# Patient Record
Sex: MALE | Race: BLACK OR AFRICAN AMERICAN | Employment: OTHER | URBAN - METROPOLITAN AREA
[De-identification: names, ages, dates, MRNs, and addresses within clinical notes are randomized per-mention and may not be internally consistent; named-entity substitution may affect disease eponyms.]

---

## 2017-01-17 ENCOUNTER — HOSPITAL ENCOUNTER (OUTPATIENT)
Dept: LAB | Age: 67
Discharge: HOME | End: 2017-01-17
Payer: MEDICARE

## 2017-01-17 ENCOUNTER — LAB ONLY (OUTPATIENT)
Dept: INTERNAL MEDICINE CLINIC | Age: 67
End: 2017-01-17

## 2017-01-17 DIAGNOSIS — I10 ESSENTIAL HYPERTENSION: ICD-10-CM

## 2017-01-17 DIAGNOSIS — E11.9 DIABETES MELLITUS WITHOUT COMPLICATION (HCC): ICD-10-CM

## 2017-01-17 DIAGNOSIS — N18.3 CKD (CHRONIC KIDNEY DISEASE), STAGE 3 (MODERATE): ICD-10-CM

## 2017-01-17 DIAGNOSIS — F32.9 REACTIVE DEPRESSION: ICD-10-CM

## 2017-01-17 DIAGNOSIS — M48.02 CERVICAL SPINAL STENOSIS: ICD-10-CM

## 2017-01-17 PROCEDURE — 82043 UR ALBUMIN QUANTITATIVE: CPT

## 2017-01-17 PROCEDURE — 80061 LIPID PANEL: CPT

## 2017-01-17 PROCEDURE — 80053 COMPREHEN METABOLIC PANEL: CPT

## 2017-01-17 PROCEDURE — 36415 COLL VENOUS BLD VENIPUNCTURE: CPT

## 2017-01-17 PROCEDURE — 83036 HEMOGLOBIN GLYCOSYLATED A1C: CPT

## 2017-01-18 LAB
ALBUMIN SERPL-MCNC: 4.5 G/DL (ref 3.6–4.8)
ALBUMIN/CREAT UR: 91.7 MG/G CREAT (ref 0–30)
ALBUMIN/GLOB SERPL: 1.4 {RATIO} (ref 1.1–2.5)
ALP SERPL-CCNC: 57 IU/L (ref 39–117)
ALT SERPL-CCNC: 15 IU/L (ref 0–44)
AST SERPL-CCNC: 20 IU/L (ref 0–40)
BILIRUB SERPL-MCNC: 0.7 MG/DL (ref 0–1.2)
BUN SERPL-MCNC: 26 MG/DL (ref 8–27)
BUN/CREAT SERPL: 20 (ref 10–22)
CALCIUM SERPL-MCNC: 9.6 MG/DL (ref 8.6–10.2)
CHLORIDE SERPL-SCNC: 101 MMOL/L (ref 96–106)
CHOLEST SERPL-MCNC: 213 MG/DL (ref 100–199)
CO2 SERPL-SCNC: 24 MMOL/L (ref 18–29)
CREAT SERPL-MCNC: 1.33 MG/DL (ref 0.76–1.27)
CREAT UR-MCNC: 225.7 MG/DL
EST. AVERAGE GLUCOSE BLD GHB EST-MCNC: 143 MG/DL
GLOBULIN SER CALC-MCNC: 3.3 G/DL (ref 1.5–4.5)
GLUCOSE SERPL-MCNC: 129 MG/DL (ref 65–99)
HBA1C MFR BLD: 6.6 % (ref 4.8–5.6)
HDLC SERPL-MCNC: 68 MG/DL
LDLC SERPL CALC-MCNC: 128 MG/DL (ref 0–99)
MICROALBUMIN UR-MCNC: 207 UG/ML
POTASSIUM SERPL-SCNC: 4.4 MMOL/L (ref 3.5–5.2)
PROT SERPL-MCNC: 7.8 G/DL (ref 6–8.5)
SODIUM SERPL-SCNC: 141 MMOL/L (ref 134–144)
TRIGL SERPL-MCNC: 83 MG/DL (ref 0–149)
VLDLC SERPL CALC-MCNC: 17 MG/DL (ref 5–40)

## 2017-01-19 RX ORDER — LORAZEPAM 0.5 MG/1
TABLET ORAL
Qty: 20 TAB | Refills: 0 | Status: SHIPPED | OUTPATIENT
Start: 2017-01-19 | End: 2017-01-21 | Stop reason: SDUPTHER

## 2017-01-22 RX ORDER — LORAZEPAM 0.5 MG/1
TABLET ORAL
Qty: 20 TAB | Refills: 0 | Status: SHIPPED | OUTPATIENT
Start: 2017-01-22 | End: 2017-02-24 | Stop reason: SDUPTHER

## 2017-01-22 RX ORDER — METOPROLOL SUCCINATE 50 MG/1
TABLET, EXTENDED RELEASE ORAL
Qty: 30 TAB | Refills: 0 | Status: SHIPPED | OUTPATIENT
Start: 2017-01-22 | End: 2017-01-24

## 2017-01-22 RX ORDER — HYDROCHLOROTHIAZIDE 12.5 MG/1
TABLET ORAL
Qty: 30 TAB | Refills: 0 | Status: SHIPPED | OUTPATIENT
Start: 2017-01-22 | End: 2017-01-24

## 2017-01-23 ENCOUNTER — DOCUMENTATION ONLY (OUTPATIENT)
Dept: INTERNAL MEDICINE CLINIC | Age: 67
End: 2017-01-23

## 2017-01-24 ENCOUNTER — OFFICE VISIT (OUTPATIENT)
Dept: INTERNAL MEDICINE CLINIC | Age: 67
End: 2017-01-24

## 2017-01-24 VITALS
WEIGHT: 190 LBS | HEART RATE: 75 BPM | SYSTOLIC BLOOD PRESSURE: 111 MMHG | DIASTOLIC BLOOD PRESSURE: 68 MMHG | OXYGEN SATURATION: 99 % | BODY MASS INDEX: 30.53 KG/M2 | HEIGHT: 66 IN | TEMPERATURE: 97.3 F

## 2017-01-24 DIAGNOSIS — Z13.39 SCREENING FOR ALCOHOLISM: ICD-10-CM

## 2017-01-24 DIAGNOSIS — Z00.00 ROUTINE GENERAL MEDICAL EXAMINATION AT A HEALTH CARE FACILITY: ICD-10-CM

## 2017-01-24 DIAGNOSIS — G47.00 INSOMNIA, UNSPECIFIED TYPE: ICD-10-CM

## 2017-01-24 DIAGNOSIS — I10 ESSENTIAL HYPERTENSION: ICD-10-CM

## 2017-01-24 DIAGNOSIS — E78.00 PURE HYPERCHOLESTEROLEMIA: ICD-10-CM

## 2017-01-24 DIAGNOSIS — E11.9 DIABETES MELLITUS WITHOUT COMPLICATION (HCC): Primary | ICD-10-CM

## 2017-01-24 DIAGNOSIS — R59.9 LYMPH NODE ENLARGEMENT: ICD-10-CM

## 2017-01-24 DIAGNOSIS — F32.9 REACTIVE DEPRESSION: ICD-10-CM

## 2017-01-24 DIAGNOSIS — N18.3 CKD (CHRONIC KIDNEY DISEASE), STAGE 3 (MODERATE): ICD-10-CM

## 2017-01-24 DIAGNOSIS — Z13.31 SCREENING FOR DEPRESSION: ICD-10-CM

## 2017-01-24 DIAGNOSIS — M48.02 CERVICAL SPINAL STENOSIS: ICD-10-CM

## 2017-01-24 RX ORDER — ROSUVASTATIN CALCIUM 10 MG/1
10 TABLET, COATED ORAL
Qty: 30 TAB | Refills: 6 | Status: SHIPPED | OUTPATIENT
Start: 2017-01-24 | End: 2017-09-02 | Stop reason: SDUPTHER

## 2017-01-24 NOTE — PATIENT INSTRUCTIONS
Medicare Part B Preventive Services  Limitations  Recommendation/Date completed if known  Scheduled/ Next Due    Bone Mass Measurement  (age 72 & older, biennial)  Requires diagnosis related to osteoporosis or estrogen deficiency. Biennial benefit unless patient has history of long-term glucocorticoid tx or baseline is needed because initial test was by other method  n/a      Recommended every 2 years  n/a   Cardiovascular Screening Blood Tests (every 5 years)  Total cholesterol, HDL, Triglycerides  Order as a panel if possible  Completed January 2017      Recommended annually  Due January 2018   Colorectal Cancer Screening  -Fecal occult blood test (annual)  -Flexible sigmoidoscopy (5y)  -Screening colonoscopy (10y)  -Barium Enema     Completed:      9/15/15      Recommended every 10 years  DUE:      September 2020 per Dr. Darrian Sun    Counseling to Prevent Tobacco Use (up to 8 sessions per year)  - Counseling greater than 3 and up to 10 minutes  - Counseling greater than 10 minutes  Patients must be asymptomatic of tobacco-related conditions to receive as preventive service     Keep up the good work! Diabetes Screening Tests (at least every 3 years, Medicare covers annually or at 6-month intervals for prediabetic patients)      Fasting blood sugar (FBS) or glucose tolerance test (GTT)     Patient must be diagnosed with one of the following:  -Hypertension, Dyslipidemia, obesity, previous impaired FBS or GTT  Or any two of the following: overweight, FH of diabetes, age ? 72, history of gestational diabetes, birth of baby weighing more than 9 pounds  Completed:      January 2017 A1c 6.6%      Recommended annually  DUE:      Every 3-6 months    Diabetes Self-Management Training (DSMT) (no USPSTF recommendation)  Requires referral by treating physician for patient with diabetes or renal disease. 10 hours of initial DSMT session of no less than 30 minutes each in a continuous 12-month period.  2 hours of follow-up DSMT in subsequent years. n/a n/a   Glaucoma Screening (no USPSTF recommendation)  Diabetes mellitus, family history, , age 48 or over,  American, age 72 or over  Completed April 2016 Dr. Beatriz Yi  Recommended annually  Due April 2017   Human Immunodeficiency Virus (HIV) Screening (annually for increased risk patients)  HIV-1 and HIV-2 by EIA, RUKHSANA, rapid antibody test, or oral mucosa transudate  Patient must be at increased risk for HIV infection per USPSTF guidelines or pregnant. Tests covered annually for patients at increased risk. Pregnant patients may receive up to 3 test during pregnancy. n/a N/a   Medical Nutrition Therapy (MNT) (for diabetes or renal disease not recommended schedule)  Requires referral by treating physician for patient with diabetes or renal disease. Can be provided in same year as diabetes self-management training (DSMT), and CMS recommends medical nutrition therapy take place after DSMT. Up to 3 hours for initial year and 2 hours in subsequent years. n/a n/a   Prostate Cancer Screening (annually up to age 76)  - Digital rectal exam (NORA)  - Prostate specific antigen (PSA)  Annually (age 48 or over), NORA not paid separately when covered E/M service is provided on same date  Completed July 2016      Recommended annually  Due July 2017   Seasonal Influenza Vaccination (annually)     Completed:      Declined  Recommended annually      DUE every Fall    Pneumococcal Vaccination (once after 65)     Completed:   November 2014   You are eligible for Prevnar 13 vaccine. You can obtain this at your local pharmacy   Shingles vaccine         Hepatitis B Vaccinations (if medium/high risk)  Medium/high risk factors: End-stage renal disease,  Hemophiliacs who received Factor VIII or IX concentrates, Clients of institutions for the mentally retarded, Persons who live in the same house as a HepB virus carrier, Homosexual men, Illicit injectable drug abusers.   n/a n/a Ultrasound Screening for Abdominal Aortic Aneurysm (AAA) (once)  Patient must be referred through IPPE and not have had a screening for abdominal aortic aneurysm before under Medicare. Limited to patients who meet one of the following criteria:  - Men who are 73-68 years old and have smoked more than 100 cigarettes in their lifetime.  -Anyone with a FH of AAA  -Anyone recommended for screening by USPSTF  CT from 11/29/14 states:      \"The aorta tapers without aneurysm. \"       Please bring in a copy of your Advance Medical Directive when complete, to be added to your medical record. Thank you!     Change pravachol to crestor    Start januvia 100mg daily

## 2017-01-24 NOTE — PROGRESS NOTES
HISTORY OF PRESENT ILLNESS  Adela Garcia is a 74 y.o. male. HPI   Last here 11/15/16.  Pt is here to f/u on chronic conditions  Pt presents with his daughter who provides some of his hx     BP today is 111/68  Pt has been monitoring BP at home but cannot recall readings  Continues toprol 50mg and HCTZ 12.5mg daily  Advised writing down home readings and bringing these in for review    BS at home running around 120s in the AM fasting  Not currently taking anything for this, had been diet controlled   Discussed his a1c is now in diabetic ranges and will require treatment   I recommend starting januvia daily at this time, very well tolerated, would be once daily, does not cause low blood sugars and is weight neutral, would start renally dosed 50mg daily  Advised working on his diet and working on more diabetic friendly diet     Last visit, I increased his celexa to 20mg daily for depression/anxiety  He states this is improving his mood overall   Pt is happy with this higher dose of celexa, working well for him  Pt has ativan to use prn for anxiety, takes this about 2-3 times per month    Pt follows with Dr. Pretty Wilburn (cardio)   Last saw him in 11/16, will follow annually    Pt reports of knot to his R neck   He noticed this after his neck surgery  Discussed lymph node, likely reactive from surgery  Will order US to evaluate this further     Reviewed last labs 1/17  Cr braden, BS elevated    Continues pravachol 40mg daily for cholesterol- not at goal in January   He misses frequent doses of this per his daughter but pt states he takes every night  Discussed pravachol is a weaker statin and would recommend changing to crestor  His daughter states pt has taken lipitor in the past   Will have him start crestor daily at this time     Wt is stable since last visit   Pt has been eating more the last several months   He had frequent company over the holidays  He has been drinking sodas and eating fast foods  Pt has been eating more fried chicken recently as well   Discussed diet and weight loss   Advised working to cut back on sodas and fast foods, reduce carbohydrates and increase proteins    Pt follows with  [facility] (surg), had surgery to his neck in 11/16  No longer taking any percocet for pain or flexeril muscle relaxer  Pt completed PT for this with improvement   He has f/u pending next week with this physician    Continues trazodone qhs for sleep, this works well for him    No longer taking prilosec for reflux, denies any sxs    Pt follows with Dr. Louisa Mendosa (nephr)  He missed his appt in 12/16 and will reschedule this     Pt's daughter states that pt is very wary of taking medications  When he gets prescriptions from the pharmacy he gets increased anxiety from reading bottles of medications and then is hesitant to take these regularly  Discussed this with patient     Recall sees dr Og Roly h/o dvt, the patient is no longer on coumadin or xarelto- just on asa  Recall was on coumadin for h/o PE and DVT post op.      PREVENTIVE:    Colonoscopy: 9/15/15, Dr. Jose Maldonado, repeat 5 years  EGD 9/15/15, Dr. Jose Maldonado  PSA: 7/15 3.0    AAA screen: CT 11/12, negative  Tdap: unknown   Pneumovax: 12/04/2012  [id number]: never had  Zostavax: never had   Flu shot: declines  Foot exam: 4/16  Microalbumin: 1/17 minimal  A1c: 11/12 12.2, 12/12 11.3 , 10/14 7.1, 7/15 6.3, 9/15 6.4, 12/15 5.9, 4/16 6.2, 10/16 5.9, 1/17 6.6  Eye exam: Dr. Ric Mckoy, 3/16/16, mild diabetic retinopathy  HepC Screen: 10/15 negative  Lipids: 1/17,         Patient Active Problem List    Diagnosis Date Noted    Reactive depression 11/15/2016    Cervical spinal stenosis 11/03/2016    CKD (chronic kidney disease) 10/31/2016    ACP (advance care planning) 12/11/2015    Diabetes mellitus without complication ([facility] [address] 95/20/8008    Hyperlipidemia 07/10/2015    Spinal stenosis, lumbar region, without neurogenic claudication 10/16/2014    Lumbar disc disease with radiculopathy 10/16/2014    DVT of leg (deep venous thrombosis) ([facility] [address] 12/18/2012    HTN (hypertension) 12/12/2012    Microalbuminuria 12/12/2012    Perforated diverticulum of large intestine 12/04/2012    Alcohol abuse 12/04/2012    Back pain 12/04/2012    Insomnia 12/04/2012    Perforated viscus 11/21/2012     Current Outpatient Prescriptions   Medication Sig Dispense Refill    LORazepam (ATIVAN) 0.5 mg tablet TAKE ONE TABLET BY MOUTH NIGHTLY AS NEEDED FOR ANXIETY 20 Tab 0    metoprolol succinate (TOPROL-XL) 50 mg XL tablet TAKE ONE TABLET BY MOUTH ONCE DAILY 30 Tab 0    hydroCHLOROthiazide (HYDRODIURIL) 12.5 mg tablet TAKE ONE TABLET BY MOUTH ONCE DAILY 30 Tab 0    metoprolol succinate (TOPROL-XL) 50 mg XL tablet TAKE ONE TABLET BY MOUTH ONCE DAILY 30 Tab 0    hydroCHLOROthiazide (HYDRODIURIL) 12.5 mg tablet TAKE ONE TABLET BY MOUTH ONCE DAILY 30 Tab 0    glucose blood VI test strips (FREESTYLE INSULINX) strip Check blood sugar once daily. 100 Strip 12    citalopram (CELEXA) 10 mg tablet TAKE ONE TABLET BY MOUTH ONCE DAILY 30 Tab 0    traZODone (DESYREL) 50 mg tablet TAKE ONE TABLET BY MOUTH NIGHTLY. 30 Tab 6    glucose blood VI test strips (FREESTYLE TEST) strip Check blood sugar once daily. 100 Strip 2    lancets (FREESTYLE LANCETS) 28 gauge misc Check blood sugar once daily 100 Lancet 2    lancets (ONE TOUCH DELICA) 33 gauge misc Check blood sugar twice daily. 1 Package 3    glucose blood VI test strips (FREESTYLE LITE STRIPS) strip Check blood sugar once daily. 100 Strip 2    cyclobenzaprine (FLEXERIL) 10 mg tablet Take 1 Tab by mouth two (2) times daily as needed for Muscle Spasm(s). 60 Tab 0    oxyCODONE-acetaminophen (PERCOCET) 5-325 mg per tablet Take 1 Tab by mouth every four (4) hours as needed. Max Daily Amount: 6 Tabs. 30 Tab 0    citalopram (CELEXA) 20 mg tablet Take 1 Tab by mouth daily.  30 Tab 3    Omeprazole delayed release (PRILOSEC D/R) 20 mg tablet Take 1 Tab by mouth daily. (Patient not taking: Reported on 11/4/2016) 30 Tab 1    glucose blood VI test strips (ONETOUCH VERIO) strip Check blood sugar twice daily. 100 Strip 3    Blood-Glucose Meter (ONETOUCH VERIO IQ METER) misc Check blood sugar twice daily. 1 Each 0    pravastatin (PRAVACHOL) 40 mg tablet Take 1 Tab by mouth nightly. 30 Tab 6    cyanocobalamin 1,000 mcg tablet Take 1,000 mcg by mouth daily.  nitroglycerin (NITROSTAT) 0.4 mg SL tablet 1 Tab by SubLINGual route every five (5) minutes as needed for Chest Pain (call 911 if not relieved by 3 tabs). 25 Tab 1    FERROUS FUMARATE (IRON PO) Take  by mouth.  CINNAMON BARK (CINNAMON PO) Take  by mouth daily.  magnesium 250 mg tab Take  by mouth daily.  Blood-Glucose Meter monitoring kit Please use as directed twice daily. 1 Kit 0    glucose blood VI test strips (ASCENSIA AUTODISC VI, ONE TOUCH ULTRA TEST VI) strip Use to check sugars twice daily.  1 Package 11     Past Surgical History   Procedure Laterality Date    Hx gi  11/2012     bowel resection    Pr abdomen surgery proc unlisted       bowel resection    Hx orthopaedic       amputated left 4th finger    Hx back surgery  2014      Lab Results  Component Value Date/Time   WBC 5.4 10/26/2016 09:49 AM   Hemoglobin (POC) 13.3 11/02/2016 07:15 AM   HGB 13.0 10/26/2016 09:49 AM   Hematocrit (POC) 39 11/02/2016 07:15 AM   HCT 39.4 10/26/2016 09:49 AM   PLATELET 187 11/69/3866 09:49 AM   MCV 80.2 10/26/2016 09:49 AM       Lab Results  Component Value Date/Time   Cholesterol, total 213 01/17/2017 09:29 AM   HDL Cholesterol 68 01/17/2017 09:29 AM   LDL, calculated 128 01/17/2017 09:29 AM   Triglyceride 83 01/17/2017 09:29 AM       Lab Results  Component Value Date/Time   GFR est AA 63 01/17/2017 09:29 AM   GFR est non-AA 55 01/17/2017 09:29 AM   Creatinine (POC) 1.4 11/02/2016 07:15 AM   Creatinine 1.33 01/17/2017 09:29 AM   BUN 26 01/17/2017 09:29 AM   BUN (POC) 28 11/02/2016 07:15 AM   Sodium (POC) 140 11/02/2016 07:15 AM   Sodium 141 01/17/2017 09:29 AM   Potassium 4.4 01/17/2017 09:29 AM   Potassium (POC) 4.1 11/02/2016 07:15 AM   Chloride (POC) 105 11/02/2016 07:15 AM   Chloride 101 01/17/2017 09:29 AM   CO2 24 01/17/2017 09:29 AM         Review of Systems   Respiratory: Negative for shortness of breath. Cardiovascular: Negative for chest pain. Physical Exam   Constitutional: He is oriented to person, place, and time. He appears well-developed and well-nourished. No distress. HENT:   Head: Normocephalic and atraumatic. Eyes: Conjunctivae and EOM are normal. Right eye exhibits no discharge. Left eye exhibits no discharge. Neck: Normal range of motion. Neck supple. Cardiovascular: Normal rate, regular rhythm, normal heart sounds and intact distal pulses. Exam reveals no gallop and no friction rub. No murmur heard. Pulmonary/Chest: Effort normal and breath sounds normal. No respiratory distress. He has no wheezes. He has no rales. He exhibits no tenderness. Musculoskeletal: He exhibits no edema, tenderness or deformity. Lymphadenopathy:     He has no cervical adenopathy. Neurological: He is alert and oriented to person, place, and time. He has normal reflexes. Coordination abnormal.   Skin: Skin is warm and dry. No rash noted. He is not diaphoretic. No erythema. No pallor. [place] size lymph node R neck   Psychiatric: He has a normal mood and affect. His behavior is normal.       ASSESSMENT and PLAN    ICD-10-CM ICD-9-CM    1. Diabetes mellitus without complication ([facility] [address]    B5N up in diabetic range again and home readings up as well, wt not significantly changed but diet is quite poor, need to treat at this time,     will give januvia 50mg daily, renally dosed, continue to monitor BS at home   E11.9 250.00    2. Essential hypertension    BP well controlled on HCTZ 12.5mg and toprol 50mg. Continue no change to dose   I10 401.9    3.  Insomnia, unspecified type    Uses trazodone every night, this works well    G47.00 780.52    4. CKD (chronic kidney disease), stage 3 (moderate)    Cr runs from 1.2-1.4 baseline, will repeat BMP today to ensure stable, pt has appt with Dr. Belia Ferguson pending for further evaluation in 2/17, he has seen her in the past but not recently. N18.3 585.3    5. Cervical spinal stenosis    S/p surgical fusion with Dr. Asher Pendleton, overall pain improved, not using narcotics or other pain medications any further. M48.02 723.0    6. Reactive depression    Increased his celexa to 20mg last visit, this worked quite well, happy with dose. Still has some bad days, uses ativan about 2 times per week for his anxiety, will continue to prescribe. F32.9 300.4    7. Pure hypercholesterolemia    Not at goal on pravachol, reports compliance though daughter thinks this is questionable, will change pravachol to crestor 10mg daily, he thinks he had myalgias on lipitor in the past but he is not sure. E78.00 272.0    8. Lymph node enlargement    Check US of lymph node, unclear if reactive from recent surgery, may need biopsy per ENT next, await results. R59.9 785.6 US THYROID/PARATHYROID/SOFT TISS   9. Routine general medical examination at a health care facility Z00.00 V70.0 [address]   10. Screening for alcoholism Z13.89 V79.1    11. Screening for depression Z13.89 V79.0 DEPRESSION SCREEN ANNUAL          Written by Krystina Fernandez, as dictated by Cuca Vergara MD.    Current diagnosis and concerns discussed with pt at length. Understands risks and benefits or current treatment plan and medications and accepts the treatment and medication with any possible risks.   Pt asks appropriate questions which were answered.   Pt instructed to call with any concerns or problems.

## 2017-01-24 NOTE — PROGRESS NOTES
NNAWV    This is a Subsequent Medicare Annual Wellness Visit providing Personalized Prevention Plan Services (PPPS) (Performed 12 months after initial AWV and PPPS )    I have reviewed the patient's medical history in detail and updated the computerized patient record. History     Past Medical History   Diagnosis Date    Arthritis     Chronic kidney disease      Stage 3    Chronic pain      Abdoman, back, fingers per daughter   [facility])      DIET CONTROLLED    Hypertension     PE (pulmonary embolism)     Pneumonia     Psychiatric disorder      Depression      Past Surgical History   Procedure Laterality Date    Hx gi  11/2012     bowel resection    Pr abdomen surgery proc unlisted       bowel resection    Hx orthopaedic       amputated left 4th finger    Hx back surgery  2014     Current Outpatient Prescriptions   Medication Sig Dispense Refill    rosuvastatin (CRESTOR) 10 mg tablet Take 1 Tab by mouth nightly. 30 Tab 6    SITagliptin (JANUVIA) 50 mg tablet Take 1 Tab by mouth daily. 30 Tab 6    LORazepam (ATIVAN) 0.5 mg tablet TAKE ONE TABLET BY MOUTH NIGHTLY AS NEEDED FOR ANXIETY 20 Tab 0    metoprolol succinate (TOPROL-XL) 50 mg XL tablet TAKE ONE TABLET BY MOUTH ONCE DAILY 30 Tab 0    hydroCHLOROthiazide (HYDRODIURIL) 12.5 mg tablet TAKE ONE TABLET BY MOUTH ONCE DAILY 30 Tab 0    traZODone (DESYREL) 50 mg tablet TAKE ONE TABLET BY MOUTH NIGHTLY. 30 Tab 6    citalopram (CELEXA) 20 mg tablet Take 1 Tab by mouth daily. 30 Tab 3    glucose blood VI test strips (ONETOUCH VERIO) strip Check blood sugar twice daily. 100 Strip 3    cyanocobalamin 1,000 mcg tablet Take 1,000 mcg by mouth daily.  FERROUS FUMARATE (IRON PO) Take  by mouth.  magnesium 250 mg tab Take  by mouth daily.  nitroglycerin (NITROSTAT) 0.4 mg SL tablet 1 Tab by SubLINGual route every five (5) minutes as needed for Chest Pain (call 911 if not relieved by 3 tabs).  25 Tab 1     Allergies   Allergen Reactions    Arb-Angiotensin Receptor Antagonist Other (comments)     High k     Family History   Problem Relation Age of Onset    Cancer Mother     Anesth Problems Neg Hx      Social History   Substance Use Topics    Smoking status: Never Smoker    Smokeless tobacco: Current User    Alcohol use 4.2 oz/week     7 Cans of beer per week      Comment: 7-10 per week     Patient Active Problem List   Diagnosis Code    Perforated viscus R19.8    Perforated 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    Alcohol abuse F10.10    Back pain M54.9    Insomnia G47.00    HTN (hypertension) I10    Microalbuminuria R80.9    DVT of leg (deep venous thrombosis) [facility] I82.409    Spinal stenosis, lumbar region, without neurogenic claudication M48.06    Lumbar disc disease with radiculopathy M51.16    Hyperlipidemia E78.5    ACP (advance care planning) Z71.89    Diabetes mellitus without complication ([facility] S35.3    CKD (chronic kidney disease) N18.9    Cervical spinal stenosis M48.02    Reactive depression F32.9       Depression Risk Factor Screening:     PHQ 2 / 9, over the last two weeks 1/24/2017   Little interest or pleasure in doing things Several days   Feeling down, depressed or hopeless Several days   Total Score PHQ 2 2     Alcohol Risk Factor Screening: On any occasion during the past 3 months, have you had more than 4 drinks containing alcohol? Yes    Do you average more than 14 drinks per week? Yes      Functional Ability and Level of Safety:     Hearing Loss   mild-to-moderate    Activities of Daily Living   Partial assistance. Requires assistance with: driving, shopping, cleaning house    Fall Risk     Fall Risk Assessment, last 12 mths 1/24/2017   Able to walk? Yes   Fall in past 12 months?  No     Abuse Screen   Patient is not abused    Review of Systems   Not required    Physical Examination     Evaluation of Cognitive Function:  Mood/affect:  neutral  Appearance: age appropriate and casually dressed  Family member/caregiver input: daughter present. No additional information. No exam performed today, MAWV. Patient Care Team:  Jagjit Rubio MD as PCP - General (Internal Medicine)  Danielle Stewart RN as Nurse Navigator  Duyen Faulkner MD as Physician (Cardiology)  Kennedy Zapata MD (Nephrology)  Nery Marlow MD (Neurosurgery)    Advice/Referrals/Counseling   Education and counseling provided:  Are appropriate based on today's review and evaluation  End-of-Life planning (with patient's consent)  Prostate cancer screening tests (PSA, covered annually)  Colorectal cancer screening tests  Screening for glaucoma  Diabetes screening test    Assessment/Plan   1. Information on Advanced Medical Directive discussed with patient. Daughter states he does have a blank AMD form and has received information on it. States he does intend to complete the form. Asked that patient bring a copy of the completed form to the office. 2.  Exercise level - states he exercises regularly. Has been getting physical therapy, and uses those exercises at home    3. Pt lives alone, but his daughter lives nearby  who is his main support network. Medication reconciliation completed by MA and reviewed by provider. Family history and Care Team reviewed and updated. Patient provided with a copy of After Visit Summary and Preventative Screening table. Patient verbalized understanding of all information discussed. Wilbert Means

## 2017-01-24 NOTE — MR AVS SNAPSHOT
Visit Information Date & Time Provider Department Dept. Phone Encounter #  
 1/24/2017  1:45 PM Shena Dawson, [address] [place] [address],[address] [phone] [id number] Follow-up Instructions Return in about 3 months (around 4/24/2017). Upcoming Health Maintenance Date Due DTaP/Tdap/Td series (1 - Tdap) 7/5/1971 EYE EXAM RETINAL OR DILATED Q1 3/16/2017 FOOT EXAM Q1 4/12/2017 HEMOGLOBIN A1C Q6M 7/17/2017 Pneumococcal 65+ High/Highest Risk (2 of 2 - PPSV23) 12/4/2017 MICROALBUMIN Q1 1/17/2018 LIPID PANEL Q1 1/17/2018 MEDICARE YEARLY EXAM 1/25/2018 GLAUCOMA SCREENING Q2Y 3/16/2018 COLONOSCOPY 9/15/2020 Allergies as of 1/24/2017  Review Complete On: 1/24/2017 By: Shena Dawson MD  
  
 Severity Noted Reaction Type Reactions Arb-angiotensin Receptor Antagonist  07/12/2016    Other (comments) High k  
  
Current Immunizations  Reviewed on 10/17/2014 Name Date Influenza Vaccine 12/4/2012 Pneumococcal Polysaccharide (PPSV-23) 12/4/2012 Pneumococcal Vaccine (Unspecified Type) 11/27/2012 12:16 PM  
  
 Not reviewed this visit You Were Diagnosed With   
  
 Codes Comments Diabetes mellitus without complication ([facility]    -  Primary ICD-10-CM: E11.9 ICD-9-CM: 250.00 Essential hypertension     ICD-10-CM: I10 
ICD-9-CM: 401.9 Insomnia, unspecified type     ICD-10-CM: G47.00 ICD-9-CM: 780.52 CKD (chronic kidney disease), stage 3 (moderate)     ICD-10-CM: N18.3 ICD-9-CM: 179. 3 Cervical spinal stenosis     ICD-10-CM: M48.02 
ICD-9-CM: 723.0 Reactive depression     ICD-10-CM: F32.9 ICD-9-CM: 300.4 Pure hypercholesterolemia     ICD-10-CM: E78.00 ICD-9-CM: 272.0 Lymph node enlargement     ICD-10-CM: R59.9 ICD-9-CM: 785.6 Routine general medical examination at a health care facility     ICD-10-CM: Z00.00 ICD-9-CM: V70.0 Screening for alcoholism     ICD-10-CM: Z13.89 ICD-9-CM: V79.1 Screening for depression     ICD-10-CM: Z13.89 ICD-9-CM: V79.0 Vitals BP Pulse Temp Height(growth percentile) Weight(growth percentile) SpO2  
 111/68 (BP 1 Location: Left arm, BP Patient Position: Sitting) 75 97.3 °F (36.3 °C) (Oral) 5' 5.5\" (1.664 m) 190 lb (86.2 kg) 99% BMI Smoking Status 31.14 kg/m2 Never Smoker BMI and BSA Data Body Mass Index Body Surface Area  
 31.14 kg/m 2 2 m 2 Preferred Pharmacy Pharmacy Name Phone [facility] [address] , [address] [place] [address] [phone] Your Updated Medication List  
  
   
This list is accurate as of: 1/24/17  2:24 PM.  Always use your most recent med list.  
  
  
  
  
 citalopram 20 mg tablet Commonly known as:  [place] Party Take 1 Tab by mouth daily. cyanocobalamin 1,000 mcg tablet Take 1,000 mcg by mouth daily. glucose blood VI test strips strip Commonly known as:  Twyla Cisneros Check blood sugar twice daily. hydroCHLOROthiazide 12.5 mg tablet Commonly known as:  HYDRODIURIL  
TAKE ONE TABLET BY MOUTH ONCE DAILY  
  
 IRON PO Take  by mouth. LORazepam 0.5 mg tablet Commonly known as:  ATIVAN  
TAKE ONE TABLET BY MOUTH NIGHTLY AS NEEDED FOR ANXIETY  
  
 magnesium 250 mg Tab Take  by mouth daily. metoprolol succinate 50 mg XL tablet Commonly known as:  TOPROL-XL  
TAKE ONE TABLET BY MOUTH ONCE DAILY  
  
 nitroglycerin 0.4 mg SL tablet Commonly known as:  NITROSTAT  
1 Tab by SubLINGual route every five (5) minutes as needed for Chest Pain (call 911 if not relieved by 3 tabs). rosuvastatin 10 mg tablet Commonly known as:  CRESTOR Take 1 Tab by mouth nightly. SITagliptin 50 mg tablet Commonly known as:  Yolis Bump Take 1 Tab by mouth daily. traZODone 50 mg tablet Commonly known as:  DESYREL  
TAKE ONE TABLET BY MOUTH NIGHTLY. Prescriptions Sent to Pharmacy Refills rosuvastatin (CRESTOR) 10 mg tablet 6 Sig: Take 1 Tab by mouth nightly. Class: Normal  
 Pharmacy: [facility] [address], [address] Ph #: [phone] Route: Oral  
 SITagliptin (JANUVIA) 50 mg tablet 6 Sig: Take 1 Tab by mouth daily. Class: Normal  
 Pharmacy: [facility] [address], [address] Ph #: [phone] Route: Oral  
  
We Performed the Following Jocelyn  [[id number] [facility]] Follow-up Instructions Return in about 3 months (around 4/24/2017). To-Do List   
 01/24/2017 Imaging:  US THYROID/PARATHYROID/SOFT TISS Patient Instructions Medicare Part B Preventive Services  Limitations  Recommendation/Date completed if known  Scheduled/ Next Due Bone Mass Measurement 
(age 72 & older, biennial)  Requires diagnosis related to osteoporosis or estrogen deficiency. Biennial benefit unless patient has history of long-term glucocorticoid tx or baseline is needed because initial test was by other method  n/a 
   
Recommended every 2 years  n/a Cardiovascular Screening Blood Tests (every 5 years) Total cholesterol, HDL, Triglycerides  Order as a panel if possible  Completed January 2017 
   
Recommended annually  Due January 2018 Colorectal Cancer Screening 
-Fecal occult blood test (annual) -Flexible sigmoidoscopy (5y) 
-Screening colonoscopy (10y) -Barium Enema     Completed: 
   
9/15/15 
   
Recommended every 10 years  DUE: 
   
September 2020 per Dr. Radha Naik   
Counseling to Prevent Tobacco Use (up to 8 sessions per year) - Counseling greater than 3 and up to 10 minutes - Counseling greater than 10 minutes  Patients must be asymptomatic of tobacco-related conditions to receive as preventive service     Keep up the good work!   
Diabetes Screening Tests (at least every 3 years, Medicare covers annually or at 6-month intervals for prediabetic patients) 
   
 Fasting blood sugar (FBS) or glucose tolerance test (GTT) 
   Patient must be diagnosed with one of the following: 
-Hypertension, Dyslipidemia, obesity, previous impaired FBS or GTT 
Or any two of the following: overweight, FH of diabetes, age ? 72, history of gestational diabetes, birth of baby weighing more than 9 pounds  Completed: 
   
January 2017 A1c 6.6% 
   
Recommended annually  DUE: 
   
Every 3-6 months Diabetes Self-Management Training (DSMT) (no USPSTF recommendation)  Requires referral by treating physician for patient with diabetes or renal disease. 10 hours of initial DSMT session of no less than 30 minutes each in a continuous 12-month period. 2 hours of follow-up DSMT in subsequent years. n/a n/a Glaucoma Screening (no USPSTF recommendation)  Diabetes mellitus, family history, , age 48 or over,  American, age 72 or over  Completed April 2016 Dr. Lyssa Loera 
Recommended annually  Due April 2017 Human Immunodeficiency Virus (HIV) Screening (annually for increased risk patients) HIV-1 and HIV-2 by EIA, RUKHSANA, rapid antibody test, or oral mucosa transudate  Patient must be at increased risk for HIV infection per USPSTF guidelines or pregnant. Tests covered annually for patients at increased risk. Pregnant patients may receive up to 3 test during pregnancy. n/a N/a Medical Nutrition Therapy (MNT) (for diabetes or renal disease not recommended schedule)  Requires referral by treating physician for patient with diabetes or renal disease. Can be provided in same year as diabetes self-management training (DSMT), and CMS recommends medical nutrition therapy take place after DSMT. Up to 3 hours for initial year and 2 hours in subsequent years. n/a n/a Prostate Cancer Screening (annually up to age 76) - Digital rectal exam (NORA) - Prostate specific antigen (PSA)  Annually (age 48 or over), NORA not paid separately when covered E/M service is provided on same date  Completed July 2016 
   
Recommended annually  Due July 2017 Seasonal Influenza Vaccination (annually)     Completed: 
   
Declined  Recommended annually 
   
DUE every Fall Pneumococcal Vaccination (once after 65)     Completed:  
November 2014   You are eligible for Prevnar 13 vaccine. You can obtain this at your local pharmacy Shingles vaccine        
Hepatitis B Vaccinations (if medium/high risk)  Medium/high risk factors: End-stage renal disease, Hemophiliacs who received Factor VIII or IX concentrates, Clients of institutions for the mentally retarded, Persons who live in the same house as a HepB virus carrier, Homosexual men, Illicit injectable drug abusers. n/a n/a Ultrasound Screening for Abdominal Aortic Aneurysm (AAA) (once)  Patient must be referred through IPPE and not have had a screening for abdominal aortic aneurysm before under Medicare. Limited to patients who meet one of the following criteria: 
- Men who are 73-68 years old and have smoked more than 100 cigarettes in their lifetime. 
-Anyone with a FH of AAA 
-Anyone recommended for screening by USPSTF  CT from 11/29/14 states: 
   
\"The aorta tapers without aneurysm. \" Please bring in a copy of your Advance Medical Directive when complete, to be added to your medical record. Thank you! Change pravachol to crestor Start januvia 100mg daily Introducing [facility]! Brett Nolan introduces [employer] patient portal. Now you can access parts of your medical record, email your doctor's office, and request medication refills online. 1. In your internet browser, go to [email or web address] 2. Click on the First Time User? Click Here link in the Sign In box. You will see the New Member Sign Up page. 3. Enter your [employer] Access Code exactly as it appears below.  You will not need to use this code after youve completed the sign-up process. If you do not sign up before the expiration date, you must request a new code. · [employer] Access Code: [email or web address] Expires: 1/29/2017  8:48 AM 
 
4. Enter the last four digits of your Social Security Number (xxxx) and Date of Birth (mm/dd/yyyy) as indicated and click Submit. You will be taken to the next sign-up page. 5. Create a [employer] ID. This will be your [employer] login ID and cannot be changed, so think of one that is secure and easy to remember. 6. Create a [employer] password. You can change your password at any time. 7. Enter your Password Reset Question and Answer. This can be used at a later time if you forget your password. 8. Enter your e-mail address. You will receive e-mail notification when new information is available in [address] 9. Click Sign Up. You can now view and download portions of your medical record. 10. Click the Download Summary menu link to download a portable copy of your medical information. If you have questions, please visit the Frequently Asked Questions section of the [employer] website. Remember, [employer] is NOT to be used for urgent needs. For medical emergencies, dial 911. Now available from your iPhone and Android! Please provide this summary of care documentation to your next provider. Your primary care clinician is listed as Dee Burleson. If you have any questions after today's visit, please call [phone].

## 2017-01-31 ENCOUNTER — HOSPITAL ENCOUNTER (OUTPATIENT)
Dept: ULTRASOUND IMAGING | Age: 67
Discharge: HOME | End: 2017-01-31
Attending: INTERNAL MEDICINE
Payer: MEDICARE

## 2017-01-31 DIAGNOSIS — R59.9 LYMPH NODE ENLARGEMENT: ICD-10-CM

## 2017-01-31 PROCEDURE — 76536 US EXAM OF HEAD AND NECK: CPT

## 2017-01-31 NOTE — PROGRESS NOTES
Mild enlargement of lymph node on US please send to ENT for further eval--dr Ramin Deleon    Fax copy US to their office as well

## 2017-02-01 DIAGNOSIS — R59.9 ENLARGED LYMPH NODE: Primary | ICD-10-CM

## 2017-02-01 NOTE — PROGRESS NOTES
Spoke to [employer] (HIPAA). Two pt identifiers confirmed. Mary informed Mild enlargement of lymph node on US please send to ENT for further eval--dr abebe. Mary given contact info for Dr. Francia Jean. Farrukh Hassan informed that US results will be faxed to ENT. Farrukh Hassan verbalized understanding of information discussed w/ no further questions at this time. Results faxed to ENT and referral placed.

## 2017-02-13 ENCOUNTER — HOSPITAL ENCOUNTER (OUTPATIENT)
Dept: CT IMAGING | Age: 67
Discharge: HOME | End: 2017-02-13
Attending: OTOLARYNGOLOGY
Payer: MEDICARE

## 2017-02-13 DIAGNOSIS — R22.1 NECK MASS: ICD-10-CM

## 2017-02-13 PROCEDURE — 74011636320 HC RX REV CODE- 636/320: Performed by: OTOLARYNGOLOGY

## 2017-02-13 PROCEDURE — 70491 CT SOFT TISSUE NECK W/DYE: CPT

## 2017-02-13 PROCEDURE — 74011250636 HC RX REV CODE- 250/636: Performed by: OTOLARYNGOLOGY

## 2017-02-13 RX ORDER — SODIUM CHLORIDE 0.9 % (FLUSH) 0.9 %
10 SYRINGE (ML) INJECTION
Status: COMPLETED | OUTPATIENT
Start: 2017-02-13 | End: 2017-02-13

## 2017-02-13 RX ORDER — SODIUM CHLORIDE 9 MG/ML
50 INJECTION, SOLUTION INTRAVENOUS
Status: COMPLETED | OUTPATIENT
Start: 2017-02-13 | End: 2017-02-13

## 2017-02-13 RX ADMIN — IOPAMIDOL 100 ML: 612 INJECTION, SOLUTION INTRAVENOUS at 08:22

## 2017-02-13 RX ADMIN — Medication 10 ML: at 08:22

## 2017-02-13 RX ADMIN — SODIUM CHLORIDE 50 ML/HR: 900 INJECTION, SOLUTION INTRAVENOUS at 08:22

## 2017-02-19 RX ORDER — METOPROLOL SUCCINATE 50 MG/1
TABLET, EXTENDED RELEASE ORAL
Qty: 30 TAB | Refills: 0 | Status: SHIPPED | OUTPATIENT
Start: 2017-02-19 | End: 2017-03-24 | Stop reason: SDUPTHER

## 2017-02-19 RX ORDER — HYDROCHLOROTHIAZIDE 12.5 MG/1
TABLET ORAL
Qty: 30 TAB | Refills: 0 | Status: SHIPPED | OUTPATIENT
Start: 2017-02-19 | End: 2017-03-24 | Stop reason: SDUPTHER

## 2017-02-21 NOTE — TELEPHONE ENCOUNTER
Pt needs test strips for the Freestyle machine, not the one touch. Please call [facility] [place] for further details as I don't show it on here?

## 2017-02-21 NOTE — TELEPHONE ENCOUNTER
I called and spoke with Richard Lombardi. She states patient needs Freestlye Insulinx test strips. She states patient checks 2-3 times daily. Rx pended.

## 2017-02-25 RX ORDER — LORAZEPAM 0.5 MG/1
TABLET ORAL
Qty: 20 TAB | Refills: 0 | Status: SHIPPED | OUTPATIENT
Start: 2017-02-25 | End: 2017-03-24 | Stop reason: SDUPTHER

## 2017-03-24 RX ORDER — HYDROCHLOROTHIAZIDE 12.5 MG/1
TABLET ORAL
Qty: 30 TAB | Refills: 0 | Status: SHIPPED | OUTPATIENT
Start: 2017-03-24 | End: 2017-04-25 | Stop reason: SDUPTHER

## 2017-03-24 RX ORDER — METOPROLOL SUCCINATE 50 MG/1
TABLET, EXTENDED RELEASE ORAL
Qty: 30 TAB | Refills: 0 | Status: SHIPPED | OUTPATIENT
Start: 2017-03-24 | End: 2017-04-25 | Stop reason: SDUPTHER

## 2017-03-24 RX ORDER — LORAZEPAM 0.5 MG/1
TABLET ORAL
Qty: 20 TAB | Refills: 0 | Status: SHIPPED | OUTPATIENT
Start: 2017-03-24 | End: 2017-05-04 | Stop reason: SDUPTHER

## 2017-04-05 ENCOUNTER — PATIENT OUTREACH (OUTPATIENT)
Dept: INTERNAL MEDICINE CLINIC | Age: 67
End: 2017-04-05

## 2017-04-05 NOTE — PROGRESS NOTES
NN - progress note:     Goals completed. Navigation type closed. Episode resolved.    Care Team updated

## 2017-04-26 RX ORDER — HYDROCHLOROTHIAZIDE 12.5 MG/1
TABLET ORAL
Qty: 30 TAB | Refills: 0 | Status: SHIPPED | OUTPATIENT
Start: 2017-04-26 | End: 2017-05-09 | Stop reason: SDUPTHER

## 2017-04-26 RX ORDER — METOPROLOL SUCCINATE 50 MG/1
TABLET, EXTENDED RELEASE ORAL
Qty: 30 TAB | Refills: 0 | Status: SHIPPED | OUTPATIENT
Start: 2017-04-26 | End: 2017-05-09 | Stop reason: SDUPTHER

## 2017-05-05 RX ORDER — LORAZEPAM 0.5 MG/1
TABLET ORAL
Qty: 20 TAB | Refills: 0 | Status: SHIPPED | OUTPATIENT
Start: 2017-05-05 | End: 2017-07-04 | Stop reason: SDUPTHER

## 2017-05-05 NOTE — TELEPHONE ENCOUNTER
The following prescription was called into pt's local pharmacy:    LORazepam (ATIVAN) 0.5 mg tablet [[id number]]   Order Details   Dose, Route, Frequency: As Directed    Dispense Quantity:  20 Tab Refills:  0 Fills Remaining:  --           Sig: TAKE ONE TABLET BY MOUTH NIGHTLY AS NEEDED FOR ANXIETY          Written Date:  05/05/17 Expiration Date:  --     Start Date:  05/05/17 End Date:  --            Ordering Provider:  -- ALEXX #:  -- NPI:  --    Authorizing Provider:  Mauro Mueller MD ALEXX #:  [id number] NPI:  [id number]       Original Order:  LORazepam (ATIVAN) 0.5 mg tablet [[id number]]      Pharmacy:  [address] #:  --     Pharmacy Comments:  --          Quantity Remaining:  -- Quantity Filled:  --

## 2017-05-09 ENCOUNTER — OFFICE VISIT (OUTPATIENT)
Dept: INTERNAL MEDICINE CLINIC | Age: 67
End: 2017-05-09

## 2017-05-09 ENCOUNTER — HOSPITAL ENCOUNTER (OUTPATIENT)
Dept: LAB | Age: 67
Discharge: HOME | End: 2017-05-09
Payer: MEDICARE

## 2017-05-09 VITALS
TEMPERATURE: 97.7 F | DIASTOLIC BLOOD PRESSURE: 83 MMHG | HEIGHT: 65 IN | BODY MASS INDEX: 31.59 KG/M2 | OXYGEN SATURATION: 99 % | WEIGHT: 189.6 LBS | SYSTOLIC BLOOD PRESSURE: 132 MMHG | HEART RATE: 75 BPM | RESPIRATION RATE: 16 BRPM

## 2017-05-09 DIAGNOSIS — I10 ESSENTIAL HYPERTENSION: ICD-10-CM

## 2017-05-09 DIAGNOSIS — F32.9 REACTIVE DEPRESSION: ICD-10-CM

## 2017-05-09 DIAGNOSIS — F41.9 ANXIETY: ICD-10-CM

## 2017-05-09 DIAGNOSIS — N18.3 CKD (CHRONIC KIDNEY DISEASE), STAGE 3 (MODERATE): ICD-10-CM

## 2017-05-09 DIAGNOSIS — E11.9 DIABETES MELLITUS WITHOUT COMPLICATION (HCC): Primary | ICD-10-CM

## 2017-05-09 DIAGNOSIS — E78.00 PURE HYPERCHOLESTEROLEMIA: ICD-10-CM

## 2017-05-09 DIAGNOSIS — G47.00 INSOMNIA, UNSPECIFIED TYPE: ICD-10-CM

## 2017-05-09 DIAGNOSIS — R97.20 PSA ELEVATION: ICD-10-CM

## 2017-05-09 DIAGNOSIS — G62.9 NEUROPATHY: ICD-10-CM

## 2017-05-09 PROCEDURE — 80061 LIPID PANEL: CPT

## 2017-05-09 PROCEDURE — 84443 ASSAY THYROID STIM HORMONE: CPT

## 2017-05-09 PROCEDURE — 36415 COLL VENOUS BLD VENIPUNCTURE: CPT

## 2017-05-09 PROCEDURE — 84153 ASSAY OF PSA TOTAL: CPT

## 2017-05-09 PROCEDURE — 80053 COMPREHEN METABOLIC PANEL: CPT

## 2017-05-09 PROCEDURE — 83036 HEMOGLOBIN GLYCOSYLATED A1C: CPT

## 2017-05-09 RX ORDER — GABAPENTIN 300 MG/1
300 CAPSULE ORAL
Qty: 30 CAP | Refills: 6 | Status: SHIPPED | OUTPATIENT
Start: 2017-05-09 | End: 2017-11-22 | Stop reason: SDUPTHER

## 2017-05-09 NOTE — PROGRESS NOTES
HISTORY OF PRESENT ILLNESS  Jami Brush is a 74 y.o. male. HPI   Last here 1/24/17.  Pt is here to f/u on chronic conditions  He presents with his daughter who provides some of his hx    BP today is 132/82  Pt has been monitoring BP at home but cannot recall readings  Continues toprol 50mg and HCTZ 12.5mg daily  Advised writing down home readings and bringing these in for review     BS at home running around 115, 116, 120s, 130s  Last visit, I started him on januvia 50mg daily   However, he notes he is taking this only when he remembers   Pt takes this on average about 5-6 days per week   Advised working on compliance with this   His daughter wonders about diabetic shoes- refer to podiatry     Continues celexa 20mg daily for depression/anxiety, wroks well, happy with dose   He has ativan to use prn for anxiety, uses about a few days per week   Discussed to avoid taking ativan every day, discussed addictive potential  Pt does continue to feel sad and down occasionally but overall doing well   Pt only feels sad when he lets himself sit around and think too much      Pt follows with Dr. Sabine Larson (cardio)   Last saw him in 11/16, will follow annually    Reviewed last labs 1/17  Repeat labs today     Wt is stable since last visit   Discussed diet and weight loss     Pt follows with Dr. Emerita Haley (nephr)--but has not seen her recently   He missed his appt in 12/16     Last visit, I changed his cholesterol medication from pravachol to crestor   Currently taking crestor 10mg daily and reports compliance, tolerating well   He may have missed a few doses since last visit     Pt had an enlarged lymph node to his neck last visit  Reviewed US thyroid: enlarged lymph node by L parotid gland   Reviewed CT neck: no persistent prominence of lymph node   Reviewed notes per Dr. Nely Alberts (ENT) 3/07/17: not a concern, no f/u needed   Will not see ENT further for now    Continues trazodone qhs for sleep   His daughter states he complains to her about sleep   Pt notes he can fall asleep but wakes up frequently   Advised focusing on sleep hygiene to improve this further     Pt drinks beer but not on daily basis   He states he primarily drinks when he has company over  He also drinks wine occasionally with company   Discussed he cannot drink alcohol and take ativan   Denies drinking in conjunction with this medication    Since last visit, pt saw Dr. Chilango Gibbons (neurosurg) to f/u from neck surgery   He had neck surgery completed per this physician in 11/16  He continues to have pain in his fingers and has not noticed much improvement   Pt has completed PT for this previously and is not taking anything for his pain   Per his daughter, Dr. Chilango Gibbons does not suspect much improvement in some of his chronic sx  Will start gabapentin qhs to improve sx and may help him sleep as well   Of note, I have started gabapentin previously but he was too concerned about kidney function- assured him that I monitor kidney function quite closely, is okay to take this     No longer taking prilosec for reflux, denies any sxs    Denies any recent falls at home  He does struggle with balance occasionally   Pt denies this but per daughter's report he has come near falling     Pt c/o burning and tingling to toes occasionally   He has this more after walking more around his house  He states this feels like a razor cutting him   Starting gabapentin which should help this     Recall sees Dr. Keith Friends for h/o DVT, no longer on coumadin or xarelto, on ASA only.  Was on coumadin for h/o PE and DVT post operatively         PREVENTIVE:    Colonoscopy: 9/15/15, Dr. Dee Dee Alonzo, repeat 5 years  EGD: 9/15/15, Dr. Dee Dee Alonzo  PSA: 7/15 3.0  , 7/16  AAA screen: CT 11/12, negative  Tdap: unknown   Pneumovax: 12/04/2012  [id number]: never had  Zostavax: never had   Flu shot: declines  Foot exam: 5/09/17   Microalbumin: 1/17 minimal  A1c: 11/12 12.2, 12/12 11.3 , 10/14 7.1, 7/15 6.3, 9/15 6.4, 12/15 5.9, 4/16 6.2, 10/16 5.9, 1/17 6.6  Eye exam: Dr. Ishmael Narayan (retired) 3/16/16, mild diabetic retinopathy, scheduled 5/31/17 with Dr. Hernandez Back: 10/15 negative  Lipids: 1/17         Patient Active Problem List    Diagnosis Date Noted    Reactive depression 11/15/2016    Cervical spinal stenosis 11/03/2016    CKD (chronic kidney disease) 10/31/2016    ACP (advance care planning) 12/11/2015    Diabetes mellitus without complication ([facility] 61/69/0231    Hyperlipidemia 07/10/2015    Spinal stenosis, lumbar region, without neurogenic claudication 10/16/2014    Lumbar disc disease with radiculopathy 10/16/2014    DVT of leg (deep venous thrombosis) ([facility] 12/18/2012    HTN (hypertension) 12/12/2012    Microalbuminuria 12/12/2012    Perforated diverticulum of large intestine 12/04/2012    Alcohol abuse 12/04/2012    Back pain 12/04/2012    Insomnia 12/04/2012    Perforated viscus 11/21/2012     Current Outpatient Prescriptions   Medication Sig Dispense Refill    LORazepam (ATIVAN) 0.5 mg tablet TAKE ONE TABLET BY MOUTH NIGHTLY AS NEEDED FOR ANXIETY 20 Tab 0    glucose blood VI test strips (FREESTYLE INSULINX TEST STRIPS) strip Check blood sugars 2-3 times daily. ICD-10: E11.9 100 Strip 0    rosuvastatin (CRESTOR) 10 mg tablet Take 1 Tab by mouth nightly. 30 Tab 6    SITagliptin (JANUVIA) 50 mg tablet Take 1 Tab by mouth daily. 30 Tab 6    metoprolol succinate (TOPROL-XL) 50 mg XL tablet TAKE ONE TABLET BY MOUTH ONCE DAILY 30 Tab 0    hydroCHLOROthiazide (HYDRODIURIL) 12.5 mg tablet TAKE ONE TABLET BY MOUTH ONCE DAILY 30 Tab 0    traZODone (DESYREL) 50 mg tablet TAKE ONE TABLET BY MOUTH NIGHTLY. 30 Tab 6    citalopram (CELEXA) 20 mg tablet Take 1 Tab by mouth daily. 30 Tab 3    glucose blood VI test strips (ONETOUCH VERIO) strip Check blood sugar twice daily. 100 Strip 3    cyanocobalamin 1,000 mcg tablet Take 1,000 mcg by mouth daily.       FERROUS FUMARATE (IRON PO) Take  by mouth.  magnesium 250 mg tab Take  by mouth daily.  nitroglycerin (NITROSTAT) 0.4 mg SL tablet 1 Tab by SubLINGual route every five (5) minutes as needed for Chest Pain (call 911 if not relieved by 3 tabs). 25 Tab 1     Past Surgical History:   Procedure Laterality Date    ABDOMEN SURGERY PROC UNLISTED      bowel resection    HX BACK SURGERY  2014    HX GI  11/2012    bowel resection    HX ORTHOPAEDIC      amputated left 4th finger      Lab Results  Component Value Date/Time   WBC 5.4 10/26/2016 09:49 AM   Hemoglobin (POC) 13.3 11/02/2016 07:15 AM   HGB 13.0 10/26/2016 09:49 AM   Hematocrit (POC) 39 11/02/2016 07:15 AM   HCT 39.4 10/26/2016 09:49 AM   PLATELET 530 01/95/7180 09:49 AM   MCV 80.2 10/26/2016 09:49 AM       Lab Results  Component Value Date/Time   Cholesterol, total 213 01/17/2017 09:29 AM   HDL Cholesterol 68 01/17/2017 09:29 AM   LDL, calculated 128 01/17/2017 09:29 AM   Triglyceride 83 01/17/2017 09:29 AM       Lab Results  Component Value Date/Time   GFR est AA 63 01/17/2017 09:29 AM   GFR est non-AA 55 01/17/2017 09:29 AM   Creatinine (POC) 1.4 11/02/2016 07:15 AM   Creatinine 1.33 01/17/2017 09:29 AM   BUN 26 01/17/2017 09:29 AM   BUN (POC) 28 11/02/2016 07:15 AM   Sodium (POC) 140 11/02/2016 07:15 AM   Sodium 141 01/17/2017 09:29 AM   Potassium 4.4 01/17/2017 09:29 AM   Potassium (POC) 4.1 11/02/2016 07:15 AM   Chloride (POC) 105 11/02/2016 07:15 AM   Chloride 101 01/17/2017 09:29 AM   CO2 24 01/17/2017 09:29 AM         Review of Systems   Respiratory: Negative for shortness of breath. Cardiovascular: Negative for chest pain. Physical Exam   Constitutional: He is oriented to person, place, and time. He appears well-developed and well-nourished. No distress. HENT:   Head: Normocephalic and atraumatic. Mouth/Throat: Oropharynx is clear and moist. No oropharyngeal exudate. Eyes: Conjunctivae and EOM are normal. Right eye exhibits no discharge.  Left eye exhibits no discharge. Neck: Normal range of motion. Neck supple. Cardiovascular: Normal rate, regular rhythm, normal heart sounds and intact distal pulses. Exam reveals no gallop and no friction rub. No murmur heard. Pulmonary/Chest: Effort normal and breath sounds normal. No respiratory distress. He has no wheezes. He has no rales. He exhibits no tenderness. Musculoskeletal: He exhibits edema. He exhibits no tenderness or deformity. Lymphadenopathy:     He has no cervical adenopathy. Neurological: He is alert and oriented to person, place, and time. He has normal reflexes. Coordination abnormal.   Monofilament nl BLE, good peripheral pulses, no ulcers       Skin: Skin is warm and dry. No rash noted. He is not diaphoretic. No erythema. No pallor. Calluses and hammertoes BLE   Psychiatric: He has a normal mood and affect. His behavior is normal.       ASSESSMENT and PLAN    ICD-10-CM ICD-9-CM    1. Diabetes mellitus without complication ([address]    Controlled on januvia 50mg daily, renally dosed, however, he is only sporadically taking this. Advised compliance. Eye exam scheduled T83.4 008.44 METABOLIC PANEL, COMPREHENSIVE      HEMOGLOBIN A1C WITH EAG      LIPID PANEL      TSH 3RD GENERATION      PROSTATE SPECIFIC AG (PSA)      HM DIABETES FOOT EXAM      AMB SUPPLY ORDER      REFERRAL TO PODIATRY   2. CKD (chronic kidney disease), stage 3 (moderate)    Cr runs around 1.2-1.4 baseline, repeat BMP today to ensure stable. A99.6 776.4 METABOLIC PANEL, COMPREHENSIVE      HEMOGLOBIN A1C WITH EAG      LIPID PANEL      TSH 3RD GENERATION      PROSTATE SPECIFIC AG (PSA)   3. Pure hypercholesterolemia    Now on crestor 10mg, reports compliance, repeat lipids and adjust dosing as needed. Q39.46 692.2 METABOLIC PANEL, COMPREHENSIVE      HEMOGLOBIN A1C WITH EAG      LIPID PANEL      TSH 3RD GENERATION      PROSTATE SPECIFIC AG (PSA)   4. Essential hypertension    Controlled on HCTZ 12.5mg and toprol 50mg daily.  Continue Y43 962.3 METABOLIC PANEL, COMPREHENSIVE      HEMOGLOBIN A1C WITH EAG      LIPID PANEL      TSH 3RD GENERATION      PROSTATE SPECIFIC AG (PSA)   5. Reactive depression    Doing well on celexa 20mg daily, continue. He continues to use ativan several nights per week for acute anxiety. Denies drinking alcohol at the same time. Q63.4 323.4 METABOLIC PANEL, COMPREHENSIVE      HEMOGLOBIN A1C WITH EAG      LIPID PANEL      TSH 3RD GENERATION      PROSTATE SPECIFIC AG (PSA)   6. Anxiety    See above I20.0 716.46 METABOLIC PANEL, COMPREHENSIVE      HEMOGLOBIN A1C WITH EAG      LIPID PANEL      TSH 3RD GENERATION      PROSTATE SPECIFIC AG (PSA)   7. PSA elevation    Last couple PSAs just under 3, repeat today to ensure stable. C96.18 612.03 METABOLIC PANEL, COMPREHENSIVE      HEMOGLOBIN A1C WITH EAG      LIPID PANEL      TSH 3RD GENERATION      PROSTATE SPECIFIC AG (PSA)   8. Insomnia, unspecified type    Chronic issue, uses trazodone which helps a little bit, discussed sleep hygiene. Will be giving gabapentin for neuropathy which may help with sleep as well    G47.00 780.52    9. Neuropathy    Gabapentin 300mg qhs to help with finger tingling and foot pain G62.9 355.9 AMB SUPPLY ORDER      REFERRAL TO PODIATRY          Written by Lupillo Dan, as dictated by Sherrie Sam MD.    Current diagnosis and concerns discussed with pt at length. Understands risks and benefits or current treatment plan and medications and accepts the treatment and medication with any possible risks.   Pt asks appropriate questions which were answered.   Pt instructed to call with any concerns or problems. This note will not be viewable in [address]

## 2017-05-09 NOTE — MR AVS SNAPSHOT
Visit Information Date & Time Provider Department Dept. Phone Encounter #  
 5/9/2017  1:30 PM Ever Spatz, [address] [place] [address],[address] [phone] [id number] Follow-up Instructions Return in about 3 months (around 8/9/2017). Upcoming Health Maintenance Date Due DTaP/Tdap/Td series (1 - Tdap) 7/5/1971 EYE EXAM RETINAL OR DILATED Q1 3/16/2017 FOOT EXAM Q1 4/12/2017 HEMOGLOBIN A1C Q6M 7/17/2017 INFLUENZA AGE 9 TO ADULT 8/1/2017 Pneumococcal 65+ Low/Medium Risk (2 of 2 - PPSV23) 12/4/2017 MICROALBUMIN Q1 1/17/2018 LIPID PANEL Q1 1/17/2018 MEDICARE YEARLY EXAM 1/25/2018 GLAUCOMA SCREENING Q2Y 3/16/2018 COLONOSCOPY 9/15/2020 Allergies as of 5/9/2017  Review Complete On: 1/24/2017 By: Ever Spatz, MD  
  
 Severity Noted Reaction Type Reactions Arb-angiotensin Receptor Antagonist  07/12/2016    Other (comments) High k  
  
Current Immunizations  Reviewed on 10/17/2014 Name Date Influenza Vaccine 12/4/2012 Pneumococcal Polysaccharide (PPSV-23) 12/4/2012 Pneumococcal Vaccine (Unspecified Type) 11/27/2012 12:16 PM  
  
 Not reviewed this visit You Were Diagnosed With   
  
 Codes Comments Diabetes mellitus without complication ([facility]    -  Primary ICD-10-CM: E11.9 ICD-9-CM: 250.00 CKD (chronic kidney disease), stage 3 (moderate)     ICD-10-CM: N18.3 ICD-9-CM: 740. 3 Pure hypercholesterolemia     ICD-10-CM: E78.00 ICD-9-CM: 272.0 Essential hypertension     ICD-10-CM: I10 
ICD-9-CM: 401.9 Reactive depression     ICD-10-CM: F32.9 ICD-9-CM: 300.4 Anxiety     ICD-10-CM: F41.9 ICD-9-CM: 300.00   
 PSA elevation     ICD-10-CM: R97.20 ICD-9-CM: 790.93 Insomnia, unspecified type     ICD-10-CM: G47.00 ICD-9-CM: 780.52 Neuropathy     ICD-10-CM: G62.9 ICD-9-CM: 359. 9 Vitals BP Pulse Temp Resp Height(growth percentile) Weight(growth percentile) 132/83 (BP 1 Location: Left arm, BP Patient Position: Sitting) 75 97.7 °F (36.5 °C) (Oral) 16 5' 5\" (1.651 m) 189 lb 9.6 oz (86 kg) SpO2 BMI Smoking Status 99% 31.55 kg/m2 Never Smoker Vitals History BMI and BSA Data Body Mass Index Body Surface Area 31.55 kg/m 2 1.99 m 2 Preferred Pharmacy Pharmacy Name Phone [facility] [address] [phone] Your Updated Medication List  
  
   
This list is accurate as of: 5/9/17  1:49 PM.  Always use your most recent med list.  
  
  
  
  
 citalopram 20 mg tablet Commonly known as:  Abel Flatness Take 1 Tab by mouth daily. cyanocobalamin 1,000 mcg tablet Take 1,000 mcg by mouth daily. gabapentin 300 mg capsule Commonly known as:  NEURONTIN Take 1 Cap by mouth nightly as needed. glucose blood VI test strips strip Commonly known as:  FREESTYLE INSULINX TEST STRIPS Check blood sugars 2-3 times daily. ICD-10: E11.9  
  
 hydroCHLOROthiazide 12.5 mg tablet Commonly known as:  HYDRODIURIL  
TAKE ONE TABLET BY MOUTH ONCE DAILY  
  
 IRON PO Take  by mouth. LORazepam 0.5 mg tablet Commonly known as:  ATIVAN  
TAKE ONE TABLET BY MOUTH NIGHTLY AS NEEDED FOR ANXIETY  
  
 magnesium 250 mg Tab Take  by mouth daily. metoprolol succinate 50 mg XL tablet Commonly known as:  TOPROL-XL  
TAKE ONE TABLET BY MOUTH ONCE DAILY  
  
 rosuvastatin 10 mg tablet Commonly known as:  CRESTOR Take 1 Tab by mouth nightly. SITagliptin 50 mg tablet Commonly known as:  Jennifer Cowboy Take 1 Tab by mouth daily. traZODone 50 mg tablet Commonly known as:  DESYREL  
TAKE ONE TABLET BY MOUTH NIGHTLY. Prescriptions Sent to Pharmacy Refills  
 gabapentin (NEURONTIN) 300 mg capsule 6 Sig: Take 1 Cap by mouth nightly as needed.   
 Class: Normal  
 Pharmacy: [facility] [address], [address] [place] [address]  [place]  #: [phone] Route: Oral  
  
We Performed the Following AMB SUPPLY ORDER [[id number] Custom] Comments:  
 Diabetic shoes with inserts HEMOGLOBIN A1C WITH EAG [23279 CPT(R)]  DIABETES FOOT EXAM [HM7 Custom] LIPID PANEL [15842 CPT(R)] METABOLIC PANEL, COMPREHENSIVE [56415 CPT(R)] PROSTATE SPECIFIC AG (PSA) Q2047507 CPT(R)] REFERRAL TO PODIATRY [REF90 Custom] Comments:  
 Please evaluate patient for dm TSH 3RD GENERATION [50797 CPT(R)] Follow-up Instructions Return in about 3 months (around 8/9/2017). Referral Information Referral ID Referred By Referred To  
  
 [id number] Carrillo Robin [address] Visits Status Start Date End Date 1 New Request 5/9/17 5/9/18 If your referral has a status of pending review or denied, additional information will be sent to support the outcome of this decision. Patient Instructions Gabapentin in the evening for nerve pain Lab today Introducing [facility] SERVICES! Soniya Varela introduces [email or web address] patient portal. Now you can access parts of your medical record, email your doctor's office, and request medication refills online. 1. In your internet browser, go to [email or web address] 2. Click on the First Time User? Click Here link in the Sign In box. You will see the New Member Sign Up page. 3. Enter your [email or web address] Access Code exactly as it appears below. You will not need to use this code after youve completed the sign-up process. If you do not sign up before the expiration date, you must request a new code. · [email or web address] Access Code: [email or web address] Expires: 8/7/2017  1:47 PM 
 
4. Enter the last four digits of your Social Security Number (xxxx) and Date of Birth (mm/dd/yyyy) as indicated and click Submit. You will be taken to the next sign-up page. 5. Create a OWM ID. This will be your OWM login ID and cannot be changed, so think of one that is secure and easy to remember. 6. Create a OWM password. You can change your password at any time. 7. Enter your Password Reset Question and Answer. This can be used at a later time if you forget your password. 8. Enter your e-mail address. You will receive e-mail notification when new information is available in [address] 9. Click Sign Up. You can now view and download portions of your medical record. 10. Click the Download Summary menu link to download a portable copy of your medical information. If you have questions, please visit the Frequently Asked Questions section of the OWM website. Remember, OWM is NOT to be used for urgent needs. For medical emergencies, dial 911. Now available from your iPhone and Android! Please provide this summary of care documentation to your next provider. Your primary care clinician is listed as Aamir Quigley. If you have any questions after today's visit, please call [phone].

## 2017-05-10 LAB
ALBUMIN SERPL-MCNC: 4.4 G/DL (ref 3.6–4.8)
ALBUMIN/GLOB SERPL: 1.3 {RATIO} (ref 1.2–2.2)
ALP SERPL-CCNC: 43 IU/L (ref 39–117)
ALT SERPL-CCNC: 34 IU/L (ref 0–44)
AST SERPL-CCNC: 38 IU/L (ref 0–40)
BILIRUB SERPL-MCNC: 0.8 MG/DL (ref 0–1.2)
BUN SERPL-MCNC: 30 MG/DL (ref 8–27)
BUN/CREAT SERPL: 19 (ref 10–24)
CALCIUM SERPL-MCNC: 9.9 MG/DL (ref 8.6–10.2)
CHLORIDE SERPL-SCNC: 100 MMOL/L (ref 96–106)
CHOLEST SERPL-MCNC: 182 MG/DL (ref 100–199)
CO2 SERPL-SCNC: 25 MMOL/L (ref 18–29)
CREAT SERPL-MCNC: 1.56 MG/DL (ref 0.76–1.27)
EST. AVERAGE GLUCOSE BLD GHB EST-MCNC: 134 MG/DL
GLOBULIN SER CALC-MCNC: 3.4 G/DL (ref 1.5–4.5)
GLUCOSE SERPL-MCNC: 102 MG/DL (ref 65–99)
HBA1C MFR BLD: 6.3 % (ref 4.8–5.6)
HDLC SERPL-MCNC: 77 MG/DL
LDLC SERPL CALC-MCNC: 94 MG/DL (ref 0–99)
POTASSIUM SERPL-SCNC: 4.7 MMOL/L (ref 3.5–5.2)
PROT SERPL-MCNC: 7.8 G/DL (ref 6–8.5)
PSA SERPL-MCNC: 3.8 NG/ML (ref 0–4)
SODIUM SERPL-SCNC: 144 MMOL/L (ref 134–144)
TRIGL SERPL-MCNC: 57 MG/DL (ref 0–149)
TSH SERPL DL<=0.005 MIU/L-ACNC: 2.1 UIU/ML (ref 0.45–4.5)
VLDLC SERPL CALC-MCNC: 11 MG/DL (ref 5–40)

## 2017-05-10 NOTE — PROGRESS NOTES
Spoke to [employer] (HIPAA). Gui Mckeon informed pt's psa climbed from last check, needs to see urology--send to [facility] for further evaluation. Pt referred to Dr. Jaret Mcdonald. Mary informed kidney's overall stable. Mary informed dm/cholesterol controlled. Gui Mckeon verbalized understanding of information discussed w/ no further questions at this time.

## 2017-05-10 NOTE — PROGRESS NOTES
psa climbed from last check, needs to see urology--send to [facility] for [facility]    kidnesy overall stable    Dm/cholesterol controlled

## 2017-05-11 ENCOUNTER — TELEPHONE (OUTPATIENT)
Dept: INTERNAL MEDICINE CLINIC | Age: 67
End: 2017-05-11

## 2017-05-11 NOTE — TELEPHONE ENCOUNTER
Mary-daughter on HIPPA states that she is needing a call back in regards to some questions that she has for you. Please call Nicolette Bauman.

## 2017-05-11 NOTE — TELEPHONE ENCOUNTER
Spoke to [employer] ([facility] Gloria Wallace had a question regarding pt's PSA level. All questions answered to satisfaction. Gloria Wallace verbalized understanding of information discussed w/ no further questions at this time.

## 2017-05-29 RX ORDER — HYDROCHLOROTHIAZIDE 12.5 MG/1
TABLET ORAL
Qty: 30 TAB | Refills: 0 | Status: SHIPPED | OUTPATIENT
Start: 2017-05-29 | End: 2017-07-04 | Stop reason: SDUPTHER

## 2017-05-29 RX ORDER — METOPROLOL SUCCINATE 50 MG/1
TABLET, EXTENDED RELEASE ORAL
Qty: 30 TAB | Refills: 0 | Status: SHIPPED | OUTPATIENT
Start: 2017-05-29 | End: 2017-07-04 | Stop reason: SDUPTHER

## 2017-07-04 RX ORDER — LORAZEPAM 0.5 MG/1
TABLET ORAL
Qty: 20 TAB | Refills: 0 | Status: SHIPPED | OUTPATIENT
Start: 2017-07-04 | End: 2017-11-01 | Stop reason: SDUPTHER

## 2017-07-04 RX ORDER — METOPROLOL SUCCINATE 50 MG/1
TABLET, EXTENDED RELEASE ORAL
Qty: 30 TAB | Refills: 0 | Status: SHIPPED | OUTPATIENT
Start: 2017-07-04 | End: 2017-08-08

## 2017-07-04 RX ORDER — HYDROCHLOROTHIAZIDE 12.5 MG/1
TABLET ORAL
Qty: 30 TAB | Refills: 0 | Status: SHIPPED | OUTPATIENT
Start: 2017-07-04 | End: 2017-08-01 | Stop reason: SDUPTHER

## 2017-07-04 RX ORDER — TRAZODONE HYDROCHLORIDE 50 MG/1
TABLET ORAL
Qty: 30 TAB | Refills: 0 | Status: SHIPPED | OUTPATIENT
Start: 2017-07-04 | End: 2017-08-01 | Stop reason: SDUPTHER

## 2017-07-05 NOTE — TELEPHONE ENCOUNTER
Called following rx to pharmacy:       LORazepam (ATIVAN) 0.5 mg tablet [[id number]]   Order Details   Dose, Route, Frequency: As Directed    Dispense Quantity:  20 Tab Refills:  0 Fills Remaining:  --           Sig: TAKE ONE TABLET BY MOUTH NIGHTLY AS NEEDED FOR ANXIETY

## 2017-08-01 RX ORDER — HYDROCHLOROTHIAZIDE 12.5 MG/1
TABLET ORAL
Qty: 30 TAB | Refills: 0 | Status: SHIPPED | OUTPATIENT
Start: 2017-08-01 | End: 2017-08-08

## 2017-08-01 RX ORDER — TRAZODONE HYDROCHLORIDE 50 MG/1
TABLET ORAL
Qty: 30 TAB | Refills: 0 | Status: SHIPPED | OUTPATIENT
Start: 2017-08-01 | End: 2017-10-06 | Stop reason: SDUPTHER

## 2017-08-08 ENCOUNTER — HOSPITAL ENCOUNTER (OUTPATIENT)
Dept: LAB | Age: 67
Discharge: HOME | End: 2017-08-08
Payer: MEDICARE

## 2017-08-08 ENCOUNTER — OFFICE VISIT (OUTPATIENT)
Dept: INTERNAL MEDICINE CLINIC | Age: 67
End: 2017-08-08

## 2017-08-08 VITALS
RESPIRATION RATE: 16 BRPM | HEIGHT: 65 IN | OXYGEN SATURATION: 99 % | SYSTOLIC BLOOD PRESSURE: 133 MMHG | BODY MASS INDEX: 31.99 KG/M2 | DIASTOLIC BLOOD PRESSURE: 78 MMHG | TEMPERATURE: 98 F | HEART RATE: 74 BPM | WEIGHT: 192 LBS

## 2017-08-08 DIAGNOSIS — G62.9 NEUROPATHY: ICD-10-CM

## 2017-08-08 DIAGNOSIS — F32.9 REACTIVE DEPRESSION: ICD-10-CM

## 2017-08-08 DIAGNOSIS — N18.3 CKD (CHRONIC KIDNEY DISEASE), STAGE 3 (MODERATE): ICD-10-CM

## 2017-08-08 DIAGNOSIS — F41.9 ANXIETY: ICD-10-CM

## 2017-08-08 DIAGNOSIS — I10 ESSENTIAL HYPERTENSION: Primary | ICD-10-CM

## 2017-08-08 DIAGNOSIS — I82.5Y3 CHRONIC DEEP VEIN THROMBOSIS (DVT) OF PROXIMAL VEIN OF BOTH LOWER EXTREMITIES (HCC): ICD-10-CM

## 2017-08-08 DIAGNOSIS — E11.9 DIABETES MELLITUS WITHOUT COMPLICATION (HCC): ICD-10-CM

## 2017-08-08 DIAGNOSIS — E78.00 PURE HYPERCHOLESTEROLEMIA: ICD-10-CM

## 2017-08-08 PROCEDURE — 83036 HEMOGLOBIN GLYCOSYLATED A1C: CPT

## 2017-08-08 PROCEDURE — 85027 COMPLETE CBC AUTOMATED: CPT

## 2017-08-08 PROCEDURE — 80048 BASIC METABOLIC PNL TOTAL CA: CPT

## 2017-08-08 PROCEDURE — 82550 ASSAY OF CK (CPK): CPT

## 2017-08-08 PROCEDURE — 83735 ASSAY OF MAGNESIUM: CPT

## 2017-08-08 PROCEDURE — 36415 COLL VENOUS BLD VENIPUNCTURE: CPT

## 2017-08-08 RX ORDER — METOPROLOL SUCCINATE 50 MG/1
50 TABLET, EXTENDED RELEASE ORAL DAILY
Qty: 30 TAB | Refills: 3 | Status: SHIPPED | OUTPATIENT
Start: 2017-08-08 | End: 2017-12-12 | Stop reason: SDUPTHER

## 2017-08-08 NOTE — PROGRESS NOTES
HISTORY OF PRESENT ILLNESS  Scar Hernandez is a 69 y.o. male. HPI   Last here 5/09/17.  Pt is here to f/u on chronic conditions  Pt presents with his daughter who provides some of his hx  Ambulates with cane    BP today is   BP at home running pretty good per home health nurse  Continues toprol 50mg and HCTZ 12.5mg daily    Pt follows with Dr. Bryanna Torres (cardio)   Last saw him in 11/16, will follow annually  His home health nurse has noted of some extra heart beats      BS at home running around 84, 88, 120, 127, 130, some lower around 80s-90s as well   Continues januvia 50mg but is not taking this daily   He misses frequent doses of this   He takes this about 2-3 days per week but sometimes will not take this for several weeks  Pt is taking this quite sporadically   Pt wonders if other medications interfere with this-addressed this  Will have him work on compliance with the [place] and [place] daily   Discussed goal BS readings with him      Continues celexa 20mg daily for depression/anxiety, works well, happy with dose   He has ativan to use prn for anxiety, quite sporadically   He uses this once per week or less   Use of this depending on how much he is sitting and thinking about things that upset him and seeing things around his house that remind him of different events    Pt c/o increased cramping in his legs  Will check basic labs to evaluate this  Advised tonic water and stretching   Beer can affect this as well-addressed     Continues crestor 10mg daily for cholesterol       Reviewed last labs 5/17  Cr stable, PSA 3.8  Repeat labs today     Since last visit, I referred pt to urology for elevated PSA  Reviewed notes from Dr. Aby Garcia (uro) 5/15/17  /77, PSA went up, will repeat his, will monitor q6 months   Per daughter, they are considering a bx to evaluate further   Discussed elevated PSA can be indicative of prostate cancer    Pt follows with Dr. Bailee Webb (nephr) but has not seen her recently   He missed his appt in 12/16     Continues trazodone prn for sleep, not on nightly basis  This works well overall      Wt is stable since last visit   Discussed diet and weight loss      Pt is not drinking beer daily   He drinks occasionally with his brother, may have 2-3 with him   There are some nights he does not have any but drinks up to 5-6   Beer tends him to help him with calming down   Discussed recommendations of no more than 2 drinks per night   Discussed cannot drink in conjunction with taking the ativan  Pt assures me that he never takes ativan after drinking   Discussed he also cannot take trazodone after drinking beer   He will not use either of these medications with drinking   Addressed this at length with him and his daughter today     Since last visit, pt had a fall about 1-2 months ago   Pt was in his kitchen cooking and stepped back and stumbled  Pt just lost his balance and had a misstep when trying to avoid grease  He hit his head and took a bit to get back up   Denies drinking at that time   His daughter had offered to take him to ED but he declined   Pt did not have significant injury at that time  Discussed avoiding future falls      Continues gabapentin daily for his tingling/neuropathy pain   His daughter thinks this helps as he has not been c/o this as much     Pt c/o increased rhinorrhea  Discussed common with age  [de-identified] starting zyrtec daily     He would like to come q6 months instead of q3 months  Discussed that I need to follow him a bit more closely     Recall sees Dr. Nirmal Mccallum for h/o DVT, no longer on coumadin or xarelto, on ASA only.  Was on coumadin for h/o PE and DVT post operatively      PREVENTIVE:    Colonoscopy: 9/15/15, Dr. Landon Olvera, repeat 5 years  EGD: 9/15/15, Dr. Landon Olvera  PSA: 7/15 3.0, 7/16, 5/17 3.8  AAA screen: CT 11/12, negative  Tdap: unknown   Pneumovax: 12/04/2012  Ernhlvr74: declines  Zostavax: never had   Flu shot: declines  Foot exam: 5/09/17   Microalbumin: 1/17 minimal  A1c: 11/12 12.2, 12/12 11.3 , 10/14 7.1, 7/15 6.3, 9/15 6.4, 12/15 5.9, 4/16 6.2, 10/16 5.9, 1/17 6.6, 5/17 6.3  Eye exam: Dr. Velasquez Guillermo, 5/31/17  Hep C Screen: 10/15 negative  Lipids: 1/17        Patient Active Problem List    Diagnosis Date Noted    Reactive depression 11/15/2016    Cervical spinal stenosis 11/03/2016    CKD (chronic kidney disease) 10/31/2016    ACP (advance care planning) 12/11/2015    Diabetes mellitus without complication ([facility] 54/35/4094    Hyperlipidemia 07/10/2015    Spinal stenosis, lumbar region, without neurogenic claudication 10/16/2014    Lumbar disc disease with radiculopathy 10/16/2014    [facility] venous thrombosis) [facility] 12/18/2012    HTN (hypertension) 12/12/2012    Microalbuminuria 12/12/2012    Perforated diverticulum of large intestine 12/04/2012    Alcohol abuse 12/04/2012    Back pain 12/04/2012    Insomnia 12/04/2012    Perforated viscus 11/21/2012     Current Outpatient Prescriptions   Medication Sig Dispense Refill    traZODone (DESYREL) 50 mg tablet TAKE ONE TABLET BY MOUTH ONCE NIGHTLY 30 Tab 0    hydroCHLOROthiazide (HYDRODIURIL) 12.5 mg tablet TAKE ONE TABLET BY MOUTH ONCE DAILY 30 Tab 0    LORazepam (ATIVAN) 0.5 mg tablet TAKE ONE TABLET BY MOUTH NIGHTLY AS NEEDED FOR ANXIETY 20 Tab 0    metoprolol succinate (TOPROL-XL) 50 mg XL tablet TAKE ONE TABLET BY MOUTH ONCE DAILY 30 Tab 0    gabapentin (NEURONTIN) 300 mg capsule Take 1 Cap by mouth nightly as needed. 30 Cap 6    glucose blood VI test strips (FREESTYLE INSULINX TEST STRIPS) strip Check blood sugars 2-3 times daily. ICD-10: E11.9 100 Strip 0    rosuvastatin (CRESTOR) 10 mg tablet Take 1 Tab by mouth nightly. 30 Tab 6    SITagliptin (JANUVIA) 50 mg tablet Take 1 Tab by mouth daily.  30 Tab 6    metoprolol succinate (TOPROL-XL) 50 mg XL tablet TAKE ONE TABLET BY MOUTH ONCE DAILY 30 Tab 0    hydroCHLOROthiazide (HYDRODIURIL) 12.5 mg tablet TAKE ONE TABLET BY MOUTH ONCE DAILY 30 Tab 0    citalopram (CELEXA) 20 mg tablet Take 1 Tab by mouth daily. 30 Tab 3    cyanocobalamin 1,000 mcg tablet Take 1,000 mcg by mouth daily.  FERROUS FUMARATE (IRON PO) Take  by mouth.  magnesium 250 mg tab Take  by mouth daily. Past Surgical History:   Procedure Laterality Date    ABDOMEN SURGERY PROC UNLISTED      bowel resection    HX BACK SURGERY  2014    HX GI  11/2012    bowel resection    HX ORTHOPAEDIC      amputated left 4th finger      Lab Results  Component Value Date/Time   WBC 5.4 10/26/2016 09:49 AM   HGB 13.0 10/26/2016 09:49 AM   Hemoglobin (POC) 13.3 11/02/2016 07:15 AM   HCT 39.4 10/26/2016 09:49 AM   Hematocrit (POC) 39 11/02/2016 07:15 AM   PLATELET 070 14/01/7019 09:49 AM   MCV 80.2 10/26/2016 09:49 AM     Lab Results  Component Value Date/Time   Cholesterol, total 182 05/09/2017 01:58 PM   Cholesterol (POC) 227 02/06/2015 03:30 PM   HDL Cholesterol 77 05/09/2017 01:58 PM   LDL, calculated 94 05/09/2017 01:58 PM   LDL Cholesterol (POC) 164 02/06/2015 03:30 PM   Triglyceride 57 05/09/2017 01:58 PM   Triglycerides (POC) 68 02/06/2015 03:30 PM     Lab Results  Component Value Date/Time   GFR est non-AA 45 05/09/2017 01:58 PM   GFRNA, POC 50 11/02/2016 07:15 AM   GFR est AA 52 05/09/2017 01:58 PM   GFRAA, POC >60 11/02/2016 07:15 AM   Creatinine 1.56 05/09/2017 01:58 PM   Creatinine (POC) 1.4 11/02/2016 07:15 AM   BUN 30 05/09/2017 01:58 PM   BUN (POC) 28 11/02/2016 07:15 AM   Sodium 144 05/09/2017 01:58 PM   Sodium (POC) 140 11/02/2016 07:15 AM   Potassium 4.7 05/09/2017 01:58 PM   Potassium (POC) 4.1 11/02/2016 07:15 AM   Chloride 100 05/09/2017 01:58 PM   Chloride (POC) 105 11/02/2016 07:15 AM   CO2 25 05/09/2017 01:58 PM   Magnesium 1.9 10/18/2014 03:05 AM   Phosphorus 3.0 10/17/2014 04:40 AM          Review of Systems   Respiratory: Negative for shortness of breath. Cardiovascular: Negative for chest pain. Musculoskeletal: Positive for falls. Physical Exam   Constitutional: He is oriented to person, place, and time. He appears well-developed and well-nourished. No distress. HENT:   Head: Normocephalic and atraumatic. Mouth/Throat: Oropharynx is clear and moist. No oropharyngeal exudate. Eyes: Conjunctivae and EOM are normal. Right eye exhibits no discharge. Left eye exhibits no discharge. Neck: Normal range of motion. Neck supple. Cardiovascular: Normal rate, regular rhythm, normal heart sounds and intact distal pulses. Exam reveals no gallop and no friction rub. No murmur heard. Pulmonary/Chest: Effort normal and breath sounds normal. No respiratory distress. He has no wheezes. He has no rales. He exhibits no tenderness. Musculoskeletal: Normal range of motion. He exhibits no edema, tenderness or deformity. Lymphadenopathy:     He has no cervical adenopathy. Neurological: He is alert and oriented to person, place, and time. He has normal reflexes. Coordination abnormal.   Skin: Skin is warm and dry. No rash noted. He is not diaphoretic. No erythema. No pallor. Psychiatric: He has a normal mood and affect. His behavior is normal.       ASSESSMENT and PLAN    ICD-10-CM ICD-9-CM    1. Essential hypertension    Well controlled on toprol 50mg and HCTZ daily. J97 659.0 METABOLIC PANEL, BASIC      CBC W/O DIFF      HEMOGLOBIN A1C WITH EAG      MAGNESIUM      CK   2. CKD (chronic kidney disease), stage 3 (moderate)    Cr running around 1.4-1.5, repeat BMP today to ensure continued stability. Was seen by Dr. Justine Albert but has not made f/u with her yet. Z94.4 078.5 METABOLIC PANEL, BASIC      CBC W/O DIFF      HEMOGLOBIN A1C WITH EAG      MAGNESIUM      CK   3. Diabetes mellitus without complication ([facility]    Controlled, will repeat a1c today, most home readings are quite good. Does have [place] and [place], which he is taking sporadically. Again, encouraged him to work on compliance.  Eye exam UTD U67.5 897.20 METABOLIC PANEL, BASIC      CBC W/O DIFF      HEMOGLOBIN A1C WITH EAG      MAGNESIUM      CK   4. Pure hypercholesterolemia    Controlled on crestor in May L30.83 637.0 METABOLIC PANEL, BASIC      CBC W/O DIFF      HEMOGLOBIN A1C WITH EAG      MAGNESIUM      CK   5. Chronic deep vein thrombosis (DVT) of proximal vein of both lower extremities (HCC)    No longer on anticoagulation other than ASA, no current signs or sx of recurrent DVT, in the past, was on coumadin. T20.1S0 433.03 METABOLIC PANEL, BASIC      CBC W/O DIFF      HEMOGLOBIN A1C WITH EAG      MAGNESIUM      CK   6. Reactive depression    Controlled with celexa for the most part. N96.0 855.4 METABOLIC PANEL, BASIC      CBC W/O DIFF      HEMOGLOBIN A1C WITH EAG      MAGNESIUM      CK   7. Neuropathy (HCC)    Gabapentin is improving his sx significantly. Continue current dose. N86.7 243.3 METABOLIC PANEL, BASIC      CBC W/O DIFF      HEMOGLOBIN A1C WITH EAG      MAGNESIUM      CK   8. Anxiety    Uses ativan sporadically, will continue to give this to him. Does not use on a daily basis. Discussed not to drink alcohol when taking the ativan. B73.6 268.89 METABOLIC PANEL, BASIC      CBC W/O DIFF      HEMOGLOBIN A1C WITH EAG      MAGNESIUM      CK              Written by Mirtha Alan, as dictated by Marcelle Johnson MD.    Current diagnosis and concerns discussed with pt at length. Understands risks and benefits or current treatment plan and medications and accepts the treatment and medication with any possible risks.   Pt asks appropriate questions which were answered.   Pt instructed to call with any concerns or problems. This note will not be viewable in [address]

## 2017-08-08 NOTE — MR AVS SNAPSHOT
Visit Information Date & Time Provider Department Dept. Phone Encounter #  
 8/8/2017  9:00 AM Elissa Morales, [address] [place] [address],[address] [phone] [id number] Follow-up Instructions Return in about 3 months (around 11/8/2017). Upcoming Health Maintenance Date Due DTaP/Tdap/Td series (1 - Tdap) 7/5/1969 HEMOGLOBIN A1C Q6M 11/9/2017 Pneumococcal 65+ Low/Medium Risk (2 of 2 - PPSV23) 12/4/2017 MICROALBUMIN Q1 1/17/2018 MEDICARE YEARLY EXAM 1/25/2018 FOOT EXAM Q1 5/9/2018 LIPID PANEL Q1 5/9/2018 EYE EXAM RETINAL OR DILATED Q1 5/31/2018 GLAUCOMA SCREENING Q2Y 5/31/2019 COLONOSCOPY 9/15/2020 Allergies as of 8/8/2017  Review Complete On: 8/8/2017 By: Elissa Morales MD  
  
 Severity Noted Reaction Type Reactions Arb-angiotensin Receptor Antagonist  07/12/2016    Other (comments) High k  
  
Current Immunizations  Reviewed on 8/8/2017 Name Date Influenza Vaccine 12/4/2012 Pneumococcal Polysaccharide (PPSV-23) 12/4/2012 ZZZ-RETIRED (DO NOT USE) Pneumococcal Vaccine (Unspecified Type) 11/27/2012 12:16 PM  
  
 Reviewed by Elissa Morales MD on 8/8/2017 at  9:01 AM  
 Reviewed by Elissa Morales MD on 8/8/2017 at  9:01 AM  
You Were Diagnosed With   
  
 Codes Comments Essential hypertension    -  Primary ICD-10-CM: I10 
ICD-9-CM: 401.9 CKD (chronic kidney disease), stage 3 (moderate)     ICD-10-CM: N18.3 ICD-9-CM: 060. 3 Diabetes mellitus without complication ([facility]     BYX-10-OR: E11.9 ICD-9-CM: 250.00 Pure hypercholesterolemia     ICD-10-CM: E78.00 ICD-9-CM: 272.0 Chronic deep vein thrombosis (DVT) of proximal vein of both lower extremities (HCC)     ICD-10-CM: P36.0H3 ICD-9-CM: 453.51 Reactive depression     ICD-10-CM: F32.9 ICD-9-CM: 300.4 Neuropathy ([facility]     ICD-10-CM: G62.9 ICD-9-CM: 355.9 Anxiety     ICD-10-CM: F41.9 ICD-9-CM: 300.00 Vitals Smoking Status Never Smoker Preferred Pharmacy Pharmacy Name Phone [facility] [address] [phone] Your Updated Medication List  
  
   
This list is accurate as of: 8/8/17  9:12 AM.  Always use your most recent med list.  
  
  
  
  
 citalopram 20 mg tablet Commonly known as:  Sable Deaner Take 1 Tab by mouth daily. cyanocobalamin 1,000 mcg tablet Take 1,000 mcg by mouth daily. gabapentin 300 mg capsule Commonly known as:  NEURONTIN Take 1 Cap by mouth nightly as needed. glucose blood VI test strips strip Commonly known as:  FREESTYLE INSULINX TEST STRIPS Check blood sugars 2-3 times daily. ICD-10: E11.9  
  
 hydroCHLOROthiazide 12.5 mg tablet Commonly known as:  HYDRODIURIL  
TAKE ONE TABLET BY MOUTH ONCE DAILY  
  
 IRON PO Take  by mouth. LORazepam 0.5 mg tablet Commonly known as:  ATIVAN  
TAKE ONE TABLET BY MOUTH NIGHTLY AS NEEDED FOR ANXIETY  
  
 magnesium 250 mg Tab Take  by mouth daily. metoprolol succinate 50 mg XL tablet Commonly known as:  TOPROL-XL  
TAKE ONE TABLET BY MOUTH ONCE DAILY  
  
 rosuvastatin 10 mg tablet Commonly known as:  CRESTOR Take 1 Tab by mouth nightly. SITagliptin 50 mg tablet Commonly known as:  [employer] Luis Take 1 Tab by mouth daily. traZODone 50 mg tablet Commonly known as:  DESYREL  
TAKE ONE TABLET BY MOUTH ONCE NIGHTLY We Performed the Following CBC W/O DIFF [65349 CPT(R)] CK [id number] CPT(R)] HEMOGLOBIN A1C WITH EAG [27008 CPT(R)] MAGNESIUM [id number] CPT(R)] METABOLIC PANEL, BASIC [20236 CPT(R)] Follow-up Instructions Return in about 3 months (around 11/8/2017). Introducing [facility] & HEALTH SERVICES! Colton Lagos introduces App[employer] patient portal. Now you can access parts of your medical record, email your doctor's office, and request medication refills online.    
 
1. In your internet browser, go to [email or web address] 2. Click on the First Time User? Click Here link in the Sign In box. You will see the New Member Sign Up page. 3. Enter your Ewirelessgear Access Code exactly as it appears below. You will not need to use this code after youve completed the sign-up process. If you do not sign up before the expiration date, you must request a new code. · Ewireless[employer]ar Access Code: [email or web address] Expires: 11/6/2017  9:12 AM 
 
4. Enter the last four digits of your Social Security Number (xxxx) and Date of Birth (mm/dd/yyyy) as indicated and click Submit. You will be taken to the next sign-up page. 5. Create a Nu3t ID. This will be your Ewirelessgear login ID and cannot be changed, so think of one that is secure and easy to remember. 6. Create a Ewirelessgear password. You can change your password at any time. 7. Enter your Password Reset Question and Answer. This can be used at a later time if you forget your password. 8. Enter your e-mail address. You will receive e-mail notification when new information is available in [address] 9. Click Sign Up. You can now view and download portions of your medical record. 10. Click the Download Summary menu link to download a portable copy of your medical information. If you have questions, please visit the Frequently Asked Questions section of the E[employer]lessgear website. Remember, Ewirelessgear is NOT to be used for urgent needs. For medical emergencies, dial 911. Now available from your iPhone and Android! Please provide this summary of care documentation to your next provider. Your primary care clinician is listed as Jose Saleh. If you have any questions after today's visit, please call [phone].

## 2017-08-09 LAB
BUN SERPL-MCNC: 19 MG/DL (ref 8–27)
BUN/CREAT SERPL: 13 (ref 10–24)
CALCIUM SERPL-MCNC: 9.7 MG/DL (ref 8.6–10.2)
CHLORIDE SERPL-SCNC: 101 MMOL/L (ref 96–106)
CK SERPL-CCNC: 287 U/L (ref 24–204)
CO2 SERPL-SCNC: 22 MMOL/L (ref 18–29)
CREAT SERPL-MCNC: 1.44 MG/DL (ref 0.76–1.27)
ERYTHROCYTE [DISTWIDTH] IN BLOOD BY AUTOMATED COUNT: 15.1 % (ref 12.3–15.4)
EST. AVERAGE GLUCOSE BLD GHB EST-MCNC: 137 MG/DL
GLUCOSE SERPL-MCNC: 131 MG/DL (ref 65–99)
HBA1C MFR BLD: 6.4 % (ref 4.8–5.6)
HCT VFR BLD AUTO: 40.6 % (ref 37.5–51)
HGB BLD-MCNC: 13.2 G/DL (ref 12.6–17.7)
MAGNESIUM SERPL-MCNC: 2.3 MG/DL (ref 1.6–2.3)
MCH RBC QN AUTO: 26.6 PG (ref 26.6–33)
MCHC RBC AUTO-ENTMCNC: 32.5 G/DL (ref 31.5–35.7)
MCV RBC AUTO: 82 FL (ref 79–97)
PLATELET # BLD AUTO: 183 X10E3/UL (ref 150–379)
POTASSIUM SERPL-SCNC: 4.7 MMOL/L (ref 3.5–5.2)
RBC # BLD AUTO: 4.96 X10E6/UL (ref 4.14–5.8)
SODIUM SERPL-SCNC: 141 MMOL/L (ref 134–144)
WBC # BLD AUTO: 6.2 X10E3/UL (ref 3.4–10.8)

## 2017-09-02 RX ORDER — ROSUVASTATIN CALCIUM 10 MG/1
TABLET, COATED ORAL
Qty: 30 TAB | Refills: 6 | Status: SHIPPED | OUTPATIENT
Start: 2017-09-02 | End: 2018-03-26 | Stop reason: SDUPTHER

## 2017-09-02 RX ORDER — HYDROCHLOROTHIAZIDE 12.5 MG/1
TABLET ORAL
Qty: 30 TAB | Refills: 0 | Status: SHIPPED | OUTPATIENT
Start: 2017-09-02 | End: 2017-11-01 | Stop reason: SDUPTHER

## 2017-10-06 RX ORDER — TRAZODONE HYDROCHLORIDE 50 MG/1
TABLET ORAL
Qty: 30 TAB | Refills: 0 | Status: SHIPPED | OUTPATIENT
Start: 2017-10-06 | End: 2017-11-01 | Stop reason: SDUPTHER

## 2017-11-01 RX ORDER — LORAZEPAM 0.5 MG/1
TABLET ORAL
Qty: 20 TAB | Refills: 0 | Status: SHIPPED | OUTPATIENT
Start: 2017-11-01 | End: 2018-03-27 | Stop reason: SDUPTHER

## 2017-11-01 RX ORDER — TRAZODONE HYDROCHLORIDE 50 MG/1
TABLET ORAL
Qty: 30 TAB | Refills: 0 | Status: SHIPPED | OUTPATIENT
Start: 2017-11-01 | End: 2017-12-12 | Stop reason: SDUPTHER

## 2017-11-01 RX ORDER — CITALOPRAM 20 MG/1
TABLET, FILM COATED ORAL
Qty: 30 TAB | Refills: 3 | Status: SHIPPED | OUTPATIENT
Start: 2017-11-01 | End: 2018-05-31 | Stop reason: SDUPTHER

## 2017-11-01 RX ORDER — HYDROCHLOROTHIAZIDE 12.5 MG/1
TABLET ORAL
Qty: 30 TAB | Refills: 0 | Status: SHIPPED | OUTPATIENT
Start: 2017-11-01 | End: 2017-11-22

## 2017-11-02 ENCOUNTER — DOCUMENTATION ONLY (OUTPATIENT)
Dept: INTERNAL MEDICINE CLINIC | Age: 67
End: 2017-11-02

## 2017-11-21 ENCOUNTER — TELEPHONE (OUTPATIENT)
Dept: CARDIOLOGY CLINIC | Age: 67
End: 2017-11-21

## 2017-11-21 NOTE — TELEPHONE ENCOUNTER
[facility] requesting cardiac clearance for prostate biopsy under local anesthesia 12- 11-17  haven't seen since 11-1-16 next appt scheduled 12-18-17. Question if need to be sooner or postpone biopsy.

## 2017-11-22 ENCOUNTER — OFFICE VISIT (OUTPATIENT)
Dept: INTERNAL MEDICINE CLINIC | Age: 67
End: 2017-11-22

## 2017-11-22 ENCOUNTER — HOSPITAL ENCOUNTER (OUTPATIENT)
Dept: LAB | Age: 67
Discharge: HOME | End: 2017-11-22
Payer: MEDICARE

## 2017-11-22 VITALS
OXYGEN SATURATION: 98 % | SYSTOLIC BLOOD PRESSURE: 101 MMHG | WEIGHT: 192 LBS | BODY MASS INDEX: 31.99 KG/M2 | HEART RATE: 76 BPM | HEIGHT: 65 IN | RESPIRATION RATE: 16 BRPM | DIASTOLIC BLOOD PRESSURE: 75 MMHG | TEMPERATURE: 97.8 F

## 2017-11-22 DIAGNOSIS — M67.40 GANGLION CYST: ICD-10-CM

## 2017-11-22 DIAGNOSIS — N18.30 STAGE 3 CHRONIC KIDNEY DISEASE (HCC): ICD-10-CM

## 2017-11-22 DIAGNOSIS — R97.20 PSA ELEVATION: ICD-10-CM

## 2017-11-22 DIAGNOSIS — E78.00 PURE HYPERCHOLESTEROLEMIA: ICD-10-CM

## 2017-11-22 DIAGNOSIS — G62.9 NEUROPATHY: ICD-10-CM

## 2017-11-22 DIAGNOSIS — F32.9 REACTIVE DEPRESSION: ICD-10-CM

## 2017-11-22 DIAGNOSIS — R74.8 ELEVATED CK: ICD-10-CM

## 2017-11-22 DIAGNOSIS — R25.2 CRAMPS, EXTREMITY: ICD-10-CM

## 2017-11-22 DIAGNOSIS — I10 ESSENTIAL HYPERTENSION: ICD-10-CM

## 2017-11-22 DIAGNOSIS — E11.9 DIABETES MELLITUS WITHOUT COMPLICATION (HCC): Primary | ICD-10-CM

## 2017-11-22 DIAGNOSIS — G47.00 INSOMNIA, UNSPECIFIED TYPE: ICD-10-CM

## 2017-11-22 PROCEDURE — 80048 BASIC METABOLIC PNL TOTAL CA: CPT

## 2017-11-22 PROCEDURE — 83036 HEMOGLOBIN GLYCOSYLATED A1C: CPT

## 2017-11-22 PROCEDURE — 82043 UR ALBUMIN QUANTITATIVE: CPT

## 2017-11-22 PROCEDURE — 36415 COLL VENOUS BLD VENIPUNCTURE: CPT

## 2017-11-22 RX ORDER — GABAPENTIN 300 MG/1
600 CAPSULE ORAL
Qty: 60 CAP | Refills: 6 | Status: SHIPPED | OUTPATIENT
Start: 2017-11-22 | End: 2018-08-17 | Stop reason: SDUPTHER

## 2017-11-22 NOTE — MR AVS SNAPSHOT
Visit Information Date & Time Provider Department Dept. Phone Encounter #  
 11/22/2017  9:15 AM Jean Claude Mcknight, [address] [id number] Follow-up Instructions Return in about 3 months (around 2/22/2018). Your Appointments 11/27/2017  3:30 PM  
Office Visit with MD [facility]) Appt Note: Per Dr. Dick Torres CP$0, pt will bring in new medicaid card([facility]/ Prostate biopsy clearance needed; Ivone said to move up prior to end of [facility]  
[phone] [address] [facility] Upcoming Health Maintenance Date Due DTaP/Tdap/Td series (1 - Tdap) 7/5/1969 Pneumococcal 65+ Low/Medium Risk (2 of 2 - PPSV23) 12/4/2017 MICROALBUMIN Q1 1/17/2018 MEDICARE YEARLY EXAM 1/25/2018 HEMOGLOBIN A1C Q6M 2/8/2018 FOOT EXAM Q1 5/9/2018 LIPID PANEL Q1 5/9/2018 EYE EXAM RETINAL OR DILATED Q1 5/31/2018 GLAUCOMA SCREENING Q2Y 5/31/2019 COLONOSCOPY 9/15/2020 Allergies as of 11/22/2017  Review Complete On: 8/8/2017 By: Jean Claude Mcknight MD  
  
 Severity Noted Reaction Type Reactions Arb-angiotensin Receptor Antagonist  07/12/2016    Other (comments) High k  
  
Current Immunizations  Reviewed on 8/8/2017 Name Date Influenza Vaccine 12/4/2012 Pneumococcal Polysaccharide (PPSV-23) 12/4/2012 ZZZ-RETIRED (DO NOT USE) Pneumococcal Vaccine (Unspecified Type) 11/27/2012 12:16 PM  
  
 Not reviewed this visit You Were Diagnosed With   
  
 Codes Comments Diabetes mellitus without complication ([facility]    -  Primary ICD-10-CM: E11.9 ICD-9-CM: 250.00 Reactive depression     ICD-10-CM: F32.9 ICD-9-CM: 300.4 Stage 3 chronic kidney disease     ICD-10-CM: N18.3 ICD-9-CM: 774. 3 Pure hypercholesterolemia     ICD-10-CM: E78.00 ICD-9-CM: 272.0 Essential hypertension     ICD-10-CM: I10 
ICD-9-CM: 401.9 Insomnia, unspecified type     ICD-10-CM: G47.00 ICD-9-CM: 780.52 Ganglion cyst     ICD-10-CM: M67.40 ICD-9-CM: 727.43 Cramps, extremity     ICD-10-CM: R25.2 ICD-9-CM: 729.82   
 PSA elevation     ICD-10-CM: R97.20 ICD-9-CM: 790.93 Neuropathy     ICD-10-CM: G62.9 ICD-9-CM: 355.9 Elevated CK     ICD-10-CM: R74.8 ICD-9-CM: 790.5 Vitals BP Pulse Temp Resp Height(growth percentile) Weight(growth percentile) 101/75 (BP 1 Location: Left arm, BP Patient Position: Sitting) 76 97.8 °F (36.6 °C) (Oral) 16 5' 5\" (1.651 m) 192 lb (87.1 kg) SpO2 BMI Smoking Status 98% 31.95 kg/m2 Never Smoker Vitals History BMI and BSA Data Body Mass Index Body Surface Area 31.95 kg/m 2 2 m 2 Preferred Pharmacy Pharmacy Name Phone [address] [phone] Your Updated Medication List  
  
   
This list is accurate as of: 11/22/17  9:38 AM.  Always use your most recent med list.  
  
  
  
  
 citalopram 20 mg tablet Commonly known as:  CELEXA  
TAKE ONE TABLET BY MOUTH ONCE DAILY  
  
 cyanocobalamin 1,000 mcg tablet Take 1,000 mcg by mouth daily. gabapentin 300 mg capsule Commonly known as:  NEURONTIN Take 2 Caps by mouth nightly as needed. glucose blood VI test strips strip Commonly known as:  FREESTYLE INSULINX TEST STRIPS Check blood sugars 2-3 times daily. ICD-10: E11.9  
  
 hydroCHLOROthiazide 12.5 mg tablet Commonly known as:  HYDRODIURIL  
TAKE ONE TABLET BY MOUTH ONCE DAILY  
  
 IRON PO Take  by mouth. LORazepam 0.5 mg tablet Commonly known as:  ATIVAN  
TAKE ONE TABLET BY MOUTH NIGHTLY AS NEEDED FOR ANXIETY  
  
 magnesium 250 mg Tab Take  by mouth daily. metoprolol succinate 50 mg XL tablet Commonly known as:  TOPROL-XL Take 1 Tab by mouth daily. rosuvastatin 10 mg tablet Commonly known as:  CRESTOR  
TAKE ONE TABLET BY MOUTH NIGHTLY SITagliptin 50 mg tablet Commonly known as:  Erling Sinner Take 1 Tab by mouth daily. traZODone 50 mg tablet Commonly known as:  DESYREL  
TAKE ONE TABLET BY MOUTH EVERY NIGHT. Prescriptions Sent to Pharmacy Refills  
 gabapentin (NEURONTIN) 300 mg capsule 6 Sig: Take 2 Caps by mouth nightly as needed. Class: Normal  
 Pharmacy: [address], [address] [place] [address] #: [phone] Route: Oral  
  
We Performed the Following HEMOGLOBIN A1C WITH EAG [69671 CPT(R)] METABOLIC PANEL, BASIC [30026 CPT(R)] MICROALBUMIN, UR, RAND W/ MICROALBUMIN/CREA RATIO [id number] CPT(R)] REFERRAL TO ORTHOPEDICS [EMF668 Custom] Follow-up Instructions Return in about 3 months (around 2/22/2018). Referral Information Referral ID Referred By Referred To  
  
 [id number] Sabrina Pitman Harriet Mcardle, M.D. [place]enny [address] Visits Status Start Date End Date 1 New Request 11/22/17 11/22/18 If your referral has a status of pending review or denied, additional information will be sent to support the outcome of this decision. Patient Instructions Decrease crestor to every other day Increase gabapentin to 2 per day Introducing [facility] & HEALTH SERVICES! Romayne Duster introduces Edi.io patient portal. Now you can access parts of your medical record, email your doctor's office, and request medication refills online. 1. In your internet browser, go to [email or web address] 2. Click on the First Time User? Click Here link in the Sign In box. You will see the New Member Sign Up page. 3. Enter your Ed[employer] Access Code exactly as it appears below. You will not need to use this code after youve completed the sign-up process.  If you do not sign up before the expiration date, you must request a new code. · PredictAd Access Code: [email or web address] Expires: 2/20/2018  9:06 AM 
 
4. Enter the last four digits of your Social Security Number (xxxx) and Date of Birth (mm/dd/yyyy) as indicated and click Submit. You will be taken to the next sign-up page. 5. Create a PredictAd ID. This will be your PredictAd login ID and cannot be changed, so think of one that is secure and easy to remember. 6. Create a PredictAd password. You can change your password at any time. 7. Enter your Password Reset Question and Answer. This can be used at a later time if you forget your password. 8. Enter your e-mail address. You will receive e-mail notification when new information is available in [address] 9. Click Sign Up. You can now view and download portions of your medical record. 10. Click the Download Summary menu link to download a portable copy of your medical information. If you have questions, please visit the Frequently Asked Questions section of the PredictAd website. Remember, PredictAd is NOT to be used for urgent needs. For medical emergencies, dial 911. Now available from your iPhone and Android! Please provide this summary of care documentation to your next provider. Your primary care clinician is listed as Keo Cai. If you have any questions after today's visit, please call [phone].

## 2017-11-22 NOTE — PROGRESS NOTES
HISTORY OF PRESENT ILLNESS  Freddy Velázquez is a 69 y.o. male. HPI   Last here 8/8/17. Pt is here to f/u on chronic conditions. Pt presents with his daughter who provides some of his hx.   Pt ambulates with a cane.     BP today is controled  BP at home running around   His daughter wonders if he should take a repeat BP on the same arm - discussed repeating BP prn  BP was 168/91 (yesterday) at uro's office - discussed his BP being elevated d/t news of US biopsy  SBP was 140s with nephro  Pt presented with BP cuff - will check for accuracy  Continues on toprol 50mg and HCTZ 12.5mg daily    BS at home running around 80-90, 100s, 133 in the AM fasting   Pt is now taking januvia 50mg daily for the most part  Pt forgets to take this medication 2-3 times per week  Advised him to work on [employer] is stable since last visit   Discussed diet and weight loss     Reviewed last labs 8/17: cr 1.4, CK levels on the higher side of nl  Discussed crestor increasing CK levels  Will get labs today     Lov, pt c/o cramps in his sides x 2 weeks  I advised him to do stretches and drink tonic water before bed  Pt never followed my advised  I advised him not to drink too much beer  Pt has still been drinking beer, as well as water  Today, he still c/o cramps in his sides  Pt states that these cramps come out of the blue  Pt was told that he did not have enough salt in his body - addressed this not being the case  Pt is concerned that he has a blood clot  Discussed crestor possibly contributing to his cramps  Will have him take crestor every other day     Pt c/o knot on his L thumb x 2 weeks - unchanged  Pt states that this area is not painful  Discussed his sx being indicative of a ganglion cyst or fatty tissue  Provided information for hand surgeon    Continues on crestor 10mg daily for cholesterol   Will have him take crestor every other day     Continues on gabapentin qhs for his tingling/neuropathy pain   Pt states that this medication works well on some days  His daughter would like this dosage to be increased  Advised him to take 2 tabs gabapentin qhs  This should also help with his insomnia      Pt has not been taking trazodone prn for sleep  Pt states that he has difficulty sleeping d/t stress  Discussed taking celexa daily for anxiety    Pt has been taking celexa 20mg sporadically for depression/anxiety  This medication works well when he takes it  Advised him to work on compliance with this medication  He has ativan to use prn for anxiety, but uses this medication quite sporadically (once per week or less)     Pt follows with Dr. Geoff Burrell (cardio) annually  Last visit was   Next visit scheduled for 17 for US biopsy clearance    Pt follows with Dr. Nanci House (nephro)  Reviewed notes 17: monitor BP once weekly, bring in home cuff for accuracy, had not take BP meds that day, did not change anything, ACE and ARB cause high K levels, cr was 1.54 that day, no suggestions made about changing meds, try not to drink before bed  Per daughter, nephro did not want pt to take HCTZ  Per daughter, nephro wanted me to increase dosage of metoprolol     Pt follows with Gerson Agarwal (uro)   Reviewed notes 17: discussed PSA trending upwards and prostate biopsy was recommended, BPH was well-controled   Per daughter, PSA was 5.29  Per daughter, Particia Obedr recommended an US biopsy  His daughter is unsure why he has to have an [address] biopsy, as her uncle had a different PSA  Discussed elevated PSA possibly being indicative of prostate cancer--HAD A VERY EXTENDED DETAILED DISCUSSION ON PROSTATE CA  Discussed his risk of having prostate cancer being high, as he has a [facility] prostate cancer and elevated PSAs  Pt wonders if he can be anesthestized before having an US biopsy - discussed this not being the case  Advised him to use a few tabs ativan before having a procedure  Discussed studies showing that men  with prostate cancer, rather than from prostate cancer  Discussed prostate cancer being indolent   His daughter wonders why all men develop prostate cancer  Discussed not all men developing prostate cancer  Discussed prostate cancer being a degenerative process  Pt read an article stating that men who eat fiber do not develop prostate cancer   Discussed possibility of having prostate cancer d/t [facility] and elevated PSAs  Discussed nl prostate exam not being indicative of no prostate cancer  His daughter wonders about the nl range of PSAs  Discussed nl range being <4  Discussed possibility of having nl PSA and prostate cancer, and vice versa  Discussed treating prostate cancer with radiation cancer or excision of the prostate  Discussed importance of having an US biopsy at-length with the pt and his daughter  Mani Profit him to schedule an US biopsy  Pt has already scheduled the US biopsy for 12/11/17  Pt has to take abx and give himself an enema before completing the procedure  Recall pt has [facility] (brother and uncle) prostate cancer.      Pt is not drinking beer daily   He drinks occasionally with his brother, may have 2-3 beers  There are some nights that he does not have any beers  Beer tends him to help him to calm down   Discussed recommendations of no more than 2 drinks per night     Recall sees Dr. Keith Orr for h/o DVT, no longer on coumadin or xarelto, on ASA only.  Was on coumadin for h/o PE and DVT post operatively      PREVENTIVE:    Colonoscopy: 9/15/15, Dr. Fern Schirmer, repeat 5 years  EGD: 9/15/15, Dr. Fern Schirmer  PSA: 7/15 3.0, 7/16, 5/17 3.8  AAA screen: CT 11/12, negative  Tdap: unknown   Pneumovax: 12/04/2012  [id number]: declines  Zostavax: never had   Flu shot: declines  Foot exam: 5/09/17   Microalbumin: 1/17 minimal  A1c: 11/12 12.2, 12/12 11.3 , 10/14 7.1, 7/15 6.3, 9/15 6.4, 12/15 5.9, 4/16 6.2, 10/16 5.9, 1/17 6.6, 5/17 6.3, 8/17 6.4  Eye exam: Dr. Senthil Carvajal, 5/31/17  Hep C Screen: 10/15 negative  Lipids: 1/17     Patient Active Problem List    Diagnosis Date Noted    Reactive depression 11/15/2016    Cervical spinal stenosis 11/03/2016    CKD (chronic kidney disease) 10/31/2016    ACP (advance care planning) 12/11/2015    Diabetes mellitus without complication ([facility] 61/43/6460    Hyperlipidemia 07/10/2015    Spinal stenosis, lumbar region, without neurogenic claudication 10/16/2014    Lumbar disc disease with radiculopathy 10/16/2014    DVT of leg (deep venous thrombosis) ([facility] 12/18/2012    HTN (hypertension) 12/12/2012    Microalbuminuria 12/12/2012    Perforated diverticulum of large intestine 12/04/2012    Alcohol abuse 12/04/2012    Back pain 12/04/2012    Insomnia 12/04/2012    Perforated viscus 11/21/2012     Current Outpatient Prescriptions   Medication Sig Dispense Refill    gabapentin (NEURONTIN) 300 mg capsule Take 2 Caps by mouth nightly as needed. 60 Cap 6    traZODone (DESYREL) 50 mg tablet TAKE ONE TABLET BY MOUTH EVERY NIGHT. 30 Tab 0    LORazepam (ATIVAN) 0.5 mg tablet TAKE ONE TABLET BY MOUTH NIGHTLY AS NEEDED FOR ANXIETY 20 Tab 0    citalopram (CELEXA) 20 mg tablet TAKE ONE TABLET BY MOUTH ONCE DAILY 30 Tab 3    rosuvastatin (CRESTOR) 10 mg tablet TAKE ONE TABLET BY MOUTH NIGHTLY 30 Tab 6    SITagliptin (JANUVIA) 50 mg tablet Take 1 Tab by mouth daily. 30 Tab 6    hydroCHLOROthiazide (HYDRODIURIL) 12.5 mg tablet TAKE ONE TABLET BY MOUTH ONCE DAILY 30 Tab 0    metoprolol succinate (TOPROL-XL) 50 mg XL tablet Take 1 Tab by mouth daily. 30 Tab 3    glucose blood VI test strips (FREESTYLE INSULINX TEST STRIPS) strip Check blood sugars 2-3 times daily. ICD-10: E11.9 100 Strip 0    cyanocobalamin 1,000 mcg tablet Take 1,000 mcg by mouth daily.  FERROUS FUMARATE (IRON PO) Take  by mouth.  magnesium 250 mg tab Take  by mouth daily.        Past Surgical History:   Procedure Laterality Date    ABDOMEN SURGERY PROC UNLISTED      bowel resection    HX BACK SURGERY  2014    HX GI  11/2012 bowel resection    HX ORTHOPAEDIC      amputated left 4th finger      Lab Results  Component Value Date/Time   WBC 6.2 08/08/2017 09:22 AM   HGB 13.2 08/08/2017 09:22 AM   Hemoglobin (POC) 13.3 11/02/2016 07:15 AM   HCT 40.6 08/08/2017 09:22 AM   Hematocrit (POC) 39 11/02/2016 07:15 AM   PLATELET 226 82/55/7318 09:22 AM   MCV 82 08/08/2017 09:22 AM     Lab Results  Component Value Date/Time   Cholesterol, total 182 05/09/2017 01:58 PM   Cholesterol (POC) 227 02/06/2015 03:30 PM   HDL Cholesterol 77 05/09/2017 01:58 PM   LDL, calculated 94 05/09/2017 01:58 PM   LDL Cholesterol (POC) 164 02/06/2015 03:30 PM   Triglyceride 57 05/09/2017 01:58 PM   Triglycerides (POC) 68 02/06/2015 03:30 PM     Lab Results  Component Value Date/Time   GFR est non-AA 49 08/08/2017 09:22 AM   GFRNA, POC 50 11/02/2016 07:15 AM   GFR est AA 57 08/08/2017 09:22 AM   GFRAA, POC >60 11/02/2016 07:15 AM   Creatinine 1.44 08/08/2017 09:22 AM   Creatinine (POC) 1.4 11/02/2016 07:15 AM   BUN 19 08/08/2017 09:22 AM   BUN (POC) 28 11/02/2016 07:15 AM   Sodium 141 08/08/2017 09:22 AM   Sodium (POC) 140 11/02/2016 07:15 AM   Potassium 4.7 08/08/2017 09:22 AM   Potassium (POC) 4.1 11/02/2016 07:15 AM   Chloride 101 08/08/2017 09:22 AM   Chloride (POC) 105 11/02/2016 07:15 AM   CO2 22 08/08/2017 09:22 AM   Magnesium 2.3 08/08/2017 09:22 AM   Phosphorus 3.0 10/17/2014 04:40 AM          Review of Systems   Respiratory: Negative for shortness of breath. Cardiovascular: Negative for chest pain. Musculoskeletal: Positive for myalgias. Physical Exam   Constitutional: He is oriented to person, place, and time. He appears well-developed and well-nourished. No distress. HENT:   Head: Normocephalic and atraumatic. Mouth/Throat: Oropharynx is clear and moist. No oropharyngeal exudate. Eyes: Conjunctivae and EOM are normal. Right eye exhibits no discharge. Left eye exhibits no discharge. Neck: Normal range of motion. Neck supple.  No thyromegaly present. Cardiovascular: Normal rate, regular rhythm and normal heart sounds. Exam reveals no gallop and no friction rub. No murmur heard. Pulmonary/Chest: Effort normal and breath sounds normal. No respiratory distress. He has no wheezes. He has no rales. He exhibits no tenderness. Abdominal: Soft. He exhibits no distension. There is no tenderness. There is no rebound and no guarding. Musculoskeletal: Normal range of motion. He exhibits no edema, tenderness or deformity. Lymphadenopathy:     He has no cervical adenopathy. Neurological: He is alert and oriented to person, place, and time. He has normal reflexes. He exhibits normal muscle tone. Coordination normal.   Skin: Skin is warm and dry. No rash noted. He is not diaphoretic. No erythema. No pallor. Gracey-sized soft cyst on base of L thumb   Psychiatric: He has a normal mood and affect. His behavior is normal.       ASSESSMENT and PLAN extended visit 40 min over half counseling    ICD-10-CM ICD-9-CM    1. Diabetes mellitus without complication ([facility] [facility]    Has been adequately controled on januvia, continue, focus on compliance, no change to dose   D62.6 056.18 METABOLIC PANEL, BASIC      HEMOGLOBIN A1C WITH EAG      MICROALBUMIN, UR, RAND W/ MICROALBUMIN/CREA RATIO   2. Reactive depression    Celexa improves his sx but he is not taking this every day, discussed need for daily compliance, he will work on this   C88.6 278.6 METABOLIC PANEL, BASIC      HEMOGLOBIN A1C WITH EAG      MICROALBUMIN, UR, RAND W/ MICROALBUMIN/CREA RATIO   3. Stage 3 chronic kidney disease    Cr running around 1.4-1.5 baseline, he recently saw nephro, renal function was stable, repeat labs today, focus on compliance with BP meds to maintain good BP control and prevent progression of diabetes   G40.3 615.3 METABOLIC PANEL, BASIC      HEMOGLOBIN A1C WITH EAG      MICROALBUMIN, UR, RAND W/ MICROALBUMIN/CREA RATIO   4.  Pure hypercholesterolemia    Will decrease crestor to every other day dosing as pt has a borderline elevated CK level and is getting cramps   P06.47 313.8 METABOLIC PANEL, BASIC      HEMOGLOBIN A1C WITH EAG      MICROALBUMIN, UR, RAND W/ MICROALBUMIN/CREA RATIO   5. Essential hypertension    BP controled on current regimen, no change to dose, work on compliance with meds   I98 648.8 METABOLIC PANEL, BASIC      HEMOGLOBIN A1C WITH EAG      MICROALBUMIN, UR, RAND W/ MICROALBUMIN/CREA RATIO   6. Insomnia, unspecified type    Pt has trazodone to use prn which helps but he does not use it every day, will be increasing gabapentin to help with tingling and this should help with sleep as well   F53.77 921.86 METABOLIC PANEL, BASIC      HEMOGLOBIN A1C WITH EAG      MICROALBUMIN, UR, RAND W/ MICROALBUMIN/CREA RATIO   7. Ganglion cyst    Refer to ortho hand   L82.23 057.76 METABOLIC PANEL, BASIC      HEMOGLOBIN A1C WITH EAG      MICROALBUMIN, UR, RAND W/ MICROALBUMIN/CREA RATIO   8. Cramps, extremity    Maybe related to crestor, will decrease crestor, discussed tonic water and stretching   T71.9 614.83 METABOLIC PANEL, BASIC      HEMOGLOBIN A1C WITH EAG      MICROALBUMIN, UR, RAND W/ MICROALBUMIN/CREA RATIO   9. PSA elevation    Concern for prostate cancer, has a brother and uncle with prostate cancer, biopsy is planned   M93.35 023.54 METABOLIC PANEL, BASIC      HEMOGLOBIN A1C WITH EAG      MICROALBUMIN, UR, RAND W/ MICROALBUMIN/CREA RATIO   10. Neuropathy    Will increase gabapentin to 2 tabs qhs   G62.9 355.9    11. Elevated CK    Decrease crestor to see if we can improve this   R74.8 790.5         Scribed by Chritsian Webster, as dictated by Dr. Laura Cantrell. Current diagnosis and concerns discussed with pt at length. Pt understands risks and benefits or current treatment plan and medications, and accepts the treatment and medication with any possible risks. Pt asks appropriate questions, which were answered.  Pt was instructed to call with any concerns or problems. This note will not be viewable in [address]

## 2017-11-23 LAB
ALBUMIN/CREAT UR: 50.2 MG/G CREAT (ref 0–30)
BUN SERPL-MCNC: 39 MG/DL (ref 8–27)
BUN/CREAT SERPL: 24 (ref 10–24)
CALCIUM SERPL-MCNC: 9.1 MG/DL (ref 8.6–10.2)
CHLORIDE SERPL-SCNC: 97 MMOL/L (ref 96–106)
CO2 SERPL-SCNC: 21 MMOL/L (ref 18–29)
CREAT SERPL-MCNC: 1.65 MG/DL (ref 0.76–1.27)
CREAT UR-MCNC: 202.3 MG/DL
EST. AVERAGE GLUCOSE BLD GHB EST-MCNC: 137 MG/DL
GFR SERPLBLD CREATININE-BSD FMLA CKD-EPI: 42 ML/MIN/1.73
GFR SERPLBLD CREATININE-BSD FMLA CKD-EPI: 48 ML/MIN/1.73
GLUCOSE SERPL-MCNC: 115 MG/DL (ref 65–99)
HBA1C MFR BLD: 6.4 % (ref 4.8–5.6)
MICROALBUMIN UR-MCNC: 101.5 UG/ML
POTASSIUM SERPL-SCNC: 4.6 MMOL/L (ref 3.5–5.2)
SODIUM SERPL-SCNC: 137 MMOL/L (ref 134–144)

## 2017-11-27 ENCOUNTER — OFFICE VISIT (OUTPATIENT)
Dept: CARDIOLOGY CLINIC | Age: 67
End: 2017-11-27

## 2017-11-27 VITALS
DIASTOLIC BLOOD PRESSURE: 70 MMHG | HEART RATE: 90 BPM | WEIGHT: 193.2 LBS | RESPIRATION RATE: 16 BRPM | OXYGEN SATURATION: 95 % | SYSTOLIC BLOOD PRESSURE: 140 MMHG | BODY MASS INDEX: 32.19 KG/M2 | HEIGHT: 65 IN

## 2017-11-27 DIAGNOSIS — Z01.810 PRE-OPERATIVE CARDIOVASCULAR EXAMINATION: ICD-10-CM

## 2017-11-27 DIAGNOSIS — R97.20 ABNORMAL PSA: Primary | ICD-10-CM

## 2017-11-27 DIAGNOSIS — I49.3 PVC'S (PREMATURE VENTRICULAR CONTRACTIONS): ICD-10-CM

## 2017-11-27 DIAGNOSIS — E11.9 DIABETES MELLITUS WITHOUT COMPLICATION (HCC): ICD-10-CM

## 2017-11-27 DIAGNOSIS — I45.10 RBBB (RIGHT BUNDLE BRANCH BLOCK): ICD-10-CM

## 2017-11-27 DIAGNOSIS — I10 ESSENTIAL HYPERTENSION: ICD-10-CM

## 2017-11-27 NOTE — PROGRESS NOTES
Alan Phillips DNP, ANP-BC  Subjective/HPI:     Andrade Cheema is a 69 y.o. male is here for routine f/u. The patient denies chest pain/ shortness of breath, orthopnea, PND, LE edema, palpitations, syncope, presyncope or fatigue. Home accu checks  range, denies fluttering or palpitations despite PVC's on EKG . He is pre op for prostate biopsy          PCP Provider  Nagi Luna MD  Past Medical History:   Diagnosis Date    Arthritis     Chronic kidney disease     Stage 3    Chronic pain     Abdoman, back, fingers per daughter   [facility])     DIET CONTROLLED    Hypertension     PE (pulmonary embolism)     Pneumonia     Psychiatric disorder     Depression      Past Surgical History:   Procedure Laterality Date    ABDOMEN SURGERY PROC UNLISTED      bowel resection    HX BACK SURGERY  2014    HX GI  11/2012    bowel resection    HX ORTHOPAEDIC      amputated left 4th finger     Allergies   Allergen Reactions    Arb-Angiotensin Receptor Antagonist Other (comments)     High k      Family History   Problem Relation Age of Onset    Cancer Mother     Anesth Problems Neg Hx       Current Outpatient Prescriptions   Medication Sig    gabapentin (NEURONTIN) 300 mg capsule Take 2 Caps by mouth nightly as needed.  traZODone (DESYREL) 50 mg tablet TAKE ONE TABLET BY MOUTH EVERY NIGHT.  citalopram (CELEXA) 20 mg tablet TAKE ONE TABLET BY MOUTH ONCE DAILY    rosuvastatin (CRESTOR) 10 mg tablet TAKE ONE TABLET BY MOUTH NIGHTLY (Patient taking differently: TAKE ONE TABLET BY MOUTH NIGHTLY/ taking every other day)    metoprolol succinate (TOPROL-XL) 50 mg XL tablet Take 1 Tab by mouth daily.  glucose blood VI test strips (FREESTYLE INSULINX TEST STRIPS) strip Check blood sugars 2-3 times daily. ICD-10: E11.9    SITagliptin (JANUVIA) 50 mg tablet Take 1 Tab by mouth daily.     hydroCHLOROthiazide (HYDRODIURIL) 12.5 mg tablet TAKE ONE TABLET BY MOUTH ONCE DAILY    FERROUS FUMARATE (IRON PO) Take  by mouth.  LORazepam (ATIVAN) 0.5 mg tablet TAKE ONE TABLET BY MOUTH NIGHTLY AS NEEDED FOR ANXIETY (Patient not taking: Reported on 11/27/2017)    cyanocobalamin 1,000 mcg tablet Take 1,000 mcg by mouth daily.  magnesium 250 mg tab Take  by mouth daily. No current facility-administered medications for this visit. Vitals:    11/27/17 1555 11/27/17 1556   BP: 130/70 140/70   Pulse: 90    Resp: 16    SpO2: 95%    Weight: 193 lb 3.2 oz (87.6 kg)    Height: 5' 5\" (1.651 m)      Social History     Social History    Marital status:      Spouse name: N/A    Number of children: N/A    Years of education: N/A     Occupational History    Not on file. Social History Main Topics    Smoking status: Never Smoker    Smokeless tobacco: Current User    Alcohol use 4.2 oz/week     7 Cans of beer per week      Comment: 7-10 per week    Drug use: No    Sexual activity: Not Currently     Other Topics Concern    Not on file     Social History Narrative    ** Merged History Encounter **            I have reviewed the nurses notes, vitals, problem list, allergy list, medical history, family, social history and medications. Review of Symptoms:    General: Pt denies excessive weight gain or loss. Pt is able to conduct ADL's  HEENT: Denies blurred vision, headaches, epistaxis and difficulty swallowing. Respiratory: Denies shortness of breath, ROLON, wheezing or stridor. Cardiovascular: Denies precordial pain, palpitations, edema or PND  Gastrointestinal: Denies poor appetite, indigestion, abdominal pain or blood in stool  Musculoskeletal: Denies pain or swelling from muscles or joints  Neurologic: Denies tremor, paresthesias, or sensory motor disturbance  Skin: Denies rash, itching or texture change. Physical Exam:      General: Well developed, in no acute distress, cooperative and alert  HEENT: No carotid bruits, no JVD, trach is midline.  Neck Supple,   Heart:  Normal S1/S2 negative S3 or S4. Irregularly irregular, no murmur, gallop or rub.   Respiratory: Clear bilaterally x 4, no wheezing or rales  Abdomen:   Soft, non-tender, no masses, bowel sounds are active.   Extremities:  No edema, normal cap refill, no cyanosis, atraumatic. Neuro: A&Ox3, speech clear, gait stable. Skin: Skin color is normal. No rashes or lesions.  Non diaphoretic  Vascular: 2+ pulses symmetric in all extremities    Cardiographics    ECG: sinus w/ RBBB frequent PVC   Results for orders placed or performed during the hospital encounter of 10/26/16   EKG, 12 LEAD, INITIAL   Result Value Ref Range    Ventricular Rate 66 BPM    Atrial Rate 66 BPM    P-R Interval 162 ms    QRS Duration 134 ms    Q-T Interval 434 ms    QTC Calculation (Bezet) 454 ms    Calculated P Axis 24 degrees    Calculated R Axis -63 degrees    Calculated T Axis 35 degrees    Diagnosis       Sinus rhythm with premature atrial complexes  Right bundle branch block  Left anterior fascicular block  ** Bifascicular block **  Left ventricular hypertrophy  Cannot rule out Septal infarct (cited on or before 26-OCT-2016)    Confirmed by Rueter Braydon MCKEON, Madelin Plummer ([id number]) on 10/26/2016 11:45:18 AM           Cardiology Labs:  Lab Results   Component Value Date/Time    Cholesterol, total 182 05/09/2017 01:58 PM    HDL Cholesterol 77 05/09/2017 01:58 PM    LDL, calculated 94 05/09/2017 01:58 PM    Triglyceride 57 05/09/2017 01:58 PM       Lab Results   Component Value Date/Time    Sodium 137 11/22/2017 09:57 AM    Potassium 4.6 11/22/2017 09:57 AM    Chloride 97 11/22/2017 09:57 AM    CO2 21 11/22/2017 09:57 AM    Anion gap 6 11/03/2016 04:01 AM    Glucose 115 11/22/2017 09:57 AM    BUN 39 11/22/2017 09:57 AM    Creatinine 1.65 11/22/2017 09:57 AM    BUN/Creatinine ratio 24 11/22/2017 09:57 AM    GFR est AA 48 11/22/2017 09:57 AM    GFR est non-AA 42 11/22/2017 09:57 AM    Calcium 9.1 11/22/2017 09:57 AM    Bilirubin, total 0.8 05/09/2017 01:58 PM    AST (SGOT) 38 05/09/2017 01:58 PM    Alk. phosphatase 43 05/09/2017 01:58 PM    Protein, total 7.8 05/09/2017 01:58 PM    Albumin 4.4 05/09/2017 01:58 PM    Globulin 4.2 10/26/2016 09:49 AM    A-G Ratio 1.3 05/09/2017 01:58 PM    ALT (SGPT) 34 05/09/2017 01:58 PM           Assessment:     Assessment:     Diagnoses and all orders for this visit:    1. Abnormal PSA    2. Pre-operative cardiovascular examination    3. Essential hypertension  -     AMB POC EKG ROUTINE W/ 12 LEADS, INTER & REP    4. Diabetes mellitus without complication ([facility] [address]    5. PVC's (premature ventricular contractions)    6. RBBB (right bundle branch block)        ICD-10-CM ICD-9-CM    1. Abnormal PSA R97.20 795.89    2. Pre-operative cardiovascular examination Z01.810 V72.81    3. Essential hypertension I10 401.9 AMB POC EKG ROUTINE W/ 12 LEADS, INTER & REP   4. Diabetes mellitus without complication ([facility]) Z32.1 250.00    5. PVC's (premature ventricular contractions) I49.3 427.69    6. RBBB (right bundle branch block) I45.10 426.4      Orders Placed This Encounter    AMB POC EKG ROUTINE W/ 12 LEADS, INTER & REP     Order Specific Question:   Reason for Exam:     Answer:   routine        Plan:     Patient is a 49-year-old male presenting for preop clearance for prostate biopsy. Negative ischemia on nuclear stress test 2016, essentially structurally normal heart on echo 2015. Patient is without signs or symptoms of coronary artery disease. He is at low cardiac risk for urologic procedures/surgery. Follow up in 1 year, sooner as needed. Brenna Díaz MD    This note was created using voice recognition software. Despite editing, there may be syntax errors.

## 2017-11-27 NOTE — MR AVS SNAPSHOT
Visit Information Date & Time Provider Department Dept. Phone Encounter #  
 11/27/2017  3:30 PM Sherly Barba, 1024 [facility] [phone] [id number] Your Appointments 2/28/2018  8:15 AM  
ROUTINE CARE with Av Merino, [address] [place] [address],[address] [facility]) Appt Note: 3 month follow up  
 [phone]  
[address] 235 [facility] [address] [place] [address] Upcoming Health Maintenance Date Due DTaP/Tdap/Td series (1 - Tdap) 7/5/1969 Pneumococcal 65+ Low/Medium Risk (2 of 2 - PPSV23) 12/4/2017 MEDICARE YEARLY EXAM 1/25/2018 FOOT EXAM Q1 5/9/2018 LIPID PANEL Q1 5/9/2018 HEMOGLOBIN A1C Q6M 5/22/2018 EYE EXAM RETINAL OR DILATED Q1 5/31/2018 MICROALBUMIN Q1 11/22/2018 GLAUCOMA SCREENING Q2Y 5/31/2019 COLONOSCOPY 9/15/2020 Allergies as of 11/27/2017  Review Complete On: 11/27/2017 By: Sherly Barba MD  
  
 Severity Noted Reaction Type Reactions Arb-angiotensin Receptor Antagonist  07/12/2016    Other (comments) High k  
  
Current Immunizations  Reviewed on 8/8/2017 Name Date Influenza Vaccine 12/4/2012 Pneumococcal Polysaccharide (PPSV-23) 12/4/2012 ZZZ-RETIRED (DO NOT USE) Pneumococcal Vaccine (Unspecified Type) 11/27/2012 12:16 PM  
  
 Not reviewed this visit You Were Diagnosed With   
  
 Codes Comments PVC's (premature ventricular contractions)    -  Primary ICD-10-CM: I49.3 ICD-9-CM: 427.69 Essential hypertension     ICD-10-CM: I10 
ICD-9-CM: 401.9 Diabetes mellitus without complication ([facility] [place] [facility]     IKB-19-HH: E11.9 ICD-9-CM: 250.00 Vitals BP Pulse Resp Height(growth percentile) Weight(growth percentile) SpO2  
 140/70 (BP 1 Location: Right arm, BP Patient Position: Sitting) 90 16 5' 5\" (1.651 m) 193 lb 3.2 oz (87.6 kg) 95% BMI Smoking Status 32.15 kg/m2 Never Smoker Vitals History BMI and BSA Data Body Mass Index Body Surface Area  
 32.15 kg/m 2 2 m 2 Preferred Pharmacy Pharmacy Name Phone [facility] [address] [phone] Your Updated Medication List  
  
   
This list is accurate as of: 11/27/17  4:28 PM.  Always use your most recent med list.  
  
  
  
  
 citalopram 20 mg tablet Commonly known as:  CELEXA  
TAKE ONE TABLET BY MOUTH ONCE DAILY  
  
 cyanocobalamin 1,000 mcg tablet Take 1,000 mcg by mouth daily. gabapentin 300 mg capsule Commonly known as:  NEURONTIN Take 2 Caps by mouth nightly as needed. glucose blood VI test strips strip Commonly known as:  FREESTYLE INSULINX TEST STRIPS Check blood sugars 2-3 times daily. ICD-10: E11.9  
  
 hydroCHLOROthiazide 12.5 mg tablet Commonly known as:  HYDRODIURIL  
TAKE ONE TABLET BY MOUTH ONCE DAILY  
  
 IRON PO Take  by mouth. LORazepam 0.5 mg tablet Commonly known as:  ATIVAN  
TAKE ONE TABLET BY MOUTH NIGHTLY AS NEEDED FOR ANXIETY  
  
 magnesium 250 mg Tab Take  by mouth daily. metoprolol succinate 50 mg XL tablet Commonly known as:  TOPROL-XL Take 1 Tab by mouth daily. rosuvastatin 10 mg tablet Commonly known as:  CRESTOR  
TAKE ONE TABLET BY MOUTH NIGHTLY SITagliptin 50 mg tablet Commonly known as:  Vallery Rutledge Take 1 Tab by mouth daily. traZODone 50 mg tablet Commonly known as:  DESYREL  
TAKE ONE TABLET BY MOUTH EVERY NIGHT. We Performed the Following AMB POC EKG ROUTINE W/ 12 LEADS, INTER & REP [73444 CPT(R)] Introducing [facility]! Benny Tejada introduces [employer] patient portal. Now you can access parts of your medical record, email your doctor's office, and request medication refills online. 1. In your internet browser, go to [email or web address] 2. Click on the First Time User? Click Here link in the Sign In box. You will see the New Member Sign Up page. 3. Enter your [employer] Access Code exactly as it appears below. You will not need to use this code after youve completed the sign-up process. If you do not sign up before the expiration date, you must request a new code. · [employer] Access Code: [email or web address] Expires: 2/20/2018  9:06 AM 
 
4. Enter the last four digits of your Social Security Number (xxxx) and Date of Birth (mm/dd/yyyy) as indicated and click Submit. You will be taken to the next sign-up page. 5. Create a [employer] ID. This will be your [employer] login ID and cannot be changed, so think of one that is secure and easy to remember. 6. Create a [employer] password. You can change your password at any time. 7. Enter your Password Reset Question and Answer. This can be used at a later time if you forget your password. 8. Enter your e-mail address. You will receive e-mail notification when new information is available in [address] 9. Click Sign Up. You can now view and download portions of your medical record. 10. Click the Download Summary menu link to download a portable copy of your medical information. If you have questions, please visit the Frequently Asked Questions section of the [employer] website. Remember, [employer] is NOT to be used for urgent needs. For medical emergencies, dial 911. Now available from your iPhone and Android! Please provide this summary of care documentation to your next provider. Your primary care clinician is listed as Dee Burleson. If you have any questions after today's visit, please call [phone].

## 2017-11-27 NOTE — PROGRESS NOTES
1. Have you been to the ER, urgent care clinic since your last visit? Hospitalized since your last visit? No    2. Have you seen or consulted any other health care providers outside of the [address] [place] [address] since your last visit? Include any pap smears or colon screening. Yes [facility]    Patient has been sent for cardiac clearance for prostate biopsy he is uncertain about this . His statin has been changed to every other day due to cramping and drinking tonic water.

## 2017-11-29 ENCOUNTER — TELEPHONE (OUTPATIENT)
Dept: CARDIOLOGY CLINIC | Age: 67
End: 2017-11-29

## 2017-11-29 NOTE — TELEPHONE ENCOUNTER
Please fax cardiac clearance over to [place] [facility] to Radha Aguilar. Fax # [phone]    Thanks!

## 2017-11-30 ENCOUNTER — TELEPHONE (OUTPATIENT)
Dept: CARDIOLOGY CLINIC | Age: 67
End: 2017-11-30

## 2017-11-30 NOTE — TELEPHONE ENCOUNTER
He can stop his aspirin a week before the procedure if needed and restarted a few days later whenever felt appropriate per urology.

## 2017-11-30 NOTE — TELEPHONE ENCOUNTER
[place] with Julio Brysons on [employer] Verified patient with two identifiers.  Will fax to Dr Elis Cloud

## 2017-12-12 RX ORDER — METOPROLOL SUCCINATE 50 MG/1
TABLET, EXTENDED RELEASE ORAL
Qty: 30 TAB | Refills: 3 | Status: SHIPPED | OUTPATIENT
Start: 2017-12-12 | End: 2018-05-04 | Stop reason: SDUPTHER

## 2017-12-12 RX ORDER — HYDROCHLOROTHIAZIDE 12.5 MG/1
TABLET ORAL
Qty: 30 TAB | Refills: 0 | Status: SHIPPED | OUTPATIENT
Start: 2017-12-12 | End: 2018-01-20 | Stop reason: SDUPTHER

## 2017-12-12 RX ORDER — TRAZODONE HYDROCHLORIDE 50 MG/1
TABLET ORAL
Qty: 30 TAB | Refills: 0 | Status: SHIPPED | OUTPATIENT
Start: 2017-12-12 | End: 2018-01-20 | Stop reason: SDUPTHER

## 2018-01-13 DIAGNOSIS — E11.9 DIABETES MELLITUS WITHOUT COMPLICATION (HCC): ICD-10-CM

## 2018-01-14 RX ORDER — LANCETS 28 GAUGE
EACH MISCELLANEOUS
Qty: 100 LANCET | Refills: 12 | Status: SHIPPED | OUTPATIENT
Start: 2018-01-14 | End: 2021-11-15

## 2018-01-19 DIAGNOSIS — E11.9 DIABETES MELLITUS WITHOUT COMPLICATION (HCC): Primary | ICD-10-CM

## 2018-01-20 RX ORDER — TRAZODONE HYDROCHLORIDE 50 MG/1
TABLET ORAL
Qty: 30 TAB | Refills: 0 | Status: SHIPPED | OUTPATIENT
Start: 2018-01-20 | End: 2018-03-01 | Stop reason: SDUPTHER

## 2018-01-20 RX ORDER — HYDROCHLOROTHIAZIDE 12.5 MG/1
TABLET ORAL
Qty: 30 TAB | Refills: 0 | Status: SHIPPED | OUTPATIENT
Start: 2018-01-20 | End: 2018-02-28

## 2018-01-25 ENCOUNTER — TELEPHONE (OUTPATIENT)
Dept: INTERNAL MEDICINE CLINIC | Age: 68
End: 2018-01-25

## 2018-01-26 NOTE — TELEPHONE ENCOUNTER
Spoke to [employer] (\[facility] [place] [facility]\""). Mary inquiring on pt's PSA. Yasmine Bishop states pt ahs seen Dr. Krishna Doherty for prostate-dx'd w/ prostate cancer. Yasmine Bishop inquiring if there would be given any abx to r/o any infection regarding PSA levels. Yasmine Bishop informed that Dr. Sheron Priest would refer pt's whose PSA elevate to Uro for further tx. Yasmine Bishop verbalized understanding of information discussed w/ no further questions at this time. Yasmine Bishop states that pt c/o nose bleeds x2-3wks constantly-when blowing nose. Yasmine Bishop states pt not taking any nasal sprays. Yasmine Bishop states that pt's house has dry heat. Yasmine Bishop asking for recommendations for pt's nose bleeds. Yasmine Bishop informed Dr. Sheron Priest will be notified. Yasmine Bishop verbalized understanding of information discussed w/ no further questions at this time.

## 2018-01-26 NOTE — TELEPHONE ENCOUNTER
----- Message from Simeon Roque Page sent at 1/26/2018 10:32 AM EST -----  Regarding: Dr. Clementina Pete, daughter, is returning nurse call. Best contact number is [phone].         Message copied/pasted from [facility]

## 2018-01-29 NOTE — TELEPHONE ENCOUNTER
Called, spoke to [employer] (HIPAA). Two pt identifiers confirmed. Kimberley Toro informed for pt to try nasal saline, apply pressure to nose, and if continuous, see ENT. Provided with Dr. Jacek Jolly' contact info. Kimberley Toro verbalized understanding of information discussed w/ no further questions at this time.

## 2018-02-26 ENCOUNTER — DOCUMENTATION ONLY (OUTPATIENT)
Dept: INTERNAL MEDICINE CLINIC | Age: 68
End: 2018-02-26

## 2018-02-26 NOTE — PROGRESS NOTES
Medicare Part B Preventive Services  Limitations  Recommendation/Date completed if known  Scheduled/ Next Due    Bone Mass Measurement  (age 72 & older, biennial)  Requires diagnosis related to osteoporosis or estrogen deficiency. Biennial benefit unless patient has history of long-term glucocorticoid tx or baseline is needed because initial test was by other method  N/A N/A   Cardiovascular Screening Blood Tests (every 5 years)  Total cholesterol, HDL, Triglycerides  Order as a panel if possible  Completed 5/2017      Recommended annually  Due 5/2018   Colorectal Cancer Screening  -Fecal occult blood test (annual)  -Flexible sigmoidoscopy (5y)  -Screening colonoscopy (10y)  -Barium Enema     Completed 9/15/15 with Dr. Thalia Rashid  Recommended in 5 years  Due 9/2020    Counseling to Prevent Tobacco Use (up to 8 sessions per year)  - Counseling greater than 3 and up to 10 minutes  - Counseling greater than 10 minutes  Patients must be asymptomatic of tobacco-related conditions to receive as preventive service  Former smoker Keep up the good work! Diabetes Screening Tests (at least every 3 years, Medicare covers annually or at 6-month intervals for prediabetic patients)      Fasting blood sugar (FBS) or glucose tolerance test (GTT)     Patient must be diagnosed with one of the following:  -Hypertension, Dyslipidemia, obesity, previous impaired FBS or GTT  Or any two of the following: overweight, FH of diabetes, age ? 72, history of gestational diabetes, birth of baby weighing more than 9 pounds  Completed 11/2017 with A1C 6.4      Recommended every 3-6 months Due 2/2018-5/2018   Diabetes Self-Management Training (DSMT) (no USPSTF recommendation)  Requires referral by treating physician for patient with diabetes or renal disease. 10 hours of initial DSMT session of no less than 30 minutes each in a continuous 12-month period. 2 hours of follow-up DSMT in subsequent years.   N/A N/A   Glaucoma Screening (no USPSTF recommendation)  Diabetes mellitus, family history, , age 48 or over,  American, age 72 or over  Completed 5/2017 with Dr. Phyllis Love  Recommended annually  Due 5/2018   Human Immunodeficiency Virus (HIV) Screening (annually for increased risk patients)  HIV-1 and HIV-2 by EIA, RUKHSANA, rapid antibody test, or oral mucosa transudate  Patient must be at increased risk for HIV infection per USPSTF guidelines or pregnant. Tests covered annually for patients at increased risk. Pregnant patients may receive up to 3 test during pregnancy. N/A N/A   Medical Nutrition Therapy (MNT) (for diabetes or renal disease not recommended schedule)  Requires referral by treating physician for patient with diabetes or renal disease. Can be provided in same year as diabetes self-management training (DSMT), and CMS recommends medical nutrition therapy take place after DSMT. Up to 3 hours for initial year and 2 hours in subsequent years.   N/A N/A   Prostate Cancer Screening (annually up to age 76)  - Digital rectal exam (NORA)  - Prostate specific antigen (PSA)  Annually (age 48 or over), NORA not paid separately when covered E/M service is provided on same date  Completed 5/2017      Recommended annually  Due 5/2018   Seasonal Influenza Vaccination (annually)     Never completed     Recommended annually Declines   Pneumococcal Vaccination (once after 72)     Pneumovax - 12/2012    Prevnar 15 - never completed    Both recommended once over the age of 72 Completed    Declines   Shingles vaccine    Zostavax - never completed     Shingrix - never completed     Recommended once over the age of 72 N/A      Due now   Hepatitis B Vaccinations (if medium/high risk)  Medium/high risk factors: End-stage renal disease,  Hemophiliacs who received Factor VIII or IX concentrates, Clients of institutions for the mentally retarded, Persons who live in the same house as a HepB virus carrier, Homosexual men, Illicit injectable drug abusers. N/A N/A   Ultrasound Screening for Abdominal Aortic Aneurysm (AAA) (once)  Patient must be referred through IPPE and not have had a screening for abdominal aortic aneurysm before under Medicare.  Limited to patients who meet one of the following criteria:  - Men who are 73-68 years old and have smoked more than 100 cigarettes in their lifetime.  -Anyone with a FH of AAA  -Anyone recommended for screening by USPSTF  US kidneys 5/2016 - negative Completed

## 2018-02-28 ENCOUNTER — OFFICE VISIT (OUTPATIENT)
Dept: INTERNAL MEDICINE CLINIC | Age: 68
End: 2018-02-28

## 2018-02-28 ENCOUNTER — HOSPITAL ENCOUNTER (OUTPATIENT)
Dept: LAB | Age: 68
Discharge: HOME | End: 2018-02-28
Payer: MEDICARE

## 2018-02-28 VITALS
RESPIRATION RATE: 16 BRPM | OXYGEN SATURATION: 98 % | DIASTOLIC BLOOD PRESSURE: 85 MMHG | SYSTOLIC BLOOD PRESSURE: 135 MMHG | HEIGHT: 65 IN | BODY MASS INDEX: 32.32 KG/M2 | HEART RATE: 67 BPM | WEIGHT: 194 LBS | TEMPERATURE: 97.3 F

## 2018-02-28 DIAGNOSIS — E11.21 TYPE 2 DIABETES WITH NEPHROPATHY (HCC): ICD-10-CM

## 2018-02-28 DIAGNOSIS — E78.00 PURE HYPERCHOLESTEROLEMIA: ICD-10-CM

## 2018-02-28 DIAGNOSIS — F32.9 REACTIVE DEPRESSION: ICD-10-CM

## 2018-02-28 DIAGNOSIS — N18.30 STAGE 3 CHRONIC KIDNEY DISEASE (HCC): ICD-10-CM

## 2018-02-28 DIAGNOSIS — H91.93 DECREASED HEARING OF BOTH EARS: ICD-10-CM

## 2018-02-28 DIAGNOSIS — E66.9 OBESITY (BMI 35.0-39.9 WITHOUT COMORBIDITY): ICD-10-CM

## 2018-02-28 DIAGNOSIS — Z00.00 MEDICARE ANNUAL WELLNESS VISIT, SUBSEQUENT: Primary | ICD-10-CM

## 2018-02-28 DIAGNOSIS — I10 ESSENTIAL HYPERTENSION: ICD-10-CM

## 2018-02-28 PROCEDURE — 83036 HEMOGLOBIN GLYCOSYLATED A1C: CPT

## 2018-02-28 PROCEDURE — 80061 LIPID PANEL: CPT

## 2018-02-28 PROCEDURE — 80053 COMPREHEN METABOLIC PANEL: CPT

## 2018-02-28 PROCEDURE — 36415 COLL VENOUS BLD VENIPUNCTURE: CPT

## 2018-02-28 NOTE — PROGRESS NOTES
This is a Subsequent Medicare Annual Wellness Exam (AWV) (Performed 12 months after IPPE or effective date of Medicare Part B enrollment)    I have reviewed the patient's medical history in detail and updated the computerized patient record. History     Past Medical History:   Diagnosis Date    Arthritis     Chronic kidney disease     Stage 3    Chronic pain     Abdoman, back, fingers per daughter   [employer] [facility])     DIET CONTROLLED    Hypertension     PE (pulmonary embolism)     Pneumonia     Psychiatric disorder     Depression      Past Surgical History:   Procedure Laterality Date    ABDOMEN SURGERY PROC UNLISTED      bowel resection    HX BACK SURGERY  2014    HX GI  11/2012    bowel resection    HX ORTHOPAEDIC      amputated left 4th finger     Current Outpatient Prescriptions   Medication Sig Dispense Refill    glucose blood VI test strips (FREESTYLE INSULINX TEST STRIPS) strip Check blood sugars 2-3 times daily. ICD-10: E11.9 100 Strip 0    traZODone (DESYREL) 50 mg tablet TAKE ONE TABLET BY MOUTH ONCE DAILY AT BEDTIME 30 Tab 0    hydroCHLOROthiazide (HYDRODIURIL) 12.5 mg tablet TAKE ONE TABLET BY MOUTH ONCE DAILY 30 Tab 0    FREESTYLE LANCETS 28 gauge misc USE TO CHECK BLOOD SUGAR ONCE DAILY 100 Lancet 12    metoprolol succinate (TOPROL-XL) 50 mg XL tablet TAKE ONE TABLET BY MOUTH ONCE DAILY 30 Tab 3    gabapentin (NEURONTIN) 300 mg capsule Take 2 Caps by mouth nightly as needed. 60 Cap 6    citalopram (CELEXA) 20 mg tablet TAKE ONE TABLET BY MOUTH ONCE DAILY 30 Tab 3    rosuvastatin (CRESTOR) 10 mg tablet TAKE ONE TABLET BY MOUTH NIGHTLY (Patient taking differently: TAKE ONE TABLET BY MOUTH NIGHTLY/ taking every other day) 30 Tab 6    SITagliptin (JANUVIA) 50 mg tablet Take 1 Tab by mouth daily. 30 Tab 6    hydroCHLOROthiazide (HYDRODIURIL) 12.5 mg tablet TAKE ONE TABLET BY MOUTH ONCE DAILY 30 Tab 0    cyanocobalamin 1,000 mcg tablet Take 1,000 mcg by mouth daily.       FERROUS FUMARATE (IRON PO) Take  by mouth.  magnesium 250 mg tab Take  by mouth daily.  LORazepam (ATIVAN) 0.5 mg tablet TAKE ONE TABLET BY MOUTH NIGHTLY AS NEEDED FOR ANXIETY (Patient not taking: Reported on 11/27/2017) 20 Tab 0     Allergies   Allergen Reactions    Arb-Angiotensin Receptor Antagonist Other (comments)     High k     Family History   Problem Relation Age of Onset    Cancer Mother     Anesth Problems Neg Hx      Social History   Substance Use Topics    Smoking status: Never Smoker    Smokeless tobacco: Current User    Alcohol use 4.2 oz/week     7 Cans of beer per week      Comment: 7-10 per week     Patient Active Problem List   Diagnosis Code    Perforated viscus R19.8    Perforated 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    Alcohol abuse F10.10    Back pain M54.9    Insomnia G47.00    HTN (hypertension) I10    Microalbuminuria R80.9    DVT of leg (deep venous thrombosis) ([facility]) I82.409    Spinal stenosis, lumbar region, without neurogenic claudication M48.061    Lumbar disc disease with radiculopathy M51.16    Hyperlipidemia E78.5    ACP (advance care planning) Z71.89    Diabetes mellitus without complication ([facility] H93.6    CKD (chronic kidney disease) N18.9    Cervical spinal stenosis M48.02    Reactive depression F32.9    Type 2 diabetes with nephropathy ([facility]) E11.21       Depression Risk Factor Screening:     PHQ over the last two weeks 2/28/2018   PHQ Not Done -   Little interest or pleasure in doing things Not at all   Feeling down, depressed or hopeless Not at all   Total Score PHQ 2 0   controlled on celexa  Alcohol Risk Factor Screening: You average more than 14 drinks a week. 3 beers per night, discussed guidelines  Functional Ability and Level of Safety:   Hearing Loss  The patient needs further evaluation. Activities of Daily Living  The home contains: no safety equipment.   Patient needs help with:  transportation, shopping, preparing meals, laundry, housework and managing medications    Fall Risk  Fall Risk Assessment, last 12 mths 2/28/2018   Able to walk? Yes   Fall in past 12 months? Yes   Fall with injury? No   Number of falls in past 12 months 1   Fall Risk Score 1     Tripped when he was cooking , lost his balance no recurrent falls   Abuse Screen  Patient is not abused  Lives alone, has 2 daughters   Cognitive Screening   Evaluation of Cognitive Function:  Has your family/caregiver stated any concerns about your memory: yes  Normal  No intervention needed  Patient Care Team   Patient Care Team:  Heaven Harmon MD as PCP - General (Internal Medicine)  Sanford Clark MD as Physician (Cardiology)  Dax Dejesus MD (Nephrology)  Lucila Vinson MD (Neurosurgery)  Gloria Sarmiento MD (Ophthalmology)  Russell Cordero MD (Otolaryngology)  Ting Mckoy MD (Urology)     updated    Assessment/Plan   Education and counseling provided:  Are appropriate based on today's review and evaluation  End-of-Life planning (with patient's consent)  Prostate cancer screening tests (PSA, covered annually)  Screening for glaucoma  Diabetes screening test    Diagnoses and all orders for this visit:    1. Medicare annual wellness visit, subsequent      Health Maintenance Due   Topic Date Due    DTaP/Tdap/Td series (1 - Tdap) 07/05/1969    Pneumococcal 65+ Low/Medium Risk (2 of 2 - PPSV23) 12/04/2017    MEDICARE YEARLY EXAM  01/25/2018     Discussed with patient about advance medical directive. Provided patient blank AMD and Your Right to Decide Booklet. Requested that if completed to provide a copy of AMD to office. ACP not on file.  SDM is his daughter (Rhiannon Rhoades).       Colonoscopy: 9/15/15, Dr. Jessica Valle, repeat 5 years      AAA screen: CT 11/12, negative  Tdap: unknown   Pneumovax: 12/04/2012  [id number]: declines  Zostavax: never had   Flu shot: declines    A1c: 11/17 6.4 due now  PSA:  5/17 3.8 q6 months with urology   Hep C Screen: 10/15, negative  Lipids: 5/17 LDL 94 annual       Eye exam: Dr. Jacklyn Allred, 5/31/17, due 5/18    Medication reconciliation completed by MA and reviewed by me. Medical/surgical/social/family history reviewed and updated by me. Patient provided AVS and preventative screening table. Patient verbalized understanding of all information discussed.

## 2018-02-28 NOTE — PATIENT INSTRUCTIONS
Medicare Part B Preventive Services  Limitations  Recommendation/Date completed if known  Scheduled/ Next Due    Bone Mass Measurement  (age 72 & older, biennial)  Requires diagnosis related to osteoporosis or estrogen deficiency. Biennial benefit unless patient has history of long-term glucocorticoid tx or baseline is needed because initial test was by other method  N/A N/A   Cardiovascular Screening Blood Tests (every 5 years)  Total cholesterol, HDL, Triglycerides  Order as a panel if possible  Completed 5/2017      Recommended annually  Due 5/2018   Colorectal Cancer Screening  -Fecal occult blood test (annual)  -Flexible sigmoidoscopy (5y)  -Screening colonoscopy (10y)  -Barium Enema     Completed 9/15/15 with Dr. Oscar Castillo  Recommended in 5 years  Due 9/2020    Counseling to Prevent Tobacco Use (up to 8 sessions per year)  - Counseling greater than 3 and up to 10 minutes  - Counseling greater than 10 minutes  Patients must be asymptomatic of tobacco-related conditions to receive as preventive service  Former smoker Keep up the good work! Diabetes Screening Tests (at least every 3 years, Medicare covers annually or at 6-month intervals for prediabetic patients)      Fasting blood sugar (FBS) or glucose tolerance test (GTT)     Patient must be diagnosed with one of the following:  -Hypertension, Dyslipidemia, obesity, previous impaired FBS or GTT  Or any two of the following: overweight, FH of diabetes, age ? 72, history of gestational diabetes, birth of baby weighing more than 9 pounds  Completed 11/2017 with A1C 6.4      Recommended every 3-6 months Due 2/2018-5/2018   Diabetes Self-Management Training (DSMT) (no USPSTF recommendation)  Requires referral by treating physician for patient with diabetes or renal disease. 10 hours of initial DSMT session of no less than 30 minutes each in a continuous 12-month period. 2 hours of follow-up DSMT in subsequent years.   N/A N/A   Glaucoma Screening (no USPSTF recommendation)  Diabetes mellitus, family history, , age 48 or over,  American, age 72 or over  Completed 5/2017 with Dr. Vikki Palomares  Recommended annually  Due 5/2018   Human Immunodeficiency Virus (HIV) Screening (annually for increased risk patients)  HIV-1 and HIV-2 by EIA, RUKHSANA, rapid antibody test, or oral mucosa transudate  Patient must be at increased risk for HIV infection per USPSTF guidelines or pregnant. Tests covered annually for patients at increased risk. Pregnant patients may receive up to 3 test during pregnancy. N/A N/A   Medical Nutrition Therapy (MNT) (for diabetes or renal disease not recommended schedule)  Requires referral by treating physician for patient with diabetes or renal disease. Can be provided in same year as diabetes self-management training (DSMT), and CMS recommends medical nutrition therapy take place after DSMT. Up to 3 hours for initial year and 2 hours in subsequent years.   N/A N/A   Prostate Cancer Screening (annually up to age 76)  - Digital rectal exam (NORA)  - Prostate specific antigen (PSA)  Annually (age 48 or over), NORA not paid separately when covered E/M service is provided on same date  Completed 5/2017      Recommended annually  Due 5/2018   Seasonal Influenza Vaccination (annually)     Never completed      Recommended annually Declines   Pneumococcal Vaccination (once after 72)     Pneumovax - 12/2012     Prevnar 13 - never completed     Both recommended once over the age of 72 Completed     Declines   Shingles vaccine    Zostavax - never completed      Shingrix - never completed      Recommended once over the age of 72 N/A        Due now  You declined   Hepatitis B Vaccinations (if medium/high risk)  Medium/high risk factors: End-stage renal disease,  Hemophiliacs who received Factor VIII or IX concentrates, Clients of institutions for the mentally retarded, Persons who live in the same house as a HepB virus carrier, Homosexual men, Illicit injectable drug abusers. N/A N/A   Ultrasound Screening for Abdominal Aortic Aneurysm (AAA) (once)  Patient must be referred through IPPE and not have had a screening for abdominal aortic aneurysm before under Medicare. Limited to patients who meet one of the following criteria:  - Men who are 73-68 years old and have smoked more than 100 cigarettes in their lifetime.  -Anyone with a FH of AAA  -Anyone recommended for screening by USPSTF  US kidneys 5/2016 - negative Completed          Please bring in a copy of your advanced directive to your next office visit so we can have a copy on file. Medicare Wellness Visit, Male    The best way to live healthy is to have a healthy lifestyle by eating a well-balanced diet, exercising regularly, limiting alcohol and stopping smoking. Regular physical exams and screening tests are another way to keep healthy. Preventive exams provided by your health care provider can find health problems before they become diseases or illnesses. Preventive services including immunizations, screening tests, monitoring and exams can help you take care of your own health. All people over age 72 should have a pneumovax  and and a prevnar shot to prevent pneumonia. These are once in a lifetime unless you and your provider decide differently. All people over 65 should have a yearly flu shot and a tetanus vaccine every 10 years. Screening for diabetes mellitus with a blood sugar test should be done every year. Glaucoma is a disease of the eye due to increased ocular pressure that can lead to blindness and it should be done every year by an eye professional.    Cardiovascular screening tests that check for elevated lipids (fatty part of blood) which can lead to heart disease and strokes should be done every 5 years.     Colorectal screening that evaluates for blood or polyps in your colon should be done yearly as a stool test or every five years as a flexible sigmoidoscope or every 10 years as a colonoscopy up to age 76. Men up to age 76 may need a screening blood test for prostate cancer at certain intervals, depending on their personal and family history. This decision is between the patient and his provider. If you have been a smoker or had family history of abdominal aortic aneurysms, you and your provider may decide to schedule an ultrasound test of your aorta. Hepatitis C screening is also recommended for anyone born between [birth year] through [address]. A shingles vaccine is also recommended once in a lifetime after age 61. Your Medicare Wellness Exam is recommended annually.     Here is a list of your current Health Maintenance items with a due date:  Health Maintenance Due   Topic Date Due    DTaP/Tdap/Td  (1 - Tdap) 07/05/1969    Pneumococcal Vaccine (2 of 2 - PPSV23) 12/04/2017    Annual Well Visit  01/25/2018

## 2018-02-28 NOTE — MR AVS SNAPSHOT
[address] [facility] [address] Ximena Rubi [address]. 
[phone] Patient: Geetha Ford MRN: [id number] BYL:0[birth year]50 Visit Information Date & Time Provider Department Dept. Phone Encounter #  
 2/28/2018  8:15 AM Lisa Fonseca, [address] [place] [address],[address] [phone] [id number] Follow-up Instructions Return in about 3 months (around 5/28/2018). Upcoming Health Maintenance Date Due DTaP/Tdap/Td series (1 - Tdap) 7/5/1969 Pneumococcal 65+ Low/Medium Risk (2 of 2 - PPSV23) 12/4/2017 MEDICARE YEARLY EXAM 1/25/2018 FOOT EXAM Q1 5/9/2018 LIPID PANEL Q1 5/9/2018 HEMOGLOBIN A1C Q6M 5/22/2018 EYE EXAM RETINAL OR DILATED Q1 5/31/2018 MICROALBUMIN Q1 11/22/2018 GLAUCOMA SCREENING Q2Y 5/31/2019 COLONOSCOPY 9/15/2020 Allergies as of 2/28/2018  Review Complete On: 2/28/2018 By: Lisa Fonseca MD  
  
 Severity Noted Reaction Type Reactions Arb-angiotensin Receptor Antagonist  07/12/2016    Other (comments) High k  
  
Current Immunizations  Reviewed on 8/8/2017 Name Date Influenza Vaccine 12/4/2012 Pneumococcal Polysaccharide (PPSV-23) 12/4/2012 ZZZ-RETIRED (DO NOT USE) Pneumococcal Vaccine (Unspecified Type) 11/27/2012 12:16 PM  
  
 Not reviewed this visit You Were Diagnosed With   
  
 Codes Comments Medicare annual wellness visit, subsequent    -  Primary ICD-10-CM: Z00.00 ICD-9-CM: V70.0 Type 2 diabetes with nephropathy (HCC)     ICD-10-CM: E11.21 
ICD-9-CM: 250.40, 583.81 Reactive depression     ICD-10-CM: F32.9 ICD-9-CM: 300.4 Stage 3 chronic kidney disease     ICD-10-CM: N18.3 ICD-9-CM: 911. 3 Pure hypercholesterolemia     ICD-10-CM: E78.00 ICD-9-CM: 272.0 Essential hypertension     ICD-10-CM: I10 
ICD-9-CM: 401.9 Decreased hearing of both ears     ICD-10-CM: H91.93 
ICD-9-CM: 389.9 Obesity (BMI 35.0-39.9 without comorbidity)     ICD-10-CM: W37.0 ICD-9-CM: 278.00 Vitals BP Pulse Temp Resp Height(growth percentile) Weight(growth percentile) 135/85 (BP 1 Location: Left arm, BP Patient Position: Sitting) 67 97.3 °F (36.3 °C) (Oral) 16 5' 5\" (1.651 m) 194 lb (88 kg) SpO2 BMI Smoking Status 98% 32.28 kg/m2 Never Smoker Vitals History BMI and BSA Data Body Mass Index Body Surface Area  
 32.28 kg/m 2 2.01 m 2 Preferred Pharmacy Pharmacy Name Phone [address] [facility] [place] [address] [phone] Your Updated Medication List  
  
   
This list is accurate as of 2/28/18  8:27 AM.  Always use your most recent med list.  
  
  
  
  
 citalopram 20 mg tablet Commonly known as:  CELEXA  
TAKE ONE TABLET BY MOUTH ONCE DAILY  
  
 cyanocobalamin 1,000 mcg tablet Take 1,000 mcg by mouth daily. FREESTYLE LANCETS 28 gauge Misc Generic drug:  lancets USE TO CHECK BLOOD SUGAR ONCE DAILY  
  
 gabapentin 300 mg capsule Commonly known as:  NEURONTIN Take 2 Caps by mouth nightly as needed. glucose blood VI test strips strip Commonly known as:  FREESTYLE INSULINX TEST STRIPS Check blood sugars 2-3 times daily. ICD-10: E11.9  
  
 hydroCHLOROthiazide 12.5 mg tablet Commonly known as:  HYDRODIURIL  
TAKE ONE TABLET BY MOUTH ONCE DAILY  
  
 IRON PO Take  by mouth. LORazepam 0.5 mg tablet Commonly known as:  ATIVAN  
TAKE ONE TABLET BY MOUTH NIGHTLY AS NEEDED FOR ANXIETY  
  
 magnesium 250 mg Tab Take  by mouth daily. metoprolol succinate 50 mg XL tablet Commonly known as:  TOPROL-XL  
TAKE ONE TABLET BY MOUTH ONCE DAILY  
  
 rosuvastatin 10 mg tablet Commonly known as:  CRESTOR  
TAKE ONE TABLET BY MOUTH NIGHTLY SITagliptin 50 mg tablet Commonly known as:  Griselda Giselle Take 1 Tab by mouth daily. traZODone 50 mg tablet Commonly known as:  Javier Cny  
 TAKE ONE TABLET BY MOUTH ONCE DAILY AT BEDTIME We Performed the Following HEMOGLOBIN A1C WITH EAG [21864 CPT(R)] LIPID PANEL [03589 CPT(R)] METABOLIC PANEL, COMPREHENSIVE [01003 CPT(R)] REFERRAL TO ENT-OTOLARYNGOLOGY [FSV69 Custom] Follow-up Instructions Return in about 3 months (around 5/28/2018). Referral Information Referral ID Referred By Referred To  
  
 [id number] [address] [place] [address] [facility] [address] [place] [address] Fax: [phone] Visits Status Start Date End Date 1 New Request 2/28/18 2/28/19 If your referral has a status of pending review or denied, additional information will be sent to support the outcome of this decision. Patient Instructions Medicare Part B Preventive Services  Limitations  Recommendation/Date completed if known  Scheduled/ Next Due Bone Mass Measurement 
(age 72 & older, biennial)  Requires diagnosis related to osteoporosis or estrogen deficiency. Biennial benefit unless patient has history of long-term glucocorticoid tx or baseline is needed because initial test was by other method  N/A N/A Cardiovascular Screening Blood Tests (every 5 years) Total cholesterol, HDL, Triglycerides  Order as a panel if possible  Completed 5/2017 
   
Recommended annually  Due 5/2018 Colorectal Cancer Screening 
-Fecal occult blood test (annual) -Flexible sigmoidoscopy (5y) 
-Screening colonoscopy (10y) -Barium Enema     Completed 9/15/15 with Dr. Easton Pinzon 
Recommended in 5 years  Due 9/2020 Counseling to Prevent Tobacco Use (up to 8 sessions per year) - Counseling greater than 3 and up to 10 minutes - Counseling greater than 10 minutes  Patients must be asymptomatic of tobacco-related conditions to receive as preventive service  Former smoker Keep up the good work!   
Diabetes Screening Tests (at least every 3 years, Medicare covers annually or at 6-month intervals for prediabetic patients) 
   
Fasting blood sugar (FBS) or glucose tolerance test (GTT) 
   Patient must be diagnosed with one of the following: 
-Hypertension, Dyslipidemia, obesity, previous impaired FBS or GTT 
Or any two of the following: overweight, FH of diabetes, age ? 72, history of gestational diabetes, birth of baby weighing more than 9 pounds  Completed 11/2017 with A1C 6.4 
   
Recommended every 3-6 months Due 2/2018-5/2018 Diabetes Self-Management Training (DSMT) (no USPSTF recommendation)  Requires referral by treating physician for patient with diabetes or renal disease. 10 hours of initial DSMT session of no less than 30 minutes each in a continuous 12-month period. 2 hours of follow-up DSMT in subsequent years. N/A N/A Glaucoma Screening (no USPSTF recommendation)  Diabetes mellitus, family history, , age 48 or over,  American, age 72 or over  Completed 5/2017 with Dr. Luis Dawkins 
Recommended annually  Due 5/2018 Human Immunodeficiency Virus (HIV) Screening (annually for increased risk patients) HIV-1 and HIV-2 by EIA, RUKHSANA, rapid antibody test, or oral mucosa transudate  Patient must be at increased risk for HIV infection per USPSTF guidelines or pregnant. Tests covered annually for patients at increased risk. Pregnant patients may receive up to 3 test during pregnancy. N/A N/A Medical Nutrition Therapy (MNT) (for diabetes or renal disease not recommended schedule)  Requires referral by treating physician for patient with diabetes or renal disease. Can be provided in same year as diabetes self-management training (DSMT), and CMS recommends medical nutrition therapy take place after DSMT. Up to 3 hours for initial year and 2 hours in subsequent years. N/A N/A Prostate Cancer Screening (annually up to age 76) - Digital rectal exam (NORA) - Prostate specific antigen (PSA)  Annually (age 48 or over), NORA not paid separately when covered E/M service is provided on same date  Completed 5/2017 
   
Recommended annually  Due 5/2018 Seasonal Influenza Vaccination (annually)     Never completed  
  
Recommended annually Declines Pneumococcal Vaccination (once after 65)     Pneumovax - 12/2012 
  
Prevnar 13 - never completed 
  
Both recommended once over the age of 72 Completed 
  
Declines Shingles vaccine    Zostavax - never completed  
  
Shingrix - never completed  
  
Recommended once over the age of 72 N/A 
  
  
Due now You declined Hepatitis B Vaccinations (if medium/high risk)  Medium/high risk factors: End-stage renal disease, Hemophiliacs who received Factor VIII or IX concentrates, Clients of institutions for the mentally retarded, Persons who live in the same house as a HepB virus carrier, Homosexual men, Illicit injectable drug abusers. N/A N/A Ultrasound Screening for Abdominal Aortic Aneurysm (AAA) (once)  Patient must be referred through IPPE and not have had a screening for abdominal aortic aneurysm before under Medicare. Limited to patients who meet one of the following criteria: 
- Men who are 73-68 years old and have smoked more than 100 cigarettes in their lifetime. 
-Anyone with a FH of AAA 
-Anyone recommended for screening by USPSTF  US kidneys 5/2016 - negative Completed  
   
  
Please bring in a copy of your advanced directive to your next office visit so we can have a copy on file. Medicare Wellness Visit, Male The best way to live healthy is to have a healthy lifestyle by eating a well-balanced diet, exercising regularly, limiting alcohol and stopping smoking. Regular physical exams and screening tests are another way to keep healthy. Preventive exams provided by your health care provider can find health problems before they become diseases or illnesses.  Preventive services including immunizations, screening tests, monitoring and exams can help you take care of your own health. All people over age 72 should have a pneumovax  and and a prevnar shot to prevent pneumonia. These are once in a lifetime unless you and your provider decide differently. All people over 65 should have a yearly flu shot and a tetanus vaccine every 10 years. Screening for diabetes mellitus with a blood sugar test should be done every year. Glaucoma is a disease of the eye due to increased ocular pressure that can lead to blindness and it should be done every year by an eye professional. 
 
Cardiovascular screening tests that check for elevated lipids (fatty part of blood) which can lead to heart disease and strokes should be done every 5 years. Colorectal screening that evaluates for blood or polyps in your colon should be done yearly as a stool test or every five years as a flexible sigmoidoscope or every 10 years as a colonoscopy up to age 76. Men up to age 76 may need a screening blood test for prostate cancer at certain intervals, depending on their personal and family history. This decision is between the patient and his provider. If you have been a smoker or had family history of abdominal aortic aneurysms, you and your provider may decide to schedule an ultrasound test of your aorta. Hepatitis C screening is also recommended for anyone born between [birth year] through [address]. A shingles vaccine is also recommended once in a lifetime after age 61. Your Medicare Wellness Exam is recommended annually. Here is a list of your current Health Maintenance items with a due date: 
Health Maintenance Due Topic Date Due  
 DTaP/Tdap/Td  (1 - Tdap) 07/05/1969  Pneumococcal Vaccine (2 of 2 - PPSV23) 12/04/2017 Manjinder Sequeira Annual Well Visit  01/25/2018 Introducing [facility]! David Mackenzie introduces [email or web address] patient portal. Now you can access parts of your medical record, email your doctor's office, and request medication refills online. 1. In your internet browser, go to [email or web address] 2. Click on the First Time User? Click Here link in the Sign In box. You will see the New Member Sign Up page. 3. Enter your [facility] Access Code exactly as it appears below. You will not need to use this code after youve completed the sign-up process. If you do not sign up before the expiration date, you must request a new code. · [facility] Access Code: [email or web address] Expires: 5/29/2018  8:27 AM 
 
4. Enter the last four digits of your Social Security Number (xxxx) and Date of Birth (mm/dd/yyyy) as indicated and click Submit. You will be taken to the next sign-up page. 5. Create a [employer] ID. This will be your [facility] login ID and cannot be changed, so think of one that is secure and easy to remember. 6. Create a [facility] password. You can change your password at any time. 7. Enter your Password Reset Question and Answer. This can be used at a later time if you forget your password. 8. Enter your e-mail address. You will receive e-mail notification when new information is available in [address] 9. Click Sign Up. You can now view and download portions of your medical record. 10. Click the Download Summary menu link to download a portable copy of your medical information. If you have questions, please visit the Frequently Asked Questions section of the [facility] website. Remember, [facility] is NOT to be used for urgent needs. For medical emergencies, dial 911. Now available from your iPhone and Android! Please provide this summary of care documentation to your next provider. Your primary care clinician is listed as Madie Kimball. If you have any questions after today's visit, please call [phone].

## 2018-02-28 NOTE — LETTER
3/2/2018 10:44 AM 
 
Mr. Hemalatha Yousif 
[address] [facility] [id number] Dear Hemalatha Yousif: 
 
Please find your most recent results below. Resulted Orders LIPID PANEL Result Value Ref Range Cholesterol, total 251 (H) 100 - 199 mg/dL Triglyceride 78 0 - 149 mg/dL HDL Cholesterol 58 >39 mg/dL VLDL, calculated 16 5 - 40 mg/dL LDL, calculated 177 (H) 0 - 99 mg/dL Narrative Performed at:  [address]  [id number] : Marko Fitch MD, Phone:  [phone] METABOLIC PANEL, COMPREHENSIVE Result Value Ref Range Glucose 121 (H) 65 - 99 mg/dL BUN 14 8 - 27 mg/dL Creatinine 1.43 (H) 0.76 - 1.27 mg/dL GFR est non-AA 50 (L) >59 mL/min/1.73 GFR est AA 57 (L) >59 mL/min/1.73  
 BUN/Creatinine ratio 10 10 - 24 Sodium 141 134 - 144 mmol/L Potassium 4.6 3.5 - 5.2 mmol/L Chloride 101 96 - 106 mmol/L  
 CO2 23 18 - 29 mmol/L Calcium 9.2 8.6 - 10.2 mg/dL Protein, total 7.0 6.0 - 8.5 g/dL Albumin 4.2 3.6 - 4.8 g/dL GLOBULIN, TOTAL 2.8 1.5 - 4.5 g/dL A-G Ratio 1.5 1.2 - 2.2 Bilirubin, total 0.5 0.0 - 1.2 mg/dL Alk. phosphatase 49 39 - 117 IU/L  
 AST (SGOT) 17 0 - 40 IU/L  
 ALT (SGPT) 14 0 - 44 IU/L Narrative Performed at:  [address]  [id number] : Marko Fitch MD, Phone:  [phone] HEMOGLOBIN A1C WITH EAG Result Value Ref Range Hemoglobin A1c 6.6 (H) 4.8 - 5.6 % Comment:  
            Pre-diabetes: 5.7 - 6.4 Diabetes: >6.4 Glycemic control for adults with diabetes: <7.0 Estimated average glucose 143 mg/dL Narrative Performed at:  [address]  [id number] : Marko Fitch MD, Phone:  [phone] RECOMMENDATIONS: 
 
 
Please call me if you have any questions: [phone] Sincerely, 
 
 
 Heaven Harmon MD

## 2018-02-28 NOTE — PROGRESS NOTES
HISTORY OF PRESENT ILLNESS  Hemalatha Yousif is a 69 y.o. male. HPI   Last here 11/22/17. Pt is here to f/u on chronic conditions. Pt presents with his daughter who provides some of his hx.      BP today is 135/85  SBP was 167 with uro  Generally he states readings are good though when checked at home  Continues on toprol 50mg and HCTZ 12.5mg daily     BS at home running around 96, 127 in the AM fasting , no lows  Continues januvia 50mg daily       Wt is up 2 lbs since last visit   Pt wonders if he can eat corn or popcorn  Discussed not being able to do so d/t past h/o diverticulitis   Discussed it being OK to blend these foods in a    Discussed decreasing beer intake to lose wt     Reviewed last labs 11/17   Will get labs today     Pt follows annually with Dr. Segundo Weeks (cardio)   Reviewed notes 11/27/17: Patient is a 57-year-old male presenting for preop clearance for prostate biopsy. Negative ischemia on nuclear stress test 2016, essentially structurally normal heart on echo 2015. Patient is without signs or symptoms of coronary artery disease. He is at low cardiac risk for urologic procedures/surgery. Follow up in 1 year, sooner as needed     Pt follows with Dr. Micheal Randall (nephro)  Last visit was 9/21/17   Pt wonders why he has to have his kidneys monitored - addressed this today  Next visit scheduled for 3/2/18     Pt follows with Edin (uro)   Reviewed notes 1/16/18: spencer 7, favorable intermediate risk prostate cancer, wanted to do MRI prostate in 2/18 or 3/18, f/u with PSA, go from there  Pt wonders why a prostate bx is necessary  Discussed having a prostate bx for depending on aggressiveness of prostate cancer  Recall pt has [facility] (brother and uncle) prostate cancer.   Next visit scheduled for 3/18    Pt is interested in getting a back brace  Discussed back braces being recommended by physiotherapists   Discussed trying PT to strengthen his back  Pt is not amenable to going to PT  Has minimal back sx no further evaluation     Lov, I had him take crestor 10mg every other day for cholesterol     Lov, I increased his gabapentin to 2 tabs qhs for his tingling/neuropathy pain, which works well     Pt is now compliant with celexa 20mg daily for depression/anxiety, which works well  He also has ativan to use prn (once per week or less) for anxiety--has not used this recently     Pt has not been using trazodone recently  Pt does exercises and yoga before bed, which allows him to sleep well    Pt drinks 3 beers each day  Pt states that he can go weeks without drinking beers  Discussed alcoholic guidelines for males  Counseled on cessation    Pt has difficulty hearing  Pt is already following with Dr. Chandler Dandy (ENT)  Provided referral for an ENT    Pt believes that he has some memory loss   However, pt can remember things of the past   Pt knew the date, day, month, year and 3 objects  Discussed his memory being intact  Discussed not needing to see a neuropsych    Pt was cooking in the kitchen in the past  Pt states that the cooking oil started to splatter  Pt fell backwards as a result  Pt is doing well overall    Pt is independent (pt can feed/bathe etc. himself)    Pt lives alone  His daughter shops/drives    ACP not on file. SDM is his daughter (Hannah Cedeno).     Recall sees Dr. Libetry Bowers for h/o DVT, no longer on coumadin or xarelto, on ASA only.  Was on coumadin for h/o PE and DVT post operatively      PREVENTIVE:    Colonoscopy: 9/15/15, Dr. Meliton Rodriges, repeat 5 years  EGD: 9/15/15, Dr. Meliton Rodriges  PSA: 7/15 3.0, 7/16, 5/17 3.8  AAA screen: CT 11/12, negative  Tdap: unknown   Pneumovax: 12/04/2012  [id number]: declines  Zostavax: never had   Flu shot: declines  Foot exam: 5/09/17   Microalbumin: 11/17  A1c: , 10/14 7.1, 7/15 6.3, 9/15 6.4, 12/15 5.9, 4/16 6.2, 10/16 5.9, 1/17 6.6, 5/17 6.3, 8/17 6.4, 11/17 6.4  Eye exam: Dr. Yan Luna, 5/31/17, due 5/18  Hep C Screen: 10/15, negative  Lipids: 5/17 LDL 80    Patient Active Problem List    Diagnosis Date Noted    Reactive depression 11/15/2016    Cervical spinal stenosis 11/03/2016    CKD (chronic kidney disease) 10/31/2016    ACP (advance care planning) 12/11/2015    Diabetes mellitus without complication ([facility] 25/97/8938    Hyperlipidemia 07/10/2015    Spinal stenosis, lumbar region, without neurogenic claudication 10/16/2014    Lumbar disc disease with radiculopathy 10/16/2014    DVT of leg (deep venous thrombosis) ([facility] 12/18/2012    HTN (hypertension) 12/12/2012    Microalbuminuria 12/12/2012    Perforated diverticulum of large intestine 12/04/2012    Alcohol abuse 12/04/2012    Back pain 12/04/2012    Insomnia 12/04/2012    Perforated viscus 11/21/2012     Current Outpatient Prescriptions   Medication Sig Dispense Refill    glucose blood VI test strips (FREESTYLE INSULINX TEST STRIPS) strip Check blood sugars 2-3 times daily. ICD-10: E11.9 100 Strip 0    traZODone (DESYREL) 50 mg tablet TAKE ONE TABLET BY MOUTH ONCE DAILY AT BEDTIME 30 Tab 0    hydroCHLOROthiazide (HYDRODIURIL) 12.5 mg tablet TAKE ONE TABLET BY MOUTH ONCE DAILY 30 Tab 0    FREESTYLE LANCETS 28 gauge misc USE TO CHECK BLOOD SUGAR ONCE DAILY 100 Lancet 12    metoprolol succinate (TOPROL-XL) 50 mg XL tablet TAKE ONE TABLET BY MOUTH ONCE DAILY 30 Tab 3    gabapentin (NEURONTIN) 300 mg capsule Take 2 Caps by mouth nightly as needed. 60 Cap 6    LORazepam (ATIVAN) 0.5 mg tablet TAKE ONE TABLET BY MOUTH NIGHTLY AS NEEDED FOR ANXIETY (Patient not taking: Reported on 11/27/2017) 20 Tab 0    citalopram (CELEXA) 20 mg tablet TAKE ONE TABLET BY MOUTH ONCE DAILY 30 Tab 3    rosuvastatin (CRESTOR) 10 mg tablet TAKE ONE TABLET BY MOUTH NIGHTLY (Patient taking differently: TAKE ONE TABLET BY MOUTH NIGHTLY/ taking every other day) 30 Tab 6    SITagliptin (JANUVIA) 50 mg tablet Take 1 Tab by mouth daily.  30 Tab 6    hydroCHLOROthiazide (HYDRODIURIL) 12.5 mg tablet TAKE ONE TABLET BY MOUTH ONCE DAILY 30 Tab 0    cyanocobalamin 1,000 mcg tablet Take 1,000 mcg by mouth daily.  FERROUS FUMARATE (IRON PO) Take  by mouth.  magnesium 250 mg tab Take  by mouth daily. Past Surgical History:   Procedure Laterality Date    ABDOMEN SURGERY PROC UNLISTED      bowel resection    HX BACK SURGERY  2014    HX GI  11/2012    bowel resection    HX ORTHOPAEDIC      amputated left 4th finger      Lab Results  Component Value Date/Time   WBC 6.2 08/08/2017 09:22 AM   HGB 13.2 08/08/2017 09:22 AM   Hemoglobin (POC) 13.3 11/02/2016 07:15 AM   HCT 40.6 08/08/2017 09:22 AM   Hematocrit (POC) 39 11/02/2016 07:15 AM   PLATELET 539 68/65/2841 09:22 AM   MCV 82 08/08/2017 09:22 AM     Lab Results  Component Value Date/Time   Cholesterol, total 182 05/09/2017 01:58 PM   Cholesterol (POC) 227 02/06/2015 03:30 PM   HDL Cholesterol 77 05/09/2017 01:58 PM   LDL, calculated 94 05/09/2017 01:58 PM   LDL Cholesterol (POC) 164 02/06/2015 03:30 PM   Triglyceride 57 05/09/2017 01:58 PM   Triglycerides (POC) 68 02/06/2015 03:30 PM     Lab Results  Component Value Date/Time   GFR est non-AA 42 (L) 11/22/2017 09:57 AM   GFRNA, POC 50 (L) 11/02/2016 07:15 AM   GFR est AA 48 (L) 11/22/2017 09:57 AM   GFRAA, POC >60 11/02/2016 07:15 AM   Creatinine 1.65 (H) 11/22/2017 09:57 AM   Creatinine (POC) 1.4 (H) 11/02/2016 07:15 AM   BUN 39 (H) 11/22/2017 09:57 AM   BUN (POC) 28 (H) 11/02/2016 07:15 AM   Sodium 137 11/22/2017 09:57 AM   Sodium (POC) 140 11/02/2016 07:15 AM   Potassium 4.6 11/22/2017 09:57 AM   Potassium (POC) 4.1 11/02/2016 07:15 AM   Chloride 97 11/22/2017 09:57 AM   Chloride (POC) 105 11/02/2016 07:15 AM   CO2 21 11/22/2017 09:57 AM   Magnesium 2.3 08/08/2017 09:22 AM   Phosphorus 3.0 10/17/2014 04:40 AM   Albumin, urine 59.5 10/23/2015 09:10 AM        Review of Systems   HENT: Positive for hearing loss. Respiratory: Negative for shortness of breath. Cardiovascular: Negative for chest pain. Musculoskeletal: Positive for falls. Psychiatric/Behavioral: Positive for memory loss. Physical Exam   Constitutional: He is oriented to person, place, and time. He appears well-developed and well-nourished. No distress. HENT:   Head: Normocephalic and atraumatic. Mouth/Throat: Oropharynx is clear and moist. No oropharyngeal exudate. Eyes: Conjunctivae and EOM are normal. Right eye exhibits no discharge. Left eye exhibits no discharge. Neck: Normal range of motion. Neck supple. No thyromegaly present. No carotid bruits    Cardiovascular: Normal rate, regular rhythm, normal heart sounds and intact distal pulses. Exam reveals no gallop and no friction rub. No murmur heard. Pulmonary/Chest: Effort normal and breath sounds normal. No respiratory distress. He has no wheezes. He has no rales. He exhibits no tenderness. Musculoskeletal: Normal range of motion. He exhibits no edema, tenderness or deformity. Lymphadenopathy:     He has no cervical adenopathy. Neurological: He is alert and oriented to person, place, and time. He has normal reflexes. He exhibits normal muscle tone. Coordination normal.   Skin: Skin is warm and dry. No rash noted. He is not diaphoretic. No erythema. No pallor. Psychiatric: He has a normal mood and affect. His behavior is normal.       ASSESSMENT and PLAN    ICD-10-CM ICD-9-CM    1. Medicare annual wellness visit, subsequent Z00.00 V70.0 LIPID PANEL      METABOLIC PANEL, COMPREHENSIVE      HEMOGLOBIN A1C WITH EAG   2. Type 2 diabetes with nephropathy (HCC)    Controled on januvia 50mg daily   E11.21 250.40 LIPID PANEL     368.42 METABOLIC PANEL, COMPREHENSIVE      HEMOGLOBIN A1C WITH EAG   3. Reactive depression    Controled on celexa   F32.9 300.4 LIPID PANEL      METABOLIC PANEL, COMPREHENSIVE      HEMOGLOBIN A1C WITH EAG   4.  Stage 3 chronic kidney disease    Cr runs around 1.4 baseline, has a f/u with Dr. Micheal Randall on Friday, last cr was a bit about baseline at 1.6, will repeat today   N18.3 585.3 LIPID PANEL      METABOLIC PANEL, COMPREHENSIVE      HEMOGLOBIN A1C WITH EAG   5. Pure hypercholesterolemia    Controled on crestor in the past, repeat lipids today   E78.00 272.0 LIPID PANEL      METABOLIC PANEL, COMPREHENSIVE      HEMOGLOBIN A1C WITH EAG   6. Essential hypertension    Adequately controled on toprol and HCTZ, continue    I10 401.9 LIPID PANEL      METABOLIC PANEL, COMPREHENSIVE      HEMOGLOBIN A1C WITH EAG   7. Decreased hearing of both ears    ENT referral for hearing check placed   H91.93 389.9 REFERRAL TO ENT-OTOLARYNGOLOGY   8. Obesity (BMI 35.0-39.9 without comorbidity)    Discussed diet and weight loss, wt up a few lbs from lov, he will focus on portion control to improve this   E66.9 278.00         Scribed by [address], as dictated by Dr. Penny Rausch. Current diagnosis and concerns discussed with pt at length. Pt understands risks and benefits or current treatment plan and medications, and accepts the treatment and medication with any possible risks. Pt asks appropriate questions, which were answered. Pt was instructed to call with any concerns or problems. This note will not be viewable in [address]

## 2018-03-01 LAB
ALBUMIN SERPL-MCNC: 4.2 G/DL (ref 3.6–4.8)
ALBUMIN/GLOB SERPL: 1.5 {RATIO} (ref 1.2–2.2)
ALP SERPL-CCNC: 49 IU/L (ref 39–117)
ALT SERPL-CCNC: 14 IU/L (ref 0–44)
AST SERPL-CCNC: 17 IU/L (ref 0–40)
BILIRUB SERPL-MCNC: 0.5 MG/DL (ref 0–1.2)
BUN SERPL-MCNC: 14 MG/DL (ref 8–27)
BUN/CREAT SERPL: 10 (ref 10–24)
CALCIUM SERPL-MCNC: 9.2 MG/DL (ref 8.6–10.2)
CHLORIDE SERPL-SCNC: 101 MMOL/L (ref 96–106)
CHOLEST SERPL-MCNC: 251 MG/DL (ref 100–199)
CO2 SERPL-SCNC: 23 MMOL/L (ref 18–29)
CREAT SERPL-MCNC: 1.43 MG/DL (ref 0.76–1.27)
EST. AVERAGE GLUCOSE BLD GHB EST-MCNC: 143 MG/DL
GFR SERPLBLD CREATININE-BSD FMLA CKD-EPI: 50 ML/MIN/1.73
GFR SERPLBLD CREATININE-BSD FMLA CKD-EPI: 57 ML/MIN/1.73
GLOBULIN SER CALC-MCNC: 2.8 G/DL (ref 1.5–4.5)
GLUCOSE SERPL-MCNC: 121 MG/DL (ref 65–99)
HBA1C MFR BLD: 6.6 % (ref 4.8–5.6)
HDLC SERPL-MCNC: 58 MG/DL
LDLC SERPL CALC-MCNC: 177 MG/DL (ref 0–99)
POTASSIUM SERPL-SCNC: 4.6 MMOL/L (ref 3.5–5.2)
PROT SERPL-MCNC: 7 G/DL (ref 6–8.5)
SODIUM SERPL-SCNC: 141 MMOL/L (ref 134–144)
TRIGL SERPL-MCNC: 78 MG/DL (ref 0–149)
VLDLC SERPL CALC-MCNC: 16 MG/DL (ref 5–40)

## 2018-03-01 RX ORDER — TRAZODONE HYDROCHLORIDE 50 MG/1
TABLET ORAL
Qty: 30 TAB | Refills: 0 | Status: SHIPPED | OUTPATIENT
Start: 2018-03-01 | End: 2018-03-27 | Stop reason: SDUPTHER

## 2018-03-01 RX ORDER — HYDROCHLOROTHIAZIDE 12.5 MG/1
TABLET ORAL
Qty: 30 TAB | Refills: 0 | Status: SHIPPED | OUTPATIENT
Start: 2018-03-01 | End: 2018-03-27 | Stop reason: SDUPTHER

## 2018-03-01 NOTE — PROGRESS NOTES
the patient is clearly not taking his crestor--needs to start every day!     Kidneys/dm stable    Repeat bmp, a1c, lipids 1 week prior to f/u

## 2018-03-02 NOTE — PROGRESS NOTES
Spoke to [employer] (HIPAA/Daughter). Mary informed per Dr. Vanesa Cordon patient is clearly not taking his crestor--needs to start every day. Mary informed per Dr. Vanesa Cordon pt's kidneys/DM stable. Wilfred Azevedo informed to have pt repeat fasting labs 1wk prior NOV. Labs ordered and mailed to pt. Results mailed. Pt verbalized understanding of information discussed w/ no further questions at this time.

## 2018-03-26 RX ORDER — ROSUVASTATIN CALCIUM 10 MG/1
TABLET, COATED ORAL
Qty: 90 TAB | Refills: 2 | Status: SHIPPED | OUTPATIENT
Start: 2018-03-26 | End: 2018-06-25 | Stop reason: SDUPTHER

## 2018-03-27 DIAGNOSIS — F41.9 ANXIETY: Primary | ICD-10-CM

## 2018-03-28 RX ORDER — TRAZODONE HYDROCHLORIDE 50 MG/1
TABLET ORAL
Qty: 30 TAB | Refills: 0 | Status: SHIPPED | OUTPATIENT
Start: 2018-03-28 | End: 2019-03-30 | Stop reason: SDUPTHER

## 2018-03-28 RX ORDER — LORAZEPAM 0.5 MG/1
TABLET ORAL
Qty: 20 TAB | Refills: 0 | Status: SHIPPED | OUTPATIENT
Start: 2018-03-28 | End: 2018-08-17 | Stop reason: SDUPTHER

## 2018-03-28 RX ORDER — HYDROCHLOROTHIAZIDE 12.5 MG/1
TABLET ORAL
Qty: 30 TAB | Refills: 0 | Status: SHIPPED | OUTPATIENT
Start: 2018-03-28 | End: 2018-06-04

## 2018-03-28 NOTE — TELEPHONE ENCOUNTER
Following rx was faxed to pharmacy with confirmation received:      LORazepam (ATIVAN) 0.5 mg tablet [[id number]]   Order Details   Dose, Route, Frequency: As Directed    Dispense Quantity:  20 Tab Refills:  0 Fills Remaining:  --           Sig: TAKE ONE TABLET BY MOUTH NIGHTLY AS NEEDED FOR ANXIETY          Written Date:  03/28/18 Expiration Date:  --     Start Date:  03/28/18 End Date:  --            Ordering Provider:  -- ALEXX #:  -- NPI:  --    Authorizing Provider:  Enriqueta Montes MD ALEXX #:  [id number] NPI:  [id number]    Ordering User:  Enriqueta Montes MD

## 2018-05-05 RX ORDER — SITAGLIPTIN 50 MG/1
TABLET, FILM COATED ORAL
Qty: 30 TAB | Refills: 6 | Status: SHIPPED | OUTPATIENT
Start: 2018-05-05 | End: 2018-05-09 | Stop reason: SDUPTHER

## 2018-05-05 RX ORDER — METOPROLOL SUCCINATE 50 MG/1
TABLET, EXTENDED RELEASE ORAL
Qty: 30 TAB | Refills: 3 | Status: SHIPPED | OUTPATIENT
Start: 2018-05-05 | End: 2019-03-30 | Stop reason: SDUPTHER

## 2018-05-08 RX ORDER — METOPROLOL SUCCINATE 50 MG/1
50 TABLET, EXTENDED RELEASE ORAL DAILY
Qty: 90 TAB | Refills: 1 | Status: SHIPPED | OUTPATIENT
Start: 2018-05-08 | End: 2020-03-09

## 2018-05-08 NOTE — TELEPHONE ENCOUNTER
PCP: Carlos Felix MD    Last appt: 2/28/2018  Future Appointments  Date Time Provider Willian Paul   6/4/2018 8:15 AM Carlos Felix MD [facility]       Requested Prescriptions     Pending Prescriptions Disp Refills    metoprolol succinate (TOPROL-XL) 50 mg XL tablet 90 Tab 1     Sig: Take 1 Tab by mouth daily.

## 2018-05-09 NOTE — TELEPHONE ENCOUNTER
PCP: Robina Mitchell MD    Last appt: 2/28/2018  Future Appointments  Date Time Provider Willian Paul   6/4/2018 8:15 AM Robina Mitchell MD [facility]       Requested Prescriptions     Pending Prescriptions Disp Refills    SITagliptin (JANUVIA) 50 mg tablet 90 Tab 1     Sig: Take 1 Tab by mouth daily.

## 2018-06-01 RX ORDER — CITALOPRAM 20 MG/1
TABLET, FILM COATED ORAL
Qty: 30 TAB | Refills: 3 | Status: SHIPPED | OUTPATIENT
Start: 2018-06-01 | End: 2019-06-19 | Stop reason: SDUPTHER

## 2018-06-04 ENCOUNTER — OFFICE VISIT (OUTPATIENT)
Dept: INTERNAL MEDICINE CLINIC | Age: 68
End: 2018-06-04

## 2018-06-04 ENCOUNTER — HOSPITAL ENCOUNTER (OUTPATIENT)
Dept: LAB | Age: 68
Discharge: HOME | End: 2018-06-04
Payer: MEDICARE

## 2018-06-04 VITALS
RESPIRATION RATE: 16 BRPM | SYSTOLIC BLOOD PRESSURE: 125 MMHG | HEART RATE: 52 BPM | BODY MASS INDEX: 31.82 KG/M2 | DIASTOLIC BLOOD PRESSURE: 71 MMHG | WEIGHT: 191 LBS | OXYGEN SATURATION: 97 % | TEMPERATURE: 97.9 F | HEIGHT: 65 IN

## 2018-06-04 DIAGNOSIS — G31.84 MCI (MILD COGNITIVE IMPAIRMENT): ICD-10-CM

## 2018-06-04 DIAGNOSIS — R74.8 ELEVATED CK: ICD-10-CM

## 2018-06-04 DIAGNOSIS — E11.21 TYPE 2 DIABETES WITH NEPHROPATHY (HCC): Primary | ICD-10-CM

## 2018-06-04 DIAGNOSIS — N18.30 STAGE 3 CHRONIC KIDNEY DISEASE (HCC): ICD-10-CM

## 2018-06-04 DIAGNOSIS — E78.00 PURE HYPERCHOLESTEROLEMIA: ICD-10-CM

## 2018-06-04 DIAGNOSIS — I10 ESSENTIAL HYPERTENSION: ICD-10-CM

## 2018-06-04 DIAGNOSIS — C61 PROSTATE CANCER (HCC): ICD-10-CM

## 2018-06-04 DIAGNOSIS — M48.02 CERVICAL SPINAL STENOSIS: ICD-10-CM

## 2018-06-04 DIAGNOSIS — F32.9 REACTIVE DEPRESSION: ICD-10-CM

## 2018-06-04 PROBLEM — E11.40 TYPE 2 DIABETES MELLITUS WITH DIABETIC NEUROPATHY (HCC): Status: ACTIVE | Noted: 2018-06-04

## 2018-06-04 PROCEDURE — 83036 HEMOGLOBIN GLYCOSYLATED A1C: CPT

## 2018-06-04 PROCEDURE — 80048 BASIC METABOLIC PNL TOTAL CA: CPT

## 2018-06-04 PROCEDURE — 83735 ASSAY OF MAGNESIUM: CPT

## 2018-06-04 PROCEDURE — 36415 COLL VENOUS BLD VENIPUNCTURE: CPT

## 2018-06-04 PROCEDURE — 82550 ASSAY OF CK (CPK): CPT

## 2018-06-04 NOTE — PROGRESS NOTES
HISTORY OF PRESENT ILLNESS  Cameron Guaman is a 69 y.o. male. HPI   Last here 2/28/18. Pt is here to f/u on chronic conditions. Pt presents with his daughter who provides some of his hx. Advised him to bring in all of his pill bottles to ov.      BP today is controled   Continues on toprol 50mg and HCTZ 12.5mg daily      BS at home running around 102, 103, 140, 180, 89, 94 in the AM fasting   Continues januvia 50mg daily        Wt is stable x lov  Discussed diet and w/l       Reviewed last labs 2/18  Will get labs today       Pt follows annually with Dr. Harsha Adams (cardio)   Reviewed notes 11/27/17: Patient is a 49-year-old male presenting for preop clearance for prostate biopsy.  Negative ischemia on nuclear stress test 2016, essentially structurally normal heart on echo 2015. Andrew Cedeno is without signs or symptoms of coronary artery disease.  He is at low cardiac risk for urologic procedures/surgery.  Follow up in 1 year, sooner as needed  Discussed him having PACs      Pt follows with Dr. Tay Sheikh (nephro)  Reviewed notes 3/2/18: emphasized taking pills daily, f/u in 6 months, cr 1.47, BP was 132/80, all was stable, f/u with PCP or her q6 months  He will only follow with me for his kidney dysfunction       Pt follows with Edin (uro)   Reviewed notes 1/18: PSA 5.2 over the winter, spencer stage 7  Discussed meaning of spencer scoring  Discussed spencer scoring either staying unchanged or worsening   Last visit was 3/18 - will get notes for review    Per daughter, he is still under active surveillance for stage 1 prostate cancer  Discussed reasoning for prostate bx  Advised him to address surg with uro  Next visit scheduled for 6/18  Recall pt has FMHX (brother and uncle) prostate cancer     Lov, I had him take crestor 10mg every other day for cholesterol   However, he is taking this med >half of the time, as he forgets  He states that he took this med last night  Advised him to take this med with his BP meds    Continues gabapentin 2 tabs qhs for his tingling/neuropathy pain in his hands, which works well for the most part     Continues celexa 20mg daily for depression/anxiety, which works well  He also has ativan to use prn (rarely, not recently) for anxiety     Pt does exercises and yoga before bed, which allows him to sleep well  Pt has not been using his trazodone recently  Pt wonders if this med interacts with his other meds  Discussed this not being the case      Pt drinks 2-3 beers each day  Pt drinks more in social settings  Pt states that he can go 3 weeks without drinking   Discussed alcoholic guidelines for males  Counseled on cessation    Pt is concerned about his memory  Discussed having a neuropsych eval  Provided referral for Dr. Junito Irizarry (neuropsych)    On exam, pt had calluses, bunions, hammertoes and thickened toenails to BLE  Pt applied lotion to his feet with no improvement to sx  Provided referral for a podiatrist     Pt wonders if his abd surg was necessary   Discussed not knowing the details of this surg     ACP not on file. SDM is his daughter (Kristi Wadsworth).     Recall sees Dr. Isabel Ling for h/o DVT, no longer on coumadin or xarelto, on ASA only.  Was on coumadin for h/o PE and DVT post operatively      PREVENTIVE:    Colonoscopy: 9/15/15, Dr. Lilliana Truong, repeat 5 years  EGD: 9/15/15, Dr. Lilliana Truong  PSA: 7/15 3.0, ,  3.8  AAA screen: CT , negative  Tdap: unknown   Pneumovax: 2012  Tyxlahh21: declines  Shingrix: never completed  Flu shot: declines  Foot exam: 18   Microalbumin:   A1c: 10/14 7.1, 7/15 6.3, 9/15 6.4, 12/15 5.9,  6.2, 10/16 5.9,  6.6,  6.3,  6.4,  6.4,  6.6  Eye exam: Dr. Lisandra Kamara, 17, due , retinal scan on 18   Hep C Screen: 10/15, negative  Lipids:     EK/17, PACs    Patient Active Problem List    Diagnosis Date Noted    Type 2 diabetes with nephropathy ([facility] 2018    Reactive depression 11/15/2016    Cervical spinal stenosis 11/03/2016    CKD (chronic kidney disease) 10/31/2016    ACP (advance care planning) 12/11/2015    Diabetes mellitus without complication [facility] 00/21/1777    Hyperlipidemia 07/10/2015    Spinal stenosis, lumbar region, without neurogenic claudication 10/16/2014    Lumbar disc disease with radiculopathy 10/16/2014    DVT of leg (deep venous thrombosis) ([facility] 12/18/2012    HTN (hypertension) 12/12/2012    Microalbuminuria 12/12/2012    Perforated diverticulum of large intestine 12/04/2012    Alcohol abuse 12/04/2012    Back pain 12/04/2012    Insomnia 12/04/2012    Perforated viscus 11/21/2012     Current Outpatient Prescriptions   Medication Sig Dispense Refill    citalopram (CELEXA) 20 mg tablet TAKE ONE TABLET BY MOUTH ONCE DAILY 30 Tab 3    SITagliptin (JANUVIA) 50 mg tablet Take 1 Tab by mouth daily. 90 Tab 1    metoprolol succinate (TOPROL-XL) 50 mg XL tablet Take 1 Tab by mouth daily. 90 Tab 1    hydroCHLOROthiazide (HYDRODIURIL) 12.5 mg tablet TAKE 1 TABLET BY MOUTH ONCE DAILY 30 Tab 0    traZODone (DESYREL) 50 mg tablet TAKE 1 TABLET BY MOUTH ONCE DAILY AT BEDTIME 30 Tab 0    LORazepam (ATIVAN) 0.5 mg tablet TAKE ONE TABLET BY MOUTH NIGHTLY AS NEEDED FOR ANXIETY 20 Tab 0    rosuvastatin (CRESTOR) 10 mg tablet TAKE ONE TABLET BY MOUTH NIGHTLY 90 Tab 2    glucose blood VI test strips (FREESTYLE INSULINX TEST STRIPS) strip Check blood sugars 2-3 times daily. ICD-10: E11.9 100 Strip 0    FREESTYLE LANCETS 28 gauge misc USE TO CHECK BLOOD SUGAR ONCE DAILY 100 Lancet 12    gabapentin (NEURONTIN) 300 mg capsule Take 2 Caps by mouth nightly as needed. 60 Cap 6    hydroCHLOROthiazide (HYDRODIURIL) 12.5 mg tablet TAKE ONE TABLET BY MOUTH ONCE DAILY 30 Tab 0    cyanocobalamin 1,000 mcg tablet Take 1,000 mcg by mouth daily.  FERROUS FUMARATE (IRON PO) Take  by mouth.  magnesium 250 mg tab Take  by mouth daily.        Past Surgical History:   Procedure Laterality Date    ABDOMEN SURGERY PROC UNLISTED      bowel resection    HX BACK SURGERY  2014    HX GI  11/2012    bowel resection    HX ORTHOPAEDIC      amputated left 4th finger      Lab Results  Component Value Date/Time   WBC 6.2 08/08/2017 09:22 AM   HGB 13.2 08/08/2017 09:22 AM   Hemoglobin (POC) 13.3 11/02/2016 07:15 AM   HCT 40.6 08/08/2017 09:22 AM   Hematocrit (POC) 39 11/02/2016 07:15 AM   PLATELET 485 89/49/3278 09:22 AM   MCV 82 08/08/2017 09:22 AM     Lab Results  Component Value Date/Time   Cholesterol, total 251 (H) 02/28/2018 08:43 AM   Cholesterol (POC) 227 02/06/2015 03:30 PM   HDL Cholesterol 58 02/28/2018 08:43 AM   LDL, calculated 177 (H) 02/28/2018 08:43 AM   LDL Cholesterol (POC) 164 02/06/2015 03:30 PM   Triglyceride 78 02/28/2018 08:43 AM   Triglycerides (POC) 68 02/06/2015 03:30 PM     Lab Results  Component Value Date/Time   GFR est non-AA 50 (L) 02/28/2018 08:43 AM   GFRNA, POC 50 (L) 11/02/2016 07:15 AM   GFR est AA 57 (L) 02/28/2018 08:43 AM   GFRAA, POC >60 11/02/2016 07:15 AM   Creatinine 1.43 (H) 02/28/2018 08:43 AM   Creatinine (POC) 1.4 (H) 11/02/2016 07:15 AM   BUN 14 02/28/2018 08:43 AM   BUN (POC) 28 (H) 11/02/2016 07:15 AM   Sodium 141 02/28/2018 08:43 AM   Sodium (POC) 140 11/02/2016 07:15 AM   Potassium 4.6 02/28/2018 08:43 AM   Potassium (POC) 4.1 11/02/2016 07:15 AM   Chloride 101 02/28/2018 08:43 AM   Chloride (POC) 105 11/02/2016 07:15 AM   CO2 23 02/28/2018 08:43 AM   Magnesium 2.3 08/08/2017 09:22 AM   Phosphorus 3.0 10/17/2014 04:40 AM   Albumin, urine 59.5 10/23/2015 09:10 AM        Review of Systems   Respiratory: Negative for shortness of breath. Cardiovascular: Negative for chest pain. Psychiatric/Behavioral: Positive for memory loss. Physical Exam   Constitutional: He is oriented to person, place, and time. He appears well-developed and well-nourished. No distress. HENT:   Head: Normocephalic and atraumatic.    Mouth/Throat: Oropharynx is clear and moist. No oropharyngeal exudate. Eyes: Conjunctivae and EOM are normal. Right eye exhibits no discharge. Left eye exhibits no discharge. Neck: Normal range of motion. Neck supple. No thyromegaly present. Cardiovascular: Normal rate, regular rhythm and normal heart sounds. Exam reveals no gallop and no friction rub. No murmur heard. Pulmonary/Chest: Effort normal and breath sounds normal. No respiratory distress. He has no wheezes. He has no rales. He exhibits no tenderness. Musculoskeletal: Normal range of motion. He exhibits no edema, tenderness or deformity. Lymphadenopathy:     He has no cervical adenopathy. Neurological: He is alert and oriented to person, place, and time. He has normal reflexes. He exhibits normal muscle tone. Coordination normal.   Monofilament nl BLE, good peripheral pulses, no ulcers, calluses, bunion, hammertoes and thickened toenails to BLE   Skin: Skin is warm and dry. No rash noted. He is not diaphoretic. No erythema. No pallor. Psychiatric: He has a normal mood and affect. His behavior is normal.       ASSESSMENT and PLAN    ICD-10-CM ICD-9-CM    1. Type 2 diabetes with nephropathy (HCC)    Controled on januvia, check a1c, retinal scan today   S12.19 442.87 METABOLIC PANEL, BASIC     583.81 CK      HEMOGLOBIN A1C WITH EAG       DIABETES FOOT EXAM      REFERRAL TO PODIATRY      MAGNESIUM   2. Reactive depression    Controled on celexa, rarely using ativan for anxiety   F94.6 708.1 METABOLIC PANEL, BASIC      CK      HEMOGLOBIN A1C WITH EAG   3. Stage 3 chronic kidney disease    Stable, cr 1.4, UTD with Bedicheck, will no longer follow with her unless progressive disease   K19.9 462.1 METABOLIC PANEL, BASIC      CK      HEMOGLOBIN A1C WITH EAG   4.  Pure hypercholesterolemia    Non-compliant with Crestor, emphasized need for compliance, will not check lipids, will check CK levels and monitor this for statin therapy as his baseline CK is higher than nl   V53.11 113.2 METABOLIC PANEL, BASIC      CK      HEMOGLOBIN A1C WITH EAG   5. Essential hypertension    Controled on toprol and HCTZ   U54 594.9 METABOLIC PANEL, BASIC      CK      HEMOGLOBIN A1C WITH EAG   6. Cervical spinal stenosis    Uses gabapentin, improves his sx   T17.02 772.5 METABOLIC PANEL, BASIC      CK      HEMOGLOBIN A1C WITH EAG   7. Prostate cancer ([facility]    [place] 7, discussed this again with pt, follows with Dr. Hanna Stanford, under active surveillance    U08 166 METABOLIC PANEL, BASIC      CK      HEMOGLOBIN A1C WITH EAG   8. Elevated CK    Check level   Q80.2 541.4 METABOLIC PANEL, BASIC      CK      HEMOGLOBIN A1C WITH EAG   9. MCI (mild cognitive impairment)    Send to Dr. Nba Mcgarry   G31.84 331.83 [address]    discussed diet/wt loss diabteic diet    Scribed by Brent Marques of [address], as dictated by Dr. Fred Arias. Current diagnosis and concerns discussed with pt at length. Pt understands risks and benefits or current treatment plan and medications, and accepts the treatment and medication with any possible risks. Pt asks appropriate questions, which were answered. Pt was instructed to call with any concerns or problems. This note will not be viewable in [address]

## 2018-06-04 NOTE — MR AVS SNAPSHOT
[address] [facility] 
[phone] Patient: Analisa Doran MRN: [id number] WKM:[birth year] Visit Information Date & Time Provider Department Dept. Phone Encounter #  
 6/4/2018  8:15 AM Nataly Flores, [address] [place] [address],[address] [phone] [id number] Follow-up Instructions Return in about 3 months (around 9/4/2018). Upcoming Health Maintenance Date Due DTaP/Tdap/Td series (1 - Tdap) 7/5/1969 Pneumococcal 65+ Low/Medium Risk (2 of 2 - PPSV23) 12/4/2017 FOOT EXAM Q1 5/9/2018 EYE EXAM RETINAL OR DILATED Q1 5/31/2018 Influenza Age 5 to Adult 8/1/2018 HEMOGLOBIN A1C Q6M 8/28/2018 MICROALBUMIN Q1 11/22/2018 LIPID PANEL Q1 2/28/2019 MEDICARE YEARLY EXAM 3/1/2019 GLAUCOMA SCREENING Q2Y 5/31/2019 COLONOSCOPY 9/15/2020 Allergies as of 6/4/2018  Review Complete On: 2/28/2018 By: Nataly Flores MD  
  
 Severity Noted Reaction Type Reactions Arb-angiotensin Receptor Antagonist  07/12/2016    Other (comments) High k  
  
Current Immunizations  Reviewed on 8/8/2017 Name Date Influenza Vaccine 12/4/2012 Pneumococcal Polysaccharide (PPSV-23) 12/4/2012 ZZZ-RETIRED (DO NOT USE) Pneumococcal Vaccine (Unspecified Type) 11/27/2012 12:16 PM  
  
 Not reviewed this visit You Were Diagnosed With   
  
 Codes Comments Type 2 diabetes with nephropathy (HCC)    -  Primary ICD-10-CM: E11.21 
ICD-9-CM: 250.40, 583.81 Reactive depression     ICD-10-CM: F32.9 ICD-9-CM: 300.4 Stage 3 chronic kidney disease     ICD-10-CM: N18.3 ICD-9-CM: 805. 3 Pure hypercholesterolemia     ICD-10-CM: E78.00 ICD-9-CM: 272.0 Essential hypertension     ICD-10-CM: I10 
ICD-9-CM: 401.9 Cervical spinal stenosis     ICD-10-CM: M48.02 
ICD-9-CM: 723.0 Prostate cancer [facility])     ICD-10-CM: D17 ICD-9-CM: 213 Elevated CK     ICD-10-CM: R74.8 ICD-9-CM: 790.5 MCI (mild cognitive impairment)     ICD-10-CM: G31.84 ICD-9-CM: 331.83 Vitals BP Pulse Temp Resp Height(growth percentile) Weight(growth percentile) 125/71 (BP 1 Location: Left arm, BP Patient Position: Sitting) (!) 52 97.9 °F (36.6 °C) (Oral) 16 5' 5\" (1.651 m) 191 lb (86.6 kg) SpO2 BMI Smoking Status 97% 31.78 kg/m2 Never Smoker BMI and BSA Data Body Mass Index Body Surface Area 31.78 kg/m 2 1.99 m 2 Preferred Pharmacy Pharmacy Name Phone [address] [phone] Your Updated Medication List  
  
   
This list is accurate as of 6/4/18  8:35 AM.  Always use your most recent med list.  
  
  
  
  
 citalopram 20 mg tablet Commonly known as:  CELEXA  
TAKE ONE TABLET BY MOUTH ONCE DAILY  
  
 cyanocobalamin 1,000 mcg tablet Take 1,000 mcg by mouth daily. FREESTYLE LANCETS 28 gauge Misc Generic drug:  lancets USE TO CHECK BLOOD SUGAR ONCE DAILY  
  
 gabapentin 300 mg capsule Commonly known as:  NEURONTIN Take 2 Caps by mouth nightly as needed. glucose blood VI test strips strip Commonly known as:  FREESTYLE INSULINX TEST STRIPS Check blood sugars 2-3 times daily. ICD-10: E11.9  
  
 hydroCHLOROthiazide 12.5 mg tablet Commonly known as:  HYDRODIURIL  
TAKE ONE TABLET BY MOUTH ONCE DAILY  
  
 IRON PO Take  by mouth. LORazepam 0.5 mg tablet Commonly known as:  ATIVAN  
TAKE ONE TABLET BY MOUTH NIGHTLY AS NEEDED FOR ANXIETY  
  
 magnesium 250 mg Tab Take  by mouth daily. metoprolol succinate 50 mg XL tablet Commonly known as:  TOPROL-XL Take 1 Tab by mouth daily. rosuvastatin 10 mg tablet Commonly known as:  CRESTOR  
TAKE ONE TABLET BY MOUTH NIGHTLY SITagliptin 50 mg tablet Commonly known as:  Ashli Isaias Take 1 Tab by mouth daily. traZODone 50 mg tablet Commonly known as:  Lou Hansen  
 TAKE 1 TABLET BY MOUTH ONCE DAILY AT BEDTIME We Performed the Following CK Q4811023 CPT(R)] HEMOGLOBIN A1C WITH EAG [20073 CPT(R)]  DIABETES FOOT EXAM [HM7 Custom] MAGNESIUM D505227 CPT(R)] METABOLIC PANEL, BASIC [62865 CPT(R)] REFERRAL TO NEUROLOGY [YBJ05 Custom] REFERRAL TO PODIATRY [REF90 Custom] Follow-up Instructions Return in about 3 months (around 9/4/2018). Referral Information Referral ID Referred By Referred To  
  
 [id number] [facility] [address] [facility]. [place], [address] [facility] Visits Status Start Date End Date 1 New Request 6/4/18 6/4/19 If your referral has a status of pending review or denied, additional information will be sent to support the outcome of this decision. Referral ID Referred By Referred To  
 [id number] Britni Aracelis Papito [facility]le [facility] [address] [facility], [address] [facility] Phone: [phone] Fax: [phone] Visits Status Start Date End Date 1 New Request 6/4/18 6/4/19 If your referral has a status of pending review or denied, additional information will be sent to support the outcome of this decision. Introducing [facility]! [employer] introduces [employer] patient portal. Now you can access parts of your medical record, email your doctor's office, and request medication refills online. 1. In your internet browser, go to [email or web address] 2. Click on the First Time User? Click Here link in the Sign In box. You will see the New Member Sign Up page. 3. Enter your [employer]wave Access Code exactly as it appears below. You will not need to use this code after youve completed the sign-up process. If you do not sign up before the expiration date, you must request a new code. · [employer]wave Access Code: [email or web address] Expires: 9/2/2018  8:33 AM 
 
 4. Enter the last four digits of your Social Security Number (xxxx) and Date of Birth (mm/dd/yyyy) as indicated and click Submit. You will be taken to the next sign-up page. 5. Create a [employer] ID. This will be your [employer] login ID and cannot be changed, so think of one that is secure and easy to remember. 6. Create a [employer] password. You can change your password at any time. 7. Enter your Password Reset Question and Answer. This can be used at a later time if you forget your password. 8. Enter your e-mail address. You will receive e-mail notification when new information is available in [address] 9. Click Sign Up. You can now view and download portions of your medical record. 10. Click the Download Summary menu link to download a portable copy of your medical information. If you have questions, please visit the Frequently Asked Questions section of the [employer] website. Remember, [employer] is NOT to be used for urgent needs. For medical emergencies, dial 911. Now available from your iPhone and Android! Please provide this summary of care documentation to your next provider. Your primary care clinician is listed as Emmie Bauman. If you have any questions after today's visit, please call [phone].

## 2018-06-05 LAB
BUN SERPL-MCNC: 20 MG/DL (ref 8–27)
BUN/CREAT SERPL: 12 (ref 10–24)
CALCIUM SERPL-MCNC: 9 MG/DL (ref 8.6–10.2)
CHLORIDE SERPL-SCNC: 101 MMOL/L (ref 96–106)
CK SERPL-CCNC: 265 U/L (ref 24–204)
CO2 SERPL-SCNC: 24 MMOL/L (ref 18–29)
CREAT SERPL-MCNC: 1.61 MG/DL (ref 0.76–1.27)
EST. AVERAGE GLUCOSE BLD GHB EST-MCNC: 140 MG/DL
GFR SERPLBLD CREATININE-BSD FMLA CKD-EPI: 43 ML/MIN/1.73
GFR SERPLBLD CREATININE-BSD FMLA CKD-EPI: 50 ML/MIN/1.73
GLUCOSE SERPL-MCNC: 103 MG/DL (ref 65–99)
HBA1C MFR BLD: 6.5 % (ref 4.8–5.6)
LEFT EYE DIABETIC RETINOPATHY: NORMAL
LEFT EYE IMAGE QUALITY: NORMAL
LEFT EYE MACULAR EDEMA: NORMAL
LEFT EYE OTHER RETINOPATHY: NORMAL
MAGNESIUM SERPL-MCNC: 2.2 MG/DL (ref 1.6–2.3)
POTASSIUM SERPL-SCNC: 4 MMOL/L (ref 3.5–5.2)
RESULT: NORMAL
RIGHT EYE DIABETIC RETINOPATHY: NORMAL
RIGHT EYE IMAGE QUALITY: NORMAL
RIGHT EYE MACULAR EDEMA: NORMAL
RIGHT EYE OTHER RETINOPATHY: NORMAL
SEVERITY: NORMAL
SODIUM SERPL-SCNC: 139 MMOL/L (ref 134–144)

## 2018-06-10 RX ORDER — HYDROCHLOROTHIAZIDE 12.5 MG/1
TABLET ORAL
Qty: 30 TAB | Refills: 0 | Status: SHIPPED | OUTPATIENT
Start: 2018-06-10 | End: 2018-06-30 | Stop reason: SDUPTHER

## 2018-06-25 RX ORDER — ROSUVASTATIN CALCIUM 10 MG/1
TABLET, COATED ORAL
Qty: 90 TAB | Refills: 2 | Status: SHIPPED | OUTPATIENT
Start: 2018-06-25 | End: 2020-04-23

## 2018-07-01 RX ORDER — TRAZODONE HYDROCHLORIDE 50 MG/1
TABLET ORAL
Qty: 30 TAB | Refills: 0 | Status: SHIPPED | OUTPATIENT
Start: 2018-07-01 | End: 2019-12-04

## 2018-07-01 RX ORDER — HYDROCHLOROTHIAZIDE 12.5 MG/1
TABLET ORAL
Qty: 30 TAB | Refills: 0 | Status: SHIPPED | OUTPATIENT
Start: 2018-07-01 | End: 2018-09-17 | Stop reason: SDUPTHER

## 2018-08-17 DIAGNOSIS — F41.9 ANXIETY: ICD-10-CM

## 2018-08-19 RX ORDER — GABAPENTIN 300 MG/1
CAPSULE ORAL
Qty: 60 CAP | Refills: 6 | Status: SHIPPED | OUTPATIENT
Start: 2018-08-19 | End: 2018-09-17 | Stop reason: SDUPTHER

## 2018-08-19 RX ORDER — LORAZEPAM 0.5 MG/1
TABLET ORAL
Qty: 20 TAB | Refills: 0 | Status: SHIPPED | OUTPATIENT
Start: 2018-08-19 | End: 2019-03-30 | Stop reason: SDUPTHER

## 2018-08-23 NOTE — TELEPHONE ENCOUNTER
Following rx was faxed to pharmacy with confirmation received:      LORazepam (ATIVAN) 0.5 mg tablet  TAKE ONE TABLET BY MOUTH NIGHTLY AS NEEDED FOR ANXIETY  Disp: 20 Tab Refills: 0    Class: Print Start: 8/19/2018   For: Anxiety

## 2018-09-17 ENCOUNTER — HOSPITAL ENCOUNTER (OUTPATIENT)
Dept: LAB | Age: 68
Discharge: HOME | End: 2018-09-17
Payer: MEDICARE

## 2018-09-17 ENCOUNTER — OFFICE VISIT (OUTPATIENT)
Dept: INTERNAL MEDICINE CLINIC | Age: 68
End: 2018-09-17

## 2018-09-17 VITALS
OXYGEN SATURATION: 97 % | TEMPERATURE: 98 F | DIASTOLIC BLOOD PRESSURE: 81 MMHG | RESPIRATION RATE: 16 BRPM | SYSTOLIC BLOOD PRESSURE: 147 MMHG | BODY MASS INDEX: 32.99 KG/M2 | WEIGHT: 198 LBS | HEIGHT: 65 IN | HEART RATE: 75 BPM

## 2018-09-17 DIAGNOSIS — F41.9 ANXIETY: ICD-10-CM

## 2018-09-17 DIAGNOSIS — C61 PROSTATE CANCER (HCC): ICD-10-CM

## 2018-09-17 DIAGNOSIS — E11.40 TYPE 2 DIABETES MELLITUS WITH DIABETIC NEUROPATHY, WITHOUT LONG-TERM CURRENT USE OF INSULIN (HCC): Primary | ICD-10-CM

## 2018-09-17 DIAGNOSIS — N18.30 STAGE 3 CHRONIC KIDNEY DISEASE (HCC): ICD-10-CM

## 2018-09-17 DIAGNOSIS — E78.00 PURE HYPERCHOLESTEROLEMIA: ICD-10-CM

## 2018-09-17 DIAGNOSIS — G62.9 NEUROPATHY: ICD-10-CM

## 2018-09-17 DIAGNOSIS — M48.02 CERVICAL SPINAL STENOSIS: ICD-10-CM

## 2018-09-17 DIAGNOSIS — F32.9 REACTIVE DEPRESSION: ICD-10-CM

## 2018-09-17 DIAGNOSIS — E66.9 OBESITY (BMI 30.0-34.9): ICD-10-CM

## 2018-09-17 PROCEDURE — 83036 HEMOGLOBIN GLYCOSYLATED A1C: CPT

## 2018-09-17 PROCEDURE — 36415 COLL VENOUS BLD VENIPUNCTURE: CPT

## 2018-09-17 PROCEDURE — 80053 COMPREHEN METABOLIC PANEL: CPT

## 2018-09-17 PROCEDURE — 84443 ASSAY THYROID STIM HORMONE: CPT

## 2018-09-17 PROCEDURE — 82043 UR ALBUMIN QUANTITATIVE: CPT

## 2018-09-17 PROCEDURE — 80061 LIPID PANEL: CPT

## 2018-09-17 RX ORDER — GABAPENTIN 300 MG/1
300 CAPSULE ORAL
Qty: 180 CAP | Refills: 1 | Status: SHIPPED | OUTPATIENT
Start: 2018-09-17 | End: 2019-03-19 | Stop reason: SDUPTHER

## 2018-09-17 RX ORDER — HYDROCHLOROTHIAZIDE 12.5 MG/1
TABLET ORAL
Qty: 90 TAB | Refills: 1 | Status: SHIPPED | OUTPATIENT
Start: 2018-09-17 | End: 2018-12-07 | Stop reason: SDUPTHER

## 2018-09-17 NOTE — MR AVS SNAPSHOT
[address] [facility] 
[phone] Patient: Sonia Hugo MRN: [id number] TWN:[id number] Visit Information Date & Time Provider Department Dept. Phone Encounter #  
 9/17/2018  8:45 AM Keo Cai, [address] [id number] Follow-up Instructions Return in about 3 months (around 12/17/2018). Upcoming Health Maintenance Date Due DTaP/Tdap/Td series (1 - Tdap) 7/5/1969 Pneumococcal 65+ High/Highest Risk (2 of 2 - PPSV23) 12/4/2017 Influenza Age 5 to Adult 7/15/2019* MICROALBUMIN Q1 11/22/2018 HEMOGLOBIN A1C Q6M 12/4/2018 LIPID PANEL Q1 2/28/2019 MEDICARE YEARLY EXAM 3/1/2019 GLAUCOMA SCREENING Q2Y 5/31/2019 FOOT EXAM Q1 6/4/2019 EYE EXAM RETINAL OR DILATED Q1 6/4/2019 COLONOSCOPY 9/15/2020 *Topic was postponed. The date shown is not the original due date. Allergies as of 9/17/2018  Review Complete On: 9/17/2018 By: Keo Cai MD  
  
 Severity Noted Reaction Type Reactions Arb-angiotensin Receptor Antagonist  07/12/2016    Other (comments) High k  
  
Current Immunizations  Reviewed on 8/8/2017 Name Date Influenza Vaccine 12/4/2012 Pneumococcal Polysaccharide (PPSV-23) 12/4/2012 ZZZ-RETIRED (DO NOT USE) Pneumococcal Vaccine (Unspecified Type) 11/27/2012 12:16 PM  
  
 Not reviewed this visit You Were Diagnosed With   
  
 Codes Comments Type 2 diabetes mellitus with diabetic neuropathy, without long-term current use of insulin (HCC)    -  Primary ICD-10-CM: E11.40 ICD-9-CM: 250.60, 357.2 Stage 3 chronic kidney disease     ICD-10-CM: N18.3 ICD-9-CM: 432. 3 Pure hypercholesterolemia     ICD-10-CM: E78.00 ICD-9-CM: 272.0 Reactive depression     ICD-10-CM: F32.9 ICD-9-CM: 300.4 Cervical spinal stenosis     ICD-10-CM: M48.02 
ICD-9-CM: 723.0 Obesity (BMI 30.0-34.9)     ICD-10-CM: C46.4 ICD-9-CM: 278.00 Prostate cancer [facility])     ICD-10-CM: I95 ICD-9-CM: 369 Anxiety     ICD-10-CM: F41.9 ICD-9-CM: 300.00 Neuropathy     ICD-10-CM: G62.9 ICD-9-CM: 762. 9 Vitals BP Pulse Temp Resp Height(growth percentile) Weight(growth percentile) 147/81 (BP 1 Location: Left arm, BP Patient Position: Sitting) 75 98 °F (36.7 °C) (Oral) 16 5' 5\" (1.651 m) 198 lb (89.8 kg) SpO2 BMI Smoking Status 97% 32.95 kg/m2 Never Smoker Vitals History BMI and BSA Data Body Mass Index Body Surface Area 32.95 kg/m 2 2.03 m 2 Preferred Pharmacy Pharmacy Name Phone [facility] [address] [place] [address] [phone] Your Updated Medication List  
  
   
This list is accurate as of 9/17/18  9:09 AM.  Always use your most recent med list.  
  
  
  
  
 citalopram 20 mg tablet Commonly known as:  CELEXA  
TAKE ONE TABLET BY MOUTH ONCE DAILY  
  
 cyanocobalamin 1,000 mcg tablet Take 1,000 mcg by mouth daily. FREESTYLE LANCETS 28 gauge Misc Generic drug:  lancets USE TO CHECK BLOOD SUGAR ONCE DAILY  
  
 gabapentin 300 mg capsule Commonly known as:  NEURONTIN Take 1 Cap by mouth nightly as needed. glucose blood VI test strips strip Commonly known as:  FREESTYLE INSULINX TEST STRIPS Check blood sugars 2-3 times daily. ICD-10: E11.9  
  
 * hydroCHLOROthiazide 12.5 mg tablet Commonly known as:  HYDRODIURIL  
TAKE ONE TABLET BY MOUTH ONCE DAILY * hydroCHLOROthiazide 12.5 mg tablet Commonly known as:  HYDRODIURIL  
TAKE 1 TABLET BY MOUTH ONCE DAILY  
  
 IRON PO Take  by mouth. LORazepam 0.5 mg tablet Commonly known as:  ATIVAN  
TAKE ONE TABLET BY MOUTH NIGHTLY AS NEEDED FOR ANXIETY  
  
 magnesium 250 mg Tab Take  by mouth daily. metoprolol succinate 50 mg XL tablet Commonly known as:  TOPROL-XL Take 1 Tab by mouth daily. rosuvastatin 10 mg tablet Commonly known as:  CRESTOR  
TAKE ONE TABLET BY MOUTH NIGHTLY SITagliptin 50 mg tablet Commonly known as:  Chantell Bannister Take 1 Tab by mouth daily. traZODone 50 mg tablet Commonly known as:  DESYREL  
TAKE 1 TABLET BY MOUTH EVERY DAY AT BEDTIME  
  
 * Notice: This list has 2 medication(s) that are the same as other medications prescribed for you. Read the directions carefully, and ask your doctor or other care provider to review them with you. Prescriptions Sent to Pharmacy Refills  
 gabapentin (NEURONTIN) 300 mg capsule 1 Sig: Take 1 Cap by mouth nightly as needed. Class: Normal  
 Pharmacy: [facility] [address], [address] [facility] Ph #: [phone] Route: Oral  
 hydroCHLOROthiazide (HYDRODIURIL) 12.5 mg tablet 1 Sig: TAKE 1 TABLET BY MOUTH ONCE DAILY Class: Normal  
 Pharmacy: [facility] [address], [address] [facility] Ph #: [phone] We Performed the Following HEMOGLOBIN A1C WITH EAG [18123 CPT(R)] LIPID PANEL [37458 CPT(R)] METABOLIC PANEL, COMPREHENSIVE [22482 CPT(R)] MICROALBUMIN, UR, RAND W/ MICROALB/CREAT RATIO [id number] CPT(R)] TSH 3RD GENERATION [52076 CPT(R)] Follow-up Instructions Return in about 3 months (around 12/17/2018). Introducing [facility]! Maricruz Gonsales introduces [employer] patient portal. Now you can access parts of your medical record, email your doctor's office, and request medication refills online. 1. In your internet browser, go to [email or web address] 2. Click on the First Time User? Click Here link in the Sign In box. You will see the New Member Sign Up page. 3. Enter your [employer] Access Code exactly as it appears below. You will not need to use this code after youve completed the sign-up process. If you do not sign up before the expiration date, you must request a new code.  
 
· [employer] Access Code: [email or web address] 
 Expires: 12/16/2018  9:09 AM 
 
4. Enter the last four digits of your Social Security Number (xxxx) and Date of Birth (mm/dd/yyyy) as indicated and click Submit. You will be taken to the next sign-up page. 5. Create a TerraPower ID. This will be your [employer]Power login ID and cannot be changed, so think of one that is secure and easy to remember. 6. Create a TerraPower password. You can change your password at any time. 7. Enter your Password Reset Question and Answer. This can be used at a later time if you forget your password. 8. Enter your e-mail address. You will receive e-mail notification when new information is available in [address] 9. Click Sign Up. You can now view and download portions of your medical record. 10. Click the Download Summary menu link to download a portable copy of your medical information. If you have questions, please visit the Frequently Asked Questions section of the [employer] website. Remember, [employer]Power is NOT to be used for urgent needs. For medical emergencies, dial 911. Now available from your iPhone and Android! Please provide this summary of care documentation to your next provider. Your primary care clinician is listed as Dee Burleson. If you have any questions after today's visit, please call [phone].

## 2018-09-17 NOTE — PROGRESS NOTES
HISTORY OF PRESENT ILLNESS  Mariah Olivares is a 77 y.o. male. HPI  Last here 6/4/18. Pt is here to f/u on chronic conditions.   Pt presents with his daughter who provides some of his hx.      BP today is 147/81, will repeat this today  Continues on toprol 50mg  Pt has not been taking HCTZ 12.5mg daily recently d/t a recall of his batch  Will restart HCTZ 12.5mg daily      BS at home running around 93 (this AM), 90s, 110, 116 in the AM fasting   Continues januvia 50mg daily        Wt is up 7 lbs x lov  Pt states he walks all the time  Discussed [employer]  Discussed cutting out calories such as beer  Discussed diet and w/l       Reviewed last labs 6/18  Will get labs today        Pt follows annually with Dr. Deejay Chin (cardio)   Last visit was 11/27/17      Pt follows with Dr. Belia Ferguson (nephro)  Last visit was 3/2/18  He will only follow with me for his kidney dysfunction   Pt has f/u pending but will cancel this if his renal function is stable      Pt follows with Edin (uro)   Last visit was 6/18, will get notes for review  Per daughter, PSA was elevated, and pt will be having a bx at the beginning of the year  Recall pt has [facility] (brother and uncle) prostate cancer      Continues on crestor 10mg for cholesterol  Pt sometimes takes this medication irregularly however, states he may skip it about once a week or more     Continues gabapentin 2-3 tabs qhs for his tingling/neuropathy pain in his hands, which works well for the most part     Continues celexa 20mg daily for depression/anxiety, which works well  Not feeling sad/down  He also has ativan to use prn (not often, about once per week) for anxiety      Pt does exercises and yoga before bed, which allows him to sleep well  Pt has not been using his trazodone recently     Lov, pt expressed concern about his memory  Lov, provided referral for Dr. Jayson Chong (neuropsych)  discused f/u with this     On exam, pt had calluses, bunions, hammertoes and thickened toenails to BLE  Pt applied lotion to his feet with no improvement to sx  Provided referral for a podiatrist       ACP not on file. SDM is his daughter (Yareli Morrell).     Recall sees Dr. Desi Costa for h/o DVT, no longer on coumadin or xarelto, on ASA only.  Was on coumadin for h/o PE and DVT post operatively      PREVENTIVE:    Colonoscopy: 9/15/15, Dr. Bobby Haro, repeat 5 years  EGD: 9/15/15, Dr. Bobby Haro  PSA: 7/15 3.0, ,  3.8,   AAA screen: CT , negative  Tdap: unknown   Pneumovax: 2012  Zwstisn95: declines  Shingrix: never completed  Flu shot: declines  Foot exam: 18   Microalbumin:   A1c: 10/14 7.1, 7/15 6.3, 9/15 6.4, 12/15 5.9,  6.2, 10/16 5.9,  6.6,  6.3,  6.4,  6.4,  6.6,  6.5  Eye exam: Dr. Jeremiah Andino, 17, retinal scan on 18, scheduled for   Hep C Screen: 10/15, negative  Lipids:     EK/17, PACs    Patient Active Problem List    Diagnosis Date Noted    Prostate cancer ([facility] 2018    Type 2 diabetes mellitus with diabetic neuropathy ([facility] 2018    Type 2 diabetes with nephropathy ([facility] 2018    Reactive depression 11/15/2016    Cervical spinal stenosis 2016    CKD (chronic kidney disease) 10/31/2016    ACP (advance care planning) 2015    Diabetes mellitus without complication ([address]     Hyperlipidemia 07/10/2015    Spinal stenosis, lumbar region, without neurogenic claudication 10/16/2014    Lumbar disc disease with radiculopathy 10/16/2014    DVT of leg (deep venous thrombosis) ([facility] [address] [facility] 2012    HTN (hypertension) 2012    Microalbuminuria 2012    Perforated diverticulum of large intestine 2012    Alcohol abuse 2012    Back pain 2012    Insomnia 2012    Perforated viscus 2012     Current Outpatient Prescriptions   Medication Sig Dispense Refill    LORazepam (ATIVAN) 0.5 mg tablet TAKE ONE TABLET BY MOUTH NIGHTLY AS NEEDED FOR ANXIETY 20 Tab 0    gabapentin (NEURONTIN) 300 mg capsule TAKE 2 CAPSULES BY MOUTH NIGHTLY AS NEEDED 60 Cap 6    hydroCHLOROthiazide (HYDRODIURIL) 12.5 mg tablet TAKE 1 TABLET BY MOUTH ONCE DAILY 30 Tab 0    traZODone (DESYREL) 50 mg tablet TAKE 1 TABLET BY MOUTH EVERY DAY AT BEDTIME 30 Tab 0    rosuvastatin (CRESTOR) 10 mg tablet TAKE ONE TABLET BY MOUTH NIGHTLY 90 Tab 2    citalopram (CELEXA) 20 mg tablet TAKE ONE TABLET BY MOUTH ONCE DAILY 30 Tab 3    SITagliptin (JANUVIA) 50 mg tablet Take 1 Tab by mouth daily. 90 Tab 1    metoprolol succinate (TOPROL-XL) 50 mg XL tablet Take 1 Tab by mouth daily. 90 Tab 1    glucose blood VI test strips (FREESTYLE INSULINX TEST STRIPS) strip Check blood sugars 2-3 times daily. ICD-10: E11.9 100 Strip 0    FREESTYLE LANCETS 28 gauge misc USE TO CHECK BLOOD SUGAR ONCE DAILY 100 Lancet 12    hydroCHLOROthiazide (HYDRODIURIL) 12.5 mg tablet TAKE ONE TABLET BY MOUTH ONCE DAILY 30 Tab 0    cyanocobalamin 1,000 mcg tablet Take 1,000 mcg by mouth daily.  FERROUS FUMARATE (IRON PO) Take  by mouth.  magnesium 250 mg tab Take  by mouth daily.        Past Surgical History:   Procedure Laterality Date    ABDOMEN SURGERY PROC UNLISTED      bowel resection    HX BACK SURGERY  2014    HX GI  11/2012    bowel resection    HX ORTHOPAEDIC      amputated left 4th finger      Lab Results  Component Value Date/Time   WBC 6.2 08/08/2017 09:22 AM   HGB 13.2 08/08/2017 09:22 AM   Hemoglobin (POC) 13.3 11/02/2016 07:15 AM   HCT 40.6 08/08/2017 09:22 AM   Hematocrit (POC) 39 11/02/2016 07:15 AM   PLATELET 664 30/00/1344 09:22 AM   MCV 82 08/08/2017 09:22 AM     Lab Results  Component Value Date/Time   Cholesterol, total 251 (H) 02/28/2018 08:43 AM   Cholesterol (POC) 227 02/06/2015 03:30 PM   HDL Cholesterol 58 02/28/2018 08:43 AM   LDL, calculated 177 (H) 02/28/2018 08:43 AM   LDL Cholesterol (POC) 164 02/06/2015 03:30 PM   Triglyceride 78 02/28/2018 08:43 AM Triglycerides (POC) 68 02/06/2015 03:30 PM     Lab Results  Component Value Date/Time   GFR est non-AA 43 (L) 06/04/2018 08:51 AM   GFRNA, POC 50 (L) 11/02/2016 07:15 AM   GFR est AA 50 (L) 06/04/2018 08:51 AM   GFRAA, POC >60 11/02/2016 07:15 AM   Creatinine 1.61 (H) 06/04/2018 08:51 AM   Creatinine (POC) 1.4 (H) 11/02/2016 07:15 AM   BUN 20 06/04/2018 08:51 AM   BUN (POC) 28 (H) 11/02/2016 07:15 AM   Sodium 139 06/04/2018 08:51 AM   Sodium (POC) 140 11/02/2016 07:15 AM   Potassium 4.0 06/04/2018 08:51 AM   Potassium (POC) 4.1 11/02/2016 07:15 AM   Chloride 101 06/04/2018 08:51 AM   Chloride (POC) 105 11/02/2016 07:15 AM   CO2 24 06/04/2018 08:51 AM   Magnesium 2.2 06/04/2018 08:51 AM   Phosphorus 3.0 10/17/2014 04:40 AM   Albumin, urine 59.5 10/23/2015 09:10 AM        Review of Systems   Respiratory: Negative for shortness of breath. Cardiovascular: Negative for chest pain. Physical Exam   Constitutional: He is oriented to person, place, and time. He appears well-developed and well-nourished. No distress. HENT:   Head: Normocephalic and atraumatic. Mouth/Throat: Oropharynx is clear and moist. No oropharyngeal exudate. Eyes: Conjunctivae and EOM are normal. Right eye exhibits no discharge. Left eye exhibits no discharge. Neck: Normal range of motion. Neck supple. Cardiovascular: Normal rate, regular rhythm and normal heart sounds. Exam reveals no gallop and no friction rub. No murmur heard. Pulmonary/Chest: Effort normal and breath sounds normal. No respiratory distress. He has no wheezes. He has no rales. He exhibits no tenderness. Musculoskeletal: Normal range of motion. He exhibits no edema, tenderness or deformity. Lymphadenopathy:     He has no cervical adenopathy. Neurological: He is alert and oriented to person, place, and time. He has normal reflexes. Coordination normal.   Skin: Skin is warm and dry. No rash noted. He is not diaphoretic. No erythema. No pallor. Psychiatric: He has a normal mood and affect. His behavior is normal.       ASSESSMENT and PLAN    ICD-10-CM ICD-9-CM    1. Type 2 diabetes mellitus with diabetic neuropathy, without long-term current use of insulin (HCC)    Has been well controlled on januvia, check a1c today and adjust meds prn, has eye exam scheduled   E11.40 250.60 MICROALBUMIN, UR, RAND W/ MICROALB/CREAT RATIO     517.0 METABOLIC PANEL, COMPREHENSIVE      HEMOGLOBIN A1C WITH EAG      TSH 3RD GENERATION   2. Stage 3 chronic kidney disease    Cr runs ~1.4-1.6 baseline, he has f/u with Dr Dick Meneses pending for 11/18 but will cancel this if renal function is stable on labs today   N18.3 585.3 MICROALBUMIN, UR, RAND W/ MICROALB/CREAT RATIO      METABOLIC PANEL, COMPREHENSIVE      HEMOGLOBIN A1C WITH EAG      TSH 3RD GENERATION   3. Pure hypercholesterolemia    Pt taking crestor at least 4-5 days per week, he is a questionable historian, will repeat lipids today    Work on compliance   E78.00 272.0 MICROALBUMIN, UR, RAND W/ MICROALB/CREAT RATIO      METABOLIC PANEL, COMPREHENSIVE      HEMOGLOBIN A1C WITH EAG      TSH 3RD GENERATION      LIPID PANEL   4. Reactive depression    Stable on celexa continue current dose, uses ativan qhs prn for anxiety   F32.9 300.4 MICROALBUMIN, UR, RAND W/ MICROALB/CREAT RATIO      METABOLIC PANEL, COMPREHENSIVE      HEMOGLOBIN A1C WITH EAG      TSH 3RD GENERATION   5. Cervical spinal stenosis    Gets tingling and pain in hands that is improved with gabapentin, takes 2-3 qhs   M48.02 723.0 MICROALBUMIN, UR, RAND W/ MICROALB/CREAT RATIO      METABOLIC PANEL, COMPREHENSIVE      HEMOGLOBIN A1C WITH EAG      TSH 3RD GENERATION   6. Obesity (BMI 30.0-34. 9)    Counseled on diet and w/l, wt is up 7 lbs from lov   E66.9 278.00    7. Prostate cancer [facility])    Has repeat bx pending, follows with Dr Starr Barber, will get notes for review   C61 185    8. Anxiety    See above   F41.9 300.00    9.  Neuropathy    Improved with gabapentin continue no change   G62.9 355.9         Scribed by Addis Morales of [facility], as dictated by Dr. Nirmala Ash. Current diagnosis and concerns discussed with pt at length. Pt understands risks and benefits or current treatment plan and medications, and accepts the treatment and medication with any possible risks. Pt asks appropriate questions, which were answered. Pt was instructed to call with any concerns or problems. This note will not be viewable in [address]

## 2018-09-18 LAB
ALBUMIN SERPL-MCNC: 4 G/DL (ref 3.5–4.8)
ALBUMIN/CREAT UR: 48.8 MG/G CREAT (ref 0–30)
ALBUMIN/GLOB SERPL: 1.4 {RATIO} (ref 1.2–2.2)
ALP SERPL-CCNC: 44 IU/L (ref 39–117)
ALT SERPL-CCNC: 15 IU/L (ref 0–44)
AST SERPL-CCNC: 20 IU/L (ref 0–40)
BILIRUB SERPL-MCNC: 1 MG/DL (ref 0–1.2)
BUN SERPL-MCNC: 24 MG/DL (ref 8–27)
BUN/CREAT SERPL: 16 (ref 10–24)
CALCIUM SERPL-MCNC: 9.3 MG/DL (ref 8.6–10.2)
CHLORIDE SERPL-SCNC: 102 MMOL/L (ref 96–106)
CHOLEST SERPL-MCNC: 231 MG/DL (ref 100–199)
CO2 SERPL-SCNC: 22 MMOL/L (ref 20–29)
CREAT SERPL-MCNC: 1.48 MG/DL (ref 0.76–1.27)
CREAT UR-MCNC: 51 MG/DL
EST. AVERAGE GLUCOSE BLD GHB EST-MCNC: 126 MG/DL
GLOBULIN SER CALC-MCNC: 2.8 G/DL (ref 1.5–4.5)
GLUCOSE SERPL-MCNC: 89 MG/DL (ref 65–99)
HBA1C MFR BLD: 6 % (ref 4.8–5.6)
HDLC SERPL-MCNC: 53 MG/DL
LDLC SERPL CALC-MCNC: 150 MG/DL (ref 0–99)
MICROALBUMIN UR-MCNC: 24.9 UG/ML
POTASSIUM SERPL-SCNC: 4.3 MMOL/L (ref 3.5–5.2)
PROT SERPL-MCNC: 6.8 G/DL (ref 6–8.5)
SODIUM SERPL-SCNC: 137 MMOL/L (ref 134–144)
TRIGL SERPL-MCNC: 140 MG/DL (ref 0–149)
TSH SERPL DL<=0.005 MIU/L-ACNC: 2.9 UIU/ML (ref 0.45–4.5)
VLDLC SERPL CALC-MCNC: 28 MG/DL (ref 5–40)

## 2018-09-29 DIAGNOSIS — E11.9 DIABETES MELLITUS WITHOUT COMPLICATION (HCC): ICD-10-CM

## 2018-09-29 RX ORDER — BLOOD SUGAR DIAGNOSTIC
STRIP MISCELLANEOUS
Qty: 100 STRIP | Refills: 0 | Status: SHIPPED | OUTPATIENT
Start: 2018-09-29 | End: 2019-06-25

## 2018-12-07 ENCOUNTER — OFFICE VISIT (OUTPATIENT)
Dept: CARDIOLOGY CLINIC | Age: 68
End: 2018-12-07

## 2018-12-07 VITALS
DIASTOLIC BLOOD PRESSURE: 88 MMHG | BODY MASS INDEX: 32.52 KG/M2 | HEIGHT: 65 IN | SYSTOLIC BLOOD PRESSURE: 136 MMHG | HEART RATE: 89 BPM | WEIGHT: 195.2 LBS | RESPIRATION RATE: 18 BRPM | OXYGEN SATURATION: 97 %

## 2018-12-07 DIAGNOSIS — E11.9 DIABETES MELLITUS WITHOUT COMPLICATION (HCC): ICD-10-CM

## 2018-12-07 DIAGNOSIS — I49.3 PVC'S (PREMATURE VENTRICULAR CONTRACTIONS): ICD-10-CM

## 2018-12-07 DIAGNOSIS — I10 ESSENTIAL HYPERTENSION: ICD-10-CM

## 2018-12-07 DIAGNOSIS — E78.2 MIXED HYPERLIPIDEMIA: ICD-10-CM

## 2018-12-07 DIAGNOSIS — I45.10 RBBB (RIGHT BUNDLE BRANCH BLOCK): Primary | ICD-10-CM

## 2018-12-07 RX ORDER — ASPIRIN 81 MG/1
81 TABLET ORAL DAILY
COMMUNITY
Start: 2016-03-16 | End: 2021-12-28

## 2018-12-07 NOTE — PROGRESS NOTES
1. Have you been to the ER, urgent care clinic since your last visit? Hospitalized since your last visit? NO.    2. Have you seen or consulted any other health care providers outside of the [facility] since your last visit? Include any pap smears or colon screening. NO.       Chief Complaint   Patient presents with    Annual Exam     PT DENIES ANY CARDIAC SYMPTOMS- CRAMPING ALL OVER FROM STATIN MEDICATION

## 2018-12-07 NOTE — PROGRESS NOTES
[address]  [phone]     Subjective:      Jj Dykes is a 77 y.o. male is here for routine f/u. The patient denies chest pain/ shortness of breath, orthopnea, PND, LE edema, palpitations, syncope, or presyncope. His only complaint currently is myalgias that occur when he takes his statin medication regularly. Therefore he has cut back to intermittent use of his Crestor.     Patient Active Problem List    Diagnosis Date Noted    Prostate cancer ([facility] 06/04/2018    Type 2 diabetes mellitus with diabetic neuropathy ([facility] 06/04/2018    Type 2 diabetes with nephropathy ([facility] 02/28/2018    Reactive depression 11/15/2016    Cervical spinal stenosis 11/03/2016    CKD (chronic kidney disease) 10/31/2016    ACP (advance care planning) 12/11/2015    Diabetes mellitus without complication ([facility] 57/38/5547    Hyperlipidemia 07/10/2015    Spinal stenosis, lumbar region, without neurogenic claudication 10/16/2014    Lumbar disc disease with radiculopathy 10/16/2014    DVT of leg (deep venous thrombosis) ([facility] 12/18/2012    HTN (hypertension) 12/12/2012    Microalbuminuria 12/12/2012    Perforated diverticulum of large intestine 12/04/2012    Alcohol abuse 12/04/2012    Back pain 12/04/2012    Insomnia 12/04/2012    Perforated viscus 11/21/2012      Leni Leahy MD  Past Medical History:   Diagnosis Date    Arthritis     Chronic kidney disease     Stage 3    Chronic pain     Abdoman, back, fingers per daughter   [facility])     DIET CONTROLLED    Hypertension     PE (pulmonary embolism)     Pneumonia     Psychiatric disorder     Depression      Past Surgical History:   Procedure Laterality Date    ABDOMEN SURGERY PROC UNLISTED      bowel resection    HX BACK SURGERY  2014    HX GI  11/2012    bowel resection    HX ORTHOPAEDIC      amputated left 4th finger     Allergies   Allergen Reactions    Arb-Angiotensin Receptor Antagonist Other (comments)     High k      Family History   Problem Relation Age of Onset    Cancer Mother     Anesth Problems Neg Hx       Social History     Socioeconomic History    Marital status:      Spouse name: Not on file    Number of children: Not on file    Years of education: Not on file    Highest education level: Not on file   Social Needs    Financial resource strain: Not on file    Food insecurity - worry: Not on file    Food insecurity - inability: Not on file   Bplats needs - medical: Not on file   Bplats needs - non-medical: Not on file   Occupational History    Not on file   Tobacco Use    Smoking status: Never Smoker    Smokeless tobacco: Current User     Types: Chew   Substance and Sexual Activity    Alcohol use: Yes     Alcohol/week: 4.2 oz     Types: 7 Cans of beer per week     Comment: 7-10 per week    Drug use: No    Sexual activity: Not Currently   Other Topics Concern    Not on file   Social History Narrative    ** Merged History Encounter **           Current Outpatient Medications   Medication Sig    aspirin delayed-release (ASPIR-LOW) 81 mg tablet Take 81 mg by mouth.  FREESTYLE INSULINX strip CHECK BLOOD SUGARS 2-3 TIMES A DAY    gabapentin (NEURONTIN) 300 mg capsule Take 1 Cap by mouth nightly as needed.  LORazepam (ATIVAN) 0.5 mg tablet TAKE ONE TABLET BY MOUTH NIGHTLY AS NEEDED FOR ANXIETY    traZODone (DESYREL) 50 mg tablet TAKE 1 TABLET BY MOUTH EVERY DAY AT BEDTIME (Patient taking differently: TAKE 1 TABLET BY MOUTH EVERY DAY AT BEDTIME0-PRN)    rosuvastatin (CRESTOR) 10 mg tablet TAKE ONE TABLET BY MOUTH NIGHTLY (Patient taking differently: TAKE ONE TABLET BY MOUTH NIGHTLY)    citalopram (CELEXA) 20 mg tablet TAKE ONE TABLET BY MOUTH ONCE DAILY    SITagliptin (JANUVIA) 50 mg tablet Take 1 Tab by mouth daily.  metoprolol succinate (TOPROL-XL) 50 mg XL tablet Take 1 Tab by mouth daily.     FREESTYLE LANCETS 28 gauge misc USE TO CHECK BLOOD SUGAR ONCE DAILY    hydroCHLOROthiazide (HYDRODIURIL) 12.5 mg tablet TAKE ONE TABLET BY MOUTH ONCE DAILY    magnesium 250 mg tab Take  by mouth daily.  cyanocobalamin 1,000 mcg tablet Take 1,000 mcg by mouth daily.  FERROUS FUMARATE (IRON PO) Take  by mouth. No current facility-administered medications for this visit. Review of Symptoms:  11 systems reviewed, negative other than as stated in the HPI    Physical ExamPhysical Exam:    Vitals:    12/07/18 1454 12/07/18 1510   BP: 130/82 136/88   Pulse: 89    Resp: 18    SpO2: 97%    Weight: 195 lb 3.2 oz (88.5 kg)    Height: 5' 5\" (1.651 m)      Body mass index is 32.48 kg/m². General PE   Gen:  NAD  Mental Status - Alert. General Appearance - Not in acute distress. Chest and Lung Exam   Inspection: Accessory muscles - No use of accessory muscles in breathing. Auscultation:   Breath sounds: - Normal.   Cardiovascular   Inspection: Jugular vein - Bilateral - Inspection Normal.   Palpation/Percussion:   Apical Impulse: - Normal.   Auscultation: Rhythm - Regular. Heart Sounds - S1 WNL and S2 WNL. No S3 or S4. Murmurs & Other Heart Sounds: Auscultation of the heart reveals - No Murmurs. Peripheral Vascular   Upper Extremity: Inspection - Bilateral - No Cyanotic nailbeds or Digital clubbing. Lower Extremity:   Palpation: Edema - Bilateral - No edema. Abdomen:   Soft, non-tender, bowel sounds are active.   Neuro: A&O times 3, CN and motor grossly WNL    Labs:   Lab Results   Component Value Date/Time    Cholesterol, total 231 (H) 09/17/2018 09:20 AM    Cholesterol, total 251 (H) 02/28/2018 08:43 AM    Cholesterol, total 182 05/09/2017 01:58 PM    Cholesterol, total 213 (H) 01/17/2017 09:29 AM    Cholesterol, total 199 04/12/2016 01:34 PM    HDL Cholesterol 53 09/17/2018 09:20 AM    HDL Cholesterol 58 02/28/2018 08:43 AM    HDL Cholesterol 77 05/09/2017 01:58 PM    HDL Cholesterol 68 01/17/2017 09:29 AM    HDL Cholesterol 66 04/12/2016 01:34 PM    LDL, calculated 150 (H) 09/17/2018 09:20 AM    LDL, calculated 177 (H) 02/28/2018 08:43 AM    LDL, calculated 94 05/09/2017 01:58 PM    LDL, calculated 128 (H) 01/17/2017 09:29 AM    LDL, calculated 115 (H) 04/12/2016 01:34 PM    Triglyceride 140 09/17/2018 09:20 AM    Triglyceride 78 02/28/2018 08:43 AM    Triglyceride 57 05/09/2017 01:58 PM    Triglyceride 83 01/17/2017 09:29 AM    Triglyceride 89 04/12/2016 01:34 PM     No results found for: CPK, CPKX, CPX  Lab Results   Component Value Date/Time    Sodium 137 09/17/2018 09:20 AM    Potassium 4.3 09/17/2018 09:20 AM    Chloride 102 09/17/2018 09:20 AM    CO2 22 09/17/2018 09:20 AM    Anion gap 6 11/03/2016 04:01 AM    Glucose 89 09/17/2018 09:20 AM    BUN 24 09/17/2018 09:20 AM    Creatinine 1.48 (H) 09/17/2018 09:20 AM    BUN/Creatinine ratio 16 09/17/2018 09:20 AM    GFR est AA 55 (L) 09/17/2018 09:20 AM    GFR est non-AA 47 (L) 09/17/2018 09:20 AM    Calcium 9.3 09/17/2018 09:20 AM    Bilirubin, total 1.0 09/17/2018 09:20 AM    AST (SGOT) 20 09/17/2018 09:20 AM    Alk. phosphatase 44 09/17/2018 09:20 AM    Protein, total 6.8 09/17/2018 09:20 AM    Albumin 4.0 09/17/2018 09:20 AM    Globulin 4.2 (H) 10/26/2016 09:49 AM    A-G Ratio 1.4 09/17/2018 09:20 AM    ALT (SGPT) 15 09/17/2018 09:20 AM       EKG:  NSR, PACs, RBBB     Assessment:        1. RBBB (right bundle branch block)    2. Essential hypertension    3. Diabetes mellitus without complication ([address]    4. PVC's (premature ventricular contractions)    5. Mixed hyperlipidemia        Orders Placed This Encounter    AMB POC EKG ROUTINE W/ 12 LEADS, INTER & REP     Order Specific Question:   Reason for Exam:     Answer:   ROUTINE    aspirin delayed-release (ASPIR-LOW) 81 mg tablet     Sig: Take 81 mg by mouth. Plan:     Patient is a 68-year-old male for routine follow-up. I last saw him in 2017 for preoperative evaluation for prostate biopsy.   Negative ischemia on nuclear stress test 2016, essentially structurally normal heart on echo 2015. Patient is without signs or symptoms of coronary artery disease. Hyperlipidemia/ Myalgias with Crestor use/diabetes as a strong indication for statin use:  Encourage the patient to use coenzyme Q 10 200 mg daily, use Crestor every other day or every third day until he has coenzyme Q in his system for a while. If he is not able to tolerate Crestor and get to goal at follow-up with Dr. Priya Presley, I recommend trying 1-3 more statins. If that fails, would need to prove that LDL is not at goal with Zetia, then apply for PCSK9 therapy such as Repatha or Praluent. Hypertension controlled    Diabetes as per Dr. Bran Landing on diet and exercise- eventual goal of 30-60 minutes 5-7 times a week as per AHA guidelines. He states he already tries to get on a treadmill fairly frequently. Follow up in 1 year, sooner as needed.      Vicky Block MD

## 2018-12-18 ENCOUNTER — HOSPITAL ENCOUNTER (OUTPATIENT)
Dept: LAB | Age: 68
Discharge: HOME | End: 2018-12-18
Payer: MEDICARE

## 2018-12-18 ENCOUNTER — OFFICE VISIT (OUTPATIENT)
Dept: INTERNAL MEDICINE CLINIC | Age: 68
End: 2018-12-18

## 2018-12-18 VITALS
BODY MASS INDEX: 31.49 KG/M2 | TEMPERATURE: 98 F | OXYGEN SATURATION: 97 % | RESPIRATION RATE: 16 BRPM | SYSTOLIC BLOOD PRESSURE: 121 MMHG | DIASTOLIC BLOOD PRESSURE: 69 MMHG | WEIGHT: 189 LBS | HEART RATE: 75 BPM | HEIGHT: 65 IN

## 2018-12-18 DIAGNOSIS — E78.2 MIXED HYPERLIPIDEMIA: ICD-10-CM

## 2018-12-18 DIAGNOSIS — M54.50 CHRONIC BILATERAL LOW BACK PAIN WITHOUT SCIATICA: ICD-10-CM

## 2018-12-18 DIAGNOSIS — G89.29 CHRONIC LOW BACK PAIN, UNSPECIFIED BACK PAIN LATERALITY, WITH SCIATICA PRESENCE UNSPECIFIED: ICD-10-CM

## 2018-12-18 DIAGNOSIS — F32.9 REACTIVE DEPRESSION: ICD-10-CM

## 2018-12-18 DIAGNOSIS — N18.30 STAGE 3 CHRONIC KIDNEY DISEASE (HCC): ICD-10-CM

## 2018-12-18 DIAGNOSIS — M54.5 CHRONIC LOW BACK PAIN, UNSPECIFIED BACK PAIN LATERALITY, WITH SCIATICA PRESENCE UNSPECIFIED: ICD-10-CM

## 2018-12-18 DIAGNOSIS — R06.83 SNORING: ICD-10-CM

## 2018-12-18 DIAGNOSIS — I10 ESSENTIAL HYPERTENSION: ICD-10-CM

## 2018-12-18 DIAGNOSIS — C61 PROSTATE CANCER (HCC): ICD-10-CM

## 2018-12-18 DIAGNOSIS — G89.29 CHRONIC BILATERAL LOW BACK PAIN WITHOUT SCIATICA: ICD-10-CM

## 2018-12-18 DIAGNOSIS — E66.9 OBESITY (BMI 30.0-34.9): ICD-10-CM

## 2018-12-18 DIAGNOSIS — M48.02 CERVICAL SPINAL STENOSIS: ICD-10-CM

## 2018-12-18 DIAGNOSIS — M54.5 LOW BACK PAIN, UNSPECIFIED BACK PAIN LATERALITY, UNSPECIFIED CHRONICITY, WITH SCIATICA PRESENCE UNSPECIFIED: Primary | ICD-10-CM

## 2018-12-18 DIAGNOSIS — E11.40 TYPE 2 DIABETES MELLITUS WITH DIABETIC NEUROPATHY, WITHOUT LONG-TERM CURRENT USE OF INSULIN (HCC): Primary | ICD-10-CM

## 2018-12-18 DIAGNOSIS — F51.01 PRIMARY INSOMNIA: ICD-10-CM

## 2018-12-18 PROCEDURE — 83036 HEMOGLOBIN GLYCOSYLATED A1C: CPT

## 2018-12-18 PROCEDURE — 36415 COLL VENOUS BLD VENIPUNCTURE: CPT

## 2018-12-18 PROCEDURE — 80048 BASIC METABOLIC PNL TOTAL CA: CPT

## 2018-12-18 NOTE — PROGRESS NOTES
HISTORY OF PRESENT ILLNESS  Josafat Valdez is a 77 y.o. male. HPI  Last here 9/17/18. Pt is here to f/u on chronic conditions. Pt presents with his daughter who provides some of his hx.      BP today is 158/74, will repeat this today  Continues on toprol 50mg  Lov, restarted HCTZ 12.5mg daily - reports compliance  Pt just took his meds this AM      BS at home running around 112, 81, 106, 98, 128, 145 in the AM fasting   Discussed preferred range of BS  Continues januvia 50mg daily        Wt today is 189 lbs --down 6 lbs x lov  Congratulated pt on this feat  Pt has been watching what he eats  Discussed diet and w/l       Reviewed labs 9/18      Pt follows annually with Dr. Rayne Bailon (cardio)   Reviewed notes 12/7/18: Patient is a 78-year-old male for routine follow-up.    I last saw him in 2017 for preoperative evaluation for prostate biopsy. Negative ischemia on nuclear stress test 2016, essentially structurally normal heart on echo 2015. Zohaib Storey is without signs or symptoms of coronary artery disease. Hyperlipidemia/ Myalgias with Crestor use/diabetes as a strong indication for statin use:  Encourage the patient to use coenzyme Q 10 200 mg daily, use Crestor every other day or every third day until he has coenzyme Q in his system for a while. If he is not able to tolerate Crestor and get to goal at follow-up with Dr. Sneha Amor, I recommend trying 1-3 more statins. If that fails, would need to prove that LDL is not at goal with Zetia, then apply for PCSK9 therapy such as Repatha or Praluent. Hypertension controlled  Diabetes as per Dr. Teri Dykes on diet and exercise- eventual goal of 30-60 minutes 5-7 times a week as per AHA guidelines. He states he already tries to get on a treadmill fairly frequently.     Follow up in 1 year, sooner as needed.       Pt follows with Dr. Marge Elias (nephro)  Last visit was 3/2/18  He will only follow with me for his kidney dysfunction   Pt has f/u pending but will cancel this if his renal function is stable      Pt follows with Edin (uro)   Reviewed notes 8/31/18: monitoring PSAs  Recall pt has FMHX (brother and uncle) prostate cancer      Continues on crestor 10mg for cholesterol  Pt sometimes takes this medication irregularly however  Pt c/o cramps when he he takes this med  Pt will be trying Co-Q 10 to see if this helps  Advised trying to take crestor as much as possible     Continues gabapentin 2-3 tabs qhs for his tingling/neuropathy pain in his hands, which works well for the most part     Continues celexa 20mg daily for depression/anxiety, which works well  Not feeling sad/down  He also has ativan to use prn (not often) for anxiety      Pt has not been using his trazodone recently, but thinks he might need to restart    Pt c/o some HAs, primarily in his temples and forehead  This does not happen every day  After he rests his sx will subside  Discussed sleep apnea  Pt's daughter thinks he likely snores  Provided information for evaluation     In the past, pt expressed concern about his memory  Previously, provided referral for Dr. Carol Minor (neuropsych)  discused f/u with this    Pt c/o pain in his lower back  This mostly started after shoveling snow and doing some situpts  Reviewed MRI L spine 8/2014: 1. Right paracentral L3-L4 disc herniation/protrusion. 2. Left paracentral L5-S1 disc herniation/protrusion. Ordered L spine XR  Provided exercises to complete at home      Reviewed notes Dr Jake Ceja (ophthal): some cataracts, no diabetic retinopathy, f/u 1 year    ACP not on file. SDM is his daughter (Yareli Day).     Recall sees Dr. David Anton for h/o DVT, no longer on coumadin or xarelto, on ASA only.  Was on coumadin for h/o PE and DVT post operatively      PREVENTIVE:    Colonoscopy: 9/15/15, Dr. Sharon Simms, repeat 5 years  EGD: 9/15/15, Dr. Sharon Simms  PSA: 7/15 3.0, 7/16, 5/17 3.8, , 4/18 5.5  AAA screen: CT 11/12, negative  Tdap: unknown   Pneumovax: 12/04/2012  [id number]: declines  Shingrix: never completed  Flu shot: declines  Foot exam: 18   Microalbumin:   A1c: 10/14 7.1, 7/15 6.3, 9/15 6.4, 12/15 5.9,  6.2, 10/16 5.9,  6.6,  6.3,  6.4,  6.4,  6.6,  6.5,  6.0  Eye exam: Dr. Ct Conde, , no diabetic retinopathy  Hep C Screen: 10/15, negative  Lipids:    EK/17, PACs    Patient Active Problem List    Diagnosis Date Noted    Prostate cancer ([facility] 2018    [facility] neuropathy [facility] 2018    Type 2 diabetes with nephropathy ([facility] 2018    Reactive depression 11/15/2016    Cervical spinal stenosis 2016    CKD (chronic kidney disease) 10/31/2016    ACP (advance care planning) 2015    Diabetes mellitus without complication [facility]     Hyperlipidemia 07/10/2015    Spinal stenosis, lumbar region, without neurogenic claudication 10/16/2014    Lumbar disc disease with radiculopathy 10/16/2014    DVT of leg (deep venous thrombosis) ([facility] 2012    HTN (hypertension) 2012    Microalbuminuria 2012    Perforated diverticulum of large intestine 2012    Alcohol abuse 2012    Back pain 2012    Insomnia 2012    Perforated viscus 2012     Current Outpatient Medications   Medication Sig Dispense Refill    aspirin delayed-release (ASPIR-LOW) 81 mg tablet Take 81 mg by mouth.  FREESTYLE INSULINX strip CHECK BLOOD SUGARS 2-3 TIMES A  Strip 0    gabapentin (NEURONTIN) 300 mg capsule Take 1 Cap by mouth nightly as needed.  180 Cap 1    LORazepam (ATIVAN) 0.5 mg tablet TAKE ONE TABLET BY MOUTH NIGHTLY AS NEEDED FOR ANXIETY 20 Tab 0    traZODone (DESYREL) 50 mg tablet TAKE 1 TABLET BY MOUTH EVERY DAY AT BEDTIME (Patient taking differently: TAKE 1 TABLET BY MOUTH EVERY DAY AT BEDTIME0-PRN) 30 Tab 0    rosuvastatin (CRESTOR) 10 mg tablet TAKE ONE TABLET BY MOUTH NIGHTLY (Patient taking differently: TAKE ONE TABLET BY MOUTH NIGHTLY) 90 Tab 2    citalopram (CELEXA) 20 mg tablet TAKE ONE TABLET BY MOUTH ONCE DAILY 30 Tab 3    SITagliptin (JANUVIA) 50 mg tablet Take 1 Tab by mouth daily. 90 Tab 1    metoprolol succinate (TOPROL-XL) 50 mg XL tablet Take 1 Tab by mouth daily. 90 Tab 1    FREESTYLE LANCETS 28 gauge misc USE TO CHECK BLOOD SUGAR ONCE DAILY 100 Lancet 12    hydroCHLOROthiazide (HYDRODIURIL) 12.5 mg tablet TAKE ONE TABLET BY MOUTH ONCE DAILY 30 Tab 0    cyanocobalamin 1,000 mcg tablet Take 1,000 mcg by mouth daily.  FERROUS FUMARATE (IRON PO) Take  by mouth.  magnesium 250 mg tab Take  by mouth daily.        Past Surgical History:   Procedure Laterality Date    ABDOMEN SURGERY PROC UNLISTED      bowel resection    HX BACK SURGERY  2014    HX GI  11/2012    bowel resection    HX ORTHOPAEDIC      amputated left 4th finger      Lab Results   Component Value Date/Time    WBC 6.2 08/08/2017 09:22 AM    HGB 13.2 08/08/2017 09:22 AM    Hemoglobin (POC) 13.3 11/02/2016 07:15 AM    HCT 40.6 08/08/2017 09:22 AM    Hematocrit (POC) 39 11/02/2016 07:15 AM    PLATELET 798 41/59/6025 09:22 AM    MCV 82 08/08/2017 09:22 AM     Lab Results   Component Value Date/Time    Cholesterol, total 231 (H) 09/17/2018 09:20 AM    Cholesterol (POC) 227 02/06/2015 03:30 PM    HDL Cholesterol 53 09/17/2018 09:20 AM    LDL, calculated 150 (H) 09/17/2018 09:20 AM    LDL Cholesterol (POC) 164 02/06/2015 03:30 PM    Triglyceride 140 09/17/2018 09:20 AM    Triglycerides (POC) 68 02/06/2015 03:30 PM     Lab Results   Component Value Date/Time    GFR est non-AA 47 (L) 09/17/2018 09:20 AM    GFRNA, POC 50 (L) 11/02/2016 07:15 AM    GFR est AA 55 (L) 09/17/2018 09:20 AM    GFRAA, POC >60 11/02/2016 07:15 AM    Creatinine 1.48 (H) 09/17/2018 09:20 AM    Creatinine (POC) 1.4 (H) 11/02/2016 07:15 AM    BUN 24 09/17/2018 09:20 AM    BUN (POC) 28 (H) 11/02/2016 07:15 AM    Sodium 137 09/17/2018 09:20 AM    Sodium (POC) 140 11/02/2016 07:15 AM    Potassium 4.3 09/17/2018 09:20 AM    Potassium (POC) 4.1 11/02/2016 07:15 AM    Chloride 102 09/17/2018 09:20 AM    Chloride (POC) 105 11/02/2016 07:15 AM    CO2 22 09/17/2018 09:20 AM    Magnesium 2.2 06/04/2018 08:51 AM    Phosphorus 3.0 10/17/2014 04:40 AM    Albumin, urine 59.5 10/23/2015 09:10 AM        Review of Systems   Respiratory: Negative for shortness of breath. Cardiovascular: Negative for chest pain. Musculoskeletal: Positive for back pain. Neurological: Positive for headaches. Physical Exam   Constitutional: He is oriented to person, place, and time. He appears well-developed and well-nourished. No distress. HENT:   Head: Normocephalic and atraumatic. Mouth/Throat: Oropharynx is clear and moist. No oropharyngeal exudate. Eyes: Conjunctivae and EOM are normal. Right eye exhibits no discharge. Left eye exhibits no discharge. Neck: Normal range of motion. Neck supple. Cardiovascular: Normal rate, regular rhythm and normal heart sounds. Exam reveals no gallop and no friction rub. No murmur heard. Pulmonary/Chest: Effort normal and breath sounds normal. No respiratory distress. He has no wheezes. He has no rales. He exhibits no tenderness. Musculoskeletal: He exhibits no edema, tenderness (No TTP over lower back) or deformity. 5/5 strength BLE   Lymphadenopathy:     He has no cervical adenopathy. Neurological: He is alert and oriented to person, place, and time. He has normal reflexes. Coordination abnormal.   Skin: Skin is warm and dry. No rash noted. He is not diaphoretic. No erythema. No pallor. Psychiatric: He has a normal mood and affect. His behavior is normal.       ASSESSMENT and PLAN    ICD-10-CM ICD-9-CM    1. Prostate cancer [facility])    Following with Dr Tristin Monae, currently under active surveillance   C61 185    2.  Type 2 diabetes mellitus with diabetic neuropathy, without long-term current use of insulin (HCC)    Adequately controlled on januvia 50mg daily, check a1c today   E11.40 250.60      357.2    3. Stage 3 chronic kidney disease (HCC)    Cr runs ~1.4-1.6 baseline, was following with Dr Virginia Bourne but since kidney function has been stable will only go back for progressive sx   N18.3 585.3    4. Cervical spinal stenosis    Uses gabapentin 2-3 tabs qhs to improve tingling in his hands, has seen Dr Diamante Cortez for this in the past   M48.02 723.0    5. Reactive depression    Pt has persistent mild depression, overall improved with celexa 20mg daily, has ativan to use several times per week to help with acute anxiety   F32.9 300.4    6. Essential hypertension    Initial BP elevated, repeat was improved, he reports taking his two meds this AM but he just took them, BP was better at Dr Jaspreet Salazar office, for now continue current regimen   I10 401.9    7. Mixed hyperlipidemia    Not at goal d/t noncompliance with crestor, encouraged compliance   E78.2 272.2    8. Obesity (BMI 30.0-34. 9)    Counseled on diet and w/l, [employer]   E66.9 278.00     9. Low back pain - check plain film, provided exercises to complete at home  11. Snoring - provided referral for sleep evaluation  12. Insomnia - pt can restart trazadone prn    Scribed by Henrique Barksdale, as dictated by Dr. Marnie Merida. Current diagnosis and concerns discussed with pt at length. Pt understands risks and benefits or current treatment plan and medications, and accepts the treatment and medication with any possible risks. Pt asks appropriate questions, which were answered. Pt was instructed to call with any concerns or problems. I have reviewed the note documented by the scribe. The services provided are my own.   The documentation is accurate

## 2018-12-19 DIAGNOSIS — E11.9 DIABETES MELLITUS WITHOUT COMPLICATION (HCC): Primary | ICD-10-CM

## 2018-12-19 LAB
BUN SERPL-MCNC: 36 MG/DL (ref 8–27)
BUN/CREAT SERPL: 21 (ref 10–24)
CALCIUM SERPL-MCNC: 9.4 MG/DL (ref 8.6–10.2)
CHLORIDE SERPL-SCNC: 101 MMOL/L (ref 96–106)
CO2 SERPL-SCNC: 21 MMOL/L (ref 20–29)
CREAT SERPL-MCNC: 1.75 MG/DL (ref 0.76–1.27)
EST. AVERAGE GLUCOSE BLD GHB EST-MCNC: 128 MG/DL
GLUCOSE SERPL-MCNC: 83 MG/DL (ref 65–99)
HBA1C MFR BLD: 6.1 % (ref 4.8–5.6)
POTASSIUM SERPL-SCNC: 4.4 MMOL/L (ref 3.5–5.2)
SODIUM SERPL-SCNC: 139 MMOL/L (ref 134–144)

## 2018-12-19 NOTE — PROGRESS NOTES
Called, spoke to [facility] [place] (HIPAA). Two pt identifiers confirmed. Yareli informed per Dr. Yoni Shaikh Mild bump in cr and to repeat bmp in 2-3 weeks to ensure stable   Labs ordered and mailed to pt. Andressa Jurado was informed other labs was fine. Andressa Jurado verbalized understanding of information discussed w/ no further questions at this time.

## 2018-12-31 ENCOUNTER — LAB ONLY (OUTPATIENT)
Dept: INTERNAL MEDICINE CLINIC | Age: 68
End: 2018-12-31

## 2018-12-31 ENCOUNTER — HOSPITAL ENCOUNTER (OUTPATIENT)
Dept: LAB | Age: 68
Discharge: HOME | End: 2018-12-31
Payer: MEDICARE

## 2018-12-31 DIAGNOSIS — E11.9 DIABETES MELLITUS WITHOUT COMPLICATION (HCC): ICD-10-CM

## 2018-12-31 PROCEDURE — 80048 BASIC METABOLIC PNL TOTAL CA: CPT

## 2018-12-31 PROCEDURE — 36415 COLL VENOUS BLD VENIPUNCTURE: CPT

## 2019-01-01 LAB
BUN SERPL-MCNC: 25 MG/DL (ref 8–27)
BUN/CREAT SERPL: 16 (ref 10–24)
CALCIUM SERPL-MCNC: 9.8 MG/DL (ref 8.6–10.2)
CHLORIDE SERPL-SCNC: 104 MMOL/L (ref 96–106)
CO2 SERPL-SCNC: 21 MMOL/L (ref 20–29)
CREAT SERPL-MCNC: 1.53 MG/DL (ref 0.76–1.27)
GLUCOSE SERPL-MCNC: 115 MG/DL (ref 65–99)
POTASSIUM SERPL-SCNC: 4.3 MMOL/L (ref 3.5–5.2)
SODIUM SERPL-SCNC: 141 MMOL/L (ref 134–144)

## 2019-01-18 ENCOUNTER — TELEPHONE (OUTPATIENT)
Dept: INTERNAL MEDICINE CLINIC | Age: 69
End: 2019-01-18

## 2019-01-18 NOTE — TELEPHONE ENCOUNTER
Care Coordinator called to invite Dr Delmi Xavier to a care team meeting on next Friday the 25th at 1pm. Please call to confirm.

## 2019-01-20 RX ORDER — SITAGLIPTIN 50 MG/1
TABLET, FILM COATED ORAL
Qty: 90 TAB | Refills: 1 | Status: SHIPPED | OUTPATIENT
Start: 2019-01-20 | End: 2019-06-19 | Stop reason: SDUPTHER

## 2019-01-22 NOTE — TELEPHONE ENCOUNTER
Spoke to Rissa Desir to inform per Dr. [facility] [place] is not able to attend the meeting.

## 2019-03-18 ENCOUNTER — DOCUMENTATION ONLY (OUTPATIENT)
Dept: INTERNAL MEDICINE CLINIC | Age: 69
End: 2019-03-18

## 2019-03-19 ENCOUNTER — OFFICE VISIT (OUTPATIENT)
Dept: INTERNAL MEDICINE CLINIC | Age: 69
End: 2019-03-19

## 2019-03-19 ENCOUNTER — HOSPITAL ENCOUNTER (OUTPATIENT)
Dept: LAB | Age: 69
Discharge: HOME | End: 2019-03-19
Payer: MEDICARE

## 2019-03-19 VITALS
HEART RATE: 67 BPM | RESPIRATION RATE: 16 BRPM | OXYGEN SATURATION: 97 % | HEIGHT: 65 IN | WEIGHT: 186 LBS | SYSTOLIC BLOOD PRESSURE: 129 MMHG | BODY MASS INDEX: 30.99 KG/M2 | TEMPERATURE: 98.1 F | DIASTOLIC BLOOD PRESSURE: 76 MMHG

## 2019-03-19 DIAGNOSIS — F51.01 PRIMARY INSOMNIA: ICD-10-CM

## 2019-03-19 DIAGNOSIS — H91.93 BILATERAL HEARING LOSS, UNSPECIFIED HEARING LOSS TYPE: ICD-10-CM

## 2019-03-19 DIAGNOSIS — E66.9 OBESITY (BMI 30.0-34.9): ICD-10-CM

## 2019-03-19 DIAGNOSIS — F32.9 REACTIVE DEPRESSION: ICD-10-CM

## 2019-03-19 DIAGNOSIS — Z00.00 MEDICARE ANNUAL WELLNESS VISIT, SUBSEQUENT: ICD-10-CM

## 2019-03-19 DIAGNOSIS — I82.5Y3 CHRONIC DEEP VEIN THROMBOSIS (DVT) OF PROXIMAL VEIN OF BOTH LOWER EXTREMITIES (HCC): ICD-10-CM

## 2019-03-19 DIAGNOSIS — I45.10 RBBB: ICD-10-CM

## 2019-03-19 DIAGNOSIS — E78.2 MIXED HYPERLIPIDEMIA: ICD-10-CM

## 2019-03-19 DIAGNOSIS — C61 PROSTATE CANCER (HCC): ICD-10-CM

## 2019-03-19 DIAGNOSIS — M48.061 SPINAL STENOSIS, LUMBAR REGION, WITHOUT NEUROGENIC CLAUDICATION: ICD-10-CM

## 2019-03-19 DIAGNOSIS — N18.30 STAGE 3 CHRONIC KIDNEY DISEASE (HCC): ICD-10-CM

## 2019-03-19 DIAGNOSIS — I10 ESSENTIAL HYPERTENSION: ICD-10-CM

## 2019-03-19 DIAGNOSIS — E11.40 TYPE 2 DIABETES MELLITUS WITH DIABETIC NEUROPATHY, WITHOUT LONG-TERM CURRENT USE OF INSULIN (HCC): Primary | ICD-10-CM

## 2019-03-19 PROCEDURE — 85025 COMPLETE CBC W/AUTO DIFF WBC: CPT

## 2019-03-19 PROCEDURE — 80053 COMPREHEN METABOLIC PANEL: CPT

## 2019-03-19 PROCEDURE — 36415 COLL VENOUS BLD VENIPUNCTURE: CPT

## 2019-03-19 PROCEDURE — 83036 HEMOGLOBIN GLYCOSYLATED A1C: CPT

## 2019-03-19 RX ORDER — GABAPENTIN 300 MG/1
300 CAPSULE ORAL
Qty: 180 CAP | Refills: 1 | Status: SHIPPED | OUTPATIENT
Start: 2019-03-19 | End: 2019-08-21 | Stop reason: SDUPTHER

## 2019-03-19 NOTE — PROGRESS NOTES
This is the Subsequent Medicare Annual Wellness Exam, performed 12 months or more after the Initial AWV or the last Subsequent AWV    I have reviewed the patient's medical history in detail and updated the computerized patient record. History     Past Medical History:   Diagnosis Date    Arthritis     Chronic kidney disease     Stage 3    Chronic pain     Abdoman, back, fingers per daughter   [facility])     DIET CONTROLLED    Hypertension     PE (pulmonary embolism)     Pneumonia     Psychiatric disorder     Depression      Past Surgical History:   Procedure Laterality Date    ABDOMEN SURGERY PROC UNLISTED      bowel resection    HX BACK SURGERY  2014    HX GI  11/2012    bowel resection    HX ORTHOPAEDIC      amputated left 4th finger     Current Outpatient Medications   Medication Sig Dispense Refill    vitamin E acetate (VITAMIN E PO) Take  by mouth daily.  cholecalciferol, vitamin D3, (VITAMIN D3 PO) Take  by mouth daily.  ascorbate calcium (VITAMIN C PO) Take  by mouth daily.  BEE POLLEN PO Take  by mouth daily.  gabapentin (NEURONTIN) 300 mg capsule Take 1 Cap by mouth nightly as needed for Pain. 180 Cap 1    varicella-zoster recombinant, PF, (SHINGRIX, PF,) 50 mcg/0.5 mL susr injection 0.5mL by IntraMUSCular route once now and then repeat in 2-6 months 0.5 mL 1    JANUVIA 50 mg tablet TAKE 1 TABLET BY MOUTH ONCE DAILY 90 Tab 1    aspirin delayed-release (ASPIR-LOW) 81 mg tablet Take 81 mg by mouth.  FREESTYLE INSULINX strip CHECK BLOOD SUGARS 2-3 TIMES A  Strip 0    citalopram (CELEXA) 20 mg tablet TAKE ONE TABLET BY MOUTH ONCE DAILY 30 Tab 3    metoprolol succinate (TOPROL-XL) 50 mg XL tablet Take 1 Tab by mouth daily.  90 Tab 1    FREESTYLE LANCETS 28 gauge misc USE TO CHECK BLOOD SUGAR ONCE DAILY 100 Lancet 12    hydroCHLOROthiazide (HYDRODIURIL) 12.5 mg tablet TAKE ONE TABLET BY MOUTH ONCE DAILY 30 Tab 0    magnesium 250 mg tab Take  by mouth daily.      LORazepam (ATIVAN) 0.5 mg tablet TAKE ONE TABLET BY MOUTH NIGHTLY AS NEEDED FOR ANXIETY 20 Tab 0    traZODone (DESYREL) 50 mg tablet TAKE 1 TABLET BY MOUTH EVERY DAY AT BEDTIME (Patient taking differently: TAKE 1 TABLET BY MOUTH EVERY DAY AT BEDTIME0-PRN) 30 Tab 0    rosuvastatin (CRESTOR) 10 mg tablet TAKE ONE TABLET BY MOUTH NIGHTLY (Patient taking differently: TAKE ONE TABLET BY MOUTH NIGHTLY) 90 Tab 2    cyanocobalamin 1,000 mcg tablet Take 1,000 mcg by mouth daily.  FERROUS FUMARATE (IRON PO) Take  by mouth. 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) I82.409    Spinal stenosis, lumbar region, without neurogenic claudication M48.061    Lumbar disc disease with radiculopathy M51.16    Hyperlipidemia E78.5    ACP (advance care planning) Z71.89    Diabetes mellitus without complication ([facility] E47.3    CKD (chronic kidney disease) N18.9    Cervical spinal stenosis M48.02    Reactive depression F32.9    Type 2 diabetes with nephropathy (HCC) E11.21    Prostate cancer ([facility] C61    Type 2 diabetes mellitus with diabetic neuropathy ([facility]) E11.40       Depression Risk Factor Screening:     3 most recent PHQ Screens 3/19/2019   PHQ Not Done -   Little interest or pleasure in doing things Nearly every day   Feeling down, depressed, irritable, or hopeless Nearly every day   Total Score PHQ 2 6   discussed he is happy on celexa, not interested in med change  Alcohol Risk Factor Screening: You do not drink alcohol or very rarely. Drinks 4 beers at a time perhaps once per week   Functional Ability and Level of Safety:   Hearing Loss  The patient needs further evaluation. --referred    Activities of Daily Living  The home contains: no safety equipment. Patient needs help with:  transportation, shopping, preparing meals, housework and managing medications    Fall Risk  Fall Risk Assessment, last 12 mths 3/19/2019   Able to walk? Yes   Fall in past 12 months? No   Fall with injury? -   Number of falls in past 12 months -   Fall Risk Score -       Abuse Screen  Patient is not abused  Lives alone , daughter helps  Cognitive Screening   Evaluation of Cognitive Function:  Has your family/caregiver stated any concerns about your memory: yes  Normal  Sent him to [facility] neuropsych  Patient Care Team   Patient Care Team:  Roque Whitfield MD as PCP - General (Internal Medicine)  Loren Corley MD as Physician (Cardiology)  Yeni Forbes MD (Nephrology)  Lakesha Watt MD (Neurosurgery)  Angelica Garcia MD (Ophthalmology)  Yudelka Varma MD (Otolaryngology)  Tommie Palma MD (Urology)  Pauline Barrera MD (Ophthalmology)   updated    Assessment/Plan   Education and counseling provided:  Are appropriate based on today's review and evaluation  End-of-Life planning (with patient's consent)  Pneumococcal Vaccine  Influenza Vaccine  Cardiovascular screening blood test  Screening for glaucoma  Diabetes screening test    Diagnoses and all orders for this visit:    1. Medicare annual wellness visit, subsequent    2. [facility] ([facility]    3. Type 2 diabetes mellitus with diabetic neuropathy, without long-term current use of insulin ([facility]    4.  Chronic deep vein thrombosis (DVT) of proximal vein of both lower extremities ([facility]    Other orders  -     gabapentin (NEURONTIN) 300 mg capsule; Take 1 Cap by mouth nightly as needed for Pain.  -     varicella-zoster recombinant, PF, (SHINGRIX, PF,) 50 mcg/0.5 mL susr injection; 0.5mL by IntraMUSCular route once now and then repeat in 2-6 months        Health Maintenance Due   Topic Date Due    DTaP/Tdap/Td series (1 - Tdap) 1969    Shingrix Vaccine Age 50> (1 of 2) 1998    Pneumococcal 65+ Low/Medium Risk (2 of 2 - PPSV23) 2017    MEDICARE YEARLY EXAM  2019       Discussed with patient about advance medical directive. Provided patient blank AMD and Your Right to Decide Booklet. Requested that if completed to provide a copy of AMD to office. ACP not on file. SDM is his daughter (Yareli [address] [place] [address]). Provided information today.        Colonoscopy: 9/15/15, Dr. Melody Baumgarten, repeat 5 years    PSA:   5.5 due  AAA screen: CT , negative    Tdap: unknown   Pneumovax: 2012  [id number]: declines  Shingrix: never completed  Flu shot: declines    Eye exam: Dr. Renna Holter, , no diabetic retinopathy annual     A1c:   6.1 due now  Hep C Screen: 10/15, negative  Lipids:   annual     EK/17, PACs    Medication reconciliation completed by MA and reviewed by me. Medical/surgical/social/family history reviewed and updated by me. Patient provided AVS and preventative screening table. Patient verbalized understanding of all information discussed.

## 2019-03-19 NOTE — PATIENT INSTRUCTIONS
Medicare Part B Preventive Services  Limitations  Recommendation/Date completed if known  Scheduled/ Next Due    Bone Mass Measurement  (age 72 & older, biennial)  Requires diagnosis related to osteoporosis or estrogen deficiency. Biennial benefit unless patient has history of long-term glucocorticoid tx or baseline is needed because initial test was by other method  N/A N/A   Cardiovascular Screening Blood Tests (every 5 years)  Total cholesterol, HDL, Triglycerides  Order as a panel if possible  Completed 9/2018      Recommended annually  Due 9/2019   Colorectal Cancer Screening  -Fecal occult blood test (annual)  -Flexible sigmoidoscopy (5y)  -Screening colonoscopy (10y)  -Barium Enema     Completed 9/15/15 with Dr. Mauro Storey  Recommended in 5 years  Due 9/2020    Counseling to Prevent Tobacco Use (up to 8 sessions per year)  - Counseling greater than 3 and up to 10 minutes  - Counseling greater than 10 minutes  Patients must be asymptomatic of tobacco-related conditions to receive as preventive service  Former smoker Keep up the good work! Diabetes Screening Tests (at least every 3 years, Medicare covers annually or at 6-month intervals for prediabetic patients)      Fasting blood sugar (FBS) or glucose tolerance test (GTT)     Patient must be diagnosed with one of the following:  -Hypertension, Dyslipidemia, obesity, previous impaired FBS or GTT  Or any two of the following: overweight, FH of diabetes, age ? 72, history of gestational diabetes, birth of baby weighing more than 9 pounds  Completed 12/2018 with A1C 6.1      Recommended every 3-6 months Due now - 6/2019   Diabetes Self-Management Training (DSMT) (no USPSTF recommendation)  Requires referral by treating physician for patient with diabetes or renal disease. 10 hours of initial DSMT session of no less than 30 minutes each in a continuous 12-month period. 2 hours of follow-up DSMT in subsequent years.   N/A N/A   Glaucoma Screening (no USPSTF recommendation)  Diabetes mellitus, family history, , age 48 or over,  American, age 72 or over  Completed 9/2018       Recommended annually  Due 9/2019   Human Immunodeficiency Virus (HIV) Screening (annually for increased risk patients)  HIV-1 and HIV-2 by EIA, RUKHSANA, rapid antibody test, or oral mucosa transudate  Patient must be at increased risk for HIV infection per USPSTF guidelines or pregnant. Tests covered annually for patients at increased risk. Pregnant patients may receive up to 3 test during pregnancy. N/A N/A   Medical Nutrition Therapy (MNT) (for diabetes or renal disease not recommended schedule)  Requires referral by treating physician for patient with diabetes or renal disease. Can be provided in same year as diabetes self-management training (DSMT), and CMS recommends medical nutrition therapy take place after DSMT. Up to 3 hours for initial year and 2 hours in subsequent years. N/A N/A   Prostate Cancer Screening (annually up to age 76)  - Digital rectal exam (NORA)  - Prostate specific antigen (PSA)  Annually (age 48 or over), NORA not paid separately when covered E/M service is provided on same date  Completed 4/2018      Recommended annually  Due per Dr Gama Clemente Vaccination (annually)     Never completed      Recommended annually Declines   Pneumococcal Vaccination (once after 72)     Pneumovax - 12/2012     Prevnar 13 - never completed     Both recommended once over the age of 72 Completed        Declines             Hepatitis B Vaccinations (if medium/high risk)  Medium/high risk factors: End-stage renal disease,  Hemophiliacs who received Factor VIII or IX concentrates, Clients of institutions for the mentally retarded, Persons who live in the same house as a HepB virus carrier, Homosexual men, Illicit injectable drug abusers.   N/A N/A   Ultrasound Screening for Abdominal Aortic Aneurysm (AAA) (once)  Patient must be referred through [facility] and not have had a screening for abdominal aortic aneurysm before under Medicare. Limited to patients who meet one of the following criteria:  - Men who are 73-68 years old and have smoked more than 100 cigarettes in their lifetime.  -Anyone with a FH of AAA  -Anyone recommended for screening by USPSTF  US kidneys 5/2016 - negative Completed         Medicare Wellness Visit, Male    The best way to live healthy is to have a lifestyle where you eat a well-balanced diet, exercise regularly, limit alcohol use, and quit all forms of tobacco/nicotine, if applicable. Regular preventive services are another way to keep healthy. Preventive services (vaccines, screening tests, monitoring & exams) can help personalize your care plan, which helps you manage your own care. Screening tests can find health problems at the earliest stages, when they are easiest to treat. [facility] follows the current, evidence-based guidelines published by the [facility] N[facility]stor Yates ([facility]) when recommending preventive services for our patients. Because we follow these guidelines, sometimes recommendations change over time as research supports it. (For example, a prostate screening blood test is no longer routinely recommended for men with no symptoms.)  Of course, you and your doctor may decide to screen more often for some diseases, based on your risk and co-morbidities (chronic disease you are already diagnosed with). Preventive services for you include:  - Medicare offers their members a free annual wellness visit, which is time for you and your primary care provider to discuss and plan for your preventive service needs. Take advantage of this benefit every year!  -All adults over age 72 should receive the recommended pneumonia vaccines. Current USPSTF guidelines recommend a series of two vaccines for the best pneumonia protection.   -All adults should have a flu vaccine yearly and an ECG.  All adults age 61 and older should receive a shingles vaccine once in their lifetime.    -All adults age 38-68 who are overweight should have a diabetes screening test once every three years.   -Other screening tests & preventive services for persons with diabetes include: an eye exam to screen for diabetic retinopathy, a kidney function test, a foot exam, and stricter control over your cholesterol.   -Cardiovascular screening for adults with routine risk involves an electrocardiogram (ECG) at intervals determined by the provider.   -Colorectal cancer screening should be done for adults age 54-65 with no increased risk factors for colorectal cancer. There are a number of acceptable methods of screening for this type of cancer. Each test has its own benefits and drawbacks. Discuss with your provider what is most appropriate for you during your annual wellness visit. The different tests include: colonoscopy (considered the best screening method), a fecal occult blood test, a fecal DNA test, and sigmoidoscopy.  -All adults born between [facility] should be screened once for Hepatitis C.  -An Abdominal Aortic Aneurysm (AAA) Screening is recommended for men age 73-68 who has ever smoked in their lifetime.      Here is a list of your current Health Maintenance items (your personalized list of preventive services) with a due date:  Health Maintenance Due   Topic Date Due    DTaP/Tdap/Td  (1 - Tdap) [birth year]/1969    Shingles Vaccine (1 of 2) 07/05/1998    Pneumococcal Vaccine (2 of 2 - PPSV23) 12/04/2017    Annual Well Visit  03/01/2019       TRY HALF TABLET CRESTOR AT A TIME 2 TIMES PER WEEK

## 2019-03-19 NOTE — PROGRESS NOTES
HISTORY OF PRESENT ILLNESS  Molli Hashimoto is a 77 y.o. male. HPI  Last here 12/18/18. Pt is here to f/u on chronic conditions. Pt presents with his daughter who provides some of his hx.      BP today is 129/76  Continues on toprol 50mg and HCTZ 12.5mg daily  Pt just took his meds this AM       BS at home running around 106 (this AM), 89, 99, 116 in the AM fasting   Continues januvia 50mg daily        Wt today is 186 lbs --down 3 lbs x lov  He has been decreasing his alcohol intake and decreasing portions  Discussed goal wt of 175 lbs  Discussed diet and w/l       Reviewed labs 12/18      Pt follows annually with Dr. Martha Amaya (cardio)   Last visit was 12/7/18      Pt follows with Dr. Tee Magana (nephro)  Last visit was 3/2/18  He will only follow with me for his kidney dysfunction       Pt follows with Jessica Padilla (uro)   Last visit was 8/31/18  His daughter reports pt having a bx that showed some progression and will likely need xrt soon  Pt also had an MRI, and will be getting another one in 4/19  They will also be meeting with a radiologist to discuss tx options  Next visit scheduled for 4/19  Recall pt has Marianna Hernandezqueper 80 (brother and uncle) prostate cancer    Pt has appointment with Dr Flower Lester (sleep) scheduled      Continues on crestor 10mg for cholesterol infrequently d/t cramps  Advised taking a half tab, two times per week  Pt has been taking CoQ-10     Continues gabapentin 2-3 tabs qhs for his tingling/neuropathy pain in his hands, which works well for the most part     Continues celexa 20mg daily for depression/anxiety, which works well  Pt is happy with this dose  He also has ativan to use prn (not often, not in the past month) for anxiety      Pt uses trazadone prn for sleep    Reviewed XR L spine 12/18: Postsurgical and degenerative changes as above.     In the past, pt expressed concern about his memory  Previously, provided referral for Dr. Tristin Knight (neuropsych)  Discused pursuing this    Pt states that he is not drinking beer all the time  He drinks 4-5 beers at a time, sometimes once a week, sometimes not at all in a week    His daughter takes care of driving, some housework, helps with finances  Pt does housework and takes care of himself     He reports having a hearing test years ago and had decreased hearing in the L  Provided info to repeat this    Lives by himself    ACP not on file. SDM is his daughter (Yareli Reveles [address]). Provided information today.      Recall sees Dr. Myriam Mata for h/o DVT, no longer on coumadin or xarelto, on ASA only.  Was on coumadin for h/o PE and DVT post operatively      PREVENTIVE:    Colonoscopy: 9/15/15, Dr. Temple Hodgkins, repeat 5 years  EGD: 9/15/15, Dr. Temple Hodgkins  PSA: 7/15 3.0, ,  3.8, ,  5.5  AAA screen: CT , negative  Tdap: unknown   Pneumovax: 2012  [id number]: declines  Shingrix: never completed  Flu shot: declines  Foot exam: 18   Microalbumin:   A1c: 12/15 5.9,  6.2, 10/16 5.9,  6.6,  6.3,  6.4,  6.4,  6.6,  6.5,  6.0,  6.1  Eye exam: Dr. Fozia Wong, , no diabetic retinopathy  Hep C Screen: 10/15, negative  Lipids:    EK/17, PACs    Patient Active Problem List    Diagnosis Date Noted    Prostate cancer ([address] 2018    Type 2 diabetes mellitus with diabetic neuropathy ([address] 2018    Type 2 diabetes with nephropathy ([address] 2018    Reactive depression 11/15/2016    Cervical spinal stenosis 2016    CKD (chronic kidney disease) 10/31/2016    ACP (advance care planning) 2015    Diabetes mellitus without complication ([address]     Hyperlipidemia 07/10/2015    Spinal stenosis, lumbar region, without neurogenic claudication 10/16/2014    Lumbar disc disease with radiculopathy 10/16/2014    DVT of leg (deep venous thrombosis) [facility] 2012    HTN (hypertension) 2012    Microalbuminuria 2012    Perforated diverticulum of large intestine 12/04/2012    Alcohol abuse 12/04/2012    Back pain 12/04/2012    Insomnia 12/04/2012    Perforated viscus 11/21/2012     Current Outpatient Medications   Medication Sig Dispense Refill    JANUVIA 50 mg tablet TAKE 1 TABLET BY MOUTH ONCE DAILY 90 Tab 1    aspirin delayed-release (ASPIR-LOW) 81 mg tablet Take 81 mg by mouth.  FREESTYLE INSULINX strip CHECK BLOOD SUGARS 2-3 TIMES A  Strip 0    gabapentin (NEURONTIN) 300 mg capsule Take 1 Cap by mouth nightly as needed. 180 Cap 1    LORazepam (ATIVAN) 0.5 mg tablet TAKE ONE TABLET BY MOUTH NIGHTLY AS NEEDED FOR ANXIETY 20 Tab 0    traZODone (DESYREL) 50 mg tablet TAKE 1 TABLET BY MOUTH EVERY DAY AT BEDTIME (Patient taking differently: TAKE 1 TABLET BY MOUTH EVERY DAY AT BEDTIME0-PRN) 30 Tab 0    rosuvastatin (CRESTOR) 10 mg tablet TAKE ONE TABLET BY MOUTH NIGHTLY (Patient taking differently: TAKE ONE TABLET BY MOUTH NIGHTLY) 90 Tab 2    citalopram (CELEXA) 20 mg tablet TAKE ONE TABLET BY MOUTH ONCE DAILY 30 Tab 3    metoprolol succinate (TOPROL-XL) 50 mg XL tablet Take 1 Tab by mouth daily. 90 Tab 1    FREESTYLE LANCETS 28 gauge misc USE TO CHECK BLOOD SUGAR ONCE DAILY 100 Lancet 12    hydroCHLOROthiazide (HYDRODIURIL) 12.5 mg tablet TAKE ONE TABLET BY MOUTH ONCE DAILY 30 Tab 0    cyanocobalamin 1,000 mcg tablet Take 1,000 mcg by mouth daily.  FERROUS FUMARATE (IRON PO) Take  by mouth.  magnesium 250 mg tab Take  by mouth daily.        Past Surgical History:   Procedure Laterality Date    ABDOMEN SURGERY PROC UNLISTED      bowel resection    HX BACK SURGERY  2014    HX GI  11/2012    bowel resection    HX ORTHOPAEDIC      amputated left 4th finger      Lab Results   Component Value Date/Time    WBC 6.2 08/08/2017 09:22 AM    HGB 13.2 08/08/2017 09:22 AM    Hemoglobin (POC) 13.3 11/02/2016 07:15 AM    HCT 40.6 08/08/2017 09:22 AM    Hematocrit (POC) 39 11/02/2016 07:15 AM    PLATELET 272 41/94/0934 09:22 AM    MCV 82 08/08/2017 09:22 AM     Lab Results   Component Value Date/Time    Cholesterol, total 231 (H) 09/17/2018 09:20 AM    Cholesterol (POC) 227 02/06/2015 03:30 PM    HDL Cholesterol 53 09/17/2018 09:20 AM    LDL, calculated 150 (H) 09/17/2018 09:20 AM    LDL Cholesterol (POC) 164 02/06/2015 03:30 PM    Triglyceride 140 09/17/2018 09:20 AM    Triglycerides (POC) 68 02/06/2015 03:30 PM     Lab Results   Component Value Date/Time    GFR est non-AA 45 (L) 12/31/2018 09:13 AM    GFRNA, POC 50 (L) 11/02/2016 07:15 AM    GFR est AA 52 (L) 12/31/2018 09:13 AM    GFRAA, POC >60 11/02/2016 07:15 AM    Creatinine 1.53 (H) 12/31/2018 09:13 AM    Creatinine (POC) 1.4 (H) 11/02/2016 07:15 AM    BUN 25 12/31/2018 09:13 AM    BUN (POC) 28 (H) 11/02/2016 07:15 AM    Sodium 141 12/31/2018 09:13 AM    Sodium (POC) 140 11/02/2016 07:15 AM    Potassium 4.3 12/31/2018 09:13 AM    Potassium (POC) 4.1 11/02/2016 07:15 AM    Chloride 104 12/31/2018 09:13 AM    Chloride (POC) 105 11/02/2016 07:15 AM    CO2 21 12/31/2018 09:13 AM    Magnesium 2.2 06/04/2018 08:51 AM    Phosphorus 3.0 10/17/2014 04:40 AM    Albumin, urine 59.5 10/23/2015 09:10 AM        Review of Systems   Respiratory: Negative for shortness of breath. Cardiovascular: Negative for chest pain. Physical Exam   Constitutional: He is oriented to person, place, and time. He appears well-developed and well-nourished. No distress. HENT:   Head: Normocephalic and atraumatic. Mouth/Throat: Oropharynx is clear and moist. No oropharyngeal exudate. Eyes: Conjunctivae and EOM are normal. Right eye exhibits no discharge. Left eye exhibits no discharge. Neck: Normal range of motion. Neck supple. Cardiovascular: Normal rate, regular rhythm, normal heart sounds and intact distal pulses. Exam reveals no gallop and no friction rub. No murmur heard. Pulmonary/Chest: Effort normal and breath sounds normal. No respiratory distress. He has no wheezes. He has no rales.  He exhibits no tenderness. Abdominal: Soft. He exhibits no distension. There is no tenderness. There is no rebound and no guarding. Musculoskeletal: Normal range of motion. He exhibits no edema, tenderness or deformity. Lymphadenopathy:     He has no cervical adenopathy. Neurological: He is alert and oriented to person, place, and time. He has normal reflexes. Coordination normal.   Skin: Skin is warm and dry. No rash noted. He is not diaphoretic. No erythema. No pallor. Psychiatric: He has a normal mood and affect. His behavior is normal.       ASSESSMENT and PLAN    ICD-10-CM ICD-9-CM    1. Type 2 diabetes mellitus with diabetic neuropathy, without long-term current use of insulin (HCC)    Controlled on januvia 50mg daily, continue   N08.61 715.09 METABOLIC PANEL, COMPREHENSIVE     357.2 HEMOGLOBIN A1C WITH EAG      CBC WITH AUTOMATED DIFF   2. Prostate cancer [facility])    Reports some progression of his cancer, has appointment scheduled with Dr Arturo De La Rosa, will be discussing radiation tx   J01 735 METABOLIC PANEL, COMPREHENSIVE      HEMOGLOBIN A1C WITH EAG      CBC WITH AUTOMATED DIFF   3. Chronic deep vein thrombosis (DVT) of proximal vein of both lower extremities (HCC)    H/o, used to be on anticoagulant with Dr Huseyin Bustillo years ago, currently doing well on ASA, has not had any recurrence   G46.7F6 178.53 METABOLIC PANEL, COMPREHENSIVE      HEMOGLOBIN A1C WITH EAG      CBC WITH AUTOMATED DIFF   4. Medicare annual wellness visit, subsequent V52.52 E55.9 METABOLIC PANEL, COMPREHENSIVE      HEMOGLOBIN A1C WITH EAG      CBC WITH AUTOMATED DIFF   5.  Spinal stenosis, lumbar region, without neurogenic claudication    Has some chronic pain in lower back, was seen by surgeon previously, completed L spine XR lov and provided exercises, currently doing well, has had issues with C spine pain as well, was seen by Dr Arianne Pandey in the past and has gabapentin to use at home for this   M88.412 446.25 METABOLIC PANEL, COMPREHENSIVE HEMOGLOBIN A1C WITH EAG      CBC WITH AUTOMATED DIFF   6. Reactive depression    Overall doing well on celexa despite positive depression screen, not interested in further adjustment of dose   V56.3 232.8 METABOLIC PANEL, COMPREHENSIVE      HEMOGLOBIN A1C WITH EAG      CBC WITH AUTOMATED DIFF   7. Stage 3 chronic kidney disease (HCC)    Cr runs ~1.4-1.5 baseline, continue to monitor   A73.5 392.8 METABOLIC PANEL, COMPREHENSIVE      HEMOGLOBIN A1C WITH EAG      CBC WITH AUTOMATED DIFF   8. Primary insomnia    Has not needed trazadone recently, can use this prn, still needs to have sleep study which is scheduled with Dr Jackie Burt   R50.74 570.31 METABOLIC PANEL, COMPREHENSIVE      HEMOGLOBIN A1C WITH EAG      CBC WITH AUTOMATED DIFF   9. Essential hypertension    Controlled on HCTZ and toprol   F08 247.3 METABOLIC PANEL, COMPREHENSIVE      HEMOGLOBIN A1C WITH EAG      CBC WITH AUTOMATED DIFF   10. Mixed hyperlipidemia    On crestor but has not been taking it recently, 10mg causes myalgias, will have him take half tab 1-2x per week as tolerated   G83.2 039.0 METABOLIC PANEL, COMPREHENSIVE      HEMOGLOBIN A1C WITH EAG      CBC WITH AUTOMATED DIFF   11. Obesity (BMI 30.0-34. 9)    Discussed WW and cutting out beer to help with w/l   L24.1 703.57 METABOLIC PANEL, COMPREHENSIVE      HEMOGLOBIN A1C WITH EAG      CBC WITH AUTOMATED DIFF   12. Bilateral hearing loss, unspecified hearing loss type    Refer ENT for further eval   H91.93 389.9 REFERRAL TO ENT-OTOLARYNGOLOGY   13. BB    UTD with Dr Rochelle De Paz   I45.10 426.4         Depression screen reviewed and positive. Pt is happy with his dose of celexa, and has ativan to use prn. Scribed by Antoni Arriaga of [facility], as dictated by Dr. Marti Miller. Current diagnosis and concerns discussed with pt at length. Pt understands risks and benefits or current treatment plan and medications, and accepts the treatment and medication with any possible risks.  Pt asks appropriate questions, which were answered. Pt was instructed to call with any concerns or problems. I have reviewed the note documented by the scribe. The services provided are my own.   The documentation is accurate

## 2019-03-20 LAB
ALBUMIN SERPL-MCNC: 4.4 G/DL (ref 3.5–4.8)
ALBUMIN/GLOB SERPL: 1.3 {RATIO} (ref 1.2–2.2)
ALP SERPL-CCNC: 50 IU/L (ref 39–117)
ALT SERPL-CCNC: 14 IU/L (ref 0–44)
AST SERPL-CCNC: 20 IU/L (ref 0–40)
BASOPHILS # BLD AUTO: 0 X10E3/UL (ref 0–0.2)
BASOPHILS NFR BLD AUTO: 0 %
BILIRUB SERPL-MCNC: 0.9 MG/DL (ref 0–1.2)
BUN SERPL-MCNC: 34 MG/DL (ref 8–27)
BUN/CREAT SERPL: 20 (ref 10–24)
CALCIUM SERPL-MCNC: 9.8 MG/DL (ref 8.6–10.2)
CHLORIDE SERPL-SCNC: 102 MMOL/L (ref 96–106)
CO2 SERPL-SCNC: 22 MMOL/L (ref 20–29)
CREAT SERPL-MCNC: 1.7 MG/DL (ref 0.76–1.27)
EOSINOPHIL # BLD AUTO: 0.1 X10E3/UL (ref 0–0.4)
EOSINOPHIL NFR BLD AUTO: 2 %
ERYTHROCYTE [DISTWIDTH] IN BLOOD BY AUTOMATED COUNT: 15.7 % (ref 12.3–15.4)
EST. AVERAGE GLUCOSE BLD GHB EST-MCNC: 126 MG/DL
GLOBULIN SER CALC-MCNC: 3.4 G/DL (ref 1.5–4.5)
GLUCOSE SERPL-MCNC: 94 MG/DL (ref 65–99)
HBA1C MFR BLD: 6 % (ref 4.8–5.6)
HCT VFR BLD AUTO: 45.4 % (ref 37.5–51)
HGB BLD-MCNC: 14.6 G/DL (ref 13–17.7)
IMM GRANULOCYTES # BLD AUTO: 0 X10E3/UL (ref 0–0.1)
IMM GRANULOCYTES NFR BLD AUTO: 0 %
LYMPHOCYTES # BLD AUTO: 2.8 X10E3/UL (ref 0.7–3.1)
LYMPHOCYTES NFR BLD AUTO: 39 %
MCH RBC QN AUTO: 26.8 PG (ref 26.6–33)
MCHC RBC AUTO-ENTMCNC: 32.2 G/DL (ref 31.5–35.7)
MCV RBC AUTO: 84 FL (ref 79–97)
MONOCYTES # BLD AUTO: 0.7 X10E3/UL (ref 0.1–0.9)
MONOCYTES NFR BLD AUTO: 10 %
NEUTROPHILS # BLD AUTO: 3.6 X10E3/UL (ref 1.4–7)
NEUTROPHILS NFR BLD AUTO: 49 %
PLATELET # BLD AUTO: 224 X10E3/UL (ref 150–379)
POTASSIUM SERPL-SCNC: 4.8 MMOL/L (ref 3.5–5.2)
PROT SERPL-MCNC: 7.8 G/DL (ref 6–8.5)
RBC # BLD AUTO: 5.44 X10E6/UL (ref 4.14–5.8)
SODIUM SERPL-SCNC: 139 MMOL/L (ref 134–144)
WBC # BLD AUTO: 7.3 X10E3/UL (ref 3.4–10.8)

## 2019-03-25 ENCOUNTER — TELEPHONE (OUTPATIENT)
Dept: INTERNAL MEDICINE CLINIC | Age: 69
End: 2019-03-25

## 2019-03-25 NOTE — TELEPHONE ENCOUNTER
Called, spoke with [facility] [place]  Two pt identifiers confirmed. Consuelo concerned about Pt's A1C. Yareli informed of pt's A1C. Consuelo inquired about getting A1C down. Suggested, exercise and low carb diet. Pt verbalized understanding of information discussed w/ no further questions at this time.

## 2019-03-25 NOTE — TELEPHONE ENCOUNTER
----- Message from Margareth Valladares sent at 3/25/2019 12:52 PM EDT -----  Regarding: DrElsy Garcia (Daughter)  is calling on behalf of Pt. She is returning a phone call regarding questions about pt's AC1 results.  Yareli's best contact number: (w) [phone]    Message copied/pasted from [facility]

## 2019-03-25 NOTE — TELEPHONE ENCOUNTER
----- Message from Chuck Yousif sent at 3/25/2019  8:18 AM EDT -----  Regarding: Dr. Ramos Letters  Pt daughter [facility] [place] requesting a call back with questions regarding recent lab results for the pt. Best contact [phone].         Message copied/pasted from Mookie Giang

## 2019-03-30 DIAGNOSIS — F41.9 ANXIETY: ICD-10-CM

## 2019-03-30 RX ORDER — TRAZODONE HYDROCHLORIDE 50 MG/1
TABLET ORAL
Qty: 30 TAB | Refills: 0 | Status: SHIPPED | OUTPATIENT
Start: 2019-03-30 | End: 2019-06-25

## 2019-03-30 RX ORDER — METOPROLOL SUCCINATE 50 MG/1
TABLET, EXTENDED RELEASE ORAL
Qty: 30 TAB | Refills: 3 | Status: SHIPPED | OUTPATIENT
Start: 2019-03-30 | End: 2019-06-25 | Stop reason: SDUPTHER

## 2019-03-30 RX ORDER — LORAZEPAM 0.5 MG/1
TABLET ORAL
Qty: 20 TAB | Refills: 0 | Status: SHIPPED | OUTPATIENT
Start: 2019-03-30 | End: 2019-04-29

## 2019-04-02 ENCOUNTER — DOCUMENTATION ONLY (OUTPATIENT)
Dept: INTERNAL MEDICINE CLINIC | Age: 69
End: 2019-04-02

## 2019-04-02 NOTE — PROGRESS NOTES
The following prescription was called into pt's local pharmacy:    LORazepam (ATIVAN) 0.5 mg tablet [[id number]]     Order Details   Dose, Route, Frequency: As Directed    Dispense Quantity: 20 Tab Refills: 0 Fills remaining: --           Sig: TAKE ONE TABLET BY MOUTH NIGHTLY AS NEEDED FOR ANXIETY          Written Date: 03/30/19 Expiration Date: --     Start Date: 03/30/19 End Date: 04/29/19

## 2019-04-19 ENCOUNTER — HOSPITAL ENCOUNTER (OUTPATIENT)
Dept: RADIATION THERAPY | Age: 69
Discharge: HOME | End: 2019-04-19

## 2019-05-21 NOTE — TELEPHONE ENCOUNTER
Beryle Mt, daughter is requesting a refill for diabetic strips, pharmacy on file.  Best contact number is [phone].        Message received & copied from [facility] 138/70 124/68   Pulse: 86 68   Resp: 16    SpO2: 98%    Weight: 163 lb 12.8 oz (74.3 kg)    Height: 5' 5\" (1.651 m)      Objective:   Physical Exam   Constitutional: He is oriented to person, place, and time. Vital signs are normal. He appears well-developed and well-nourished. No distress. HENT:   Head: Normocephalic and atraumatic. Eyes: Pupils are equal, round, and reactive to light. Conjunctivae and EOM are normal.   Neck: Trachea normal, normal range of motion, full passive range of motion without pain and phonation normal. Neck supple. Normal carotid pulses, no hepatojugular reflux and no JVD present. Carotid bruit is not present. No thyroid mass and no thyromegaly present. Cardiovascular: Normal rate, regular rhythm, normal heart sounds, intact distal pulses and normal pulses. No extrasystoles are present. PMI is not displaced. Exam reveals no gallop, no friction rub and no decreased pulses. No murmur heard. Pulses:       Carotid pulses are 2+ on the right side, and 2+ on the left side. Radial pulses are 2+ on the right side, and 2+ on the left side. Femoral pulses are 2+ on the right side, and 2+ on the left side. Popliteal pulses are 2+ on the right side, and 2+ on the left side. Dorsalis pedis pulses are 2+ on the right side, and 2+ on the left side. Posterior tibial pulses are 2+ on the right side, and 2+ on the left side. S/p L carotid surgery 9/2018. Pulmonary/Chest: Effort normal and breath sounds normal. No accessory muscle usage. No apnea, no tachypnea and no bradypnea. No respiratory distress. He has no decreased breath sounds. He has no wheezes. He has no rhonchi. He has no rales. Abdominal: Normal appearance and normal aorta. There is no hepatosplenomegaly. There is no CVA tenderness. No hernia. Hernia confirmed negative in the ventral area. Musculoskeletal: Normal range of motion. He exhibits edema (mild edema and venous stasis L>R leg).  He for now. F/u w/ Cardiology as scheduled.               Sarah Justin MD

## 2019-05-28 ENCOUNTER — OFFICE VISIT (OUTPATIENT)
Dept: SLEEP MEDICINE | Age: 69
End: 2019-05-28

## 2019-05-28 VITALS
SYSTOLIC BLOOD PRESSURE: 134 MMHG | OXYGEN SATURATION: 98 % | DIASTOLIC BLOOD PRESSURE: 84 MMHG | HEART RATE: 77 BPM | HEIGHT: 65 IN | BODY MASS INDEX: 31.65 KG/M2 | WEIGHT: 190 LBS | RESPIRATION RATE: 19 BRPM

## 2019-05-28 DIAGNOSIS — G47.00 INSOMNIA, UNSPECIFIED TYPE: ICD-10-CM

## 2019-05-28 DIAGNOSIS — G47.33 OSA (OBSTRUCTIVE SLEEP APNEA): Primary | ICD-10-CM

## 2019-05-28 NOTE — PATIENT INSTRUCTIONS

## 2019-05-28 NOTE — PROGRESS NOTES
[address] [facility] [place]. [place], [address]  Tel.  [phone]  Fax. [address] [facility] [address]  [place], [address] [facility]  Tel.  [phone]  Fax. [phone] [address]   Tel.  [phone]  Fax. [phone]       Chief Complaint       Chief Complaint   Patient presents with    Sleep Problem     Np refd by Dr. Monique Bourne for restless sleep habits and snoring       HPI      Esequiel Cabello is 77 y.o. male seen for evaluation of a sleep disorder. He is coming by his daughter who assists with the history. He has a long history of \"problems not sleeping\". He attributes this to his having worked at nights. Currently, he will fall asleep between 1-2 AM; he was sleep several hours then get out of bed returning at approximately 11 AM again for several hours. His daughter notes that he will constantly watch TV (news and \"[employer]\"). He notes that he is fatigued when he awakens and during the day. He has been told of snoring. He states that his \"mind is always running\" and that he is \"stressed out all the time\". He had been started on trazodone which he \"does not take as I should\", as well as Celexa. He had been recommended to have a neuropsych evaluation but \"would not go\". Anxiety continues to be an ongoing problem for him. The patient has not undergone diagnostic testing for the current problems. Allergies   Allergen Reactions    Arb-Angiotensin Receptor Antagonist Other (comments)     High k       Current Outpatient Medications   Medication Sig Dispense Refill    vitamin E acetate (VITAMIN E PO) Take  by mouth daily.  cholecalciferol, vitamin D3, (VITAMIN D3 PO) Take  by mouth daily.  ascorbate calcium (VITAMIN C PO) Take  by mouth daily.  BEE POLLEN PO Take  by mouth daily.  gabapentin (NEURONTIN) 300 mg capsule Take 1 Cap by mouth nightly as needed for Pain.  180 Cap 1    JANUVIA 50 mg tablet TAKE 1 TABLET BY MOUTH ONCE DAILY 90 Tab 1    aspirin delayed-release (ASPIR-LOW) 81 mg tablet Take 81 mg by mouth.  FREESTYLE INSULINX strip CHECK BLOOD SUGARS 2-3 TIMES A  Strip 0    traZODone (DESYREL) 50 mg tablet TAKE 1 TABLET BY MOUTH EVERY DAY AT BEDTIME (Patient taking differently: TAKE 1 TABLET BY MOUTH EVERY DAY AT BEDTIME0-PRN) 30 Tab 0    rosuvastatin (CRESTOR) 10 mg tablet TAKE ONE TABLET BY MOUTH NIGHTLY (Patient taking differently: TAKE ONE TABLET BY MOUTH NIGHTLY) 90 Tab 2    citalopram (CELEXA) 20 mg tablet TAKE ONE TABLET BY MOUTH ONCE DAILY 30 Tab 3    metoprolol succinate (TOPROL-XL) 50 mg XL tablet Take 1 Tab by mouth daily. 90 Tab 1    FREESTYLE LANCETS 28 gauge misc USE TO CHECK BLOOD SUGAR ONCE DAILY 100 Lancet 12    hydroCHLOROthiazide (HYDRODIURIL) 12.5 mg tablet TAKE ONE TABLET BY MOUTH ONCE DAILY 30 Tab 0    cyanocobalamin 1,000 mcg tablet Take 1,000 mcg by mouth daily.  FERROUS FUMARATE (IRON PO) Take  by mouth.  magnesium 250 mg tab Take  by mouth daily.  metoprolol succinate (TOPROL-XL) 50 mg XL tablet TAKE 1 TABLET BY MOUTH ONCE DAILY 30 Tab 3    traZODone (DESYREL) 50 mg tablet TAKE 1 TABLET BY MOUTH EVERY DAY AT BEDTIME 30 Tab 0    varicella-zoster recombinant, PF, (SHINGRIX, PF,) 50 mcg/0.5 mL susr injection 0.5mL by IntraMUSCular route once now and then repeat in 2-6 months 0.5 mL 1        He  has a past medical history of Arthritis, Chronic kidney disease, Chronic pain, Diabetes ([address] Hypertension, PE (pulmonary embolism), Pneumonia, and Psychiatric disorder. He  has a past surgical history that includes hx gi (11/2012); pr abdomen surgery proc unlisted; hx orthopaedic; and hx back surgery (2014). He family history includes Cancer in his mother. He  reports that he has never smoked. His smokeless tobacco use includes chew. He reports that he drinks about 4.2 oz of alcohol per week. He reports that he does not use drugs. Review of Systems:  Review of Systems   Constitutional: Negative for chills and fever. HENT: Positive for hearing loss. Eyes: Negative for blurred vision and double vision. Respiratory: Negative for cough and wheezing. Cardiovascular: Negative for chest pain and palpitations. Gastrointestinal: Negative for abdominal pain and heartburn. Genitourinary: Negative for frequency and urgency. Musculoskeletal: Positive for back pain, joint pain and neck pain. Skin: Negative for itching and rash. Neurological: Negative for headaches. Psychiatric/Behavioral: Positive for memory loss. The patient is nervous/anxious and has insomnia. Objective:     Visit Vitals  /84 (BP 1 Location: Left arm, BP Patient Position: Sitting)   Pulse 77   Resp 19   Ht 5' 5\" (1.651 m)   Wt 190 lb (86.2 kg)   SpO2 98%   BMI 31.62 kg/m²     Body mass index is 31.62 kg/m². General:   Conversant, cooperative   Eyes:  Pupils equal and reactive, no nystagmus   Oropharynx:   Mallampati score IV, tongue normal   Tonsils:     Neck:   No carotid bruits; Neck circ. in \"inches\": 16   Chest/Lungs:  Clear on auscultation    CVS:  Normal rate, regular rhythm   Skin:  Warm to touch; no obvious rashes   Neuro:  Speech fluent, face symmetrical, tongue movement normal   Psych:  Normal affect,  normal countenance        Assessment:       ICD-10-CM ICD-9-CM    1. NIMO (obstructive sleep apnea) G47.33 327.23    2. Insomnia, unspecified type G47.00 780.52      History suggestive of sleep disordered breathing. He has ongoing problem with insomnia reflecting problems with anxiety as well and notices past history of working nights. Neuropsych evaluation had been suggested which he has declined. I recommended that he proceed with that evaluation. He may benefit from psychiatric evaluation to deal with anxiety issues. He agrees to return for a sleep evaluation once the above assessments have been obtained.   Plan:     No orders of the defined types were placed in this encounter. * Patient has a history and examination consistent with the diagnosis of sleep apnea. * Sleep testing will be obtained as noted above. * He was provided information on sleep apnea including corresponding risk factors and the importance of proper treatment. * Treatment options if indicated were reviewed today. Instructions:   Do not engage in activities requiring a normal degree of alertness if fatigue is present. o The patient understands that untreated or undertreated sleep apnea could impair judgement and the ability to function normally during the day.  o Call or return if symptoms worsen or persist.          Arturo Viveros MD, FAA  Electronically signed 05/28/19       This note was created using voice recognition software. Despite editing, there may be syntax errors. This note will not be viewable in [address]

## 2019-06-25 ENCOUNTER — HOSPITAL ENCOUNTER (OUTPATIENT)
Dept: LAB | Age: 69
Discharge: HOME | End: 2019-06-25
Payer: MEDICARE

## 2019-06-25 ENCOUNTER — OFFICE VISIT (OUTPATIENT)
Dept: INTERNAL MEDICINE CLINIC | Age: 69
End: 2019-06-25

## 2019-06-25 VITALS
HEIGHT: 65 IN | DIASTOLIC BLOOD PRESSURE: 68 MMHG | BODY MASS INDEX: 31.32 KG/M2 | SYSTOLIC BLOOD PRESSURE: 118 MMHG | HEART RATE: 79 BPM | RESPIRATION RATE: 16 BRPM | WEIGHT: 188 LBS | OXYGEN SATURATION: 98 % | TEMPERATURE: 98.1 F

## 2019-06-25 DIAGNOSIS — F51.01 PRIMARY INSOMNIA: ICD-10-CM

## 2019-06-25 DIAGNOSIS — F32.9 REACTIVE DEPRESSION: ICD-10-CM

## 2019-06-25 DIAGNOSIS — E11.21 TYPE 2 DIABETES WITH NEPHROPATHY (HCC): Primary | ICD-10-CM

## 2019-06-25 DIAGNOSIS — N18.30 STAGE 3 CHRONIC KIDNEY DISEASE (HCC): ICD-10-CM

## 2019-06-25 DIAGNOSIS — B35.1 ONYCHOMYCOSIS: ICD-10-CM

## 2019-06-25 DIAGNOSIS — I10 ESSENTIAL HYPERTENSION: ICD-10-CM

## 2019-06-25 DIAGNOSIS — G62.9 NEUROPATHY: ICD-10-CM

## 2019-06-25 DIAGNOSIS — G47.9 SLEEP DISORDER: ICD-10-CM

## 2019-06-25 DIAGNOSIS — G31.84 MCI (MILD COGNITIVE IMPAIRMENT): ICD-10-CM

## 2019-06-25 DIAGNOSIS — C61 PROSTATE CANCER (HCC): ICD-10-CM

## 2019-06-25 DIAGNOSIS — E78.2 MIXED HYPERLIPIDEMIA: ICD-10-CM

## 2019-06-25 PROCEDURE — 36415 COLL VENOUS BLD VENIPUNCTURE: CPT

## 2019-06-25 PROCEDURE — 82043 UR ALBUMIN QUANTITATIVE: CPT

## 2019-06-25 PROCEDURE — 80053 COMPREHEN METABOLIC PANEL: CPT

## 2019-06-25 PROCEDURE — 80061 LIPID PANEL: CPT

## 2019-06-25 PROCEDURE — 83036 HEMOGLOBIN GLYCOSYLATED A1C: CPT

## 2019-06-25 PROCEDURE — 84443 ASSAY THYROID STIM HORMONE: CPT

## 2019-06-25 RX ORDER — METOPROLOL SUCCINATE 50 MG/1
50 TABLET, EXTENDED RELEASE ORAL DAILY
Qty: 90 TAB | Refills: 1 | Status: SHIPPED | OUTPATIENT
Start: 2019-06-25 | End: 2019-09-24

## 2019-06-25 RX ORDER — CITALOPRAM 20 MG/1
20 TABLET, FILM COATED ORAL DAILY
Qty: 90 TAB | Refills: 1 | Status: SHIPPED | OUTPATIENT
Start: 2019-06-25 | End: 2019-12-05 | Stop reason: SDUPTHER

## 2019-06-25 RX ORDER — HYDROCHLOROTHIAZIDE 12.5 MG/1
12.5 TABLET ORAL DAILY
Qty: 90 TAB | Status: ON HOLD | OUTPATIENT
Start: 2019-06-25 | End: 2020-04-27 | Stop reason: SDUPTHER

## 2019-06-25 NOTE — PROGRESS NOTES
HISTORY OF PRESENT ILLNESS  Adela Gustafson is a 77 y.o. male. HPI   Last here 3/19/19. Pt is here to f/u on chronic conditions. Pt presents with his daughter who provides some of his hx.      BP today is 118/68  Continues on toprol 50mg and HCTZ 12.5mg daily  Pt just took his meds this AM       BS at home running V[place]a, 94, 108 in the AM fasting   Continues januvia 50mg daily        Wt today is 188 lbs --up 2 lbs x lov  Discussed cutting back on beer  Discussed [employer]  Discussed diet and w/l       Reviewed labs 3/19      Pt follows annually with Dr. Pretty Wilburn (cardio)   Last visit was 12/7/18      Pt follows with Dr. Vern Goff (nephro) for ckd in the past  Last visit was 3/2/18  He will only follow with me for his kidney dysfunction  Baseline cr 1.4-1. 5      Pt follows with John Conde (uro)  for prostate cancer  Reviewed notes 4/19/19: intermediate risk prostate cancer, spencer 7, more and more on L side, discussed radiation therapy as tx, 6 month f/u  Recall pt has FMHX (brother and uncle) prostate cancer  Pt also met with a radiation oncologist at [facility]  They will likely be meeting again to discuss radiation therapy in the future  Per his daughter, pt may get XRT with radioactive seeds     Pt saw Dr Jonny Pleitez (sleep)  Reviewed notes 5/28/19: * Patient has a history and examination consistent with the diagnosis of sleep apnea. * Sleep testing will be obtained as noted above. * He was provided information on sleep apnea including corresponding risk factors and the importance of proper treatment. * Treatment options if indicated were reviewed today. Instructions:   Do not engage in activities requiring a normal degree of alertness if fatigue is present. · The patient understands that untreated or undertreated sleep apnea could impair judgement and the ability to function normally during the day.   · Call or return if symptoms worsen or persist.  However while there the pt discussed other issues such as anxiety so the specialist wanted to hold off on the sleep study for now    Pt saw Dr Carlos Vinson (podiatry) in   Pt was given terbinafine for fungal nails  Discussed that if the fungus is not bothering him that he could just leave it rather than treat it      Pt had a hearing evaluation since lov  Per his daughter he had 40% decrease in both ears, and does not yet need hearing aids    Continues on crestor 10mg for cholesterol  Pt had previously been taking this infrequently d/t cramps  However pt thinks he has been taking it sometimes once a day currently  Pt has been taking CoQ-10     Continues gabapentin 2-3 tabs qhs for his tingling/neuropathy pain in his hands/fingers, which works well for the most part     Continues celexa 20mg daily for depression/anxiety, which works well  Pt is happy with this dose  He also has ativan to use prn (not often, not in the past month) for anxiety      Pt uses trazadone prn for sleep (infrequently, up to 3x per week)     In the past, pt expressed concern about his memory  Previously, provided referral for Dr. Loki Forde (neuropsych)  Discussed pursuing this - today provided info for Dr Cedric Chinchilla (neuropsych)     ACP not on file. SDM is his daughter (Yareli Espinozata). Provided information in the past.      Recall sees Dr. Sirisha Martinez for h/o DVT, no longer on coumadin or xarelto, on ASA only.  Was on coumadin for h/o PE and DVT post operatively      PREVENTIVE:    Colonoscopy: 9/15/15, Dr. Darrian Sun, repeat 5 years  EGD: 9/15/15, Dr. Darrian Sun  PSA: 7/15 3.0, ,  3.8, ,  5.5  AAA screen: CT , negative  Tdap: unknown   Pneumovax: 2012  [id number]: declines  Shingrix: never completed  Flu shot: declines  Foot exam: 19   Microalbumin:   A1c:  10/16 5.9,  6.6,  6.3,  6.4,  6.4,  6.6,  6.5,  6.0,  6.1, 3/19 6.0  Eye exam: Dr. Lenin Roman, no diabetic retinopathy  Hep C Screen: 10/15, negative  Lipids:  LDL 150  EK/17, PACs    Patient Active Problem List    Diagnosis Date Noted    Prostate cancer ([facility] 06/04/2018    Type 2 diabetes mellitus with diabetic neuropathy ([facility] 06/04/2018    Type 2 diabetes with nephropathy ([facility] 02/28/2018    Reactive depression 11/15/2016    Cervical spinal stenosis 11/03/2016    CKD (chronic kidney disease) 10/31/2016    ACP (advance care planning) 12/11/2015    Diabetes mellitus without complication ([facility] 96/55/7093    Hyperlipidemia 07/10/2015    Spinal stenosis, lumbar region, without neurogenic claudication 10/16/2014    Lumbar disc disease with radiculopathy 10/16/2014    DVT of leg (deep venous thrombosis) ([facility] 12/18/2012    HTN (hypertension) 12/12/2012    Microalbuminuria 12/12/2012    Perforated diverticulum of large intestine 12/04/2012    Alcohol abuse 12/04/2012    Back pain 12/04/2012    Insomnia 12/04/2012    Perforated viscus 11/21/2012     Current Outpatient Medications   Medication Sig Dispense Refill    JANUVIA 50 mg tablet TAKE 1 TABLET BY MOUTH ONCE DAILY 30 Tab 0    citalopram (CELEXA) 20 mg tablet TAKE 1 TABLET BY MOUTH ONCE DAILY 30 Tab 0    metoprolol succinate (TOPROL-XL) 50 mg XL tablet TAKE 1 TABLET BY MOUTH ONCE DAILY 30 Tab 3    traZODone (DESYREL) 50 mg tablet TAKE 1 TABLET BY MOUTH EVERY DAY AT BEDTIME 30 Tab 0    vitamin E acetate (VITAMIN E PO) Take  by mouth daily.  cholecalciferol, vitamin D3, (VITAMIN D3 PO) Take  by mouth daily.  ascorbate calcium (VITAMIN C PO) Take  by mouth daily.  BEE POLLEN PO Take  by mouth daily.  gabapentin (NEURONTIN) 300 mg capsule Take 1 Cap by mouth nightly as needed for Pain. 180 Cap 1    varicella-zoster recombinant, PF, (SHINGRIX, PF,) 50 mcg/0.5 mL susr injection 0.5mL by IntraMUSCular route once now and then repeat in 2-6 months 0.5 mL 1    aspirin delayed-release (ASPIR-LOW) 81 mg tablet Take 81 mg by mouth.       FREESTYLE INSULINX strip CHECK BLOOD SUGARS 2-3 TIMES A  Strip 0  traZODone (DESYREL) 50 mg tablet TAKE 1 TABLET BY MOUTH EVERY DAY AT BEDTIME (Patient taking differently: TAKE 1 TABLET BY MOUTH EVERY DAY AT BEDTIME0-PRN) 30 Tab 0    rosuvastatin (CRESTOR) 10 mg tablet TAKE ONE TABLET BY MOUTH NIGHTLY (Patient taking differently: TAKE ONE TABLET BY MOUTH NIGHTLY) 90 Tab 2    metoprolol succinate (TOPROL-XL) 50 mg XL tablet Take 1 Tab by mouth daily. 90 Tab 1    FREESTYLE LANCETS 28 gauge misc USE TO CHECK BLOOD SUGAR ONCE DAILY 100 Lancet 12    hydroCHLOROthiazide (HYDRODIURIL) 12.5 mg tablet TAKE ONE TABLET BY MOUTH ONCE DAILY 30 Tab 0    cyanocobalamin 1,000 mcg tablet Take 1,000 mcg by mouth daily.  FERROUS FUMARATE (IRON PO) Take  by mouth.  magnesium 250 mg tab Take  by mouth daily.        Past Surgical History:   Procedure Laterality Date    ABDOMEN SURGERY PROC UNLISTED      bowel resection    HX BACK SURGERY  2014    HX GI  11/2012    bowel resection    HX ORTHOPAEDIC      amputated left 4th finger      Lab Results   Component Value Date/Time    WBC 7.3 03/19/2019 10:00 AM    HGB 14.6 03/19/2019 10:00 AM    Hemoglobin (POC) 13.3 11/02/2016 07:15 AM    HCT 45.4 03/19/2019 10:00 AM    Hematocrit (POC) 39 11/02/2016 07:15 AM    PLATELET 704 94/11/2074 10:00 AM    MCV 84 03/19/2019 10:00 AM     Lab Results   Component Value Date/Time    Cholesterol, total 231 (H) 09/17/2018 09:20 AM    Cholesterol (POC) 227 02/06/2015 03:30 PM    HDL Cholesterol 53 09/17/2018 09:20 AM    LDL, calculated 150 (H) 09/17/2018 09:20 AM    LDL Cholesterol (POC) 164 02/06/2015 03:30 PM    Triglyceride 140 09/17/2018 09:20 AM    Triglycerides (POC) 68 02/06/2015 03:30 PM     Lab Results   Component Value Date/Time    GFR est non-AA 40 (L) 03/19/2019 10:00 AM    GFRNA, POC 50 (L) 11/02/2016 07:15 AM    GFR est AA 46 (L) 03/19/2019 10:00 AM    GFRAA, POC >60 11/02/2016 07:15 AM    Creatinine 1.70 (H) 03/19/2019 10:00 AM    Creatinine (POC) 1.4 (H) 11/02/2016 07:15 AM    BUN 34 (H) 03/19/2019 10:00 AM    BUN (POC) 28 (H) 11/02/2016 07:15 AM    Sodium 139 03/19/2019 10:00 AM    Sodium (POC) 140 11/02/2016 07:15 AM    Potassium 4.8 03/19/2019 10:00 AM    Potassium (POC) 4.1 11/02/2016 07:15 AM    Chloride 102 03/19/2019 10:00 AM    Chloride (POC) 105 11/02/2016 07:15 AM    CO2 22 03/19/2019 10:00 AM    Magnesium 2.2 06/04/2018 08:51 AM    Phosphorus 3.0 10/17/2014 04:40 AM    Albumin, urine 59.5 10/23/2015 09:10 AM        Review of Systems   Respiratory: Negative for shortness of breath. Cardiovascular: Negative for chest pain. Physical Exam   Constitutional: He is oriented to person, place, and time. He appears well-developed and well-nourished. No distress. HENT:   Head: Normocephalic and atraumatic. Mouth/Throat: Oropharynx is clear and moist. No oropharyngeal exudate. Eyes: Conjunctivae and EOM are normal. Right eye exhibits no discharge. Left eye exhibits no discharge. Neck: Normal range of motion. Neck supple. Cardiovascular: Normal rate, regular rhythm, normal heart sounds and intact distal pulses. Exam reveals no gallop and no friction rub. No murmur heard. Pulmonary/Chest: Effort normal and breath sounds normal. No respiratory distress. He has no wheezes. He has no rales. He exhibits no tenderness. Musculoskeletal: Normal range of motion. He exhibits no edema, tenderness or deformity. Lymphadenopathy:     He has no cervical adenopathy. Neurological: He is alert and oriented to person, place, and time. He has normal reflexes. Coordination normal.   Monofilament nl BLE, good peripheral pulses, no ulcers    Skin: Skin is warm and dry. No rash noted. He is not diaphoretic. No erythema. No pallor. Psychiatric: He has a normal mood and affect. His behavior is normal.       ASSESSMENT and PLAN    ICD-10-CM ICD-9-CM    1.  Type 2 diabetes with nephropathy (HCC)    Controlled on januvia, continue, check a1c today   E11.21 250.40 LIPID PANEL     182.93 METABOLIC PANEL, COMPREHENSIVE      TSH 3RD GENERATION      HEMOGLOBIN A1C WITH EAG      MICROALBUMIN, UR, RAND W/ MICROALB/CREAT RATIO   2. MCI (mild cognitive impairment)    Will set up with neuropsych for further eval, most likely related too chronic anxiety and depression, await evaluation   G31.84 331.83 REFERRAL TO NEUROPSYCHOLOGY      LIPID PANEL      METABOLIC PANEL, COMPREHENSIVE      TSH 3RD GENERATION      HEMOGLOBIN A1C WITH EAG   3. Mixed hyperlipidemia    Above goal in the past d/t noncompliance with crestor, pt states he is taking the crestor routinely, will check lipids   E78.2 272.2 LIPID PANEL      METABOLIC PANEL, COMPREHENSIVE      TSH 3RD GENERATION      HEMOGLOBIN A1C WITH EAG   4. Essential hypertension    Controlled on toprol XL and HCTZ, continue   I10 401.9 LIPID PANEL      METABOLIC PANEL, COMPREHENSIVE      TSH 3RD GENERATION      HEMOGLOBIN A1C WITH EAG   5. Stage 3 chronic kidney disease (HCC)    Baseline cr 1.4-1.5, saw Dr Amilcar Pena about a year ago, last cr climbed slightly will repeat today, will refer back to Amilcar Pena for progressive disease   N18.3 585.3 LIPID PANEL      METABOLIC PANEL, COMPREHENSIVE      TSH 3RD GENERATION      HEMOGLOBIN A1C WITH EAG   6. Reactive depression    Overall reports good control with celexa, occasionally uses ativan for anxiety, await neuropsych eval, if this is thought to be causing significant problems to general health may need to consider psych evaluation in the future   F32.9 300.4 LIPID PANEL      METABOLIC PANEL, COMPREHENSIVE      TSH 3RD GENERATION      HEMOGLOBIN A1C WITH EAG   7. Prostate cancer [facility])    Currently under surveillance with Dr Fanny Martinez, met with rad onc, has f/u in 6 months, may be getting XRT with radioactive seeds in the future   C61 185 LIPID PANEL      METABOLIC PANEL, COMPREHENSIVE      TSH 3RD GENERATION      HEMOGLOBIN A1C WITH EAG   8.  Primary insomnia    Uses trazadone prn but not daily   F51.01 307.42 LIPID PANEL      METABOLIC PANEL, COMPREHENSIVE      TSH 3RD GENERATION      HEMOGLOBIN A1C WITH EAG   9. Sleep disorder    Had sleep eval, they will not perform sleep study until his neuropsych eval is completed   G47.9 780.50 LIPID PANEL      METABOLIC PANEL, COMPREHENSIVE      TSH 3RD GENERATION      HEMOGLOBIN A1C WITH EAG   10. Neuropathy    Improved with gabapentin, he takes this daily   G62.9 355.9 LIPID PANEL      METABOLIC PANEL, COMPREHENSIVE      TSH 3RD GENERATION      HEMOGLOBIN A1C WITH EAG   11. Onychomycosis    Discussed terbinafine and risks, he has decided not to take it   B35.1 110.1         Scribed by Kiana Elmore [facility], as dictated by Dr. Latasha Cole. Current diagnosis and concerns discussed with pt at length. Pt understands risks and benefits or current treatment plan and medications, and accepts the treatment and medication with any possible risks. Pt asks appropriate questions, which were answered. Pt was instructed to call with any concerns or problems. I have reviewed the note documented by the scribe. The services provided are my own.   The documentation is accurate

## 2019-06-26 ENCOUNTER — DOCUMENTATION ONLY (OUTPATIENT)
Dept: INTERNAL MEDICINE CLINIC | Age: 69
End: 2019-06-26

## 2019-06-26 ENCOUNTER — TELEPHONE (OUTPATIENT)
Dept: INTERNAL MEDICINE CLINIC | Age: 69
End: 2019-06-26

## 2019-06-26 LAB
ALBUMIN SERPL-MCNC: 4.2 G/DL (ref 3.5–4.8)
ALBUMIN/CREAT UR: 47.4 MG/G CREAT (ref 0–30)
ALBUMIN/GLOB SERPL: 1.4 {RATIO} (ref 1.2–2.2)
ALP SERPL-CCNC: 45 IU/L (ref 39–117)
ALT SERPL-CCNC: 19 IU/L (ref 0–44)
AST SERPL-CCNC: 25 IU/L (ref 0–40)
BILIRUB SERPL-MCNC: 0.9 MG/DL (ref 0–1.2)
BUN SERPL-MCNC: 30 MG/DL (ref 8–27)
BUN/CREAT SERPL: 19 (ref 10–24)
CALCIUM SERPL-MCNC: 9.6 MG/DL (ref 8.6–10.2)
CHLORIDE SERPL-SCNC: 104 MMOL/L (ref 96–106)
CHOLEST SERPL-MCNC: 244 MG/DL (ref 100–199)
CO2 SERPL-SCNC: 23 MMOL/L (ref 20–29)
CREAT SERPL-MCNC: 1.56 MG/DL (ref 0.76–1.27)
CREAT UR-MCNC: 189.8 MG/DL
EST. AVERAGE GLUCOSE BLD GHB EST-MCNC: 123 MG/DL
GLOBULIN SER CALC-MCNC: 2.9 G/DL (ref 1.5–4.5)
GLUCOSE SERPL-MCNC: 95 MG/DL (ref 65–99)
HBA1C MFR BLD: 5.9 % (ref 4.8–5.6)
HDLC SERPL-MCNC: 62 MG/DL
LDLC SERPL CALC-MCNC: 164 MG/DL (ref 0–99)
MICROALBUMIN UR-MCNC: 90 UG/ML
POTASSIUM SERPL-SCNC: 4.9 MMOL/L (ref 3.5–5.2)
PROT SERPL-MCNC: 7.1 G/DL (ref 6–8.5)
SODIUM SERPL-SCNC: 141 MMOL/L (ref 134–144)
TRIGL SERPL-MCNC: 88 MG/DL (ref 0–149)
TSH SERPL DL<=0.005 MIU/L-ACNC: 2.36 UIU/ML (ref 0.45–4.5)
VLDLC SERPL CALC-MCNC: 18 MG/DL (ref 5–40)

## 2019-06-26 NOTE — PROGRESS NOTES
I have attempted to contact this patient by phone with the following results:   Let him know renal fxn stable   Appears to NOT be taking crestor, though he stated in clinic he was--focus on compliance with this   Repeat a1c, bmp 1 week prior to f/u     Will reattempt to call patient.

## 2019-06-26 NOTE — TELEPHONE ENCOUNTER
Selina Boy, Daughter ([phone] and/ or [phone]), is returning a phone call       Message received & copied from [facility]

## 2019-06-26 NOTE — TELEPHONE ENCOUNTER
Spoke with Austin Benz, patient's daughter, on HIPAA. Lab results reviewed. Patient to be compliant with taking cholesterol medication and will come in for labs one week prior to 09/24/19 appointment.

## 2019-06-26 NOTE — PROGRESS NOTES
Following rx was faxed to pharmacy with confirmation received:      hydroCHLOROthiazide (HYDRODIURIL) 12.5 mg tablet [[id number]]     Order Details   Dose: 12.5 mg Route: Oral Frequency: DAILY   Dispense Quantity: 90 Tab Refills: `1 Fills remaining: --           Sig: Take 1 Tab by mouth daily.          Written Date: 06/25/19 Expiration Date: --     Start Date: 06/25/19 End Date: --

## 2019-06-26 NOTE — PROGRESS NOTES
Let him know renal fxn stable    Appears to NOT be taking crestor, though he stated in clinic he was--focus on compliance with this    Repeat a1c, bmp 1 week prior to f/u

## 2019-06-30 DIAGNOSIS — F51.01 PRIMARY INSOMNIA: Primary | ICD-10-CM

## 2019-06-30 RX ORDER — TRAZODONE HYDROCHLORIDE 50 MG/1
TABLET ORAL
Qty: 30 TAB | Refills: 0 | Status: SHIPPED | OUTPATIENT
Start: 2019-06-30 | End: 2019-09-24

## 2019-06-30 RX ORDER — LORAZEPAM 0.5 MG/1
TABLET ORAL
Qty: 20 TAB | Refills: 0 | Status: SHIPPED | OUTPATIENT
Start: 2019-06-30 | End: 2020-03-09

## 2019-07-01 NOTE — TELEPHONE ENCOUNTER
Following rx was faxed to pharmacy with confirmation received:      LORazepam (ATIVAN) 0.5 mg tablet          Sig: TAKE 1 TABLET BY MOUTH ONCE DAILY AT NIGHT AS NEEDED FOR ANXIETY    Disp:  20 Tab    Refills:  0    Start: 6/30/2019    Class: Print

## 2019-08-21 DIAGNOSIS — M54.50 LOW BACK PAIN WITHOUT SCIATICA, UNSPECIFIED BACK PAIN LATERALITY, UNSPECIFIED CHRONICITY: Primary | ICD-10-CM

## 2019-08-21 RX ORDER — GABAPENTIN 300 MG/1
300 CAPSULE ORAL
Qty: 180 CAP | Refills: 1 | Status: SHIPPED | OUTPATIENT
Start: 2019-08-21 | End: 2020-07-23

## 2019-08-21 NOTE — TELEPHONE ENCOUNTER
PCP: eR Hernandez MD    Last appt: 6/25/2019  Future Appointments   Date Time Provider Willian Paul   9/24/2019  8:30 AM Re Hernandez MD [address]   9/24/2019 11:00 AM Rashawn Frost, PHD REYNALDO COOK [facility]   9/24/2019  1:00 PM Rashawn Frost,  [facility]       Requested Prescriptions     Pending Prescriptions Disp Refills    gabapentin (NEURONTIN) 300 mg capsule 180 Cap 1     Sig: Take 1 Cap by mouth nightly as needed for Pain.

## 2019-08-23 NOTE — TELEPHONE ENCOUNTER
Following rx was faxed to pharmacy with confirmation received:      gabapentin (NEURONTIN) 300 mg capsule [[id number]]     Order Details   Dose: 300 mg Route: Oral Frequency: BEDTIME PRN for Pain   Note to Pharmacy:   Please consider 90 day supplies to promote better adherence        Dispense Quantity: 180 Cap Refills: 1 Fills remaining: --           Sig: Take 1 Cap by mouth nightly as needed for Pain.          Written Date: 08/21/19 Expiration Date: --     Start Date: 08/21/19 End Date: --

## 2019-08-26 ENCOUNTER — TELEPHONE (OUTPATIENT)
Dept: INTERNAL MEDICINE CLINIC | Age: 69
End: 2019-08-26

## 2019-08-26 DIAGNOSIS — M54.50 LOW BACK PAIN WITHOUT SCIATICA, UNSPECIFIED BACK PAIN LATERALITY, UNSPECIFIED CHRONICITY: ICD-10-CM

## 2019-08-26 RX ORDER — GABAPENTIN 300 MG/1
300 CAPSULE ORAL
Qty: 180 CAP | Refills: 1 | Status: CANCELLED | OUTPATIENT
Start: 2019-08-26

## 2019-08-26 NOTE — TELEPHONE ENCOUNTER
----- Message from Viv Baez sent at 8/23/2019  4:42 PM EDT -----  Regarding:  [facility] / Austin Nearing Message/Vendor Calls    [facility] is requesting to speak with nurse in regard in to clarifying the directions on \"Gabapentin\" 300 mg # 180    Callback required    Best contact number(s): [phone]        Message copied/pasted from [facility]

## 2019-08-26 NOTE — TELEPHONE ENCOUNTER
Spoke to Henrique sandhu at [address] to clarify gabapentin directions: per Dr. Riki Irby last office notes, pt taking 2-3 tabs qhs. Henrique sandhu verbalized understanding of information discussed w/ no further questions at this time.

## 2019-09-10 ENCOUNTER — TELEPHONE (OUTPATIENT)
Dept: INTERNAL MEDICINE CLINIC | Age: 69
End: 2019-09-10

## 2019-09-10 DIAGNOSIS — Z13.29 SCREENING FOR THYROID DISORDER: ICD-10-CM

## 2019-09-10 DIAGNOSIS — E11.9 DIABETES MELLITUS WITHOUT COMPLICATION (HCC): Primary | ICD-10-CM

## 2019-09-10 NOTE — TELEPHONE ENCOUNTER
----- Message from Ian Ruby sent at 9/10/2019  8:46 AM EDT -----  Regarding: DR. Bowling/telephone  Contact: [phone]  Daughter Kecia Cerna requesting to see if lab work order is there for Pt. to come in to have done prior to appt. on 9/24/19. Leave message if she doesn't answer.   Best contact: [phone]

## 2019-09-11 NOTE — TELEPHONE ENCOUNTER
MD Vero Alba Client, LPN   Caller: Unspecified (Yesterday,  9:11 AM)             Duane Malone, labs ordered.

## 2019-09-11 NOTE — TELEPHONE ENCOUNTER
Left detailed vm For Yareli(HIPAA) PT's labs are put in for him to do prior to his next appointment. Call back as needed.

## 2019-09-18 ENCOUNTER — HOSPITAL ENCOUNTER (OUTPATIENT)
Dept: LAB | Age: 69
Discharge: HOME | End: 2019-09-18
Payer: MEDICARE

## 2019-09-18 ENCOUNTER — TELEPHONE (OUTPATIENT)
Dept: INTERNAL MEDICINE CLINIC | Age: 69
End: 2019-09-18

## 2019-09-18 DIAGNOSIS — Z13.29 SCREENING FOR THYROID DISORDER: ICD-10-CM

## 2019-09-18 DIAGNOSIS — E11.9 DIABETES MELLITUS WITHOUT COMPLICATION (HCC): ICD-10-CM

## 2019-09-18 PROCEDURE — 36415 COLL VENOUS BLD VENIPUNCTURE: CPT

## 2019-09-18 PROCEDURE — 80048 BASIC METABOLIC PNL TOTAL CA: CPT

## 2019-09-18 PROCEDURE — 84443 ASSAY THYROID STIM HORMONE: CPT

## 2019-09-18 NOTE — TELEPHONE ENCOUNTER
Please fax lab orders to fax #[phone] [facility])  Pt is there waiting now. They can not see what is in our system.

## 2019-09-18 NOTE — TELEPHONE ENCOUNTER
Lab orders faxed to [employer] at [facility] w/ confirmation received.

## 2019-09-19 LAB
BUN SERPL-MCNC: 22 MG/DL (ref 8–27)
BUN/CREAT SERPL: 15 (ref 10–24)
CALCIUM SERPL-MCNC: 9.9 MG/DL (ref 8.6–10.2)
CHLORIDE SERPL-SCNC: 105 MMOL/L (ref 96–106)
CO2 SERPL-SCNC: 26 MMOL/L (ref 20–29)
CREAT SERPL-MCNC: 1.44 MG/DL (ref 0.76–1.27)
GLUCOSE SERPL-MCNC: 95 MG/DL (ref 65–99)
POTASSIUM SERPL-SCNC: 4.5 MMOL/L (ref 3.5–5.2)
SODIUM SERPL-SCNC: 143 MMOL/L (ref 134–144)
TSH SERPL DL<=0.005 MIU/L-ACNC: 2.22 UIU/ML (ref 0.45–4.5)

## 2019-09-23 NOTE — PROGRESS NOTES
HISTORY OF PRESENT ILLNESS  Cornelius Araujo is a 68 y.o. male. HPI   Last here 6/25/19. Pt is here to f/u on chronic conditions. Pt presents with his daughter who provides some of his hx.      BP today is controlled  128/78  Continues on toprol 50mg and HCTZ 12.5mg daily  Pt just took his meds this AM      He is daibetic    BS at home running around 100 87 96 in the AM fasting   Highest reading has been 116   Pt checks BS about once a day   Continues januvia 50mg daily        Wt today is 185 lbs --down 3 lbs x lov   Discussed low carb diet   Discussed [employer]  Discussed diet and w/l       Reviewed labs 6/19         Pt follows annually with Dr. Eric Camara (cardio)   Last visit was 12/7/18  Due for annual f/u in December discussed      Pt follows with Dr. Cecille Montanez (nephro) for ckd in the past  Last visit was 3/2/18  He will only follow with me for his kidney dysfunction  Baseline cr 1.4-1. 5      Pt follows with Lore Young (uro)  for prostate cancer  Reviewed notes 4/19/19: intermediate risk prostate cancer, spencer 7, more and more on L side, discussed radiation therapy as tx, 6 month f/u  Recall pt has FMHX (brother and uncle) prostate cancer  Pt also met with a radiation oncologist at [address] [place] [address]  They will likely be meeting again to discuss radiation therapy in the future  Per his daughter, pt may get XRT with radioactive seeds   Next visit scheduled for 10/24/19 to disucdcs tx andpsa results         Pt saw Dr Shalini Macdonald (sleep)  Last visit was 5/28/19   However while there the pt discussed other issues such as anxiety so the specialist wanted to hold off on the sleep study for now     Pt saw Dr Eric Sánchez (podiatry) in 4/19  Pt was given terbinafine for fungal nails  Discussed that if the fungus is not bothering him that he could just leave it rather than treat it      Pt had a hearing evaluation since lov  Per his daughter he had 40% decrease in both ears, and does not yet need hearing aids  Was recommended to f/u in 1 year for a repeat eval   Reviewed notes from ENT 5/28/19: has some hearing loss      Continues on crestor 10mg for cholesterol  Pt had previously been taking this infrequently d/t cramps  He takes this about 2 x a week at most discussed compliance  Discussed tonic water at night to help with cramps     Pt has been taking CoQ-10     Continues gabapentin 2-3 tabs qhs for his tingling/neuropathy pain in his hands/fingers, which works well for the most part 9/19     Continues celexa 20mg for depression/anxiety, however pt has only been taking this prn   Discussed he needs to take this daily for it to work     He also has ativan to use prn (not often, not in the past month) for anxiety      Pt uses trazadone prn for sleep (infrequently, up to 3x per week)  Pt's daughter states he used to work at night so he was used to sleeping in the day   Discussed sleep hygiene  Discussed sleeping in a dark quiet room, going to bed at the same time, avoiding naps, only sleeping in bed and not other activities, going to another room for a quiet activity if woken up      In the past, pt expressed concern about his memory  Previously, provided referral for Dr. Laith Cantrell (neuropsych)  lov provided info for Dr Osvaldo Curry (neuropsych)  Pt has an appt scheduled for testing 09/24/19     Pt's daughter states he would like to see me less   Wonders how he can move down to 2x a year   Discussed that with diabetes the least I can see him is every 4 mos      ACP not on file. SDM is his daughter (Yareli Kwong). Provided information in the past.      Recall sees Dr. Jersey Mora for h/o DVT, no longer on coumadin or xarelto, on ASA only.  Was on coumadin for h/o PE and DVT post operatively      PREVENTIVE:    Colonoscopy: 9/15/15, Dr. Jennifer Bahena, repeat 5 years  EGD: 9/15/15, Dr. Jennifer Bahena  PSA: 7/15 3.0, 7/16, 5/17 3.8, , 4/18 5.5 4/19 Dr Willis Goes follows   AAA screen: CT 11/12, negative  Tdap: unknown   Pneumovax: 12/04/2012  [id number]:09/24/19   Shingrix: never completed  Flu shot: declines  Foot exam: 19   Microalbumin:    A1c:  ,  6.4,  6.6,  6.5,  6.0,  6.1, 3/19 6.0  5.9  POC 5.7    Eye exam: Dr. Chilango Rios, no diabetic retinopathy--due now  Hep C Screen: 10/15, negative  Lipids:   PGZ 647  EK/17, PACs       Patient Active Problem List    Diagnosis Date Noted    Prostate cancer ([facility] 2018    Type 2 diabetes mellitus with diabetic neuropathy ([facility] 2018    Type 2 diabetes with nephropathy ([facility] 2018    Reactive depression 11/15/2016    Cervical spinal stenosis 2016    CKD (chronic kidney disease) 10/31/2016    ACP (advance care planning) 2015    Diabetes mellitus without complication ([facility]     Hyperlipidemia 07/10/2015    Spinal stenosis, lumbar region, without neurogenic claudication 10/16/2014    Lumbar disc disease with radiculopathy 10/16/2014    DVT of leg (deep venous thrombosis) ([facility] 2012    HTN (hypertension) 2012    Microalbuminuria 2012    Perforated diverticulum of large intestine 2012    Alcohol abuse 2012    Back pain 2012    Insomnia 2012    Perforated viscus 2012     Current Outpatient Medications   Medication Sig Dispense Refill    gabapentin (NEURONTIN) 300 mg capsule Take 1 Cap by mouth nightly as needed for Pain. 180 Cap 1    traZODone (DESYREL) 50 mg tablet TAKE 1 TABLET BY MOUTH ONCE DAILY AT BEDTIME 30 Tab 0    LORazepam (ATIVAN) 0.5 mg tablet TAKE 1 TABLET BY MOUTH ONCE DAILY AT NIGHT AS NEEDED FOR ANXIETY 20 Tab 0    SITagliptin (JANUVIA) 50 mg tablet Take 1 Tab by mouth daily. 90 Tab 0    citalopram (CELEXA) 20 mg tablet Take 1 Tab by mouth daily. 90 Tab 1    metoprolol succinate (TOPROL-XL) 50 mg XL tablet Take 1 Tab by mouth daily. 90 Tab 1    hydroCHLOROthiazide (HYDRODIURIL) 12.5 mg tablet Take 1 Tab by mouth daily.  90 Tab `1    vitamin E acetate (VITAMIN E PO) Take  by mouth daily.  cholecalciferol, vitamin D3, (VITAMIN D3 PO) Take  by mouth daily.  ascorbate calcium (VITAMIN C PO) Take  by mouth daily.  BEE POLLEN PO Take  by mouth daily.  aspirin delayed-release (ASPIR-LOW) 81 mg tablet Take 81 mg by mouth.  traZODone (DESYREL) 50 mg tablet TAKE 1 TABLET BY MOUTH EVERY DAY AT BEDTIME (Patient taking differently: TAKE 1 TABLET BY MOUTH EVERY DAY AT BEDTIME0-PRN) 30 Tab 0    rosuvastatin (CRESTOR) 10 mg tablet TAKE ONE TABLET BY MOUTH NIGHTLY (Patient taking differently: TAKE ONE TABLET BY MOUTH NIGHTLY) 90 Tab 2    metoprolol succinate (TOPROL-XL) 50 mg XL tablet Take 1 Tab by mouth daily. 90 Tab 1    FREESTYLE LANCETS 28 gauge misc USE TO CHECK BLOOD SUGAR ONCE DAILY 100 Lancet 12    cyanocobalamin 1,000 mcg tablet Take 1,000 mcg by mouth daily.  FERROUS FUMARATE (IRON PO) Take  by mouth.  magnesium 250 mg tab Take  by mouth daily.        Past Surgical History:   Procedure Laterality Date    ABDOMEN SURGERY PROC UNLISTED      bowel resection    HX BACK SURGERY  2014    HX GI  11/2012    bowel resection    HX ORTHOPAEDIC      amputated left 4th finger      Lab Results   Component Value Date/Time    WBC 7.3 03/19/2019 10:00 AM    HGB 14.6 03/19/2019 10:00 AM    Hemoglobin (POC) 13.3 11/02/2016 07:15 AM    HCT 45.4 03/19/2019 10:00 AM    Hematocrit (POC) 39 11/02/2016 07:15 AM    PLATELET 785 42/14/1351 10:00 AM    MCV 84 03/19/2019 10:00 AM     Lab Results   Component Value Date/Time    Cholesterol, total 244 (H) 06/25/2019 09:23 AM    Cholesterol (POC) 227 02/06/2015 03:30 PM    HDL Cholesterol 62 06/25/2019 09:23 AM    LDL, calculated 164 (H) 06/25/2019 09:23 AM    LDL Cholesterol (POC) 164 02/06/2015 03:30 PM    Triglyceride 88 06/25/2019 09:23 AM    Triglycerides (POC) 68 02/06/2015 03:30 PM     Lab Results   Component Value Date/Time    GFR est non-AA 49 (L) 09/18/2019 09:15 AM    GFRNA, POC 50 (L) 11/02/2016 07:15 AM    GFR est AA 56 (L) 09/18/2019 09:15 AM    GFRAA, POC >60 11/02/2016 07:15 AM    Creatinine 1.44 (H) 09/18/2019 09:15 AM    Creatinine (POC) 1.4 (H) 11/02/2016 07:15 AM    BUN 22 09/18/2019 09:15 AM    BUN (POC) 28 (H) 11/02/2016 07:15 AM    Sodium 143 09/18/2019 09:15 AM    Sodium (POC) 140 11/02/2016 07:15 AM    Potassium 4.5 09/18/2019 09:15 AM    Potassium (POC) 4.1 11/02/2016 07:15 AM    Chloride 105 09/18/2019 09:15 AM    Chloride (POC) 105 11/02/2016 07:15 AM    CO2 26 09/18/2019 09:15 AM    Magnesium 2.2 06/04/2018 08:51 AM    Phosphorus 3.0 10/17/2014 04:40 AM    Albumin, urine 59.5 10/23/2015 09:10 AM        Review of Systems   Respiratory: Negative for shortness of breath. Cardiovascular: Negative for chest pain. Physical Exam   Constitutional: He is oriented to person, place, and time. He appears well-developed and well-nourished. No distress. HENT:   Head: Normocephalic and atraumatic. Mouth/Throat: Oropharynx is clear and moist. No oropharyngeal exudate. Eyes: Conjunctivae and EOM are normal. Right eye exhibits no discharge. Left eye exhibits no discharge. Neck: Normal range of motion. Neck supple. Cardiovascular: Normal rate, regular rhythm and normal heart sounds. Exam reveals no gallop and no friction rub. No murmur heard. Pulmonary/Chest: Effort normal and breath sounds normal. No respiratory distress. He has no wheezes. He has no rales. He exhibits no tenderness. Musculoskeletal: Normal range of motion. He exhibits no edema, tenderness or deformity. Lymphadenopathy:     He has no cervical adenopathy. Neurological: He is alert and oriented to person, place, and time. He has normal reflexes. Coordination normal.   Skin: Skin is warm and dry. No rash noted. He is not diaphoretic. No erythema. No pallor. Psychiatric: He has a normal mood and affect. His behavior is normal.       ASSESSMENT and PLAN    ICD-10-CM ICD-9-CM    1.  Chronic deep vein thrombosis (DVT) of proximal vein of both lower extremities ([facility]          Hx of in the past was on long term anti coag in the past currently just on asa  I82.5Y3 453.51    2. Type 2 diabetes with nephropathy (HCC)              a1c at goal on januvia 50 mg daily  E11.21 250.40      583.81    3. Prostate cancer [facility])                Follows with dr Yamilex De Paz discussing tx with radioactive seeds  C61 185    4. Stage 3 chronic kidney disease (HCC)            Cr runs around 1.4-1.5 stable on recent blood work  N18.3 585.3    5. Mixed hyperlipidemia              On crestor lipids not at goal last check due to noncompliance focus on compliance  E78.2 272.2    6. Cervical spinal stenosis              Uses gabapentin 2-3 tabs qhs works well has had surg in the past with dr Erin Rhoades  M48.02 723.0    7. Essential hypertension              Controlled on toprol and hctz continue  I10 401.9    8. Primary insomnia              Uses trazadone prn  F51.01 307.42    9. Reactive depression            Controlled on celexa --discussed taking daily  F32.9 300.4    10. Obesity (BMI 30.0-34. 9)              Discussed diet wt loss portion control WW  E66.9 278.00         11. Memory impairment has neuropsych testing scheduled for today       Scribed by Eliana Cameron of [address] [place] [address] [address], as dictated by Dr. Renetta Hanna. Current diagnosis and concerns discussed with pt at length. Pt understands risks and benefits or current treatment plan and medications, and accepts the treatment and medication with any possible risks. Pt asks appropriate questions, which were answered. Pt was instructed to call with any concerns or problems. I have reviewed the note documented by the scribe. The services provided are my own.   The documentation is accurate

## 2019-09-24 ENCOUNTER — OFFICE VISIT (OUTPATIENT)
Dept: NEUROLOGY | Age: 69
End: 2019-09-24

## 2019-09-24 ENCOUNTER — OFFICE VISIT (OUTPATIENT)
Dept: INTERNAL MEDICINE CLINIC | Age: 69
End: 2019-09-24

## 2019-09-24 VITALS
OXYGEN SATURATION: 98 % | RESPIRATION RATE: 16 BRPM | BODY MASS INDEX: 30.82 KG/M2 | SYSTOLIC BLOOD PRESSURE: 125 MMHG | DIASTOLIC BLOOD PRESSURE: 78 MMHG | HEIGHT: 65 IN | TEMPERATURE: 98 F | HEART RATE: 86 BPM | WEIGHT: 185 LBS

## 2019-09-24 DIAGNOSIS — E66.9 OBESITY (BMI 30.0-34.9): ICD-10-CM

## 2019-09-24 DIAGNOSIS — F32.9 REACTIVE DEPRESSION: ICD-10-CM

## 2019-09-24 DIAGNOSIS — E11.21 TYPE 2 DIABETES WITH NEPHROPATHY (HCC): ICD-10-CM

## 2019-09-24 DIAGNOSIS — Z23 ENCOUNTER FOR IMMUNIZATION: ICD-10-CM

## 2019-09-24 DIAGNOSIS — F51.01 PRIMARY INSOMNIA: ICD-10-CM

## 2019-09-24 DIAGNOSIS — E78.2 MIXED HYPERLIPIDEMIA: ICD-10-CM

## 2019-09-24 DIAGNOSIS — R41.3 MEMORY IMPAIRMENT: ICD-10-CM

## 2019-09-24 DIAGNOSIS — I82.5Y3 CHRONIC DEEP VEIN THROMBOSIS (DVT) OF PROXIMAL VEIN OF BOTH LOWER EXTREMITIES (HCC): Primary | ICD-10-CM

## 2019-09-24 DIAGNOSIS — R41.3 DISTURBANCE OF MEMORY: Primary | ICD-10-CM

## 2019-09-24 DIAGNOSIS — N18.30 STAGE 3 CHRONIC KIDNEY DISEASE (HCC): ICD-10-CM

## 2019-09-24 DIAGNOSIS — C61 PROSTATE CANCER (HCC): ICD-10-CM

## 2019-09-24 DIAGNOSIS — I10 ESSENTIAL HYPERTENSION: ICD-10-CM

## 2019-09-24 DIAGNOSIS — M48.02 CERVICAL SPINAL STENOSIS: ICD-10-CM

## 2019-09-24 PROBLEM — E11.40 TYPE 2 DIABETES MELLITUS WITH DIABETIC NEUROPATHY (HCC): Status: RESOLVED | Noted: 2018-06-04 | Resolved: 2019-09-24

## 2019-09-24 LAB — HBA1C MFR BLD HPLC: 5.7 %

## 2019-09-24 NOTE — PROGRESS NOTES
This note will not be viewable in [address] [facility] at [address]    Office:  [phone]  Fax: [phone]                 Initial Office Exam    Patient Name: Oracio Schultz  Age: 68 y.o. Gender: male   Occupation: Retired  Handedness: right handed   Presenting Concern: Primary Care Physician: Blair Rosen MD  Referring Provider: Same as above    REASON FOR REFERRAL:  This comprehensive and medically necessary neuropsychological assessment was requested to assist a differential diagnosis of memory complaints. The use and purpose of this examination, as well as the extent and limitations of confidentiality, were explained prior to obtaining permission to participate. Instructions were provided regarding the necessity to put forth optimal effort and answer questions truthfully in order to obtain reliable and accurate test results. PERTINENT HISTORY:  Mr. Sarah Isbell presented for a neuropsychological assessment at the recommendation of his treating physician secondary to complaints of declining memory and dysthymia. His daughter reports that he is forgetful, not completing tasks, and verbally aggressive. She reports that his symptoms have been progressively getting worse over the past year. From a brief review of his medical and personal history there has not been any other significant neurological injury or illness noted or reported. He did report experiencing depression or anxiety in the past.  Mr. Rekha Moran continues to drink alcohol, but doesn't feel he has an alcohol problem. Mr. Sarah Isbell does not  report any problems at birth or difficulties meeting developmental milestones. He reports that he had an adequate level of family support and was not subject to trauma or abuse as a child.   Mr. Rekha Moran Surya Estrada does not  report being retain in school or receiving special assistance in any of he classes or subjects. Mr. Sarah Isbell completed 12 years of education. He does note that he was a behavioral problem in school. Mr. Sarah Isbell does  exercise on a regular basis and does  maintain a balanced diet. He does  report problems with sleep, but does not complain of pain. He does not  participate in mentally stimulating activities. Mr. Sarah Isbell does not  have concerns regarding prescription medications, family members, place of residence, or financial stressors. Mr. Sarah Isbell indicated that he is independent in his instrumental activities of daily living, including shopping, meal preparation, housekeeping, doing laundry, driving a car, managing medications, and finances. Current Outpatient Medications   Medication Sig    gabapentin (NEURONTIN) 300 mg capsule Take 1 Cap by mouth nightly as needed for Pain.  LORazepam (ATIVAN) 0.5 mg tablet TAKE 1 TABLET BY MOUTH ONCE DAILY AT NIGHT AS NEEDED FOR ANXIETY    SITagliptin (JANUVIA) 50 mg tablet Take 1 Tab by mouth daily.  citalopram (CELEXA) 20 mg tablet Take 1 Tab by mouth daily.  hydroCHLOROthiazide (HYDRODIURIL) 12.5 mg tablet Take 1 Tab by mouth daily.  cholecalciferol, vitamin D3, (VITAMIN D3 PO) Take  by mouth daily.  ascorbate calcium (VITAMIN C PO) Take  by mouth daily.  aspirin delayed-release (ASPIR-LOW) 81 mg tablet Take 81 mg by mouth.  traZODone (DESYREL) 50 mg tablet TAKE 1 TABLET BY MOUTH EVERY DAY AT BEDTIME (Patient taking differently: TAKE 1 TABLET BY MOUTH EVERY DAY AT BEDTIME0-PRN)    rosuvastatin (CRESTOR) 10 mg tablet TAKE ONE TABLET BY MOUTH NIGHTLY (Patient taking differently: TAKE ONE TABLET BY MOUTH NIGHTLY)    metoprolol succinate (TOPROL-XL) 50 mg XL tablet Take 1 Tab by mouth daily.  FREESTYLE LANCETS [facility] USE TO CHECK BLOOD SUGAR ONCE DAILY    cyanocobalamin 1,000 mcg tablet Take 1,000 mcg by mouth daily.     FERROUS FUMARATE (IRON PO) Take  by mouth.  magnesium 250 mg tab Take  by mouth daily. No current facility-administered medications for this visit. Past Medical History:   Diagnosis Date    Arthritis     Chronic kidney disease     Stage 3    Chronic pain     Abdoman, back, fingers per daughter   [facility])     DIET CONTROLLED    Hypertension     PE (pulmonary embolism)     Pneumonia     Psychiatric disorder     Depression       No flowsheet data found. No data recorded    Past Surgical History:   Procedure Laterality Date    ABDOMEN SURGERY PROC UNLISTED      bowel resection    HX BACK SURGERY  2014    HX GI  11/2012    bowel resection    HX ORTHOPAEDIC      amputated left 4th finger       Social History     Socioeconomic History    Marital status:      Spouse name: Not on file    Number of children: Not on file    Years of education: Not on file    Highest education level: Not on file   Tobacco Use    Smoking status: Never Smoker    Smokeless tobacco: Current User     Types: Chew   Substance and Sexual Activity    Alcohol use: Yes     Alcohol/week: 7.0 standard drinks     Types: 7 Cans of beer per week     Comment: 7-10 per week    Drug use: No    Sexual activity: Not Currently   Social History Narrative    ** Merged History Encounter **            Family History   Problem Relation Age of Onset    Cancer Mother     Anesth Problems Neg Hx        CT Results (most recent):  Results from Hospital Encounter encounter on 02/13/17   CT NECK SOFT TISSUE W CONT    Narrative EXAM:  CT NECK SOFT TISSUE W CONT    INDICATION:  NECK MASS. Prominent sized lymph nodes adjacent right parotid gland  on prior ultrasound. COMPARISON: Ultrasound 1/31/2017. CONTRAST: 100 mL of Isovue-300. TECHNIQUE: Multislice helical CT was performed from the mid calvarium to the  aortic arch during uneventful rapid bolus intravenous contrast administration.   Contiguous 2.5 mm axial images were reconstructed and lung and soft tissue  windows were generated. Coronal and sagittal reformations were generated. CT  dose reduction was achieved through use of a standardized protocol tailored for  this examination and automatic exposure control for dose modulation. FINDINGS:    The previously seen prominent lymph node adjacent to the right parotid gland has  normal size and size measuring approximately 4 x 15 mm, previously 8 x 20 mm. No  enlarged lymph nodes are seen otherwise. No mass is identified within the neck. The carotid and jugular vessels enhance normally with minimal atherosclerotic  calcification. The thyroid gland, submandibular salivary glands and parotid glands are normal.    No nasopharyngeal, pharyngeal or laryngeal mass is identified. No abnormalities are identified in the visualized portions of the brain or  orbits. The visualized mastoid air cells and paranasal sinuses are clear. The visualized portions of the lung apices are clear. The surrounding soft tissue and musculoskeletal structures are markable for  degenerative spine change and postsurgical changes of bilateral katty and screw  posterior hardware at the C3-C6 levels bilaterally as well as partial  visualization of what appears to be right shoulder joint effusion with calcified  free body. Impression IMPRESSION: No persistent prominence of lymph node adjacent right parotid gland  with no other lymphadenopathy or mass identified. Incidental findings as above  including probable right shoulder joint effusion with calcified free body. This  could be further evaluated with dedicated shoulder MRI as clinically warranted. MRI Results (most recent):  Results from [place] encounter on 04/25/16   MRI CERV SPINE WO CONT    Narrative **Final Report**       ICD Codes / Adm. Diagnosis: 782.0  782.0 / Disturbance of skin sensation    Disturbance of skin sensation  Examination:  MR C SPINE WO CON  - [id number] - Apr 25 2016  9:33AM  Accession No:  [id number]  Reason:  severe djd      REPORT:  EXAM:  MR C SPINE WO CON    INDICATION:  Bilateral hand tingling. Clinical differential diagnosis   includes cervical degenerative disease and bilateral carpal tunnel syndrome. COMPARISON: Cervical spine degenerative disease on 4/12/2016    TECHNIQUE: MR imaging of the cervical spine was performed using the   following sequences: sagittal T1, T2, STIR;  axial T2, T1 performed on the 3   Ara magnet. CONTRAST:  None. FINDINGS:    2 mm posterior subluxation of C3 on C4. No other subluxation. Vertebral body   heights are maintained. Marrow signal is normal.     Disc desiccation is greatest at C3-C4 and C6-C7. No evidence of discitis. The craniocervical junction is intact. Mild cord compression at C3-4 and   C6-C7. No cord signal abnormality. The paraspinal soft tissues are within normal limits. C1-C2 osteoarthritis   moderate for age. C2-C3:  Small central disc protrusion. No stenosis. C3-C4:  Posterior disc osteophyte complex and posterior subluxation. Moderate central spinal canal stenosis. Moderate bilateral foraminal   stenosis. C4-C5:  No herniation or stenosis. C5-C6:  Posterior disc osteophyte complex. Mild central spinal canal   stenosis. Mild left foraminal stenosis. C6-C7:  Posterior disc osteophyte complex. Moderate central spinal canal   stenosis. Moderate-severe bilateral foraminal stenosis. C7-T1:  No herniation or stenosis. IMPRESSION:    1. Mild cord compression at C3-4 and C6-7. No cord signal abnormality. 2. Moderate central spinal canal stenosis at C3-4 and C6-7.  3. Moderate-severe bilateral foraminal stenosis at C6-7.  4. 2 mm posterior subluxation of C3-C4. Consider flexion and extension   lateral cervical spine views to assess for motion.   23X              Signing/Reading Doctor: Cailin Renee ([id number])    Approved: Cailin Renee ([id number])  Apr 25 2016 11: 30AM                                           MENTAL STATUS:    Orientation: Generally oriented to person, place, and situation   Eye Contact: variable   Motor Behavior:  Ambulates independently   Speech:  Garbled secondary to chewing tobacco   Thought Process: Goal-directed   Thought Content: No evidence of hallucinations or delusions   Suicidal ideations: Denies   Mood:  Irritable    Affect:  unremakable   Concentration:  N/A   Abstraction:  N/A   Insight:  limited     On the Modified Mini-Mental Status Exam: Mr. Norris Arellano was uncooperative with the mental status, and left the interview prematurely . DIAGNOSTIC IMPRESSIONS:    ICD-10-CM ICD-9-CM    1. Disturbance of memory R41.3 780.93    2. Reactive depression F32.9 300.4              PLAN:  Mr. Norris Arellano does not want further neuropsychological services at this time. 22414 x 1 Review of records. Face to face interview w/ patient. Determine test protocol: 60 minutes. Total 1 unit        Kiko Hooks, PhD, ABPP, LCP  Licensed Clinical Psychologist/ Neuropsychologist        This note will not be viewable in [address]

## 2019-12-04 RX ORDER — TRAZODONE HYDROCHLORIDE 50 MG/1
TABLET ORAL
Qty: 30 TAB | Refills: 0 | Status: SHIPPED | OUTPATIENT
Start: 2019-12-04 | End: 2020-03-09

## 2019-12-05 RX ORDER — CITALOPRAM 20 MG/1
20 TABLET, FILM COATED ORAL DAILY
Qty: 90 TAB | Refills: 1 | Status: SHIPPED | OUTPATIENT
Start: 2019-12-05 | End: 2021-03-18 | Stop reason: CLARIF

## 2019-12-05 NOTE — TELEPHONE ENCOUNTER
PCP: Eloisa Zepeda MD    Last appt: 9/24/2019  Future Appointments   Date Time Provider Willian Paul   12/20/2019  3:00 PM Tee Fontanez MD [address] [place] [address]   1/14/2020  8:30 AM Eloisa Zepeda MD [facility]       Requested Prescriptions     Pending Prescriptions Disp Refills    citalopram (CELEXA) 20 mg tablet 90 Tab 1     Sig: Take 1 Tab by mouth daily. PCP: Eloisa Zepeda MD    Last appt: 9/24/2019    Requested Prescriptions     Pending Prescriptions Disp Refills    citalopram (CELEXA) 20 mg tablet 90 Tab 1     Sig: Take 1 Tab by mouth daily.  SITagliptin (JANUVIA) 50 mg tablet 90 Tab 0     Sig: Take 1 Tab by mouth daily.

## 2019-12-20 ENCOUNTER — OFFICE VISIT (OUTPATIENT)
Dept: CARDIOLOGY CLINIC | Age: 69
End: 2019-12-20

## 2019-12-20 VITALS
SYSTOLIC BLOOD PRESSURE: 130 MMHG | DIASTOLIC BLOOD PRESSURE: 86 MMHG | HEIGHT: 65 IN | WEIGHT: 191.8 LBS | BODY MASS INDEX: 31.96 KG/M2 | OXYGEN SATURATION: 97 % | RESPIRATION RATE: 16 BRPM | HEART RATE: 75 BPM

## 2019-12-20 DIAGNOSIS — E11.21 TYPE 2 DIABETES WITH NEPHROPATHY (HCC): ICD-10-CM

## 2019-12-20 DIAGNOSIS — E78.2 MIXED HYPERLIPIDEMIA: ICD-10-CM

## 2019-12-20 DIAGNOSIS — I10 ESSENTIAL HYPERTENSION: Primary | ICD-10-CM

## 2019-12-20 RX ORDER — AMPICILLIN TRIHYDRATE 250 MG
600 CAPSULE ORAL
COMMUNITY
End: 2020-06-10 | Stop reason: ALTCHOICE

## 2019-12-20 RX ORDER — EZETIMIBE 10 MG/1
10 TABLET ORAL DAILY
Qty: 90 TAB | Refills: 1 | Status: SHIPPED | OUTPATIENT
Start: 2019-12-20 | End: 2020-07-08

## 2019-12-20 NOTE — PROGRESS NOTES
1. Have you been to the ER, urgent care clinic since your last visit? Hospitalized since your last visit? No.    2. Have you seen or consulted any other health care providers outside of the [address] [place] [address] since your last visit? Include any pap smears or colon screening.    No.      Chief Complaint   Patient presents with    Annual Exam     pt denies any cardiac symptoms

## 2019-12-20 NOTE — LETTER
12/20/19 Patient: Mi Gonzalez YOB: 1950 Date of Visit: 12/20/2019 Komal Hitchcock MD 
[address] [facility] [address] VIA In Basket Dear Komal Hitchcock MD, Thank you for referring Mr. Harsha Campos to [address] for evaluation. My notes for this consultation are attached. If you have questions, please do not hesitate to call me. I look forward to following your patient along with you.  
 
 
Sincerely, 
 
Rocco Ahumada, MD

## 2019-12-20 NOTE — PROGRESS NOTES
[facility] @ [facility]    Zeinab Howard DNP  Subjective/HPI:     Bao Page is a 68 y.o. male is here for routine f/u. The patient denies chest pain/ shortness of breath, orthopnea, PND, LE edema, palpitations, syncope, presyncope or fatigue. Patient presents for a yearly follow-up. Denies any cardiac symptoms he is accompanied by his wife. Has a history of diabetes hypertension hyperlipidemia previously seen by cardiology for preop cardiac clearance. Does not self monitor blood pressure, has been fairly controlled in [facility] care flowsheet. Has had difficulty in tolerating statin therapy he is off statin therapy and is using red yeast rice. In chart review has been intolerant to Crestor, Lipitor, pravastatin. Denies smoking, reports EtOH use. PCP Provider  Sandie Cantrell MD  Past Medical History:   Diagnosis Date    Arthritis     Chronic kidney disease     Stage 3    Chronic pain     Abdoman, back, fingers per daughter   Aletha Ramírez [facility])     DIET CONTROLLED    Hypertension     PE (pulmonary embolism)     Pneumonia     Psychiatric disorder     Depression      Past Surgical History:   Procedure Laterality Date    ABDOMEN SURGERY PROC UNLISTED      bowel resection    HX BACK SURGERY  2014    HX GI  11/2012    bowel resection    HX ORTHOPAEDIC      amputated left 4th finger     Allergies   Allergen Reactions    Arb-Angiotensin Receptor Antagonist Other (comments)     High k      Family History   Problem Relation Age of Onset    Cancer Mother     Anesth Problems Neg Hx       Current Outpatient Medications   Medication Sig    red yeast rice extract 600 mg cap Take 600 mg by mouth now. Every now and then    ezetimibe (ZETIA) 10 mg tablet Take 1 Tab by mouth daily.  citalopram (CELEXA) 20 mg tablet Take 1 Tab by mouth daily.  SITagliptin (JANUVIA) 50 mg tablet Take 1 Tab by mouth daily.     traZODone (DESYREL) 50 mg tablet TAKE 1 TABLET BY MOUTH AT BEDTIME    gabapentin (NEURONTIN) 300 mg capsule Take 1 Cap by mouth nightly as needed for Pain.  LORazepam (ATIVAN) 0.5 mg tablet TAKE 1 TABLET BY MOUTH ONCE DAILY AT NIGHT AS NEEDED FOR ANXIETY    hydroCHLOROthiazide (HYDRODIURIL) 12.5 mg tablet Take 1 Tab by mouth daily.  cholecalciferol, vitamin D3, (VITAMIN D3 PO) Take  by mouth daily.  ascorbate calcium (VITAMIN C PO) Take  by mouth daily.  aspirin delayed-release (ASPIR-LOW) 81 mg tablet Take 81 mg by mouth. Taking every now and then    metoprolol succinate (TOPROL-XL) 50 mg XL tablet Take 1 Tab by mouth daily.  FREESTYLE LANCETS 28 gauge misc USE TO CHECK BLOOD SUGAR ONCE DAILY    magnesium 250 mg tab Take  by mouth daily.  rosuvastatin (CRESTOR) 10 mg tablet TAKE ONE TABLET BY MOUTH NIGHTLY (Patient taking differently: Not taking  Indications: PT TAKES TWICE WEEKLY AND THEN STOPS)    cyanocobalamin 1,000 mcg tablet Take 1,000 mcg by mouth daily.  FERROUS FUMARATE (IRON PO) Take  by mouth. No current facility-administered medications for this visit. Vitals:    12/20/19 1438 12/20/19 1454   BP: 138/90 130/86   Pulse: 75    Resp: 16    SpO2: 97%    Weight: 191 lb 12.8 oz (87 kg)    Height: 5' 5\" (1.651 m)      Social History     Socioeconomic History    Marital status:      Spouse name: Not on file    Number of children: Not on file    Years of education: Not on file    Highest education level: Not on file   Occupational History    Not on file   Social Needs    Financial resource strain: Not on file    Food insecurity:     Worry: Not on file     Inability: Not on file    Transportation needs:     Medical: Not on file     Non-medical: Not on file   Tobacco Use    Smoking status: Never Smoker    Smokeless tobacco: Current User     Types: Chew   Substance and Sexual Activity    Alcohol use:  Yes     Alcohol/week: 7.0 standard drinks     Types: 7 Cans of beer per week     Comment: 7-10 per week    Drug use: No    Sexual activity: Not Currently   Lifestyle    Physical activity:     Days per week: Not on file     Minutes per session: Not on file    Stress: Not on file   Relationships    Social connections:     Talks on phone: Not on file     Gets together: Not on file     Attends Advent service: Not on file     Active member of club or organization: Not on file     Attends meetings of clubs or organizations: Not on file     Relationship status: Not on file    Intimate partner violence:     Fear of current or ex partner: Not on file     Emotionally abused: Not on file     Physically abused: Not on file     Forced sexual activity: Not on file   Other Topics Concern    Not on file   Social History Narrative    ** Merged History Encounter **            I have reviewed the nurses notes, vitals, problem list, allergy list, medical history, family, social history and medications. Review of Symptoms:  Review of Systems   Constitutional: Negative for chills, fever and malaise/fatigue. Respiratory: Negative for cough, hemoptysis, shortness of breath and wheezing. Cardiovascular: Negative for chest pain, palpitations, orthopnea, leg swelling and PND. Gastrointestinal: Negative for heartburn and nausea. Skin: Negative. Physical Exam:      General: Well developed, in no acute distress, cooperative and alert  HEENT: No carotid bruits, no JVD, trach is midline. Neck Supple, PERRL, EOM intact. Heart:  Normal S1/S2 negative S3 or S4. Regular, no murmur, gallop or rub. Respiratory: Clear bilaterally x 4, no wheezing or rales  Abdomen:   Soft, non-tender, no masses, bowel sounds are active. Extremities:  No edema, normal cap refill, no cyanosis, atraumatic. Neuro: A&Ox3, speech clear, gait stable. Skin: Skin color is normal. No rashes or lesions.  Non diaphoretic  Vascular: 2+ pulses symmetric in all extremities    Cardiographics    ECG: NSR        Cardiology Labs:  Lab Results   Component Value Date/Time    Cholesterol, total 244 (H) 06/25/2019 09:23 AM    HDL Cholesterol 62 06/25/2019 09:23 AM    LDL, calculated 164 (H) 06/25/2019 09:23 AM    Triglyceride 88 06/25/2019 09:23 AM       Lab Results   Component Value Date/Time    Sodium 143 09/18/2019 09:15 AM    Potassium 4.5 09/18/2019 09:15 AM    Chloride 105 09/18/2019 09:15 AM    CO2 26 09/18/2019 09:15 AM    Anion gap 6 11/03/2016 04:01 AM    Glucose 95 09/18/2019 09:15 AM    BUN 22 09/18/2019 09:15 AM    Creatinine 1.44 (H) 09/18/2019 09:15 AM    BUN/Creatinine ratio 15 09/18/2019 09:15 AM    GFR est AA 56 (L) 09/18/2019 09:15 AM    GFR est non-AA 49 (L) 09/18/2019 09:15 AM    Calcium 9.9 09/18/2019 09:15 AM    Bilirubin, total 0.9 06/25/2019 09:23 AM    AST (SGOT) 25 06/25/2019 09:23 AM    Alk. phosphatase 45 06/25/2019 09:23 AM    Protein, total 7.1 06/25/2019 09:23 AM    Albumin 4.2 06/25/2019 09:23 AM    Globulin 4.2 (H) 10/26/2016 09:49 AM    A-G Ratio 1.4 06/25/2019 09:23 AM    ALT (SGPT) 19 06/25/2019 09:23 AM           Assessment:     Assessment:     Diagnoses and all orders for this visit:    1. Essential hypertension  -     AMB POC EKG ROUTINE W/ 12 LEADS, INTER & REP  -     CK; Future  -     LIPID CASCADE; Future  -     METABOLIC PANEL, COMPREHENSIVE; Future    2. Mixed hyperlipidemia  -     CK; Future  -     LIPID CASCADE; Future  -     METABOLIC PANEL, COMPREHENSIVE; Future    3. Type 2 diabetes with nephropathy (HCC)  -     CK; Future  -     LIPID CASCADE; Future  -     METABOLIC PANEL, COMPREHENSIVE; Future    Other orders  -     ezetimibe (ZETIA) 10 mg tablet; Take 1 Tab by mouth daily. ICD-10-CM ICD-9-CM    1. Essential hypertension I10 401.9 AMB POC EKG ROUTINE W/ 12 LEADS, INTER & REP      CK      LIPID CASCADE      METABOLIC PANEL, COMPREHENSIVE   2. Mixed hyperlipidemia E78.2 272.2 CK      LIPID CASCADE      METABOLIC PANEL, COMPREHENSIVE   3.  Type 2 diabetes with nephropathy (HCC) E11.21 250.40 CK     583.81 LIPID CASCADE      METABOLIC PANEL, COMPREHENSIVE     Orders Placed This Encounter    CK     Standing Status:   Future     Standing Expiration Date:   6/20/2020    LIPID CASCADE     Standing Status:   Future     Standing Expiration Date:   4/13/2659    METABOLIC PANEL, COMPREHENSIVE     Standing Status:   Future     Standing Expiration Date:   6/20/2020    AMB POC EKG ROUTINE W/ 12 LEADS, INTER & REP     Order Specific Question:   Reason for Exam:     Answer:   routine    red yeast rice extract 600 mg cap     Sig: Take 600 mg by mouth now. Every now and then    ezetimibe (ZETIA) 10 mg tablet     Sig: Take 1 Tab by mouth daily. Dispense:  90 Tab     Refill:  1        Plan:     Patient is a 38-year-old male for routine follow-up.    I last saw him in 2017 for preoperative evaluation for prostate biopsy. Negative ischemia on nuclear stress test 2016, essentially structurally normal heart on echo 2015. Shashank Carrizales is without signs or symptoms of coronary artery disease.      Hyperlipidemia/ Myalgias: Intolerant to Crestor, Lipitor and Pravachol, declined PSK9 therapy. Will trial Zetia. Repeat labs in 3 months.     Hypertension: controlled     Diabetes as per Dr. Autumn Crowder on diet and exercise- eventual goal of 30-60 minutes 5-7 times a week as per AHA guidelines. Follow up in 1 year, sooner as needed. Ju Calderón MD      Please note that this dictation was completed with [employer], the computer voice recognition software. Quite often unanticipated grammatical, syntax, homophones, and other interpretive errors are inadvertently transcribed by the computer software. Please disregard these errors. Please excuse any errors that have escaped final proofreading. Thank you.

## 2020-01-08 ENCOUNTER — TELEPHONE (OUTPATIENT)
Dept: INTERNAL MEDICINE CLINIC | Age: 70
End: 2020-01-08

## 2020-01-08 NOTE — PROGRESS NOTES
HISTORY OF PRESENT ILLNESS  Bhumika Becerril is a 68 y.o. male. HPI   Last here 9/24/19. Pt is here to f/u on chronic conditions. Pt presents with his daughter who provides some of his hx.      BP NJXRD AG 250/17   Continues on toprol 50mg and HCTZ 12.5mg daily  Pt just took his meds this AM      He is diabetic   BS at home running around 104 this AM, ranging between 80s, 90s and 100s   Pt checks BS about once a day   Continues januvia 50mg daily        Wt today is 187 lbs-- up 2 lbs x lov    Discussed [employer]  Discussed diet and w/l       Reviewed labs 9/19          Pt follows annually with Dr. Hortensia Rivera (cardio)   Last visit was 12/20/19: Patient is a 24-year-old male for routine follow-up.    I last saw him in 2017 for preoperative evaluation for prostate biopsy.  Negative ischemia on nuclear stress test 2016, essentially structurally normal heart on echo 2015. Delbert Navarrete is without signs or symptoms of coronary artery disease.   Hyperlipidemia/ Myalgias: Intolerant to Crestor, Lipitor and Pravachol, declined PSK9 therapy. Will trial Zetia. Repeat labs in 3 months. Hypertension: controlled  Diabetes as per Dr. Fernando Reynolds on diet and exercise- eventual goal of 30-60 minutes 5-7 times a week as per AHA guidelines.    Follow up in 1 year, sooner as needed. He is now on zetia       Pt follows with Dr. Samia Weinstein (nephro) for ckd in the past  Last visit was 3/2/18  He will only follow with me for his kidney dysfunction  Baseline cr 1.4-1. 5      Pt follows with Corrina Barr (uro)  for prostate cancer  Last visit was 10/24/19   Pt had an MRI of prostate this AM   He has a f/u scheduled next week 1/20   Recall pt has FMHX (brother and uncle) prostate cancer  Pt also met with a radiation oncologist at [facility]   They have been discussing xrt options         Pt saw Dr Adolfo Baker (sleep)  Last visit was 5/28/19   However while there the pt discussed other issues such as anxiety so the specialist wanted to hold off on the sleep Patient is currently sexually active and uses Tri-Sprintec for birth control. She reports this method is effective for her. She will occasionally forget to take a pill and will take 2 the next day. We discussed using backup method for contraception when this does happen. She has regular periods without breakthrough bleeding. She is currently taking her last pack of OCPs and will have her period next week.   study for now     Pt saw Dr Dave Bahena (podiatry) in 4/19        Pt had a hearing evaluation 5/28/19, was found to have some hearing loss but did not want hearing aids      Pt was having myalgias on crestor, now on zetia per cardio   No cramps on this      Pt has been taking CoQ-10     Continues gabapentin 2-3 tabs qhs for his tingling/neuropathy pain in his hands/fingers  He c/o this getting worse   Discussed seeing neuro   Recall: pt had surg with Dr Jacob Phillips on his neck      Continues celexa 20mg for depression/anxiety, pt still not taking daily but using this more often   Discussed again  he needs to take this daily for it to work      He also has ativan to use prn (not often, not in the past month) for anxiety      Pt uses trazadone prn for sleep (infrequently, up to 3x per week)  Pt wonders about nightmares, discussed this is nl      In the past, pt expressed concern about his memory  Previously, provided referral for Dr. Veronica Renee (neuropsych)  Pt saw  Dr Priya Liriano (neuropsych)  Reviewed notes 9/24/19: Mr. Keara Miller does not want further neuropsychological services at this time. [id number] x 1 Review of records. Face to face interview w/ patient. Determine test protocol: 60 minutes. Total 1 unit        ACP not on file. SDM is his daughter (Yareli Mehta). Provided information in the past.      Recall sees Dr. Avani Lopez for h/o DVT, no longer on coumadin or xarelto, on ASA only.  Was on coumadin for h/o PE and DVT post operatively      PREVENTIVE:    Colonoscopy: 9/15/15, Dr. Destini Arshad, repeat 5 years  EGD: 9/15/15, Dr. Destini Arshad  PSA: 7/15 3.0, 7/16, 5/17 3.8, , 4/18 5.5 4/19 Dr Shweta Minor follows   AAA screen: CT 11/12, negative  Tdap: unknown   Pneumovax: 12/04/2012  [id number]:09/24/19   Shingrix: never completed, declines   Flu shot: declines  Foot exam: 06/25/19   Microalbumin: 6/19   A1c:  , 11/17 6.4, 2/18 6.6, 6/18 6.5, 9/18 6.0, 12/18 6.1, 3/19 6.0 6/19 5.9 9/19 POC 5.7    Eye exam: Dr. Louisa Francisco, no diabetic retinopathy--due now, scheduled for 19   Hep C Screen: 10/15, negative  Lipids:   SVI 639  EK/17, PACs       Patient Active Problem List    Diagnosis Date Noted    Prostate cancer ([facility] 2018    Type 2 diabetes with nephropathy ([facility] 2018    Reactive depression 11/15/2016    Cervical spinal stenosis 2016    CKD (chronic kidney disease) 10/31/2016    Hyperlipidemia 07/10/2015    Spinal stenosis, lumbar region, without neurogenic claudication 10/16/2014    DVT of leg (deep venous thrombosis) ([facility] 2012    HTN (hypertension) 2012    Perforated diverticulum of large intestine 2012    Alcohol abuse 2012    Insomnia 2012     Current Outpatient Medications   Medication Sig Dispense Refill    red yeast rice extract 600 mg cap Take 600 mg by mouth now. Every now and then      ezetimibe (ZETIA) 10 mg tablet Take 1 Tab by mouth daily. 90 Tab 1    citalopram (CELEXA) 20 mg tablet Take 1 Tab by mouth daily. 90 Tab 1    SITagliptin (JANUVIA) 50 mg tablet Take 1 Tab by mouth daily. 90 Tab 0    traZODone (DESYREL) 50 mg tablet TAKE 1 TABLET BY MOUTH AT BEDTIME 30 Tab 0    gabapentin (NEURONTIN) 300 mg capsule Take 1 Cap by mouth nightly as needed for Pain. 180 Cap 1    LORazepam (ATIVAN) 0.5 mg tablet TAKE 1 TABLET BY MOUTH ONCE DAILY AT NIGHT AS NEEDED FOR ANXIETY 20 Tab 0    hydroCHLOROthiazide (HYDRODIURIL) 12.5 mg tablet Take 1 Tab by mouth daily. 90 Tab `1    cholecalciferol, vitamin D3, (VITAMIN D3 PO) Take  by mouth daily.  ascorbate calcium (VITAMIN C PO) Take  by mouth daily.  aspirin delayed-release (ASPIR-LOW) 81 mg tablet Take 81 mg by mouth. Taking every now and then      rosuvastatin (CRESTOR) 10 mg tablet TAKE ONE TABLET BY MOUTH NIGHTLY (Patient taking differently: Not taking  Indications: PT TAKES TWICE WEEKLY AND THEN STOPS) 90 Tab 2    metoprolol succinate (TOPROL-XL) 50 mg XL tablet Take 1 Tab by mouth daily.  90 Tab 1  FREESTYLE LANCETS 28 gauge misc USE TO CHECK BLOOD SUGAR ONCE DAILY 100 Lancet 12    cyanocobalamin 1,000 mcg tablet Take 1,000 mcg by mouth daily.  FERROUS FUMARATE (IRON PO) Take  by mouth.  magnesium 250 mg tab Take  by mouth daily.        Past Surgical History:   Procedure Laterality Date    ABDOMEN SURGERY PROC UNLISTED      bowel resection    HX BACK SURGERY  2014    HX GI  11/2012    bowel resection    HX ORTHOPAEDIC      amputated left 4th finger      Lab Results   Component Value Date/Time    WBC 7.3 03/19/2019 10:00 AM    HGB 14.6 03/19/2019 10:00 AM    Hemoglobin (POC) 13.3 11/02/2016 07:15 AM    HCT 45.4 03/19/2019 10:00 AM    Hematocrit (POC) 39 11/02/2016 07:15 AM    PLATELET 610 72/88/5917 10:00 AM    MCV 84 03/19/2019 10:00 AM     Lab Results   Component Value Date/Time    Cholesterol, total 244 (H) 06/25/2019 09:23 AM    Cholesterol (POC) 227 02/06/2015 03:30 PM    HDL Cholesterol 62 06/25/2019 09:23 AM    LDL, calculated 164 (H) 06/25/2019 09:23 AM    LDL Cholesterol (POC) 164 02/06/2015 03:30 PM    Triglyceride 88 06/25/2019 09:23 AM    Triglycerides (POC) 68 02/06/2015 03:30 PM     Lab Results   Component Value Date/Time    GFR est non-AA 49 (L) 09/18/2019 09:15 AM    GFRNA, POC 50 (L) 11/02/2016 07:15 AM    GFR est AA 56 (L) 09/18/2019 09:15 AM    GFRAA, POC >60 11/02/2016 07:15 AM    Creatinine 1.44 (H) 09/18/2019 09:15 AM    Creatinine (POC) 1.4 (H) 11/02/2016 07:15 AM    BUN 22 09/18/2019 09:15 AM    BUN (POC) 28 (H) 11/02/2016 07:15 AM    Sodium 143 09/18/2019 09:15 AM    Sodium (POC) 140 11/02/2016 07:15 AM    Potassium 4.5 09/18/2019 09:15 AM    Potassium (POC) 4.1 11/02/2016 07:15 AM    Chloride 105 09/18/2019 09:15 AM    Chloride (POC) 105 11/02/2016 07:15 AM    CO2 26 09/18/2019 09:15 AM    Magnesium 2.2 06/04/2018 08:51 AM    Phosphorus 3.0 10/17/2014 04:40 AM    Albumin, urine 59.5 10/23/2015 09:10 AM        Review of Systems   Respiratory: Negative for shortness of breath. Cardiovascular: Negative for chest pain. Physical Exam  Constitutional:       General: He is not in acute distress. Appearance: He is well-developed. He is not diaphoretic. HENT:      Head: Normocephalic and atraumatic. Eyes:      General:         Right eye: No discharge. Left eye: No discharge. Conjunctiva/sclera: Conjunctivae normal.   Neck:      Musculoskeletal: Normal range of motion and neck supple. Cardiovascular:      Rate and Rhythm: Normal rate and regular rhythm. Heart sounds: Normal heart sounds. No murmur. No friction rub. No gallop. Pulmonary:      Effort: Pulmonary effort is normal. No respiratory distress. Breath sounds: Normal breath sounds. No wheezing or rales. Chest:      Chest wall: No tenderness. Musculoskeletal: Normal range of motion. General: No tenderness or deformity. Right lower leg: No edema. Left lower leg: No edema. Lymphadenopathy:      Cervical: No cervical adenopathy. Skin:     General: Skin is warm and dry. Coloration: Skin is not pale. Findings: No erythema or rash. Neurological:      Mental Status: He is alert and oriented to person, place, and time. Coordination: Coordination normal.      Deep Tendon Reflexes: Reflexes are normal and symmetric. Psychiatric:         Behavior: Behavior normal.         ASSESSMENT and PLAN    ICD-10-CM ICD-9-CM    1. Prostate cancer ([facility]                    Pt is following with uro dr Daphene Safe and seeing rad onc as well pt was to start xrt after lov just had mri this AM of prostate will be seeing aleksandr next week to determine tx plan    C61 185    2. Type 2 diabetes with nephropathy (HCC)                  Controlled on januvia 50 mg daily renally doses  E11.21 250.40      583.81    3.  Chronic deep vein thrombosis (DVT) of proximal vein of both lower extremities (HCC)                  Hx of in the past was prev followed by hem now just on asa to prevent dvt  I82.5Y3 453.51    4. Reactive depression                  Overall improved with celexa 20 mg daily he takes this prn but we have discussed this is a daily med  F32.9 300.4    5. Spinal stenosis, lumbar region, without neurogenic claudication                    Prev had surg with dr Ramakrishna Barclay currently has gabapentin to take prn for tingling in hands this works well  M48.061 724.02    6. Mixed hyperlipidemia                  Pt was having myalgias on crestor, was started on zetia by cardio, doing well on this reports compliance  E78.2 272.2    7. Stage 3 chronic kidney disease (HCC)                  Cr runs around 1.4-1.5 baseline followed with dr Malick Beasley in the past continue to monitor  N18.3 585.3    8. Essential hypertension                  Controlled on toprol and hctz  I10 401.9       9. Insomnia- trazaone prn helps   10- neuropathy- refer to neuro for emg testing of note pt declines further memory eval         Scribed by Gricelda Bartlett, as dictated by Dr. Latasha Cole. Current diagnosis and concerns discussed with pt at length. Pt understands risks and benefits or current treatment plan and medications, and accepts the treatment and medication with any possible risks. Pt asks appropriate questions, which were answered. Pt was instructed to call with any concerns or problems. I have reviewed the note documented by the scribe. The services provided are my own.   The documentation is accurate

## 2020-01-08 NOTE — TELEPHONE ENCOUNTER
Can Mr. Philomena Ness have an earlier appt tomorrow? He has an appt in [facility]. Please call daughter, Betsey Arnett. Thanks.

## 2020-01-08 NOTE — TELEPHONE ENCOUNTER
Called, spoke to Consuelo   Two pt identifiers confirmed. Consuelo states that she is trying to isamar the pt any earlier appt in the day. Offered 0745 per Dr. Arely Borges. Consuelo states that it is too close to his appt on the other side of [facility] and asking if they could do maybe [id number]. Yareli informed Dr. Arely Borges will be notified. Consuelo verbalized understanding of information discussed w/ no further questions at this time.

## 2020-01-09 ENCOUNTER — OFFICE VISIT (OUTPATIENT)
Dept: INTERNAL MEDICINE CLINIC | Age: 70
End: 2020-01-09

## 2020-01-09 ENCOUNTER — HOSPITAL ENCOUNTER (OUTPATIENT)
Dept: LAB | Age: 70
Discharge: HOME | End: 2020-01-09
Payer: MEDICARE

## 2020-01-09 VITALS
SYSTOLIC BLOOD PRESSURE: 133 MMHG | HEIGHT: 65 IN | WEIGHT: 187 LBS | DIASTOLIC BLOOD PRESSURE: 73 MMHG | RESPIRATION RATE: 16 BRPM | BODY MASS INDEX: 31.16 KG/M2 | TEMPERATURE: 97.8 F | OXYGEN SATURATION: 97 % | HEART RATE: 92 BPM

## 2020-01-09 DIAGNOSIS — C61 PROSTATE CANCER (HCC): ICD-10-CM

## 2020-01-09 DIAGNOSIS — N18.30 STAGE 3 CHRONIC KIDNEY DISEASE (HCC): ICD-10-CM

## 2020-01-09 DIAGNOSIS — F51.01 PRIMARY INSOMNIA: ICD-10-CM

## 2020-01-09 DIAGNOSIS — E11.40 TYPE 2 DIABETES MELLITUS WITH DIABETIC NEUROPATHY, WITHOUT LONG-TERM CURRENT USE OF INSULIN (HCC): ICD-10-CM

## 2020-01-09 DIAGNOSIS — I10 ESSENTIAL HYPERTENSION: ICD-10-CM

## 2020-01-09 DIAGNOSIS — M48.061 SPINAL STENOSIS, LUMBAR REGION, WITHOUT NEUROGENIC CLAUDICATION: ICD-10-CM

## 2020-01-09 DIAGNOSIS — M48.02 CERVICAL SPINAL STENOSIS: ICD-10-CM

## 2020-01-09 DIAGNOSIS — I82.5Y3 CHRONIC DEEP VEIN THROMBOSIS (DVT) OF PROXIMAL VEIN OF BOTH LOWER EXTREMITIES (HCC): ICD-10-CM

## 2020-01-09 DIAGNOSIS — E66.9 OBESITY (BMI 30.0-34.9): ICD-10-CM

## 2020-01-09 DIAGNOSIS — F32.9 REACTIVE DEPRESSION: ICD-10-CM

## 2020-01-09 DIAGNOSIS — E78.2 MIXED HYPERLIPIDEMIA: ICD-10-CM

## 2020-01-09 DIAGNOSIS — R41.3 MEMORY IMPAIRMENT: ICD-10-CM

## 2020-01-09 DIAGNOSIS — E11.21 TYPE 2 DIABETES WITH NEPHROPATHY (HCC): Primary | ICD-10-CM

## 2020-01-09 DIAGNOSIS — G62.9 NEUROPATHY: ICD-10-CM

## 2020-01-09 DIAGNOSIS — E11.21 TYPE 2 DIABETES WITH NEPHROPATHY (HCC): ICD-10-CM

## 2020-01-09 PROCEDURE — 80061 LIPID PANEL: CPT

## 2020-01-09 PROCEDURE — 80053 COMPREHEN METABOLIC PANEL: CPT

## 2020-01-09 PROCEDURE — 83036 HEMOGLOBIN GLYCOSYLATED A1C: CPT

## 2020-01-09 PROCEDURE — 36415 COLL VENOUS BLD VENIPUNCTURE: CPT

## 2020-01-09 PROCEDURE — 85027 COMPLETE CBC AUTOMATED: CPT

## 2020-01-09 PROCEDURE — 84443 ASSAY THYROID STIM HORMONE: CPT

## 2020-01-09 RX ORDER — INSULIN PUMP SYRINGE, 3 ML
EACH MISCELLANEOUS
Qty: 1 KIT | Refills: 0 | Status: SHIPPED | OUTPATIENT
Start: 2020-01-09 | End: 2020-02-21 | Stop reason: SDUPTHER

## 2020-01-10 DIAGNOSIS — I10 ESSENTIAL HYPERTENSION: Primary | ICD-10-CM

## 2020-01-10 DIAGNOSIS — E11.21 TYPE 2 DIABETES WITH NEPHROPATHY (HCC): ICD-10-CM

## 2020-01-10 LAB
ALBUMIN SERPL-MCNC: 4.2 G/DL (ref 3.5–4.8)
ALBUMIN/GLOB SERPL: 1.4 {RATIO} (ref 1.2–2.2)
ALP SERPL-CCNC: 45 IU/L (ref 39–117)
ALT SERPL-CCNC: 18 IU/L (ref 0–44)
AST SERPL-CCNC: 27 IU/L (ref 0–40)
BILIRUB SERPL-MCNC: 1 MG/DL (ref 0–1.2)
BUN SERPL-MCNC: 29 MG/DL (ref 8–27)
BUN/CREAT SERPL: 20 (ref 10–24)
CALCIUM SERPL-MCNC: 9.5 MG/DL (ref 8.6–10.2)
CHLORIDE SERPL-SCNC: 106 MMOL/L (ref 96–106)
CHOLEST SERPL-MCNC: 213 MG/DL (ref 100–199)
CO2 SERPL-SCNC: 22 MMOL/L (ref 20–29)
CREAT SERPL-MCNC: 1.48 MG/DL (ref 0.76–1.27)
ERYTHROCYTE [DISTWIDTH] IN BLOOD BY AUTOMATED COUNT: 14.4 % (ref 11.6–15.4)
EST. AVERAGE GLUCOSE BLD GHB EST-MCNC: 120 MG/DL
GLOBULIN SER CALC-MCNC: 3 G/DL (ref 1.5–4.5)
GLUCOSE SERPL-MCNC: 96 MG/DL (ref 65–99)
HBA1C MFR BLD: 5.8 % (ref 4.8–5.6)
HCT VFR BLD AUTO: 42.7 % (ref 37.5–51)
HDLC SERPL-MCNC: 57 MG/DL
HGB BLD-MCNC: 14.1 G/DL (ref 13–17.7)
LDLC SERPL CALC-MCNC: 138 MG/DL (ref 0–99)
MCH RBC QN AUTO: 27.4 PG (ref 26.6–33)
MCHC RBC AUTO-ENTMCNC: 33 G/DL (ref 31.5–35.7)
MCV RBC AUTO: 83 FL (ref 79–97)
PLATELET # BLD AUTO: 204 X10E3/UL (ref 150–450)
POTASSIUM SERPL-SCNC: 4.8 MMOL/L (ref 3.5–5.2)
PROT SERPL-MCNC: 7.2 G/DL (ref 6–8.5)
RBC # BLD AUTO: 5.15 X10E6/UL (ref 4.14–5.8)
SODIUM SERPL-SCNC: 144 MMOL/L (ref 134–144)
TRIGL SERPL-MCNC: 92 MG/DL (ref 0–149)
TSH SERPL DL<=0.005 MIU/L-ACNC: 2.4 UIU/ML (ref 0.45–4.5)
VLDLC SERPL CALC-MCNC: 18 MG/DL (ref 5–40)
WBC # BLD AUTO: 6 X10E3/UL (ref 3.4–10.8)

## 2020-02-21 RX ORDER — INSULIN PUMP SYRINGE, 3 ML
EACH MISCELLANEOUS
Qty: 1 KIT | Refills: 0 | Status: SHIPPED | OUTPATIENT
Start: 2020-02-21 | End: 2020-05-07

## 2020-02-21 NOTE — TELEPHONE ENCOUNTER
PCP: Hughes Severance, MD    Last appt: 1/9/2020  Future Appointments   Date Time Provider Willian Paul   5/6/2020  8:30 AM Hughes Severance, MD [facility]       Requested Prescriptions     Pending Prescriptions Disp Refills    Blood-Glucose Meter monitoring kit 1 Kit 0     Sig: Check blood sugar once daily, E11.21

## 2020-03-10 ENCOUNTER — HOSPITAL ENCOUNTER (OUTPATIENT)
Dept: RADIATION THERAPY | Age: 70
Discharge: HOME | End: 2020-03-10

## 2020-04-21 RX ORDER — SITAGLIPTIN 50 MG/1
TABLET, FILM COATED ORAL
Qty: 30 TAB | Refills: 0 | Status: SHIPPED | OUTPATIENT
Start: 2020-04-21 | End: 2020-05-24

## 2020-04-22 DIAGNOSIS — E11.21 TYPE 2 DIABETES WITH NEPHROPATHY (HCC): ICD-10-CM

## 2020-04-22 DIAGNOSIS — I10 ESSENTIAL HYPERTENSION: ICD-10-CM

## 2020-04-23 ENCOUNTER — HOSPITAL ENCOUNTER (INPATIENT)
Age: 70
LOS: 4 days | Discharge: ANOTHER HOSPITAL | DRG: 281 | End: 2020-04-27
Attending: EMERGENCY MEDICINE | Admitting: FAMILY MEDICINE
Payer: MEDICARE

## 2020-04-23 ENCOUNTER — APPOINTMENT (OUTPATIENT)
Dept: GENERAL RADIOLOGY | Age: 70
DRG: 281 | End: 2020-04-23
Attending: EMERGENCY MEDICINE
Payer: MEDICARE

## 2020-04-23 DIAGNOSIS — R77.8 ELEVATED TROPONIN: ICD-10-CM

## 2020-04-23 DIAGNOSIS — I21.4 NSTEMI (NON-ST ELEVATED MYOCARDIAL INFARCTION) (HCC): ICD-10-CM

## 2020-04-23 DIAGNOSIS — R07.9 ACUTE CHEST PAIN: Primary | ICD-10-CM

## 2020-04-23 LAB
ALBUMIN SERPL-MCNC: 3.6 G/DL (ref 3.5–5)
ALBUMIN/GLOB SERPL: 0.8 {RATIO} (ref 1.1–2.2)
ALP SERPL-CCNC: 47 U/L (ref 45–117)
ALT SERPL-CCNC: 28 U/L (ref 12–78)
ANION GAP SERPL CALC-SCNC: 7 MMOL/L (ref 5–15)
AST SERPL-CCNC: 30 U/L (ref 15–37)
BASOPHILS # BLD: 0 K/UL (ref 0–0.1)
BASOPHILS NFR BLD: 0 % (ref 0–1)
BILIRUB SERPL-MCNC: 1.1 MG/DL (ref 0.2–1)
BUN SERPL-MCNC: 27 MG/DL (ref 6–20)
BUN/CREAT SERPL: 18 (ref 12–20)
CALCIUM SERPL-MCNC: 9 MG/DL (ref 8.5–10.1)
CHLORIDE SERPL-SCNC: 107 MMOL/L (ref 97–108)
CK SERPL-CCNC: 263 U/L (ref 39–308)
CO2 SERPL-SCNC: 25 MMOL/L (ref 21–32)
CREAT SERPL-MCNC: 1.54 MG/DL (ref 0.7–1.3)
DIFFERENTIAL METHOD BLD: ABNORMAL
EOSINOPHIL # BLD: 0.1 K/UL (ref 0–0.4)
EOSINOPHIL NFR BLD: 2 % (ref 0–7)
ERYTHROCYTE [DISTWIDTH] IN BLOOD BY AUTOMATED COUNT: 15.9 % (ref 11.5–14.5)
GLOBULIN SER CALC-MCNC: 4.4 G/DL (ref 2–4)
GLUCOSE SERPL-MCNC: 98 MG/DL (ref 65–100)
HCT VFR BLD AUTO: 41.6 % (ref 36.6–50.3)
HGB BLD-MCNC: 13.7 G/DL (ref 12.1–17)
IMM GRANULOCYTES # BLD AUTO: 0 K/UL (ref 0–0.04)
IMM GRANULOCYTES NFR BLD AUTO: 0 % (ref 0–0.5)
LYMPHOCYTES # BLD: 1.7 K/UL (ref 0.8–3.5)
LYMPHOCYTES NFR BLD: 29 % (ref 12–49)
MCH RBC QN AUTO: 26.8 PG (ref 26–34)
MCHC RBC AUTO-ENTMCNC: 32.9 G/DL (ref 30–36.5)
MCV RBC AUTO: 81.4 FL (ref 80–99)
MONOCYTES # BLD: 0.9 K/UL (ref 0–1)
MONOCYTES NFR BLD: 16 % (ref 5–13)
NEUTS SEG # BLD: 3.2 K/UL (ref 1.8–8)
NEUTS SEG NFR BLD: 53 % (ref 32–75)
NRBC # BLD: 0 K/UL (ref 0–0.01)
NRBC BLD-RTO: 0 PER 100 WBC
PLATELET # BLD AUTO: 184 K/UL (ref 150–400)
PMV BLD AUTO: 9.9 FL (ref 8.9–12.9)
POTASSIUM SERPL-SCNC: 4.2 MMOL/L (ref 3.5–5.1)
PROT SERPL-MCNC: 8 G/DL (ref 6.4–8.2)
RBC # BLD AUTO: 5.11 M/UL (ref 4.1–5.7)
SODIUM SERPL-SCNC: 139 MMOL/L (ref 136–145)
TROPONIN I SERPL-MCNC: 0.07 NG/ML
TROPONIN I SERPL-MCNC: 0.1 NG/ML
WBC # BLD AUTO: 6 K/UL (ref 4.1–11.1)

## 2020-04-23 PROCEDURE — 74011250637 HC RX REV CODE- 250/637: Performed by: EMERGENCY MEDICINE

## 2020-04-23 PROCEDURE — 82550 ASSAY OF CK (CPK): CPT

## 2020-04-23 PROCEDURE — 84484 ASSAY OF TROPONIN QUANT: CPT

## 2020-04-23 PROCEDURE — 71046 X-RAY EXAM CHEST 2 VIEWS: CPT

## 2020-04-23 PROCEDURE — 36415 COLL VENOUS BLD VENIPUNCTURE: CPT

## 2020-04-23 PROCEDURE — 85025 COMPLETE CBC W/AUTO DIFF WBC: CPT

## 2020-04-23 PROCEDURE — 74011250636 HC RX REV CODE- 250/636: Performed by: EMERGENCY MEDICINE

## 2020-04-23 PROCEDURE — 93005 ELECTROCARDIOGRAM TRACING: CPT

## 2020-04-23 PROCEDURE — 65270000029 HC RM PRIVATE

## 2020-04-23 PROCEDURE — 80053 COMPREHEN METABOLIC PANEL: CPT

## 2020-04-23 PROCEDURE — 99285 EMERGENCY DEPT VISIT HI MDM: CPT

## 2020-04-23 RX ORDER — ASPIRIN 325 MG
325 TABLET ORAL ONCE
Status: COMPLETED | OUTPATIENT
Start: 2020-04-23 | End: 2020-04-23

## 2020-04-23 RX ORDER — ENOXAPARIN SODIUM 100 MG/ML
1 INJECTION SUBCUTANEOUS
Status: COMPLETED | OUTPATIENT
Start: 2020-04-23 | End: 2020-04-23

## 2020-04-23 RX ORDER — ACETAMINOPHEN 160 MG/5ML
200 SUSPENSION, ORAL (FINAL DOSE FORM) ORAL DAILY
COMMUNITY

## 2020-04-23 RX ADMIN — ENOXAPARIN SODIUM 90 MG: 100 INJECTION SUBCUTANEOUS at 23:07

## 2020-04-23 RX ADMIN — ASPIRIN 325 MG: 325 TABLET, FILM COATED ORAL at 19:00

## 2020-04-23 NOTE — ED NOTES
Bedside and Verbal shift change report given to Fernando (oncoming nurse) by Nishant Diamond (offgoing nurse).  Report included the following information SBAR, ED Summary, Intake/Output, MAR, Recent Results and Cardiac Rhythm SR.

## 2020-04-23 NOTE — PROGRESS NOTES
Nurse Clarification of the Prior to Admission Medication Regimen     The patient was interviewed regarding clarification of the prior to admission medication regimen. States his daughter takes care of his medication, and to speak with her. Spoke with daughter who gave updated information. The individual(s) listed above were questioned regarding the patient's use of any other inhalers, topical products, over the counter medications, herbal medications, vitamin products or ophthalmic/nasal/otic medication use. Information Obtained From: Patient's Daughter    Recommendations/Findings: The following amendments were made to the patient's active medication list on file at [facility]:     1) Additions:    Co-enzyme Q-10    2) Removals:    Ferrous Fumarate   Crestor 10mg    3) Changes:   (Old regimen: Trazodone 50mg tablet, take one tablet by mouth at bedtime /New regimen: Trazodone 50mg tablet, take one tablet by mouth at bedtime as needed)         PTA medication list was corrected to the following:     Prior to Admission Medications   Prescriptions Last Dose Informant Taking? Blood-Glucose Meter monitoring kit   Yes   Sig: Check blood sugar once daily, E11.21   FREESTYLE LANCETS 28 gauge misc   Yes   Sig: USE TO CHECK BLOOD SUGAR ONCE DAILY   Januvia 50 mg tablet   Yes   Sig: Take 1 tablet by mouth once daily   LORazepam (ATIVAN) 0.5 mg tablet   Yes   Sig: TAKE 1 TABLET BY MOUTH ONCE DAILY AT NIGHT AS NEEDED FOR ANXIETY   ascorbate calcium (VITAMIN C PO)   Yes   Sig: Take  by mouth daily. aspirin delayed-release (ASPIR-LOW) 81 mg tablet   Yes   Sig: Take 81 mg by mouth. Taking every now and then   cholecalciferol, vitamin D3, (VITAMIN D3 PO)   Yes   Sig: Take  by mouth daily. citalopram (CELEXA) 20 mg tablet   Yes   Sig: Take 1 Tab by mouth daily. co-enzyme Q-10 (Co Q-10) 100 mg capsule   Yes   Sig: Take 100 mg by mouth daily. cyanocobalamin 1,000 mcg tablet   Yes   Sig: Take 1,000 mcg by mouth daily. ezetimibe (ZETIA) 10 mg tablet   Yes   Sig: Take 1 Tab by mouth daily. gabapentin (NEURONTIN) 300 mg capsule   Yes   Sig: Take 1 Cap by mouth nightly as needed for Pain.   hydroCHLOROthiazide (HYDRODIURIL) 12.5 mg tablet   Yes   Sig: Take 1 Tab by mouth daily. magnesium 250 mg tab   Yes   Sig: Take  by mouth daily. metoprolol succinate (TOPROL-XL) 50 mg XL tablet   Yes   Sig: Take 1 tablet by mouth once daily   red yeast rice extract 600 mg cap   Yes   Sig: Take 600 mg by mouth now. Every now and then   traZODone (DESYREL) 50 mg tablet   No   Sig: TAKE 1 TABLET BY MOUTH AT BEDTIME   Patient taking differently: Take 50 mg by mouth nightly as needed.       Facility-Administered Medications: None        Thank you,  Aicha Ledezma RN

## 2020-04-23 NOTE — ED NOTES
Bedside shift change report given to Alireza/Jose RN (oncoming nurse) by Luis Mazariegos RN (offgoing nurse). Report included the following information SBAR, Kardex, ED Summary, [facility] and Recent Results.

## 2020-04-24 ENCOUNTER — APPOINTMENT (OUTPATIENT)
Dept: VASCULAR SURGERY | Age: 70
DRG: 281 | End: 2020-04-24
Attending: NURSE PRACTITIONER
Payer: MEDICARE

## 2020-04-24 PROBLEM — Z01.810 PREOP CARDIOVASCULAR EXAM: Status: ACTIVE | Noted: 2020-04-24

## 2020-04-24 PROBLEM — I65.23 BILATERAL CAROTID ARTERY STENOSIS: Status: ACTIVE | Noted: 2020-04-24

## 2020-04-24 LAB
ALBUMIN SERPL-MCNC: 3.4 G/DL (ref 3.5–5)
ALBUMIN/GLOB SERPL: 0.8 {RATIO} (ref 1.1–2.2)
ALP SERPL-CCNC: 48 U/L (ref 45–117)
ALT SERPL-CCNC: 27 U/L (ref 12–78)
ANION GAP SERPL CALC-SCNC: 6 MMOL/L (ref 5–15)
AST SERPL-CCNC: 28 U/L (ref 15–37)
ATRIAL RATE: 71 BPM
ATRIAL RATE: 90 BPM
BASOPHILS # BLD: 0 K/UL (ref 0–0.1)
BASOPHILS NFR BLD: 1 % (ref 0–1)
BILIRUB SERPL-MCNC: 1.5 MG/DL (ref 0.2–1)
BUN SERPL-MCNC: 27 MG/DL (ref 6–20)
BUN/CREAT SERPL: 18 (ref 12–20)
CALCIUM SERPL-MCNC: 8.9 MG/DL (ref 8.5–10.1)
CALCULATED P AXIS, ECG09: 38 DEGREES
CALCULATED P AXIS, ECG09: 41 DEGREES
CALCULATED R AXIS, ECG10: -79 DEGREES
CALCULATED R AXIS, ECG10: -85 DEGREES
CALCULATED T AXIS, ECG11: 64 DEGREES
CALCULATED T AXIS, ECG11: 79 DEGREES
CHLORIDE SERPL-SCNC: 107 MMOL/L (ref 97–108)
CO2 SERPL-SCNC: 26 MMOL/L (ref 21–32)
CREAT SERPL-MCNC: 1.48 MG/DL (ref 0.7–1.3)
DIAGNOSIS, 93000: NORMAL
DIAGNOSIS, 93000: NORMAL
DIFFERENTIAL METHOD BLD: ABNORMAL
EOSINOPHIL # BLD: 0.1 K/UL (ref 0–0.4)
EOSINOPHIL NFR BLD: 2 % (ref 0–7)
ERYTHROCYTE [DISTWIDTH] IN BLOOD BY AUTOMATED COUNT: 16.4 % (ref 11.5–14.5)
GLOBULIN SER CALC-MCNC: 4.5 G/DL (ref 2–4)
GLUCOSE BLD STRIP.AUTO-MCNC: 128 MG/DL (ref 65–100)
GLUCOSE BLD STRIP.AUTO-MCNC: 84 MG/DL (ref 65–100)
GLUCOSE BLD STRIP.AUTO-MCNC: 85 MG/DL (ref 65–100)
GLUCOSE BLD STRIP.AUTO-MCNC: 98 MG/DL (ref 65–100)
GLUCOSE SERPL-MCNC: 142 MG/DL (ref 65–100)
HCT VFR BLD AUTO: 42.9 % (ref 36.6–50.3)
HGB BLD-MCNC: 13.9 G/DL (ref 12.1–17)
IMM GRANULOCYTES # BLD AUTO: 0 K/UL (ref 0–0.04)
IMM GRANULOCYTES NFR BLD AUTO: 0 % (ref 0–0.5)
LEFT ABI: 1.24
LEFT ANTERIOR TIBIAL: 136 MMHG
LEFT ARM BP: 121 MMHG
LEFT CCA DIST DIAS: 9.4 CM/S
LEFT CCA DIST SYS: 96.6 CM/S
LEFT CCA PROX DIAS: 12.6 CM/S
LEFT CCA PROX SYS: 83.7 CM/S
LEFT ECA DIAS: 0 CM/S
LEFT ECA SYS: 175.9 CM/S
LEFT GSV ANKLE DIAM: 0.24 CM
LEFT GSV AT KNEE DIAM: 0.3 CM
LEFT GSV BK MID DIAM: 0.13 CM
LEFT GSV BK PROX DIAM: 0.16 CM
LEFT GSV THIGH DIST DIAM: 0.33 CM
LEFT GSV THIGH MID DIAM: 0.39 CM
LEFT GSV THIGH PROX DIAM: 0.42 CM
LEFT ICA DIST DIAS: 12.6 CM/S
LEFT ICA DIST SYS: 34.5 CM/S
LEFT ICA MID DIAS: 12.6 CM/S
LEFT ICA MID SYS: 47.3 CM/S
LEFT ICA PROX DIAS: 0 CM/S
LEFT ICA PROX SYS: 77.2 CM/S
LEFT ICA/CCA SYS: 0.8
LEFT POSTERIOR TIBIAL: 150 MMHG
LEFT SUBCLAVIAN DIAS: 0 CM/S
LEFT SUBCLAVIAN SYS: 219 CM/S
LEFT TBI: 0.83
LEFT TOE PRESSURE: 100 MMHG
LEFT VERTEBRAL DIAS: 25.6 CM/S
LEFT VERTEBRAL SYS: 59.3 CM/S
LYMPHOCYTES # BLD: 2 K/UL (ref 0.8–3.5)
LYMPHOCYTES NFR BLD: 34 % (ref 12–49)
MAGNESIUM SERPL-MCNC: 2.4 MG/DL (ref 1.6–2.4)
MCH RBC QN AUTO: 27 PG (ref 26–34)
MCHC RBC AUTO-ENTMCNC: 32.4 G/DL (ref 30–36.5)
MCV RBC AUTO: 83.5 FL (ref 80–99)
MONOCYTES # BLD: 0.8 K/UL (ref 0–1)
MONOCYTES NFR BLD: 14 % (ref 5–13)
NEUTS SEG # BLD: 2.9 K/UL (ref 1.8–8)
NEUTS SEG NFR BLD: 49 % (ref 32–75)
NRBC # BLD: 0 K/UL (ref 0–0.01)
NRBC BLD-RTO: 0 PER 100 WBC
P-R INTERVAL, ECG05: 186 MS
P-R INTERVAL, ECG05: 188 MS
PLATELET # BLD AUTO: 192 K/UL (ref 150–400)
PMV BLD AUTO: 11.1 FL (ref 8.9–12.9)
POTASSIUM SERPL-SCNC: 4.1 MMOL/L (ref 3.5–5.1)
PROT SERPL-MCNC: 7.9 G/DL (ref 6.4–8.2)
Q-T INTERVAL, ECG07: 412 MS
Q-T INTERVAL, ECG07: 464 MS
QRS DURATION, ECG06: 144 MS
QRS DURATION, ECG06: 146 MS
QTC CALCULATION (BEZET), ECG08: 504 MS
QTC CALCULATION (BEZET), ECG08: 504 MS
RBC # BLD AUTO: 5.14 M/UL (ref 4.1–5.7)
RIGHT ABI: 1.41
RIGHT ANTERIOR TIBIAL: 149 MMHG
RIGHT CCA DIST DIAS: 23.9 CM/S
RIGHT CCA DIST SYS: 85.4 CM/S
RIGHT CCA PROX DIAS: 15.1 CM/S
RIGHT CCA PROX SYS: 63.4 CM/S
RIGHT ECA DIAS: 0 CM/S
RIGHT ECA SYS: 107.3 CM/S
RIGHT GSV ANKLE DIAM: 0.17 CM
RIGHT GSV AT KNEE DIAM: 0.22 CM
RIGHT GSV BK MID DIAM: 0.12 CM
RIGHT GSV BK PROX DIAM: 0.17 CM
RIGHT GSV THIGH DIST DIAM: 0.15 CM
RIGHT GSV THIGH MID DIAM: 0.18 CM
RIGHT GSV THIGH PROX DIAM: 0.38 CM
RIGHT ICA DIST DIAS: 28.3 CM/S
RIGHT ICA DIST SYS: 59 CM/S
RIGHT ICA MID DIAS: 21.7 CM/S
RIGHT ICA MID SYS: 70 CM/S
RIGHT ICA PROX DIAS: 17.3 CM/S
RIGHT ICA PROX SYS: 63.4 CM/S
RIGHT ICA/CCA SYS: 0.8
RIGHT POSTERIOR TIBIAL: 171 MMHG
RIGHT SUBCLAVIAN DIAS: 0 CM/S
RIGHT SUBCLAVIAN SYS: 122.7 CM/S
RIGHT TBI: 0.83
RIGHT TOE PRESSURE: 101 MMHG
RIGHT VERTEBRAL DIAS: 10.9 CM/S
RIGHT VERTEBRAL SYS: 40.4 CM/S
SERVICE CMNT-IMP: ABNORMAL
SERVICE CMNT-IMP: NORMAL
SODIUM SERPL-SCNC: 139 MMOL/L (ref 136–145)
TROPONIN I SERPL-MCNC: 0.09 NG/ML
VENTRICULAR RATE, ECG03: 71 BPM
VENTRICULAR RATE, ECG03: 90 BPM
WBC # BLD AUTO: 5.8 K/UL (ref 4.1–11.1)

## 2020-04-24 PROCEDURE — 77030028837 HC SYR ANGI PWR INJ COEU -A: Performed by: INTERNAL MEDICINE

## 2020-04-24 PROCEDURE — 80053 COMPREHEN METABOLIC PANEL: CPT

## 2020-04-24 PROCEDURE — 93922 UPR/L XTREMITY ART 2 LEVELS: CPT

## 2020-04-24 PROCEDURE — 74011250637 HC RX REV CODE- 250/637: Performed by: FAMILY MEDICINE

## 2020-04-24 PROCEDURE — C1769 GUIDE WIRE: HCPCS | Performed by: INTERNAL MEDICINE

## 2020-04-24 PROCEDURE — 84484 ASSAY OF TROPONIN QUANT: CPT

## 2020-04-24 PROCEDURE — C1887 CATHETER, GUIDING: HCPCS | Performed by: INTERNAL MEDICINE

## 2020-04-24 PROCEDURE — 99153 MOD SED SAME PHYS/QHP EA: CPT | Performed by: INTERNAL MEDICINE

## 2020-04-24 PROCEDURE — 4A023N7 MEASUREMENT OF CARDIAC SAMPLING AND PRESSURE, LEFT HEART, PERCUTANEOUS APPROACH: ICD-10-PCS | Performed by: INTERNAL MEDICINE

## 2020-04-24 PROCEDURE — 74011250636 HC RX REV CODE- 250/636: Performed by: INTERNAL MEDICINE

## 2020-04-24 PROCEDURE — B2111ZZ FLUOROSCOPY OF MULTIPLE CORONARY ARTERIES USING LOW OSMOLAR CONTRAST: ICD-10-PCS | Performed by: INTERNAL MEDICINE

## 2020-04-24 PROCEDURE — C1894 INTRO/SHEATH, NON-LASER: HCPCS | Performed by: INTERNAL MEDICINE

## 2020-04-24 PROCEDURE — 74011250637 HC RX REV CODE- 250/637: Performed by: NURSE PRACTITIONER

## 2020-04-24 PROCEDURE — 77030019569 HC BND COMPR RAD TERU -B: Performed by: INTERNAL MEDICINE

## 2020-04-24 PROCEDURE — 93880 EXTRACRANIAL BILAT STUDY: CPT

## 2020-04-24 PROCEDURE — 82962 GLUCOSE BLOOD TEST: CPT

## 2020-04-24 PROCEDURE — 65660000000 HC RM CCU STEPDOWN

## 2020-04-24 PROCEDURE — 99152 MOD SED SAME PHYS/QHP 5/>YRS: CPT | Performed by: INTERNAL MEDICINE

## 2020-04-24 PROCEDURE — 85025 COMPLETE CBC W/AUTO DIFF WBC: CPT

## 2020-04-24 PROCEDURE — 74011250636 HC RX REV CODE- 250/636: Performed by: FAMILY MEDICINE

## 2020-04-24 PROCEDURE — 93970 EXTREMITY STUDY: CPT

## 2020-04-24 PROCEDURE — B2151ZZ FLUOROSCOPY OF LEFT HEART USING LOW OSMOLAR CONTRAST: ICD-10-PCS | Performed by: INTERNAL MEDICINE

## 2020-04-24 PROCEDURE — 74011250637 HC RX REV CODE- 250/637: Performed by: INTERNAL MEDICINE

## 2020-04-24 PROCEDURE — 74011000250 HC RX REV CODE- 250: Performed by: NURSE PRACTITIONER

## 2020-04-24 PROCEDURE — 74011636320 HC RX REV CODE- 636/320: Performed by: INTERNAL MEDICINE

## 2020-04-24 PROCEDURE — 77030015766: Performed by: INTERNAL MEDICINE

## 2020-04-24 PROCEDURE — 77030016699 HC CATH ANGI DX INFN1 CARD -A: Performed by: INTERNAL MEDICINE

## 2020-04-24 PROCEDURE — 77030010221 HC SPLNT WR POS TELE -B: Performed by: INTERNAL MEDICINE

## 2020-04-24 PROCEDURE — 74011000250 HC RX REV CODE- 250: Performed by: INTERNAL MEDICINE

## 2020-04-24 PROCEDURE — 83735 ASSAY OF MAGNESIUM: CPT

## 2020-04-24 PROCEDURE — 77030004549 HC CATH ANGI DX PRF MRTM -A: Performed by: INTERNAL MEDICINE

## 2020-04-24 PROCEDURE — 36415 COLL VENOUS BLD VENIPUNCTURE: CPT

## 2020-04-24 PROCEDURE — 93458 L HRT ARTERY/VENTRICLE ANGIO: CPT | Performed by: INTERNAL MEDICINE

## 2020-04-24 RX ORDER — DEXTROSE 50 % IN WATER (D50W) INTRAVENOUS SYRINGE
25-50 AS NEEDED
Status: DISCONTINUED | OUTPATIENT
Start: 2020-04-24 | End: 2020-04-27 | Stop reason: HOSPADM

## 2020-04-24 RX ORDER — HYDROCHLOROTHIAZIDE 25 MG/1
12.5 TABLET ORAL DAILY
Status: DISCONTINUED | OUTPATIENT
Start: 2020-04-24 | End: 2020-04-27 | Stop reason: HOSPADM

## 2020-04-24 RX ORDER — PANTOPRAZOLE SODIUM 40 MG/1
40 TABLET, DELAYED RELEASE ORAL
Status: DISCONTINUED | OUTPATIENT
Start: 2020-04-24 | End: 2020-04-27

## 2020-04-24 RX ORDER — EZETIMIBE 10 MG/1
10 TABLET ORAL DAILY
Status: DISCONTINUED | OUTPATIENT
Start: 2020-04-24 | End: 2020-04-27

## 2020-04-24 RX ORDER — SODIUM CHLORIDE 9 MG/ML
100 INJECTION, SOLUTION INTRAVENOUS CONTINUOUS
Status: DISPENSED | OUTPATIENT
Start: 2020-04-24 | End: 2020-04-25

## 2020-04-24 RX ORDER — LIDOCAINE HYDROCHLORIDE 10 MG/ML
INJECTION, SOLUTION EPIDURAL; INFILTRATION; INTRACAUDAL; PERINEURAL AS NEEDED
Status: DISCONTINUED | OUTPATIENT
Start: 2020-04-24 | End: 2020-04-24 | Stop reason: HOSPADM

## 2020-04-24 RX ORDER — VERAPAMIL HYDROCHLORIDE 2.5 MG/ML
INJECTION, SOLUTION INTRAVENOUS AS NEEDED
Status: DISCONTINUED | OUTPATIENT
Start: 2020-04-24 | End: 2020-04-24 | Stop reason: HOSPADM

## 2020-04-24 RX ORDER — MUPIROCIN 20 MG/G
OINTMENT TOPICAL EVERY 12 HOURS
Status: DISCONTINUED | OUTPATIENT
Start: 2020-04-24 | End: 2020-04-27

## 2020-04-24 RX ORDER — FENTANYL CITRATE 50 UG/ML
INJECTION, SOLUTION INTRAMUSCULAR; INTRAVENOUS AS NEEDED
Status: DISCONTINUED | OUTPATIENT
Start: 2020-04-24 | End: 2020-04-24 | Stop reason: HOSPADM

## 2020-04-24 RX ORDER — LANOLIN ALCOHOL/MO/W.PET/CERES
400 CREAM (GRAM) TOPICAL DAILY
Status: DISCONTINUED | OUTPATIENT
Start: 2020-04-24 | End: 2020-04-27 | Stop reason: HOSPADM

## 2020-04-24 RX ORDER — UBIDECARENONE 50 MG
100 CAPSULE ORAL DAILY
Status: DISCONTINUED | OUTPATIENT
Start: 2020-04-24 | End: 2020-04-24

## 2020-04-24 RX ORDER — HEPARIN SODIUM 200 [USP'U]/100ML
INJECTION, SOLUTION INTRAVENOUS
Status: COMPLETED | OUTPATIENT
Start: 2020-04-24 | End: 2020-04-24

## 2020-04-24 RX ORDER — NITROGLYCERIN 0.4 MG/1
0.4 TABLET SUBLINGUAL
Status: DISCONTINUED | OUTPATIENT
Start: 2020-04-24 | End: 2020-04-27

## 2020-04-24 RX ORDER — HEPARIN SODIUM 1000 [USP'U]/ML
INJECTION, SOLUTION INTRAVENOUS; SUBCUTANEOUS AS NEEDED
Status: DISCONTINUED | OUTPATIENT
Start: 2020-04-24 | End: 2020-04-24 | Stop reason: HOSPADM

## 2020-04-24 RX ORDER — MAGNESIUM SULFATE 100 %
4 CRYSTALS MISCELLANEOUS AS NEEDED
Status: DISCONTINUED | OUTPATIENT
Start: 2020-04-24 | End: 2020-04-27 | Stop reason: HOSPADM

## 2020-04-24 RX ORDER — ASPIRIN 81 MG/1
81 TABLET ORAL DAILY
Status: DISCONTINUED | OUTPATIENT
Start: 2020-04-24 | End: 2020-04-27

## 2020-04-24 RX ORDER — ACETAMINOPHEN 325 MG/1
650 TABLET ORAL
Status: DISCONTINUED | OUTPATIENT
Start: 2020-04-24 | End: 2020-04-27 | Stop reason: HOSPADM

## 2020-04-24 RX ORDER — TRAZODONE HYDROCHLORIDE 50 MG/1
50 TABLET ORAL
Status: DISCONTINUED | OUTPATIENT
Start: 2020-04-24 | End: 2020-04-27

## 2020-04-24 RX ORDER — SODIUM CHLORIDE 9 MG/ML
100 INJECTION, SOLUTION INTRAVENOUS CONTINUOUS
Status: DISPENSED | OUTPATIENT
Start: 2020-04-24 | End: 2020-04-24

## 2020-04-24 RX ORDER — AMIODARONE HYDROCHLORIDE 200 MG/1
400 TABLET ORAL EVERY 12 HOURS
Status: DISCONTINUED | OUTPATIENT
Start: 2020-04-24 | End: 2020-04-24

## 2020-04-24 RX ORDER — CITALOPRAM 20 MG/1
20 TABLET, FILM COATED ORAL DAILY
Status: DISCONTINUED | OUTPATIENT
Start: 2020-04-24 | End: 2020-04-27

## 2020-04-24 RX ORDER — MELATONIN
1000 DAILY
Status: DISCONTINUED | OUTPATIENT
Start: 2020-04-24 | End: 2020-04-27

## 2020-04-24 RX ORDER — LORAZEPAM 0.5 MG/1
0.5 TABLET ORAL
Status: DISCONTINUED | OUTPATIENT
Start: 2020-04-24 | End: 2020-04-27

## 2020-04-24 RX ORDER — SODIUM CHLORIDE 0.9 % (FLUSH) 0.9 %
5-40 SYRINGE (ML) INJECTION EVERY 8 HOURS
Status: DISCONTINUED | OUTPATIENT
Start: 2020-04-24 | End: 2020-04-27 | Stop reason: HOSPADM

## 2020-04-24 RX ORDER — SODIUM CHLORIDE 0.9 % (FLUSH) 0.9 %
5-40 SYRINGE (ML) INJECTION AS NEEDED
Status: DISCONTINUED | OUTPATIENT
Start: 2020-04-24 | End: 2020-04-27 | Stop reason: HOSPADM

## 2020-04-24 RX ORDER — AMIODARONE HYDROCHLORIDE 200 MG/1
200 TABLET ORAL EVERY 12 HOURS
Status: DISCONTINUED | OUTPATIENT
Start: 2020-04-24 | End: 2020-04-27

## 2020-04-24 RX ORDER — ENOXAPARIN SODIUM 100 MG/ML
1 INJECTION SUBCUTANEOUS EVERY 12 HOURS
Status: DISCONTINUED | OUTPATIENT
Start: 2020-04-24 | End: 2020-04-27 | Stop reason: HOSPADM

## 2020-04-24 RX ORDER — METOPROLOL SUCCINATE 50 MG/1
50 TABLET, EXTENDED RELEASE ORAL DAILY
Status: DISCONTINUED | OUTPATIENT
Start: 2020-04-24 | End: 2020-04-27

## 2020-04-24 RX ORDER — LORAZEPAM 2 MG/ML
4 INJECTION INTRAMUSCULAR
Status: DISCONTINUED | OUTPATIENT
Start: 2020-04-24 | End: 2020-04-27

## 2020-04-24 RX ORDER — LORAZEPAM 2 MG/ML
2 INJECTION INTRAMUSCULAR
Status: DISCONTINUED | OUTPATIENT
Start: 2020-04-24 | End: 2020-04-27

## 2020-04-24 RX ORDER — GABAPENTIN 300 MG/1
300 CAPSULE ORAL
Status: DISCONTINUED | OUTPATIENT
Start: 2020-04-24 | End: 2020-04-27

## 2020-04-24 RX ORDER — MIDAZOLAM HYDROCHLORIDE 1 MG/ML
INJECTION, SOLUTION INTRAMUSCULAR; INTRAVENOUS AS NEEDED
Status: DISCONTINUED | OUTPATIENT
Start: 2020-04-24 | End: 2020-04-24 | Stop reason: HOSPADM

## 2020-04-24 RX ORDER — CHLORHEXIDINE GLUCONATE 1.2 MG/ML
15 RINSE ORAL EVERY 12 HOURS
Status: DISCONTINUED | OUTPATIENT
Start: 2020-04-24 | End: 2020-04-27

## 2020-04-24 RX ADMIN — EZETIMIBE 10 MG: 10 TABLET ORAL at 11:02

## 2020-04-24 RX ADMIN — CITALOPRAM HYDROBROMIDE 20 MG: 20 TABLET, FILM COATED ORAL at 08:51

## 2020-04-24 RX ADMIN — PANTOPRAZOLE SODIUM 40 MG: 40 TABLET, DELAYED RELEASE ORAL at 08:57

## 2020-04-24 RX ADMIN — TRAZODONE HYDROCHLORIDE 50 MG: 50 TABLET ORAL at 23:06

## 2020-04-24 RX ADMIN — LORAZEPAM 0.5 MG: 0.5 TABLET ORAL at 23:05

## 2020-04-24 RX ADMIN — CHLORHEXIDINE GLUCONATE 0.12% ORAL RINSE 15 ML: 1.2 LIQUID ORAL at 16:59

## 2020-04-24 RX ADMIN — METOPROLOL SUCCINATE 50 MG: 50 TABLET, EXTENDED RELEASE ORAL at 08:53

## 2020-04-24 RX ADMIN — Medication 100 MG: at 11:03

## 2020-04-24 RX ADMIN — LORAZEPAM 0.5 MG: 0.5 TABLET ORAL at 02:19

## 2020-04-24 RX ADMIN — MUPIROCIN: 20 OINTMENT TOPICAL at 21:49

## 2020-04-24 RX ADMIN — MELATONIN 1 TABLET: at 08:52

## 2020-04-24 RX ADMIN — TRAZODONE HYDROCHLORIDE 50 MG: 50 TABLET ORAL at 02:19

## 2020-04-24 RX ADMIN — SODIUM CHLORIDE 100 ML/HR: 900 INJECTION, SOLUTION INTRAVENOUS at 14:18

## 2020-04-24 RX ADMIN — Medication 10 ML: at 21:48

## 2020-04-24 RX ADMIN — ASPIRIN 81 MG: 81 TABLET ORAL at 08:51

## 2020-04-24 RX ADMIN — MAGNESIUM OXIDE 400 MG (241.3 MG MAGNESIUM) TABLET 400 MG: TABLET at 11:02

## 2020-04-24 RX ADMIN — HYDROCHLOROTHIAZIDE 12.5 MG: 25 TABLET ORAL at 08:52

## 2020-04-24 RX ADMIN — AMIODARONE HYDROCHLORIDE 200 MG: 200 TABLET ORAL at 21:45

## 2020-04-24 RX ADMIN — SODIUM CHLORIDE 50 ML/HR: 900 INJECTION, SOLUTION INTRAVENOUS at 01:13

## 2020-04-24 RX ADMIN — Medication 10 ML: at 17:01

## 2020-04-24 RX ADMIN — Medication 10 ML: at 02:19

## 2020-04-24 RX ADMIN — MUPIROCIN: 20 OINTMENT TOPICAL at 17:00

## 2020-04-24 NOTE — PROGRESS NOTES
Transition of Care Plan:          1-D/C Home - Daughter to provide transportation   2- F/U OP appointment  3- 2nd IM Letter at d/c    Reason for Admission:   NSTEMI                  RUR Score:    18%             PCP: First and Last name:  Nixon Farley. MD   Name of Practice: [facility]   Are you a current patient: Yes/No:  Yes   Approximate date of last visit:  1/9/20    Do you (patient/family) have any concerns for transition/discharge? No. Pt js independent , lives alone supportive daughters. Plan for utilizing home health: To be determined    Current Advanced Directive/Advance Care Plan:   Pt is a Full code, No ACD on file. Daughters/POC- [facility]- [phone] and [facility]- [phone]. Transition of Care Plan:          1-D/C Home - Daughter to provide transportation   2- F/U OP appointment  3- 2nd IM Letter at d/c    CM verified pt demographics, insurance information and emergency contact. Pt lives alone in one story home. Daughter, [facility] visits 3 x a week. Pt is independent in ADL's, ambulates with cane or walker. No HH or SNF in the past. Uses [facility] on [address]. Has supportive daughters. Daughter will provide transportation home. CM will continue to follow pt for D/C palnning and arrange services as deemed neccessary    Care Management Interventions  PCP Verified by CM: Yes  Last Visit to PCP: 01/09/20  Mode of Transport at Discharge:  Other (see comment)(Daughter to provide transportation)  Discharge Durable Medical Equipment: No(Has a walker and cane)  Physical Therapy Consult: No  Occupational Therapy Consult: No  Speech Therapy Consult: No  Current Support Network: Lives Alone, Family Lives [place], [facility](Daughter visits 3 x a week)    [address] [facility]  Phone: [phone]

## 2020-04-24 NOTE — CONSULTS
CSS Consult    Subjective:      Modesto Redding is a 68 y.o. male who was referred for cardiac evaluation by Dr. Iman Comer for multivessel CAD/NSTEMI. The patient came to the emergency room for intermittent midsternal CP, that he described as a funny feeling in his chest. Nothing made the pain better or worse. He denies SOB, edema, orthopnea/PND, claudication, and dizziness. He does report significant recent stress due to several family members dying recently and watching a lot of the news. He has a medical history of HTN, DM type II, CDK 3, PE/DVT, spinal stenosis, chronic pain/peripheral neuropathy, prostate CA and anxiety. He has a surgical history of bowel resection and spinal surgery x2. He lives alone but he does have family locally. He states he would stay with his daughter after surgery if needed. He reports that he is active and independent. He denies smoking, does chew tobacco, and drinks alcohol regularly -when asked about amount he was unsure. He has a family history of heart disease. Cardiac Testing    Cardiac catheterization: final report pending    ECHO: pending    Past Medical History:   Diagnosis Date    Arthritis     Chronic kidney disease     Stage 3    Chronic pain     Abdoman, back, fingers per daughter   [facility])     DIET CONTROLLED    Hypertension     PE (pulmonary embolism)     Pneumonia     Psychiatric disorder     Depression     Past Surgical History:   Procedure Laterality Date    ABDOMEN SURGERY PROC UNLISTED      bowel resection    HX BACK SURGERY  2014    HX GI  11/2012    bowel resection    HX ORTHOPAEDIC      amputated left 4th finger      Social History     Tobacco Use    Smoking status: Never Smoker    Smokeless tobacco: Current User     Types: Chew   Substance Use Topics    Alcohol use:  Yes     Alcohol/week: 7.0 standard drinks     Types: 7 Cans of beer per week     Comment: 7-10 per week      Family History   Problem Relation Age of Onset    Cancer Mother  Heart Disease Father     Heart Disease Brother     Anesth Problems Neg Hx      Prior to Admission medications    Medication Sig Start Date End Date Taking? Authorizing Provider   co-enzyme Q-10 (Co Q-10) 100 mg capsule Take 100 mg by mouth daily. Yes Treva, MD Orlando Sampsonuvia 50 mg tablet Take 1 tablet by mouth once daily 4/21/20  Yes Marti Cook MD   LORazepam (ATIVAN) 0.5 mg tablet TAKE 1 TABLET BY MOUTH ONCE DAILY AT NIGHT AS NEEDED FOR ANXIETY 3/9/20  Yes Marti Cook MD   metoprolol succinate (TOPROL-XL) 50 mg XL tablet Take 1 tablet by mouth once daily 3/9/20  Yes Marti Cook MD   Blood-Glucose Meter monitoring kit Check blood sugar once daily, E11.21 2/21/20  Yes Marti Cook MD   red yeast rice extract 600 mg cap Take 600 mg by mouth now. Every now and then   Yes Provider, Historical   ezetimibe (ZETIA) 10 mg tablet Take 1 Tab by mouth daily. 12/20/19  Yes Karyn Guerrier MD   citalopram (CELEXA) 20 mg tablet Take 1 Tab by mouth daily. 12/5/19  Yes Marti Cook MD   gabapentin (NEURONTIN) 300 mg capsule Take 1 Cap by mouth nightly as needed for Pain. 8/21/19  Yes Marti Cook MD   hydroCHLOROthiazide (HYDRODIURIL) 12.5 mg tablet Take 1 Tab by mouth daily. 6/25/19  Yes Marti Cook MD   cholecalciferol, vitamin D3, (VITAMIN D3 PO) Take  by mouth daily. Yes Provider, Historical   ascorbate calcium (VITAMIN C PO) Take  by mouth daily. Yes Provider, Historical   aspirin delayed-release (ASPIR-LOW) 81 mg tablet Take 81 mg by mouth. Taking every now and then 3/16/16  Yes Provider, Historical   FREESTYLE LANCETS 28 gauge misc USE TO CHECK BLOOD SUGAR ONCE DAILY 1/14/18  Yes Marti Cook MD   cyanocobalamin 1,000 mcg tablet Take 1,000 mcg by mouth daily. Yes Provider, Historical   magnesium 250 mg tab Take  by mouth daily.    Yes Provider, Historical   traZODone (DESYREL) 50 mg tablet TAKE 1 TABLET BY MOUTH AT BEDTIME  Patient taking differently: Take 50 mg by mouth nightly as needed. 3/9/20   Marti Cook MD       Allergies   Allergen Reactions    Arb-Angiotensin Receptor Antagonist Other (comments)     High k         Review of Systems:   Consititutional: Denies fever or chills. Eyes:  Denies use of glasses or vision problems(cataracts). ENT:  Denies hearing or swallowing difficulty. CV: +CP, HTN. Denies claudication  Resp: Denies dyspnea, productive cough. : Denies dialysis + kidney problems. GI: Denies ulcers, esophageal strictures, liver problems. M/S: Denies joint or bone problems  Skin: Denies varicose veins, edema. Neuro: Denies strokes, or TIAs. Psych: Denies anxiety or depression. Endocrine: Denies thyroid problems + diabetes. Heme/Lymphatic: Denies easy bruising or lymphedema. Objective:     VS:   Visit Vitals  /57   Pulse 65   Temp 98 °F (36.7 °C)   Resp 15   Ht 5' 7.5\" (1.715 m)   Wt 185 lb 3 oz (84 kg)   SpO2 94%   BMI 28.58 kg/m²       Physical Exam:    General appearance: alert, cooperative, no distress  Head: normocephalic, without obvious abnormality; atraumatic  Eyes: conjunctivae/corneas clear; EOM's intact. Nose: nares normal; no drainage. Neck: no carotid bruit and no JVD  Lungs: clear to auscultation bilaterally  Heart: regular rate and rhythm; no murmur  Abdomen: soft, non-tender; bowel sounds normal  Extremities: moves all extremities; no weakness. Skin: Skin color normal; No varicose veins or edema. Neurologic: Grossly normal      Labs:   Recent Labs     04/24/20  1107  04/24/20  0245   WBC  --   --  5.8   HGB  --   --  13.9   HCT  --   --  42.9   PLT  --   --  192   NA  --   --  139   K  --   --  4.1   BUN  --   --  27*   CREA  --   --  1.48*   GLU  --   --  142*   GLUCPOC 84   < >  --     < > = values in this interval not displayed. Diagnostics:   PA and lateral: IMPRESSION:     No acute process.     Carotid doppler: pending    PFTS-FEV1:  Unable to obtain dt PFT lab being down    EKG: pending    Vein mapping: pending    Assessment:     Principal Problem:    NSTEMI (non-ST elevated myocardial infarction) ([facility] (4/23/2020)    Active Problems:    HTN (hypertension) (12/12/2012)      Hyperlipidemia (7/10/2015)      CKD (chronic kidney disease) (10/31/2016)      Overview: rhianna            Type 2 diabetes with nephropathy ([facility] (2/28/2018)        Plan:       STS Risk Calculator V2.81 - Discussed by surgeon with patient. Procedure: Isolated CAB CALCULATE   Risk of Mortality:  1.795%    Renal Failure:  3.087%    Permanent Stroke:  2.220%    Prolonged Ventilation:  10.390%    DSW Infection:  0.176%    Reoperation:  2.665%    Morbidity or Mortality:  14.165%    Short Length of Stay:  36.615%    Long Length of Stay:  7.662%         Treatment Plan:    1. CAD/NSTEMI: On ASA, BB, does not tolerate statin. Preop workup in process, surgical plans per Dr. Re Jones. 2. HTN: on BB, HCTZ -will need to hold HCTZ 48 hrs preop  3. HLD: does not tolerate statin, on zetia, coq10 stopped preop. 4. DM type II: check a1c, on januvia PTA  5. CKD stage III: Cr 1.48, on IV fluids post cath, monitor  6. 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 Monitor. 7. Chronic pain/peripheral neuropathy: On gabapentin PTA  8. Anxiety: On celexa, ativan PTA  9. Alcohol abuse: CIWA monitoring, per primary team  10. Prostate CA: PSA 10, sees Dr. Tigist Olivarez (urology) and Dr. Enrique Costello (radioation oncology), was planning on scheduling radiation seed implant soon.   11. Elevated Tbili: AST/ALT, alk phos normal, check coags, fractionated bili, monitor      Signed By: Jeb Byrd NP     April 24, 2020

## 2020-04-24 NOTE — PROGRESS NOTES
Physical Therapy Screening:    An [facility] screening referral was triggered for physical therapy based on results obtained during the nursing admission assessment. The patients chart was reviewed and the patient is appropriate for a skilled therapy evaluation if there is a decline in functional mobility from baseline. Please order a consult for physical therapy if you are in agreement and would like an evaluation to be completed. Thank you.     Miquel Gracia, PT

## 2020-04-24 NOTE — CARDIO/PULMONARY
Cardiac Rehab Note: chart review     Consult has been acknowledged for pre-op teaching. Pt is a 67 yo admitting diagnosis, NSTEMI, s/p cardiac cath with severe vessel disease. Pt s/p CTS consult with plans per Dr. Elis Hendrix. Smoking history assessed. Patient chews tobacco per chart review. EF pending. CABG educational folder with \"Cardiac Surgery Post-Op Instructions\" book, heart healthy eating, warning signs, heart facts, heart aware, resources, and CABGSurgery booklet to Wilfred Hong on 4/24/2020. Attempted to educate using teach back method. Assessed patient's understanding of diagnosis and upcoming surgery. Pt reporting that he had no idea what the plan was going to be next. He was not aware of the cardiac cath results and did not know if anyone consulted from the surgery service. Pt asking when he gets to go home. Pt reporting that his daughters would need to be informed of whatever the plan was. Written materials placed with pt belongings. Awaiting consulting surgeon treatment plan. Wilfred Hong was not able to verbalize understanding. CP Rehab will continue to follow and educate as indicated.

## 2020-04-24 NOTE — CONSULTS
[address] [facility]     Date of  Admission: 4/23/2020  4:55 PM     Admission type:Emergency    Consult for: Elevated Troponin  Consult by: Dr. Jerri Lopez     Subjective:     Katharine Thompson is a 68 y.o. male with a PMH significant for CKD stage 3, arthritis, chronic back pain, DM II (diet controlled), HTN, HLD, PE, and Depression admitted for NSTEMI (non-ST elevated myocardial infarction) ([facility] [I21.4]. Per ED provider note, the patient presented with midsternal chest discomfort x 1 week that has been coming and going. Upon assessment the patient continues to endorse intermittent non-radiating, mid-sternal chest pain. He said that yesterday he started \"feeling funny in the chest\" and \"it felt like something moving\". He reports he had not taken any medications to alleviate the symptoms and they self terminate, but return. The pain is not reproducible. He denies any increased SOB, palpitations, syncope, near syncope, dizziness, lightheadedness, leg edema, or leg pain. He denies any recent illness/fever/chills/diarrhea/vomitting or exposure to any sick persons. He also endorses high levels of stress lately, stating his sister, brother, brother-in-law, mother, and a friend have all passed away in recent weeks. He denies smoking, but admits to consuming 6 beers per day and some wine or liquor at times. His alcohol consumption fluctuates based upon \"what I can get my hands on\" according to the patient. He is an established patient of Dr. Steph Arrieta, last seen Dec. 2019 for routine follow-up. His last stress test was in 2016, negative. Last Echo 2015 EF 55-60%,no wall abnormalities, normal valve structures. Cardiac risk factors: family history, dyslipidemia, male gender, hypertension, stress, age, alcohol consumption.       Patient Active Problem List    Diagnosis Date Noted    NSTEMI (non-ST elevated myocardial infarction) ([facility] 04/23/2020    Type 2 diabetes mellitus with diabetic neuropathy ([facility] 01/09/2020    Prostate cancer ([facility] 06/04/2018    Type 2 diabetes with nephropathy ([facility] 02/28/2018    Reactive depression 11/15/2016    Cervical spinal stenosis 11/03/2016    CKD (chronic kidney disease) 10/31/2016    Hyperlipidemia 07/10/2015    Spinal stenosis, lumbar region, without neurogenic claudication 10/16/2014    DVT of leg (deep venous thrombosis) ([facility] 12/18/2012    HTN (hypertension) 12/12/2012    Perforated diverticulum of large intestine 12/04/2012    Alcohol abuse 12/04/2012    Insomnia 12/04/2012      Madai Giraldo MD  Past Medical History:   Diagnosis Date    Arthritis     Chronic kidney disease     Stage 3    Chronic pain     Abdoman, back, fingers per daughter   [facility])     DIET CONTROLLED    Hypertension     PE (pulmonary embolism)     Pneumonia     Psychiatric disorder     Depression      Social History     Socioeconomic History    Marital status:      Spouse name: Not on file    Number of children: Not on file    Years of education: Not on file    Highest education level: Not on file   Tobacco Use    Smoking status: Never Smoker    Smokeless tobacco: Current User     Types: Chew   Substance and Sexual Activity    Alcohol use:  Yes     Alcohol/week: 7.0 standard drinks     Types: 7 Cans of beer per week     Comment: 7-10 per week    Drug use: No    Sexual activity: Not Currently   Social History Narrative    ** Merged History Encounter **          Allergies   Allergen Reactions    Arb-Angiotensin Receptor Antagonist Other (comments)     High k      Family History   Problem Relation Age of Onset    Cancer Mother     Anesth Problems Neg Hx       Current Facility-Administered Medications   Medication Dose Route Frequency    magnesium oxide (MAG-OX) tablet 400 mg  400 mg Oral DAILY    aspirin delayed-release tablet 81 mg  81 mg Oral DAILY    cholecalciferol (VITAMIN D3) (1000 Units /25 mcg) tablet 1 Tab  1,000 Units Oral DAILY    hydroCHLOROthiazide (HYDRODIURIL) tablet 12.5 mg  12.5 mg Oral DAILY    gabapentin (NEURONTIN) capsule 300 mg  300 mg Oral QHS PRN    citalopram (CELEXA) tablet 20 mg  20 mg Oral DAILY    ezetimibe (ZETIA) tablet 10 mg  10 mg Oral DAILY    LORazepam (ATIVAN) tablet 0.5 mg  0.5 mg Oral QHS PRN    traZODone (DESYREL) tablet 50 mg  50 mg Oral QHS PRN    metoprolol succinate (TOPROL-XL) XL tablet 50 mg  50 mg Oral DAILY    co-enzyme Q-10 (CO Q-10) capsule 100 mg  100 mg Oral DAILY    sodium chloride (NS) flush 5-40 mL  5-40 mL IntraVENous Q8H    sodium chloride (NS) flush 5-40 mL  5-40 mL IntraVENous PRN    nitroglycerin (NITROSTAT) tablet 0.4 mg  0.4 mg SubLINGual Q5MIN PRN    [Held by provider] enoxaparin (LOVENOX) injection 90 mg  1 mg/kg SubCUTAneous Q12H    0.9% sodium chloride infusion  100 mL/hr IntraVENous CONTINUOUS    glucose chewable tablet 16 g  4 Tab Oral PRN    dextrose (D50W) injection syrg 12.5-25 g  25-50 mL IntraVENous PRN    glucagon (GLUCAGEN) injection 1 mg  1 mg IntraMUSCular PRN    acetaminophen (TYLENOL) tablet 650 mg  650 mg Oral Q4H PRN    LORazepam (ATIVAN) injection 2 mg  2 mg IntraVENous Q1H PRN    LORazepam (ATIVAN) injection 4 mg  4 mg IntraVENous Q1H PRN    pantoprazole (PROTONIX) tablet 40 mg  40 mg Oral ACB        Review of Symptoms:   11 systems reviewed, negative other than as stated in the HPI        Objective:      Visit Vitals  /72   Pulse (!) 56   Temp 97.8 °F (36.6 °C)   Resp 16   Ht 5' 7.5\" (1.715 m)   Wt 84 kg (185 lb 3 oz)   SpO2 98%   BMI 28.58 kg/m²       Physical:   General: elderly, AA male, in NAD   E/N/T: unilateral left eye Exophthalmos  Heart: RRR, no m/S3/JVD, no carotid bruits   Lungs: clear throughout, RA  Abdomen: Soft, +BS, NTND   Extremities: LE yarelis +DP/PT, no edema   Neurologic: Grossly normal, talkative    Data Review:   Recent Labs     04/24/20  0245 04/23/20  1728   WBC 5.8 6.0   HGB 13.9 13.7   HCT 42.9 41.6    184     Recent Labs     04/24/20  0245 04/23/20  1728    139   K 4.1 4.2    107   CO2 26 25   * 98   BUN 27* 27*   CREA 1.48* 1.54*   CA 8.9 9.0   MG 2.4  --    ALB 3.4* 3.6   TBILI 1.5* 1.1*   SGOT 28 30   ALT 27 28       Recent Labs     04/24/20  0057 04/23/20  2042 04/23/20  1728   TROIQ 0.09* 0.10* 0.07*         Intake/Output Summary (Last 24 hours) at 4/24/2020 0920  Last data filed at 4/24/2020 0439  Gross per 24 hour   Intake 171.67 ml   Output 250 ml   Net -78.33 ml        Cardiographics    Telemetry: SR with IVCD  ECG: SR with IVCD, RBBB  Echocardiogram: last result 2015, EF 55-60%, normal structure. New Echo ordered. CXRAY: No acute process        Assessment:       Principal Problem:    NSTEMI (non-ST elevated myocardial infarction) ([facility] (4/23/2020)    Active Problems:    HTN (hypertension) (12/12/2012)      Hyperlipidemia (7/10/2015)      CKD (chronic kidney disease) (10/31/2016)      Overview: rhianna            Type 2 diabetes with nephropathy ([facility] (2/28/2018)         Plan:   Areli Haider is a 68 y.o. male with a PMH significant for CKD stage 3, arthritis, chronic back pain, DM II (diet controlled), HTN, HLD, PE, and depression admitted for NSTEMI (non-ST elevated myocardial infarction) ([facility] [I21.4]. Cardiology consulted for continued CP and elevated troponin. Cr 1.48, TSH 2.4, Trop 0.07,0.10, now 0.09), EKG: NSR with IVCD, H/H 13.9/42. 9. . Echo: pending. NSTEMI: continued intermittent mid-sternal CP  -Troponin trended (peaked 0.10)  -continue to monitor telemetry  -Echo pending  -continue BB, ASA, statin intolerant-continue zetia.  on recent labs. -nitroglycerin PRN for CP  -NPO  -Fluids 150cc/hr  -Obtain consent for cardiac cath. This am.     I discussed the risks/benefits/alternatives of cardiac catheterization +/- PCI with the patient. Risks include (but are not limited to) bleeding, infection, cva/mi/tamponade/death.  The patient understands and wishes to proceed. Hyperlipidemia/ Myalgias: Intolerant to Crestor, Lipitor and Pravachol, declined PSK9 therapy. -continue Zetia.       Hypertension: controlled  -continue BB, hydrodiuril     Diabetes Mellitus:  - as per internal medicine    CKD Stage III:   -pre-hydration initiated prior to cath. -monitor BMP daily     Thank you for the opportunity to partner in the care of this patient. Darlene Andrew NP   MSN,RN,AG-ACNP-[facility]          Patient seen, examined by me personally. Plan discussed as detailed. Agree with note as outlined by  NP. I confirm findings in history and physical exam. No additional findings noted. Agree with plan as outlined above.      Kenn Llanes MD

## 2020-04-24 NOTE — PROGRESS NOTES
1900 report received and care assumed from [facility] . Pt received in bed sleeping 1930  BP 80/50 repeat 97/62 pt in NAD ,IV fluids infusing . Will continue to monitor Cath site CDI no hematoma noted . Will continue to monitor . 2300 pt back to bed with assistance , no c/o pain made requesting something to relax him . Medicated with Ativan 0.5 mg PO . Bed alarm applied . 2330 pt resting in bed   0730 - Bedside shift change report given to Heriberto Bills  (oncoming nurse) by me (offgoing nurse). Report given with SBAR, MAR and Recent Results.

## 2020-04-24 NOTE — PROGRESS NOTES
8350 [facility] from Jose G Shrestha RN. Assumed care of pt.    1025  Pt to CCL. 1250  Pt back from CCL. Surgical consult. 1  Daughter called and wished to know information regarding procedure. MD has not called her. Assured her that I would get MD to call and give her update. Information given to NP regarding name and phone number. 1435  Begin to take down TR band. Pt keiry well. Pt asking questions regarding surgery. Pt reminiscing about his past and multiple losses in recent years. Pt seems sad and speaks frequently about his  mother and fiancee. Listened attentatively and let pt talk. 1600  Pt cont to reminisce and seems a sad about recent losses. Listen and let patient talk.

## 2020-04-24 NOTE — PROGRESS NOTES
Discussed with daughter and updated. Per daughter patient has stage 2 prostate cancer and plans is for seed implant in next 2 months.

## 2020-04-24 NOTE — PROGRESS NOTES
TRANSFER - IN REPORT:    Verbal report received from [facility] (name) on Katharine Thompson  being received from ER(unit) for routine progression of care      Report consisted of patients Situation, Background, Assessment and   Recommendations(SBAR). Information from the following report(s) SBAR, ED Summary, Intake/Output, MAR, Recent Results, Med Rec Status and Cardiac Rhythm nsr was reviewed with the receiving nurse. Opportunity for questions and clarification was provided. Assessment completed upon patients arrival to unit and care assumed. 0230 - Ativan 0.5 po for hx daily beer (says 2-6 beers is typical). Explained rationale to pt. Pt expresses having several deaths / losses in the past 3 years, starting with his mother and most recently his younger brother. Lives alone, stays to himself much of the time. Dtr called ER RN and said to watch him bc of his daily drinking habit. Pt currently fine, just nervous about being here in hospital.       0700 -slept soundly. Bedside report to RN. NSR BBB.

## 2020-04-24 NOTE — PROGRESS NOTES
[id number]  [facility]  Hospitalist Group                                                                                          Hospitalist Progress Note  Manual MD Maria Elena  Answering service: [phone] OR [phone] from in house phone        Date of Service:  2020  NAME:  Kash Ta  :  1950  MRN:  [id number]      Admission Summary:   CHIEF COMPLAINT: chest pain      HISTORY OF PRESENT ILLNESS:     Mr. Marny Hamman is a 68 y.o.  male who is admitted with chest pain. Mr. Marny Hamman presented to the Emergency Department today complaining of chest pain, patient having some discomfort, on and off for the past week, chest mid sternal non radiating, he didn't feel right, felt like indigestion,belching. No SOB or cough. No fever or chills. No abdominal pain, no N/V. Patient denies any swelling of his legs. No weakness. No other complaints.     Patient admitted with NSTEMI, Chest Pain, cardiac evaluation.        Interval history / Subjective:     Mild intermitten cp 2020  w deep inspiraion mainlh, precordial no distress,   Does drink beer but on questioning, seems 2-3 a days and not every day      Assessment & Plan:     NSTEMI  IV Lovenox 1 mg/kg BID  Serial Troponins - curve flattens this am no longer following ;   Echo, Cardio consult all pending      Chest Pain  Likely ACS  Nitro prn  Aspirin, cardio consult  Echo ordered     DM type 2   Diet controlled  accu checks  Lab Results   Component Value Date/Time    Glucose 142 (H) 2020 02:45 AM    Glucose (POC) 85 2020 07:10 AM         HTN  Continue home BP meds  BP Readings from Last 1 Encounters:   20 109/72         Mild Dehydration  Ivf/   IVF hydration     DVT Prophylaxis: lovenox  GI Prophylaxis:protonix  Code status Full code   Surrogate Decision Maker daughter  Baseline: AAOx 2-3      Hospital Problems  Date Reviewed: 2020          Codes Class Noted POA    NSTEMI (non-ST elevated myocardial infarction) ([facility] [address] ICD-10-CM: I21.4  ICD-9-CM: 410.70  4/23/2020 Unknown                Review of Systems:   Pertinent items are noted in HPI. Vital Signs:    Last 24hrs VS reviewed since prior progress note. Most recent are:  Visit Vitals  /72   Pulse (!) 56   Temp 97.8 °F (36.6 °C)   Resp 16   Ht 5' 7.5\" (1.715 m)   Wt 84 kg (185 lb 3 oz)   SpO2 98%   BMI 28.58 kg/m²         Intake/Output Summary (Last 24 hours) at 4/24/2020 0843  Last data filed at 4/24/2020 0439  Gross per 24 hour   Intake 171.67 ml   Output 250 ml   Net -78.33 ml        Physical Examination:             Constitutional:  No acute distress, cooperative, pleasant    ENT:  Oral mucosa moist, oropharynx benign. Resp:  CTA bilaterally. No wheezing/rhonchi/rales. No accessory muscle use   CV:  Regular rhythm, normal rate,      GI:  Soft, non distended, non tender. normoactive bowel sounds, no hepatosplenomegaly     Musculoskeletal:  No edema, warm,      Neurologic:  Moves all extremities. AAOx3, CN II-XII reviewed     Psych:  Good insight, Not anxious nor agitated. Skin:  Good turgor, no rashes or ulcers       Data Review:    Review and/or order of clinical lab test      Labs:     Recent Labs     04/24/20  0245 04/23/20  1728   WBC 5.8 6.0   HGB 13.9 13.7   HCT 42.9 41.6    184     Recent Labs     04/24/20  0245 04/23/20  1728    139   K 4.1 4.2    107   CO2 26 25   BUN 27* 27*   CREA 1.48* 1.54*   * 98   CA 8.9 9.0   MG 2.4  --      Recent Labs     04/24/20  0245 04/23/20  1728   SGOT 28 30   ALT 27 28   AP 48 47   TBILI 1.5* 1.1*   TP 7.9 8.0   ALB 3.4* 3.6   GLOB 4.5* 4.4*     No results for input(s): INR, PTP, APTT, INREXT in the last 72 hours. No results for input(s): FE, TIBC, PSAT, FERR in the last 72 hours. Lab Results   Component Value Date/Time    Folate 12.0 01/21/2013 07:55 AM      No results for input(s): PH, PCO2, PO2 in the last 72 hours.   Recent Labs     04/24/20  0057 04/23/20 2042 04/23/20  1728   TROIQ 0.09* 0.10* 0.07*     Lab Results   Component Value Date/Time    Cholesterol, total 213 (H) 01/09/2020 11:36 AM    HDL Cholesterol 57 01/09/2020 11:36 AM    LDL, calculated 138 (H) 01/09/2020 11:36 AM    Triglyceride 92 01/09/2020 11:36 AM     Lab Results   Component Value Date/Time    Glucose (POC) 85 04/24/2020 07:10 AM    Glucose (POC) 98 04/24/2020 12:23 AM    Glucose (POC) 104 (H) 11/03/2016 10:56 AM    Glucose (POC) 114 (H) 11/03/2016 08:11 AM    Glucose (POC) 78 11/03/2016 07:50 AM     Lab Results   Component Value Date/Time    Color YELLOW/STRAW 10/26/2016 09:49 AM    Appearance CLEAR 10/26/2016 09:49 AM    Specific gravity 1.017 10/26/2016 09:49 AM    Specific gravity 1.010 12/12/2012 03:15 PM    pH (UA) 5.0 10/26/2016 09:49 AM    Protein TRACE (A) 10/26/2016 09:49 AM    Glucose NEGATIVE  10/26/2016 09:49 AM    Ketone NEGATIVE  10/26/2016 09:49 AM    Bilirubin NEGATIVE  10/26/2016 09:49 AM    Urobilinogen 0.2 10/26/2016 09:49 AM    Nitrites NEGATIVE  10/26/2016 09:49 AM    Leukocyte Esterase NEGATIVE  10/26/2016 09:49 AM    Epithelial cells FEW 10/26/2016 09:49 AM    Bacteria NEGATIVE  10/26/2016 09:49 AM    WBC 0-4 10/26/2016 09:49 AM    RBC 0-5 10/26/2016 09:49 AM         Medications Reviewed:     Current Facility-Administered Medications   Medication Dose Route Frequency    magnesium oxide (MAG-OX) tablet 400 mg  400 mg Oral DAILY    aspirin delayed-release tablet 81 mg  81 mg Oral DAILY    cholecalciferol (VITAMIN D3) (1000 Units /25 mcg) tablet 1 Tab  1,000 Units Oral DAILY    hydroCHLOROthiazide (HYDRODIURIL) tablet 12.5 mg  12.5 mg Oral DAILY    gabapentin (NEURONTIN) capsule 300 mg  300 mg Oral QHS PRN    citalopram (CELEXA) tablet 20 mg  20 mg Oral DAILY    ezetimibe (ZETIA) tablet 10 mg  10 mg Oral DAILY    LORazepam (ATIVAN) tablet 0.5 mg  0.5 mg Oral QHS PRN    traZODone (DESYREL) tablet 50 mg  50 mg Oral QHS PRN    metoprolol succinate (TOPROL-XL) XL tablet 50 mg  50 mg Oral DAILY    co-enzyme Q-10 (CO Q-10) capsule 100 mg  100 mg Oral DAILY    sodium chloride (NS) flush 5-40 mL  5-40 mL IntraVENous Q8H    sodium chloride (NS) flush 5-40 mL  5-40 mL IntraVENous PRN    nitroglycerin (NITROSTAT) tablet 0.4 mg  0.4 mg SubLINGual Q5MIN PRN    [Held by provider] enoxaparin (LOVENOX) injection 90 mg  1 mg/kg SubCUTAneous Q12H    0.9% sodium chloride infusion  100 mL/hr IntraVENous CONTINUOUS    glucose chewable tablet 16 g  4 Tab Oral PRN    dextrose (D50W) injection syrg 12.5-25 g  25-50 mL IntraVENous PRN    glucagon (GLUCAGEN) injection 1 mg  1 mg IntraMUSCular PRN    acetaminophen (TYLENOL) tablet 650 mg  650 mg Oral Q4H PRN    LORazepam (ATIVAN) injection 2 mg  2 mg IntraVENous Q1H PRN    LORazepam (ATIVAN) injection 4 mg  4 mg IntraVENous Q1H PRN    pantoprazole (PROTONIX) tablet 40 mg  40 mg Oral ACB     ______________________________________________________________________  EXPECTED LENGTH OF STAY: - - -  ACTUAL LENGTH OF STAY:          1                 Bonita Moran MD

## 2020-04-24 NOTE — ED PROVIDER NOTES
EMERGENCY DEPARTMENT HISTORY AND PHYSICAL EXAM      Date: 4/23/2020  Patient Name: Payal Randall    History of Presenting Illness     Chief Complaint   Patient presents with    Chest Pain     pain in the center of the chest for a week, states belching, denies shortness of breath       History Provided By: Patient    HPI: Payal Randall, 68 y.o. male with PMHx significant for hypertension, diabetes, chronic kidney disease, prior provoked clot, reported cardiac history followed by Dr. Demario Greenfield who presents with a chief complaint of chest pain. Patient states he has had midsternal chest discomfort for about 1 week that has been coming and going. He is not able to further characterize his symptoms but denies any associated shortness of breath, fever, cough, abdominal pain, nausea, vomiting. PCP: Dylan Zaidi MD    There are no other complaints, changes, or physical findings at this time. Current Outpatient Medications   Medication Sig Dispense Refill    co-enzyme Q-10 (Co Q-10) 100 mg capsule Take 100 mg by mouth daily.  Januvia 50 mg tablet Take 1 tablet by mouth once daily 30 Tab 0    LORazepam (ATIVAN) 0.5 mg tablet TAKE 1 TABLET BY MOUTH ONCE DAILY AT NIGHT AS NEEDED FOR ANXIETY 20 Tab 0    metoprolol succinate (TOPROL-XL) 50 mg XL tablet Take 1 tablet by mouth once daily 90 Tab 0    Blood-Glucose Meter monitoring kit Check blood sugar once daily, E11.21 1 Kit 0    red yeast rice extract 600 mg cap Take 600 mg by mouth now. Every now and then      ezetimibe (ZETIA) 10 mg tablet Take 1 Tab by mouth daily. 90 Tab 1    citalopram (CELEXA) 20 mg tablet Take 1 Tab by mouth daily. 90 Tab 1    gabapentin (NEURONTIN) 300 mg capsule Take 1 Cap by mouth nightly as needed for Pain. 180 Cap 1    hydroCHLOROthiazide (HYDRODIURIL) 12.5 mg tablet Take 1 Tab by mouth daily. 90 Tab `1    cholecalciferol, vitamin D3, (VITAMIN D3 PO) Take  by mouth daily.       ascorbate calcium (VITAMIN C PO) Take by mouth daily.  aspirin delayed-release (ASPIR-LOW) 81 mg tablet Take 81 mg by mouth. Taking every now and then      FREESTYLE LANCETS 28 gauge misc USE TO CHECK BLOOD SUGAR ONCE DAILY 100 Lancet 12    cyanocobalamin 1,000 mcg tablet Take 1,000 mcg by mouth daily.  magnesium 250 mg tab Take  by mouth daily.  traZODone (DESYREL) 50 mg tablet TAKE 1 TABLET BY MOUTH AT BEDTIME (Patient taking differently: Take 50 mg by mouth nightly as needed.) 30 Tab 0     Past History     Past Medical History:  Past Medical History:   Diagnosis Date    Arthritis     Chronic kidney disease     Stage 3    Chronic pain     Abdoman, back, fingers per daughter   [facility] Diabetes ([facility] [address]     DIET CONTROLLED    Hypertension     PE (pulmonary embolism)     Pneumonia     Psychiatric disorder     Depression     Past Surgical History:  Past Surgical History:   Procedure Laterality Date    ABDOMEN SURGERY PROC UNLISTED      bowel resection    HX BACK SURGERY  2014    HX GI  11/2012    bowel resection    HX ORTHOPAEDIC      amputated left 4th finger     Family History:  Family History   Problem Relation Age of Onset    Cancer Mother     Anesth Problems Neg Hx      Social History:  Social History     Tobacco Use    Smoking status: Never Smoker    Smokeless tobacco: Current User     Types: Chew   Substance Use Topics    Alcohol use: Yes     Alcohol/week: 7.0 standard drinks     Types: 7 Cans of beer per week     Comment: 7-10 per week    Drug use: No     Allergies: Allergies   Allergen Reactions    Arb-Angiotensin Receptor Antagonist Other (comments)     High k     Review of Systems   Review of Systems   Constitutional: Negative for chills and fever. HENT: Negative for congestion, rhinorrhea and sore throat. Respiratory: Negative for cough and shortness of breath. Cardiovascular: Positive for chest pain. Gastrointestinal: Negative for abdominal pain, nausea and vomiting.    Genitourinary: Negative for dysuria and urgency. Skin: Negative for rash. Neurological: Negative for dizziness, light-headedness and headaches. All other systems reviewed and are negative. Physical Exam   Physical Exam  Vitals signs and nursing note reviewed. Constitutional:       General: He is not in acute distress. Appearance: He is well-developed. HENT:      Head: Normocephalic and atraumatic. Eyes:      Conjunctiva/sclera: Conjunctivae normal.      Pupils: Pupils are equal, round, and reactive to light. Neck:      Musculoskeletal: Normal range of motion. Cardiovascular:      Rate and Rhythm: Normal rate and regular rhythm. Pulmonary:      Effort: Pulmonary effort is normal. No respiratory distress. Breath sounds: Normal breath sounds. No stridor. Abdominal:      General: There is no distension. Palpations: Abdomen is soft. Tenderness: There is no abdominal tenderness. Musculoskeletal: Normal range of motion. Skin:     General: Skin is warm and dry. Neurological:      Mental Status: He is alert and oriented to person, place, and time.        Diagnostic Study Results   Labs -     Recent Results (from the past 12 hour(s))   EKG, 12 LEAD, INITIAL    Collection Time: 04/23/20  4:42 PM   Result Value Ref Range    Ventricular Rate 90 BPM    Atrial Rate 90 BPM    P-R Interval 188 ms    QRS Duration 144 ms    Q-T Interval 412 ms    QTC Calculation (Bezet) 504 ms    Calculated P Axis 41 degrees    Calculated R Axis -79 degrees    Calculated T Axis 79 degrees    Diagnosis       Normal sinus rhythm  Left axis deviation  Right bundle branch block  Septal infarct , age undetermined     CBC WITH AUTOMATED DIFF    Collection Time: 04/23/20  5:28 PM   Result Value Ref Range    WBC 6.0 4.1 - 11.1 K/uL    RBC 5.11 4.10 - 5.70 M/uL    HGB 13.7 12.1 - 17.0 g/dL    HCT 41.6 36.6 - 50.3 %    MCV 81.4 80.0 - 99.0 FL    MCH 26.8 26.0 - 34.0 PG    MCHC 32.9 30.0 - 36.5 g/dL    RDW 15.9 (H) 11.5 - 14.5 %    PLATELET 108 510 - 400 K/uL    MPV 9.9 8.9 - 12.9 FL    NRBC 0.0 0  WBC    ABSOLUTE NRBC 0.00 0.00 - 0.01 K/uL    NEUTROPHILS 53 32 - 75 %    LYMPHOCYTES 29 12 - 49 %    MONOCYTES 16 (H) 5 - 13 %    EOSINOPHILS 2 0 - 7 %    BASOPHILS 0 0 - 1 %    IMMATURE GRANULOCYTES 0 0.0 - 0.5 %    ABS. NEUTROPHILS 3.2 1.8 - 8.0 K/UL    ABS. LYMPHOCYTES 1.7 0.8 - 3.5 K/UL    ABS. MONOCYTES 0.9 0.0 - 1.0 K/UL    ABS. EOSINOPHILS 0.1 0.0 - 0.4 K/UL    ABS. BASOPHILS 0.0 0.0 - 0.1 K/UL    ABS. IMM. GRANS. 0.0 0.00 - 0.04 K/UL    DF AUTOMATED     METABOLIC PANEL, COMPREHENSIVE    Collection Time: 04/23/20  5:28 PM   Result Value Ref Range    Sodium 139 136 - 145 mmol/L    Potassium 4.2 3.5 - 5.1 mmol/L    Chloride 107 97 - 108 mmol/L    CO2 25 21 - 32 mmol/L    Anion gap 7 5 - 15 mmol/L    Glucose 98 65 - 100 mg/dL    BUN 27 (H) 6 - 20 MG/DL    Creatinine 1.54 (H) 0.70 - 1.30 MG/DL    BUN/Creatinine ratio 18 12 - 20      GFR est AA 54 (L) >60 ml/min/1.73m2    GFR est non-AA 45 (L) >60 ml/min/1.73m2    Calcium 9.0 8.5 - 10.1 MG/DL    Bilirubin, total 1.1 (H) 0.2 - 1.0 MG/DL    ALT (SGPT) 28 12 - 78 U/L    AST (SGOT) 30 15 - 37 U/L    Alk.  phosphatase 47 45 - 117 U/L    Protein, total 8.0 6.4 - 8.2 g/dL    Albumin 3.6 3.5 - 5.0 g/dL    Globulin 4.4 (H) 2.0 - 4.0 g/dL    A-G Ratio 0.8 (L) 1.1 - 2.2     TROPONIN I    Collection Time: 04/23/20  5:28 PM   Result Value Ref Range    Troponin-I, Qt. 0.07 (H) <0.05 ng/mL   CK W/ REFLX CKMB    Collection Time: 04/23/20  5:28 PM   Result Value Ref Range     39 - 308 U/L   TROPONIN I    Collection Time: 04/23/20  8:42 PM   Result Value Ref Range    Troponin-I, Qt. 0.10 (H) <0.05 ng/mL   EKG, 12 LEAD, SUBSEQUENT    Collection Time: 04/23/20  9:24 PM   Result Value Ref Range    Ventricular Rate 71 BPM    Atrial Rate 71 BPM    P-R Interval 186 ms    QRS Duration 146 ms    Q-T Interval 464 ms    QTC Calculation (Bezet) 504 ms    Calculated P Axis 38 degrees    Calculated R Axis -85 degrees    Calculated T Axis 64 degrees    Diagnosis       Sinus rhythm with premature atrial complexes  Left axis deviation  Right bundle branch block  Septal infarct , age undetermined         Radiologic Studies -   XR CHEST PA LAT   Final Result   IMPRESSION:      No acute process. Xr Chest Pa Lat    Result Date: 4/23/2020  IMPRESSION: No acute process. Medical Decision Making   I am the first provider for this patient. I reviewed the vital signs, available nursing notes, past medical history, past surgical history, family history and social history. Vital Signs-Reviewed the patient's vital signs. Patient Vitals for the past 12 hrs:   Temp Pulse Resp BP SpO2   04/23/20 1945 -- 72 17 112/70 94 %   04/23/20 1930 -- 74 14 115/72 94 %   04/23/20 1915 98.5 °F (36.9 °C) 74 13 112/73 99 %   04/23/20 1900 -- 74 12 108/76 98 %   04/23/20 1845 -- 77 17 115/70 97 %   04/23/20 1830 -- 75 16 102/56 97 %   04/23/20 1815 -- 74 21 111/68 94 %   04/23/20 1800 -- 79 16 116/71 95 %   04/23/20 1745 -- 75 13 114/66 97 %   04/23/20 1730 -- 80 15 129/76 97 %   04/23/20 1715 -- 82 15 117/74 97 %   04/23/20 1710 -- -- -- 128/77 --   04/23/20 1634 98.7 °F (37.1 °C) 88 18 (!) 154/91 98 %       Pulse Oximetry Analysis - 98% on ra    Cardiac Monitor:   Rate: 71 bpm  Rhythm: Normal Sinus Rhythm      ED EKG interpretation:  Rhythm: normal sinus rhythm and RBBB; and regular . Rate (approx.): 90; Axis: left axis deviation; P wave: normal; QRS interval: prolonged; ST/T wave: normal; Other findings: abnormal ekg. This EKG was interpreted by JO Alvarado MD,ED Provider. Records Reviewed: Nursing Notes and Old Medical Records    Provider Notes (Medical Decision Making):   Patient presents with approximately 1 week of chest discomfort. Patient is overall quite poor historian in terms of characterizing her describing his chest discomfort. EKG was performed and shows a right bundle. Plan for basic lab work, troponin, chest x-ray.     ED Course: Initial assessment performed. The patients presenting problems have been discussed, and they are in agreement with the care plan formulated and outlined with them. I have encouraged them to ask questions as they arise throughout their visit. Initial troponin  0.07, repeat 3 hours later up to 0.1. Will discuss with hospitalist and cardiology for admission    ED Course as of Apr 23 2256   Thu Apr 23, 2020 2201 Discussed with Dr. Will Tena, cardiology, recommends admission    [MINH]   2236 Spoke with Dr. Esau Edwards, telehospitalist, will see for admit    [MINH]      ED Course User Index  Katherin Escamilla MD       Critical Care:  none    Disposition:    Admission Note:  Patient is being admitted to the hospital by Dr. Esau Edwards, Service: Hospitalist.  The results of their tests and reasons for their admission have been discussed with them and available family. They convey agreement and understanding for the need to be admitted and for their admission diagnosis. Diagnosis     Clinical Impression:   1. Acute chest pain    2. Elevated troponin        This note will not be viewable in [employer]. Please note that this dictation was completed with Smart [employer]oon, the [employer] voice recognition software. Quite often unanticipated grammatical, syntax, homophones, and other interpretive errors are inadvertently transcribed by the computer software. Please disregard these errors.   Please excuse any errors that have escaped final proofreading

## 2020-04-24 NOTE — H&P
[facility] [place] [facility]  Admission History and Physical      NAME:  Katharine Thompson   :   1950   MRN:  [id number]     PCP:  Hope Goldberg MD     Date/Time:  2020           Assessment/Plan:         My assessment of this patient's clinical condition and my plan of care is as follows:      Given the patient's current clinical presentation, I have a high level of concern for decompensation if discharged from the ED. Complex decision making was performed which includes reviewing the patient's available past medical records, laboratory results, and Xray films. I have also directly communicated my plan and discussed this case with the involved ED physician.      Assessment / Plan:  NSTEMI  IV Lovenox 1 mg/kg BID  Serial Troponins  Echo, Cardio consult    Chest Pain  Likely ACS  Nitro prn  Aspirin, cardio consult  Echo ordered    DM type 2   Diet controlled  accu checks    HTN  Continue home BP meds    Mild Dehydration   IVF hydration     DVT Prophylaxis: lovenox  GI Prophylaxis:protonix  Code status Full code   Surrogate Decision Maker daughter  Baseline: AAOx 2-3               Subjective:     CHIEF COMPLAINT: chest pain     HISTORY OF PRESENT ILLNESS:     Mr. Osmin Schultz is a 68 y.o.  male who is admitted with chest pain. Mr. Osmin Schultz presented to the Emergency Department today complaining of chest pain, patient having some discomfort, on and off for the past week, chest mid sternal non radiating, he didn't feel right, felt like indigestion,belching. No SOB or cough. No fever or chills. No abdominal pain, no N/V. Patient denies any swelling of his legs. No weakness. No other complaints. Patient admitted with NSTEMI, Chest Pain, cardiac evaluation.        Past Medical History:   Diagnosis Date    Arthritis     Chronic kidney disease     Stage 3    Chronic pain     Abdoman, back, fingers per daughter   Milan Moritz Diabetes [facility])     DIET CONTROLLED    Hypertension     PE (pulmonary embolism)     Pneumonia     Psychiatric disorder     Depression        Past Surgical History:   Procedure Laterality Date    ABDOMEN SURGERY PROC UNLISTED      bowel resection    HX BACK SURGERY  2014    HX GI  11/2012    bowel resection    HX ORTHOPAEDIC      amputated left 4th finger       Social History     Tobacco Use    Smoking status: Never Smoker    Smokeless tobacco: Current User     Types: Chew   Substance Use Topics    Alcohol use: Yes     Alcohol/week: 7.0 standard drinks     Types: 7 Cans of beer per week     Comment: 7-10 per week        Family History   Problem Relation Age of Onset    Cancer Mother     Anesth Problems Neg Hx          Allergies   Allergen Reactions    Arb-Angiotensin Receptor Antagonist Other (comments)     High k        Prior to Admission medications    Medication Sig Start Date End Date Taking? Authorizing Provider   co-enzyme Q-10 (Co Q-10) 100 mg capsule Take 100 mg by mouth daily. Yes Treva, MD Camilla   Januvia 50 mg tablet Take 1 tablet by mouth once daily 4/21/20  Yes Marti Cook MD   LORazepam (ATIVAN) 0.5 mg tablet TAKE 1 TABLET BY MOUTH ONCE DAILY AT NIGHT AS NEEDED FOR ANXIETY 3/9/20  Yes Marti Cook MD   metoprolol succinate (TOPROL-XL) 50 mg XL tablet Take 1 tablet by mouth once daily 3/9/20  Yes Marti Cook MD   Blood-Glucose Meter monitoring kit Check blood sugar once daily, E11.21 2/21/20  Yes Marti Cook MD   red yeast rice extract 600 mg cap Take 600 mg by mouth now. Every now and then   Yes Provider, Historical   ezetimibe (ZETIA) 10 mg tablet Take 1 Tab by mouth daily. 12/20/19  Yes Karyn Guerrier MD   citalopram (CELEXA) 20 mg tablet Take 1 Tab by mouth daily. 12/5/19  Yes Marti Cook MD   gabapentin (NEURONTIN) 300 mg capsule Take 1 Cap by mouth nightly as needed for Pain. 8/21/19  Yes Marti Cook MD   hydroCHLOROthiazide (HYDRODIURIL) 12.5 mg tablet Take 1 Tab by mouth daily.  6/25/19  Yes Kolby Veronica Wendy Valenzuela MD   cholecalciferol, vitamin D3, (VITAMIN D3 PO) Take  by mouth daily. Yes Provider, Historical   ascorbate calcium (VITAMIN C PO) Take  by mouth daily. Yes Provider, Historical   aspirin delayed-release (ASPIR-LOW) 81 mg tablet Take 81 mg by mouth. Taking every now and then 3/16/16  Yes Provider, Historical   FREESTYLE LANCETS 28 gauge misc USE TO CHECK BLOOD SUGAR ONCE DAILY 1/14/18  Yes Everton Harper MD   cyanocobalamin 1,000 mcg tablet Take 1,000 mcg by mouth daily. Yes Provider, Historical   magnesium 250 mg tab Take  by mouth daily. Yes Provider, Historical   traZODone (DESYREL) 50 mg tablet TAKE 1 TABLET BY MOUTH AT BEDTIME  Patient taking differently: Take 50 mg by mouth nightly as needed.  3/9/20   Everton Harper MD         Review of Systems:    Constitutional: negative for fevers, chills, sweats, fatigue, malaise, anorexia and weight loss   Eyes: negative for irritation, redness and icterus   Ears, nose, mouth, throat, and face: negative for ear drainage, earaches, nasal congestion, sore mouth and sore throat   Respiratory: negative for cough, sputum, hemoptysis, pleurisy/chest pain, asthma, wheezing or dyspnea on exertion   Cardiovascular:+ chest pain  Gastrointestinal: negative for nausea, vomiting, diarrhea, constipation and abdominal pain   Genitourinary:negative for frequency and dysuria   Hematologic/lymphatic: negative for easy bruising, bleeding, lymphadenopathy, petechiae and coughing up blood   Musculoskeletal:negative for myalgias, arthralgias and muscle weakness   Neurological: negative for headaches and dizziness   Endocrine: Denies heat or cold intolerance       Objective:      VITALS:    Vital signs reviewed; most recent are:    Visit Vitals  /75   Pulse 71   Temp 98.5 °F (36.9 °C)   Resp 15   Ht 5' 7.5\" (1.715 m)   Wt 85.5 kg (188 lb 7.9 oz)   SpO2 98%   BMI 29.09 kg/m²     SpO2 Readings from Last 6 Encounters:   04/23/20 98%   01/09/20 97%   12/20/19 97% 09/24/19 98%   06/25/19 98%   05/28/19 98%        No intake or output data in the 24 hours ending 04/23/20 6387         Exam:     Physical Exam:  Via Telemedicine, RN assisted   Gen:  Well-developed, well-nourished, in no acute distress,elderly   HEENT: ATNC  Resp:  No accessory muscle use, clear breath sounds without wheezes rales or rhonchi  Card:  No murmurs, normal S1, S2 without thrills, bruits or peripheral edema  Abd:  Soft, non-tender, non-distended, normoactive bowel sounds are present, no palpable organomegaly  Musc:  No cyanosis or clubbing  Skin:  No rashes or ulcers, skin turgor is good   Neuro:  Cranial nerves 3-12 are grossly intact,  strength is 5/5 bilaterally, dorsi / plantarflexion strength is 5/5 bilaterally, follows commands appropriately  Psych:  Alert with good insight. Oriented to person, place, and time       Labs:    Recent Labs     04/23/20  1728   WBC 6.0   HGB 13.7   HCT 41.6        Recent Labs     04/23/20  1728      K 4.2      CO2 25   GLU 98   BUN 27*   CREA 1.54*   CA 9.0   ALB 3.6   SGOT 30   ALT 28     No components found for: GLPOC  No results for input(s): PH, PCO2, PO2, HCO3, FIO2 in the last 72 hours. No results for input(s): INR, INREXT in the last 72 hours.     Chest Xray:  Chest X-ray: Normal.Results reviewed with Radiologist.  EKG reviewed: Sinus rhythm with premature atrial complexes   Left axis deviation   Right bundle branch block   Septal infarct , age undetermined       Total time spent with patient: 48 [address] discussed with: Patient, Nursing Staff and >50% of time spent in counseling and coordination of care    Discussed:  Code Status and Care Plan    Prophylaxis:  Lovenox, SCD's and H2B/PPI    Probable Disposition:  Home w/Family           ___________________________________________________    Attending Physician: Ji Villegas MD

## 2020-04-25 ENCOUNTER — APPOINTMENT (OUTPATIENT)
Dept: NON INVASIVE DIAGNOSTICS | Age: 70
DRG: 281 | End: 2020-04-25
Attending: FAMILY MEDICINE
Payer: MEDICARE

## 2020-04-25 LAB
ABO + RH BLD: NORMAL
ALBUMIN SERPL-MCNC: 2.8 G/DL (ref 3.5–5)
ALBUMIN/GLOB SERPL: 0.7 {RATIO} (ref 1.1–2.2)
ALP SERPL-CCNC: 40 U/L (ref 45–117)
ALT SERPL-CCNC: 23 U/L (ref 12–78)
ANION GAP SERPL CALC-SCNC: 7 MMOL/L (ref 5–15)
APTT PPP: 25.1 SEC (ref 22.1–32)
AST SERPL-CCNC: 22 U/L (ref 15–37)
AV VELOCITY RATIO: 0.79
AV VTI RATIO: 0.8
BASOPHILS # BLD: 0 K/UL (ref 0–0.1)
BASOPHILS NFR BLD: 0 % (ref 0–1)
BILIRUB DIRECT SERPL-MCNC: 0.2 MG/DL (ref 0–0.2)
BILIRUB INDIRECT SERPL-MCNC: 0.8 MG/DL (ref 0–1.1)
BILIRUB SERPL-MCNC: 0.9 MG/DL (ref 0.2–1)
BILIRUB SERPL-MCNC: 1 MG/DL (ref 0.2–1)
BLOOD GROUP ANTIBODIES SERPL: NORMAL
BNP SERPL-MCNC: 532 PG/ML
BUN SERPL-MCNC: 25 MG/DL (ref 6–20)
BUN/CREAT SERPL: 17 (ref 12–20)
CALCIUM SERPL-MCNC: 8.4 MG/DL (ref 8.5–10.1)
CHLORIDE SERPL-SCNC: 108 MMOL/L (ref 97–108)
CO2 SERPL-SCNC: 24 MMOL/L (ref 21–32)
CREAT SERPL-MCNC: 1.46 MG/DL (ref 0.7–1.3)
DIFFERENTIAL METHOD BLD: ABNORMAL
ECHO AO ROOT DIAM: 3.45 CM
ECHO AV AREA PEAK VELOCITY: 2.5 CM2
ECHO AV AREA VTI: 2.4 CM2
ECHO AV MEAN GRADIENT: 2.4 MMHG
ECHO AV MEAN VELOCITY: 0.72 M/S
ECHO AV PEAK GRADIENT: 4.6 MMHG
ECHO AV PEAK VELOCITY: 107.01 CM/S
ECHO AV VTI: 18.35 CM
ECHO EST RA PRESSURE: 10 MMHG
ECHO LA AREA 4C: 23.3 CM2
ECHO LA MAJOR AXIS: 3.49 CM
ECHO LA TO AORTIC ROOT RATIO: 1.01
ECHO LA VOL 4C: 80.39 ML (ref 18–58)
ECHO LA VOLUME INDEX A4C: 41.1 ML/M2 (ref 16–28)
ECHO LV E' LATERAL VELOCITY: 5.68 CM/S
ECHO LV E' SEPTAL VELOCITY: 3.8 CM/S
ECHO LV EDV A4C: 103.2 ML
ECHO LV EDV INDEX A4C: 52.8 ML/M2
ECHO LV EJECTION FRACTION A4C: 49 %
ECHO LV ESV A4C: 52.9 ML
ECHO LV ESV INDEX A4C: 27 ML/M2
ECHO LV INTERNAL DIMENSION DIASTOLIC: 3.76 CM (ref 4.2–5.9)
ECHO LV INTERNAL DIMENSION SYSTOLIC: 2.63 CM
ECHO LV IVSD: 1.5 CM (ref 0.6–1)
ECHO LV MASS 2D: 333 G (ref 88–224)
ECHO LV MASS INDEX 2D: 170.2 G/M2 (ref 49–115)
ECHO LV POSTERIOR WALL DIASTOLIC: 2.02 CM (ref 0.6–1)
ECHO LVOT CARDIAC OUTPUT: 3.1 L/MIN
ECHO LVOT DIAM: 2 CM
ECHO LVOT PEAK GRADIENT: 2.9 MMHG
ECHO LVOT PEAK VELOCITY: 84.55 CM/S
ECHO LVOT SV: 43.5 ML
ECHO LVOT VTI: 13.83 CM
ECHO MV A VELOCITY: 26.16 CM/S
ECHO MV AREA PHT: 5.1 CM2
ECHO MV AREA VTI: 2.3 CM2
ECHO MV E DECELERATION TIME (DT): 191 MS
ECHO MV E VELOCITY: 77.89 CM/S
ECHO MV E/A RATIO: 2.98
ECHO MV E/E' LATERAL: 13.71
ECHO MV E/E' RATIO (AVERAGED): 17.11
ECHO MV E/E' SEPTAL: 20.5
ECHO MV MAX VELOCITY: 103.76 CM/S
ECHO MV MEAN GRADIENT: 1 MMHG
ECHO MV MEAN INFLOW VELOCITY: 0.43 M/S
ECHO MV PEAK GRADIENT: 4.3 MMHG
ECHO MV PRESSURE HALF TIME (PHT): 43.1 MS
ECHO MV VTI: 18.88 CM
ECHO PULMONARY ARTERY SYSTOLIC PRESSURE (PASP): 32 MMHG
ECHO PV MAX VELOCITY: 79.72 CM/S
ECHO PV MEAN GRADIENT: 1.2 MMHG
ECHO PV PEAK GRADIENT: 2.5 MMHG
ECHO PV VTI: 15.59 CM
ECHO RIGHT VENTRICULAR SYSTOLIC PRESSURE (RVSP): 32 MMHG
ECHO TV A WAVE: 68.38 CM/S
ECHO TV E WAVE: 51.19 CM/S
ECHO TV EROA: 1.3
ECHO TV REGURGITANT MAX VELOCITY: 234.67 CM/S
ECHO TV REGURGITANT PEAK GRADIENT: 22 MMHG
EOSINOPHIL # BLD: 0.2 K/UL (ref 0–0.4)
EOSINOPHIL NFR BLD: 3 % (ref 0–7)
ERYTHROCYTE [DISTWIDTH] IN BLOOD BY AUTOMATED COUNT: 16.3 % (ref 11.5–14.5)
EST. AVERAGE GLUCOSE BLD GHB EST-MCNC: 120 MG/DL
GLOBULIN SER CALC-MCNC: 3.9 G/DL (ref 2–4)
GLUCOSE BLD STRIP.AUTO-MCNC: 101 MG/DL (ref 65–100)
GLUCOSE BLD STRIP.AUTO-MCNC: 107 MG/DL (ref 65–100)
GLUCOSE BLD STRIP.AUTO-MCNC: 109 MG/DL (ref 65–100)
GLUCOSE BLD STRIP.AUTO-MCNC: 91 MG/DL (ref 65–100)
GLUCOSE SERPL-MCNC: 85 MG/DL (ref 65–100)
HBA1C MFR BLD: 5.8 % (ref 4–5.6)
HCT VFR BLD AUTO: 38.8 % (ref 36.6–50.3)
HGB BLD-MCNC: 12.7 G/DL (ref 12.1–17)
IMM GRANULOCYTES # BLD AUTO: 0 K/UL (ref 0–0.04)
IMM GRANULOCYTES NFR BLD AUTO: 1 % (ref 0–0.5)
INR PPP: 1 (ref 0.9–1.1)
LVFS 2D: 30.02 %
LVOT MG: 1.45 MMHG
LVOT MV: 0.54 CM/S
LYMPHOCYTES # BLD: 1.9 K/UL (ref 0.8–3.5)
LYMPHOCYTES NFR BLD: 35 % (ref 12–49)
MAGNESIUM SERPL-MCNC: 2.2 MG/DL (ref 1.6–2.4)
MCH RBC QN AUTO: 27.3 PG (ref 26–34)
MCHC RBC AUTO-ENTMCNC: 32.7 G/DL (ref 30–36.5)
MCV RBC AUTO: 83.3 FL (ref 80–99)
MONOCYTES # BLD: 0.7 K/UL (ref 0–1)
MONOCYTES NFR BLD: 13 % (ref 5–13)
MV DEC SLOPE: 4.08
NEUTS SEG # BLD: 2.6 K/UL (ref 1.8–8)
NEUTS SEG NFR BLD: 48 % (ref 32–75)
NRBC # BLD: 0 K/UL (ref 0–0.01)
NRBC BLD-RTO: 0 PER 100 WBC
PLATELET # BLD AUTO: 153 K/UL (ref 150–400)
PMV BLD AUTO: 10.7 FL (ref 8.9–12.9)
POTASSIUM SERPL-SCNC: 4.1 MMOL/L (ref 3.5–5.1)
PROT SERPL-MCNC: 6.7 G/DL (ref 6.4–8.2)
PROTHROMBIN TIME: 10.3 SEC (ref 9–11.1)
RBC # BLD AUTO: 4.66 M/UL (ref 4.1–5.7)
SERVICE CMNT-IMP: ABNORMAL
SERVICE CMNT-IMP: NORMAL
SODIUM SERPL-SCNC: 139 MMOL/L (ref 136–145)
SPECIMEN EXP DATE BLD: NORMAL
THERAPEUTIC RANGE,PTTT: NORMAL SECS (ref 58–77)
TSH SERPL DL<=0.05 MIU/L-ACNC: 1.98 UIU/ML (ref 0.36–3.74)
WBC # BLD AUTO: 5.3 K/UL (ref 4.1–11.1)

## 2020-04-25 PROCEDURE — 74011250637 HC RX REV CODE- 250/637: Performed by: FAMILY MEDICINE

## 2020-04-25 PROCEDURE — 82248 BILIRUBIN DIRECT: CPT

## 2020-04-25 PROCEDURE — 80053 COMPREHEN METABOLIC PANEL: CPT

## 2020-04-25 PROCEDURE — 74011250637 HC RX REV CODE- 250/637: Performed by: NURSE PRACTITIONER

## 2020-04-25 PROCEDURE — 74011250637 HC RX REV CODE- 250/637: Performed by: INTERNAL MEDICINE

## 2020-04-25 PROCEDURE — 36415 COLL VENOUS BLD VENIPUNCTURE: CPT

## 2020-04-25 PROCEDURE — 85730 THROMBOPLASTIN TIME PARTIAL: CPT

## 2020-04-25 PROCEDURE — 85610 PROTHROMBIN TIME: CPT

## 2020-04-25 PROCEDURE — 74011000250 HC RX REV CODE- 250: Performed by: NURSE PRACTITIONER

## 2020-04-25 PROCEDURE — 86900 BLOOD TYPING SEROLOGIC ABO: CPT

## 2020-04-25 PROCEDURE — 83880 ASSAY OF NATRIURETIC PEPTIDE: CPT

## 2020-04-25 PROCEDURE — 83735 ASSAY OF MAGNESIUM: CPT

## 2020-04-25 PROCEDURE — 85025 COMPLETE CBC W/AUTO DIFF WBC: CPT

## 2020-04-25 PROCEDURE — 65660000000 HC RM CCU STEPDOWN

## 2020-04-25 PROCEDURE — 83036 HEMOGLOBIN GLYCOSYLATED A1C: CPT

## 2020-04-25 PROCEDURE — 84443 ASSAY THYROID STIM HORMONE: CPT

## 2020-04-25 PROCEDURE — 82962 GLUCOSE BLOOD TEST: CPT

## 2020-04-25 PROCEDURE — 93306 TTE W/DOPPLER COMPLETE: CPT

## 2020-04-25 RX ADMIN — MAGNESIUM OXIDE 400 MG (241.3 MG MAGNESIUM) TABLET 400 MG: TABLET at 08:15

## 2020-04-25 RX ADMIN — Medication 10 ML: at 21:53

## 2020-04-25 RX ADMIN — Medication 10 ML: at 08:16

## 2020-04-25 RX ADMIN — Medication 10 ML: at 05:55

## 2020-04-25 RX ADMIN — METOPROLOL SUCCINATE 50 MG: 50 TABLET, EXTENDED RELEASE ORAL at 08:15

## 2020-04-25 RX ADMIN — AMIODARONE HYDROCHLORIDE 200 MG: 200 TABLET ORAL at 21:49

## 2020-04-25 RX ADMIN — EZETIMIBE 10 MG: 10 TABLET ORAL at 08:15

## 2020-04-25 RX ADMIN — HYDROCHLOROTHIAZIDE 12.5 MG: 25 TABLET ORAL at 08:14

## 2020-04-25 RX ADMIN — AMIODARONE HYDROCHLORIDE 200 MG: 200 TABLET ORAL at 08:15

## 2020-04-25 RX ADMIN — CHLORHEXIDINE GLUCONATE 0.12% ORAL RINSE 15 ML: 1.2 LIQUID ORAL at 23:32

## 2020-04-25 RX ADMIN — PANTOPRAZOLE SODIUM 40 MG: 40 TABLET, DELAYED RELEASE ORAL at 08:15

## 2020-04-25 RX ADMIN — CITALOPRAM HYDROBROMIDE 20 MG: 20 TABLET, FILM COATED ORAL at 08:15

## 2020-04-25 RX ADMIN — MUPIROCIN: 20 OINTMENT TOPICAL at 08:14

## 2020-04-25 RX ADMIN — MELATONIN 1 TABLET: at 08:15

## 2020-04-25 RX ADMIN — ASPIRIN 81 MG: 81 TABLET ORAL at 08:14

## 2020-04-25 RX ADMIN — MUPIROCIN: 20 OINTMENT TOPICAL at 23:29

## 2020-04-25 RX ADMIN — LORAZEPAM 0.5 MG: 0.5 TABLET ORAL at 21:52

## 2020-04-25 RX ADMIN — CHLORHEXIDINE GLUCONATE 0.12% ORAL RINSE 15 ML: 1.2 LIQUID ORAL at 08:14

## 2020-04-25 NOTE — H&P
[facility] 310  Admission History and Physical      NAME:  Modesto Redding   :   1950   MRN:  [id number]     PCP:  Don Palacios MD     Date/Time:  2020           Assessment/Plan:         My assessment of this patient's clinical condition and my plan of care is as follows:      Given the patient's current clinical presentation, I have a high level of concern for decompensation if discharged from the ED. Complex decision making was performed which includes reviewing the patient's available past medical records, laboratory results, and Xray films. I have also directly communicated my plan and discussed this case with the involved ED physician.      Assessment / Plan:  Interim update: 2020: Patient admitted, anticipating CABG next week  NSTEMI  Now status post angiography-MV CAD  -- CT surgery referral  --Continue aspirin, statin, nitro, beta-blocker,  --Echo pending  -- CABG this week  --We appreciate cardiology and surgery assistance    DM type 2   Diet controlled  accu checks, insulin as needed    HTN  Continue home BP meds    Mild Dehydration   IVF hydration     DVT Prophylaxis: lovenox  GI Prophylaxis:protonix  Code status Full code   Surrogate Decision Maker daughter  Baseline: AAOx 2-3               Subjective:     CHIEF COMPLAINT: chest pain     HISTORY OF PRESENT ILLNESS:     Mr. Pearson Peabody is a 68 y.o.  male who is admitted with chest pain. Mr. Pearson Peabody presented to the Emergency Department today complaining of chest pain, patient having some discomfort, on and off for the past week, chest mid sternal non radiating, he didn't feel right, felt like indigestion,belching. No SOB or cough. No fever or chills. No abdominal pain, no N/V. Patient denies any swelling of his legs. No weakness. No other complaints. Patient admitted with NSTEMI, Chest Pain, cardiac evaluation.        Past Medical History:   Diagnosis Date    Arthritis     Chronic kidney disease Stage 3    Chronic pain     Abdoman, back, fingers per daughter    Diabetes [facility])     DIET CONTROLLED    Hypertension     PE (pulmonary embolism)     Pneumonia     Psychiatric disorder     Depression        Past Surgical History:   Procedure Laterality Date    ABDOMEN SURGERY PROC UNLISTED      bowel resection    HX BACK SURGERY  2014    HX GI  11/2012    bowel resection    HX ORTHOPAEDIC      amputated left 4th finger       Social History     Tobacco Use    Smoking status: Never Smoker    Smokeless tobacco: Current User     Types: Chew   Substance Use Topics    Alcohol use: Yes     Alcohol/week: 7.0 standard drinks     Types: 7 Cans of beer per week     Comment: 7-10 per week        Family History   Problem Relation Age of Onset    Cancer Mother     Heart Disease Father     Heart Disease Brother     Anesth Problems Neg Hx          Allergies   Allergen Reactions    Arb-Angiotensin Receptor Antagonist Other (comments)     High k        Prior to Admission medications    Medication Sig Start Date End Date Taking? Authorizing Provider   co-enzyme Q-10 (Co Q-10) 100 mg capsule Take 100 mg by mouth daily. Yes Treva, MD Camilla   Januvia 50 mg tablet Take 1 tablet by mouth once daily 4/21/20  Yes Don Palacios MD   LORazepam (ATIVAN) 0.5 mg tablet TAKE 1 TABLET BY MOUTH ONCE DAILY AT NIGHT AS NEEDED FOR ANXIETY 3/9/20  Yes Don Palacios MD   metoprolol succinate (TOPROL-XL) 50 mg XL tablet Take 1 tablet by mouth once daily 3/9/20  Yes Don Palacios MD   Blood-Glucose Meter monitoring kit Check blood sugar once daily, E11.21 2/21/20  Yes Don Palacios MD   red yeast rice extract 600 mg cap Take 600 mg by mouth now. Every now and then   Yes Provider, Historical   ezetimibe (ZETIA) 10 mg tablet Take 1 Tab by mouth daily. 12/20/19  Yes Sky Longoria MD   citalopram (CELEXA) 20 mg tablet Take 1 Tab by mouth daily.  12/5/19  Yes Don Palacios MD   gabapentin (NEURONTIN) 300 mg capsule Take 1 Cap by mouth nightly as needed for Pain. 8/21/19  Yes Dylan Zaidi MD   hydroCHLOROthiazide (HYDRODIURIL) 12.5 mg tablet Take 1 Tab by mouth daily. 6/25/19  Yes Dylan Zaidi MD   cholecalciferol, vitamin D3, (VITAMIN D3 PO) Take  by mouth daily. Yes Provider, Historical   ascorbate calcium (VITAMIN C PO) Take  by mouth daily. Yes Provider, Historical   aspirin delayed-release (ASPIR-LOW) 81 mg tablet Take 81 mg by mouth. Taking every now and then 3/16/16  Yes Provider, Historical   FREESTYLE LANCETS [facility] gauge [facility] USE TO CHECK BLOOD SUGAR ONCE DAILY 1/14/18  Yes Dylan Zaidi MD   cyanocobalamin 1,000 mcg tablet Take 1,000 mcg by mouth daily. Yes Provider, Historical   magnesium 250 mg tab Take  by mouth daily. Yes Provider, Historical   traZODone (DESYREL) 50 mg tablet TAKE 1 TABLET BY MOUTH AT BEDTIME  Patient taking differently: Take 50 mg by mouth nightly as needed.  3/9/20   Dylan Zaidi MD         Review of Systems:    Constitutional: negative for fevers, chills, sweats, fatigue, malaise, anorexia and weight loss   Eyes: negative for irritation, redness and icterus   Ears, nose, mouth, throat, and face: negative for ear drainage, earaches, nasal congestion, sore mouth and sore throat   Respiratory: negative for cough, sputum, hemoptysis, pleurisy/chest pain, asthma, wheezing or dyspnea on exertion   Cardiovascular:+ chest pain  Gastrointestinal: negative for nausea, vomiting, diarrhea, constipation and abdominal pain   Genitourinary:negative for frequency and dysuria   Hematologic/lymphatic: negative for easy bruising, bleeding, lymphadenopathy, petechiae and coughing up blood   Musculoskeletal:negative for myalgias, arthralgias and muscle weakness   Neurological: negative for headaches and dizziness   Endocrine: Denies heat or cold intolerance       Objective:      VITALS:    Vital signs reviewed; most recent are:    Visit Vitals  /66 (BP 1 Location: Left arm, BP Patient Position: Sitting)   Pulse 68   Temp 98.1 °F (36.7 °C)   Resp 15   Ht 5' 7\" (1.702 m)   Wt 83.9 kg (185 lb)   SpO2 97%   BMI 28.98 kg/m²     SpO2 Readings from Last 6 Encounters:   04/25/20 97%   01/09/20 97%   12/20/19 97%   09/24/19 98%   06/25/19 98%   05/28/19 98%            Intake/Output Summary (Last 24 hours) at 4/25/2020 1746  Last data filed at 4/25/2020 0302  Gross per 24 hour   Intake 1980 ml   Output 251 ml   Net 1729 ml            Exam:     Physical Exam:  Via Telemedicine, RN assisted   Gen:  Well-developed, well-nourished, in no acute distress,elderly   HEENT: ATNC  Resp:  No accessory muscle use, clear breath sounds without wheezes rales or rhonchi  Card:  No murmurs, normal S1, S2 without thrills, bruits or peripheral edema  Abd:  Soft, non-tender, non-distended, normoactive bowel sounds are present, no palpable organomegaly  Musc:  No cyanosis or clubbing  Skin:  No rashes or ulcers, skin turgor is good   Neuro:  Cranial nerves 3-12 are grossly intact,  strength is 5/5 bilaterally, dorsi / plantarflexion strength is 5/5 bilaterally, follows commands appropriately  Psych:  Alert with good insight. Oriented to person, place, and time       Labs:    Recent Labs     04/25/20  0142   WBC 5.3   HGB 12.7   HCT 38.8        Recent Labs     04/25/20  0142      K 4.1      CO2 24   GLU 85   BUN 25*   CREA 1.46*   CA 8.4*   MG 2.2   ALB 2.8*   SGOT 22   ALT 23     No components found for: GLPOC  No results for input(s): PH, PCO2, PO2, HCO3, FIO2 in the last 72 hours.   Recent Labs     04/25/20  0142   INR 1.0       Chest Xray:  Chest X-ray: Normal.Results reviewed with Radiologist.  EKG reviewed: Sinus rhythm with premature atrial complexes   Left axis deviation   Right bundle branch block   Septal infarct , age undetermined       Total time spent with patient: 48 895 [facility] discussed with: Patient, Nursing Staff and >50% of time spent in counseling and coordination of care    Discussed:  Code Status and Care Plan    Prophylaxis:  Lovenox, SCD's and H2B/PPI    Probable Disposition:  Home w/Family           ___________________________________________________    Attending Physician: Fer Orta MD

## 2020-04-25 NOTE — PROGRESS NOTES
Hospitalist Progress Note    NAME: Rommel Carvalho   :  1950   MRN:  [id number]       Assessment / Plan:  NSTEMI  -- MV CAD, CT surgery consulted for CABG this week  -- Continue aspirin, nitro, statin, beta-blocker  -- Echo pending     DM type 2   Diet controlled  accu checks        Lab Results   Component Value Date/Time     Glucose 142 (H) 2020 02:45 AM     Glucose (POC) 85 2020 07:10 AM      LUIS FERNANDO  -- Acutely elevated creatinine in the setting of contrast, should resolve within 3 days  --IV fluids     HTN  Continue home BP meds      BP Readings from Last 1 Encounters:   20 109/72      Prostate cancer  --Patient to have seed placed later      Mild Dehydration  Ivf/   IVF hydration     DVT Prophylaxis: lovenox  GI Prophylaxis:protonix  Code status Full code   Surrogate Decision Maker daughter  Baseline: AAOx 2-3        Subjective:     Chief Complaint / Reason for Physician Visit  \" Patient states he does not understand why he is still here. Had a long discussion with him regarding his coronary disease and need for surgery. Patient is agreeable to do whatever he needs to do to get better. He has no further complaints of pain. He denies lightheadedness, dizziness, chest pain. .  Discussed with RN events overnight. Review of Systems:  Symptom Y/N Comments  Symptom Y/N Comments   Fever/Chills n   Chest Pain n    Poor Appetite n   Edema n    Cough n   Abdominal Pain n    Sputum n   Joint Pain n    SOB/ROLON n   Pruritis/Rash n    Nausea/vomit n   Tolerating PT/OT y    Diarrhea n   Tolerating Diet n    Constipation n   Other       Could NOT obtain due to:      Objective:     VITALS:   Last 24hrs VS reviewed since prior progress note.  Most recent are:  Patient Vitals for the past 24 hrs:   Temp Pulse Resp BP SpO2   20 1509 98.1 °F (36.7 °C) 68 15 126/66 97 %   20 1129 97.7 °F (36.5 °C) 67 14 119/75 96 %   20 0921 -- -- -- 117/58 --   20 0745 97.5 °F (36.4 °C) 61 15 117/58 96 %   04/25/20 0303 97.9 °F (36.6 °C) 64 14 110/63 97 %   04/25/20 0302 -- 64 14 -- 97 %   04/24/20 2308 97.8 °F (36.6 °C) 63 15 140/60 96 %   04/24/20 2146 -- 71 19 115/71 95 %   04/24/20 2145 -- 70 -- 115/71 --   04/24/20 2000 -- 65 14 113/64 97 %   04/24/20 1926 -- 72 22 97/62 98 %   04/24/20 1925 -- -- -- (!) 80/50 --   04/24/20 1924 -- (!) 57 11 -- --   04/24/20 1900 98.2 °F (36.8 °C) 63 12 95/65 98 %   04/24/20 1700 -- -- -- 121/56 --   04/24/20 1615 98.1 °F (36.7 °C) 61 19 116/61 --       Intake/Output Summary (Last 24 hours) at 4/25/2020 1546  Last data filed at 4/25/2020 0302  Gross per 24 hour   Intake 2220 ml   Output 251 ml   Net 1969 ml        PHYSICAL EXAM:  General: WD, WN. Alert, cooperative, no acute distress    EENT:  EOMI. Anicteric sclerae. MMM  Resp:  CTA bilaterally, no wheezing or rales. No accessory muscle use  CV:  Regular  rhythm,  No edema  GI:  Soft, Non distended, Non tender.  +Bowel sounds  Neurologic:  Alert and oriented X 3, normal speech,   Psych:   Good insight. Not anxious nor agitated  Skin:  No rashes. No jaundice    Reviewed most current lab test results and cultures  YES  Reviewed most current radiology test results   YES  Review and summation of old records today    NO  Reviewed patient's current orders and MAR    YES  PMH/SH reviewed - no change compared to H&P  ________________________________________________________________________  Care Plan discussed with:    Comments   Patient x    Family      RN x    Care Manager     Consultant                        Multidiciplinary team rounds were held today with , nursing, pharmacist and clinical coordinator. Patient's plan of care was discussed; medications were reviewed and discharge planning was addressed.      ________________________________________________________________________  Total NON critical care TIME:  40   Minutes    Total CRITICAL CARE TIME Spent:   Minutes non procedure based      Comments >50% of visit spent in counseling and coordination of care x    ________________________________________________________________________  Ulises Tolbert MD     Procedures: see electronic medical records for all procedures/Xrays and details which were not copied into this note but were reviewed prior to creation of Plan. LABS:  I reviewed today's most current labs and imaging studies.   Pertinent labs include:  Recent Labs     04/25/20  0142 04/24/20  0245 04/23/20  1728   WBC 5.3 5.8 6.0   HGB 12.7 13.9 13.7   HCT 38.8 42.9 41.6    192 184     Recent Labs     04/25/20  0142 04/24/20  0245 04/23/20  1728    139 139   K 4.1 4.1 4.2    107 107   CO2 24 26 25   GLU 85 142* 98   BUN 25* 27* 27*   CREA 1.46* 1.48* 1.54*   CA 8.4* 8.9 9.0   MG 2.2 2.4  --    ALB 2.8* 3.4* 3.6   TBILI 0.9  1.0 1.5* 1.1*   SGOT 22 28 30   ALT 23 27 28   INR 1.0  --   --        Signed: Ulises Tolbert MD

## 2020-04-25 NOTE — PROGRESS NOTES
4/25/2020 4:07 PM    Admit Date: 4/23/2020    Admit Diagnosis:   NSTEMI (non-ST elevated myocardial infarction) [facility]) [I21.4]    Subjective:     Janae Chandra denies chest pain or shortness of breath.      Current Facility-Administered Medications   Medication Dose Route Frequency    magnesium oxide (MAG-OX) tablet 400 mg  400 mg Oral DAILY    aspirin delayed-release tablet 81 mg  81 mg Oral DAILY    cholecalciferol (VITAMIN D3) (1000 Units /25 mcg) tablet 1 Tab  1,000 Units Oral DAILY    hydroCHLOROthiazide (HYDRODIURIL) tablet 12.5 mg  12.5 mg Oral DAILY    gabapentin (NEURONTIN) capsule 300 mg  300 mg Oral QHS PRN    citalopram (CELEXA) tablet 20 mg  20 mg Oral DAILY    ezetimibe (ZETIA) tablet 10 mg  10 mg Oral DAILY    LORazepam (ATIVAN) tablet 0.5 mg  0.5 mg Oral QHS PRN    traZODone (DESYREL) tablet 50 mg  50 mg Oral QHS PRN    metoprolol succinate (TOPROL-XL) XL tablet 50 mg  50 mg Oral DAILY    sodium chloride (NS) flush 5-40 mL  5-40 mL IntraVENous Q8H    sodium chloride (NS) flush 5-40 mL  5-40 mL IntraVENous PRN    nitroglycerin (NITROSTAT) tablet 0.4 mg  0.4 mg SubLINGual Q5MIN PRN    [Held by provider] enoxaparin (LOVENOX) injection 90 mg  1 mg/kg SubCUTAneous Q12H    glucose chewable tablet 16 g  4 Tab Oral PRN    dextrose (D50W) injection syrg 12.5-25 g  25-50 mL IntraVENous PRN    glucagon (GLUCAGEN) injection 1 mg  1 mg IntraMUSCular PRN    acetaminophen (TYLENOL) tablet 650 mg  650 mg Oral Q4H PRN    LORazepam (ATIVAN) injection 2 mg  2 mg IntraVENous Q1H PRN    LORazepam (ATIVAN) injection 4 mg  4 mg IntraVENous Q1H PRN    pantoprazole (PROTONIX) tablet 40 mg  40 mg Oral ACB    sodium chloride (NS) flush 5-40 mL  5-40 mL IntraVENous Q8H    sodium chloride (NS) flush 5-40 mL  5-40 mL IntraVENous PRN    chlorhexidine (PERIDEX) 0.12 % mouthwash 15 mL  15 mL Oral Q12H    mupirocin (BACTROBAN) 2 % ointment   Both Nostrils Q12H    amiodarone (CORDARONE) tablet 200 mg  200 mg Oral Q12H         Objective:      Physical Exam:    Visit Vitals  /66 (BP 1 Location: Left arm, BP Patient Position: Sitting)   Pulse 68   Temp 98.1 °F (36.7 °C)   Resp 15   Ht 5' 7\" (1.702 m)   Wt 185 lb (83.9 kg)   SpO2 97%   BMI 28.98 kg/m²     Gen:  NAD  Mental Status - Alert. General Appearance - Not in acute distress. Chest and Lung Exam   Inspection: Accessory muscles - No use of accessory muscles in breathing. Auscultation:   Breath sounds: - Normal.   Cardiovascular   Inspection: Jugular vein - Bilateral - Inspection Normal.   Palpation/Percussion:   Apical Impulse: - Normal.   Auscultation: Rhythm - Regular. Heart Sounds - S1 WNL and S2 WNL. No S3 or S4. Murmurs & Other Heart Sounds: Auscultation of the heart reveals - No Murmurs. Peripheral Vascular   Upper Extremity: Inspection - Bilateral - No Cyanotic nailbeds or Digital clubbing. Lower Extremity:   Palpation: Edema - Bilateral - No edema. Abdomen:   Soft, non-tender, bowel sounds are active.   Neuro: A&O times 3, CN and motor grossly WNL    Data Review:   Recent Labs     04/25/20  0142 04/24/20  0245 04/23/20  1728   WBC 5.3 5.8 6.0   HGB 12.7 13.9 13.7   HCT 38.8 42.9 41.6    192 184     Recent Labs     04/25/20  0142 04/24/20  0245 04/23/20  1728    139 139   K 4.1 4.1 4.2    107 107   CO2 24 26 25   GLU 85 142* 98   BUN 25* 27* 27*   CREA 1.46* 1.48* 1.54*   CA 8.4* 8.9 9.0   MG 2.2 2.4  --    ALB 2.8* 3.4* 3.6   TBILI 0.9  1.0 1.5* 1.1*   SGOT 22 28 30   ALT 23 27 28   INR 1.0  --   --        Recent Labs     04/24/20  0057 04/23/20  2042 04/23/20  1728   TROIQ 0.09* 0.10* 0.07*         Intake/Output Summary (Last 24 hours) at 4/25/2020 1607  Last data filed at 4/25/2020 0302  Gross per 24 hour   Intake 2220 ml   Output 251 ml   Net 1969 ml        Telemetry: normal sinus rhythm    Assessment:     Principal Problem:    NSTEMI (non-ST elevated myocardial infarction) ([facility] (4/23/2020)    Active Problems:    HTN (hypertension) (12/12/2012)      Hyperlipidemia (7/10/2015)      CKD (chronic kidney disease) (10/31/2016)      Overview: bedicheck            Type 2 diabetes with nephropathy ([facility] (2/28/2018)        Plan:       Leida Painting is a 68 y.o. male with a PMH significant for CKD stage 3, arthritis, chronic back pain, DM II (diet controlled), HTN, HLD, PE, and depression admitted for NSTEMI (non-ST elevated myocardial infarction) ([facility] [I21.4]. Cardiology consulted for continued CP and elevated troponin. Cr 1.48, TSH 2.4, Trop 0.07,0.10, now 0.09), EKG: NSR with IVCD, H/H 13.9/42. 9. . Echo: pending.      NSTEMI/three-vessel CAD:  -CT surgery following, with plans for surgery this coming week  -Echo shows normal LV systolic function, EF 60 to 65%, mild MR  -continue BB, ASA, statin intolerant-continue zetia.  on recent labs.    -nitroglycerin PRN for CP       Hyperlipidemia/ Myalgias: Intolerant to Crestor, Lipitor and Pravachol, declined PSK9 therapy.    -continue Zetia.       Hypertension: controlled  -continue BB, hydrodiuril     Diabetes Mellitus:  - as per internal medicine     CKD Stage III:   -creat stable  -monitor BMP

## 2020-04-25 NOTE — PROGRESS NOTES
8:55 PM Pt ambulated from the bed to the bathroom to void. Pt reports no CP/SOB/Change in gait. Pt ambulated in the hallway. Pt returned to bedside chair. Site CDI. No complaints.

## 2020-04-26 LAB
APPEARANCE UR: CLEAR
BACTERIA URNS QL MICRO: NEGATIVE /HPF
BILIRUB UR QL: NEGATIVE
COLOR UR: NORMAL
EPITH CASTS URNS QL MICRO: NORMAL /LPF
GLUCOSE BLD STRIP.AUTO-MCNC: 89 MG/DL (ref 65–100)
GLUCOSE BLD STRIP.AUTO-MCNC: 95 MG/DL (ref 65–100)
GLUCOSE BLD STRIP.AUTO-MCNC: 99 MG/DL (ref 65–100)
GLUCOSE UR STRIP.AUTO-MCNC: NEGATIVE MG/DL
HGB UR QL STRIP: NEGATIVE
HYALINE CASTS URNS QL MICRO: NORMAL /LPF (ref 0–5)
KETONES UR QL STRIP.AUTO: NEGATIVE MG/DL
LEUKOCYTE ESTERASE UR QL STRIP.AUTO: NEGATIVE
NITRITE UR QL STRIP.AUTO: NEGATIVE
PH UR STRIP: 5 [PH] (ref 5–8)
PROT UR STRIP-MCNC: NEGATIVE MG/DL
RBC #/AREA URNS HPF: NORMAL /HPF (ref 0–5)
SERVICE CMNT-IMP: NORMAL
SP GR UR REFRACTOMETRY: 1.01 (ref 1–1.03)
UA: UC IF INDICATED,UAUC: NORMAL
UROBILINOGEN UR QL STRIP.AUTO: 0.2 EU/DL (ref 0.2–1)
WBC URNS QL MICRO: NORMAL /HPF (ref 0–4)

## 2020-04-26 PROCEDURE — 74011250637 HC RX REV CODE- 250/637: Performed by: INTERNAL MEDICINE

## 2020-04-26 PROCEDURE — 81001 URINALYSIS AUTO W/SCOPE: CPT

## 2020-04-26 PROCEDURE — 65660000000 HC RM CCU STEPDOWN

## 2020-04-26 PROCEDURE — 82962 GLUCOSE BLOOD TEST: CPT

## 2020-04-26 PROCEDURE — 77010033678 HC OXYGEN DAILY

## 2020-04-26 PROCEDURE — 74011250637 HC RX REV CODE- 250/637: Performed by: FAMILY MEDICINE

## 2020-04-26 PROCEDURE — 74011000250 HC RX REV CODE- 250: Performed by: NURSE PRACTITIONER

## 2020-04-26 PROCEDURE — 74011250637 HC RX REV CODE- 250/637: Performed by: NURSE PRACTITIONER

## 2020-04-26 RX ADMIN — ACETAMINOPHEN 650 MG: 325 TABLET ORAL at 15:43

## 2020-04-26 RX ADMIN — AMIODARONE HYDROCHLORIDE 200 MG: 200 TABLET ORAL at 09:17

## 2020-04-26 RX ADMIN — MELATONIN 1 TABLET: at 09:17

## 2020-04-26 RX ADMIN — CITALOPRAM HYDROBROMIDE 20 MG: 20 TABLET, FILM COATED ORAL at 09:16

## 2020-04-26 RX ADMIN — PANTOPRAZOLE SODIUM 40 MG: 40 TABLET, DELAYED RELEASE ORAL at 09:17

## 2020-04-26 RX ADMIN — Medication 10 ML: at 06:36

## 2020-04-26 RX ADMIN — MUPIROCIN: 20 OINTMENT TOPICAL at 09:17

## 2020-04-26 RX ADMIN — CHLORHEXIDINE GLUCONATE 0.12% ORAL RINSE 15 ML: 1.2 LIQUID ORAL at 10:58

## 2020-04-26 RX ADMIN — MAGNESIUM OXIDE 400 MG (241.3 MG MAGNESIUM) TABLET 400 MG: TABLET at 09:17

## 2020-04-26 RX ADMIN — CHLORHEXIDINE GLUCONATE 0.12% ORAL RINSE 15 ML: 1.2 LIQUID ORAL at 23:10

## 2020-04-26 RX ADMIN — MUPIROCIN: 20 OINTMENT TOPICAL at 22:13

## 2020-04-26 RX ADMIN — Medication 10 ML: at 22:14

## 2020-04-26 RX ADMIN — METOPROLOL SUCCINATE 50 MG: 50 TABLET, EXTENDED RELEASE ORAL at 09:17

## 2020-04-26 RX ADMIN — EZETIMIBE 10 MG: 10 TABLET ORAL at 09:17

## 2020-04-26 RX ADMIN — AMIODARONE HYDROCHLORIDE 200 MG: 200 TABLET ORAL at 22:13

## 2020-04-26 RX ADMIN — HYDROCHLOROTHIAZIDE 12.5 MG: 25 TABLET ORAL at 09:17

## 2020-04-26 RX ADMIN — TRAZODONE HYDROCHLORIDE 50 MG: 50 TABLET ORAL at 22:18

## 2020-04-26 RX ADMIN — ASPIRIN 81 MG: 81 TABLET ORAL at 09:17

## 2020-04-26 NOTE — PROGRESS NOTES
4/26/2020 4:07 PM    Admit Date: 4/23/2020    Admit Diagnosis:   NSTEMI (non-ST elevated myocardial infarction) [facility]) [I21.4]    Subjective:     Rommel Carvalho denies chest pain or shortness of breath.      Current Facility-Administered Medications   Medication Dose Route Frequency    magnesium oxide (MAG-OX) tablet 400 mg  400 mg Oral DAILY    aspirin delayed-release tablet 81 mg  81 mg Oral DAILY    cholecalciferol (VITAMIN D3) (1000 Units /25 mcg) tablet 1 Tab  1,000 Units Oral DAILY    hydroCHLOROthiazide (HYDRODIURIL) tablet 12.5 mg  12.5 mg Oral DAILY    gabapentin (NEURONTIN) capsule 300 mg  300 mg Oral QHS PRN    citalopram (CELEXA) tablet 20 mg  20 mg Oral DAILY    ezetimibe (ZETIA) tablet 10 mg  10 mg Oral DAILY    LORazepam (ATIVAN) tablet 0.5 mg  0.5 mg Oral QHS PRN    traZODone (DESYREL) tablet 50 mg  50 mg Oral QHS PRN    metoprolol succinate (TOPROL-XL) XL tablet 50 mg  50 mg Oral DAILY    sodium chloride (NS) flush 5-40 mL  5-40 mL IntraVENous Q8H    sodium chloride (NS) flush 5-40 mL  5-40 mL IntraVENous PRN    nitroglycerin (NITROSTAT) tablet 0.4 mg  0.4 mg SubLINGual Q5MIN PRN    [Held by provider] enoxaparin (LOVENOX) injection 90 mg  1 mg/kg SubCUTAneous Q12H    glucose chewable tablet 16 g  4 Tab Oral PRN    dextrose (D50W) injection syrg 12.5-25 g  25-50 mL IntraVENous PRN    glucagon (GLUCAGEN) injection 1 mg  1 mg IntraMUSCular PRN    acetaminophen (TYLENOL) tablet 650 mg  650 mg Oral Q4H PRN    LORazepam (ATIVAN) injection 2 mg  2 mg IntraVENous Q1H PRN    LORazepam (ATIVAN) injection 4 mg  4 mg IntraVENous Q1H PRN    pantoprazole (PROTONIX) tablet 40 mg  40 mg Oral ACB    sodium chloride (NS) flush 5-40 mL  5-40 mL IntraVENous Q8H    sodium chloride (NS) flush 5-40 mL  5-40 mL IntraVENous PRN    chlorhexidine (PERIDEX) 0.12 % mouthwash 15 mL  15 mL Oral Q12H    mupirocin (BACTROBAN) 2 % ointment   Both Nostrils Q12H    amiodarone (CORDARONE) tablet 200 mg  200 mg Oral Q12H         Objective:      Physical Exam:    Visit Vitals  /71   Pulse 62   Temp 98.2 °F (36.8 °C)   Resp 18   Ht 5' 7\" (1.702 m)   Wt 185 lb (83.9 kg)   SpO2 96%   BMI 28.98 kg/m²     Gen:  NAD  Mental Status - Alert. General Appearance - Not in acute distress. Chest and Lung Exam   Inspection: Accessory muscles - No use of accessory muscles in breathing. Auscultation:   Breath sounds: - Normal.   Cardiovascular   Inspection: Jugular vein - Bilateral - Inspection Normal.   Palpation/Percussion:   Apical Impulse: - Normal.   Auscultation: Rhythm - Regular. Heart Sounds - S1 WNL and S2 WNL. No S3 or S4. Murmurs & Other Heart Sounds: Auscultation of the heart reveals - No Murmurs. Peripheral Vascular   Upper Extremity: Inspection - Bilateral - No Cyanotic nailbeds or Digital clubbing. Lower Extremity:   Palpation: Edema - Bilateral - No edema. Abdomen:   Soft, non-tender, bowel sounds are active.   Neuro: A&O times 3, CN and motor grossly WNL    Data Review:   Recent Labs     04/25/20  0142 04/24/20  0245 04/23/20  1728   WBC 5.3 5.8 6.0   HGB 12.7 13.9 13.7   HCT 38.8 42.9 41.6    192 184     Recent Labs     04/25/20  0142 04/24/20  0245 04/23/20  1728    139 139   K 4.1 4.1 4.2    107 107   CO2 24 26 25   GLU 85 142* 98   BUN 25* 27* 27*   CREA 1.46* 1.48* 1.54*   CA 8.4* 8.9 9.0   MG 2.2 2.4  --    ALB 2.8* 3.4* 3.6   TBILI 0.9  1.0 1.5* 1.1*   SGOT 22 28 30   ALT 23 27 28   INR 1.0  --   --        Recent Labs     04/24/20  0057 04/23/20  2042 04/23/20  1728   TROIQ 0.09* 0.10* 0.07*       No intake or output data in the 24 hours ending 04/26/20 1111     Telemetry: normal sinus rhythm    Assessment:     Principal Problem:    NSTEMI (non-ST elevated myocardial infarction) ([facility] [address] (4/23/2020)    Active Problems:    HTN (hypertension) (12/12/2012)      Hyperlipidemia (7/10/2015)      CKD (chronic kidney disease) (10/31/2016)      Overview: rhianna            Type 2 diabetes with nephropathy ([facility] (2/28/2018)        Plan:       Juan Miguel Viveros is a 68 y.o. male with a PMH significant for CKD stage 3, arthritis, chronic back pain, DM II (diet controlled), HTN, HLD, PE, and depression admitted for NSTEMI (non-ST elevated myocardial infarction) ([facility] [I21.4]. Cardiology consulted for continued CP and elevated troponin. Cr 1.48, TSH 2.4, Trop 0.07,0.10, now 0.09), EKG: NSR with IVCD, H/H 13.9/42. 9. .       NSTEMI/three-vessel CAD:  -CT surgery following, with plans for surgery this coming week  -Echo shows normal LV systolic function, EF 60 to 65%, mild MR  -continue BB, ASA, statin intolerant-continue zetia.  on recent labs.    -nitroglycerin PRN for CP- not needed so far       Hyperlipidemia/ Myalgias: Intolerant to Crestor, Lipitor and Pravachol, declined PSK9 therapy.    -continue Zetia.       Hypertension: controlled  -continue BB, hydrodiuril     Diabetes Mellitus:  - as per internal medicine     CKD Stage III:   -creat stable  -monitor BMP

## 2020-04-26 NOTE — PROGRESS NOTES
Hospitalist Progress Note    NAME: Salvador Dodd   :  1950   MRN:  [id number]       Assessment / Plan:  NSTEMI  -- MV CAD, CT surgery consulted for CABG this week  -- Continue aspirin, nitro, statin, beta-blocker  -- Echo noted  Final result ·   Normal cavity size and systolic function (ejection fraction normal). Moderate to severe concentric hypertrophy. Estimated left ventricular ejection fraction is 60 - 65%. Left ventricular diastolic dysfunction. · Mild mitral annular calcification. Mild mitral valve regurgitation is present. · Mildly dilated left atrium.        DM type 2, diet controlled  accu checks  No SSI currently given well controlled BGs        Lab Results   Component Value Date/Time     Glucose 142 (H) 2020 02:45 AM     Glucose (POC) 85 2020 07:10 AM      LUIS FERNANDO vs CKD3, stable creatinine trend in comparison to previous ones  -- Acutely elevated creatinine in the setting of contrast, should resolve within 3 days  --IV fluids     HTN  Continue home BP meds      BP Readings from Last 1 Encounters:   20 109/72      Prostate cancer  --Patient to have seed placed later      Mild Dehydration  Ivf/   IVF hydration     DVT Prophylaxis: lovenox  GI Prophylaxis:protonix  Code status Full code   Surrogate Decision Maker daughter  Baseline: AAOx 2-3        Subjective:     Chief Complaint / Reason for Physician Visit  Follow up NSTEMI, LUIS FERNANDO. Patient seen and examined at the bedside. Labs, images and notes reviewed  Discussed with nursing staff, orders reviewed. Plan discussed with patient/Family    Pt is doing ok. No CP, SOB. Intermittent chest tightness with exertion, however not at rest and not recently. Awaiting CABG this week.       Review of Systems:  Symptom Y/N Comments  Symptom Y/N Comments   Fever/Chills n   Chest Pain n    Poor Appetite n   Edema n    Cough n   Abdominal Pain n    Sputum n   Joint Pain n    SOB/ROLON n   Pruritis/Rash n    Nausea/vomit n   Tolerating PT/OT y Diarrhea n   Tolerating Diet n    Constipation n   Other       Could NOT obtain due to:      Objective:     VITALS:   Last 24hrs VS reviewed since prior progress note. Most recent are:  Patient Vitals for the past 24 hrs:   Temp Pulse Resp BP SpO2   04/26/20 1540 -- -- -- 106/73 --   04/26/20 1216 97.9 °F (36.6 °C) 65 15 116/67 98 %   04/26/20 0820 98 °F (36.7 °C) 60 16 115/71 97 %   04/26/20 0308 98.2 °F (36.8 °C) 62 18 110/65 96 %   04/26/20 0307 -- -- -- 110/65 --   04/25/20 2325 98.2 °F (36.8 °C) 68 18 112/64 98 %   04/25/20 2151 -- 73 -- 128/64 --   04/25/20 2149 -- 75 -- 128/64 --   04/25/20 1927 98.2 °F (36.8 °C) 68 20 123/81 94 %   04/25/20 1926 -- 68 -- -- (!) 88 %     No intake or output data in the 24 hours ending 04/26/20 1819     PHYSICAL EXAM:  General: WD, WN. Alert, cooperative, no acute distress    EENT:  EOMI. Anicteric sclerae. MMM  Resp:  CTA bilaterally, no wheezing or rales. No accessory muscle use  CV:  Regular  rhythm,  No edema  GI:  Soft, Non distended, Non tender.  +Bowel sounds  Neurologic:  Alert and oriented X 3, normal speech,   Psych:   Good insight. Not anxious nor agitated  Skin:  No rashes. No jaundice    Reviewed most current lab test results and cultures  YES  Reviewed most current radiology test results   YES  Review and summation of old records today    NO  Reviewed patient's current orders and MAR    YES  PMH/SH reviewed - no change compared to H&P  ________________________________________________________________________  Care Plan discussed with:    Comments   Patient x    Family      RN x    Care Manager     Consultant                        Multidiciplinary team rounds were held today with , nursing, pharmacist and clinical coordinator. Patient's plan of care was discussed; medications were reviewed and discharge planning was addressed.      ________________________________________________________________________  Total NON critical care TIME:  40 Minutes    Total CRITICAL CARE TIME Spent:   Minutes non procedure based      Comments   >50% of visit spent in counseling and coordination of care x    ________________________________________________________________________  Humble Warren MD     Procedures: see electronic medical records for all procedures/Xrays and details which were not copied into this note but were reviewed prior to creation of Plan. LABS:  I reviewed today's most current labs and imaging studies.   Pertinent labs include:  Recent Labs     04/25/20  0142 04/24/20  0245   WBC 5.3 5.8   HGB 12.7 13.9   HCT 38.8 42.9    192     Recent Labs     04/25/20  0142 04/24/20  0245    139   K 4.1 4.1    107   CO2 24 26   GLU 85 142*   BUN 25* 27*   CREA 1.46* 1.48*   CA 8.4* 8.9   MG 2.2 2.4   ALB 2.8* 3.4*   TBILI 0.9  1.0 1.5*   SGOT 22 28   ALT 23 27   INR 1.0  --        Signed: Humble Warren MD

## 2020-04-26 NOTE — PROGRESS NOTES
Bedside shift change report given to IRINA LIMA [facility] RN  (oncoming nurse) by me (offgoing nurse). Report given with SBAR, MAR and Recent Results.    Pt without complaint of chest pain this shift

## 2020-04-27 ENCOUNTER — ANESTHESIA EVENT (OUTPATIENT)
Dept: CARDIOTHORACIC SURGERY | Age: 70
DRG: 236 | End: 2020-04-27
Payer: MEDICARE

## 2020-04-27 ENCOUNTER — TELEPHONE (OUTPATIENT)
Dept: INTERNAL MEDICINE CLINIC | Age: 70
End: 2020-04-27

## 2020-04-27 ENCOUNTER — HOSPITAL ENCOUNTER (INPATIENT)
Age: 70
LOS: 7 days | Discharge: HOME HEALTH | DRG: 236 | End: 2020-05-04
Attending: THORACIC SURGERY (CARDIOTHORACIC VASCULAR SURGERY) | Admitting: THORACIC SURGERY (CARDIOTHORACIC VASCULAR SURGERY)
Payer: MEDICARE

## 2020-04-27 ENCOUNTER — TELEPHONE (OUTPATIENT)
Dept: CASE MANAGEMENT | Age: 70
End: 2020-04-27

## 2020-04-27 VITALS
OXYGEN SATURATION: 97 % | RESPIRATION RATE: 18 BRPM | TEMPERATURE: 97.6 F | HEIGHT: 67 IN | SYSTOLIC BLOOD PRESSURE: 124 MMHG | BODY MASS INDEX: 29.03 KG/M2 | WEIGHT: 185 LBS | HEART RATE: 66 BPM | DIASTOLIC BLOOD PRESSURE: 71 MMHG

## 2020-04-27 DIAGNOSIS — Z95.1 S/P CABG X 3: Primary | ICD-10-CM

## 2020-04-27 DIAGNOSIS — I44.2 COMPLETE HEART BLOCK (HCC): ICD-10-CM

## 2020-04-27 PROBLEM — I25.10 CAD (CORONARY ARTERY DISEASE): Status: ACTIVE | Noted: 2020-04-27

## 2020-04-27 LAB
ANION GAP SERPL CALC-SCNC: 5 MMOL/L (ref 5–15)
ARTERIAL PATENCY WRIST A: YES
BASE EXCESS BLD CALC-SCNC: 0 MMOL/L
BDY SITE: NORMAL
BUN SERPL-MCNC: 20 MG/DL (ref 6–20)
BUN/CREAT SERPL: 13 (ref 12–20)
CA-I BLD-SCNC: 1.29 MMOL/L (ref 1.12–1.32)
CALCIUM SERPL-MCNC: 9.1 MG/DL (ref 8.5–10.1)
CHLORIDE SERPL-SCNC: 105 MMOL/L (ref 97–108)
CO2 SERPL-SCNC: 26 MMOL/L (ref 21–32)
CREAT SERPL-MCNC: 1.51 MG/DL (ref 0.7–1.3)
ERYTHROCYTE [DISTWIDTH] IN BLOOD BY AUTOMATED COUNT: 15.6 % (ref 11.5–14.5)
GAS FLOW.O2 O2 DELIVERY SYS: NORMAL L/MIN
GLUCOSE BLD STRIP.AUTO-MCNC: 109 MG/DL (ref 65–100)
GLUCOSE BLD STRIP.AUTO-MCNC: 91 MG/DL (ref 65–100)
GLUCOSE SERPL-MCNC: 174 MG/DL (ref 65–100)
HCO3 BLD-SCNC: 24.8 MMOL/L (ref 22–26)
HCT VFR BLD AUTO: 42.6 % (ref 36.6–50.3)
HGB BLD-MCNC: 13.8 G/DL (ref 12.1–17)
MAGNESIUM SERPL-MCNC: 2.1 MG/DL (ref 1.6–2.4)
MCH RBC QN AUTO: 26.9 PG (ref 26–34)
MCHC RBC AUTO-ENTMCNC: 32.4 G/DL (ref 30–36.5)
MCV RBC AUTO: 83 FL (ref 80–99)
NRBC # BLD: 0 K/UL (ref 0–0.01)
NRBC BLD-RTO: 0 PER 100 WBC
PCO2 BLD: 41.4 MMHG (ref 35–45)
PH BLD: 7.38 [PH] (ref 7.35–7.45)
PLATELET # BLD AUTO: 170 K/UL (ref 150–400)
PMV BLD AUTO: 10.1 FL (ref 8.9–12.9)
PO2 BLD: 81 MMHG (ref 80–100)
POTASSIUM SERPL-SCNC: 4.1 MMOL/L (ref 3.5–5.1)
RBC # BLD AUTO: 5.13 M/UL (ref 4.1–5.7)
SAO2 % BLD: 96 % (ref 92–97)
SERVICE CMNT-IMP: ABNORMAL
SERVICE CMNT-IMP: NORMAL
SODIUM SERPL-SCNC: 136 MMOL/L (ref 136–145)
SPECIMEN TYPE: NORMAL
TOTAL RESP. RATE, ITRR: 18
WBC # BLD AUTO: 4.6 K/UL (ref 4.1–11.1)

## 2020-04-27 PROCEDURE — 80048 BASIC METABOLIC PNL TOTAL CA: CPT

## 2020-04-27 PROCEDURE — 36415 COLL VENOUS BLD VENIPUNCTURE: CPT

## 2020-04-27 PROCEDURE — 85027 COMPLETE CBC AUTOMATED: CPT

## 2020-04-27 PROCEDURE — 82803 BLOOD GASES ANY COMBINATION: CPT

## 2020-04-27 PROCEDURE — 36600 WITHDRAWAL OF ARTERIAL BLOOD: CPT

## 2020-04-27 PROCEDURE — 82962 GLUCOSE BLOOD TEST: CPT

## 2020-04-27 PROCEDURE — 74011250637 HC RX REV CODE- 250/637: Performed by: FAMILY MEDICINE

## 2020-04-27 PROCEDURE — 65660000001 HC RM ICU INTERMED STEPDOWN

## 2020-04-27 PROCEDURE — 83735 ASSAY OF MAGNESIUM: CPT

## 2020-04-27 PROCEDURE — 74011000250 HC RX REV CODE- 250: Performed by: NURSE PRACTITIONER

## 2020-04-27 PROCEDURE — 74011250637 HC RX REV CODE- 250/637: Performed by: NURSE PRACTITIONER

## 2020-04-27 PROCEDURE — 86900 BLOOD TYPING SEROLOGIC ABO: CPT

## 2020-04-27 PROCEDURE — 74011250637 HC RX REV CODE- 250/637: Performed by: INTERNAL MEDICINE

## 2020-04-27 PROCEDURE — 86923 COMPATIBILITY TEST ELECTRIC: CPT

## 2020-04-27 PROCEDURE — 77030027138 HC INCENT SPIROMETER -A

## 2020-04-27 RX ORDER — ASPIRIN 81 MG/1
81 TABLET ORAL DAILY
Status: CANCELLED | OUTPATIENT
Start: 2020-04-28

## 2020-04-27 RX ORDER — MELATONIN
1000 DAILY
Status: CANCELLED | OUTPATIENT
Start: 2020-04-28

## 2020-04-27 RX ORDER — METOPROLOL SUCCINATE 50 MG/1
50 TABLET, EXTENDED RELEASE ORAL DAILY
Status: DISCONTINUED | OUTPATIENT
Start: 2020-04-28 | End: 2020-04-28

## 2020-04-27 RX ORDER — GABAPENTIN 300 MG/1
300 CAPSULE ORAL
Status: CANCELLED | OUTPATIENT
Start: 2020-04-27

## 2020-04-27 RX ORDER — PANTOPRAZOLE SODIUM 40 MG/1
40 TABLET, DELAYED RELEASE ORAL
Status: CANCELLED | OUTPATIENT
Start: 2020-04-28

## 2020-04-27 RX ORDER — PROTAMINE SULFATE 10 MG/ML
500 INJECTION, SOLUTION INTRAVENOUS ONCE
Status: DISCONTINUED | OUTPATIENT
Start: 2020-04-28 | End: 2020-04-28 | Stop reason: HOSPADM

## 2020-04-27 RX ORDER — TRAZODONE HYDROCHLORIDE 50 MG/1
50 TABLET ORAL
Status: DISCONTINUED | OUTPATIENT
Start: 2020-04-27 | End: 2020-05-04 | Stop reason: HOSPADM

## 2020-04-27 RX ORDER — SODIUM CHLORIDE 0.9 % (FLUSH) 0.9 %
5-40 SYRINGE (ML) INJECTION AS NEEDED
Status: DISCONTINUED | OUTPATIENT
Start: 2020-04-27 | End: 2020-04-28 | Stop reason: HOSPADM

## 2020-04-27 RX ORDER — CITALOPRAM 20 MG/1
20 TABLET, FILM COATED ORAL DAILY
Status: DISCONTINUED | OUTPATIENT
Start: 2020-04-28 | End: 2020-05-04 | Stop reason: HOSPADM

## 2020-04-27 RX ORDER — SODIUM CHLORIDE 0.9 % (FLUSH) 0.9 %
5-40 SYRINGE (ML) INJECTION AS NEEDED
Status: CANCELLED | OUTPATIENT
Start: 2020-04-27

## 2020-04-27 RX ORDER — SODIUM CHLORIDE 0.9 % (FLUSH) 0.9 %
5-40 SYRINGE (ML) INJECTION EVERY 8 HOURS
Status: DISCONTINUED | OUTPATIENT
Start: 2020-04-27 | End: 2020-04-28 | Stop reason: HOSPADM

## 2020-04-27 RX ORDER — NITROGLYCERIN 20 MG/100ML
16.5 INJECTION INTRAVENOUS CONTINUOUS
Status: DISCONTINUED | OUTPATIENT
Start: 2020-04-28 | End: 2020-04-28

## 2020-04-27 RX ORDER — POTASSIUM CHLORIDE 29.8 MG/ML
20 INJECTION INTRAVENOUS ONCE
Status: DISCONTINUED | OUTPATIENT
Start: 2020-04-28 | End: 2020-04-28 | Stop reason: HOSPADM

## 2020-04-27 RX ORDER — EZETIMIBE 10 MG/1
10 TABLET ORAL DAILY
Status: CANCELLED | OUTPATIENT
Start: 2020-04-28

## 2020-04-27 RX ORDER — PANTOPRAZOLE SODIUM 40 MG/1
40 TABLET, DELAYED RELEASE ORAL
Qty: 30 TAB | Refills: 0 | Status: SHIPPED
Start: 2020-04-28 | End: 2020-06-10

## 2020-04-27 RX ORDER — LORAZEPAM 2 MG/ML
2 INJECTION INTRAMUSCULAR
Status: DISCONTINUED | OUTPATIENT
Start: 2020-04-27 | End: 2020-04-30

## 2020-04-27 RX ORDER — LORAZEPAM 0.5 MG/1
0.5 TABLET ORAL
Status: CANCELLED | OUTPATIENT
Start: 2020-04-27

## 2020-04-27 RX ORDER — LORAZEPAM 2 MG/ML
2 INJECTION INTRAMUSCULAR
Status: CANCELLED | OUTPATIENT
Start: 2020-04-27

## 2020-04-27 RX ORDER — CITALOPRAM 20 MG/1
20 TABLET, FILM COATED ORAL DAILY
Status: CANCELLED | OUTPATIENT
Start: 2020-04-28

## 2020-04-27 RX ORDER — AMIODARONE HYDROCHLORIDE 200 MG/1
200 TABLET ORAL EVERY 12 HOURS
Status: CANCELLED | OUTPATIENT
Start: 2020-04-27

## 2020-04-27 RX ORDER — CEFAZOLIN SODIUM/WATER 2 G/20 ML
2 SYRINGE (ML) INTRAVENOUS
Status: COMPLETED | OUTPATIENT
Start: 2020-04-28 | End: 2020-04-28

## 2020-04-27 RX ORDER — AMIODARONE HYDROCHLORIDE 200 MG/1
200 TABLET ORAL EVERY 12 HOURS
Status: DISCONTINUED | OUTPATIENT
Start: 2020-04-27 | End: 2020-04-28

## 2020-04-27 RX ORDER — LORAZEPAM 0.5 MG/1
0.5 TABLET ORAL
Status: DISCONTINUED | OUTPATIENT
Start: 2020-04-27 | End: 2020-05-04 | Stop reason: HOSPADM

## 2020-04-27 RX ORDER — GABAPENTIN 300 MG/1
300 CAPSULE ORAL
Status: DISCONTINUED | OUTPATIENT
Start: 2020-04-27 | End: 2020-05-04 | Stop reason: HOSPADM

## 2020-04-27 RX ORDER — MUPIROCIN 20 MG/G
OINTMENT TOPICAL EVERY 12 HOURS
Status: DISCONTINUED | OUTPATIENT
Start: 2020-04-27 | End: 2020-04-28

## 2020-04-27 RX ORDER — HEPARIN SOD,PORCINE/0.9 % NACL 30K/1000ML
50-1000 INTRAVENOUS SOLUTION INTRAVENOUS AS NEEDED
Status: DISCONTINUED | OUTPATIENT
Start: 2020-04-28 | End: 2020-04-28 | Stop reason: HOSPADM

## 2020-04-27 RX ORDER — METOPROLOL SUCCINATE 50 MG/1
50 TABLET, EXTENDED RELEASE ORAL DAILY
Status: CANCELLED | OUTPATIENT
Start: 2020-04-28

## 2020-04-27 RX ORDER — MELATONIN
1000 DAILY
Status: DISCONTINUED | OUTPATIENT
Start: 2020-04-28 | End: 2020-04-28

## 2020-04-27 RX ORDER — PANTOPRAZOLE SODIUM 40 MG/1
40 TABLET, DELAYED RELEASE ORAL
Status: DISCONTINUED | OUTPATIENT
Start: 2020-04-28 | End: 2020-04-28

## 2020-04-27 RX ORDER — ASPIRIN 81 MG/1
81 TABLET ORAL DAILY
Status: DISCONTINUED | OUTPATIENT
Start: 2020-04-28 | End: 2020-04-28

## 2020-04-27 RX ORDER — LORAZEPAM 2 MG/ML
4 INJECTION INTRAMUSCULAR
Status: CANCELLED | OUTPATIENT
Start: 2020-04-27

## 2020-04-27 RX ORDER — LORAZEPAM 2 MG/ML
4 INJECTION INTRAMUSCULAR
Status: DISCONTINUED | OUTPATIENT
Start: 2020-04-27 | End: 2020-04-30

## 2020-04-27 RX ORDER — EZETIMIBE 10 MG/1
10 TABLET ORAL DAILY
Status: DISCONTINUED | OUTPATIENT
Start: 2020-04-28 | End: 2020-05-04 | Stop reason: HOSPADM

## 2020-04-27 RX ORDER — HYDROCHLOROTHIAZIDE 12.5 MG/1
12.5 TABLET ORAL DAILY
Qty: 90 TAB | Status: SHIPPED
Start: 2020-04-27 | End: 2020-05-04

## 2020-04-27 RX ORDER — TRAZODONE HYDROCHLORIDE 50 MG/1
50 TABLET ORAL
Status: CANCELLED | OUTPATIENT
Start: 2020-04-27

## 2020-04-27 RX ORDER — PHENYLEPHRINE 10 MG/250 ML(40 MCG/ML)IN 0.9 % SOD.CHLORIDE INTRAVENOUS
10-100
Status: DISCONTINUED | OUTPATIENT
Start: 2020-04-28 | End: 2020-04-28 | Stop reason: HOSPADM

## 2020-04-27 RX ORDER — CHLORHEXIDINE GLUCONATE 1.2 MG/ML
15 RINSE ORAL EVERY 12 HOURS
Status: DISCONTINUED | OUTPATIENT
Start: 2020-04-27 | End: 2020-04-28

## 2020-04-27 RX ORDER — DOBUTAMINE HYDROCHLORIDE 200 MG/100ML
0-10 INJECTION INTRAVENOUS
Status: DISCONTINUED | OUTPATIENT
Start: 2020-04-28 | End: 2020-05-02

## 2020-04-27 RX ORDER — SODIUM CHLORIDE 0.9 % (FLUSH) 0.9 %
5-40 SYRINGE (ML) INJECTION EVERY 8 HOURS
Status: CANCELLED | OUTPATIENT
Start: 2020-04-27

## 2020-04-27 RX ORDER — MAGNESIUM SULFATE HEPTAHYDRATE 40 MG/ML
2 INJECTION, SOLUTION INTRAVENOUS ONCE
Status: DISCONTINUED | OUTPATIENT
Start: 2020-04-28 | End: 2020-04-28 | Stop reason: HOSPADM

## 2020-04-27 RX ORDER — CHLORHEXIDINE GLUCONATE 1.2 MG/ML
15 RINSE ORAL EVERY 12 HOURS
Status: CANCELLED | OUTPATIENT
Start: 2020-04-27

## 2020-04-27 RX ORDER — ALBUMIN HUMAN 50 G/1000ML
25 SOLUTION INTRAVENOUS ONCE
Status: DISCONTINUED | OUTPATIENT
Start: 2020-04-28 | End: 2020-04-28 | Stop reason: HOSPADM

## 2020-04-27 RX ORDER — NITROGLYCERIN 0.4 MG/1
0.4 TABLET SUBLINGUAL
Status: DISCONTINUED | OUTPATIENT
Start: 2020-04-27 | End: 2020-04-28

## 2020-04-27 RX ORDER — NITROGLYCERIN 0.4 MG/1
0.4 TABLET SUBLINGUAL
Status: CANCELLED | OUTPATIENT
Start: 2020-04-27

## 2020-04-27 RX ORDER — MUPIROCIN 20 MG/G
OINTMENT TOPICAL EVERY 12 HOURS
Status: CANCELLED | OUTPATIENT
Start: 2020-04-27

## 2020-04-27 RX ORDER — AMIODARONE HYDROCHLORIDE 200 MG/1
200 TABLET ORAL EVERY 12 HOURS
Qty: 30 TAB | Refills: 0 | Status: SHIPPED
Start: 2020-04-27 | End: 2020-05-04

## 2020-04-27 RX ADMIN — TRAZODONE HYDROCHLORIDE 50 MG: 50 TABLET ORAL at 22:04

## 2020-04-27 RX ADMIN — MUPIROCIN: 20 OINTMENT TOPICAL at 09:19

## 2020-04-27 RX ADMIN — Medication 10 ML: at 03:46

## 2020-04-27 RX ADMIN — EZETIMIBE 10 MG: 10 TABLET ORAL at 09:20

## 2020-04-27 RX ADMIN — MAGNESIUM OXIDE 400 MG (241.3 MG MAGNESIUM) TABLET 400 MG: TABLET at 09:19

## 2020-04-27 RX ADMIN — Medication 10 ML: at 15:13

## 2020-04-27 RX ADMIN — Medication 10 ML: at 22:04

## 2020-04-27 RX ADMIN — AMIODARONE HYDROCHLORIDE 200 MG: 200 TABLET ORAL at 22:04

## 2020-04-27 RX ADMIN — ASPIRIN 81 MG: 81 TABLET ORAL at 09:20

## 2020-04-27 RX ADMIN — PANTOPRAZOLE SODIUM 40 MG: 40 TABLET, DELAYED RELEASE ORAL at 09:20

## 2020-04-27 RX ADMIN — CHLORHEXIDINE GLUCONATE 0.12% ORAL RINSE 15 ML: 1.2 LIQUID ORAL at 09:19

## 2020-04-27 RX ADMIN — METOPROLOL SUCCINATE 50 MG: 50 TABLET, EXTENDED RELEASE ORAL at 09:19

## 2020-04-27 RX ADMIN — CHLORHEXIDINE GLUCONATE 15 ML: 1.2 RINSE ORAL at 22:04

## 2020-04-27 RX ADMIN — CITALOPRAM HYDROBROMIDE 20 MG: 20 TABLET, FILM COATED ORAL at 09:20

## 2020-04-27 RX ADMIN — AMIODARONE HYDROCHLORIDE 200 MG: 200 TABLET ORAL at 09:20

## 2020-04-27 RX ADMIN — Medication 10 ML: at 09:20

## 2020-04-27 RX ADMIN — MELATONIN 1 TABLET: at 09:20

## 2020-04-27 RX ADMIN — MUPIROCIN: 20 OINTMENT TOPICAL at 22:04

## 2020-04-27 NOTE — TELEPHONE ENCOUNTER
Called, spoke to [facility] [place] (HIPAA). Two pt identifiers confirmed. Belinda Francis wants to report that the pt is currently admitted to the hospital and has pending cardiac surgery for 4/28/20. Belinda Francis states that once everything has calmed down and he gets out of the hospital, she will call the office for a VV f/u. Belinda Francis verbalized understanding of information discussed w/ no further questions at this time.

## 2020-04-27 NOTE — PROGRESS NOTES
Problem: Falls - Risk of  Goal: *Absence of Falls  Description: Document Sahil Franco Fall Risk and appropriate interventions in the flowsheet.   4/27/2020 0738 by Raj Mccord RN  Outcome: Progressing Towards Goal  Note: Fall Risk Interventions:  Mobility Interventions: Communicate number of staff needed for ambulation/transfer, Patient to call before getting OOB         Medication Interventions: Teach patient to arise slowly, Patient to call before getting OOB    Elimination Interventions: Call light in reach, Toilet paper/wipes in reach, Toileting schedule/hourly rounds, Patient to call for help with toileting needs           4/26/2020 2017 by Raj Mccord RN  Outcome: Progressing Towards Goal  Note: Fall Risk Interventions:  Mobility Interventions: Communicate number of staff needed for ambulation/transfer, Patient to call before getting OOB         Medication Interventions: Teach patient to arise slowly, Patient to call before getting OOB    Elimination Interventions: Call light in reach, Toilet paper/wipes in reach, Toileting schedule/hourly rounds, Patient to call for help with toileting needs

## 2020-04-27 NOTE — H&P
CSS   History and Physical    Subjective:      Jessa Emanuel is a 68 y.o. male who was referred for cardiac evaluation by Dr. Alan Lucia for multivessel CAD/NSTEMI. Pt transferred to [facility] for surgical intervention. The patient came to the emergency room for intermittent midsternal CP, that he described as a funny feeling in his chest. Nothing made the pain better or worse. He denies SOB, edema, orthopnea/PND, claudication, and dizziness. He does report significant recent stress due to several family members dying recently and watching a lot of the news. He has a medical history of HTN, DM type II, CDK 3, PE/DVT, spinal stenosis, chronic pain/peripheral neuropathy, prostate CA and anxiety. He has a surgical history of bowel resection and spinal surgery x2. He lives alone but he does have family locally. He states he would stay with his daughter after surgery if needed. He reports that he is active and independent. He denies smoking, does chew tobacco, and drinks alcohol regularly -when asked about amount he was unsure. He has a family history of heart disease. Cardiac Testing    Cardiac catheterization 4/24/20:   Left Main   The vessel was visualized by angiography. The vessel is angiographically normal.   Left Anterior Descending   Prox LAD lesion 50% stenosed. .   Mid LAD lesion 75% stenosed. .   Ramus Intermedius   Ramus lesion 80% stenosed. .   Left Circumflex   Mid Cx lesion 50% stenosed. .   First Obtuse Marginal Branch   1st Mrg lesion 80% stenosed. .   Right Coronary Artery   Mid RCA lesion 75% stenosed. .   Intervention     No interventions have been documented. ECHO 4/25/20: Normal cavity size and systolic function (ejection fraction normal). Moderate to severe concentric hypertrophy. Estimated left ventricular ejection fraction is 60 - 65%. Left ventricular diastolic dysfunction. · Mild mitral annular calcification. Mild mitral valve regurgitation is present. Mildly dilated left atrium.     Past Medical History:   Diagnosis Date    Arthritis     Chronic kidney disease     Stage 3    Chronic pain     Abdoman, back, fingers per daughter   [facility])     DIET CONTROLLED    Hypertension     PE (pulmonary embolism)     Pneumonia     Psychiatric disorder     Depression     Past Surgical History:   Procedure Laterality Date    ABDOMEN SURGERY PROC UNLISTED      bowel resection    HX BACK SURGERY  2014    HX GI  11/2012    bowel resection    HX ORTHOPAEDIC      amputated left 4th finger      Social History     Tobacco Use    Smoking status: Never Smoker    Smokeless tobacco: Current User     Types: Chew   Substance Use Topics    Alcohol use: Yes     Alcohol/week: 7.0 standard drinks     Types: 7 Cans of beer per week     Comment: 7-10 per week      Family History   Problem Relation Age of Onset    Cancer Mother     Heart Disease Father     Heart Disease Brother     Anesth Problems Neg Hx      Prior to Admission medications    Medication Sig Start Date End Date Taking? Authorizing Provider   amiodarone (CORDARONE) 200 mg tablet Take 1 Tab by mouth every twelve (12) hours. 4/27/20   Lu Dasilva MD   hydroCHLOROthiazide (HYDRODIURIL) 12.5 mg tablet Take 1 Tab by mouth daily. On Hold per CTSx for CABG 4/28/20 4/27/20   Lu Dasilva MD   pantoprazole (PROTONIX) 40 mg tablet Take 1 Tab by mouth Daily (before breakfast). 4/28/20   Lu Dasilva MD   co-enzyme Q-10 (Co Q-10) 100 mg capsule Take 100 mg by mouth daily. Other, MD aCmilla   Januvia 50 mg tablet Take 1 tablet by mouth once daily 4/21/20   Ashutosh Costello MD   LORazepam (ATIVAN) 0.5 mg tablet TAKE 1 TABLET BY MOUTH ONCE DAILY AT NIGHT AS NEEDED FOR ANXIETY 3/9/20   Ashutosh Costello MD   traZODone (DESYREL) 50 mg tablet TAKE 1 TABLET BY MOUTH AT BEDTIME  Patient taking differently: Take 50 mg by mouth nightly as needed.  3/9/20   Ashutosh Costello MD   metoprolol succinate (TOPROL-XL) 50 mg XL tablet Take 1 tablet by mouth once daily 3/9/20   Shireen Apgar, MD   Blood-Glucose Meter monitoring kit Check blood sugar once daily, E11.21 20   Shireen Apgar, MD   red yeast rice extract 600 mg cap Take 600 mg by mouth now. Every now and then    Provider, Historical   ezetimibe (ZETIA) 10 mg tablet Take 1 Tab by mouth daily. 19   Rexene Fothergill, MD   citalopram (CELEXA) 20 mg tablet Take 1 Tab by mouth daily. 19   Shireen Apgar, MD   gabapentin (NEURONTIN) 300 mg capsule Take 1 Cap by mouth nightly as needed for Pain. 19   Shireen Apgar, MD   hydroCHLOROthiazide (HYDRODIURIL) 12.5 mg tablet Take 1 Tab by mouth daily. 19  Shireen Apgar, MD   cholecalciferol, vitamin D3, (VITAMIN D3 PO) Take  by mouth daily. Provider, Historical   ascorbate calcium (VITAMIN C PO) Take  by mouth daily. Provider, Historical   aspirin delayed-release (ASPIR-LOW) 81 mg tablet Take 81 mg by mouth. Taking every now and then 3/16/16   Provider, Historical   FREESTYLE LANCETS 28 gauge misc USE TO CHECK BLOOD SUGAR ONCE DAILY 18   Shireen Apgar, MD   cyanocobalamin 1,000 mcg tablet Take 1,000 mcg by mouth daily. Provider, Historical   magnesium 250 mg tab Take  by mouth daily. Provider, Historical       Allergies   Allergen Reactions    Arb-Angiotensin Receptor Antagonist Other (comments)     High k       Review of Systems:   Consititutional: Denies fever or chills. Eyes:  Denies use of glasses or vision problems(cataracts). ENT:  Denies hearing or swallowing difficulty. CV: +CP, HTN. Denies claudication  Resp: Denies dyspnea, productive cough. : Denies dialysis + kidney problems. GI: Denies ulcers, esophageal strictures, liver problems. M/S: Denies joint or bone problems  Skin: Denies varicose veins, edema. Neuro: Denies strokes, or TIAs. Psych: Denies anxiety or depression. Endocrine: Denies thyroid problems + diabetes. Heme/Lymphatic: Denies easy bruising or lymphedema. Objective:     Physical Exam:    Visit Vitals  /78 (BP 1 Location: Left arm, BP Patient Position: Sitting)   Pulse 61   Temp 98.2 °F (36.8 °C)   Resp 18   SpO2 99%     General appearance: alert, cooperative, no distress, appears stated age  Head: Normocephalic, without obvious abnormality, atraumatic  Eyes: conjunctivae/corneas clear. PERRL, EOM's intact. Neck: supple, symmetrical, trachea midline, no adenopathy, thyroid: not enlarged, symmetric, no tenderness/mass/nodules, no carotid bruit and no JVD  Lungs: clear to auscultation bilaterally  Heart: regular rate and rhythm, S1, S2 normal, no murmur, click, rub or gallop  Abdomen: soft, non-tender. Bowel sounds normal. No masses,  no organomegaly  Extremities: extremities normal, atraumatic, no cyanosis or edema  Neurologic: Grossly normal    Labs:   Recent Labs     04/27/20  0929 04/27/20  0749  04/25/20  0142   WBC 4.6  --   --  5.3   HGB 13.8  --   --  12.7   HCT 42.6  --   --  38.8     --   --  153     --   --  139   K 4.1  --   --  4.1   BUN 20  --   --  25*   CREA 1.51*  --   --  1.46*   *  --   --  85   GLUCPOC  --  91   < >  --    INR  --   --   --  1.0    < > = values in this interval not displayed. Diagnostics:   PA and lateral 4/23/20: IMPRESSION:     No acute process. Carotid doppler 4/24/20: Right Carotid     There is mild stenosis in the right CCA. The right CCA has mild and calcific plaque. There is mild stenosis in the right ICA (<50%). The right ICA has mild and calcific plaque. There is mild stenosis in the right ECA. The right ECA has mild and calcific plaque. The right vertebral is antegrade. The right subclavian with biphasic waveforms. Left Carotid     The left CCA has mild and calcific plaque. There is mild stenosis in the left ICA (<50%). The left ICA has mild and calcific plaque. There is mild stenosis in the left ECA. The left ECA has mild and calcific plaque. The left vertebral is antegrade.  The left subclavian with triphasic waveforms with biphasic waveforms. PFTS-FEV1: n/a per Dr. Georga Cranker     EKG 4/23/20: Sinus rhythm with premature atrial complexes   Left axis deviation   Right bundle branch block   Septal infarct , age undetermined   Assessment:     Active Problems:    CAD (coronary artery disease) (4/27/2020)        Plan:   The risk and benefit of surgery were reviewed with patient and family and all questions answered and the patient wishes to proceed. Risk include infection, bleeding, stroke, heart attack, irregular heart rhythm, kidney failure and death. 1. CAD/NSTEMI: On ASA, BB, does not tolerate statin. Preop workup completed. surgical plans per Dr. Georga Cranker. 2. HTN: on BB, HCTZ on hold for surgery. 3. HLD: does not tolerate statin, on zetia, coq10 stopped preop. 4. DM type II: a1c 5.8, on januvia PTA  5. CKD stage III:  Cr 1.51, monitor  6. 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 Monitor. Will need postop DVT ppx   7. Chronic pain/peripheral neuropathy: On gabapentin PTA  8. Anxiety: On celexa, ativan PTA  9. Alcohol abuse: CIWA monitoring, has received 0,5 mg PO ativan every evening, has not needed any PRN ativan. Monitor. 10. Prostate CA: PSA 10, sees Dr. Steve Fuentes (urology) and Dr. Glenis Méndez (radiation oncology), was planning on scheduling radiation seed implant soon. Dispo: plans for CABG tomorrow with Dr. Rosalio Mccullough V2.81 - Discussed by surgeon with patient.    Procedure: Isolated CAB CALCULATE   Risk of Mortality:  1.795%    Renal Failure:  3.087%    Permanent Stroke:  2.220%    Prolonged Ventilation:  10.390%    DSW Infection:  0.176%    Reoperation:  2.665%    Morbidity or Mortality:  14.165%    Short Length of Stay:  36.615%    Long Length of Stay:  7.662%          Signed By: Alex Mcmahon NP     April 27, 2020

## 2020-04-27 NOTE — DISCHARGE INSTRUCTIONS
BS  [facility] hospitalist discharge instruction   Discharge Instructions       PATIENT ID: Rhclyde Dodd  MRN: [id number]   YOB: 1950    DATE OF ADMISSION: 4/23/2020  4:55 PM    DATE OF DISCHARGE: 4/27/2020    PRIMARY CARE PROVIDER: León Gannon MD     ATTENDING PHYSICIAN: Radha Lewis MD  DISCHARGING PROVIDER: Latasha Zavala MD    To contact this individual call [phone] and ask the  to page. If unavailable ask to be transferred the Adult Hospitalist Department. DISCHARGE DIAGNOSES   # CAD/NSTEMI, triple-vessel disease. Surgical plans per Dr. Lynnette Hansen, scheduled for 4/28/2020 at [facility]. Patient is transferred to [facility] for the same. # HTN: Continue BB, hold HCTZ for surgery  # HLD: Does not tolerate statin. On Zetia. Co-Q10 stopped preop per CVTS. 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 No OAC at present  # Chronic pain/peripheral neuropathy, on gabapentin  # History of prostate CA, PSA 10.   Follows with Dr. Anisha Brown ([facility]) and Dr. Les Zavala (Rad-onc), awaiting radiation seed implant  Soon  # Alcohol abuse, CIWA monitoring, getting as needed Ativan p.o., last dose on 4/25/2020 at 9 PM, 0.5 mg only    CONSULTATIONS: IP CONSULT TO CARDIOLOGY  IP CONSULT TO ANESTHESIOLOGY    PROCEDURES/SURGERIES: Procedure(s):  LEFT HEART CATH / CORONARY ANGIOGRAPHY  Left Ventriculography    PENDING TEST RESULTS:   At the time of discharge the following test results are still pending: None    FOLLOW UP APPOINTMENTS:   Follow-up Information     Follow up With Specialties Details Why Contact Info    León Gannon MD Internal Medicine  [facility] - hospital follow up  [address]  [address] [facility] [address] [facility]      Gloria Álvarez MD Cardiology, [address] [facility] Vascular Surgery, Internal Medicine  Cardiology - hospital follow up  [address]  [address] [address]  [phone]      Teresa Urias MD Cardiothoracic Surgery  Cardiothoracic Surgeon - hospital follow up  [address] [facility]  [phone]             ADDITIONAL CARE RECOMMENDATIONS:   Defer to cardiothoracic surgery. Please follow cardiothoracic surgery recommendations after surgery her perioperative care. DIET: Cardiac Diet and Diabetic Diet    ACTIVITY: Activity as tolerated, further per CVTS    WOUND CARE: Per CVTS    EQUIPMENT needed: Per CVTS      DISCHARGE MEDICATIONS:   See Medication Reconciliation Form    · It is important that you take the medication exactly as they are prescribed. · Keep your medication in the bottles provided by the pharmacist and keep a list of the medication names, dosages, and times to be taken in your wallet. · Do not take other medications without consulting your doctor. NOTIFY YOUR PHYSICIAN FOR ANY OF THE FOLLOWING:   Fever over 101 degrees for 24 hours. Chest pain, shortness of breath, fever, chills, nausea, vomiting, diarrhea, change in mentation, falling, weakness, bleeding. Severe pain or pain not relieved by medications. Or, any other signs or symptoms that you may have questions about. DISPOSITION:    Home With:   OT  PT  HH  RN       SNF/Inpatient Rehab/LTAC    Independent/assisted living    Hospice   x Other: Transfer to [facility] for cardiothoracic surgery-CABG     CDMP Checked:   Yes x     PROBLEM LIST Updated:  Yes x        My Medications      START taking these medications      Instructions Each Dose to Equal Morning Noon Evening Bedtime   amiodarone 200 mg tablet  Commonly known as:  CORDARONE    Your last dose was: Your next dose is: Take 1 Tab by mouth every twelve (12) hours. 200 mg                 pantoprazole 40 mg tablet  Commonly known as:  PROTONIX  Start taking on:  April 28, 2020    Your last dose was: Your next dose is:           Take 1 Tab by mouth Daily (before breakfast). 40 mg                    CHANGE how you take these medications      Instructions Each Dose to Equal Morning Noon Evening Bedtime   hydroCHLOROthiazide 12.5 mg tablet  Commonly known as:  HYDRODIURIL  What changed:  additional instructions    Your last dose was: Your next dose is: Take 1 Tab by mouth daily. On Hold per CTSx for CABG 4/28/20   12.5 mg                 traZODone 50 mg tablet  Commonly known as:  DESYREL  What changed:  See the new instructions. Your last dose was: Your next dose is:          TAKE 1 TABLET BY MOUTH AT BEDTIME                     CONTINUE taking these medications      Instructions Each Dose to Equal Morning Noon Evening Bedtime   Aspir-Low 81 mg tablet  Generic drug:  aspirin delayed-release    Your last dose was: Your next dose is: Take 81 mg by mouth. Taking every now and then   81 mg                 Blood-Glucose Meter monitoring kit    Your last dose was: Your next dose is:          Check blood sugar once daily, E11.21                  citalopram 20 mg tablet  Commonly known as:  CELEXA    Your last dose was: Your next dose is: Take 1 Tab by mouth daily. 20 mg                 Co Q-10 100 mg capsule  Generic drug:  co-enzyme Q-10    Your last dose was: Your next dose is: Take 100 mg by mouth daily. 100 mg                 cyanocobalamin 1,000 mcg tablet    Your last dose was: Your next dose is: Take 1,000 mcg by mouth daily. 1,000 mcg                 ezetimibe 10 mg tablet  Commonly known as:  ZETIA    Your last dose was: Your next dose is: Take 1 Tab by mouth daily. 10 mg                 FreeStyle Lancets 28 gauge Misc  Generic drug:  lancets    Your last dose was: Your next dose is:          USE TO CHECK BLOOD SUGAR ONCE DAILY                  gabapentin 300 mg capsule  Commonly known as:  NEURONTIN    Your last dose was:       Your next dose is: Take 1 Cap by mouth nightly as needed for Pain. 300 mg                 Januvia 50 mg tablet  Generic drug:  SITagliptin    Your last dose was: Your next dose is: Take 1 tablet by mouth once daily                  LORazepam 0.5 mg tablet  Commonly known as:  ATIVAN    Your last dose was: Your next dose is:          TAKE 1 TABLET BY MOUTH ONCE DAILY AT NIGHT AS NEEDED FOR ANXIETY                  magnesium 250 mg Tab    Your last dose was: Your next dose is: Take  by mouth daily. metoprolol succinate 50 mg XL tablet  Commonly known as:  TOPROL-XL    Your last dose was: Your next dose is: Take 1 tablet by mouth once daily                  red yeast rice extract 600 mg Cap    Your last dose was: Your next dose is: Take 600 mg by mouth now. Every now and then   600 mg                 VITAMIN C PO    Your last dose was: Your next dose is: Take  by mouth daily. VITAMIN D3 PO    Your last dose was: Your next dose is: Take  by mouth daily. Where to Get Your Medications      Information on where to get these meds will be given to you by the nurse or doctor.     Ask your nurse or doctor about these medications  · amiodarone 200 mg tablet  · hydroCHLOROthiazide 12.5 mg tablet  · pantoprazole 40 mg tablet         Signed:   Nuris Osborne MD  4/27/2020  11:23 AM

## 2020-04-27 NOTE — PROGRESS NOTES
Cm acknowledged discharge order. Room assgn received. [facility] transport has been confirmed. 1134am  No show [facility] and no updates. CM reviewed transport time with [facility]. [facility] has confirmed 12:30pm transport time. Nursing and patient informed. 1249pm  [facility] onsite and providing transport. CM tasks are complete.      Carolyne Gramajo RN CM  Ext [phone]

## 2020-04-27 NOTE — PROGRESS NOTES
Problem: Falls - Risk of  Goal: *Absence of Falls  Description: Document Fercho Sol Fall Risk and appropriate interventions in the flowsheet.   Outcome: Progressing Towards Goal  Note: Fall Risk Interventions:  Mobility Interventions: OT consult for ADLs, Patient to call before getting OOB         Medication Interventions: Patient to call before getting OOB, Teach patient to arise slowly    Elimination Interventions: Call light in reach, Patient to call for help with toileting needs, Urinal in reach              Problem: Patient Education: Go to Patient Education Activity  Goal: Patient/Family Education  Outcome: Progressing Towards Goal

## 2020-04-27 NOTE — Clinical Note
[facility] Brief Operative Note    Pre-operative diagnosis: ABNORMAL UTERINE BLEEDING   Post-operative diagnosis same   Procedure: Procedure(s):  HYSTEROSCOPY, DIAGNOSTIC, WITH DILATION AND CURETTAGE OF UTERUS   Surgeon(s): Surgeon(s) and Role:     * Schwab, Jennifer Lani, MD - Primary   Estimated blood loss: 5cc   Specimens: ID Type Source Tests Collected by Time Destination   A : Endometrial Curettings Tissue Uterus SURGICAL PATHOLOGY EXAM Schwab, Jennifer Lani, MD 5/30/2019 12:15 PM       Findings: Normal appearing uterine cavity, bilateral ostia seen. Uterus sounded to 8cm      No contrast administered during procedure. Amount: 0 mL.

## 2020-04-27 NOTE — TELEPHONE ENCOUNTER
Cardiac Surgery Care Coordinator- Placed call to Mrs Sheela Schroeder, the daughter of Geraldo Porter  Reviewed plan of care and encouraged her to verbalize. Will continue to follow for educational and emotional support.  Alie Oshea RN

## 2020-04-27 NOTE — PROGRESS NOTES
1050: TRANSFER - IN REPORT:    Verbal report received from Juan C(name) on Tremaine Forde  being received from [facility](unit) for urgent transfer      Report consisted of patients Situation, Background, Assessment and   Recommendations(SBAR). Information from the following report(s) SBAR, Kardex, MAR, Recent Results and Cardiac Rhythm Sinus jay with BBB was reviewed with the receiving nurse. Opportunity for questions and clarification was provided. Assessment completed upon patients arrival to unit and care assumed. 1930: Bedside shift change report given to [facility] (oncoming nurse) by Douglas Vivas (offgoing nurse). Report included the following information SBAR, Kardex, MAR, Recent Results and Cardiac Rhythm NSR with BBB.

## 2020-04-27 NOTE — PROGRESS NOTES
4/27/20 1014am    Admit Date: 4/23/2020    Admit Diagnosis:   NSTEMI (non-ST elevated myocardial infarction) ([facility] [I21.4]    Subjective:     Tremaine Forde denies chest pain or shortness of breath. VSS.      Current Facility-Administered Medications   Medication Dose Route Frequency    magnesium oxide (MAG-OX) tablet 400 mg  400 mg Oral DAILY    aspirin delayed-release tablet 81 mg  81 mg Oral DAILY    cholecalciferol (VITAMIN D3) (1000 Units /25 mcg) tablet 1 Tab  1,000 Units Oral DAILY    [Held by provider] hydroCHLOROthiazide (HYDRODIURIL) tablet 12.5 mg  12.5 mg Oral DAILY    gabapentin (NEURONTIN) capsule 300 mg  300 mg Oral QHS PRN    citalopram (CELEXA) tablet 20 mg  20 mg Oral DAILY    ezetimibe (ZETIA) tablet 10 mg  10 mg Oral DAILY    LORazepam (ATIVAN) tablet 0.5 mg  0.5 mg Oral QHS PRN    traZODone (DESYREL) tablet 50 mg  50 mg Oral QHS PRN    metoprolol succinate (TOPROL-XL) XL tablet 50 mg  50 mg Oral DAILY    sodium chloride (NS) flush 5-40 mL  5-40 mL IntraVENous Q8H    sodium chloride (NS) flush 5-40 mL  5-40 mL IntraVENous PRN    nitroglycerin (NITROSTAT) tablet 0.4 mg  0.4 mg SubLINGual Q5MIN PRN    [Held by provider] enoxaparin (LOVENOX) injection 90 mg  1 mg/kg SubCUTAneous Q12H    glucose chewable tablet 16 g  4 Tab Oral PRN    dextrose (D50W) injection syrg 12.5-25 g  25-50 mL IntraVENous PRN    glucagon (GLUCAGEN) injection 1 mg  1 mg IntraMUSCular PRN    acetaminophen (TYLENOL) tablet 650 mg  650 mg Oral Q4H PRN    LORazepam (ATIVAN) injection 2 mg  2 mg IntraVENous Q1H PRN    LORazepam (ATIVAN) injection 4 mg  4 mg IntraVENous Q1H PRN    pantoprazole (PROTONIX) tablet 40 mg  40 mg Oral ACB    sodium chloride (NS) flush 5-40 mL  5-40 mL IntraVENous Q8H    sodium chloride (NS) flush 5-40 mL  5-40 mL IntraVENous PRN    chlorhexidine (PERIDEX) 0.12 % mouthwash 15 mL  15 mL Oral Q12H    mupirocin (BACTROBAN) 2 % ointment   Both Nostrils Q12H    amiodarone (CORDARONE) tablet 200 mg  200 mg Oral Q12H         Objective:      Physical Exam:    Visit Vitals  /67   Pulse 66   Temp 97.6 °F (36.4 °C)   Resp 18   Ht 5' 7\" (1.702 m)   Wt 83.9 kg (185 lb)   SpO2 97%   BMI 28.98 kg/m²     Gen:  NAD  Mental Status - Alert. General Appearance - Not in acute distress. Chest and Lung Exam   Inspection: Accessory muscles - No use of accessory muscles in breathing. Auscultation:   Breath sounds: - Normal.   Cardiovascular   Inspection: Jugular vein - Bilateral - Inspection Normal.   Palpation/Percussion:   Apical Impulse: - Normal.   Auscultation: Rhythm - Regular. Heart Sounds - S1 WNL and S2 WNL. No S3 or S4. Murmurs & Other Heart Sounds: Auscultation of the heart reveals - No Murmurs. Peripheral Vascular   Upper Extremity: Inspection - Bilateral - No Cyanotic nailbeds or Digital clubbing. Lower Extremity:   Palpation: Edema - Bilateral - No edema. Abdomen:   Soft, non-tender, bowel sounds are active. Neuro: A&O times 3, CN and motor grossly WNL    Data Review:   Recent Labs     04/27/20  0929 04/25/20  0142   WBC 4.6 5.3   HGB 13.8 12.7   HCT 42.6 38.8    153     Recent Labs     04/25/20  0142      K 4.1      CO2 24   GLU 85   BUN 25*   CREA 1.46*   CA 8.4*   MG 2.2   ALB 2.8*   TBILI 0.9  1.0   SGOT 22   ALT 23   INR 1.0       No results for input(s): TROIQ, CPK, CKMB in the last 72 hours.     No intake or output data in the 24 hours ending 04/27/20 1011     Telemetry: normal sinus rhythm    Assessment:     Principal Problem:    NSTEMI (non-ST elevated myocardial infarction) ([facility] (4/23/2020)    Active Problems:    HTN (hypertension) (12/12/2012)      Hyperlipidemia (7/10/2015)      CKD (chronic kidney disease) (10/31/2016)      Overview: rhianna            Type 2 diabetes with nephropathy ([facility] (2/28/2018)        Plan:   Bony Griffin is a 68 y.o. male with a PMH significant for CKD stage 3, arthritis, chronic back pain, DM II (diet controlled), HTN, HLD, PE, and depression admitted for NSTEMI (non-ST elevated myocardial infarction) ([facility] [I21.4]. Trop 0.07,0.10, now 0.09), EKG: NSR with IVCD, H/H 13.8/42. 6. .       NSTEMI/three-vessel CAD:  -CT surgery following, with plans for surgery   -Echo shows normal LV systolic function, EF 60 to 65%, mild MR  -continue BB, ASA, statin intolerant-continue zetia.  on recent labs. -nitroglycerin PRN for CP- not needed so far       Hyperlipidemia/ Myalgias: Intolerant to Crestor, Lipitor and Pravachol, declined PSK9 therapy.    -continue Zetia.       Hypertension: controlled  -continue BB  -hydrodiuril held      Diabetes Mellitus:  - as per internal medicine     CKD Stage III:   -creat stable  -monitor BMP     Transferring to [facility] today to cardiac surgery service.     BENSON Bales  MSN,RN,AG-ACNP-BC

## 2020-04-27 NOTE — PROGRESS NOTES
ANASTASIYA  EMTALA transfer to [facility] for CTS procedure. Room assgn - 453  Call report to:  Boone Erica  Receiving attending to be provided at time of report. [facility] has confirmed ALS transport for 11:00am today. PCS, H&P and Facesheet on chart. Dr. Eugene Mckeon and Fox Greene RN have informed CM this patient is to be transferred to [facility] today for procedure on 4/28/2020. EMTALA  Assigned to hospitalist.  Will need receiving hospitalist and [facility] transport to be placed on will call. ECKG monitoring en route. [address] has received call from [facility] - bed availability/assignment pending    1025am  Nursing informed CM of room assignment at [facility]. Transport updated with [facility]. CM provided 2nd IM to patient and explained transfer process. Patient requested CM inform [facility] [place] of discharge plan today. [address] informed. Provided room number to be assigned at [facility] Hospital main number. CM will continue to follow for discharge planning.     Adrianna Higgins RN CM  Ext [phone]

## 2020-04-27 NOTE — PROGRESS NOTES
[facility] FLOOR Progress Note    Admit Date: 2020      Subjective:   Pt seen with Dr. Nura Geller. Tmax 98, on RA. No complaints. No CP. Objective:     Visit Vitals  /67   Pulse 66   Temp 97.6 °F (36.4 °C)   Resp 18   Ht 5' 7\" (1.702 m)   Wt 185 lb (83.9 kg)   SpO2 97%   BMI 28.98 kg/m²     Temp (24hrs), Av.8 °F (36.6 °C), Min:97.6 °F (36.4 °C), Max:98 °F (36.7 °C)      Last 24hr Input/Output:  No intake or output data in the 24 hours ending 20 0758     EKG/Rhythm: NSR      Oxygen: RA    CXR:   CXR Results  (Last 48 hours)    None            Admission Weight: Last Weight   Weight: 188 lb 7.9 oz (85.5 kg) Weight: 185 lb (83.9 kg)       EXAM:  General: Alert, NAD   Lungs:   Clear to auscultation bilaterally. Heart:  Regular rate and rhythm, S1, S2 normal, no murmur, click, rub or gallop. Abdomen:   Soft, non-tender. Bowel sounds normal. No masses,  No organomegaly. Extremities:  No edema. PPP   Neurologic:  Gross motor and sensory apparatus intact. Activity: up ad sanjuana    Diet:  cardiac    Lab Data Reviewed:   Recent Labs     20  0749  20  0142   WBC  --   --  5.3   HGB  --   --  12.7   HCT  --   --  38.8   PLT  --   --  153   NA  --   --  139   K  --   --  4.1   BUN  --   --  25*   CREA  --   --  1.46*   GLU  --   --  85   GLUCPOC 91   < >  --    INR  --   --  1.0    < > = values in this interval not displayed. Assessment:     Principal Problem:    NSTEMI (non-ST elevated myocardial infarction) ([facility] (2020)    Active Problems:    HTN (hypertension) (2012)      Hyperlipidemia (7/10/2015)      CKD (chronic kidney disease) (10/31/2016)      Overview: bedicheck            Type 2 diabetes with nephropathy ([facility] (2018)             Plan/Recommendations/Medical Decision Makin. CAD/NSTEMI: On ASA, BB, does not tolerate statin. Preop workup completed. surgical plans per Dr. Nura Geller. 2. HTN: on BB, HCTZ on hold for surgery.   3. HLD: does not tolerate statin, on zetia, coq10 stopped preop. 4. DM type II: a1c 5.8, on januvia PTA  5. CKD stage III:  Cr 1.46, monitor  6. 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 Monitor. 7. Chronic pain/peripheral neuropathy: On gabapentin PTA  8. Anxiety: On celexa, ativan PTA  9. Alcohol abuse: CIWA monitoring, has received 0,5 mg PO ativan every evening, has not needed any PRN ativan. Monitor. 10. Prostate CA: PSA 10, sees Dr. Cris Jameson (urology) and Dr. Romeo Deluna (radioation oncology), was planning on scheduling radiation seed implant soon. 11. Dispo: Plan for transfer today to [facility] for CABG tomorrow.          Signed By: Urbano Tovar NP

## 2020-04-27 NOTE — TELEPHONE ENCOUNTER
[facility] states pt has been admitted to the hospital and she would like a call back from [facility] [place] today.

## 2020-04-27 NOTE — PROGRESS NOTES
Transitions of Care Plan:    CABG scheduled for 20    Independent at baseline; resides alone; family nearby    Disposition: pending medical progress; anticipate home with Alf Dawn    Reason for Admission:   CAD                  RUR Score:     15%             PCP: First and Last name:  Carlos Lamas MD   Name of Practice: [id number]   Are you a current patient: Yes/No: Yes   Approximate date of last visit: 3 months ago    Do you (patient/family) have any concerns for transition/discharge? No              Plan for utilizing home health:   [place] of Choice Offered - patient would like CM to discuss with his daughter, Tayla Garza. CM will follow up with patient's daughter. Current Advanced Directive/Advance Care Plan:  Patient declined offer to complete AMD. Patient states he has told his four daughters his wishes. Transition of Care Plan:          CM met with patient at bedside to introduce self and discuss role. Patient states the followin. ADLs, self-care, orientation baseline - Independent. Uses a cane or walker PRN due to hip pain. Independent with self-care. Alert and oriented. 2.  Social - Resides at address on file. Patient has 4 daughters who reside in the [facility] [place]. Daughter, Tayla Garza, checks on patient 3 x per week. Patient will reside with his daughter, Tayla Garza, temporarily after discharge while he recovers. 3.  Medications - [facility] on [address]. 4.  Disposition - pending medical progress post CABG. Anticipate daughter's home. CM will verify address with patient's daughter. CM will continue to follow. Kenna Doan, MPH    Care Management Interventions  PCP Verified by CM: Yes  Palliative Care Criteria Met (RRAT>21 & CHF Dx)?: No  Mode of Transport at Discharge:  Other (see comment)(Family, private car)  Transition of Care Consult (CM Consult): Discharge Planning  MyChart Signup: No  Discharge Durable Medical Equipment: No  Health Maintenance Reviewed: Yes  Physical Therapy Consult: Yes  Occupational Therapy Consult: Yes  Speech Therapy Consult: No  Current Support Network: Lives Alone, Family Lives Nearby  Confirm Follow Up Transport: Family  Discharge Location  Discharge Placement: Other:(TBD)

## 2020-04-27 NOTE — Clinical Note
TRANSFER - OUT REPORT:     Verbal report given to: Marian Carrillo. Report consisted of patient's Situation, Background, Assessment and   Recommendations(SBAR). Opportunity for questions and clarification was provided. Patient transported with a Registered Nurse.
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
## 2020-04-27 NOTE — PROGRESS NOTES
Cardiac Surgery Care Coordinator-  Met with Nirmala March, Introduced role of the Cardiac Surgery Care Coordinator. Reviewed plan of care and began pre-op education. Discussed day of surgery expectations for the pt and family. Reviewed material in the CABG educational folder including Cardiac Surgery Pathway. Reinforced sternal precautions and keeping your move in the tube.  Encouraged Mitch Anderson RN

## 2020-04-27 NOTE — Clinical Note
Per MD, no need for antibiotic before procedure due to patient being post surgery already being on antibiotics during admission

## 2020-04-27 NOTE — PROGRESS NOTES
Problem: Falls - Risk of  Goal: *Absence of Falls  Description: Document Clarice Shepard Fall Risk and appropriate interventions in the flowsheet.   Outcome: Progressing Towards Goal  Note: Fall Risk Interventions:  Mobility Interventions: Communicate number of staff needed for ambulation/transfer, Patient to call before getting OOB         Medication Interventions: Teach patient to arise slowly, Patient to call before getting OOB    Elimination Interventions: Call light in reach, Toilet paper/wipes in reach, Toileting schedule/hourly rounds, Patient to call for help with toileting needs

## 2020-04-28 ENCOUNTER — ANESTHESIA (OUTPATIENT)
Dept: CARDIOTHORACIC SURGERY | Age: 70
DRG: 236 | End: 2020-04-28
Payer: MEDICARE

## 2020-04-28 ENCOUNTER — APPOINTMENT (OUTPATIENT)
Dept: GENERAL RADIOLOGY | Age: 70
DRG: 236 | End: 2020-04-28
Attending: NURSE PRACTITIONER
Payer: MEDICARE

## 2020-04-28 ENCOUNTER — HOSPITAL ENCOUNTER (OUTPATIENT)
Dept: NON INVASIVE DIAGNOSTICS | Age: 70
Discharge: HOME | End: 2020-04-28
Attending: THORACIC SURGERY (CARDIOTHORACIC VASCULAR SURGERY)

## 2020-04-28 PROBLEM — Z95.1 S/P CABG X 3: Status: ACTIVE | Noted: 2020-04-28

## 2020-04-28 LAB
ABO + RH BLD: NORMAL
ADMINISTERED INITIALS, ADMINIT: NORMAL
ALBUMIN SERPL-MCNC: 3 G/DL (ref 3.5–5)
ALBUMIN SERPL-MCNC: 3.2 G/DL (ref 3.5–5)
ALBUMIN SERPL-MCNC: 4.1 G/DL (ref 3.5–5)
ALBUMIN/GLOB SERPL: 0.8 {RATIO} (ref 1.1–2.2)
ALBUMIN/GLOB SERPL: 1.5 {RATIO} (ref 1.1–2.2)
ALBUMIN/GLOB SERPL: 2.1 {RATIO} (ref 1.1–2.2)
ALP SERPL-CCNC: 23 U/L (ref 45–117)
ALP SERPL-CCNC: 25 U/L (ref 45–117)
ALP SERPL-CCNC: 43 U/L (ref 45–117)
ALT SERPL-CCNC: 28 U/L (ref 12–78)
ALT SERPL-CCNC: 30 U/L (ref 12–78)
ALT SERPL-CCNC: 44 U/L (ref 12–78)
ANION GAP SERPL CALC-SCNC: 3 MMOL/L (ref 5–15)
ANION GAP SERPL CALC-SCNC: 4 MMOL/L (ref 5–15)
ANION GAP SERPL CALC-SCNC: 4 MMOL/L (ref 5–15)
APTT PPP: 31.5 SEC (ref 22.1–32)
ARTERIAL PATENCY WRIST A: ABNORMAL
AST SERPL-CCNC: 32 U/L (ref 15–37)
AST SERPL-CCNC: 68 U/L (ref 15–37)
AST SERPL-CCNC: 76 U/L (ref 15–37)
BASE DEFICIT BLD-SCNC: 2 MMOL/L
BASE DEFICIT BLD-SCNC: 4 MMOL/L
BASE DEFICIT BLD-SCNC: 4 MMOL/L
BASE DEFICIT BLD-SCNC: 5 MMOL/L
BASOPHILS # BLD: 0 K/UL (ref 0–0.1)
BASOPHILS # BLD: 0 K/UL (ref 0–0.1)
BASOPHILS NFR BLD: 0 % (ref 0–1)
BASOPHILS NFR BLD: 0 % (ref 0–1)
BDY SITE: ABNORMAL
BILIRUB SERPL-MCNC: 0.8 MG/DL (ref 0.2–1)
BILIRUB SERPL-MCNC: 0.9 MG/DL (ref 0.2–1)
BILIRUB SERPL-MCNC: 1.4 MG/DL (ref 0.2–1)
BLD PROD TYP BPU: NORMAL
BLD PROD TYP BPU: NORMAL
BLOOD GROUP ANTIBODIES SERPL: NORMAL
BPU ID: NORMAL
BPU ID: NORMAL
BUN SERPL-MCNC: 20 MG/DL (ref 6–20)
BUN SERPL-MCNC: 22 MG/DL (ref 6–20)
BUN SERPL-MCNC: 23 MG/DL (ref 6–20)
BUN/CREAT SERPL: 13 (ref 12–20)
BUN/CREAT SERPL: 15 (ref 12–20)
BUN/CREAT SERPL: 15 (ref 12–20)
CA-I BLD-SCNC: 1.29 MMOL/L (ref 1.12–1.32)
CA-I BLD-SCNC: 1.3 MMOL/L (ref 1.12–1.32)
CA-I BLD-SCNC: 1.31 MMOL/L (ref 1.12–1.32)
CA-I BLD-SCNC: 1.31 MMOL/L (ref 1.12–1.32)
CALCIUM SERPL-MCNC: 8.3 MG/DL (ref 8.5–10.1)
CALCIUM SERPL-MCNC: 8.4 MG/DL (ref 8.5–10.1)
CALCIUM SERPL-MCNC: 9.2 MG/DL (ref 8.5–10.1)
CHLORIDE SERPL-SCNC: 106 MMOL/L (ref 97–108)
CHLORIDE SERPL-SCNC: 114 MMOL/L (ref 97–108)
CHLORIDE SERPL-SCNC: 115 MMOL/L (ref 97–108)
CO2 SERPL-SCNC: 24 MMOL/L (ref 21–32)
CO2 SERPL-SCNC: 25 MMOL/L (ref 21–32)
CO2 SERPL-SCNC: 29 MMOL/L (ref 21–32)
CREAT SERPL-MCNC: 1.44 MG/DL (ref 0.7–1.3)
CREAT SERPL-MCNC: 1.51 MG/DL (ref 0.7–1.3)
CREAT SERPL-MCNC: 1.53 MG/DL (ref 0.7–1.3)
CROSSMATCH RESULT,%XM: NORMAL
CROSSMATCH RESULT,%XM: NORMAL
D50 ADMINISTERED, D50ADM: 0 ML
D50 ADMINISTERED, D50ADM: NORMAL ML
D50 ORDER, D50ORD: 0 ML
D50 ORDER, D50ORD: NORMAL ML
DIFFERENTIAL METHOD BLD: ABNORMAL
DIFFERENTIAL METHOD BLD: ABNORMAL
EOSINOPHIL # BLD: 0 K/UL (ref 0–0.4)
EOSINOPHIL # BLD: 0.2 K/UL (ref 0–0.4)
EOSINOPHIL NFR BLD: 0 % (ref 0–7)
EOSINOPHIL NFR BLD: 4 % (ref 0–7)
ERYTHROCYTE [DISTWIDTH] IN BLOOD BY AUTOMATED COUNT: 15.4 % (ref 11.5–14.5)
ERYTHROCYTE [DISTWIDTH] IN BLOOD BY AUTOMATED COUNT: 15.5 % (ref 11.5–14.5)
GAS FLOW.O2 O2 DELIVERY SYS: ABNORMAL L/MIN
GAS FLOW.O2 SETTING OXYMISER: 14 BPM
GAS FLOW.O2 SETTING OXYMISER: 14 BPM
GLOBULIN SER CALC-MCNC: 2 G/DL (ref 2–4)
GLOBULIN SER CALC-MCNC: 2.2 G/DL (ref 2–4)
GLOBULIN SER CALC-MCNC: 3.9 G/DL (ref 2–4)
GLSCOM COMMENTS: NORMAL
GLUCOSE BLD STRIP.AUTO-MCNC: 103 MG/DL (ref 65–100)
GLUCOSE BLD STRIP.AUTO-MCNC: 104 MG/DL (ref 65–100)
GLUCOSE BLD STRIP.AUTO-MCNC: 121 MG/DL (ref 65–100)
GLUCOSE BLD STRIP.AUTO-MCNC: 135 MG/DL (ref 65–100)
GLUCOSE BLD STRIP.AUTO-MCNC: 136 MG/DL (ref 65–100)
GLUCOSE BLD STRIP.AUTO-MCNC: 85 MG/DL (ref 65–100)
GLUCOSE BLD STRIP.AUTO-MCNC: 86 MG/DL (ref 65–100)
GLUCOSE BLD STRIP.AUTO-MCNC: 90 MG/DL (ref 65–100)
GLUCOSE BLD STRIP.AUTO-MCNC: 90 MG/DL (ref 65–100)
GLUCOSE BLD STRIP.AUTO-MCNC: 91 MG/DL (ref 65–100)
GLUCOSE BLD STRIP.AUTO-MCNC: 92 MG/DL (ref 65–100)
GLUCOSE BLD STRIP.AUTO-MCNC: 96 MG/DL (ref 65–100)
GLUCOSE SERPL-MCNC: 100 MG/DL (ref 65–100)
GLUCOSE SERPL-MCNC: 142 MG/DL (ref 65–100)
GLUCOSE SERPL-MCNC: 87 MG/DL (ref 65–100)
GLUCOSE, GLC: 103 MG/DL
GLUCOSE, GLC: 104 MG/DL
GLUCOSE, GLC: 111 MG/DL
GLUCOSE, GLC: 121 MG/DL
GLUCOSE, GLC: 135 MG/DL
GLUCOSE, GLC: 136 MG/DL
GLUCOSE, GLC: 154 MG/DL
GLUCOSE, GLC: 85 MG/DL
GLUCOSE, GLC: 86 MG/DL
GLUCOSE, GLC: 90 MG/DL
GLUCOSE, GLC: 90 MG/DL
GLUCOSE, GLC: 91 MG/DL
GLUCOSE, GLC: 95 MG/DL
GLUCOSE, GLC: 96 MG/DL
GLUCOSE, GLC: NORMAL MG/DL
HCO3 BLD-SCNC: 21.7 MMOL/L (ref 22–26)
HCO3 BLD-SCNC: 22.3 MMOL/L (ref 22–26)
HCO3 BLD-SCNC: 22.5 MMOL/L (ref 22–26)
HCO3 BLD-SCNC: 23.7 MMOL/L (ref 22–26)
HCT VFR BLD AUTO: 32.8 % (ref 36.6–50.3)
HCT VFR BLD AUTO: 33.1 % (ref 36.6–50.3)
HCT VFR BLD AUTO: 40.1 % (ref 36.6–50.3)
HGB BLD-MCNC: 10.2 G/DL (ref 12.1–17)
HGB BLD-MCNC: 10.8 G/DL (ref 12.1–17)
HGB BLD-MCNC: 12.9 G/DL (ref 12.1–17)
HIGH TARGET, HITG: 130 MG/DL
HIGH TARGET, HITG: 140 MG/DL
HIGH TARGET, HITG: NORMAL MG/DL
IMM GRANULOCYTES # BLD AUTO: 0 K/UL (ref 0–0.04)
IMM GRANULOCYTES # BLD AUTO: 0.1 K/UL (ref 0–0.04)
IMM GRANULOCYTES NFR BLD AUTO: 0 % (ref 0–0.5)
IMM GRANULOCYTES NFR BLD AUTO: 1 % (ref 0–0.5)
INR PPP: 1.2 (ref 0.9–1.1)
INSULIN ADMINSTERED, INSADM: 0 UNITS/HOUR
INSULIN ADMINSTERED, INSADM: 0 UNITS/HOUR
INSULIN ADMINSTERED, INSADM: 0.3 UNITS/HOUR
INSULIN ADMINSTERED, INSADM: 0.6 UNITS/HOUR
INSULIN ADMINSTERED, INSADM: 0.7 UNITS/HOUR
INSULIN ADMINSTERED, INSADM: 0.9 UNITS/HOUR
INSULIN ADMINSTERED, INSADM: 1.1 UNITS/HOUR
INSULIN ADMINSTERED, INSADM: 1.2 UNITS/HOUR
INSULIN ADMINSTERED, INSADM: 1.3 UNITS/HOUR
INSULIN ADMINSTERED, INSADM: 1.5 UNITS/HOUR
INSULIN ADMINSTERED, INSADM: 1.8 UNITS/HOUR
INSULIN ADMINSTERED, INSADM: 2.3 UNITS/HOUR
INSULIN ADMINSTERED, INSADM: 3 UNITS/HOUR
INSULIN ADMINSTERED, INSADM: 3.8 UNITS/HOUR
INSULIN ADMINSTERED, INSADM: NORMAL UNITS/HOUR
INSULIN ORDER, INSORD: 0 UNITS/HOUR
INSULIN ORDER, INSORD: 0.3 UNITS/HOUR
INSULIN ORDER, INSORD: 0.5 UNITS/HOUR
INSULIN ORDER, INSORD: 0.6 UNITS/HOUR
INSULIN ORDER, INSORD: 0.7 UNITS/HOUR
INSULIN ORDER, INSORD: 0.9 UNITS/HOUR
INSULIN ORDER, INSORD: 1.1 UNITS/HOUR
INSULIN ORDER, INSORD: 1.2 UNITS/HOUR
INSULIN ORDER, INSORD: 1.3 UNITS/HOUR
INSULIN ORDER, INSORD: 1.5 UNITS/HOUR
INSULIN ORDER, INSORD: 1.8 UNITS/HOUR
INSULIN ORDER, INSORD: 2.3 UNITS/HOUR
INSULIN ORDER, INSORD: 3 UNITS/HOUR
INSULIN ORDER, INSORD: 3.8 UNITS/HOUR
INSULIN ORDER, INSORD: NORMAL UNITS/HOUR
LOW TARGET, LOT: 100 MG/DL
LOW TARGET, LOT: 95 MG/DL
LOW TARGET, LOT: NORMAL MG/DL
LYMPHOCYTES # BLD: 0.7 K/UL (ref 0.8–3.5)
LYMPHOCYTES # BLD: 2.1 K/UL (ref 0.8–3.5)
LYMPHOCYTES NFR BLD: 38 % (ref 12–49)
LYMPHOCYTES NFR BLD: 9 % (ref 12–49)
MAGNESIUM SERPL-MCNC: 2.6 MG/DL (ref 1.6–2.4)
MAGNESIUM SERPL-MCNC: 2.8 MG/DL (ref 1.6–2.4)
MCH RBC QN AUTO: 26.7 PG (ref 26–34)
MCH RBC QN AUTO: 27.4 PG (ref 26–34)
MCHC RBC AUTO-ENTMCNC: 32.2 G/DL (ref 30–36.5)
MCHC RBC AUTO-ENTMCNC: 32.6 G/DL (ref 30–36.5)
MCV RBC AUTO: 83 FL (ref 80–99)
MCV RBC AUTO: 84 FL (ref 80–99)
MINUTES UNTIL NEXT BG, NBG: 60 MIN
MINUTES UNTIL NEXT BG, NBG: NORMAL MIN
MONOCYTES # BLD: 0.5 K/UL (ref 0–1)
MONOCYTES # BLD: 0.7 K/UL (ref 0–1)
MONOCYTES NFR BLD: 13 % (ref 5–13)
MONOCYTES NFR BLD: 6 % (ref 5–13)
MULTIPLIER, MUL: 0
MULTIPLIER, MUL: 0.01
MULTIPLIER, MUL: 0.02
MULTIPLIER, MUL: 0.03
MULTIPLIER, MUL: 0.04
MULTIPLIER, MUL: NORMAL
NEUTS SEG # BLD: 2.5 K/UL (ref 1.8–8)
NEUTS SEG # BLD: 6.8 K/UL (ref 1.8–8)
NEUTS SEG NFR BLD: 45 % (ref 32–75)
NEUTS SEG NFR BLD: 84 % (ref 32–75)
NRBC # BLD: 0 K/UL (ref 0–0.01)
NRBC # BLD: 0 K/UL (ref 0–0.01)
NRBC BLD-RTO: 0 PER 100 WBC
NRBC BLD-RTO: 0 PER 100 WBC
O2/TOTAL GAS SETTING VFR VENT: 50 %
O2/TOTAL GAS SETTING VFR VENT: 50 %
O2/TOTAL GAS SETTING VFR VENT: 80 %
O2/TOTAL GAS SETTING VFR VENT: 80 %
ORDER INITIALS, ORDINIT: NORMAL
PCO2 BLD: 37.1 MMHG (ref 35–45)
PCO2 BLD: 43.8 MMHG (ref 35–45)
PCO2 BLD: 48.2 MMHG (ref 35–45)
PCO2 BLD: 51.1 MMHG (ref 35–45)
PEEP RESPIRATORY: 5 CMH2O
PH BLD: 7.25 [PH] (ref 7.35–7.45)
PH BLD: 7.28 [PH] (ref 7.35–7.45)
PH BLD: 7.34 [PH] (ref 7.35–7.45)
PH BLD: 7.38 [PH] (ref 7.35–7.45)
PLATELET # BLD AUTO: 168 K/UL (ref 150–400)
PLATELET # BLD AUTO: 85 K/UL (ref 150–400)
PMV BLD AUTO: 10 FL (ref 8.9–12.9)
PMV BLD AUTO: 11.2 FL (ref 8.9–12.9)
PO2 BLD: 100 MMHG (ref 80–100)
PO2 BLD: 105 MMHG (ref 80–100)
PO2 BLD: 36 MMHG (ref 80–100)
PO2 BLD: 75 MMHG (ref 80–100)
POTASSIUM SERPL-SCNC: 3.9 MMOL/L (ref 3.5–5.1)
POTASSIUM SERPL-SCNC: 4.1 MMOL/L (ref 3.5–5.1)
POTASSIUM SERPL-SCNC: 4.4 MMOL/L (ref 3.5–5.1)
PRESSURE SUPPORT SETTING VENT: 5 CMH2O
PROT SERPL-MCNC: 5.4 G/DL (ref 6.4–8.2)
PROT SERPL-MCNC: 6.1 G/DL (ref 6.4–8.2)
PROT SERPL-MCNC: 6.9 G/DL (ref 6.4–8.2)
PROTHROMBIN TIME: 12.1 SEC (ref 9–11.1)
RBC # BLD AUTO: 3.94 M/UL (ref 4.1–5.7)
RBC # BLD AUTO: 4.83 M/UL (ref 4.1–5.7)
RBC MORPH BLD: ABNORMAL
SAO2 % BLD: 64 % (ref 92–97)
SAO2 % BLD: 95 % (ref 92–97)
SAO2 % BLD: 97 % (ref 92–97)
SAO2 % BLD: 97 % (ref 92–97)
SERVICE CMNT-IMP: ABNORMAL
SERVICE CMNT-IMP: NORMAL
SODIUM SERPL-SCNC: 138 MMOL/L (ref 136–145)
SODIUM SERPL-SCNC: 142 MMOL/L (ref 136–145)
SODIUM SERPL-SCNC: 144 MMOL/L (ref 136–145)
SPECIMEN EXP DATE BLD: NORMAL
SPECIMEN TYPE: ABNORMAL
STATUS OF UNIT,%ST: NORMAL
STATUS OF UNIT,%ST: NORMAL
THERAPEUTIC RANGE,PTTT: NORMAL SECS (ref 58–77)
TOTAL RESP. RATE, ITRR: 14
UNIT DIVISION, %UDIV: 0
UNIT DIVISION, %UDIV: 0
VENTILATION MODE VENT: ABNORMAL
VT SETTING VENT: 550 ML
VT SETTING VENT: 550 ML
WBC # BLD AUTO: 5.5 K/UL (ref 4.1–11.1)
WBC # BLD AUTO: 8.1 K/UL (ref 4.1–11.1)

## 2020-04-28 PROCEDURE — 74011250636 HC RX REV CODE- 250/636: Performed by: ANESTHESIOLOGY

## 2020-04-28 PROCEDURE — P9045 ALBUMIN (HUMAN), 5%, 250 ML: HCPCS | Performed by: NURSE PRACTITIONER

## 2020-04-28 PROCEDURE — 77030010813: Performed by: THORACIC SURGERY (CARDIOTHORACIC VASCULAR SURGERY)

## 2020-04-28 PROCEDURE — 77030011640 HC PAD GRND REM COVD -A: Performed by: THORACIC SURGERY (CARDIOTHORACIC VASCULAR SURGERY)

## 2020-04-28 PROCEDURE — 77030026438 HC STYL ET INTUB CARD -A: Performed by: ANESTHESIOLOGY

## 2020-04-28 PROCEDURE — 77030018836 HC SOL IRR NACL ICUM -A: Performed by: THORACIC SURGERY (CARDIOTHORACIC VASCULAR SURGERY)

## 2020-04-28 PROCEDURE — C1751 CATH, INF, PER/CENT/MIDLINE: HCPCS

## 2020-04-28 PROCEDURE — 77030037878 HC DRSG MEPILEX >48IN BORD MOLN -B: Performed by: THORACIC SURGERY (CARDIOTHORACIC VASCULAR SURGERY)

## 2020-04-28 PROCEDURE — 77030020263 HC SOL INJ SOD CL0.9% LFCR 1000ML: Performed by: THORACIC SURGERY (CARDIOTHORACIC VASCULAR SURGERY)

## 2020-04-28 PROCEDURE — 77030010507 HC ADH SKN DERMBND J&J -B: Performed by: THORACIC SURGERY (CARDIOTHORACIC VASCULAR SURGERY)

## 2020-04-28 PROCEDURE — 74011000250 HC RX REV CODE- 250: Performed by: NURSE ANESTHETIST, CERTIFIED REGISTERED

## 2020-04-28 PROCEDURE — 36415 COLL VENOUS BLD VENIPUNCTURE: CPT

## 2020-04-28 PROCEDURE — 74011250636 HC RX REV CODE- 250/636: Performed by: NURSE PRACTITIONER

## 2020-04-28 PROCEDURE — 80053 COMPREHEN METABOLIC PANEL: CPT

## 2020-04-28 PROCEDURE — 77030002973 HC SUT PLEDG CV SFT OVL TELE -B: Performed by: THORACIC SURGERY (CARDIOTHORACIC VASCULAR SURGERY)

## 2020-04-28 PROCEDURE — 77030020167 HC PERF SET MULT MEDT -B: Performed by: THORACIC SURGERY (CARDIOTHORACIC VASCULAR SURGERY)

## 2020-04-28 PROCEDURE — 77030041238 HC TBNG INSUF MDII -A: Performed by: THORACIC SURGERY (CARDIOTHORACIC VASCULAR SURGERY)

## 2020-04-28 PROCEDURE — 77030022199 HC SYS HARV VESL GTNG -G1: Performed by: THORACIC SURGERY (CARDIOTHORACIC VASCULAR SURGERY)

## 2020-04-28 PROCEDURE — 74011000250 HC RX REV CODE- 250: Performed by: THORACIC SURGERY (CARDIOTHORACIC VASCULAR SURGERY)

## 2020-04-28 PROCEDURE — 77030040361 HC SLV COMPR DVT MDII -B

## 2020-04-28 PROCEDURE — 77030018729 HC ELECTRD DEFIB PAD CARD -B

## 2020-04-28 PROCEDURE — 77030041244 HC CBL PACE EXT TEMP REMG -B: Performed by: THORACIC SURGERY (CARDIOTHORACIC VASCULAR SURGERY)

## 2020-04-28 PROCEDURE — 74011636637 HC RX REV CODE- 636/637: Performed by: NURSE PRACTITIONER

## 2020-04-28 PROCEDURE — 94002 VENT MGMT INPAT INIT DAY: CPT

## 2020-04-28 PROCEDURE — P9045 ALBUMIN (HUMAN), 5%, 250 ML: HCPCS | Performed by: NURSE ANESTHETIST, CERTIFIED REGISTERED

## 2020-04-28 PROCEDURE — 74011250637 HC RX REV CODE- 250/637: Performed by: NURSE PRACTITIONER

## 2020-04-28 PROCEDURE — 85610 PROTHROMBIN TIME: CPT

## 2020-04-28 PROCEDURE — 77030018846 HC SOL IRR STRL H20 ICUM -A: Performed by: THORACIC SURGERY (CARDIOTHORACIC VASCULAR SURGERY)

## 2020-04-28 PROCEDURE — 77030003020 HC SUT TICRN COVD -A: Performed by: THORACIC SURGERY (CARDIOTHORACIC VASCULAR SURGERY)

## 2020-04-28 PROCEDURE — 65610000003 HC RM ICU SURGICAL

## 2020-04-28 PROCEDURE — 74011250636 HC RX REV CODE- 250/636: Performed by: NURSE ANESTHETIST, CERTIFIED REGISTERED

## 2020-04-28 PROCEDURE — P9045 ALBUMIN (HUMAN), 5%, 250 ML: HCPCS

## 2020-04-28 PROCEDURE — 74011000250 HC RX REV CODE- 250: Performed by: NURSE PRACTITIONER

## 2020-04-28 PROCEDURE — 74011250636 HC RX REV CODE- 250/636: Performed by: THORACIC SURGERY (CARDIOTHORACIC VASCULAR SURGERY)

## 2020-04-28 PROCEDURE — 77030006247 HC LD PCMKR MYOCRD BPLR TEMP MEDT -B: Performed by: THORACIC SURGERY (CARDIOTHORACIC VASCULAR SURGERY)

## 2020-04-28 PROCEDURE — 74011000258 HC RX REV CODE- 258: Performed by: NURSE PRACTITIONER

## 2020-04-28 PROCEDURE — 77030003010 HC SUT SURG STL J&J -B: Performed by: THORACIC SURGERY (CARDIOTHORACIC VASCULAR SURGERY)

## 2020-04-28 PROCEDURE — 77030003029 HC SUT VCRL J&J -B: Performed by: THORACIC SURGERY (CARDIOTHORACIC VASCULAR SURGERY)

## 2020-04-28 PROCEDURE — 02100Z9 BYPASS CORONARY ARTERY, ONE ARTERY FROM LEFT INTERNAL MAMMARY, OPEN APPROACH: ICD-10-PCS | Performed by: THORACIC SURGERY (CARDIOTHORACIC VASCULAR SURGERY)

## 2020-04-28 PROCEDURE — 77030010605 HC BLWR MR MAL MEDT -B: Performed by: THORACIC SURGERY (CARDIOTHORACIC VASCULAR SURGERY)

## 2020-04-28 PROCEDURE — 77030012390 HC DRN CHST BTL GTNG -B: Performed by: THORACIC SURGERY (CARDIOTHORACIC VASCULAR SURGERY)

## 2020-04-28 PROCEDURE — 77030020061 HC IV BLD WRMR ADMIN SET 3M -B: Performed by: ANESTHESIOLOGY

## 2020-04-28 PROCEDURE — 77030008684 HC TU ET CUF COVD -B: Performed by: ANESTHESIOLOGY

## 2020-04-28 PROCEDURE — 76060000041 HC ANESTHESIA 5 TO 5.5 HR: Performed by: THORACIC SURGERY (CARDIOTHORACIC VASCULAR SURGERY)

## 2020-04-28 PROCEDURE — 77030013798 HC KT TRNSDUC PRSSR EDWD -B: Performed by: THORACIC SURGERY (CARDIOTHORACIC VASCULAR SURGERY)

## 2020-04-28 PROCEDURE — 77030005402 HC CATH RAD ART LN KT TELE -B

## 2020-04-28 PROCEDURE — 77030018835 HC SOL IRR LR ICUM -A: Performed by: THORACIC SURGERY (CARDIOTHORACIC VASCULAR SURGERY)

## 2020-04-28 PROCEDURE — 77030040392 HC DRSG OPTIFOAM MDII -A

## 2020-04-28 PROCEDURE — 71045 X-RAY EXAM CHEST 1 VIEW: CPT

## 2020-04-28 PROCEDURE — 77030002986 HC SUT PROL J&J -A: Performed by: THORACIC SURGERY (CARDIOTHORACIC VASCULAR SURGERY)

## 2020-04-28 PROCEDURE — 021109W BYPASS CORONARY ARTERY, TWO ARTERIES FROM AORTA WITH AUTOLOGOUS VENOUS TISSUE, OPEN APPROACH: ICD-10-PCS | Performed by: THORACIC SURGERY (CARDIOTHORACIC VASCULAR SURGERY)

## 2020-04-28 PROCEDURE — 76010000116 HC CV SURG 5 TO 5.5 HR: Performed by: THORACIC SURGERY (CARDIOTHORACIC VASCULAR SURGERY)

## 2020-04-28 PROCEDURE — B24BZZ4 ULTRASONOGRAPHY OF HEART WITH AORTA, TRANSESOPHAGEAL: ICD-10-PCS | Performed by: ANESTHESIOLOGY

## 2020-04-28 PROCEDURE — P9047 ALBUMIN (HUMAN), 25%, 50ML: HCPCS

## 2020-04-28 PROCEDURE — 77030019702 HC WRP THER MENM -C: Performed by: THORACIC SURGERY (CARDIOTHORACIC VASCULAR SURGERY)

## 2020-04-28 PROCEDURE — C1713 ANCHOR/SCREW BN/BN,TIS/BN: HCPCS | Performed by: THORACIC SURGERY (CARDIOTHORACIC VASCULAR SURGERY)

## 2020-04-28 PROCEDURE — 77030006920 HC BLD STRNL SAW STRY -B: Performed by: THORACIC SURGERY (CARDIOTHORACIC VASCULAR SURGERY)

## 2020-04-28 PROCEDURE — 77030020256 HC SOL INJ NACL 0.9%  500ML: Performed by: THORACIC SURGERY (CARDIOTHORACIC VASCULAR SURGERY)

## 2020-04-28 PROCEDURE — 77030018702 HC CLP LIG TI TELE -A: Performed by: THORACIC SURGERY (CARDIOTHORACIC VASCULAR SURGERY)

## 2020-04-28 PROCEDURE — 85018 HEMOGLOBIN: CPT

## 2020-04-28 PROCEDURE — 77030041076 HC DRSG AG OPTICELL MDII -A: Performed by: THORACIC SURGERY (CARDIOTHORACIC VASCULAR SURGERY)

## 2020-04-28 PROCEDURE — 74011000258 HC RX REV CODE- 258: Performed by: NURSE ANESTHETIST, CERTIFIED REGISTERED

## 2020-04-28 PROCEDURE — 74011000250 HC RX REV CODE- 250

## 2020-04-28 PROCEDURE — 77030002978 HC SUT POLY TELE -A: Performed by: THORACIC SURGERY (CARDIOTHORACIC VASCULAR SURGERY)

## 2020-04-28 PROCEDURE — 77030010389 HC WRE ATR PACE AEMC -B: Performed by: THORACIC SURGERY (CARDIOTHORACIC VASCULAR SURGERY)

## 2020-04-28 PROCEDURE — 85025 COMPLETE CBC W/AUTO DIFF WBC: CPT

## 2020-04-28 PROCEDURE — 82962 GLUCOSE BLOOD TEST: CPT

## 2020-04-28 PROCEDURE — 85730 THROMBOPLASTIN TIME PARTIAL: CPT

## 2020-04-28 PROCEDURE — 77030010797: Performed by: THORACIC SURGERY (CARDIOTHORACIC VASCULAR SURGERY)

## 2020-04-28 PROCEDURE — 74011250636 HC RX REV CODE- 250/636

## 2020-04-28 PROCEDURE — 77030002987 HC SUT PROL J&J -B: Performed by: THORACIC SURGERY (CARDIOTHORACIC VASCULAR SURGERY)

## 2020-04-28 PROCEDURE — 83735 ASSAY OF MAGNESIUM: CPT

## 2020-04-28 PROCEDURE — 93355 ECHO TRANSESOPHAGEAL (TEE): CPT | Performed by: THORACIC SURGERY (CARDIOTHORACIC VASCULAR SURGERY)

## 2020-04-28 PROCEDURE — 82803 BLOOD GASES ANY COMBINATION: CPT

## 2020-04-28 PROCEDURE — 77030002933 HC SUT MCRYL J&J -A: Performed by: THORACIC SURGERY (CARDIOTHORACIC VASCULAR SURGERY)

## 2020-04-28 PROCEDURE — 06BQ4ZZ EXCISION OF LEFT SAPHENOUS VEIN, PERCUTANEOUS ENDOSCOPIC APPROACH: ICD-10-PCS | Performed by: THORACIC SURGERY (CARDIOTHORACIC VASCULAR SURGERY)

## 2020-04-28 PROCEDURE — 77030022521 HC CATH PERF VENT EDWD -B: Performed by: THORACIC SURGERY (CARDIOTHORACIC VASCULAR SURGERY)

## 2020-04-28 PROCEDURE — 77030019579 HC CBL PACE DISP REMG -B: Performed by: THORACIC SURGERY (CARDIOTHORACIC VASCULAR SURGERY)

## 2020-04-28 PROCEDURE — 77030006689 HC BLD OPHTH BVR BD -A: Performed by: THORACIC SURGERY (CARDIOTHORACIC VASCULAR SURGERY)

## 2020-04-28 PROCEDURE — 77030013861 HC PNCH AORT CLNCUT QUES -B: Performed by: THORACIC SURGERY (CARDIOTHORACIC VASCULAR SURGERY)

## 2020-04-28 PROCEDURE — 77030010818: Performed by: THORACIC SURGERY (CARDIOTHORACIC VASCULAR SURGERY)

## 2020-04-28 PROCEDURE — 74011636637 HC RX REV CODE- 636/637: Performed by: NURSE ANESTHETIST, CERTIFIED REGISTERED

## 2020-04-28 PROCEDURE — 77030006994: Performed by: THORACIC SURGERY (CARDIOTHORACIC VASCULAR SURGERY)

## 2020-04-28 PROCEDURE — 77030033150: Performed by: THORACIC SURGERY (CARDIOTHORACIC VASCULAR SURGERY)

## 2020-04-28 RX ORDER — DEXTROSE 50 % IN WATER (D50W) INTRAVENOUS SYRINGE
AS NEEDED
Status: DISCONTINUED | OUTPATIENT
Start: 2020-04-28 | End: 2020-04-28 | Stop reason: HOSPADM

## 2020-04-28 RX ORDER — PAPAVERINE HYDROCHLORIDE 30 MG/ML
INJECTION INTRAMUSCULAR; INTRAVENOUS AS NEEDED
Status: DISCONTINUED | OUTPATIENT
Start: 2020-04-28 | End: 2020-04-28 | Stop reason: HOSPADM

## 2020-04-28 RX ORDER — ALBUMIN HUMAN 50 G/1000ML
SOLUTION INTRAVENOUS AS NEEDED
Status: DISCONTINUED | OUTPATIENT
Start: 2020-04-28 | End: 2020-04-28 | Stop reason: HOSPADM

## 2020-04-28 RX ORDER — OXYCODONE HYDROCHLORIDE 5 MG/1
5 TABLET ORAL
Status: DISCONTINUED | OUTPATIENT
Start: 2020-04-28 | End: 2020-05-04 | Stop reason: HOSPADM

## 2020-04-28 RX ORDER — SODIUM CHLORIDE 9 MG/ML
9 INJECTION, SOLUTION INTRAVENOUS CONTINUOUS
Status: DISCONTINUED | OUTPATIENT
Start: 2020-04-28 | End: 2020-05-02

## 2020-04-28 RX ORDER — POLYETHYLENE GLYCOL 3350 17 G/17G
17 POWDER, FOR SOLUTION ORAL DAILY
Status: DISCONTINUED | OUTPATIENT
Start: 2020-04-29 | End: 2020-05-04 | Stop reason: HOSPADM

## 2020-04-28 RX ORDER — FENTANYL CITRATE 50 UG/ML
50 INJECTION, SOLUTION INTRAMUSCULAR; INTRAVENOUS AS NEEDED
Status: DISCONTINUED | OUTPATIENT
Start: 2020-04-28 | End: 2020-04-28 | Stop reason: HOSPADM

## 2020-04-28 RX ORDER — PROPOFOL 10 MG/ML
INJECTION, EMULSION INTRAVENOUS AS NEEDED
Status: DISCONTINUED | OUTPATIENT
Start: 2020-04-28 | End: 2020-04-28 | Stop reason: HOSPADM

## 2020-04-28 RX ORDER — SUFENTANIL CITRATE 50 UG/ML
INJECTION EPIDURAL; INTRAVENOUS AS NEEDED
Status: DISCONTINUED | OUTPATIENT
Start: 2020-04-28 | End: 2020-04-28 | Stop reason: HOSPADM

## 2020-04-28 RX ORDER — INSULIN GLARGINE 100 [IU]/ML
1-50 INJECTION, SOLUTION SUBCUTANEOUS
Status: ACTIVE | OUTPATIENT
Start: 2020-04-28 | End: 2020-04-29

## 2020-04-28 RX ORDER — ONDANSETRON 2 MG/ML
4 INJECTION INTRAMUSCULAR; INTRAVENOUS
Status: DISCONTINUED | OUTPATIENT
Start: 2020-04-28 | End: 2020-05-04 | Stop reason: HOSPADM

## 2020-04-28 RX ORDER — SODIUM CHLORIDE 0.9 % (FLUSH) 0.9 %
5-40 SYRINGE (ML) INJECTION AS NEEDED
Status: DISCONTINUED | OUTPATIENT
Start: 2020-04-28 | End: 2020-04-28 | Stop reason: HOSPADM

## 2020-04-28 RX ORDER — INSULIN LISPRO 100 [IU]/ML
INJECTION, SOLUTION INTRAVENOUS; SUBCUTANEOUS
Status: DISCONTINUED | OUTPATIENT
Start: 2020-04-28 | End: 2020-05-02

## 2020-04-28 RX ORDER — POTASSIUM CHLORIDE 29.8 MG/ML
20 INJECTION INTRAVENOUS
Status: ACTIVE | OUTPATIENT
Start: 2020-04-28 | End: 2020-04-29

## 2020-04-28 RX ORDER — SODIUM CHLORIDE 0.9 % (FLUSH) 0.9 %
5-40 SYRINGE (ML) INJECTION EVERY 8 HOURS
Status: DISCONTINUED | OUTPATIENT
Start: 2020-04-28 | End: 2020-04-28 | Stop reason: HOSPADM

## 2020-04-28 RX ORDER — MUPIROCIN 20 MG/G
OINTMENT TOPICAL 2 TIMES DAILY
Status: DISPENSED | OUTPATIENT
Start: 2020-04-28 | End: 2020-05-03

## 2020-04-28 RX ORDER — PROTAMINE SULFATE 10 MG/ML
INJECTION, SOLUTION INTRAVENOUS AS NEEDED
Status: DISCONTINUED | OUTPATIENT
Start: 2020-04-28 | End: 2020-04-28 | Stop reason: HOSPADM

## 2020-04-28 RX ORDER — SODIUM CHLORIDE 0.9 % (FLUSH) 0.9 %
5-40 SYRINGE (ML) INJECTION EVERY 8 HOURS
Status: DISCONTINUED | OUTPATIENT
Start: 2020-04-28 | End: 2020-05-02

## 2020-04-28 RX ORDER — NALOXONE HYDROCHLORIDE 0.4 MG/ML
0.4 INJECTION, SOLUTION INTRAMUSCULAR; INTRAVENOUS; SUBCUTANEOUS AS NEEDED
Status: DISCONTINUED | OUTPATIENT
Start: 2020-04-28 | End: 2020-05-04 | Stop reason: HOSPADM

## 2020-04-28 RX ORDER — LIDOCAINE HYDROCHLORIDE 20 MG/ML
INJECTION, SOLUTION EPIDURAL; INFILTRATION; INTRACAUDAL; PERINEURAL AS NEEDED
Status: DISCONTINUED | OUTPATIENT
Start: 2020-04-28 | End: 2020-04-28 | Stop reason: HOSPADM

## 2020-04-28 RX ORDER — AMOXICILLIN 250 MG
1 CAPSULE ORAL 2 TIMES DAILY
Status: DISCONTINUED | OUTPATIENT
Start: 2020-04-29 | End: 2020-05-04 | Stop reason: HOSPADM

## 2020-04-28 RX ORDER — LANOLIN ALCOHOL/MO/W.PET/CERES
400 CREAM (GRAM) TOPICAL 2 TIMES DAILY
Status: DISCONTINUED | OUTPATIENT
Start: 2020-04-29 | End: 2020-05-04 | Stop reason: HOSPADM

## 2020-04-28 RX ORDER — AMIODARONE HYDROCHLORIDE 200 MG/1
400 TABLET ORAL EVERY 12 HOURS
Status: DISCONTINUED | OUTPATIENT
Start: 2020-04-29 | End: 2020-04-29

## 2020-04-28 RX ORDER — CEFAZOLIN SODIUM 1 G/3ML
INJECTION, POWDER, FOR SOLUTION INTRAMUSCULAR; INTRAVENOUS AS NEEDED
Status: DISCONTINUED | OUTPATIENT
Start: 2020-04-28 | End: 2020-04-28 | Stop reason: HOSPADM

## 2020-04-28 RX ORDER — SODIUM CHLORIDE, SODIUM LACTATE, POTASSIUM CHLORIDE, CALCIUM CHLORIDE 600; 310; 30; 20 MG/100ML; MG/100ML; MG/100ML; MG/100ML
INJECTION, SOLUTION INTRAVENOUS
Status: DISCONTINUED | OUTPATIENT
Start: 2020-04-28 | End: 2020-04-28 | Stop reason: HOSPADM

## 2020-04-28 RX ORDER — SODIUM CHLORIDE 450 MG/100ML
10 INJECTION, SOLUTION INTRAVENOUS CONTINUOUS
Status: DISCONTINUED | OUTPATIENT
Start: 2020-04-28 | End: 2020-05-02

## 2020-04-28 RX ORDER — SODIUM CHLORIDE 0.9 % (FLUSH) 0.9 %
5-40 SYRINGE (ML) INJECTION AS NEEDED
Status: DISCONTINUED | OUTPATIENT
Start: 2020-04-28 | End: 2020-05-02

## 2020-04-28 RX ORDER — MIDAZOLAM HYDROCHLORIDE 1 MG/ML
1 INJECTION, SOLUTION INTRAMUSCULAR; INTRAVENOUS
Status: DISCONTINUED | OUTPATIENT
Start: 2020-04-28 | End: 2020-05-02

## 2020-04-28 RX ORDER — MAGNESIUM SULFATE 1 G/100ML
1 INJECTION INTRAVENOUS AS NEEDED
Status: DISCONTINUED | OUTPATIENT
Start: 2020-04-28 | End: 2020-05-02

## 2020-04-28 RX ORDER — ACETAMINOPHEN 325 MG/1
650 TABLET ORAL ONCE
Status: DISCONTINUED | OUTPATIENT
Start: 2020-04-28 | End: 2020-04-28 | Stop reason: HOSPADM

## 2020-04-28 RX ORDER — HEPARIN SODIUM 1000 [USP'U]/ML
INJECTION, SOLUTION INTRAVENOUS; SUBCUTANEOUS AS NEEDED
Status: DISCONTINUED | OUTPATIENT
Start: 2020-04-28 | End: 2020-04-28 | Stop reason: HOSPADM

## 2020-04-28 RX ORDER — ALBUMIN HUMAN 50 G/1000ML
SOLUTION INTRAVENOUS
Status: COMPLETED
Start: 2020-04-28 | End: 2020-04-28

## 2020-04-28 RX ORDER — SODIUM BICARBONATE 84 MG/ML
100 INJECTION, SOLUTION INTRAVENOUS
Status: COMPLETED | OUTPATIENT
Start: 2020-04-28 | End: 2020-04-28

## 2020-04-28 RX ORDER — FAMOTIDINE 20 MG/1
20 TABLET, FILM COATED ORAL DAILY
Status: DISCONTINUED | OUTPATIENT
Start: 2020-04-29 | End: 2020-04-29

## 2020-04-28 RX ORDER — MORPHINE SULFATE 2 MG/ML
2 INJECTION, SOLUTION INTRAMUSCULAR; INTRAVENOUS
Status: ACTIVE | OUTPATIENT
Start: 2020-04-28 | End: 2020-05-01

## 2020-04-28 RX ORDER — MORPHINE SULFATE 4 MG/ML
4 INJECTION INTRAVENOUS
Status: DISCONTINUED | OUTPATIENT
Start: 2020-04-28 | End: 2020-05-02

## 2020-04-28 RX ORDER — SODIUM CHLORIDE 9 MG/ML
INJECTION, SOLUTION INTRAVENOUS
Status: DISCONTINUED | OUTPATIENT
Start: 2020-04-28 | End: 2020-04-28 | Stop reason: HOSPADM

## 2020-04-28 RX ORDER — DIPHENHYDRAMINE HCL 25 MG
25 CAPSULE ORAL
Status: DISCONTINUED | OUTPATIENT
Start: 2020-04-28 | End: 2020-05-04 | Stop reason: HOSPADM

## 2020-04-28 RX ORDER — SODIUM CHLORIDE, SODIUM LACTATE, POTASSIUM CHLORIDE, CALCIUM CHLORIDE 600; 310; 30; 20 MG/100ML; MG/100ML; MG/100ML; MG/100ML
50 INJECTION, SOLUTION INTRAVENOUS CONTINUOUS
Status: DISCONTINUED | OUTPATIENT
Start: 2020-04-28 | End: 2020-04-28 | Stop reason: HOSPADM

## 2020-04-28 RX ORDER — LIDOCAINE HYDROCHLORIDE 10 MG/ML
0.1 INJECTION, SOLUTION EPIDURAL; INFILTRATION; INTRACAUDAL; PERINEURAL AS NEEDED
Status: DISCONTINUED | OUTPATIENT
Start: 2020-04-28 | End: 2020-04-28 | Stop reason: HOSPADM

## 2020-04-28 RX ORDER — CHLORHEXIDINE GLUCONATE 1.2 MG/ML
10 RINSE ORAL EVERY 12 HOURS
Status: DISCONTINUED | OUTPATIENT
Start: 2020-04-28 | End: 2020-05-04 | Stop reason: HOSPADM

## 2020-04-28 RX ORDER — GUAIFENESIN 100 MG/5ML
81 LIQUID (ML) ORAL DAILY
Status: DISCONTINUED | OUTPATIENT
Start: 2020-04-29 | End: 2020-05-04 | Stop reason: HOSPADM

## 2020-04-28 RX ORDER — SUFENTANIL CITRATE 50 UG/ML
INJECTION EPIDURAL; INTRAVENOUS
Status: DISCONTINUED | OUTPATIENT
Start: 2020-04-28 | End: 2020-04-28 | Stop reason: HOSPADM

## 2020-04-28 RX ORDER — ACETAMINOPHEN 325 MG/1
650 TABLET ORAL EVERY 4 HOURS
Status: DISCONTINUED | OUTPATIENT
Start: 2020-04-28 | End: 2020-04-29

## 2020-04-28 RX ORDER — FACIAL-BODY WIPES
10 EACH TOPICAL DAILY PRN
Status: DISCONTINUED | OUTPATIENT
Start: 2020-04-28 | End: 2020-05-04 | Stop reason: HOSPADM

## 2020-04-28 RX ORDER — ALBUMIN HUMAN 50 G/1000ML
12.5 SOLUTION INTRAVENOUS
Status: COMPLETED | OUTPATIENT
Start: 2020-04-28 | End: 2020-04-28

## 2020-04-28 RX ORDER — BACITRACIN 500 UNIT/G
1 PACKET (EA) TOPICAL AS NEEDED
Status: DISCONTINUED | OUTPATIENT
Start: 2020-04-28 | End: 2020-05-04 | Stop reason: HOSPADM

## 2020-04-28 RX ORDER — DESMOPRESSIN ACETATE 4 UG/ML
INJECTION, SOLUTION INTRAVENOUS; SUBCUTANEOUS AS NEEDED
Status: DISCONTINUED | OUTPATIENT
Start: 2020-04-28 | End: 2020-04-28 | Stop reason: HOSPADM

## 2020-04-28 RX ORDER — DIPHENHYDRAMINE HYDROCHLORIDE 50 MG/ML
25 INJECTION, SOLUTION INTRAMUSCULAR; INTRAVENOUS
Status: ACTIVE | OUTPATIENT
Start: 2020-04-28 | End: 2020-04-30

## 2020-04-28 RX ORDER — CEFAZOLIN SODIUM/WATER 2 G/20 ML
2 SYRINGE (ML) INTRAVENOUS EVERY 8 HOURS
Status: COMPLETED | OUTPATIENT
Start: 2020-04-28 | End: 2020-04-29

## 2020-04-28 RX ORDER — MAGNESIUM SULFATE 100 %
4 CRYSTALS MISCELLANEOUS AS NEEDED
Status: DISCONTINUED | OUTPATIENT
Start: 2020-04-28 | End: 2020-05-04 | Stop reason: HOSPADM

## 2020-04-28 RX ORDER — DEXTROSE MONOHYDRATE 100 MG/ML
0-250 INJECTION, SOLUTION INTRAVENOUS AS NEEDED
Status: DISCONTINUED | OUTPATIENT
Start: 2020-04-28 | End: 2020-05-04 | Stop reason: HOSPADM

## 2020-04-28 RX ORDER — OXYCODONE HYDROCHLORIDE 5 MG/1
10 TABLET ORAL
Status: DISCONTINUED | OUTPATIENT
Start: 2020-04-28 | End: 2020-05-04 | Stop reason: HOSPADM

## 2020-04-28 RX ORDER — MIDAZOLAM HYDROCHLORIDE 1 MG/ML
1 INJECTION, SOLUTION INTRAMUSCULAR; INTRAVENOUS AS NEEDED
Status: DISCONTINUED | OUTPATIENT
Start: 2020-04-28 | End: 2020-04-28 | Stop reason: HOSPADM

## 2020-04-28 RX ORDER — ALBUTEROL SULFATE 0.83 MG/ML
2.5 SOLUTION RESPIRATORY (INHALATION)
Status: DISCONTINUED | OUTPATIENT
Start: 2020-04-28 | End: 2020-05-04 | Stop reason: HOSPADM

## 2020-04-28 RX ORDER — ROCURONIUM BROMIDE 10 MG/ML
INJECTION, SOLUTION INTRAVENOUS AS NEEDED
Status: DISCONTINUED | OUTPATIENT
Start: 2020-04-28 | End: 2020-04-28 | Stop reason: HOSPADM

## 2020-04-28 RX ADMIN — SUFENTANIL CITRATE 40 MCG: 50 INJECTION EPIDURAL; INTRAVENOUS at 07:46

## 2020-04-28 RX ADMIN — CEFAZOLIN SODIUM 2 G: 100 INJECTION, POWDER, LYOPHILIZED, FOR SOLUTION INTRAVENOUS at 19:24

## 2020-04-28 RX ADMIN — ALBUMIN (HUMAN) 250 ML: 12.5 INJECTION, SOLUTION INTRAVENOUS at 11:20

## 2020-04-28 RX ADMIN — SODIUM CHLORIDE 0.9 UNITS/HR: 9 INJECTION, SOLUTION INTRAVENOUS at 19:10

## 2020-04-28 RX ADMIN — Medication 10 ML: at 21:16

## 2020-04-28 RX ADMIN — ACETAMINOPHEN 650 MG: 325 TABLET ORAL at 22:13

## 2020-04-28 RX ADMIN — MIDAZOLAM 4 MG: 1 INJECTION INTRAMUSCULAR; INTRAVENOUS at 07:10

## 2020-04-28 RX ADMIN — ALBUMIN (HUMAN) 12.5 G: 12.5 INJECTION, SOLUTION INTRAVENOUS at 13:36

## 2020-04-28 RX ADMIN — SODIUM CHLORIDE 2.5 MG/HR: 900 INJECTION, SOLUTION INTRAVENOUS at 18:49

## 2020-04-28 RX ADMIN — ALBUMIN (HUMAN) 12.5 G: 12.5 INJECTION, SOLUTION INTRAVENOUS at 17:40

## 2020-04-28 RX ADMIN — Medication 2 G: at 11:09

## 2020-04-28 RX ADMIN — CHLORHEXIDINE GLUCONATE 10 ML: 1.2 RINSE ORAL at 21:16

## 2020-04-28 RX ADMIN — LIDOCAINE HYDROCHLORIDE 100 MG: 20 INJECTION, SOLUTION EPIDURAL; INFILTRATION; INTRACAUDAL; PERINEURAL at 07:46

## 2020-04-28 RX ADMIN — SODIUM CHLORIDE 1.3 UNITS/HR: 9 INJECTION, SOLUTION INTRAVENOUS at 09:58

## 2020-04-28 RX ADMIN — SUFENTANIL CITRATE 0.3 MCG/KG/HR: 50 INJECTION EPIDURAL; INTRAVENOUS at 07:53

## 2020-04-28 RX ADMIN — SODIUM CHLORIDE 60 MCG/MIN: 900 INJECTION, SOLUTION INTRAVENOUS at 11:53

## 2020-04-28 RX ADMIN — SUFENTANIL CITRATE 30 MCG: 50 INJECTION EPIDURAL; INTRAVENOUS at 08:23

## 2020-04-28 RX ADMIN — SODIUM CHLORIDE 10 UNITS: 9 INJECTION, SOLUTION INTRAVENOUS at 10:56

## 2020-04-28 RX ADMIN — DEXTROSE MONOHYDRATE 25 ML: 25 INJECTION, SOLUTION INTRAVENOUS at 10:56

## 2020-04-28 RX ADMIN — Medication 2 G: at 08:10

## 2020-04-28 RX ADMIN — SODIUM CHLORIDE 10 G/HR: 900 INJECTION, SOLUTION INTRAVENOUS at 07:54

## 2020-04-28 RX ADMIN — PROPOFOL 100 MG: 10 INJECTION, EMULSION INTRAVENOUS at 07:46

## 2020-04-28 RX ADMIN — HEPARIN SODIUM 35000 UNITS: 1000 INJECTION, SOLUTION INTRAVENOUS; SUBCUTANEOUS at 08:58

## 2020-04-28 RX ADMIN — FENTANYL CITRATE 100 MCG: 50 INJECTION INTRAMUSCULAR; INTRAVENOUS at 07:10

## 2020-04-28 RX ADMIN — FAMOTIDINE 20 MG: 10 INJECTION, SOLUTION INTRAVENOUS at 15:16

## 2020-04-28 RX ADMIN — ALBUMIN (HUMAN) 12.5 G: 12.5 INJECTION, SOLUTION INTRAVENOUS at 15:29

## 2020-04-28 RX ADMIN — DEXTROSE MONOHYDRATE 12.5 ML: 25 INJECTION, SOLUTION INTRAVENOUS at 12:44

## 2020-04-28 RX ADMIN — MUPIROCIN: 20 OINTMENT TOPICAL at 17:40

## 2020-04-28 RX ADMIN — ROCURONIUM BROMIDE 100 MG: 10 INJECTION INTRAVENOUS at 07:46

## 2020-04-28 RX ADMIN — DEXMEDETOMIDINE HYDROCHLORIDE 0.5 MCG/KG/HR: 100 INJECTION, SOLUTION, CONCENTRATE INTRAVENOUS at 10:37

## 2020-04-28 RX ADMIN — DESMOPRESSIN ACETATE 25 MCG: 4 SOLUTION INTRAVENOUS at 11:41

## 2020-04-28 RX ADMIN — SODIUM CHLORIDE: 900 INJECTION, SOLUTION INTRAVENOUS at 07:22

## 2020-04-28 RX ADMIN — ALBUMIN (HUMAN) 12.5 G: 12.5 INJECTION, SOLUTION INTRAVENOUS at 14:21

## 2020-04-28 RX ADMIN — SODIUM CHLORIDE 9 ML/HR: 900 INJECTION, SOLUTION INTRAVENOUS at 14:02

## 2020-04-28 RX ADMIN — PHENYLEPHRINE HYDROCHLORIDE 20 MCG/MIN: 10 INJECTION INTRAVENOUS at 08:31

## 2020-04-28 RX ADMIN — SODIUM CHLORIDE, POTASSIUM CHLORIDE, SODIUM LACTATE AND CALCIUM CHLORIDE: 600; 310; 30; 20 INJECTION, SOLUTION INTRAVENOUS at 07:22

## 2020-04-28 RX ADMIN — SODIUM BICARBONATE 100 MEQ: 84 INJECTION, SOLUTION INTRAVENOUS at 18:33

## 2020-04-28 RX ADMIN — ALBUMIN (HUMAN) 250 ML: 12.5 INJECTION, SOLUTION INTRAVENOUS at 12:11

## 2020-04-28 RX ADMIN — PROTAMINE SULFATE 200 MG: 10 INJECTION, SOLUTION INTRAVENOUS at 11:28

## 2020-04-28 RX ADMIN — HEPARIN SODIUM 1000 UNITS: 1000 INJECTION, SOLUTION INTRAVENOUS; SUBCUTANEOUS at 08:23

## 2020-04-28 NOTE — BRIEF OP NOTE
BRIEF OP NOTE  Pre-Op Diagnosis: CORONARY ARTERY DISEASE    Post-Op Diagnosis: CORONARY ARTERY DISEASE      Procedure:   ON PUMP CORONARY ARTERY BYPASS GRAFT X 3  WITH LIMA to LAD, RSVG to RCA, RSVG to ramus  Left greater saphenous vein EVH     Surgeon: Rejeana Scheuermann MD    Assistant(s): Nette Lozano PA-C    Anesthesia: General     Infusions:  precedex, insulin, cy    Estimated Blood Loss:  2350ml    Cell Saver:  1050ml    Drains and pacing wires: 2 atrial wires, 1 bipolar ventricular wire, 3 berhane drains    Complications: None    Findings: CAD

## 2020-04-28 NOTE — PROGRESS NOTES
Occupational Therapy  Chart reviewed, referral received. Pt is POD 0 CABG x 3. Will follow up tomorrow as pt is medically stable/appropriate.  Jameel Xiao MS, OTR/L

## 2020-04-28 NOTE — CONSULTS
[facility] SPECIALIST CONSULT  PROGRAM FOR DIABETES HEALTH    INITIAL NOTE    Presentation   Juan Miguel Viveros is a 68 y.o. male admitted with a PMH of arthritis, CKD, diabetes, HTN, PE, and depression who presented to ED [facility] ED with chest pain and was found to have an NSTEMI. He was admitted and cardiac cath was performed which revealed CAD. He is now s/p CABG x3. Current clinical course has been uncomplicated. Diabetes: Patient has known Type type two diabetes, controlled on Januvia PTA. Admitting A1C 5.8% (down from 12.1% in 2012)    CNS consulted by Provider for advanced diabetes nursing assessment and care, specifically related to   [x] Transitioning off Lorena Mccray   [x] Inpatient management strategy    Diabetes-related medical history  Acute complications: None  Neurological complications: None  Microvascular disease: None  Macrovascular disease  CAD  Other associated conditions     Depression    Diabetes medication history  Drug class Currently in use Discontinued Never used   Biguanide      DDP-4 inhibitor  Januvia 50mg Daily     Sulfonylurea      Thiazolidinedione      GLP-1 RA      SGLT-2 inhibitors      Basal insulin      Bolus insulin        Subjective   Patient sedated and intubated     Objective   Physical exam  General Sedated and intubated  Vital Signs   Visit Vitals  /71   Pulse 86   Temp 96.8 °F (36 °C)   Resp (!) 0   Wt 84.6 kg (186 lb 8.2 oz)   SpO2 97%   BMI 29.21 kg/m²     Skin  Warm and dry. No acanthosis noted along neckline. Heart   Regular rate and rhythm. No murmurs, rubs or gallops  Lungs  Clear to auscultation without rales or rhonchi  Extremities No foot wounds    Diabetic foot exam:    Left Foot     Visual Exam: normal    Pulse DP: 2+ (normal)   Vibratory and Filament test: deferred due to clinical condition    Right Foot   Visual Exam: normal    Pulse DP: 2+ (normal)   Vibratory and Filament test: deferred due to clinical condition.      Laboratory  Lab Results   Component Value Date/Time    Hemoglobin A1c 5.8 (H) 04/25/2020 01:42 AM    Hemoglobin A1c (POC) 5.7 09/24/2019 08:33 AM     Lab Results   Component Value Date/Time    LDL, calculated 138 (H) 01/09/2020 11:36 AM     Lab Results   Component Value Date/Time    Creatinine (POC) 1.4 (H) 11/02/2016 07:15 AM    Creatinine 1.51 (H) 04/28/2020 01:24 PM     Lab Results   Component Value Date/Time    Sodium 144 04/28/2020 01:24 PM    Potassium 4.1 04/28/2020 01:24 PM    Chloride 115 (H) 04/28/2020 01:24 PM    CO2 25 04/28/2020 01:24 PM    Anion gap 4 (L) 04/28/2020 01:24 PM    Glucose 87 04/28/2020 01:24 PM    BUN 20 04/28/2020 01:24 PM    Creatinine 1.51 (H) 04/28/2020 01:24 PM    BUN/Creatinine ratio 13 04/28/2020 01:24 PM    GFR est AA 55 (L) 04/28/2020 01:24 PM    GFR est non-AA 46 (L) 04/28/2020 01:24 PM    Calcium 8.3 (L) 04/28/2020 01:24 PM    Bilirubin, total 0.9 04/28/2020 01:24 PM    AST (SGOT) 68 (H) 04/28/2020 01:24 PM    Alk. phosphatase 25 (L) 04/28/2020 01:24 PM    Protein, total 5.4 (L) 04/28/2020 01:24 PM    Albumin 3.2 (L) 04/28/2020 01:24 PM    Globulin 2.2 04/28/2020 01:24 PM    A-G Ratio 1.5 04/28/2020 01:24 PM    ALT (SGPT) 28 04/28/2020 01:24 PM     Lab Results   Component Value Date/Time    ALT (SGPT) 28 04/28/2020 01:24 PM       Blood glucose pattern        Assessment and Plan   Nursing Diagnosis Risk for unstable blood glucose pattern   Nursing Intervention Domain 2665 Decision-making Support   Nursing Interventions Examined current inpatient diabetes control   Explored factors facilitating and impeding inpatient management  Explored corrective strategies with responsible inpatient provider      Evaluation   Rommel Carvalho is a 68 y.o. male admitted with a PMH of arthritis, CKD, diabetes, HTN, PE, and depression who presented to ED [facility] ED with chest pain and was found to have an NSTEMI. He was admitted and cardiac cath was performed which revealed CAD. He is now s/p CABG x3.  There is low suspicion of hyperglycemia in the post-operative period with admitting A1C of 5.8%. Blood glucose goal in the inpatient setting is 100-180. Recommendations   Recommend:  1. Insulin infusion per post-operative protocol    2. Blood glucose monitoring TIDAC once off insulin infusion. If BG normal after 3 checks, ok to trend on am labs and d/c correctional insulin.       Billing Code(s)     [x] [address] [facility]  Program for Diabetes Health  Access via ROMEL Walter [phone]

## 2020-04-28 NOTE — PERIOP NOTES
Patient arrived to the operating room via stretcher. All wheels locked and patient transferred to the OR table with assistance of four staff members. The [employer] MTRMUR[employer]ZA warming machine on bed. Temperature set at 37 C and adjusted as needed. Safety strap across upper body until time of positioning and after drapes removed prior to transfer. After the induction of anesthesia and intubation 16fr temperature lundberg catheter was inserted with no difficulties. Urine output and temperature monitored during case by all providers. Patient positioned with assistance of all providers. Head resting in proper alignment on gel doughnut. Gel Shoulder roll placed. Arms tucked as sides. Each arm was wrapped in one step arm protectors. Heel Mepilex pads applied. Sacral mepilex dressing in place. All pressure points/IV lines/stopcocks padded with foam. Thumbs in proper alignment and ulnar padding in place. Picket fence foot positioner used during prep.

## 2020-04-28 NOTE — ANESTHESIA POSTPROCEDURE EVALUATION
Post-Anesthesia Evaluation and Assessment    Patient: Payal Randall MRN: [id number]  SSN: [id number]    YOB: 1950  Age: 68 y.o. Sex: male      I have evaluated the patient and they are stable and ready for discharge from the PACU. Cardiovascular Function/Vital Signs  Visit Vitals  /71   Pulse 86   Temp 36 °C (96.8 °F)   Resp 14   Wt 84.6 kg (186 lb 8.2 oz)   SpO2 96%   BMI 29.21 kg/m²       Patient is status post General anesthesia for Procedure(s):  ON PUMP CORONARY ARTERY BYPASS GRAFT X 3  WITH LEFT LEG EVH AND LIMA/CARDIOPULMONARY BYPASS/ GODWIN AND EPI AORTIC ULTRASOIND BY DR. Citlali Omer. .    Nausea/Vomiting: None    Postoperative hydration reviewed and adequate. Pain:  Pain Scale 1: Adult Nonverbal Pain Scale (04/28/20 1330)  Pain Intensity 1: 0 (04/28/20 0601)   Managed    Neurological Status:   Neuro  Neurologic State: Alert (04/27/20 2000)  Orientation Level: Oriented X4 (04/27/20 2000)  Cognition: Appropriate decision making; Appropriate for age attention/concentration; Appropriate safety awareness; Follows commands (04/27/20 1345)  Speech: Clear; Appropriate for age (04/27/20 1345)   At baseline    Mental Status, Level of Consciousness: Alert and  oriented to person, place, and time    Pulmonary Status:   O2 Device: Other (comment) (04/28/20 1318)   Adequate oxygenation and airway patent    Complications related to anesthesia: None    Post-anesthesia assessment completed.  No concerns    Signed By: Vidya Stauffer MD     April 28, 2020

## 2020-04-28 NOTE — PERIOP NOTES
Page Gant Cardiac Care Coordinator updated with Surgery Start time and progress of Surgery. Will continue to Update.

## 2020-04-28 NOTE — PROGRESS NOTES
Physical Therapy 4/28/2020    Orders acknowledged and chart reviewed up to date. Pt POD 0 CABG x 3. Will follow-up tomorrow as schedule permits/as appropriate. Thank you.   Aniceto Brown, PT, DPT

## 2020-04-28 NOTE — PROGRESS NOTES
1305: arrived on unit    1315; chest xray done, inserted 2 cm per PA to 23 cm    1320: abg, increased the Fio2 to 80% as PaO2 only 75. Labs drawn    1330: pacer checked, no underlying rhythm. DDD paced at 80, Ma 5/5    1430: patient starting to wake, moves all extremities. Precedex to 0.2    1500: precedex off, FiO2 to 50%    1530: Decreased rate on vent to 8    1600: to CPAP, not breathing, back to IVM 8    1630: to CPAP    1700: patient still very sleepy, but appropriate. ABG at 1700 Ph 7.24, Co2 51.1. Spoke with intensivist. Will put him back on full support and wait untill he is more awake    [id number] [id number]: attempted to go to CPAP again, patient apneic. Still very wakeful and responsive ,but not wanting to breath. CI low, SVR 2300. Turned Luis A off and gave another albumen    1740:Extubated to 5L. Spoke with Dr Joanne Fry prior to extubation with ABG. Orders received to give 2 amps of sodium bicarb and then extubate    1900: added 2.5 mg Cardene to help reduce SVR and improve CI. CI now 2.3    1800: Bedside shift change report given to Jennie Norwood (oncoming nurse) by Catalina Elizabeth RN (offgoing nurse). Report included the following information SBAR, Kardex, Procedure Summary, Intake/Output, MAR and Recent Results.

## 2020-04-28 NOTE — ANESTHESIA PROCEDURE NOTES
Arterial Line Placement    Start time: 4/28/2020 6:30 AM  End time: 4/28/2020 7:00 AM  Performed by: Kalin Ellis MD  Authorized by: Kalin Ellis MD     Pre-Procedure  Indications:  Arterial pressure monitoring and blood sampling  Preanesthetic Checklist: patient identified, risks and benefits discussed, anesthesia consent, site marked, patient being monitored, timeout performed and patient being monitored    Timeout Time: 06:00        Procedure:   Prep:  ChloraPrep  Seldinger Technique?: Yes    Orientation:  Left  Location:  Radial artery  Catheter size:  20 G  Number of attempts:  1    Assessment:   Post-procedure:  Line secured and sterile dressing applied  Patient Tolerance:  Patient tolerated the procedure well with no immediate complications

## 2020-04-28 NOTE — PROGRESS NOTES
Cardiac Surgery Care Coordinator- Met with the daughter of Camden Ying, reviewed role of the Cardiac Surgery Co- Nurse. Reviewed plan of care and day of surgery expectations. Provided her with an update from the OR. Encouraged her to verbalize and emotional support given. Will continue to update throughout the day. Met with Mr [place] daughter Clemente Serrano,  provided her with an update. She is  without questions or concerns at this time. Will continue to follow for educational and emotional needs. 1310- Met with Maria Guadalupe Ellis Jr's daughter and Dr. Nura Geller. Update given, Encouraged family to verbalize and offered emotional support. Reinforced Surgical waiting room instructions, family to wait in the main surgical waiting room until contacted by the nursing staff. 26- Escorted Ms Anushka Vinson to the bedside, reviewed plan of care and encouraged her to verbalize. Will continue to update via phone.  Justice Mclean RN

## 2020-04-28 NOTE — ROUTINE PROCESS
TRANSFER - OUT REPORT:    Verbal report given to [facility] RN on Mace Serum  being transferred to CVICU for routine progression of care       Report consisted of patients Situation, Background, Assessment and   Recommendations(SBAR). Information from the following report(s) OR Summary and Procedure Summary was reviewed with the receiving nurse. Lines:   Double Lumen 04/28/20 (Active)       Kelly Oar Triple 04/28/20 (Active)       Peripheral IV 04/28/20 Posterior;Right Hand (Active)   Site Assessment Clean, dry, & intact 4/28/2020  7:19 AM   Phlebitis Assessment 0 4/28/2020  7:19 AM   Infiltration Assessment 0 4/28/2020  7:19 AM   Dressing Status Clean, dry, & intact; New 4/28/2020  7:19 AM   Dressing Type Tape;Transparent 4/28/2020  7:19 AM   Hub Color/Line Status Green; Infusing 4/28/2020  7:19 AM       Arterial Line 04/28/20 (Active)        Opportunity for questions and clarification was provided. Patient transported intubated and monitored .  Accompanied by CRNA, RN, Perfusionist.

## 2020-04-28 NOTE — PROGRESS NOTES
Problem: Falls - Risk of  Goal: *Absence of Falls  Description: Document Thora Bare Fall Risk and appropriate interventions in the flowsheet. Outcome: Progressing Towards Goal  Note: Fall Risk Interventions:  Mobility Interventions: Assess mobility with egress test         Medication Interventions: Evaluate medications/consider consulting pharmacy    Elimination Interventions: Call light in reach              Problem: Pressure Injury - Risk of  Goal: *Prevention of pressure injury  Description: Document Keegan Scale and appropriate interventions in the flowsheet. Outcome: Progressing Towards Goal  Note: Pressure Injury Interventions: Activity Interventions: Pressure redistribution bed/mattress(bed type), PT/OT evaluation    Mobility Interventions: Pressure redistribution bed/mattress (bed type)                          Problem: CABG: Pre-Op Day  Goal: Activity/Safety  Outcome: Progressing Towards Goal  Goal: Diagnostic Test/Procedures  Outcome: Progressing Towards Goal  Goal: Medications  Outcome: Progressing Towards Goal    0545: Transported patient with RN & daughter to pre op. Belongings labeled and brought to CVICU.

## 2020-04-28 NOTE — ANESTHESIA PROCEDURE NOTES
Central Line Placement    Start time: 4/28/2020 6:30 AM  End time: 4/28/2020 7:00 AM  Performed by: Rickey Garcia MD  Authorized by: Rickey Garcia MD     Indications: vascular access, central pressure monitoring and need for vasopressors  Preanesthetic Checklist: patient identified, risks and benefits discussed, anesthesia consent, site marked, patient being monitored and timeout performed    Timeout Time: 07:00       Pre-procedure: All elements of maximal sterile barrier technique followed?  Yes    2% Chlorhexidine for cutaneous antisepsis, Hand hygiene performed prior to catheter insertion and Ultrasound guidance    Sterile Ultrasound Technique followed?: Yes            Procedure:   Prep:  Chlorhexidine  Location: internal jugular  Orientation:  Right  Patient position:  Trendelenburg  Catheter type:  Double lumen  Catheter size:  9 Fr  Catheter length:  12 cm  Number of attempts:  1  Successful placement: Yes      Assessment:   Post-procedure:  Catheter secured and sterile dressing applied  Assessment:  Blood return through all ports, free fluid flow and guidewire removal verified  Insertion:  Uncomplicated  Patient tolerance:  Patient tolerated the procedure well with no immediate complications  8 Fr CCO PAC

## 2020-04-28 NOTE — ANESTHESIA PREPROCEDURE EVALUATION
Anesthetic History   No history of anesthetic complications            Review of Systems / Medical History  Patient summary reviewed, nursing notes reviewed and pertinent labs reviewed    Pulmonary  Within defined limits                 Neuro/Psych         Psychiatric history     Cardiovascular    Hypertension          CAD    Exercise tolerance: >4 METS  Comments: · Normal cavity size and systolic function (ejection fraction normal). Moderate to severe concentric hypertrophy. Estimated left ventricular ejection fraction is 60 - 65%. Left ventricular diastolic dysfunction. · Mild mitral annular calcification. Mild mitral valve regurgitation is present. · Mildly dilated left atrium. Left Main  The vessel was visualized by angiography. The vessel is angiographically normal.  Left Anterior Descending  Prox LAD lesion 50% stenosed. .  Mid LAD lesion 75% stenosed. .  Ramus Intermedius  Ramus lesion 80% stenosed. .  Left Circumflex  Mid Cx lesion 50% stenosed. .  First Obtuse Marginal Branch  1st Mrg lesion 80% stenosed. .  Right Coronary Artery  Mid RCA lesion 75% stenosed. Bula Dayhoff        GI/Hepatic/Renal  Within defined limits              Endo/Other    Diabetes (diet controlled)    Obesity and arthritis     Other Findings   Comments: PE/DVT  Chronic pain           Physical Exam    Airway  Mallampati: II  TM Distance: 4 - 6 cm  Neck ROM: normal range of motion   Mouth opening: Normal     Cardiovascular  Regular rate and rhythm,  S1 and S2 normal,  no murmur, click, rub, or gallop             Dental    Dentition: Edentulous     Pulmonary  Breath sounds clear to auscultation               Abdominal  GI exam deferred       Other Findings            Anesthetic Plan    ASA: 3  Anesthesia type: general    Monitoring Plan: Arterial line, BIS, CVP, [place] and GODWIN      Induction: Intravenous  Anesthetic plan and risks discussed with: Patient      Discussed with the patient risks of anesthesia for the procedure and they wish to proceed. Plan for GETA with standard ASA monitors, 2 PIV, arterial line, central line, PA catheter, GODWIN, blood transfusion likely, ICU post op and all other indicated procedures.

## 2020-04-29 ENCOUNTER — APPOINTMENT (OUTPATIENT)
Dept: GENERAL RADIOLOGY | Age: 70
DRG: 236 | End: 2020-04-29
Attending: NURSE PRACTITIONER
Payer: MEDICARE

## 2020-04-29 PROBLEM — D62 POSTOPERATIVE ANEMIA DUE TO ACUTE BLOOD LOSS: Status: ACTIVE | Noted: 2020-04-29

## 2020-04-29 LAB
ADMINISTERED INITIALS, ADMINIT: NORMAL
ALBUMIN SERPL-MCNC: 3.8 G/DL (ref 3.5–5)
ALBUMIN/GLOB SERPL: 1.9 {RATIO} (ref 1.1–2.2)
ALP SERPL-CCNC: 18 U/L (ref 45–117)
ALT SERPL-CCNC: 24 U/L (ref 12–78)
ANION GAP SERPL CALC-SCNC: 9 MMOL/L (ref 5–15)
ARTERIAL PATENCY WRIST A: ABNORMAL
AST SERPL-CCNC: 71 U/L (ref 15–37)
BASE DEFICIT BLD-SCNC: 3 MMOL/L
BDY SITE: ABNORMAL
BILIRUB SERPL-MCNC: 1 MG/DL (ref 0.2–1)
BUN SERPL-MCNC: 25 MG/DL (ref 6–20)
BUN/CREAT SERPL: 16 (ref 12–20)
CA-I BLD-SCNC: 1.25 MMOL/L (ref 1.12–1.32)
CALCIUM SERPL-MCNC: 8.2 MG/DL (ref 8.5–10.1)
CHLORIDE SERPL-SCNC: 110 MMOL/L (ref 97–108)
CO2 SERPL-SCNC: 23 MMOL/L (ref 21–32)
CREAT SERPL-MCNC: 1.52 MG/DL (ref 0.7–1.3)
D50 ADMINISTERED, D50ADM: 0 ML
D50 ORDER, D50ORD: 0 ML
ERYTHROCYTE [DISTWIDTH] IN BLOOD BY AUTOMATED COUNT: 16.1 % (ref 11.5–14.5)
GAS FLOW.O2 O2 DELIVERY SYS: ABNORMAL L/MIN
GAS FLOW.O2 SETTING OXYMISER: 6 L/M
GLOBULIN SER CALC-MCNC: 2 G/DL (ref 2–4)
GLSCOM COMMENTS: NORMAL
GLUCOSE BLD STRIP.AUTO-MCNC: 104 MG/DL (ref 65–100)
GLUCOSE BLD STRIP.AUTO-MCNC: 107 MG/DL (ref 65–100)
GLUCOSE BLD STRIP.AUTO-MCNC: 107 MG/DL (ref 65–100)
GLUCOSE BLD STRIP.AUTO-MCNC: 108 MG/DL (ref 65–100)
GLUCOSE BLD STRIP.AUTO-MCNC: 109 MG/DL (ref 65–100)
GLUCOSE BLD STRIP.AUTO-MCNC: 111 MG/DL (ref 65–100)
GLUCOSE BLD STRIP.AUTO-MCNC: 113 MG/DL (ref 65–100)
GLUCOSE BLD STRIP.AUTO-MCNC: 113 MG/DL (ref 65–100)
GLUCOSE BLD STRIP.AUTO-MCNC: 116 MG/DL (ref 65–100)
GLUCOSE BLD STRIP.AUTO-MCNC: 128 MG/DL (ref 65–100)
GLUCOSE BLD STRIP.AUTO-MCNC: 132 MG/DL (ref 65–100)
GLUCOSE BLD STRIP.AUTO-MCNC: 133 MG/DL (ref 65–100)
GLUCOSE BLD STRIP.AUTO-MCNC: 144 MG/DL (ref 65–100)
GLUCOSE BLD STRIP.AUTO-MCNC: 90 MG/DL (ref 65–100)
GLUCOSE BLD STRIP.AUTO-MCNC: 91 MG/DL (ref 65–100)
GLUCOSE BLD STRIP.AUTO-MCNC: 95 MG/DL (ref 65–100)
GLUCOSE BLD STRIP.AUTO-MCNC: 95 MG/DL (ref 65–100)
GLUCOSE BLD STRIP.AUTO-MCNC: 97 MG/DL (ref 65–100)
GLUCOSE BLD STRIP.AUTO-MCNC: 97 MG/DL (ref 65–100)
GLUCOSE BLD STRIP.AUTO-MCNC: 98 MG/DL (ref 65–100)
GLUCOSE BLD STRIP.AUTO-MCNC: 98 MG/DL (ref 65–100)
GLUCOSE SERPL-MCNC: 98 MG/DL (ref 65–100)
GLUCOSE, GLC: 104 MG/DL
GLUCOSE, GLC: 107 MG/DL
GLUCOSE, GLC: 107 MG/DL
GLUCOSE, GLC: 108 MG/DL
GLUCOSE, GLC: 109 MG/DL
GLUCOSE, GLC: 111 MG/DL
GLUCOSE, GLC: 113 MG/DL
GLUCOSE, GLC: 113 MG/DL
GLUCOSE, GLC: 116 MG/DL
GLUCOSE, GLC: 128 MG/DL
GLUCOSE, GLC: 132 MG/DL
GLUCOSE, GLC: 133 MG/DL
GLUCOSE, GLC: 144 MG/DL
GLUCOSE, GLC: 90 MG/DL
GLUCOSE, GLC: 91 MG/DL
GLUCOSE, GLC: 95 MG/DL
GLUCOSE, GLC: 95 MG/DL
GLUCOSE, GLC: 97 MG/DL
GLUCOSE, GLC: 97 MG/DL
GLUCOSE, GLC: 98 MG/DL
GLUCOSE, GLC: 98 MG/DL
HCO3 BLD-SCNC: 23.4 MMOL/L (ref 22–26)
HCT VFR BLD AUTO: 26.1 % (ref 36.6–50.3)
HGB BLD-MCNC: 8.2 G/DL (ref 12.1–17)
HIGH TARGET, HITG: 130 MG/DL
INSULIN ADMINSTERED, INSADM: 0 UNITS/HOUR
INSULIN ADMINSTERED, INSADM: 0.7 UNITS/HOUR
INSULIN ADMINSTERED, INSADM: 0.7 UNITS/HOUR
INSULIN ADMINSTERED, INSADM: 0.9 UNITS/HOUR
INSULIN ADMINSTERED, INSADM: 0.9 UNITS/HOUR
INSULIN ADMINSTERED, INSADM: 1.1 UNITS/HOUR
INSULIN ADMINSTERED, INSADM: 1.4 UNITS/HOUR
INSULIN ORDER, INSORD: 0 UNITS/HOUR
INSULIN ORDER, INSORD: 0.7 UNITS/HOUR
INSULIN ORDER, INSORD: 0.7 UNITS/HOUR
INSULIN ORDER, INSORD: 0.9 UNITS/HOUR
INSULIN ORDER, INSORD: 0.9 UNITS/HOUR
INSULIN ORDER, INSORD: 1.1 UNITS/HOUR
INSULIN ORDER, INSORD: 1.4 UNITS/HOUR
LOW TARGET, LOT: 95 MG/DL
MAGNESIUM SERPL-MCNC: 2.5 MG/DL (ref 1.6–2.4)
MCH RBC QN AUTO: 27 PG (ref 26–34)
MCHC RBC AUTO-ENTMCNC: 31.4 G/DL (ref 30–36.5)
MCV RBC AUTO: 85.9 FL (ref 80–99)
MINUTES UNTIL NEXT BG, NBG: 120 MIN
MINUTES UNTIL NEXT BG, NBG: 120 MIN
MINUTES UNTIL NEXT BG, NBG: 60 MIN
MULTIPLIER, MUL: 0
MULTIPLIER, MUL: 0.01
MULTIPLIER, MUL: 0.02
NRBC # BLD: 0 K/UL (ref 0–0.01)
NRBC BLD-RTO: 0 PER 100 WBC
ORDER INITIALS, ORDINIT: NORMAL
PCO2 BLD: 50.8 MMHG (ref 35–45)
PH BLD: 7.27 [PH] (ref 7.35–7.45)
PLATELET # BLD AUTO: 79 K/UL (ref 150–400)
PMV BLD AUTO: 11.2 FL (ref 8.9–12.9)
PO2 BLD: 34 MMHG (ref 80–100)
POTASSIUM SERPL-SCNC: 4.2 MMOL/L (ref 3.5–5.1)
PROT SERPL-MCNC: 5.8 G/DL (ref 6.4–8.2)
RBC # BLD AUTO: 3.04 M/UL (ref 4.1–5.7)
SAO2 % BLD: 56 % (ref 92–97)
SERVICE CMNT-IMP: ABNORMAL
SERVICE CMNT-IMP: NORMAL
SODIUM SERPL-SCNC: 142 MMOL/L (ref 136–145)
SPECIMEN TYPE: ABNORMAL
WBC # BLD AUTO: 7.6 K/UL (ref 4.1–11.1)

## 2020-04-29 PROCEDURE — 65610000003 HC RM ICU SURGICAL

## 2020-04-29 PROCEDURE — 85027 COMPLETE CBC AUTOMATED: CPT

## 2020-04-29 PROCEDURE — 74011250636 HC RX REV CODE- 250/636: Performed by: INTERNAL MEDICINE

## 2020-04-29 PROCEDURE — P9045 ALBUMIN (HUMAN), 5%, 250 ML: HCPCS | Performed by: INTERNAL MEDICINE

## 2020-04-29 PROCEDURE — 97161 PT EVAL LOW COMPLEX 20 MIN: CPT

## 2020-04-29 PROCEDURE — 77010033678 HC OXYGEN DAILY

## 2020-04-29 PROCEDURE — 97165 OT EVAL LOW COMPLEX 30 MIN: CPT

## 2020-04-29 PROCEDURE — 36415 COLL VENOUS BLD VENIPUNCTURE: CPT

## 2020-04-29 PROCEDURE — 97535 SELF CARE MNGMENT TRAINING: CPT

## 2020-04-29 PROCEDURE — 82962 GLUCOSE BLOOD TEST: CPT

## 2020-04-29 PROCEDURE — 83735 ASSAY OF MAGNESIUM: CPT

## 2020-04-29 PROCEDURE — 97530 THERAPEUTIC ACTIVITIES: CPT

## 2020-04-29 PROCEDURE — 74011250636 HC RX REV CODE- 250/636: Performed by: NURSE PRACTITIONER

## 2020-04-29 PROCEDURE — 74011250637 HC RX REV CODE- 250/637: Performed by: NURSE PRACTITIONER

## 2020-04-29 PROCEDURE — 71045 X-RAY EXAM CHEST 1 VIEW: CPT

## 2020-04-29 PROCEDURE — 74011000250 HC RX REV CODE- 250: Performed by: NURSE PRACTITIONER

## 2020-04-29 PROCEDURE — 80053 COMPREHEN METABOLIC PANEL: CPT

## 2020-04-29 PROCEDURE — 82803 BLOOD GASES ANY COMBINATION: CPT

## 2020-04-29 RX ORDER — ALBUMIN HUMAN 50 G/1000ML
12.5 SOLUTION INTRAVENOUS
Status: DISCONTINUED | OUTPATIENT
Start: 2020-04-29 | End: 2020-05-02

## 2020-04-29 RX ORDER — PANTOPRAZOLE SODIUM 40 MG/1
40 TABLET, DELAYED RELEASE ORAL
Status: DISCONTINUED | OUTPATIENT
Start: 2020-04-29 | End: 2020-05-04 | Stop reason: HOSPADM

## 2020-04-29 RX ORDER — ACETAMINOPHEN 10 MG/ML
1000 INJECTION, SOLUTION INTRAVENOUS EVERY 6 HOURS
Status: COMPLETED | OUTPATIENT
Start: 2020-04-29 | End: 2020-04-30

## 2020-04-29 RX ORDER — ACETAMINOPHEN 325 MG/1
650 TABLET ORAL
Status: DISCONTINUED | OUTPATIENT
Start: 2020-04-29 | End: 2020-05-04 | Stop reason: HOSPADM

## 2020-04-29 RX ADMIN — CEFAZOLIN SODIUM 2 G: 100 INJECTION, POWDER, LYOPHILIZED, FOR SOLUTION INTRAVENOUS at 03:07

## 2020-04-29 RX ADMIN — ACETAMINOPHEN 650 MG: 325 TABLET ORAL at 05:51

## 2020-04-29 RX ADMIN — ACETAMINOPHEN 1000 MG: 10 INJECTION, SOLUTION INTRAVENOUS at 16:09

## 2020-04-29 RX ADMIN — CEFAZOLIN SODIUM 2 G: 100 INJECTION, POWDER, LYOPHILIZED, FOR SOLUTION INTRAVENOUS at 18:28

## 2020-04-29 RX ADMIN — MORPHINE SULFATE 4 MG: 4 INJECTION INTRAVENOUS at 05:51

## 2020-04-29 RX ADMIN — SENNOSIDES AND DOCUSATE SODIUM 1 TABLET: 8.6; 5 TABLET ORAL at 18:28

## 2020-04-29 RX ADMIN — CITALOPRAM HYDROBROMIDE 20 MG: 20 TABLET ORAL at 08:19

## 2020-04-29 RX ADMIN — Medication 10 ML: at 05:07

## 2020-04-29 RX ADMIN — SODIUM CHLORIDE 60 MCG/MIN: 900 INJECTION, SOLUTION INTRAVENOUS at 17:54

## 2020-04-29 RX ADMIN — OXYCODONE 5 MG: 5 TABLET ORAL at 21:21

## 2020-04-29 RX ADMIN — Medication 10 ML: at 21:18

## 2020-04-29 RX ADMIN — OXYCODONE 5 MG: 5 TABLET ORAL at 15:08

## 2020-04-29 RX ADMIN — OXYCODONE 5 MG: 5 TABLET ORAL at 04:19

## 2020-04-29 RX ADMIN — MUPIROCIN: 20 OINTMENT TOPICAL at 18:28

## 2020-04-29 RX ADMIN — CEFAZOLIN SODIUM 2 G: 100 INJECTION, POWDER, LYOPHILIZED, FOR SOLUTION INTRAVENOUS at 11:32

## 2020-04-29 RX ADMIN — PANTOPRAZOLE SODIUM 40 MG: 40 TABLET, DELAYED RELEASE ORAL at 08:22

## 2020-04-29 RX ADMIN — OXYCODONE 5 MG: 5 TABLET ORAL at 08:22

## 2020-04-29 RX ADMIN — ALBUMIN (HUMAN) 12.5 G: 12.5 INJECTION, SOLUTION INTRAVENOUS at 02:09

## 2020-04-29 RX ADMIN — POLYETHYLENE GLYCOL 3350 17 G: 17 POWDER, FOR SOLUTION ORAL at 08:20

## 2020-04-29 RX ADMIN — SODIUM CHLORIDE 40 MCG/MIN: 900 INJECTION, SOLUTION INTRAVENOUS at 06:37

## 2020-04-29 RX ADMIN — MAGNESIUM OXIDE TAB 400 MG (241.3 MG ELEMENTAL MG) 400 MG: 400 (241.3 MG) TAB at 18:28

## 2020-04-29 RX ADMIN — MUPIROCIN: 20 OINTMENT TOPICAL at 08:23

## 2020-04-29 RX ADMIN — ACETAMINOPHEN 1000 MG: 10 INJECTION, SOLUTION INTRAVENOUS at 10:42

## 2020-04-29 RX ADMIN — SENNOSIDES AND DOCUSATE SODIUM 1 TABLET: 8.6; 5 TABLET ORAL at 08:20

## 2020-04-29 RX ADMIN — EZETIMIBE 10 MG: 10 TABLET ORAL at 08:20

## 2020-04-29 RX ADMIN — ACETAMINOPHEN 1000 MG: 10 INJECTION, SOLUTION INTRAVENOUS at 21:21

## 2020-04-29 RX ADMIN — ACETAMINOPHEN 650 MG: 325 TABLET ORAL at 02:09

## 2020-04-29 RX ADMIN — Medication 10 ML: at 14:05

## 2020-04-29 RX ADMIN — ALBUMIN (HUMAN) 12.5 G: 12.5 INJECTION, SOLUTION INTRAVENOUS at 00:11

## 2020-04-29 RX ADMIN — CHLORHEXIDINE GLUCONATE 10 ML: 1.2 RINSE ORAL at 21:21

## 2020-04-29 RX ADMIN — CHLORHEXIDINE GLUCONATE 10 ML: 1.2 RINSE ORAL at 08:23

## 2020-04-29 NOTE — PROGRESS NOTES
Problem: Mobility Impaired (Adult and Pediatric)  Goal: *Acute Goals and Plan of Care (Insert Text)  Description: FUNCTIONAL STATUS PRIOR TO ADMISSION: Patient was independent and active without use of DME. Denies falls. HOME SUPPORT PRIOR TO ADMISSION: The patient lived alone and has 4 daughters local to provide assistance. Plans to d/c to one of his daughter's homes for additional support post op. Physical Therapy Goals  Initiated 4/29/2020  1. Patient will move from supine to sit and sit to supine , scoot up and down and roll side to side in bed with modified independence within 5 days. 2.  Patient will perform sit to/from stand with modified independence within 5 days. 3.  Patient will ambulate 150 feet with least restrictive assistive device and modified independence within 5 days. 4.  Patient will ascend/descend 12 stairs with right handrail(s) with modified independence within 5 days. 5.  Patient will perform cardiac exercises per protocol with modified independence within 5 days. 6.  Patient will verbally recall and functionally demonstrate mindful-based movements (\"move in the tube\") principles without cues within 5 days. Outcome: Progressing Towards Goal    PHYSICAL THERAPY EVALUATION  Patient: Karlos Lr (79 y.o. male)  Date: 4/29/2020  Primary Diagnosis: CAD (coronary artery disease) [I25.10]  Procedure(s) (LRB):  ON PUMP CORONARY ARTERY BYPASS GRAFT X 3  WITH LEFT LEG EVH AND LIMA/CARDIOPULMONARY BYPASS/ GODWIN AND EPI AORTIC ULTRASOIND BY DR. Vimal Betancourt. (N/A) 1 Day Post-Op   Precautions:  Fall(move in the tube)      ASSESSMENT  Based on the objective data described below, the patient presents with decreased activity tolerance, labile BPs, increased pain, and generalized weakness following CABG x3 POD 1. Patient with labile BP (MAP 70 then quickly dropping to 56) and with no underlying rhythm under pacemaker.  With very slow transition to sitting EOB, patient's MAP on art line as low as 44. Cuff reading 79/62 and patient quickly returned to supine. No overt signs or symptoms of hypotension throughout session, but patient drowsy and c/o soreness throughout. Hopeful for good progress with acute PT as he was independent prior to admission. Patient is not verbalizing and is not demonstrating understanding of mindful-based movements (\"move in the tube\") principles of keeping UEs proximal to ribcage to prevent lateral pull on the sternum during load-bearing activities with visual, verbal, and manual cues required for compliance. Current Level of Function Impacting Discharge (mobility/balance): min-mod Ax2    Functional Outcome Measure: The patient scored 0/28 on the Tinetti outcome measure which is indicative of high fall risk. Other factors to consider for discharge: will d/c to daughter's home     Patient will benefit from skilled therapy intervention to address the above noted impairments. PLAN :  Recommendations and Planned Interventions: bed mobility training, transfer training, gait training, therapeutic exercises, neuromuscular re-education, patient and family training/education, and therapeutic activities      Frequency/Duration: Patient will be followed by physical therapy:  daily to address goals. Recommendation for discharge: (in order for the patient to meet his/her long term goals)  Physical therapy at least 2 days/week in the home pending progress    This discharge recommendation:  Has not yet been discussed the attending provider and/or case management    IF patient discharges home will need the following DME: to be determined (TBD)         SUBJECTIVE:   Patient stated Veronica Deems me?     OBJECTIVE DATA SUMMARY:   Patient mobilized on continuous portable monitor/telemetry.   HISTORY:    Past Medical History:   Diagnosis Date    Arthritis     Chronic kidney disease     Stage 3    Chronic pain     Abdoman, back, fingers per daughter    Diabetes [facility])     DIET CONTROLLED Hypertension     PE (pulmonary embolism)     Pneumonia     Psychiatric disorder     Depression     Past Surgical History:   Procedure Laterality Date    ABDOMEN SURGERY PROC UNLISTED      bowel resection    HX BACK SURGERY  2014    HX GI  11/2012    bowel resection    HX ORTHOPAEDIC      amputated left 4th finger       Personal factors and/or comorbidities impacting plan of care: PMH    Home Situation  Home Environment: Private residence  # Steps to Enter: 5  One/Two Story Residence: Split level  # of Interior Steps: 7  Living Alone: Yes(will DC to daughter's house)  Support Systems: Child(anca), Family member(s)  Patient Expects to be Discharged to[de-identified] Private residence(daughter's house)  Current DME Used/Available at Home: [place], straight, Walker, rolling  Tub or Shower Type: Tub/Shower combination    EXAMINATION/PRESENTATION/DECISION MAKING:     Critical Behavior:  Neurologic State: Appropriate for age  Orientation Level: Oriented X4  Cognition: Appropriate for age attention/concentration  Safety/Judgement: Decreased insight into deficits, Decreased awareness of need for safety  Hearing: Auditory  Auditory Impairment: None  Range Of Motion:  AROM: Generally decreased, functional  Strength:    Strength: Generally decreased, functional  Tone & Sensation:   Tone: Normal  Coordination:  Coordination: Generally decreased, functional  Vision:   Corrective Lenses: Glasses  Functional Mobility:  Bed Mobility  Supine to Sit: Minimum assistance; Additional time;Assist x2  Sit to Supine: Moderate assistance;Assist x2; Additional time  Scooting: Maximum assistance; Additional time;Assist x1  Balance:   Sitting: Impaired; Without support  Sitting - Static: Fair (occasional)  Sitting - Dynamic: Fair (occasional)  Standing: (did not attempt 2* hypotension)  Cardiac diagnosis intervention:  Patient instructed and educated on mindful movement principles based on Move in The Tube concept to include maintaining bilateral elbows close to rib cage when performing any load-bearing activity such as getting in/out of bed, pushing up from a chair, opening a door, or lifting a box. Patient was given a handout with diagrams of each correct/incorrect method of performing each of the above tasks. Functional Measure:  Tinetti test:    Sitting Balance: 0  Arises: 0  Attempts to Rise: 0  Immediate Standing Balance: 0  Standing Balance: 0  Nudged: 0  Eyes Closed: 0  Turn 360 Degrees - Continuous/Discontinuous: 0  Turn 360 Degrees - Steady/Unsteady: 0  Sitting Down: 0  Balance Score: 0 Balance total score  Indication of Gait: 0  R Step Length/Height: 0  L Step Length/Height: 0  R Foot Clearance: 0  L Foot Clearance: 0  Step Symmetry: 0  Step Continuity: 0  Path: 0  Trunk: 0  Walking Time: 0  Gait Score: 0 Gait total score  Total Score: 0/28 Overall total score         Tinetti Tool Score Risk of Falls  <19 = High Fall Risk  19-24 = Moderate Fall Risk  25-28 = Low Fall Risk  Tinetti ME. Performance-Oriented Assessment of Mobility Problems in Elderly Patients. [facility] 66; [id number].  (Scoring Description: PT Bulletin Feb. 10, 1993)    Older adults: Mynor Montano et al, 2009; n = 1000 [facility] elderly evaluated with ABC, PENNIE, ADL, and IADL)  · Mean PENNIE score for males aged 69-68 years = 26.21(3.40)  · Mean PENNIE score for females age 69-68 years = 25.16(4.30)  · Mean PENNIE score for males over 80 years = 23.29(6.02)  · Mean PENNIE score for females over 80 years = 17.20(8.32)            Physical Therapy Evaluation Charge Determination   History Examination Presentation Decision-Making   HIGH Complexity :3+ comorbidities / personal factors will impact the outcome/ POC  HIGH Complexity : 4+ Standardized tests and measures addressing body structure, function, activity limitation and / or participation in recreation  LOW Complexity : Stable, uncomplicated  Other outcome measures Tinetti 0/28  HIGH       Based on the above components, the patient evaluation is determined to be of the following complexity level: LOW     Pain Rating:  Complaining of pain throughout session, \"soreness\"    Activity Tolerance:   BP extremely labile throughout session (MAPs 46-70)   Please refer to the flowsheet for vital signs taken during this treatment. After treatment patient left in no apparent distress:   Supine in bed, Heels elevated for pressure relief, Call bell within reach, and Side rails x 3    COMMUNICATION/EDUCATION:   The patients plan of care was discussed with: Occupational therapist and Registered nurse. Fall prevention education was provided and the patient/caregiver indicated understanding., Patient/family have participated as able in goal setting and plan of care. , and Patient/family agree to work toward stated goals and plan of care.     Thank you for this referral.  Esther Bush, PT, DPT   Time Calculation: 21 mins

## 2020-04-29 NOTE — PROGRESS NOTES
[facility] ICU Progress Note    Admit Date: 2020  POD:  1 Day Post-Op    Procedure:  Procedure(s):  ON PUMP CORONARY ARTERY BYPASS GRAFT X 3  WITH LEFT LEG EVH AND LIMA/CARDIOPULMONARY BYPASS/ GODWIN AND EPI AORTIC ULTRASOIND BY  [facility] Subjective:   Pt seen with Dr. Libia Davalos. Pt resting in bed, sig drop in BP w/ movement. Remains on cy 50 mcg, insulin gtts. Afebrile, NC 6L      Objective:   Vitals:  Blood pressure 117/71, pulse 86, temperature 98.7 °F (37.1 °C), resp. rate 13, weight 195 lb 1.7 oz (88.5 kg), SpO2 99 %. Temp (24hrs), Av.8 °F (36.6 °C), Min:96.8 °F (36 °C), Max:98.7 °F (37.1 °C)    Hemodynamics:   CO: CO (l/min): 5 l/min   CI: CI (l/min/m2): 2.5 l/min/m2   CVP: CVP (mmHg): 16 mmHg (20 07)   SVR: SVR (dyne*sec)/cm5: 919 (dyne*sec)/cm5 (20 4448)   PAP Systolic: PAP Systolic: 40 ( 0499)   PAP Diastolic: PAP Diastolic: 22 (25 0520)   PVR:     SV02: SVO2 (%): 55 % (20 07)   SCV02:      EKG/Rhythm: 100% paced at 86 bpm     Extubation Date / Time: 20 at 1640     CT Output: +677 ml    Ventilator:  Ventilator Volumes  Vt Set (ml): 550 ml (20 1800)  Vt Exhaled (Machine Breath) (ml): 574 ml (20 1657)  Vt Spont (ml): 456 ml (20 1800)  Ve Observed (l/min): 6.78 l/min (20 1800)    Oxygen Therapy:  Oxygen Therapy  O2 Sat (%): 99 % (20 0700)  Pulse via Oximetry: 86 beats per minute (20 07)  O2 Device: Nasal cannula (20 0400)  O2 Flow Rate (L/min): 6 l/min (20 0400)  FIO2 (%): 50 % (20 1800)    CXR:   CXR Results  (Last 48 hours)               20 0503  XR CHEST PORT Final result    Impression:  IMPRESSION:   Diminished lung volumes with interstitial edema and pleural effusions greater on   the left. Interval removal of ET tube and NG tube. Otherwise no change.                    Narrative:  PORTABLE CHEST RADIOGRAPH/S: 2020 5:03 AM       Clinical history: postop heart   INDICATION:   postop heart   COMPARISON: 4/28/2020       FINDINGS:   AP portable upright view of the chest demonstrates a stable significantly   enlarged cardiopericardial silhouette. The lungs are diminished in volume. Mild   interstitial edema with basilar atelectasis and effusion greater on the left. [place] catheter in place. Interval removal of ET tube and NG tube. Mediastinal and pleural drains remain. . The osseous structures are unremarkable. Patient is on a cardiac monitor. 04/28/20 1330  XR CHEST PORT Final result    Impression:  IMPRESSION:       Postoperative chest. No pneumothorax. Layering left pleural effusion versus   unilateral left pulmonary edema. Consider advancement of the enteric tube. Narrative:  EXAM: XR CHEST PORT       INDICATION: Coronary artery disease, thoracic surgery. COMPARISON: Preoperative chest views on 4/23/2020       TECHNIQUE: Supine portable chest AP view       FINDINGS: Endotracheal tube is new and terminates 6 cm proximal to the nona. Enteric tube extends near the gastroesophageal junction. Homer-Sea catheter   extends into the expected location of the main pulmonary artery. Chest tubes are   new. Sternotomy wires are new. Cardiomegaly is accentuated by technique. Pulmonary vascular prominence is mild. Left hazy lung opacities are nonspecific. Limited expansion of the right lung. No evidence of pneumothorax. Osteopenia. Admission Weight: Last Weight   Weight: 186 lb 4.6 oz (84.5 kg) Weight: 195 lb 1.7 oz (88.5 kg)     Intake / Output / Drain:  Current Shift: No intake/output data recorded.   Last 24 hrs.:     Intake/Output Summary (Last 24 hours) at 4/29/2020 0737  Last data filed at 4/29/2020 0700  Gross per 24 hour   Intake 5846.65 ml   Output 4932 ml   Net 914.65 ml       EXAM:  General:  Pleasant, no apparent distress                                                                                             Lungs:   Diminished w/ fine crackles    Incision:  Prevena dsg intact   Heart:  Regular rate and rhythm - paced, S1, S2 normal, no murmur, click, or gallop. +rub    Abdomen:   Soft, non-tender. Bowel sounds normal. No masses,  No organomegaly. Extremities:  No edema. Palpable pulses bilat    Neurologic:  Gross motor and sensory apparatus intact. Labs:   Recent Labs     20  0632  20  0423  20  1324   WBC  --   --  7.6  --  8.1   HGB  --   --  8.2*   < > 10.8*   HCT  --   --  26.1*   < > 33.1*   PLT  --   --  79*  --  85*   NA  --   --  142   < > 144   K  --   --  4.2   < > 4.1   BUN  --   --  25*   < > 20   CREA  --   --  1.52*   < > 1.51*   GLU  --   --  98   < > 87   GLUCPOC 91   < >  --    < >  --    INR  --   --   --   --  1.2*    < > = values in this interval not displayed. Assessment:     Active Problems:    CAD (coronary artery disease) (2020)      S/P CABG x 3 (2020)      Overview: ON PUMP CORONARY ARTERY BYPASS GRAFT X 3  WITH LIMA to LAD, RSVG to RCA,       RSVG to ramus      Left greater saphenous vein EV          Plan/Recommendations/Medical Decision Makin. S/p CABG: start ASA, pt intolerant of statins - resumed zetia. No BB due to hypotension, 100% paced    2. CHB: pacer dependent, no amio/BB. Monitor     3. Hypotension: volume repletion, wean cy to SBP > 90    4. Interstitial edema, pleural effusions, atelectasis: Increase IS use and activity, wean O2 sats to > 92%. Likely needs diuretics but BP too low to tolerate     5.  HTN: currently hypotensive, monitor     6.  HLD: does not tolerate statin, on zetia, coq10 stopped preop. 7. DM type II: a1c 5.8, on januvia PTA. On insulin gtt per protocol. DTC following    8. CKD stage III: Cr 1.52, near baseline. Avoid nephrotoxins. Keep Givens today for strict I/O's     9. Hx DVT/PE: developed PE/DVT after bowel resection in , not on anticoagulation at this time. Monitor.  Will need postop DVT ppx - plts low     10. Chronic pain/peripheral neuropathy: On gabapentin prn PTA - resumed     11. Anxiety: On celexa, ativan PTA    12. Alcohol abuse: CIWA monitoring, has received 0,5 mg PO ativan every evening, has not needed any PRN ativan. Monitor. Can likely d/c since > 72 hrs and has not needed during this admission    13. Prostate CA: PSA 10, sees Dr. Saran Burr (urology) and Dr. Bright Richey (radiation oncology), was planning on scheduling radiation seed implant soon. Monitor for urinary retention once Givens removed     14. Thrombocytopenia: plts decreased to 79K, hold ASA. Switch pepcid to protonix. 15. Anemia: Hgb 8.2, monitor. Start iron once diet advanced. Transfuse for Hgb < 7     16. DVT/GI ppx: SCD's, protonix.  Will need DVT ppx when able    Dispo: PT/OT eval. Remain in ICU until BP stable off gtts and HR improved     Signed By: Keila Benedict NP

## 2020-04-29 NOTE — PROGRESS NOTES
Cardiac Surgery Care Coordinator-  Met with Nirmala March. Reviewed plan of care and discussed goals for the day. Nirmala March has a good understanding of her plan for the day. Reinforced sternal precautions and encouraged continued use of the incentive spirometer. Nirmala March can pull 750ml with good effort. Discussed possible discharge date and encouraged Nirmala March to verbalize. Will continue to follow for educational and emotional needs. 2744- Placed call to [facility] after morning rounds with Dr. Donovan Ganser, provided update and encouraged her to verbalize.  Trang Obrien, RN

## 2020-04-29 NOTE — PROGRESS NOTES
2000: Bedside and Verbal shift change report given to Belem Giles RN (oncoming nurse) by Edy Peterson RN (offgoing nurse). Report included the following information SBAR, ED Summary, OR Summary, Procedure Summary, Intake/Output, MAR, Recent Results, Cardiac Rhythm Paced and Alarm Parameters . 0000: Dr. Tiffanie Dillon at bedside, pt's MAPs in low 60s, CVP 9. U/O 25mL/hr the last two hours. Orders to give albumin    0400: Unable to obtain EKG, pacer turned down to 50 and pt still pacing. Will reassess during rounds    0600: Pt bathed. During turn and linen change, pt's MAPs dropped into 40s. Luis A gtt titrated up, MAPs restored to >65. Will leave pt in bed at this time    0800: Bedside and Verbal shift change report given to Stiven Dumont RN (oncoming nurse) by Belem Giles RN (offgoing nurse). Report included the following information SBAR, ED Summary, OR Summary, Procedure Summary, Intake/Output, MAR, Recent Results, Cardiac Rhythm Paced and Alarm Parameters .

## 2020-04-29 NOTE — PROGRESS NOTES
0800:  Bedside and Verbal shift change report given to ELMIRA CHADWICK (oncoming nurse) by Lulu Foss RN (offgoing nurse). Report included the following information SBAR, Kardex, Intake/Output, MAR and Cardiac Rhythm DDD paced. 0820:  5mg Roxicodone given for post op pain. 0900:  Dr. Betty Fagan rounding, orders to d/c SWAN and transduce CVP off of MAC. Keep lundberg for strict I&Os. Patient may work with PT, may attempt standing, however, be diligent as he was hypotensive last night with turning in bed and does not have an intrinsic heart rhythm under his temporary pacer. He is currently DDD paced at 86 and mAs at 5 for both A&V pacing. IV tylenol ordered for pain control. May start patient on clear liquid diet and advance as tolerated. 0945:  SWAN d/c'd.    1100: Worked with PT.  BP dropped to 80s/50s. Did not get out of bed.    1508:  5mg roxicodone given for post op chest pain. 1610:  Advanced diet to vegetarian cardiac. 1645:  Increased luis a to 60mcg/min due to consistent MAPs in the low 60s.    1805: Turned patient to the R and MAP dropped to the high 40s. Luis A increased to 75mcg. Urine output remains marginal at 30mL/hr and CT output has been 30mL/hr. A few hours CT output had 50mL/hr out due to working with PT/OT. 1825:  Patient talking with daughter on phone. 2000:  Bedside and Verbal shift change report given to ELMIRA HARVEY (oncoming nurse) by Britnay Meadows RN (offgoing nurse). Report included the following information SBAR, Kardex, Intake/Output, MAR, Recent Results and Cardiac Rhythm Paced.

## 2020-04-29 NOTE — CDMP QUERY
*4/29 new question Pt admitted for CABG and has Anemia documented. Please further specify type and acuity of anemia in the medical record. ? Anemia due to acute blood loss ? Anemia due to chronic blood loss ? Anemia due to iron deficiency ? Anemia due to postoperative blood loss  (please specify if expected or complication of the surgery) ? Anemia due to chronic disease, please specify disease ? Dilutional anemia 
? Other, please specify ? Clinically unable to determine The medical record reflects the following: 
   Risk Factors: s/p CABG, pmh of CKD 3 Clinical Indicators: drop in Hgb from 13.8 pre-op, down to 8.2 today. Pt with  cc from surgery. Treatment: IVF, monitoring of Labs, to be started on Iron once diet is advanced.  
 
Thank you, Alison KU

## 2020-04-29 NOTE — OP NOTES
[address]   OPERATIVE REPORT    Name:  Mukesh Hutton  MR#:  [id number]  :  1950  ACCOUNT #:  [de-identified]  DATE OF SERVICE:  2020      PREOPERATIVE DIAGNOSES:  1.  Non-ST-elevation myocardial infarction. 2.  Severe multivessel coronary artery disease. 3.  Chronic kidney disease stage III with creatinine of 1.56.  4.  Diabetes mellitus. 5.  Hypertension. 6.  History of pulmonary embolism. 7.  Mild mitral regurgitation. 8.  Seven drinks of alcohol per week. 9.  Severe pulmonary hypertension with PA pressure of 58/28. 10. Severe (Grade III) left ventricular diastolic dysfunction (see Dr. Elisa Zavala GODWIN Report)    POSTOPERATIVE DIAGNOSES:  1.  Non-ST-elevation myocardial infarction. 2.  Severe multivessel coronary artery disease. 3.  Chronic kidney disease stage III with creatinine of 1.56.  4.  Diabetes mellitus. 5.  Hypertension. 6.  History of pulmonary embolism. 7.  Mild mitral regurgitation. 8.  Seven drinks of alcohol per week. 9.  Severe pulmonary hypertension with PA pressure of 58/28. 10. Severe (Grade III) left ventricular diastolic dysfunction (see Dr. Elisa Zavala GODWIN Report)    PROCEDURES PERFORMED:  1. Coronary artery bypass grafting x3:  Reverse saphenous vein graft to the distal right coronary artery, reverse saphenous vein graft to ramus intermedius, left internal mammary artery to left anterior descending artery. 2.  Epiaortic ultrasound. 3.  Endoscopic harvest of the saphenous vein. 4.  Transesophageal echocardiography. SURGEON:  Ana Laura Marvin MD    ASSISTANT:  Katty Hagen PA-C    ANESTHESIA:  General endotracheal.    ANESTHESIOLOGIST:  Hermann Zavala MD    COMPLICATIONS:  None. SPECIMENS REMOVED:  None. IMPLANTS:  None. ESTIMATED BLOOD LOSS:  500 mL. DETAILS:  The patient is a 18-year-old gentleman, who recently presented with an NSTEMI. He was found to have severe multivessel disease on his catheterization.   He was referred for coronary artery bypass grafting. PROCEDURE:  He was prepped and draped in a sterile fashion. A time-out was performed. An incision was made over the sternum and a median sternotomy was performed. The left jeannie-sternum was elevated. Simultaneously, the PA began endoscopic harvest of the greater saphenous vein. The left internal mammary artery was large and pulsatile, dissected free from the left chest wall, ligated and divided and wrapped in papaverine-soaked sponge. The sternal retractor was then placed. The pericardium was opened. The ascending aorta was examined with an epiaortic ultrasound probe as well as my finger and it was free of atherosclerosis or calcifications. We proceeded with aortic and right atrial cannulation. We also had an antegrade and retrograde cardioplegia. When we had a therapeutic ACT, we went on cardiopulmonary bypass. We applied the aortic cross-clamp. We administered antegrade as well as retrograde cardioplegia. We placed a left ventricular vent as well due to the thickness of the LV cavity and the apparent diastolic dysfunction. We had excellent drainage. We first turned our attention to the distal RCA. This was a large and healthy place to sew our anastomosis. We had no technical difficulties and no repair stitches. We next turned our attention to the ramus intermedius. This was also a healthy place that we found to sew to an otherwise heavily diseased vessel. We performed this anastomosis with no technical difficulties. We next performed our anastomosis from the LIMA to the LAD. We found a distal place on the LAD beyond the midway lesion. We performed this with no technical difficulties. We had excellent run off. We did look for the OM1 vessel that was seen on the catheterization. We were not able to identify a graftable segment at this vessel. We next performed our 2 proximal anastomoses.   We had no technical difficulties and no repair of stitches. We came off bypass with no issues and no inotropic requirements. He did require a significant amount of volume to fill his left ventricle. We weaned from bypass. We decannulated. We administered protamine. We dried up our surgical sites. We placed 10 stainless steel wires. We also had placed mediastinal and left pleural chest tube and atrial and ventricular pacing wires. The patient was then safely transported to the CCU.         MD ERYN [email or web address]  D:  04/28/2020 12:55  T:  04/28/2020 22:19  JOB #:  [id number]

## 2020-04-29 NOTE — CARDIO/PULMONARY
Cardiac Rehab: Per EMR, patient is a current smoker.  Smoking Cessation Program information placed on the AVS.    Sheri Becker RN

## 2020-04-29 NOTE — PROGRESS NOTES
Problem: Self Care Deficits Care Plan (Adult)  Goal: *Acute Goals and Plan of Care (Insert Text)  Description: FUNCTIONAL STATUS PRIOR TO ADMISSION: Patient was modified independent using a rolling walker and single point cane for functional mobility occasionally. Patient is retired. HOME SUPPORT: The patient lived alone with a daughter who lives nearby to provide assistance. Patient plans to stay with his daughter post discharge. Occupational Therapy Goals  Initiated 4/29/2020  1. Patient will perform ADLs standing 5 mins without fatigue or LOB with moderate assistance within 5 day(s). 2.  Patient will perform lower body ADLs with moderate assistance within 5 day(s). 3.  Patient will perform gathering ADL items high and low 2/2 with moderate assistance within 5 day(s). 4.  Patient will perform toilet transfers with moderate assistance within 5 day(s). 5.  Patient will perform all aspects of toileting with moderate assistance within 5 day(s). 6.  Patient will participate in cardiac/sternal upper extremity therapeutic exercise/activities to increase independence with ADLs with supervision/set-up for 5 minutes within 5 day(s). Outcome: Progressing Towards Goal   OCCUPATIONAL THERAPY EVALUATION  Patient: Gayle Pinzon (62 y.o. male)  Date: 4/29/2020  Primary Diagnosis: CAD (coronary artery disease) [I25.10]  Procedure(s) (LRB):  ON PUMP CORONARY ARTERY BYPASS GRAFT X 3  WITH LEFT LEG EVH AND LIMA/CARDIOPULMONARY BYPASS/ GODWIN AND EPI AORTIC ULTRASOIND BY DR. Choco Armando. (N/A) 1 Day Post-Op   Precautions: Fall    ASSESSMENT  Based on the objective data described below, the patient presents with generalized weakness, decreased endurance, hypotension, impaired sitting balance, and decreased insight into deficits s/p CABG x 3 with education provided on \"move in the tube\" precautions, bimanual overhead reaching, and splinting chest when coughing for pain management.  PTA, patient stated he rarely utilized DME for mobility however has a walker and a cane that he used after a prior back surgery. Patient highly limited today by labile BP this session (MAPs 40-70). Discharge recommendation TBD. Patient is verbalizing and is demonstrating understanding of mindful-based movements (\"move in the tube\") principles of keeping UEs proximal to ribcage to prevent lateral pull on the sternum during load-bearing activities with verbal and tactile cues required for compliance. Current Level of Function Impacting Discharge (ADLs/self-care): TOTAL A toileting and LB ADLs    Functional Outcome Measure: The patient scored 20/100 on the Barthel Index outcome measure which is indicative of 80% ADL impairment. Patient will benefit from skilled therapy intervention to address the above noted impairments. PLAN :  Recommendations and Planned Interventions: self care training, functional mobility training, therapeutic exercise, balance training, therapeutic activities, endurance activities, patient education, home safety training, and family training/education    Frequency/Duration: Patient will be followed by occupational therapy 5 times a week to address goals. Recommendation for discharge: (in order for the patient to meet his/her long term goals)  To be determined: pending medical and functional progression     This discharge recommendation:  Has been made in collaboration with the attending provider and/or case management    IF patient discharges home will need the following DME: TBD       SUBJECTIVE:   Patient stated I don't feel great.     OBJECTIVE DATA SUMMARY:   HISTORY:   Past Medical History:   Diagnosis Date    Arthritis     Chronic kidney disease     Stage 3    Chronic pain     Abdoman, back, fingers per daughter    Diabetes [facility])     DIET CONTROLLED    Hypertension     PE (pulmonary embolism)     Pneumonia     Psychiatric disorder     Depression     Past Surgical History:   Procedure Laterality Date ABDOMEN SURGERY PROC UNLISTED      bowel resection    HX BACK SURGERY  2014    HX GI  11/2012    bowel resection    HX ORTHOPAEDIC      amputated left 4th finger       Expanded or extensive additional review of patient history:     Home Situation  Home Environment: Private residence  # Steps to Enter: 5  One/Two Story Residence: Split level  # of Interior Steps: 7  Living Alone: Yes(will DC to daughter's house)  Support Systems: Child(anca), Family member(s)  Patient Expects to be Discharged to[de-identified] Private residence(daughter's house)  Current DME Used/Available at Home: Jennifer Piles, straight, Walker, rolling  Tub or Shower Type: Tub/Shower combination      EXAMINATION OF PERFORMANCE DEFICITS:  Cognitive/Behavioral Status:  Neurologic State: Appropriate for age  Orientation Level: Oriented X4  Cognition: Appropriate for age attention/concentration  Perception: Appears intact  Perseveration: No perseveration noted  Safety/Judgement: Decreased insight into deficits; Decreased awareness of need for safety    Skin: prevena wound vac, lundberg catheter, chest tube, lines & leads intact    Edema: BLE    Hearing: Auditory  Auditory Impairment: None    Vision/Perceptual:                                Corrective Lenses: Glasses    Range of Motion:  BUE  AROM: Generally decreased, functional                         Strength:  BUE  Strength: Generally decreased, functional                Coordination:  Coordination: Generally decreased, functional  Fine Motor Skills-Upper: Left Intact; Right Intact    Gross Motor Skills-Upper: Left Intact; Right Intact    Tone & Sensation:  BUE  Tone: Normal                         Balance:  Sitting: Impaired; Without support  Sitting - Static: Fair (occasional)  Sitting - Dynamic: Fair (occasional)  Standing: (did not attempt 2* hypotension)    Functional Mobility and Transfers for ADLs:  Bed Mobility:  Supine to Sit: Minimum assistance; Additional time;Assist x2  Sit to Supine:  Moderate assistance;Assist x2;Additional time  Scooting: Maximum assistance; Additional time;Assist x1         ADL Assessment:  Feeding: Setup;Supervision(in bed)    Oral Facial Hygiene/Grooming: Setup;Supervision(in bed)    Bathing: Maximum assistance(infer A for BLE and posterior periarea)    Upper Body Dressing: Minimum assistance(infer 2* cues for bimanual overhead reaching)    Lower Body Dressing: Total assistance(infer 2* hypotension)    Toileting: Total assistance(infer 2* hypotension/balance)                ADL Intervention and task modifications:                           Lower Body Dressing Assistance  Socks: Total assistance (dependent)         Cognitive Retraining  Safety/Judgement: Decreased insight into deficits; Decreased awareness of need for safety    Patient instructed and educated on mindful movement principles based on Move in The Tube concept to include maintaining bilateral elbows close to rib cage when performing any load-bearing activity such as getting in/out of bed, pushing up from a chair, opening a door, or lifting a box. Patient was given a handout with diagrams of each correct/incorrect method of performing each of the above tasks. Patient instructed on the ability to utilize upper extremities outside the tube when doing any non-load bearing activity such as washing hair/body, brushing teeth, retrieving clothing items, or scratching your back. Patient encouraged to also perform upper extremity exercises \"outside of the tube\" to prevent scar tissue formation around sternal incision site. Patient instructed no asymmetrical reaching over head to ensure B UEs when shoulders >90* i.e. reaching in cabinets and dressing. Instruction on upper body dressing techniques of over head, then arms through to decrease pain and unilateral shoulder flexion >90*. Instruction on the benefits of utilizing B UEs during functional tasks i.e. opening the fridge, stepping into the tub.      Functional Measure:  Barthel Index:    Bathin  Bladder: 0  Bowels: 5  Groomin  Dressin  Feedin  Mobility: 0  Stairs: 0  Toilet Use: 0  Transfer (Bed to Chair and Back): 0  Total: 20/100        The Barthel ADL Index: Guidelines  1. The index should be used as a record of what a patient does, not as a record of what a patient could do. 2. The main aim is to establish degree of independence from any help, physical or verbal, however minor and for whatever reason. 3. The need for supervision renders the patient not independent. 4. A patient's performance should be established using the best available evidence. Asking the patient, friends/relatives and nurses are the usual sources, but direct observation and common sense are also important. However direct testing is not needed. 5. Usually the patient's performance over the preceding 24-48 hours is important, but occasionally longer periods will be relevant. 6. Middle categories imply that the patient supplies over 50 per cent of the effort. 7. Use of aids to be independent is allowed. Harjit Sierra., Barthel, D.W. ([id number]). Functional evaluation: the Barthel Index. [facility] (14)2. Rosamaria Bryson angel SAMANTHA Kaba, Nanci Cuenca., Severa Mouse., [facility], [address] [place] [address] (). Measuring the change indisability after inpatient rehabilitation; comparison of the responsiveness of the Barthel Index and Functional [place] Measure. Journal of Neurology, Neurosurgery, and Psychiatry, 66(4), 038-391. John York, N.J.A, SYEDA Delarosa, & Pooja Jorgensen MElsyA. (2004.) Assessment of post-stroke quality of life in cost-effectiveness studies: The usefulness of the Barthel Index and the EuroQoL-5D.  Quality of Life Research, 15, 824-65        Occupational Therapy Evaluation Charge Determination   History Examination Decision-Making   LOW Complexity : Brief history review  MEDIUM Complexity : 3-5 performance deficits relating to physical, cognitive , or psychosocial skils that result in activity limitations and / or participation restrictions MEDIUM Complexity : Patient may present with comorbidities that affect occupational performnce. Miniml to moderate modification of tasks or assistance (eg, physical or verbal ) with assesment(s) is necessary to enable patient to complete evaluation       Based on the above components, the patient evaluation is determined to be of the following complexity level: MEDIUM  Activity Tolerance:   Fair and requires rest breaks  Please refer to the flowsheet for vital signs taken during this treatment. After treatment patient left in no apparent distress:    Supine in bed and Call bell within reach    COMMUNICATION/EDUCATION:   The patients plan of care was discussed with: Physical therapist and Registered nurse. Home safety education was provided and the patient/caregiver indicated understanding., Patient/family have participated as able in goal setting and plan of care. , and Patient/family agree to work toward stated goals and plan of care. This patients plan of care is appropriate for delegation to [facility].     Thank you for this referral.  Will Evans  Time Calculation: 21 mins

## 2020-04-29 NOTE — PROGRESS NOTES
Physical Therapy  4/29/2020    Order received, chart reviewed. Patient POD 1 from CABG x3. Hold PT at this time due to hypotension and f/u later today for PT evaluation (hopeful for OOB and increased activity). Thank you.     Arielle Miller, PT, DPT

## 2020-04-29 NOTE — DIABETES MGMT
[facility] SPECIALIST CONSULT  PROGRAM FOR DIABETES HEALTH    FOLLOW-UP NOTE    Presentation   Joleen Ruff is a 68 y.o. male admitted with a PMH of arthritis, CKD, diabetes, HTN, PE, and depression who presented to [facility] ED with chest pain and was found to have an NSTEMI. He was admitted and cardiac cath was performed which revealed CAD. He is now s/p CABG x3. Current clinical course has been uncomplicated. Subjective   I don't feel pain if I sit still. It hurts when I start moving.     Extubated, now on NC   Remains on cy. Insulin infusion discontinued. Blood glucose in goal   Paced for CHB    Objective   Physical exam  General Alert, oriented and in acute pain. Conversant and cooperative. Vital Signs   Visit Vitals  /67   Pulse 86   Temp 98.7 °F (37.1 °C)   Resp 18   Wt 88.5 kg (195 lb 1.7 oz)   SpO2 99%   BMI 30.56 kg/m²     Skin  Warm and dry  Heart   Regular rate and rhythm.  No murmurs, rubs or gallops  Lungs  Clear to auscultation without rales or rhonchi  Extremities No foot wounds    Laboratory  Lab Results   Component Value Date/Time    Hemoglobin A1c 5.8 (H) 04/25/2020 01:42 AM    Hemoglobin A1c (POC) 5.7 09/24/2019 08:33 AM     Lab Results   Component Value Date/Time    LDL, calculated 138 (H) 01/09/2020 11:36 AM     Lab Results   Component Value Date/Time    Creatinine (POC) 1.4 (H) 11/02/2016 07:15 AM    Creatinine 1.52 (H) 04/29/2020 04:23 AM     Lab Results   Component Value Date/Time    Sodium 142 04/29/2020 04:23 AM    Potassium 4.2 04/29/2020 04:23 AM    Chloride 110 (H) 04/29/2020 04:23 AM    CO2 23 04/29/2020 04:23 AM    Anion gap 9 04/29/2020 04:23 AM    Glucose 98 04/29/2020 04:23 AM    BUN 25 (H) 04/29/2020 04:23 AM    Creatinine 1.52 (H) 04/29/2020 04:23 AM    BUN/Creatinine ratio 16 04/29/2020 04:23 AM    GFR est AA 55 (L) 04/29/2020 04:23 AM    GFR est non-AA 45 (L) 04/29/2020 04:23 AM    Calcium 8.2 (L) 04/29/2020 04:23 AM    Bilirubin, total 1.0 04/29/2020 04:23 AM    AST (SGOT) 71 (H) 04/29/2020 04:23 AM    Alk. phosphatase 18 (L) 04/29/2020 04:23 AM    Protein, total 5.8 (L) 04/29/2020 04:23 AM    Albumin 3.8 04/29/2020 04:23 AM    Globulin 2.0 04/29/2020 04:23 AM    A-G Ratio 1.9 04/29/2020 04:23 AM    ALT (SGPT) 24 04/29/2020 04:23 AM     Lab Results   Component Value Date/Time    ALT (SGPT) 24 04/29/2020 04:23 AM       Blood glucose pattern      Assessment and Plan   Nursing Diagnosis Risk for unstable blood glucose pattern   Nursing Intervention Domain 2623 Decision-making Support   Nursing Interventions Examined current inpatient diabetes control   Explored factors facilitating and impeding inpatient management     Evaluation   Sarita Alicea is a 68 y.o. male admitted with a PMH of arthritis, CKD, diabetes, HTN, PE, and depression who presented to [facility] ED with chest pain and was found to have an NSTEMI. He was admitted and cardiac cath was performed which revealed CAD. He is now s/p CABG x3. There is low suspicion of hyperglycemia in the post-operative period with admitting A1C of 5.8%. Blood glucose goal in the inpatient setting is 100-180.       Recommendations   Recommend:  1. Transition to blood glucose monitoring ACHS now off insulin infusion. 2. Monitor blood glucose now that diet is advanced. Low suspicion of hyperglycemia given low A1C. Would not start basal insulin unless BG over 200.     3. If blood glucose under 200, tolerating PO well, d/c correctional insulin and ok to trend BG on am labs.      Billing Code(s)     [x] [address] [facility]  Access via ROMEL Walter [phone]

## 2020-04-29 NOTE — PROGRESS NOTES
Spiritual Care Assessment/Progress Note  [facility]      NAME: Saajn Velez      MRN: [id number]  AGE: 68 y.o. SEX: male  Denominational Affiliation: Congregational   Language: English     4/29/2020           Spiritual Assessment begun in [facility] 4 CV INTNSV CARE through conversation with:         []Patient        [] Family    [] Friend(s)        Reason for Consult: Initial/Spiritual assessment, critical care     Spiritual beliefs: (Please include comment if needed)     [] Identifies with a nilam tradition:         [] Supported by a nilam community:            [] Claims no spiritual orientation:           [] Seeking spiritual identity:                [] Adheres to an individual form of spirituality:           [x] Not able to assess:                           Identified resources for coping:      [] Prayer                               [] Music                  [] Guided Imagery     [] Family/friends                 [] Pet visits     [] Devotional reading                         [] Unknown     [] Other:                                              Interventions offered during this visit: (See comments for more details)    Patient Interventions: Initial visit, Other (comment)(Note left at bedside)     Family/Friend(s): Other (comment)(Note left at bedside)     Plan of Care:     [] Support spiritual and/or cultural needs    [] Support AMD and/or advance care planning process      [] Support grieving process   [] Coordinate Rites and/or Rituals    [] Coordination with community clergy   [] No spiritual needs identified at this time   [] Detailed Plan of Care below (See Comments)  [] Make referral to Music Therapy  [] Make referral to Pet Therapy     [] Make referral to Addiction services  [] Make referral to [facility]  [] Make referral to Spiritual Care Partner  [] No future visits requested        [] Follow up visits as needed     Comments: Visited Mr Margaret Dickinson in [facility] for initial spiritual assessment.  Mr Margaret Dickinson appeared to be sleeping and no family was present. Left note at bedside assuring patient and family of  availability for support. : . Mario Rivera.  Rickey Luna; [facility], to contact [phone] call: [phone]

## 2020-04-30 ENCOUNTER — APPOINTMENT (OUTPATIENT)
Dept: GENERAL RADIOLOGY | Age: 70
DRG: 236 | End: 2020-04-30
Attending: NURSE PRACTITIONER
Payer: MEDICARE

## 2020-04-30 LAB
ADMINISTERED INITIALS, ADMINIT: NORMAL
ALBUMIN SERPL-MCNC: 3.5 G/DL (ref 3.5–5)
ALBUMIN/GLOB SERPL: 1.3 {RATIO} (ref 1.1–2.2)
ALP SERPL-CCNC: 30 U/L (ref 45–117)
ALT SERPL-CCNC: 18 U/L (ref 12–78)
ANION GAP SERPL CALC-SCNC: 6 MMOL/L (ref 5–15)
AST SERPL-CCNC: 54 U/L (ref 15–37)
BILIRUB SERPL-MCNC: 0.8 MG/DL (ref 0.2–1)
BUN SERPL-MCNC: 34 MG/DL (ref 6–20)
BUN/CREAT SERPL: 18 (ref 12–20)
CALCIUM SERPL-MCNC: 8.2 MG/DL (ref 8.5–10.1)
CHLORIDE SERPL-SCNC: 107 MMOL/L (ref 97–108)
CO2 SERPL-SCNC: 24 MMOL/L (ref 21–32)
CREAT SERPL-MCNC: 1.86 MG/DL (ref 0.7–1.3)
D50 ADMINISTERED, D50ADM: 0 ML
D50 ORDER, D50ORD: 0 ML
ERYTHROCYTE [DISTWIDTH] IN BLOOD BY AUTOMATED COUNT: 16.4 % (ref 11.5–14.5)
GLOBULIN SER CALC-MCNC: 2.7 G/DL (ref 2–4)
GLSCOM COMMENTS: NORMAL
GLUCOSE BLD STRIP.AUTO-MCNC: 100 MG/DL (ref 65–100)
GLUCOSE BLD STRIP.AUTO-MCNC: 102 MG/DL (ref 65–100)
GLUCOSE BLD STRIP.AUTO-MCNC: 105 MG/DL (ref 65–100)
GLUCOSE BLD STRIP.AUTO-MCNC: 106 MG/DL (ref 65–100)
GLUCOSE BLD STRIP.AUTO-MCNC: 112 MG/DL (ref 65–100)
GLUCOSE BLD STRIP.AUTO-MCNC: 138 MG/DL (ref 65–100)
GLUCOSE BLD STRIP.AUTO-MCNC: 153 MG/DL (ref 65–100)
GLUCOSE BLD STRIP.AUTO-MCNC: 91 MG/DL (ref 65–100)
GLUCOSE BLD STRIP.AUTO-MCNC: 92 MG/DL (ref 65–100)
GLUCOSE BLD STRIP.AUTO-MCNC: 98 MG/DL (ref 65–100)
GLUCOSE BLD STRIP.AUTO-MCNC: 99 MG/DL (ref 65–100)
GLUCOSE SERPL-MCNC: 102 MG/DL (ref 65–100)
GLUCOSE, GLC: 100 MG/DL
GLUCOSE, GLC: 102 MG/DL
GLUCOSE, GLC: 105 MG/DL
GLUCOSE, GLC: 106 MG/DL
GLUCOSE, GLC: 91 MG/DL
GLUCOSE, GLC: 92 MG/DL
GLUCOSE, GLC: 98 MG/DL
GLUCOSE, GLC: 99 MG/DL
HCT VFR BLD AUTO: 27.6 % (ref 36.6–50.3)
HGB BLD-MCNC: 8.6 G/DL (ref 12.1–17)
HIGH TARGET, HITG: 130 MG/DL
INSULIN ADMINSTERED, INSADM: 0 UNITS/HOUR
INSULIN ADMINSTERED, INSADM: 0.3 UNITS/HOUR
INSULIN ADMINSTERED, INSADM: 0.4 UNITS/HOUR
INSULIN ADMINSTERED, INSADM: 0.5 UNITS/HOUR
INSULIN ADMINSTERED, INSADM: 0.9 UNITS/HOUR
INSULIN ORDER, INSORD: 0 UNITS/HOUR
INSULIN ORDER, INSORD: 0.3 UNITS/HOUR
INSULIN ORDER, INSORD: 0.4 UNITS/HOUR
INSULIN ORDER, INSORD: 0.5 UNITS/HOUR
INSULIN ORDER, INSORD: 0.9 UNITS/HOUR
LOW TARGET, LOT: 95 MG/DL
MAGNESIUM SERPL-MCNC: 2.7 MG/DL (ref 1.6–2.4)
MCH RBC QN AUTO: 26.7 PG (ref 26–34)
MCHC RBC AUTO-ENTMCNC: 31.2 G/DL (ref 30–36.5)
MCV RBC AUTO: 85.7 FL (ref 80–99)
MINUTES UNTIL NEXT BG, NBG: 60 MIN
MULTIPLIER, MUL: 0
MULTIPLIER, MUL: 0.01
MULTIPLIER, MUL: 0.02
NRBC # BLD: 0 K/UL (ref 0–0.01)
NRBC BLD-RTO: 0 PER 100 WBC
ORDER INITIALS, ORDINIT: NORMAL
PLATELET # BLD AUTO: 86 K/UL (ref 150–400)
PMV BLD AUTO: 11 FL (ref 8.9–12.9)
POTASSIUM SERPL-SCNC: 4.6 MMOL/L (ref 3.5–5.1)
PROT SERPL-MCNC: 6.2 G/DL (ref 6.4–8.2)
RBC # BLD AUTO: 3.22 M/UL (ref 4.1–5.7)
SERVICE CMNT-IMP: ABNORMAL
SERVICE CMNT-IMP: NORMAL
SODIUM SERPL-SCNC: 137 MMOL/L (ref 136–145)
WBC # BLD AUTO: 10 K/UL (ref 4.1–11.1)

## 2020-04-30 PROCEDURE — 74011250636 HC RX REV CODE- 250/636: Performed by: NURSE PRACTITIONER

## 2020-04-30 PROCEDURE — 97530 THERAPEUTIC ACTIVITIES: CPT

## 2020-04-30 PROCEDURE — 85027 COMPLETE CBC AUTOMATED: CPT

## 2020-04-30 PROCEDURE — 36415 COLL VENOUS BLD VENIPUNCTURE: CPT

## 2020-04-30 PROCEDURE — 71045 X-RAY EXAM CHEST 1 VIEW: CPT

## 2020-04-30 PROCEDURE — 65610000003 HC RM ICU SURGICAL

## 2020-04-30 PROCEDURE — 77010033678 HC OXYGEN DAILY

## 2020-04-30 PROCEDURE — 80053 COMPREHEN METABOLIC PANEL: CPT

## 2020-04-30 PROCEDURE — 97110 THERAPEUTIC EXERCISES: CPT

## 2020-04-30 PROCEDURE — 74011000258 HC RX REV CODE- 258: Performed by: NURSE PRACTITIONER

## 2020-04-30 PROCEDURE — 83735 ASSAY OF MAGNESIUM: CPT

## 2020-04-30 PROCEDURE — 74011250637 HC RX REV CODE- 250/637: Performed by: NURSE PRACTITIONER

## 2020-04-30 PROCEDURE — 82962 GLUCOSE BLOOD TEST: CPT

## 2020-04-30 RX ORDER — FUROSEMIDE 10 MG/ML
40 INJECTION INTRAMUSCULAR; INTRAVENOUS ONCE
Status: COMPLETED | OUTPATIENT
Start: 2020-04-30 | End: 2020-04-30

## 2020-04-30 RX ADMIN — ACETAMINOPHEN 1000 MG: 10 INJECTION, SOLUTION INTRAVENOUS at 04:29

## 2020-04-30 RX ADMIN — Medication 10 ML: at 21:38

## 2020-04-30 RX ADMIN — MAGNESIUM OXIDE TAB 400 MG (241.3 MG ELEMENTAL MG) 400 MG: 400 (241.3 MG) TAB at 09:15

## 2020-04-30 RX ADMIN — SODIUM CHLORIDE 40 MCG/MIN: 900 INJECTION, SOLUTION INTRAVENOUS at 06:11

## 2020-04-30 RX ADMIN — Medication 10 ML: at 18:05

## 2020-04-30 RX ADMIN — SENNOSIDES AND DOCUSATE SODIUM 1 TABLET: 8.6; 5 TABLET ORAL at 09:15

## 2020-04-30 RX ADMIN — SODIUM CHLORIDE 6 ML/HR: 900 INJECTION, SOLUTION INTRAVENOUS at 05:15

## 2020-04-30 RX ADMIN — MAGNESIUM OXIDE TAB 400 MG (241.3 MG ELEMENTAL MG) 400 MG: 400 (241.3 MG) TAB at 18:04

## 2020-04-30 RX ADMIN — PANTOPRAZOLE SODIUM 40 MG: 40 TABLET, DELAYED RELEASE ORAL at 06:11

## 2020-04-30 RX ADMIN — EZETIMIBE 10 MG: 10 TABLET ORAL at 09:15

## 2020-04-30 RX ADMIN — CITALOPRAM HYDROBROMIDE 20 MG: 20 TABLET ORAL at 09:15

## 2020-04-30 RX ADMIN — OXYCODONE 5 MG: 5 TABLET ORAL at 12:26

## 2020-04-30 RX ADMIN — CHLORHEXIDINE GLUCONATE 10 ML: 1.2 RINSE ORAL at 10:03

## 2020-04-30 RX ADMIN — Medication 10 ML: at 06:11

## 2020-04-30 RX ADMIN — MUPIROCIN: 20 OINTMENT TOPICAL at 10:03

## 2020-04-30 RX ADMIN — MUPIROCIN: 20 OINTMENT TOPICAL at 18:05

## 2020-04-30 RX ADMIN — FUROSEMIDE 40 MG: 10 INJECTION, SOLUTION INTRAMUSCULAR; INTRAVENOUS at 10:44

## 2020-04-30 RX ADMIN — SODIUM CHLORIDE 10 ML/HR: 450 INJECTION, SOLUTION INTRAVENOUS at 05:15

## 2020-04-30 RX ADMIN — Medication 10 ML: at 14:00

## 2020-04-30 RX ADMIN — SENNOSIDES AND DOCUSATE SODIUM 1 TABLET: 8.6; 5 TABLET ORAL at 18:04

## 2020-04-30 RX ADMIN — ASPIRIN 81 MG 81 MG: 81 TABLET ORAL at 09:15

## 2020-04-30 RX ADMIN — POLYETHYLENE GLYCOL 3350 17 G: 17 POWDER, FOR SOLUTION ORAL at 09:31

## 2020-04-30 RX ADMIN — CHLORHEXIDINE GLUCONATE 10 ML: 1.2 RINSE ORAL at 21:41

## 2020-04-30 RX ADMIN — OXYCODONE 5 MG: 5 TABLET ORAL at 23:55

## 2020-04-30 RX ADMIN — OXYCODONE 5 MG: 5 TABLET ORAL at 04:29

## 2020-04-30 NOTE — DIABETES MGMT
[facility] SPECIALIST CONSULT  PROGRAM FOR DIABETES HEALTH    FOLLOW-UP NOTE    Presentation   Gayle Pinzon is a 68 y.o. male admitted with a PMH of arthritis, CKD, diabetes, HTN, PE, and depression who presented to [facility] ED with chest pain and was found to have an NSTEMI. He was admitted and cardiac cath was performed which revealed CAD. He is now s/p CABG x3. Current clinical course has been uncomplicated. Subjective   I feel ok.     Extubated, now on NC   Remains on cy. Insulin infusion discontinued. Blood glucose in goal   Paced for CHB    Objective   Physical exam  General Alert, oriented and in acute pain. Conversant and cooperative. Vital Signs   Visit Vitals  /71 (BP 1 Location: Left arm, BP Patient Position: At rest)   Pulse 93   Temp 99 °F (37.2 °C)   Resp 21   Wt 90.5 kg (199 lb 8.3 oz)   SpO2 97%   BMI 31.25 kg/m²     Skin  Warm and dry  Heart   Regular rate and rhythm.  No murmurs, rubs or gallops  Lungs  Clear to auscultation without rales or rhonchi  Extremities No foot wounds    Laboratory  Lab Results   Component Value Date/Time    Hemoglobin A1c 5.8 (H) 04/25/2020 01:42 AM    Hemoglobin A1c (POC) 5.7 09/24/2019 08:33 AM     Lab Results   Component Value Date/Time    LDL, calculated 138 (H) 01/09/2020 11:36 AM     Lab Results   Component Value Date/Time    Creatinine (POC) 1.4 (H) 11/02/2016 07:15 AM    Creatinine 1.86 (H) 04/30/2020 03:12 AM     Lab Results   Component Value Date/Time    Sodium 137 04/30/2020 03:12 AM    Potassium 4.6 04/30/2020 03:12 AM    Chloride 107 04/30/2020 03:12 AM    CO2 24 04/30/2020 03:12 AM    Anion gap 6 04/30/2020 03:12 AM    Glucose 102 (H) 04/30/2020 03:12 AM    BUN 34 (H) 04/30/2020 03:12 AM    Creatinine 1.86 (H) 04/30/2020 03:12 AM    BUN/Creatinine ratio 18 04/30/2020 03:12 AM    GFR est AA 44 (L) 04/30/2020 03:12 AM    GFR est non-AA 36 (L) 04/30/2020 03:12 AM    Calcium 8.2 (L) 04/30/2020 03:12 AM    Bilirubin, total 0.8 04/30/2020 03:12 AM    AST (SGOT) 54 (H) 04/30/2020 03:12 AM    Alk. phosphatase 30 (L) 04/30/2020 03:12 AM    Protein, total 6.2 (L) 04/30/2020 03:12 AM    Albumin 3.5 04/30/2020 03:12 AM    Globulin 2.7 04/30/2020 03:12 AM    A-G Ratio 1.3 04/30/2020 03:12 AM    ALT (SGPT) 18 04/30/2020 03:12 AM     Lab Results   Component Value Date/Time    ALT (SGPT) 18 04/30/2020 03:12 AM       Blood glucose pattern      Assessment and Plan   Nursing Diagnosis Risk for unstable blood glucose pattern   Nursing Intervention Domain 4073 Decision-making Support   Nursing Interventions Examined current inpatient diabetes control   Explored factors facilitating and impeding inpatient management     Evaluation   Rommel Carvalho is a 68 y.o. male admitted with a PMH of arthritis, CKD, diabetes, HTN, PE, and depression who presented to [id number] [place] [address] ED with chest pain and was found to have an NSTEMI. He was admitted and cardiac cath was performed which revealed CAD. He is now s/p CABG x3. There is low suspicion of hyperglycemia in the post-operative period with admitting A1C of 5.8%. Blood glucose goal in the inpatient setting is 100-180.       Recommendations   Recommend:  1. Transition to blood glucose monitoring ACHS now off insulin infusion. 2. Monitor blood glucose now that diet is advanced. Low suspicion of hyperglycemia given low A1C. Would not start basal insulin unless BG over 200.     3. If blood glucose under 200, tolerating PO well, d/c correctional insulin and ok to trend BG on am labs.      Billing Code(s)     [x] [address] [facility]  Access via ROMEL [address] [place] [phone]

## 2020-04-30 NOTE — PROGRESS NOTES
Problem: Falls - Risk of  Goal: *Absence of Falls  Description: Document Alondra Castro Fall Risk and appropriate interventions in the flowsheet. Outcome: Progressing Towards Goal  Note: Fall Risk Interventions:  Mobility Interventions: Bed/chair exit alarm, Communicate number of staff needed for ambulation/transfer         Medication Interventions: Assess postural VS orthostatic hypotension, Bed/chair exit alarm, Evaluate medications/consider consulting pharmacy    Elimination Interventions: Call light in reach, Patient to call for help with toileting needs, Stay With Me (per policy)              Problem: Pressure Injury - Risk of  Goal: *Prevention of pressure injury  Description: Document Keegan Scale and appropriate interventions in the flowsheet. Outcome: Progressing Towards Goal  Note: Pressure Injury Interventions:  Sensory Interventions: Assess need for specialty bed, Avoid rigorous massage over bony prominences, Chair cushion    Moisture Interventions: Apply protective barrier, creams and emollients, Assess need for specialty bed, Check for incontinence Q2 hours and as needed    Activity Interventions: Assess need for specialty bed, Chair cushion, Increase time out of bed    Mobility Interventions: Assess need for specialty bed, Chair cushion, HOB 30 degrees or less    Nutrition Interventions: Document food/fluid/supplement intake, Discuss nutritional consult with provider    Friction and Shear Interventions: Apply protective barrier, creams and emollients, Foam dressings/transparent film/skin sealants, HOB 30 degrees or less                Problem: CABG: Post-Op Day 1  Goal: *Lungs clear or at baseline  Outcome: Progressing Towards Goal  Note: Pt's lungs are clear to auscultation bilaterally but diminished in the bases. Pt takes very shallow breaths. Attempting to use IS more often. Goal: *Mechanical ventilation discontinued  Outcome: Progressing Towards Goal  Note: Pt on 2L nasal cannula.    Goal: *Optimal pain control at patient's stated goal  Outcome: Progressing Towards Goal  Note: Pt getting 5 mg PRN Katy for pain control and it is working well. Goal: *Tolerating diet  Outcome: Progressing Towards Goal  Goal: *Level of consciousness returns to baseline  Outcome: Progressing Towards Goal  Note: Pt completely alert and oriented.

## 2020-04-30 NOTE — PROGRESS NOTES
Cardiac Surgery Care Coordinator-  Met with Tanisha Lopez. Reviewed plan of care and discussed goals for the day. Tanisha Lopez has a good understanding of his plan for the day. Reinforced sternal precautions and encouraged continued use of the incentive spirometer. Will continue to follow for educational and emotional needs. [id number]- Placed update call to Harleen Ascencio, reviewed plan of care and encouraged her to verbalize. Facilitated phone conversation between Mr Citlali Hopper and his daughter. Will continue to follow and update.  Destiny Donohue RN

## 2020-04-30 NOTE — PROGRESS NOTES
NUTRITION  Reason for Rescreen: Other vegetarian        RECOMMENDATIONS:   None at this time  Interventions   - added supplements/snacks: Ensure High Protein BID  - diet order adjusted to: 2gm Na, NCS, vegetarian       Information obtained from:   patient  Past Medical History:   Diagnosis Date    Arthritis     Chronic kidney disease     Stage 3    Chronic pain     Abdoman, back, fingers per daughter   [facility])     DIET CONTROLLED    Hypertension     PE (pulmonary embolism)     Pneumonia     Psychiatric disorder     Depression     Pt admitted for CAD. S/p CABG x 3 on 4/28. Pt visited since vegetarian to provide food options. List of vegetarian options provided and breakfast order adjusted. Pt with stable wt PTA and eating well before surgery. Ensure High Protein added BID for protein (320kcal, 32g protein). Will follow for PO intake during recovery.      Diet:  cardiac, vegetarian  Supplements: None  Meal intake:   Patient Vitals for the past 168 hrs:   % Diet Eaten   04/29/20 1806 0 %   04/29/20 1300 15 %   04/29/20 1042 15 %   04/27/20 1550 80 %   04/27/20 1345 100 %     Weight Changes:   Stable  Wt Readings from Last 10 Encounters:   04/30/20 90.5 kg (199 lb 8.3 oz)   04/25/20 83.9 kg (185 lb)   01/09/20 84.8 kg (187 lb)   12/20/19 87 kg (191 lb 12.8 oz)   09/24/19 83.9 kg (185 lb)   06/25/19 85.3 kg (188 lb)   05/28/19 86.2 kg (190 lb)   03/19/19 84.4 kg (186 lb)   12/18/18 85.7 kg (189 lb)   12/07/18 88.5 kg (195 lb 3.2 oz)     Nutrition-Related Concerns Identified:  Specified food preferences - vegetarian     PLAN:   - Follow for protein intake     Will revisit per policy    Saúl Kimble RD [facility], Pager #[phone] or via [employer]

## 2020-04-30 NOTE — PROGRESS NOTES
Problem: Self Care Deficits Care Plan (Adult)  Goal: *Acute Goals and Plan of Care (Insert Text)  Description: FUNCTIONAL STATUS PRIOR TO ADMISSION: Patient was modified independent using a rolling walker and single point cane for functional mobility occasionally. Patient is retired. HOME SUPPORT: The patient lived alone with a daughter who lives nearby to provide assistance. Patient plans to stay with his daughter post discharge. Occupational Therapy Goals  Initiated 4/29/2020  1. Patient will perform ADLs standing 5 mins without fatigue or LOB with moderate assistance within 5 day(s). 2.  Patient will perform lower body ADLs with moderate assistance within 5 day(s). 3.  Patient will perform gathering ADL items high and low 2/2 with moderate assistance within 5 day(s). 4.  Patient will perform toilet transfers with moderate assistance within 5 day(s). 5.  Patient will perform all aspects of toileting with moderate assistance within 5 day(s). 6.  Patient will participate in cardiac/sternal upper extremity therapeutic exercise/activities to increase independence with ADLs with supervision/set-up for 5 minutes within 5 day(s). Outcome: Progressing Towards Goal  OCCUPATIONAL THERAPY TREATMENT  Patient: Sajan Serum (27 y.o. male)  Date: 4/30/2020  Diagnosis: CAD (coronary artery disease) [I25.10]   <principal problem not specified>  Procedure(s) (LRB):  ON PUMP CORONARY ARTERY BYPASS GRAFT X 3  WITH LEFT LEG EVH AND LIMA/CARDIOPULMONARY BYPASS/ GODWIN AND EPI AORTIC ULTRASOIND BY DR. Tomas Avilez. (N/A) 2 Days Post-Op  Precautions: Fall(move in the tube)  Chart, occupational therapy assessment, plan of care, and goals were reviewed. ASSESSMENT  Patient continues with skilled OT services and is progressing towards goals. Patient continues to be limited by labile BP in session with MAPs (80s-40s), generalized weakness, decreased endurance, and decreased activity tolerance s/p on pump CABG x 3.  Patient completed functional bed mobility today for sit <> stand transfers to [facility] [place] to [facility] with MIN A x 2 (A for line management) however becoming diaphoretic and returned to sitting EOB. Patient completed 4/6 BUE cardiac exercises seated EOB x CGA and returned to supine with MOD A. Patient rolled on bed pan x MOD A in bed. Anticipate good functional progression when patient hemodynamically stable, however recommend rehab v HHOT at this time to maximize patient safety and independence with ADL transfers and tasks. Patient is verbalizing and is demonstrating understanding of mindful-based movements (\"move in the tube\") principles of keeping UEs proximal to ribcage to prevent lateral pull on the sternum during load-bearing activities with verbal and tactile cues required for compliance. Current Level of Function Impacting Discharge (ADLs): MOD A LB ADLs    Other factors to consider for discharge: labile BP         PLAN :  Patient continues to benefit from skilled intervention to address the above impairments. Continue treatment per established plan of care. to address goals. Recommend with staff: OOB to chair when BP stable    Recommend next OT session: seated ADLs    Recommendation for discharge: (in order for the patient to meet his/her long term goals)  Occupational therapy at least 2 days/week in the home AND ensure assist and/or supervision for safety with IADL tasks v rehab     This discharge recommendation:  Has not yet been discussed the attending provider and/or case management    IF patient discharges home will need the following DME: TBD       SUBJECTIVE:   Patient stated I feel sweaty.     OBJECTIVE DATA SUMMARY:   Cognitive/Behavioral Status:  Neurologic State: Alert  Orientation Level: Oriented X4  Cognition: Appropriate for age attention/concentration  Perception: Appears intact  Perseveration: No perseveration noted  Safety/Judgement: Decreased insight into deficits; Decreased awareness of need for safety    Functional Mobility and Transfers for ADLs:  Bed Mobility:  Rolling: Moderate assistance; Additional time;Assist x1(on bed pan)  Sit to Supine: Moderate assistance;Assist x2; Additional time  Scooting: Maximum assistance    Transfers:  Sit to Stand: Minimum assistance;Assist x2; Additional time          Balance:  Sitting: Impaired; Without support  Sitting - Static: Fair (occasional)  Sitting - Dynamic: Fair (occasional)  Standing: Impaired; With support  Standing - Static: Fair  Standing - Dynamic : Fair    ADL Intervention:                                     Cognitive Retraining  Safety/Judgement: Decreased insight into deficits; Decreased awareness of need for safety    Patient instructed and educated on mindful movement principles based on Move in The Tube concept to include maintaining bilateral elbows close to rib cage when performing any load-bearing activity such as getting in/out of bed, pushing up from a chair, opening a door, or lifting a box. Patient was given a handout with diagrams of each correct/incorrect method of performing each of the above tasks. Patient instructed on the ability to utilize upper extremities outside the tube when doing any non-load bearing activity such as washing hair/body, brushing teeth, retrieving clothing items, or scratching your back. Patient encouraged to also perform upper extremity exercises \"outside of the tube\" to prevent scar tissue formation around sternal incision site. Patient instructed no asymmetrical reaching over head to ensure B UEs when shoulders >90* i.e. reaching in cabinets and dressing. Instruction on upper body dressing techniques of over head, then arms through to decrease pain and unilateral shoulder flexion >90*. Instruction on the benefits of utilizing B UEs during functional tasks i.e. opening the fridge, stepping into the tub.      Increase activity tolerance for home, work, and sexual intercourse by pacing self with increasing the arm exercises, sitting duration, frequency OOB, walking, standing, and ADLs. Instructed and indicated understanding of s/s of too much activity, how to respond to s/s safely. Therapeutic Exercises:   Patient instructed on the benefits and demonstrated 4/6 cardiac exercises while seated EOB with Contact guard assistance. Instructed and indicated understanding on how to progress reps, sets against gravity, pacing through progressive muscle strengthening standing based on surgeon clearance for more weight in prep for basic and instrumental ADLs. Instruction on the use of household items in place of weights as needed. CARDIAC   EXERCISE    Sets    Reps    Active  Active Assist    Passive  Self ROM    Comments    Shoulder flexion  1  5   [x]                            []                             []                             []                                Shoulder abduction  1  5  [x]                             []                             []                             []                                Scapular elevation  1  5  [x]                             []                              []                             []                                Scapular retraction  1  5  [x]                             []                             []                             []                                Trunk rotation           Trunk sidebending               Activity Tolerance:   Fair, requires rest breaks, and labile BP   Please refer to the flowsheet for vital signs taken during this treatment. After treatment patient left in no apparent distress:   Supine in bed, Call bell within reach, and patient positioned on bed pan     COMMUNICATION/COLLABORATION:   The patients plan of care was discussed with: Physical therapist and Registered nurse.      Sonu Aneglo  Time Calculation: 27 mins

## 2020-04-30 NOTE — PROGRESS NOTES
1030 Pt worked qwell with PT stood at bedside . DID marching in place, BY after diuresis pt was hypo tensive with standing even with Phenylephrine drip increased to 75MCG/min   1200 Beginning to wean Luis A drip now. Urine output much improved. Pt in great spirits  1400 Pt asleep at present. 1700 Pt taking with daughter not really interested in dinner right now.  [address] stopped, MAPS 70's   1930: Bedside and Verbal shift change report given to [address] [place] [address]  (oncoming nurse) by Angelic Enrique RN  (offgoing nurse). Report included the following information SBAR and Cardiac Rhythm PACED. Pt continues to have no underlying rhythm Ventricular standstill.

## 2020-04-30 NOTE — PROGRESS NOTES
1930: Bedside and Verbal shift change report given to AUNDREA RN (oncoming nurse) by Eddie Pleitez RN (offgoing nurse). Report included the following information SBAR, Kardex, Intake/Output, MAR, Recent Results, Med Rec Status and Cardiac Rhythm Paced. 2000: Gtts dual verified and patient's VSS at this time. Pt currently on insulin, MIV and 75 mcg/min of luis a to keep MAPs greater than 65.  0312: Labs drawn and sent. 0441: CXR at bedside. 0500: Pt CHG bathed, weighed and linen changed. 5: Checked patient's underlying rhythm and only P waves noted with no following QRS. Placed patient back on DDD Pacing with rate of 86.  0715: Luis A weaned to 50 mcg/min for goal systolic above 90.  0453: Bedside and Verbal shift change report given to Gerald Worley RN (oncoming nurse) by ELMIRA GUILLAUME (offgoing nurse). Report included the following information SBAR, Kardex, Intake/Output, MAR, Recent Results, Med Rec Status and Cardiac Rhythm Paced.

## 2020-04-30 NOTE — PROGRESS NOTES
\[facility]\"" ICU Progress Note    Admit Date: 2020  POD:  2 Day Post-Op    Procedure:  Procedure(s):  ON PUMP CORONARY ARTERY BYPASS GRAFT X 3  WITH LEFT LEG EVH AND LIMA/CARDIOPULMONARY BYPASS/ GODWIN AND EPI AORTIC ULTRASOIND BY DR. Sam Morrell. Subjective:   Pt seen with Dr. Zachary Miller. Remains on cy 50 mcg, insulin gtts. Tmax 99.2, NC 2L      Objective:   Vitals:  Blood pressure 135/73, pulse 86, temperature 99.2 °F (37.3 °C), resp. rate 16, weight 199 lb 8.3 oz (90.5 kg), SpO2 97 %. Temp (24hrs), Av.5 °F (36.9 °C), Min:98 °F (36.7 °C), Max:99.2 °F (37.3 °C)    EKG/Rhythm: 100% paced at 86 bpm     CT Output: 390 ml + 210 ml (last 24 hours)    Oxygen Therapy:  Oxygen Therapy  O2 Sat (%): 97 % (20 0700)  Pulse via Oximetry: 85 beats per minute (20 0700)  O2 Device: Nasal cannula (20 0400)  O2 Flow Rate (L/min): 2 l/min (20 0400)  FIO2 (%): 50 % (20 1800)    CXR:   CXR Results  (Last 48 hours)               20 0441  XR CHEST PORT Final result    Impression:  IMPRESSION:   Diminished lung volumes with interstitial edema and pleural effusions greater on   the left. Interval removal of [place] catheter. Otherwise no change. Narrative:  PORTABLE CHEST RADIOGRAPH/S: 2020 4:41 AM       Clinical history: postop heart   INDICATION:   postop heart   COMPARISON: 2020       FINDINGS:   AP portable upright view of the chest demonstrates a stable significantly   enlarged cardiopericardial silhouette. The lungs are diminished in volume. Mild   interstitial edema with basilar atelectasis and effusion greater on the left. [place] catheter has been removed. Mediastinal and pleural drains remain. . The   osseous structures are unremarkable. Patient is on a cardiac monitor. 20 0503  XR CHEST PORT Final result    Impression:  IMPRESSION:   Diminished lung volumes with interstitial edema and pleural effusions greater on   the left. Interval removal of ET tube and NG tube. Otherwise no change. Narrative:  PORTABLE CHEST RADIOGRAPH/S: 4/29/2020 5:03 AM       Clinical history: postop heart   INDICATION:   postop heart   COMPARISON: 4/28/2020       FINDINGS:   AP portable upright view of the chest demonstrates a stable significantly   enlarged cardiopericardial silhouette. The lungs are diminished in volume. Mild   interstitial edema with basilar atelectasis and effusion greater on the left. [place] catheter in place. Interval removal of ET tube and NG tube. Mediastinal and pleural drains remain. . The osseous structures are unremarkable. Patient is on a cardiac monitor. 04/28/20 1330  XR CHEST PORT Final result    Impression:  IMPRESSION:       Postoperative chest. No pneumothorax. Layering left pleural effusion versus   unilateral left pulmonary edema. Consider advancement of the enteric tube. Narrative:  EXAM: XR CHEST PORT       INDICATION: Coronary artery disease, thoracic surgery. COMPARISON: Preoperative chest views on 4/23/2020       TECHNIQUE: Supine portable chest AP view       FINDINGS: Endotracheal tube is new and terminates 6 cm proximal to the nona. Enteric tube extends near the gastroesophageal junction. [place] catheter   extends into the expected location of the main pulmonary artery. Chest tubes are   new. Sternotomy wires are new. Cardiomegaly is accentuated by technique. Pulmonary vascular prominence is mild. Left hazy lung opacities are nonspecific. Limited expansion of the right lung. No evidence of pneumothorax. Osteopenia. Admission Weight: Last Weight   Weight: 186 lb 4.6 oz (84.5 kg) Weight: 199 lb 8.3 oz (90.5 kg)     Intake / Output / Drain:  Current Shift: No intake/output data recorded.   Last 24 hrs.:     Intake/Output Summary (Last 24 hours) at 4/30/2020 0743  Last data filed at 4/30/2020 0700  Gross per 24 hour   Intake 2867.61 ml   Output 1454 ml   Net 1413.61 ml       EXAM:  General:  Pleasant, no apparent distress                                      Lungs:   Diminished throughout   Incision:  Prevena dsg intact   Heart:  Regular rate and rhythm - paced, S1, S2 normal, no murmur, click, or gallop. Abdomen:   Soft, non-tender. Bowel sounds hypoactive. No masses,  No organomegaly. Extremities:  +1 BLE edema. Palpable pulses bilat    Neurologic:  Gross motor and sensory apparatus intact. Labs:   Recent Labs     20  0722  20  0312  20  1324   WBC  --   --  10.0   < > 8.1   HGB  --   --  8.6*   < > 10.8*   HCT  --   --  27.6*   < > 33.1*   PLT  --   --  86*   < > 85*   NA  --   --  137   < > 144   K  --   --  4.6   < > 4.1   BUN  --   --  34*   < > 20   CREA  --   --  1.86*   < > 1.51*   GLU  --   --  102*   < > 87   GLUCPOC 100   < >  --    < >  --    INR  --   --   --   --  1.2*    < > = values in this interval not displayed. Assessment:     Active Problems:    CAD (coronary artery disease) (2020)      S/P CABG x 3 (2020)      Overview: ON PUMP CORONARY ARTERY BYPASS GRAFT X 3  WITH LIMA to LAD, RSVG to RCA,       RSVG to ramus      Left greater saphenous vein EVH       Postoperative anemia due to acute blood loss (2020)         Plan/Recommendations/Medical Decision Makin. S/p CABG: Continue ASA, pt intolerant of statins - resumed zetia. No BB due to hypotension/heart block, 100% paced    2. CHB: pacer dependent, no amio/BB. Will monitor another 24 hours for return of intrinsic rhythm. If no improvement by Friday will ask EP to evaluate for a PPM    3. Hypotension: volume repletion, wean cy to SBP > 90    4. Interstitial edema, pleural effusions, atelectasis: Increase IS use and activity, wean O2 sats to > 92%. Will give Lasix X 1 this am.    5. HTN: currently hypotensive requiring [id number] as able, monitor     6.  HLD: does not tolerate statin, on zetia, coq10 PTA. Continue Zetia. Can resume CoQ-10 on discharge. 7. DM type II: a1c 5.8, on januvia PTA. On insulin gtt per protocol. DTC following    8. CKD stage III: Cr 1.86, slight elevation today-likely volume related. Will diurese this am.  Avoid nephrotoxins. Keep Givens this am for strict I/O's     9. Hx DVT/PE: developed PE/DVT after bowel resection in 2012, not on anticoagulation prior to surgery. Must use his SCDs at all times. Will need postop DVT ppx-plan to start SQ heparin tomorrow. 10. Chronic pain/peripheral neuropathy: On gabapentin prn PTA - resumed     11. Anxiety: On celexa, ativan PTA    12. Alcohol abuse: CIWA monitoring, has received 0,5 mg PO ativan every evening, has not needed any PRN ativan. Monitor. Will discontinue today since > 72 hrs and has not needed during this admission    13. Prostate CA: PSA 10, sees Dr. Diann Sotelo (urology) and Dr. Karolyn Pantoja (radiation oncology), was planning on scheduling radiation seed implant soon. Monitor for urinary retention once Givens removed     14. Thrombocytopenia: plts improved to 86K, restart ASA. Switch pepcid to protonix. 15. Anemia: Hgb 8.6, monitor. Start iron once diet advanced. Transfuse for Hgb < 7     16. DVT/GI ppx: SCD's, protonix.  Will start SQ Heparin tomorrow for DVT ppx    Dispo: PT/OT/Cardiac Rehab eval. Remain in ICU until BP stable off gtts and HR improved     Signed By: Roberto Martinez NP

## 2020-04-30 NOTE — PROGRESS NOTES
Problem: Mobility Impaired (Adult and Pediatric)  Goal: *Acute Goals and Plan of Care (Insert Text)  Description: FUNCTIONAL STATUS PRIOR TO ADMISSION: Patient was independent and active without use of DME. Denies falls. HOME SUPPORT PRIOR TO ADMISSION: The patient lived alone and has 4 daughters local to provide assistance. Plans to d/c to one of his daughter's homes for additional support post op. Physical Therapy Goals  Initiated 4/29/2020  1. Patient will move from supine to sit and sit to supine , scoot up and down and roll side to side in bed with modified independence within 5 days. 2.  Patient will perform sit to/from stand with modified independence within 5 days. 3.  Patient will ambulate 150 feet with least restrictive assistive device and modified independence within 5 days. 4.  Patient will ascend/descend 12 stairs with right handrail(s) with modified independence within 5 days. 5.  Patient will perform cardiac exercises per protocol with modified independence within 5 days. 6.  Patient will verbally recall and functionally demonstrate mindful-based movements (\"move in the tube\") principles without cues within 5 days. Outcome: Progressing Towards Goal   PHYSICAL THERAPY TREATMENT  Patient: Hua Ding (40 y.o. male)  Date: 4/30/2020  Diagnosis: CAD (coronary artery disease) [I25.10]   <principal problem not specified>  Procedure(s) (LRB):  ON PUMP CORONARY ARTERY BYPASS GRAFT X 3  WITH LEFT LEG EVH AND LIMA/CARDIOPULMONARY BYPASS/ GODWIN AND EPI AORTIC ULTRASOIND BY DR. Americo Amaya. (N/A) 2 Days Post-Op  Precautions: Fall, Sternal(labile BP)  Chart, physical therapy assessment, plan of care and goals were reviewed. ASSESSMENT  Patient continues with skilled PT services and is progressing towards goals. Patient received supine in bed, OT present. Able to mobilize completely to EOB with feet grounded (drop in MAP from 70-55 but asymptomatic).  Upon sitting EOB, cuff pressure reading stable and patient asymptomatic and therefore able to stand with min Ax2, performing marches and steps to [facility]. One major LOB with moderate assist to correct but otherwise min A for static standing balance. Admitted to feeling sweaty in standing and returned to EOB. Anticipate continued good progress with BP control but remain cautious at this time due to rhythm issues. Patient is not verbalizing and is not demonstrating understanding of mindful-based movements (\"move in the tube\") principles of keeping UEs proximal to ribcage to prevent lateral pull on the sternum during load-bearing activities with visual, verbal, and manual cues required for compliance. Current Level of Function Impacting Discharge (mobility/balance): mod Ax2 for bed mobility    Other factors to consider for discharge: previously independent, will d/c to daughter's home         PLAN :  Patient continues to benefit from skilled intervention to address the above impairments. Continue treatment per established plan of care. to address goals. Recommendation for discharge: (in order for the patient to meet his/her long term goals)  Physical therapy at least 2 days/week in the home     This discharge recommendation:  Has been made in collaboration with the attending provider and/or case management    IF patient discharges home will need the following DME: to be determined (TBD)       SUBJECTIVE:   Patient stated I'm starting to feel sweaty.     OBJECTIVE DATA SUMMARY:   Patient mobilized on continuous portable monitor/telemetry. Critical Behavior:  Neurologic State: Alert  Orientation Level: Oriented X4  Cognition: Appropriate for age attention/concentration  Safety/Judgement: Decreased insight into deficits, Decreased awareness of need for safety  Functional Mobility Training:  Bed Mobility:  Rolling: Moderate assistance; Additional time;Assist x1(on bed pan)  Supine to Sit: Moderate assistance;Assist x2  Sit to Supine:  Moderate assistance;Assist x2; Additional time  Scooting: Maximum assistance  Transfers:  Sit to Stand: Minimum assistance;Assist x2  Stand to Sit: Minimum assistance;Assist x2  Balance:  Sitting: Impaired; Without support  Sitting - Static: Fair (occasional)  Sitting - Dynamic: Fair (occasional)  Standing: Impaired; With support  Standing - Static: Fair  Standing - Dynamic : Fair  Ambulation/Gait Training:  Distance (ft): 3 Feet (ft)(to [facility])  Assistive Device: Gait belt(HHAx1)  Ambulation - Level of Assistance: Minimal assistance;Assist x2  Gait Abnormalities: Decreased step clearance  Cardiac diagnosis intervention:  Patient instructed and educated on mindful movement principles based on Move in The Tube concept to include maintaining bilateral elbows close to rib cage when performing any load-bearing activity such as getting in/out of bed, pushing up from a chair, opening a door, or lifting a box. Patient was given a handout with diagrams of each correct/incorrect method of performing each of the above tasks. Activity Tolerance:   Fair, requires rest breaks, and remains with labile BP   Please refer to the flowsheet for vital signs taken during this treatment. After treatment patient left in no apparent distress:   Supine in bed, Heels elevated for pressure relief, and Call bell within reach    COMMUNICATION/COLLABORATION:   The patients plan of care was discussed with: Occupational therapist and Registered nurse.      Roel Talamantes PT, DPT   Time Calculation: 15 mins

## 2020-05-01 ENCOUNTER — APPOINTMENT (OUTPATIENT)
Dept: GENERAL RADIOLOGY | Age: 70
DRG: 236 | End: 2020-05-01
Attending: NURSE PRACTITIONER
Payer: MEDICARE

## 2020-05-01 PROBLEM — I44.2 THIRD DEGREE AV BLOCK (HCC): Status: ACTIVE | Noted: 2020-05-01

## 2020-05-01 PROBLEM — Z95.0 PACEMAKER: Status: ACTIVE | Noted: 2020-05-01

## 2020-05-01 LAB
ALBUMIN SERPL-MCNC: 2.8 G/DL (ref 3.5–5)
ALBUMIN/GLOB SERPL: 1 {RATIO} (ref 1.1–2.2)
ALP SERPL-CCNC: 31 U/L (ref 45–117)
ALT SERPL-CCNC: 14 U/L (ref 12–78)
ANION GAP SERPL CALC-SCNC: 4 MMOL/L (ref 5–15)
AST SERPL-CCNC: 33 U/L (ref 15–37)
BILIRUB SERPL-MCNC: 0.9 MG/DL (ref 0.2–1)
BUN SERPL-MCNC: 31 MG/DL (ref 6–20)
BUN/CREAT SERPL: 26 (ref 12–20)
CALCIUM SERPL-MCNC: 8 MG/DL (ref 8.5–10.1)
CHLORIDE SERPL-SCNC: 105 MMOL/L (ref 97–108)
CO2 SERPL-SCNC: 27 MMOL/L (ref 21–32)
CREAT SERPL-MCNC: 1.18 MG/DL (ref 0.7–1.3)
ERYTHROCYTE [DISTWIDTH] IN BLOOD BY AUTOMATED COUNT: 16 % (ref 11.5–14.5)
GLOBULIN SER CALC-MCNC: 2.8 G/DL (ref 2–4)
GLUCOSE BLD STRIP.AUTO-MCNC: 116 MG/DL (ref 65–100)
GLUCOSE SERPL-MCNC: 110 MG/DL (ref 65–100)
HCT VFR BLD AUTO: 24.6 % (ref 36.6–50.3)
HGB BLD-MCNC: 7.7 G/DL (ref 12.1–17)
MAGNESIUM SERPL-MCNC: 2.7 MG/DL (ref 1.6–2.4)
MCH RBC QN AUTO: 26.3 PG (ref 26–34)
MCHC RBC AUTO-ENTMCNC: 31.3 G/DL (ref 30–36.5)
MCV RBC AUTO: 84 FL (ref 80–99)
NRBC # BLD: 0 K/UL (ref 0–0.01)
NRBC BLD-RTO: 0 PER 100 WBC
PLATELET # BLD AUTO: 79 K/UL (ref 150–400)
PMV BLD AUTO: 11.1 FL (ref 8.9–12.9)
POTASSIUM SERPL-SCNC: 4.2 MMOL/L (ref 3.5–5.1)
PROT SERPL-MCNC: 5.6 G/DL (ref 6.4–8.2)
RBC # BLD AUTO: 2.93 M/UL (ref 4.1–5.7)
SERVICE CMNT-IMP: ABNORMAL
SODIUM SERPL-SCNC: 136 MMOL/L (ref 136–145)
WBC # BLD AUTO: 8.5 K/UL (ref 4.1–11.1)

## 2020-05-01 PROCEDURE — 77030032060 HC PWDR HEMSTAT ARISTA ASRB 3GM BARD -C: Performed by: INTERNAL MEDICINE

## 2020-05-01 PROCEDURE — 77030018547 HC SUT ETHBND1 J&J -B: Performed by: INTERNAL MEDICINE

## 2020-05-01 PROCEDURE — 74011250637 HC RX REV CODE- 250/637: Performed by: NURSE PRACTITIONER

## 2020-05-01 PROCEDURE — 82962 GLUCOSE BLOOD TEST: CPT

## 2020-05-01 PROCEDURE — 97110 THERAPEUTIC EXERCISES: CPT

## 2020-05-01 PROCEDURE — 74011250636 HC RX REV CODE- 250/636: Performed by: NURSE PRACTITIONER

## 2020-05-01 PROCEDURE — 97530 THERAPEUTIC ACTIVITIES: CPT

## 2020-05-01 PROCEDURE — 74011000250 HC RX REV CODE- 250: Performed by: NURSE PRACTITIONER

## 2020-05-01 PROCEDURE — C1893 INTRO/SHEATH, FIXED,NON-PEEL: HCPCS | Performed by: INTERNAL MEDICINE

## 2020-05-01 PROCEDURE — 33208 INSRT HEART PM ATRIAL & VENT: CPT | Performed by: INTERNAL MEDICINE

## 2020-05-01 PROCEDURE — 77030037029 HC IMPL ENV ICD ANTIBACT ABSRB TYRX MEDT -G: Performed by: INTERNAL MEDICINE

## 2020-05-01 PROCEDURE — 77030039046 HC PAD DEFIB RADIOTRNSPNT CNMD -B: Performed by: INTERNAL MEDICINE

## 2020-05-01 PROCEDURE — 36415 COLL VENOUS BLD VENIPUNCTURE: CPT

## 2020-05-01 PROCEDURE — 02HK3JZ INSERTION OF PACEMAKER LEAD INTO RIGHT VENTRICLE, PERCUTANEOUS APPROACH: ICD-10-PCS | Performed by: INTERNAL MEDICINE

## 2020-05-01 PROCEDURE — 77030018673: Performed by: INTERNAL MEDICINE

## 2020-05-01 PROCEDURE — 71045 X-RAY EXAM CHEST 1 VIEW: CPT

## 2020-05-01 PROCEDURE — 85027 COMPLETE CBC AUTOMATED: CPT

## 2020-05-01 PROCEDURE — 02H63JZ INSERTION OF PACEMAKER LEAD INTO RIGHT ATRIUM, PERCUTANEOUS APPROACH: ICD-10-PCS | Performed by: INTERNAL MEDICINE

## 2020-05-01 PROCEDURE — 74011000250 HC RX REV CODE- 250: Performed by: INTERNAL MEDICINE

## 2020-05-01 PROCEDURE — 0JH606Z INSERTION OF PACEMAKER, DUAL CHAMBER INTO CHEST SUBCUTANEOUS TISSUE AND FASCIA, OPEN APPROACH: ICD-10-PCS | Performed by: INTERNAL MEDICINE

## 2020-05-01 PROCEDURE — 80053 COMPREHEN METABOLIC PANEL: CPT

## 2020-05-01 PROCEDURE — 74011250636 HC RX REV CODE- 250/636: Performed by: INTERNAL MEDICINE

## 2020-05-01 PROCEDURE — 99152 MOD SED SAME PHYS/QHP 5/>YRS: CPT | Performed by: INTERNAL MEDICINE

## 2020-05-01 PROCEDURE — 74011000272 HC RX REV CODE- 272: Performed by: INTERNAL MEDICINE

## 2020-05-01 PROCEDURE — 65610000003 HC RM ICU SURGICAL

## 2020-05-01 PROCEDURE — C1898 LEAD, PMKR, OTHER THAN TRANS: HCPCS | Performed by: INTERNAL MEDICINE

## 2020-05-01 PROCEDURE — 77030031139 HC SUT VCRL2 J&J -A: Performed by: INTERNAL MEDICINE

## 2020-05-01 PROCEDURE — A4565 SLINGS: HCPCS | Performed by: INTERNAL MEDICINE

## 2020-05-01 PROCEDURE — 83735 ASSAY OF MAGNESIUM: CPT

## 2020-05-01 PROCEDURE — C1785 PMKR, DUAL, RATE-RESP: HCPCS | Performed by: INTERNAL MEDICINE

## 2020-05-01 PROCEDURE — 77030019580 HC CBL PACE MEDT -B: Performed by: INTERNAL MEDICINE

## 2020-05-01 PROCEDURE — 99153 MOD SED SAME PHYS/QHP EA: CPT | Performed by: INTERNAL MEDICINE

## 2020-05-01 DEVICE — LEAD PCMKR CAPSUR FIX NOVUS 52 --: Type: IMPLANTABLE DEVICE | Status: FUNCTIONAL

## 2020-05-01 DEVICE — LEAD PCMKR CAPSUR FIX NOVUS 58 --: Type: IMPLANTABLE DEVICE | Status: FUNCTIONAL

## 2020-05-01 DEVICE — ENVELOPE CMRM6133 ABSORB LRG US
Type: IMPLANTABLE DEVICE | Status: FUNCTIONAL
Brand: TYRX™

## 2020-05-01 DEVICE — IPG W1DR01 AZURE XT DR MRI WL USA BCP
Type: IMPLANTABLE DEVICE | Status: FUNCTIONAL
Brand: AZURE™ XT DR MRI SURESCAN™

## 2020-05-01 RX ORDER — CEFAZOLIN SODIUM/WATER 2 G/20 ML
2 SYRINGE (ML) INTRAVENOUS EVERY 8 HOURS
Status: COMPLETED | OUTPATIENT
Start: 2020-05-01 | End: 2020-05-02

## 2020-05-01 RX ORDER — SODIUM CHLORIDE 0.9 % (FLUSH) 0.9 %
5-40 SYRINGE (ML) INJECTION AS NEEDED
Status: CANCELLED | OUTPATIENT
Start: 2020-05-01

## 2020-05-01 RX ORDER — MIDAZOLAM HYDROCHLORIDE 1 MG/ML
INJECTION, SOLUTION INTRAMUSCULAR; INTRAVENOUS AS NEEDED
Status: DISCONTINUED | OUTPATIENT
Start: 2020-05-01 | End: 2020-05-01 | Stop reason: HOSPADM

## 2020-05-01 RX ORDER — CEFAZOLIN SODIUM/WATER 2 G/20 ML
2 SYRINGE (ML) INTRAVENOUS ONCE
Status: COMPLETED | OUTPATIENT
Start: 2020-05-01 | End: 2020-05-01

## 2020-05-01 RX ORDER — CEPHALEXIN 250 MG/1
250 CAPSULE ORAL 3 TIMES DAILY
Status: DISCONTINUED | OUTPATIENT
Start: 2020-05-02 | End: 2020-05-04 | Stop reason: HOSPADM

## 2020-05-01 RX ORDER — SODIUM CHLORIDE 0.9 % (FLUSH) 0.9 %
5-40 SYRINGE (ML) INJECTION AS NEEDED
Status: DISCONTINUED | OUTPATIENT
Start: 2020-05-01 | End: 2020-05-02

## 2020-05-01 RX ORDER — FENTANYL CITRATE 50 UG/ML
INJECTION, SOLUTION INTRAMUSCULAR; INTRAVENOUS AS NEEDED
Status: DISCONTINUED | OUTPATIENT
Start: 2020-05-01 | End: 2020-05-01 | Stop reason: HOSPADM

## 2020-05-01 RX ORDER — CEFAZOLIN SODIUM/WATER 2 G/20 ML
2 SYRINGE (ML) INTRAVENOUS ONCE
Status: CANCELLED | OUTPATIENT
Start: 2020-05-01 | End: 2020-05-01

## 2020-05-01 RX ORDER — SODIUM CHLORIDE 0.9 % (FLUSH) 0.9 %
5-40 SYRINGE (ML) INJECTION EVERY 8 HOURS
Status: CANCELLED | OUTPATIENT
Start: 2020-05-01

## 2020-05-01 RX ORDER — HYDROCODONE BITARTRATE AND ACETAMINOPHEN 5; 325 MG/1; MG/1
1 TABLET ORAL
Status: DISCONTINUED | OUTPATIENT
Start: 2020-05-01 | End: 2020-05-02

## 2020-05-01 RX ORDER — SODIUM CHLORIDE 0.9 % (FLUSH) 0.9 %
5-40 SYRINGE (ML) INJECTION EVERY 8 HOURS
Status: DISCONTINUED | OUTPATIENT
Start: 2020-05-01 | End: 2020-05-02

## 2020-05-01 RX ORDER — LIDOCAINE HYDROCHLORIDE 10 MG/ML
INJECTION INFILTRATION; PERINEURAL AS NEEDED
Status: DISCONTINUED | OUTPATIENT
Start: 2020-05-01 | End: 2020-05-01 | Stop reason: HOSPADM

## 2020-05-01 RX ORDER — FUROSEMIDE 10 MG/ML
40 INJECTION INTRAMUSCULAR; INTRAVENOUS ONCE
Status: COMPLETED | OUTPATIENT
Start: 2020-05-01 | End: 2020-05-01

## 2020-05-01 RX ADMIN — FUROSEMIDE 40 MG: 10 INJECTION, SOLUTION INTRAMUSCULAR; INTRAVENOUS at 14:13

## 2020-05-01 RX ADMIN — CHLORHEXIDINE GLUCONATE 10 ML: 1.2 RINSE ORAL at 10:12

## 2020-05-01 RX ADMIN — Medication 10 ML: at 22:08

## 2020-05-01 RX ADMIN — SENNOSIDES AND DOCUSATE SODIUM 1 TABLET: 8.6; 5 TABLET ORAL at 17:48

## 2020-05-01 RX ADMIN — POLYETHYLENE GLYCOL 3350 17 G: 17 POWDER, FOR SOLUTION ORAL at 10:11

## 2020-05-01 RX ADMIN — MAGNESIUM OXIDE TAB 400 MG (241.3 MG ELEMENTAL MG) 400 MG: 400 (241.3 MG) TAB at 17:48

## 2020-05-01 RX ADMIN — CHLORHEXIDINE GLUCONATE 10 ML: 1.2 RINSE ORAL at 21:03

## 2020-05-01 RX ADMIN — Medication 10 ML: at 17:49

## 2020-05-01 RX ADMIN — SENNOSIDES AND DOCUSATE SODIUM 1 TABLET: 8.6; 5 TABLET ORAL at 10:11

## 2020-05-01 RX ADMIN — CEFAZOLIN SODIUM 2 G: 300 INJECTION, POWDER, LYOPHILIZED, FOR SOLUTION INTRAVENOUS at 17:48

## 2020-05-01 RX ADMIN — MAGNESIUM OXIDE TAB 400 MG (241.3 MG ELEMENTAL MG) 400 MG: 400 (241.3 MG) TAB at 10:11

## 2020-05-01 RX ADMIN — Medication 10 ML: at 14:13

## 2020-05-01 RX ADMIN — Medication 10 ML: at 06:30

## 2020-05-01 RX ADMIN — PANTOPRAZOLE SODIUM 40 MG: 40 TABLET, DELAYED RELEASE ORAL at 06:35

## 2020-05-01 RX ADMIN — CITALOPRAM HYDROBROMIDE 20 MG: 20 TABLET ORAL at 10:11

## 2020-05-01 RX ADMIN — EZETIMIBE 10 MG: 10 TABLET ORAL at 10:11

## 2020-05-01 RX ADMIN — MUPIROCIN: 20 OINTMENT TOPICAL at 10:12

## 2020-05-01 RX ADMIN — ACETAMINOPHEN 650 MG: 325 TABLET, FILM COATED ORAL at 22:41

## 2020-05-01 RX ADMIN — MUPIROCIN: 20 OINTMENT TOPICAL at 17:49

## 2020-05-01 RX ADMIN — CEFAZOLIN SODIUM 2 G: 10 INJECTION, POWDER, FOR SOLUTION INTRAVENOUS at 10:11

## 2020-05-01 NOTE — PROGRESS NOTES
Cardiac Surgery Care Coordinator- Met with Gayle Pinzon, reviewed plan of care and discussed surgical plan for pacemaker this afternoon. Reinforced sternal precautions and encouraged continued use of the incentive spirometer. Will continue to follow for educational and emotional needs. 1045- Placed update call to Michael Maloney, Mr [facility] daughter, reviewed plan for pacemaker and encouraged her to verbalize. Will continue to follow. 1220- Placed follow up call to Michael Maloney, provided update and encouraged her to verbalize. Will continue to follow.  Mor Willams RN

## 2020-05-01 NOTE — CONSULTS
Discussed with RN Corin Mcmahan, will plan for 12 noon pacemaker after I see him this morning  Keep NPO  Full consult to follow

## 2020-05-01 NOTE — PROGRESS NOTES
2000: Bedside shift change report given to [facility] (oncoming nurse) by Cinthya Woodard RN (offgoing nurse). Report included the following information SBAR, OR Summary, Intake/Output, MAR, Recent Results, Cardiac Rhythm paced and Alarm Parameters . 0000: Reassessment    0400: Labs sent. Reassessment. 0820: Bedside shift change report given to [facility] (oncoming nurse) by Leena Oshea RN (offgoing nurse). Report included the following information SBAR, ED Summary, OR Summary, Procedure Summary, Intake/Output, MAR, Recent Results, Cardiac Rhythm paced and Alarm Parameters .

## 2020-05-01 NOTE — PROGRESS NOTES
[facility] ICU Progress Note    Admit Date: 2020  POD:  3 Day Post-Op    Procedure:  Procedure(s):  ON PUMP CORONARY ARTERY BYPASS GRAFT X 3  WITH LEFT LEG EVH AND LIMA/CARDIOPULMONARY BYPASS/ GODWIN AND EPI AORTIC ULTRASOIND BY DR. Leelee Wells. Subjective:   Pt seen with Dr. Esdras Prabhakar. Afebrile, oxygen at 2L per NC. Off Luis A. EP consult and plan for PPM later today. Objective:   Vitals:  Blood pressure 122/66, pulse 90, temperature 98.3 °F (36.8 °C), resp. rate 17, weight 198 lb 3.1 oz (89.9 kg), SpO2 98 %. Temp (24hrs), Av.1 °F (36.7 °C), Min:97 °F (36.1 °C), Max:98.5 °F (36.9 °C)    EKG/Rhythm: 100% paced at 86 bpm     CT Output: 140 ml + 80 ml (last 24 hours)    Oxygen Therapy:  Oxygen Therapy  O2 Sat (%): 98 % (20 0800)  Pulse via Oximetry: 90 beats per minute (20 0800)  O2 Device: Nasal cannula (20 0800)  O2 Flow Rate (L/min): 2 l/min (20 0800)  FIO2 (%): 50 % (20 1800)    CXR:   CXR Results  (Last 48 hours)               20 0617  XR CHEST PORT Final result    Impression:  IMPRESSION:   Improved lung volumes. Narrative:  PORTABLE CHEST RADIOGRAPH/S: 2020 6:17 AM       Clinical history: postop heart   INDICATION:   postop heart   COMPARISON: 2020       FINDINGS:   AP portable upright view of the chest demonstrates a stable significantly   enlarged cardiopericardial silhouette. The lungs are slightly improved in   volume. Mild interstitial edema with basilar atelectasis and effusion greater   on the left. Mediastinal and pleural drains remain. . A vascular sheath remains   in the right internal jugular vein. The osseous structures are unremarkable. Patient is on a cardiac monitor. 20 0441  XR CHEST PORT Final result    Impression:  IMPRESSION:   Diminished lung volumes with interstitial edema and pleural effusions greater on   the left. Interval removal of [place] catheter. Otherwise no change. Narrative:  PORTABLE CHEST RADIOGRAPH/S: 4/30/2020 4:41 AM       Clinical history: postop heart   INDICATION:   postop heart   COMPARISON: 4/29/2020       FINDINGS:   AP portable upright view of the chest demonstrates a stable significantly   enlarged cardiopericardial silhouette. The lungs are diminished in volume. Mild   interstitial edema with basilar atelectasis and effusion greater on the left. [place] catheter has been removed. Mediastinal and pleural drains remain. . The   osseous structures are unremarkable. Patient is on a cardiac monitor. Admission Weight: Last Weight   Weight: 186 lb 4.6 oz (84.5 kg) Weight: 198 lb 3.1 oz (89.9 kg)     Intake / Output / Drain:  Current Shift: 05/01 0701 - 05/01 1900  In: -   Out: 125 [Urine:105; Drains:20]  Last 24 hrs.:     Intake/Output Summary (Last 24 hours) at 5/1/2020 0934  Last data filed at 5/1/2020 0800  Gross per 24 hour   Intake 990.42 ml   Output 1800 ml   Net -809.58 ml       EXAM:  General:  Pleasant, no apparent distress                                      Lungs:   Diminished throughout   Incision:  Prevena dsg intact   Heart:  Regular rate and rhythm - paced, S1, S2 normal, no murmur, click, or gallop. Abdomen:   Soft, non-tender. Bowel sounds hypoactive. No masses,  No organomegaly. Extremities:  +1 BLE edema. Palpable pulses bilat    Neurologic:  Gross motor and sensory apparatus intact. Labs:   Recent Labs     05/01/20  0633 05/01/20  0358  04/28/20  1324   WBC  --  8.5   < > 8.1   HGB  --  7.7*   < > 10.8*   HCT  --  24.6*   < > 33.1*   PLT  --  79*   < > 85*   NA  --  136   < > 144   K  --  4.2   < > 4.1   BUN  --  31*   < > 20   CREA  --  1.18   < > 1.51*   GLU  --  110*   < > 87   GLUCPOC 116*  --    < >  --    INR  --   --   --  1.2*    < > = values in this interval not displayed.         Assessment:     Active Problems:    CAD (coronary artery disease) (4/27/2020)      S/P CABG x 3 (4/28/2020)      Overview: ON PUMP CORONARY ARTERY BYPASS GRAFT X 3  WITH LIMA to LAD, RSVG to RCA,       RSVG to ramus      Left greater saphenous vein EVH       Postoperative anemia due to acute blood loss (2020)         Plan/Recommendations/Medical Decision Makin. S/p CABG: Continue ASA, pt intolerant of statins - resumed zetia. No BB due to hypotension/heart block, 100% paced    2. CHB: pacer dependent, no amio/BB. EP consult to evaluate for a PPM-plan for PPM placement later today per Dr. Jolly Acuna.    3. Hypotension: improved. Luis A off. Tolerated diuresis well yesterday. 4. Interstitial edema, pleural effusions, atelectasis: Increase IS use and activity, wean O2 sats to > 92%. Plan for diuresis again today but will wait until after PPM    5.  HTN: Not currently on any antihypertensives with reasonable BP. Add antihypertensives as needed    6.  HLD: does not tolerate statin, on zetia, coq10 PTA. Continue Zetia. Can resume CoQ-10 on discharge. 7. DM type II: a1c 5.8, on januvia PTA. DTC following-AccuCheck AC & HS with SSI    8. CKD stage III: Cr 1.18, improved today after diuresis yesterday. Will diurese again today after PPM.   Avoid nephrotoxins. Keep Givens this am for strict I/O's -discontinue after PPM    9. Hx DVT/PE: developed PE/DVT after bowel resection in , not on anticoagulation prior to surgery. Must use his SCDs at all times. Will need postop DVT ppx-plan to start SQ heparin when platelets improve. 10. Chronic pain/peripheral neuropathy: On gabapentin prn PTA - resumed     11. Anxiety: On celexa, ativan PTA    12. Alcohol abuse: CIWA monitoring, has received 0,5 mg PO ativan every evening, has not needed any PRN ativan. Monitor. Now discontinued since > 72 hrs and has not needed during this admission    13. Prostate CA: PSA 10, sees Dr. Yee Quintero (urology) and Dr. Renea Coleman (radiation oncology), was planning on scheduling radiation seed implant soon. Monitor for urinary retention once Givens removed     14. Thrombocytopenia: plts remain low at 79K, continue ASA. Switched pepcid to protonix. 15. Anemia: Hgb 8.6, monitor. Start iron once diet advanced. Transfuse for Hgb < 7     16. DVT/GI ppx: SCD's, protonix. Will start SQ Heparin when thrombocytopenia improves for DVT ppx    Dispo: PT/OT/Cardiac Rehab eval. Remain in ICU due to need for pacing.  Case management for discharge planning    Signed By: Roberto Martinez NP

## 2020-05-01 NOTE — PROGRESS NOTES
TRANSFER - OUT REPORT:    Verbal report given to [facility], [facility] RN(name) on Cruzito Hernandez  being transferred to CVICU(unit) for routine post - op       Report consisted of patients Situation, Background, Assessment and   Recommendations(SBAR). Information from the following report(s) SBAR, Procedure Summary, MAR and Recent Results was reviewed with the receiving nurse. Lines:   Double Lumen 04/28/20 (Active)   Central Line Being Utilized Yes 5/1/2020  8:00 AM   Criteria for Appropriate Use Hemodynamically unstable, requiring monitoring lines, vasopressors, or volume resuscitation 5/1/2020  8:00 AM   Infiltration Assessment 0 5/1/2020  8:00 AM   Affected Extremity/Extremities Color distal to insertion site pink (or appropriate for race) 5/1/2020  8:00 AM   Date of Last Dressing Change 04/28/20 5/1/2020  8:00 AM   Dressing Status Clean, dry, & intact 5/1/2020  8:00 AM   Dressing Type Disk with Chlorhexadine gluconate (CHG); Transparent 5/1/2020  8:00 AM   Action Taken Open ports on tubing capped 5/1/2020  8:00 AM   Proximal Hub Color/Line Status White;Capped; Infusing 5/1/2020  8:00 AM   Positive Blood Return (Medial Site) Yes 5/1/2020  8:00 AM   Distal Hub Color/Line Status Brown; Infusing 5/1/2020  8:00 AM   Alcohol Cap Used Yes 5/1/2020  8:00 AM       Peripheral IV 04/28/20 Posterior;Right Hand (Active)   Site Assessment Clean, dry, & intact 5/1/2020  8:00 AM   Phlebitis Assessment 0 5/1/2020  8:00 AM   Infiltration Assessment 0 5/1/2020  8:00 AM   Dressing Status Clean, dry, & intact 5/1/2020  8:00 AM   Dressing Type Transparent 5/1/2020  8:00 AM   Hub Color/Line Status Green; Infusing 5/1/2020  8:00 AM   Action Taken Open ports on tubing capped 5/1/2020  8:00 AM   Alcohol Cap Used Yes 5/1/2020  8:00 AM       Arterial Line 04/28/20 (Active)   Dressing Status Clean, dry, & intact 5/1/2020  8:00 AM   Dressing Type Disk with Chlorhexadine gluconate (CHG); Transparent 5/1/2020  8:00 AM   Line Status Intact and in place 5/1/2020  8:00 AM   Treatment Arm board off;Zeroed or re-zeroed 5/1/2020  8:00 AM   Affected Extremity/Extremities Color distal to insertion site pink (or appropriate for race) 5/1/2020  8:00 AM        Opportunity for questions and clarification was provided.       Patient transported with:   Monitor  O2 @ 2 liters  Registered Nurse

## 2020-05-01 NOTE — DIABETES MGMT
[facility] SPECIALIST CONSULT  PROGRAM FOR DIABETES HEALTH    FOLLOW-UP NOTE    Presentation   Lynn Harris is a 68 y.o. male admitted with a PMH of arthritis, CKD, diabetes, HTN, PE, and depression who presented to [facility] ED with chest pain and was found to have an NSTEMI. He was admitted and cardiac cath was performed which revealed CAD. He is now s/p CABG x3. Subjective   Pacemaker placement for CHB  Off cy  Diuresis needed    Objective   Physical exam  General Alert, oriented and in acute pain. Conversant and cooperative. Vital Signs   Visit Vitals  /71 (BP 1 Location: Right arm, BP Patient Position: Supine)   Pulse 60   Temp 98.3 °F (36.8 °C)   Resp 16   Wt 89.9 kg (198 lb 3.1 oz)   SpO2 99%   BMI 31.04 kg/m²     Skin  Warm and dry  Heart   Regular rate and rhythm. No murmurs, rubs or gallops  Lungs  Clear to auscultation without rales or rhonchi  Extremities No foot wounds    Laboratory  Lab Results   Component Value Date/Time    Hemoglobin A1c 5.8 (H) 04/25/2020 01:42 AM    Hemoglobin A1c (POC) 5.7 09/24/2019 08:33 AM     Lab Results   Component Value Date/Time    LDL, calculated 138 (H) 01/09/2020 11:36 AM     Lab Results   Component Value Date/Time    Creatinine (POC) 1.4 (H) 11/02/2016 07:15 AM    Creatinine 1.18 05/01/2020 03:58 AM     Lab Results   Component Value Date/Time    Sodium 136 05/01/2020 03:58 AM    Potassium 4.2 05/01/2020 03:58 AM    Chloride 105 05/01/2020 03:58 AM    CO2 27 05/01/2020 03:58 AM    Anion gap 4 (L) 05/01/2020 03:58 AM    Glucose 110 (H) 05/01/2020 03:58 AM    BUN 31 (H) 05/01/2020 03:58 AM    Creatinine 1.18 05/01/2020 03:58 AM    BUN/Creatinine ratio 26 (H) 05/01/2020 03:58 AM    GFR est AA >60 05/01/2020 03:58 AM    GFR est non-AA >60 05/01/2020 03:58 AM    Calcium 8.0 (L) 05/01/2020 03:58 AM    Bilirubin, total 0.9 05/01/2020 03:58 AM    AST (SGOT) 33 05/01/2020 03:58 AM    Alk.  phosphatase 31 (L) 05/01/2020 03:58 AM    Protein, total 5.6 (L) 05/01/2020 03:58 AM    Albumin 2.8 (L) 05/01/2020 03:58 AM    Globulin 2.8 05/01/2020 03:58 AM    A-G Ratio 1.0 (L) 05/01/2020 03:58 AM    ALT (SGPT) 14 05/01/2020 03:58 AM     Lab Results   Component Value Date/Time    ALT (SGPT) 14 05/01/2020 03:58 AM       Blood glucose pattern      Assessment and Plan   Nursing Diagnosis Risk for unstable blood glucose pattern   Nursing Intervention Domain 5259 Decision-making Support   Nursing Interventions Examined current inpatient diabetes control   Explored factors facilitating and impeding inpatient management     Evaluation   Hua Ding is a 68 y.o. male admitted with a PMH of arthritis, CKD, diabetes, HTN, PE, and depression who presented to [id number] [place] [address] ED with chest pain and was found to have an NSTEMI. He was admitted and cardiac cath was performed which revealed CAD. He is now s/p CABG x3. There is low suspicion of hyperglycemia in the post-operative period with admitting A1C of 5.8%. Blood glucose goal in the inpatient setting is 100-180.       Recommendations   Recommend:  1. Blood glucose monitoring ACHS. If glucose in goal of 100-180, can trend glucose on am labs starting tomorrow am and d/c correctional insulin. Ok to resume home antihyperglycemics on discharge.       Billing Code(s)     [x] [facility]  Access via ROMEL Walter [phone]

## 2020-05-01 NOTE — PROGRESS NOTES
0800- bedside report received. Patient in resting in bed. 7150- spoke to Dr. Jessica Calvo regarding consult. Plan for patient to be NPO with probable pacemaker insertion today. 1140- report given to Yesenia Angeles RN for planned pacemaker insertion. 1145- Dr. Jessica Calvo at bedside with cath lab team. Obtained consent and transported to cath lab by team.    [phone]- [facility] 1355- patient arrived on monitor from cath lab. He is awake, oriented, and without complaints. 1625- left arterial line removed difficulty, pressure held for 5 minutes and then dressing applied. Patient tolerated well. Givens removed after without complaints. 1815- patient assisted from bed to chair, tolerated well. Was able to stand and use the urinal.    1945- Bedside and Verbal shift change report given to Shelia Da Silva RN (oncoming nurse) by Shon Mejia RN (offgoing nurse). Report included the following information SBAR, Kardex, Recent Results and Cardiac Rhythm paced.

## 2020-05-01 NOTE — CARDIO/PULMONARY
Cardiac Rehab: Arnold MI and PPM education books added to [address] [place] [address] CABG education folder. Patient is currently off the floor for PPM insertion. Will continue to follow.  Kane Dejesus RN

## 2020-05-01 NOTE — PROGRESS NOTES
Met with patient who began to cry as another patient had an incident. Provided comfort and support to LEPPEN, who just stated he was feeling emotional.  Offered presence and assured LEPPEN of prayers and care. Spiritual care is able to continue to follow. [phone]. Visit by: Markus Harris.  Jaime Valdez D.Min, MA, [address]    Lead  Profession Development & Advancement

## 2020-05-01 NOTE — CONSULTS
[facility]iology [facility] Consultation Note   REFERRING PROVIDER: Dr Teresa Morejon:      Viviana Mallory is a 68 y.o. patient who is seen for evaluation of pacemaker due to persistent complete av block post CABG 4/28/2020 after NSTEMI  Dr Fede Leavitt is cardiologist  Hx of PVCs  Hypertension   DM2  CKD  DVT/PE  RBBB    He had LIMA to LAD, SVG to RCA and Ramus  He is on temporary epicardial wire pacing     Echo 4/25/2020 normal LVEF severe LVH  ECG 4/23/2020 sinus rhythm PAC, RBBB, LAFB, septal MI         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    Alcohol abuse F10.10    Insomnia G47.00    HTN (hypertension) I10    DVT of leg (deep venous thrombosis) ([facility] [address] I82.409    Spinal stenosis, lumbar region, without neurogenic claudication M48.061    Hyperlipidemia E78.5    CKD (chronic kidney disease) N18.9    Cervical spinal stenosis M48.02    Reactive depression F32.9    Type 2 diabetes with nephropathy ([facility] [address] E11.21    Prostate cancer ([facility] [address] C61    Type 2 diabetes mellitus with diabetic neuropathy ([facility] [address] E11.40    NSTEMI (non-ST elevated myocardial infarction) ([facility] [address] I21.4    Bilateral carotid artery stenosis I65.23    Preop cardiovascular exam Z01.810    CAD (coronary artery disease) I25.10    S/P CABG x 3 Z95.1    Postoperative anemia due to acute blood loss D62    Pacemaker Z95.0    Third degree AV block (HCC) I44.2     Current Facility-Administered Medications   Medication Dose Route Frequency Provider Last Rate Last Dose    furosemide (LASIX) injection 40 mg  40 mg IntraVENous ONCE Abena Iniguez NP        bacitracin 50,000 Units in sodium chloride irrigation 0.9 % 500 mL irrigation solution  50,000 Units Irrigation Claudia Matute MD        albumin human 5% (BUMINATE) solution 12.5 g  12.5 g IntraVENous Q2H PRN Tabby Mcnally MD   12.5 g at 04/29/20 0209    pantoprazole (PROTONIX) tablet 40 mg  40 mg Oral COCO Clifford Nevaeh Tapia NP   40 mg at 05/01/20 2194    acetaminophen (TYLENOL) tablet 650 mg  650 mg Oral Q4H PRN Araceli Cantrell NP        alteplase (CATHFLO) 1 mg in sterile water (preservative free) 1 mL injection  1 mg InterCATHeter PRN Heike Tapia NP        bacitracin 500 unit/gram packet 1 Packet  1 Packet Topical PRN Araceli Cantrell NP        sodium chloride (NS) flush 5-40 mL  5-40 mL IntraVENous Q8H Araceli Cantrell NP   10 mL at 05/01/20 0630    sodium chloride (NS) flush 5-40 mL  5-40 mL IntraVENous PRN Araceli Cantrell NP   10 mL at 04/30/20 1805    0.45% sodium chloride infusion  10 mL/hr IntraVENous CONTINUOUS Araceli Cantrell NP 10 mL/hr at 04/30/20 2005 10 mL/hr at 04/30/20 2005    0.9% sodium chloride infusion  9 mL/hr IntraVENous CONTINUOUS Araceli Cantrell NP 6 mL/hr at 04/30/20 2006 6 mL/hr at 04/30/20 2006    oxyCODONE IR (ROXICODONE) tablet 5 mg  5 mg Oral Q4H PRN Araceli Cantrell NP   5 mg at 04/30/20 2355    oxyCODONE IR (ROXICODONE) tablet 10 mg  10 mg Oral Q4H PRN Araceli Cantrell NP   5 mg at 04/29/20 2121    morphine injection 4 mg  4 mg IntraVENous Q2H PRN Araceli Cantrell NP   4 mg at 04/29/20 0551    naloxone (NARCAN) injection 0.4 mg  0.4 mg IntraVENous PRN Araceli Cantrell NP        mupirocin (BACTROBAN) 2 % ointment   Both Nostrils BID Araceli Cantrell NP        ondansetron [facility]) injection 4 mg  4 mg IntraVENous Q4H PRN Heike Tapia NP        albuterol (PROVENTIL VENTOLIN) nebulizer solution 2.5 mg  2.5 mg Nebulization Q4H PRN Heike Tapia NP        aspirin chewable tablet 81 mg  81 mg Oral DAILY Araceli Cantrell NP   Stopped at 05/01/20 0900    midazolam (VERSED) injection 1 mg  1 mg IntraVENous Q1H PRN Araceli Cantrell NP        chlorhexidine (PERIDEX) 0.12 % mouthwash 10 mL  10 mL Oral Q12H Araceli Cantrell NP   10 mL at 05/01/20 1012    magnesium oxide (MAG-OX) tablet 400 mg  400 mg Oral BID Araceli Cantrell NP   400 mg at 05/01/20 1011    calcium chloride 1 g in 0.9% sodium chloride 250 mL IVPB  1 g IntraVENous PRN Millie Klinen Nail, NP        bisacodyL (DULCOLAX) suppository 10 mg  10 mg Rectal DAILY PRN Ramakrishnasaniya Ariza, NP        senna-docusate (PERICOLACE) 8.6-50 mg per tablet 1 Tab  1 Tab Oral BID Ramakrishna Eng, NP   1 Tab at 05/01/20 1011    polyethylene glycol (MIRALAX) packet 17 g  17 g Oral DAILY Ramakrishna To, NP   17 g at 05/01/20 1011    ELECTROLYTE REPLACEMENT NOTE: Nurse to review Serum Potassium and Magnesuim levels and Initiate Electrolyte Replacement Protocol as needed  1 Each Other PRN Ramakrishna Ariza, NP        magnesium sulfate 1 g/100 ml IVPB (premix or compounded)  1 g IntraVENous PRN Millie Klinen Nail, NP        glucose chewable tablet 16 g  4 Tab Oral PRN Millie Klinen Nail, NP        glucagon (GLUCAGEN) injection 1 mg  1 mg IntraMUSCular PRN Millie Chaudhari Nail, NP        insulin lispro (HUMALOG) injection   SubCUTAneous Cheri Donohue NP   Stopped at 04/28/20 1630    insulin lispro (HUMALOG) injection   SubCUTAneous AC&HS Ramakrishna Eng, NP   Stopped at 04/28/20 1630    dextrose 10% infusion 0-250 mL  0-250 mL IntraVENous PRN Millie Klinen Nail, NP        morphine injection 2 mg  2 mg IntraVENous Q2H PRN Millie Klinen Nail, NP        diphenhydrAMINE (BENADRYL) capsule 25 mg  25 mg Oral Q6H PRN Ramakrishna Eng, NP        citalopram (CELEXA) tablet 20 mg  20 mg Oral DAILY Ramakrishna Eng, NP   20 mg at 05/01/20 1011    ezetimibe (ZETIA) tablet 10 mg  10 mg Oral DAILY Ramakrishna Eng, NP   10 mg at 05/01/20 1011    gabapentin (NEURONTIN) capsule 300 mg  300 mg Oral QHS PRN Ramakrishna Eng, NP        LORazepam (ATIVAN) tablet 0.5 mg  0.5 mg Oral QHS PRN Millie Webster Fara Nail, NP        traZODone (DESYREL) tablet 50 mg  50 mg Oral QHS PRN Ramakrishna Eng, NP   50 mg at 04/27/20 2204    DOBUTamine (DOBUTREX) 500 mg/250 mL (2,000 mcg/mL) infusion  0-10 mcg/kg/min IntraVENous TITRATE Ramakrishna Ariza NP   Stopped at 04/28/20 0700    insulin regular (NOVOLIN R, HUMULIN R) 100 Units in 0.9% sodium chloride 100 mL infusion  1-50 Units/hr IntraVENous TITRATE Ludy Chin NP   Stopped at 04/30/20 0420    PHENYLephrine (CLARISSE-SYNEPHRINE) 30 mg in 0.9% sodium chloride 250 mL infusion   mcg/min IntraVENous TITRATE Kecia Metcalf NP   Stopped at 04/30/20 1900    niCARdipine (CARDENE) 25 mg in 0.9% sodium chloride 250 mL infusion  0-15 mg/hr IntraVENous TITRATE Kecia Metcalf NP   Stopped at 04/28/20 2025    EPINEPHrine (ADRENALIN) 2 mg in 0.9% sodium chloride 250 mL infusion  1-10 mcg/min IntraVENous TITRATE Ludy Chin NP        NOREPINephrine (LEVOPHED) 4,000 mcg in dextrose 5% 250 mL infusion  0.5-16 mcg/min IntraVENous TITRATE Ludy Chin NP   Stopped at 04/28/20 0700     Allergies   Allergen Reactions    Arb-Angiotensin Receptor Antagonist Other (comments)     High k     Past Medical History:   Diagnosis Date    Arthritis     Chronic kidney disease     Stage 3    Chronic pain     Abdoman, back, fingers per daughter   [facility])     DIET CONTROLLED    Hypertension     PE (pulmonary embolism)     Pneumonia     Psychiatric disorder     Depression     Past Surgical History:   Procedure Laterality Date    ABDOMEN SURGERY PROC UNLISTED      bowel resection    HX BACK SURGERY  2014    HX GI  11/2012    bowel resection    HX ORTHOPAEDIC      amputated left 4th finger     Family History   Problem Relation Age of Onset    Cancer Mother     Heart Disease Father     Heart Disease Brother     Anesth Problems Neg Hx      Social History     Tobacco Use    Smoking status: Never Smoker    Smokeless tobacco: Current User     Types: Chew   Substance Use Topics    Alcohol use: Yes     Alcohol/week: 7.0 standard drinks     Types: 7 Cans of beer per week     Comment: 7-10 per week        Review of Systems:   Constitutional: Negative for fever, chills, weight loss, + malaise/fatigue.    HEENT: Negative for nosebleeds, vision changes. Respiratory: Negative for cough, hemoptysis  Cardiovascular: Negative for chest pain, palpitations, orthopnea, claudication, leg swelling, syncope, and PND. Gastrointestinal: Negative for nausea, vomiting, diarrhea, blood in stool and melena. Genitourinary: Negative for dysuria, and hematuria. Musculoskeletal: Negative for myalgias, + arthralgia. Skin: Negative for rash. Heme: bruise easily. Neurological: Negative for speech change and focal weakness     Objective:     Visit Vitals  /67   Pulse 90   Temp 98.3 °F (36.8 °C)   Resp 24   Wt 198 lb 3.1 oz (89.9 kg)   SpO2 97%   BMI 31.04 kg/m²      Physical Exam:   Constitutional: well-developed and well-nourished. No respiratory distress. Head: Normocephalic and atraumatic. Eyes: Pupils are equal, round  ENT: hearing normal  Neck: supple. No JVD present. Cardiovascular: Normal rate, regular rhythm. Exam reveals no gallop and no friction rub. No murmur heard. Pulmonary/Chest: Effort normal and breath sounds normal. No wheezes. Abdominal: Soft, no tenderness. Musculoskeletal: no edema. Neurological: alert,oriented. Skin: Skin is warm and dry. Sternal wound is dressed   Psychiatric: normal mood and affect. Behavior is normal. Judgment and thought content normal.      BMP:   Lab Results   Component Value Date/Time     05/01/2020 03:58 AM    K 4.2 05/01/2020 03:58 AM     05/01/2020 03:58 AM    CO2 27 05/01/2020 03:58 AM    AGAP 4 (L) 05/01/2020 03:58 AM     (H) 05/01/2020 03:58 AM    BUN 31 (H) 05/01/2020 03:58 AM    CREA 1.18 05/01/2020 03:58 AM    GFRAA >60 05/01/2020 03:58 AM    GFRNA >60 05/01/2020 03:58 AM     CBC:   Lab Results   Component Value Date/Time    WBC 8.5 05/01/2020 03:58 AM    HGB 7.7 (L) 05/01/2020 03:58 AM    HCT 24.6 (L) 05/01/2020 03:58 AM    PLT 79 (L) 05/01/2020 03:58 AM        Assessment/Plan:    Third degree av block  Known hx of bifascicular block  CAD post CABG  Anemia  Thrombocytopenia  DM2    I have discussed with him about implantation of dual chamber pacemaker risks and benefits and he agrees to proceed  Risks involve device implant include but are not limited to bleeding, infection, valvular damage, heart attack, stroke, lung collapse (pneumothorax or hemothorax), heart collapse (pericardial tamponade), heart perforation, kidney failure, death. Elective or emergency surgery may be required to repair some of these complications. Prolonged hospitalization would be required. General anesthesia may be required for the procedure. Thank you for involving me in this patient's care and please call with further concerns or questions. Srikanth Perez M.D.   Electrophysiology/Cardiology  [facility]  [address] [place] [address]                                [phone]

## 2020-05-01 NOTE — PROGRESS NOTES
Problem: Self Care Deficits Care Plan (Adult)  Goal: *Acute Goals and Plan of Care (Insert Text)  Description: FUNCTIONAL STATUS PRIOR TO ADMISSION: Patient was modified independent using a rolling walker and single point cane for functional mobility occasionally. Patient is retired. HOME SUPPORT: The patient lived alone with a daughter who lives nearby to provide assistance. Patient plans to stay with his daughter post discharge. Occupational Therapy Goals  Initiated 4/29/2020  1. Patient will perform ADLs standing 5 mins without fatigue or LOB with moderate assistance within 5 day(s). 2.  Patient will perform lower body ADLs with moderate assistance within 5 day(s). 3.  Patient will perform gathering ADL items high and low 2/2 with moderate assistance within 5 day(s). 4.  Patient will perform toilet transfers with moderate assistance within 5 day(s). 5.  Patient will perform all aspects of toileting with moderate assistance within 5 day(s). 6.  Patient will participate in cardiac/sternal upper extremity therapeutic exercise/activities to increase independence with ADLs with supervision/set-up for 5 minutes within 5 day(s). Outcome: Progressing Towards Goal  OCCUPATIONAL THERAPY TREATMENT  Patient: Bony Griffin (66 y.o. male)  Date: 5/1/2020  Diagnosis: CAD (coronary artery disease) [I25.10]   <principal problem not specified>  Procedure(s) (LRB):  ON PUMP CORONARY ARTERY BYPASS GRAFT X 3  WITH LEFT LEG EVH AND LIMA/CARDIOPULMONARY BYPASS/ GODWIN AND EPI AORTIC ULTRASOIND BY DR. Antolin Haney. (N/A) 3 Days Post-Op  Precautions: Fall, Sternal(labile BP)  Chart, occupational therapy assessment, plan of care, and goals were reviewed. ASSESSMENT  Patient continues with skilled OT services and is progressing towards goals. Session limited today by needing to stay in bed due to no underlying rhythm, to have pacemaker placed today at 12 p.m.       PLAN :  Patient continues to benefit from skilled intervention to address the above impairments. Continue treatment per established plan of care. to address goals. Recommend with staff: Recommend with nursing patient to complete as able in order to maintain strength, endurance and independence: ADLs with supervision/setup and once Egress Test completed OOB to chair 3x/day and mobilizing to the bathroom for toileting with 1 assist. Thank you for your assistance. Recommend next OT session: upper body ADLs s/p pacemaker    Recommendation for discharge: (in order for the patient to meet his/her long term goals)  Occupational therapy at least 2 days/week in the home AND ensure assist and/or supervision for safety with IADLs stating discharging to daughter's home temporarily 2* lives alone. This discharge recommendation:  Has not yet been discussed the attending provider and/or case management       SUBJECTIVE:   Patient stated That does feel better (after completing exercises).     OBJECTIVE DATA SUMMARY:   Cognitive/Behavioral Status:         Alert, oriented             Functional Mobility and Transfers for ADLs:  Bed Mobility:       Transfers:             Balance:       ADL Intervention:     Noted ABP increased when brought up his family but minimally during exercise. During exercise breaks thoroughly discussed to meet patient where he was and make applicable, instructed on stress management techniques - patient liked the deep breathing and imagery (going to [facility]). As well as giving himself a moment to talk about what is stressing him, negativity, etc. For max 10 mins and then move on to positive or stress management technique. Therapeutic Exercises:   Patient instructed on the benefits cardiac exercises while counting to ensure cognitive work out as well. Patient demonstrated with moderate A verbal cues throughout to attend to task, PLB; no cues for last two types of exercises and it's last set.  Instructed to perform again this afternoon as able on opposing side of pacemaker, can move B elbows-digits. Can perform ADLs this p.m. B elbows-digits. Patient relieved. CARDIAC   EXERCISE    Sets    Reps    Active  Active Assist    Passive  Self ROM    Comments    Shoulder flexion  3 10  [x]                            []                             []                             []                                Shoulder abduction  3 10 [x]                             []                             []                             []                                Scapular elevation  2 10 [x]                             []                              []                             []                                Scapular retraction  2 10 [x]                             []                             []                             []                                Trunk rotation     []                             []                             []                                Trunk sidebending     []                              []                             []                                            Activity Tolerance:   Fair  //72  SPO2 98%  RR 18-22  HR 90-95  Please refer to the flowsheet for vital signs taken during this treatment. After treatment patient left in no apparent distress:   Supine in bed and Call bell within reach    COMMUNICATION/COLLABORATION:   The patients plan of care was discussed with: Registered nurse.      Brittni Gauthier  Time Calculation: 27 mins

## 2020-05-01 NOTE — PROGRESS NOTES
Physical Therapy  5/1/2020    Chart reviewed. Patient to have PPM placed today at noon. Hold PT and f/u later today as able/appropriate. Plan to see daily over the weekend. Thank you.     Arielle Romero Mention, PT, DPT

## 2020-05-01 NOTE — PROGRESS NOTES
Cardiac Cath Lab Procedure Area Arrival Note:    Gayle Pinzon arrived to Cardiac Cath Lab, Procedure Area. Patient identifiers verified with NAME and DATE OF BIRTH. Procedure verified with patient. Consent forms verified. Allergies verified. Patient informed of procedure and plan of care. Questions answered with review. Patient voiced understanding of procedure and plan of care. Patient on cardiac monitor, non-invasive blood pressure, SPO2 monitor. Placed on O2 @ 2 lpm via nasal cannula. IV of normal saline on pump at 25 ml/hr. Patient status doing well with some problems : chest pain. Patient is A&Ox 4. Patient reports 3/10 chest pain. Patient medicated during procedure with orders obtained and verified by Dr. Izabela Alston. Refer to patients Cardiac Cath Lab PROCEDURE REPORT for vital signs, assessment, status, and response during procedure, printed at end of case. Printed report on chart or scanned into chart.

## 2020-05-01 NOTE — PROGRESS NOTES
I called and informed his daughter  He will remove dressing before leaving hospital     Future Appointments   Date Time Provider Willian Paul   8/4/2020  9:15 AM Maxine Melvin [facility]   8/4/2020  9:20 AM Sana Ojeda  [address]

## 2020-05-01 NOTE — PROCEDURES
Cardiac Procedure Note   Patient: Tremaine Forde  MRN: [id number]  SSN: [id number]   YOB: 1950 Age: 68 y.o.   Sex: male    Date of Procedure: 5/1/2020   Pre-procedure Diagnosis: third degree av block  Post-procedure Diagnosis: same  Procedure: Permanent Pacemaker Insertion  :  Dr. Magali Keith MD    Assistant(s):  None  Anesthesia: Moderate Sedation   Estimated Blood Loss: Less than 20 mL   Specimens Removed: None  Findings: RA anterior wall lead  RV apical lead  Complications: None   Implants: [employer] dual chamber pacer  Signed by:  Magali Keith MD  5/1/2020  2:04 PM

## 2020-05-02 ENCOUNTER — APPOINTMENT (OUTPATIENT)
Dept: GENERAL RADIOLOGY | Age: 70
DRG: 236 | End: 2020-05-02
Attending: NURSE PRACTITIONER
Payer: MEDICARE

## 2020-05-02 LAB
ALBUMIN SERPL-MCNC: 2.8 G/DL (ref 3.5–5)
ALBUMIN/GLOB SERPL: 0.9 {RATIO} (ref 1.1–2.2)
ALP SERPL-CCNC: 36 U/L (ref 45–117)
ALT SERPL-CCNC: 16 U/L (ref 12–78)
ANION GAP SERPL CALC-SCNC: 5 MMOL/L (ref 5–15)
AST SERPL-CCNC: 29 U/L (ref 15–37)
BILIRUB SERPL-MCNC: 0.8 MG/DL (ref 0.2–1)
BUN SERPL-MCNC: 27 MG/DL (ref 6–20)
BUN/CREAT SERPL: 20 (ref 12–20)
CALCIUM SERPL-MCNC: 8.5 MG/DL (ref 8.5–10.1)
CHLORIDE SERPL-SCNC: 102 MMOL/L (ref 97–108)
CO2 SERPL-SCNC: 28 MMOL/L (ref 21–32)
CREAT SERPL-MCNC: 1.32 MG/DL (ref 0.7–1.3)
ERYTHROCYTE [DISTWIDTH] IN BLOOD BY AUTOMATED COUNT: 16 % (ref 11.5–14.5)
GLOBULIN SER CALC-MCNC: 3.1 G/DL (ref 2–4)
GLUCOSE SERPL-MCNC: 112 MG/DL (ref 65–100)
HCT VFR BLD AUTO: 25.6 % (ref 36.6–50.3)
HGB BLD-MCNC: 8.2 G/DL (ref 12.1–17)
MAGNESIUM SERPL-MCNC: 2.7 MG/DL (ref 1.6–2.4)
MCH RBC QN AUTO: 26.8 PG (ref 26–34)
MCHC RBC AUTO-ENTMCNC: 32 G/DL (ref 30–36.5)
MCV RBC AUTO: 83.7 FL (ref 80–99)
NRBC # BLD: 0.02 K/UL (ref 0–0.01)
NRBC BLD-RTO: 0.3 PER 100 WBC
PLATELET # BLD AUTO: 108 K/UL (ref 150–400)
PMV BLD AUTO: 11.1 FL (ref 8.9–12.9)
POTASSIUM SERPL-SCNC: 3.8 MMOL/L (ref 3.5–5.1)
PROT SERPL-MCNC: 5.9 G/DL (ref 6.4–8.2)
RBC # BLD AUTO: 3.06 M/UL (ref 4.1–5.7)
SODIUM SERPL-SCNC: 135 MMOL/L (ref 136–145)
WBC # BLD AUTO: 7.4 K/UL (ref 4.1–11.1)

## 2020-05-02 PROCEDURE — 83735 ASSAY OF MAGNESIUM: CPT

## 2020-05-02 PROCEDURE — 74011250637 HC RX REV CODE- 250/637: Performed by: NURSE PRACTITIONER

## 2020-05-02 PROCEDURE — 97116 GAIT TRAINING THERAPY: CPT

## 2020-05-02 PROCEDURE — 97164 PT RE-EVAL EST PLAN CARE: CPT

## 2020-05-02 PROCEDURE — 74011250636 HC RX REV CODE- 250/636: Performed by: NURSE PRACTITIONER

## 2020-05-02 PROCEDURE — 74011000250 HC RX REV CODE- 250: Performed by: NURSE PRACTITIONER

## 2020-05-02 PROCEDURE — 51798 US URINE CAPACITY MEASURE: CPT

## 2020-05-02 PROCEDURE — 80053 COMPREHEN METABOLIC PANEL: CPT

## 2020-05-02 PROCEDURE — 85027 COMPLETE CBC AUTOMATED: CPT

## 2020-05-02 PROCEDURE — 71045 X-RAY EXAM CHEST 1 VIEW: CPT

## 2020-05-02 PROCEDURE — P9045 ALBUMIN (HUMAN), 5%, 250 ML: HCPCS | Performed by: NURSE PRACTITIONER

## 2020-05-02 PROCEDURE — 65660000000 HC RM CCU STEPDOWN

## 2020-05-02 PROCEDURE — 36415 COLL VENOUS BLD VENIPUNCTURE: CPT

## 2020-05-02 RX ORDER — ALBUMIN HUMAN 50 G/1000ML
12.5 SOLUTION INTRAVENOUS ONCE
Status: COMPLETED | OUTPATIENT
Start: 2020-05-02 | End: 2020-05-02

## 2020-05-02 RX ORDER — SODIUM CHLORIDE 0.9 % (FLUSH) 0.9 %
5-40 SYRINGE (ML) INJECTION EVERY 8 HOURS
Status: DISCONTINUED | OUTPATIENT
Start: 2020-05-02 | End: 2020-05-04 | Stop reason: HOSPADM

## 2020-05-02 RX ORDER — SODIUM CHLORIDE 0.9 % (FLUSH) 0.9 %
5-40 SYRINGE (ML) INJECTION AS NEEDED
Status: DISCONTINUED | OUTPATIENT
Start: 2020-05-02 | End: 2020-05-04 | Stop reason: HOSPADM

## 2020-05-02 RX ORDER — METOPROLOL TARTRATE 25 MG/1
12.5 TABLET, FILM COATED ORAL EVERY 12 HOURS
Status: DISCONTINUED | OUTPATIENT
Start: 2020-05-02 | End: 2020-05-04 | Stop reason: HOSPADM

## 2020-05-02 RX ORDER — LANOLIN ALCOHOL/MO/W.PET/CERES
1 CREAM (GRAM) TOPICAL
Status: DISCONTINUED | OUTPATIENT
Start: 2020-05-03 | End: 2020-05-04 | Stop reason: HOSPADM

## 2020-05-02 RX ORDER — HEPARIN SODIUM 5000 [USP'U]/ML
5000 INJECTION, SOLUTION INTRAVENOUS; SUBCUTANEOUS EVERY 8 HOURS
Status: DISCONTINUED | OUTPATIENT
Start: 2020-05-02 | End: 2020-05-04 | Stop reason: HOSPADM

## 2020-05-02 RX ADMIN — HEPARIN SODIUM 5000 UNITS: 5000 INJECTION INTRAVENOUS; SUBCUTANEOUS at 17:05

## 2020-05-02 RX ADMIN — PANTOPRAZOLE SODIUM 40 MG: 40 TABLET, DELAYED RELEASE ORAL at 08:39

## 2020-05-02 RX ADMIN — SENNOSIDES AND DOCUSATE SODIUM 1 TABLET: 8.6; 5 TABLET ORAL at 08:38

## 2020-05-02 RX ADMIN — CHLORHEXIDINE GLUCONATE 10 ML: 1.2 RINSE ORAL at 21:54

## 2020-05-02 RX ADMIN — EZETIMIBE 10 MG: 10 TABLET ORAL at 08:38

## 2020-05-02 RX ADMIN — CEPHALEXIN 250 MG: 250 CAPSULE ORAL at 21:51

## 2020-05-02 RX ADMIN — Medication 10 ML: at 21:54

## 2020-05-02 RX ADMIN — ACETAMINOPHEN 650 MG: 325 TABLET, FILM COATED ORAL at 04:43

## 2020-05-02 RX ADMIN — ALBUMIN (HUMAN) 12.5 G: 12.5 INJECTION, SOLUTION INTRAVENOUS at 10:45

## 2020-05-02 RX ADMIN — CEPHALEXIN 250 MG: 250 CAPSULE ORAL at 17:06

## 2020-05-02 RX ADMIN — CITALOPRAM HYDROBROMIDE 20 MG: 20 TABLET ORAL at 08:39

## 2020-05-02 RX ADMIN — Medication 10 ML: at 17:06

## 2020-05-02 RX ADMIN — Medication 10 ML: at 06:53

## 2020-05-02 RX ADMIN — ASPIRIN 81 MG 81 MG: 81 TABLET ORAL at 08:39

## 2020-05-02 RX ADMIN — Medication 10 ML: at 01:56

## 2020-05-02 RX ADMIN — POLYETHYLENE GLYCOL 3350 17 G: 17 POWDER, FOR SOLUTION ORAL at 08:39

## 2020-05-02 RX ADMIN — SENNOSIDES AND DOCUSATE SODIUM 1 TABLET: 8.6; 5 TABLET ORAL at 17:06

## 2020-05-02 RX ADMIN — MUPIROCIN: 20 OINTMENT TOPICAL at 17:06

## 2020-05-02 RX ADMIN — MAGNESIUM OXIDE TAB 400 MG (241.3 MG ELEMENTAL MG) 400 MG: 400 (241.3 MG) TAB at 17:06

## 2020-05-02 RX ADMIN — METOPROLOL TARTRATE 12.5 MG: 25 TABLET, FILM COATED ORAL at 21:52

## 2020-05-02 RX ADMIN — MAGNESIUM OXIDE TAB 400 MG (241.3 MG ELEMENTAL MG) 400 MG: 400 (241.3 MG) TAB at 08:39

## 2020-05-02 RX ADMIN — HEPARIN SODIUM 5000 UNITS: 5000 INJECTION INTRAVENOUS; SUBCUTANEOUS at 08:39

## 2020-05-02 RX ADMIN — METOPROLOL TARTRATE 12.5 MG: 25 TABLET, FILM COATED ORAL at 08:38

## 2020-05-02 RX ADMIN — CEFAZOLIN SODIUM 2 G: 300 INJECTION, POWDER, LYOPHILIZED, FOR SOLUTION INTRAVENOUS at 01:56

## 2020-05-02 NOTE — PROGRESS NOTES
[facility] ICU Progress Note    Admit Date: 2020  POD:  4 Day Post-Op    Procedure:  Procedure(s):  ON PUMP CORONARY ARTERY BYPASS GRAFT X 3  WITH LEFT LEG EVH AND LIMA/CARDIOPULMONARY BYPASS/ GODWIN AND EPI AORTIC ULTRASOIND BY DR. Tanya Rosas. Subjective:   Pt seen with Dr. Opal Wilkinson. Afebrile, on RA. PPM placed yesterday. Low UO overnight. Paced rate 104. Objective:   Vitals:  Blood pressure 115/69, pulse 91, temperature 99.9 °F (37.7 °C), resp. rate 13, weight 195 lb 6.4 oz (88.6 kg), SpO2 96 %. Temp (24hrs), Av.2 °F (37.3 °C), Min:98.4 °F (36.9 °C), Max:99.9 °F (37.7 °C)    EKG/Rhythm: 100% paced at 104    CT Output: 150 ml    Oxygen Therapy:  Oxygen Therapy  O2 Sat (%): 96 % (20 0600)  Pulse via Oximetry: 89 beats per minute (20 1600)  O2 Device: Room air (20 2000)  O2 Flow Rate (L/min): 2 l/min (20 0800)  FIO2 (%): 50 % (20 1800)    CXR:   CXR Results  (Last 48 hours)               20 041  XR CHEST PORT Final result    Impression:  IMPRESSION:   No significant interval change. Narrative:  PORTABLE CHEST RADIOGRAPH/S: 2020 4:11 AM       Clinical history: Chest tube   INDICATION:   Chest tube   COMPARISON: 2020       FINDINGS:   AP portable upright view of the chest demonstrates a stable significantly   enlarged cardiopericardial silhouette. The lungs are slightly improved in   volume. Mild interstitial edema with basilar atelectasis and effusion greater   on the left. Mediastinal and pleural drains remain. . A vascular sheath remains   in the right internal jugular vein. The osseous structures are unremarkable. Patient is on a cardiac monitor. 20  XR CHEST PORT Final result    Impression:  IMPRESSION:    No evidence of pneumothorax, status post left-sided pacer placement.            Narrative:  INDICATION:    Evaluate for pneumothorax        EXAMINATION:  AP CHEST, PORTABLE       COMPARISON: Earlier today FINDINGS: Single AP portable view of the chest at 1859 hours demonstrates   interval placement of a left-sided pacer device with 2 intracardiac leads. There   is no evidence of pneumothorax. Remainder of the tubes and lines are unchanged. The cardiomediastinal silhouette is stable. There is no new airspace disease. Left basilar opacification has improved. 05/01/20 0617  XR CHEST PORT Final result    Impression:  IMPRESSION:   Improved lung volumes. Narrative:  PORTABLE CHEST RADIOGRAPH/S: 5/1/2020 6:17 AM       Clinical history: postop heart   INDICATION:   postop heart   COMPARISON: 4/30/2020       FINDINGS:   AP portable upright view of the chest demonstrates a stable significantly   enlarged cardiopericardial silhouette. The lungs are slightly improved in   volume. Mild interstitial edema with basilar atelectasis and effusion greater   on the left. Mediastinal and pleural drains remain. . A vascular sheath remains   in the right internal jugular vein. The osseous structures are unremarkable. Patient is on a cardiac monitor. Admission Weight: Last Weight   Weight: 186 lb 4.6 oz (84.5 kg) Weight: 195 lb 6.4 oz (88.6 kg)     Intake / Output / Drain:  Current Shift: No intake/output data recorded. Last 24 hrs.:     Intake/Output Summary (Last 24 hours) at 5/2/2020 0816  Last data filed at 5/2/2020 0654  Gross per 24 hour   Intake 190 ml   Output 1325 ml   Net -1135 ml       EXAM:  General:  Pleasant, no apparent distress                                      Lungs:   Diminished throughout   Incision:  Prevena dsg intact   Heart:  Regular rate and rhythm - paced, S1, S2 normal, no murmur, click, or gallop. Abdomen:   Soft, non-tender. Bowel sounds hypoactive. No masses,  No organomegaly. Extremities:  +1 BLE edema. Palpable pulses bilat    Neurologic:  Gross motor and sensory apparatus intact.      Labs:   Recent Labs     05/02/20  0414 05/01/20  0633   WBC 7.4 --    HGB 8.2*  --    HCT 25.6*  --    *  --    *  --    K 3.8  --    BUN 27*  --    CREA 1.32*  --    *  --    GLUCPOC  --  116*        Assessment:     Active Problems:    CAD (coronary artery disease) (2020)      S/P CABG x 3 (2020)      Overview: ON PUMP CORONARY ARTERY BYPASS GRAFT X 3  WITH LIMA to LAD, RSVG to RCA,       RSVG to ramus      Left greater saphenous vein EVH       Postoperative anemia due to acute blood loss (2020)      Pacemaker (2020)      Overview: Medtronic dual chamber 2020      Third degree AV block ([address] (2020)         Plan/Recommendations/Medical Decision Makin. S/p CABG: Continue ASA, pt intolerant of statins - resumed zetia. Start low dose BB. 2. CHB s/p PPM: PPM placed by Dr. Marshall Richard. Cont keflex for PPM ppx. 3. Hypotension: resolved. 4. Interstitial edema, pleural effusions, atelectasis: Increase IS use and activity, wean O2 sats to > 92%. Diureses as needed. 5. HTN: start low dose BB    6. HLD: does not tolerate statin, on zetia, coq10 PTA. Continue Zetia. Can resume CoQ-10 on discharge. 7. DM type II: a1c 5.8, on januvia PTA. DTC following-AccuCheck AC & HS with SSI    8. CKD stage III: Cr 1.32, baseline appears to be around 1.4-1.5. UO low overnight, bladder scan this morning to check for retention. 9. Hx DVT/PE: developed PE/DVT after bowel resection in , not on anticoagulation prior to surgery. Must use his SCDs at all times. Start sq heparin today. 10. Chronic pain/peripheral neuropathy: On gabapentin prn PTA - resumed     11. Anxiety: On celexa, ativan PTA    12. Alcohol abuse: CIWA monitoring, has received 0.5 mg PO ativan every evening, has not needed any PRN ativan. Monitor. Now discontinued since > 72 hrs and has not needed during this admission    13. Prostate CA: PSA 10, sees Dr. Loyd Khan (urology) and Dr. Bautista Gardner (radiation oncology), was planning on scheduling radiation seed implant soon.  Monitor for urinary retention once Givens removed, bladder scan this morning. 14. Thrombocytopenia: improved, continue ASA. Switched pepcid to protonix. 15. Anemia:  monitor. Start iron once diet advanced. Transfuse for Hgb < 7     16. DVT/GI ppx: SCD's, protonix. SQ heparin    Dispo: PT/OT/Cardiac Rehab eval. Dc CT this morning. Transfer to stepdown.      Signed By: Rian Pope NP

## 2020-05-02 NOTE — PROGRESS NOTES
2000 Received report from [facility]. Pt assisted back to bed from chair. Needed assistance of 2 staff.    0600 Pt pleasant and cooperative with care. Slept well this shift.     0700 Stood to void. Will need to be bladder scanned when he returns to bed. Voided 50 cc.     0800 Bedside, Verbal and Written shift change report given to [facility]   (oncoming nurse). Report included the following information SBAR, Kardex, OR Summary, Intake/Output, MAR and Recent Results.

## 2020-05-02 NOTE — PROGRESS NOTES
1430: TRANSFER - IN REPORT:    Verbal report received from Leonardo(name) on Renetta Vega  being received from CVICU(unit) for routine progression of care      Report consisted of patients Situation, Background, Assessment and   Recommendations(SBAR). Information from the following report(s) SBAR, Kardex, [facility], Recent Results and Cardiac Rhythm Paced was reviewed with the receiving nurse. Opportunity for questions and clarification was provided. Assessment completed upon patients arrival to unit and care assumed. 1930: Bedside shift change report given to Nevaeh Marques (oncoming nurse) by Annalee Lambert (offgoing nurse). Report included the following information SBAR, Kardex, [facility], Recent Results and Cardiac Rhythm Paced.

## 2020-05-02 NOTE — PROGRESS NOTES
Problem: Mobility Impaired (Adult and Pediatric)  Goal: *Acute Goals and Plan of Care (Insert Text)  Description: FUNCTIONAL STATUS PRIOR TO ADMISSION: Patient was independent and active without use of DME. Denies falls. HOME SUPPORT PRIOR TO ADMISSION: The patient lived alone and has 4 daughters local to provide assistance. Plans to d/c to one of his daughter's homes for additional support post op. Physical Therapy Goals  Initiated 4/29/2020- Goals remain appropriate and updated goal #5 on 5/2/2020  1. Patient will move from supine to sit and sit to supine , scoot up and down and roll side to side in bed with modified independence within 5 days. 2.  Patient will perform sit to/from stand with modified independence within 5 days. 3.  Patient will ambulate 150 feet with least restrictive assistive device and modified independence within 5 days. 4.  Patient will ascend/descend 12 stairs with right handrail(s) with modified independence within 5 days. 5.  Patient will perform cardiac exercises per protocol with modified independence within 5 days. 6.  Patient will verbally recall and functionally demonstrate mindful-based movements (\"move in the tube\") principles and while complying with PPM precautions on L UE, without cues within 5 days. -updated on re-evaluation 5/2/2020          Outcome: Progressing Towards Goal   PHYSICAL THERAPY REEVALUATION  Patient: Cruzito Andino (89 y.o. male)  Date: 5/2/2020  Primary Diagnosis: CAD (coronary artery disease) [I25.10]  Procedure(s) (LRB):  INSERT PPM DUAL (N/A) 1 Day Post-Op   Precautions:   Fall, Sternal(PPM precautions; move in the tube R )      ASSESSMENT  Based on the objective data described below, the patient presents with great improvement in strength, balance, endurance, activity tolerance, and new PPM precautions following PPM insertion POD 1. Patient educated on precautions and utilized sling during session.  Now on room air, and tolerated ambulation throughout CVICU with CGA and no LOB, HR 90's. Patient reported fatigue following activity but did not appear SOB. Smiling and proud of his progress. Anticipate d/c home. Current Level of Function Impacting Discharge (mobility/balance): CGA    Functional Outcome Measure: The patient scored 22/28 on the Tinetti outcome measure which is indicative of high fall risk. Other factors to consider for discharge: new PPM precautions     Patient will benefit from skilled therapy intervention to address the above noted impairments. PLAN :  Recommendations and Planned Interventions: bed mobility training, transfer training, gait training, therapeutic exercises, neuromuscular re-education, patient and family training/education, and therapeutic activities      Frequency/Duration: Patient will be followed by physical therapy:  daily to address goals. Recommendation for discharge: (in order for the patient to meet his/her long term goals)  Physical therapy at least 2 days/week in the home     This discharge recommendation:  Has been made in collaboration with the attending provider and/or case management    Equipment recommendations for successful discharge (if) home: none         SUBJECTIVE:   Patient stated Yes it does.  . ..feel good to walk    OBJECTIVE DATA SUMMARY:   HISTORY:    Past Medical History:   Diagnosis Date    Arthritis     Chronic kidney disease     Stage 3    Chronic pain     Abdoman, back, fingers per daughter    Diabetes [facility])     DIET CONTROLLED    Hypertension     PE (pulmonary embolism)     Pneumonia     Psychiatric disorder     Depression     Past Surgical History:   Procedure Laterality Date    ABDOMEN SURGERY PROC UNLISTED      bowel resection    HX BACK SURGERY  2014    HX GI  11/2012    bowel resection    HX ORTHOPAEDIC      amputated left 4th finger    [place] INS NEW/RPLCMT PRM PM W/TRANSV ELTRD ATRIAL&VENT N/A 5/1/2020    INSERT PPM DUAL performed by Shaun Steinberg MD at [facility]     Hospital course since last seen and reason for reevaluation: PPM POD 1    Personal factors and/or comorbidities impacting plan of care: PMH    Home Situation  Home Environment: Private residence  # Steps to Enter: 5  One/Two Story Residence: Split level  # of Interior Steps: 7  Living Alone: Yes(will DC to daughter's house)  Support Systems: Child(anca), Family member(s)  Patient Expects to be Discharged to[de-identified] Private residence(daughter's house)  Current DME Used/Available at Home: Henretta Kenny, straight, Walker, rolling  Tub or Shower Type: Tub/Shower combination    EXAMINATION/PRESENTATION/DECISION MAKING:   Critical Behavior:  Neurologic State: Alert  Orientation Level: Oriented X4  Cognition: Appropriate decision making, Appropriate for age attention/concentration, Appropriate safety awareness, Follows commands  Safety/Judgement: Decreased insight into deficits, Decreased awareness of need for safety  Hearing: Auditory  Auditory Impairment: None  Range Of Motion:  AROM: Generally decreased, functional  Strength:    Strength:  Within functional limits  Tone & Sensation:   Tone: Normal  Coordination:  Coordination: Within functional limits  Functional Mobility:  Bed Mobility:  Rolling: Minimum assistance  Supine to Sit: Minimum assistance  Scooting: Contact guard assistance  Transfers:  Sit to Stand: Contact guard assistance  Stand to Sit: Contact guard assistance  Balance:   Sitting: Intact  Standing: Intact  Ambulation/Gait Training:  Distance (ft): 100 Feet (ft)  Assistive Device: Gait belt(room air)  Ambulation - Level of Assistance: Contact guard assistance  Gait Abnormalities: Decreased step clearance  Base of Support: Widened  Speed/Lois: Pace decreased (<100 feet/min)  Step Length: Right shortened;Left shortened      Functional Measure:  Tinetti test:    Sitting Balance: 1  Arises: 1  Attempts to Rise: 2  Immediate Standing Balance: 2  Standing Balance: 1  Nudged: 1  Eyes Closed: 1  Turn 360 Degrees - Continuous/Discontinuous: 1  Turn 360 Degrees - Steady/Unsteady: 1  Sitting Down: 1  Balance Score: 12 Balance total score  Indication of Gait: 1  R Step Length/Height: 1  L Step Length/Height: 1  R Foot Clearance: 1  L Foot Clearance: 1  Step Symmetry: 1  Step Continuity: 1  Path: 1  Trunk: 2  Walking Time: 0  Gait Score: 10 Gait total score  Total Score: 22/28 Overall total score         Tinetti Tool Score Risk of Falls  <19 = High Fall Risk  19-24 = Moderate Fall Risk  25-28 = Low Fall Risk  Tinetti ME. Performance-Oriented Assessment of Mobility Problems in Elderly Patients. [address] 64; [id number]. (Scoring Description: PT Bulletin Feb. 10, 1993)    Older adults: Marii Barry et al, 2009; n = 1000 [facility] elderly evaluated with ABC, PENNIE, ADL, and IADL)  · Mean PENNIE score for males aged 69-68 years = 26.21(3.40)  · Mean PENNIE score for females age 69-68 years = 25.16(4.30)  · Mean PENNIE score for males over 80 years = 23.29(6.02)  · Mean PENNIE score for females over 80 years = 17.20(8.32)              Pain Rating:  Pain with movement, did not rate    Activity Tolerance:   Good and SpO2 stable on RA  Please refer to the flowsheet for vital signs taken during this treatment. After treatment patient left in no apparent distress:   Sitting in chair and Call bell within reach    COMMUNICATION/EDUCATION:   The patients plan of care was discussed with: Registered nurse. Fall prevention education was provided and the patient/caregiver indicated understanding., Patient/family have participated as able in goal setting and plan of care. , and Patient/family agree to work toward stated goals and plan of care.     Thank you for this referral.  Michael Bravo, PT, DPT   Time Calculation: 13 mins

## 2020-05-02 NOTE — PROGRESS NOTES
0800- bedside report received. Patient in chair without complaints. America Snooks, NP at bedside to remove chest tubes. 1310- right MAC removed without difficulty, pressure held and dressing applied. 1430- TRANSFER - OUT REPORT:    Verbal report given to RN(name) on Gayle Shown  being transferred to CVSU(unit) for routine progression of care       Report consisted of patients Situation, Background, Assessment and   Recommendations(SBAR). Information from the following report(s) SBAR, Kardex, Recent Results and Cardiac Rhythm paced was reviewed with the receiving nurse. Lines:   Double Lumen 04/28/20 (Active)   Criteria for Appropriate Use Hemodynamically unstable, requiring monitoring lines, vasopressors, or volume resuscitation 5/2/2020  8:00 AM   Infiltration Assessment 0 5/2/2020  8:00 AM   Affected Extremity/Extremities Color distal to insertion site pink (or appropriate for race) 5/2/2020  8:00 AM   Date of Last Dressing Change 04/28/20 5/2/2020  8:00 AM   Dressing Status Clean, dry, & intact 5/2/2020  8:00 AM   Dressing Type Disk with Chlorhexadine gluconate (CHG); Transparent 5/2/2020  8:00 AM   Action Taken Open ports on tubing capped 5/2/2020  8:00 AM   Proximal Hub Color/Line Status White;Capped;Flushed 5/2/2020  8:00 AM   Positive Blood Return (Medial Site) Yes 5/2/2020  8:00 AM   Distal Hub Color/Line Status Brown;Capped;Flushed 5/2/2020  8:00 AM   Positive Blood Return (Lateral Site) Yes 5/2/2020  8:00 AM   Alcohol Cap Used Yes 5/2/2020  8:00 AM       Peripheral IV 04/28/20 Posterior;Right Hand (Active)   Phlebitis Assessment 0 5/2/2020  8:00 AM   Infiltration Assessment 0 5/2/2020  8:00 AM   Dressing Status Clean, dry, & intact 5/2/2020  8:00 AM   Dressing Type Transparent 5/2/2020  8:00 AM   Hub Color/Line Status Green;Capped;Flushed 5/2/2020  8:00 AM   Action Taken Open ports on tubing capped 5/2/2020  8:00 AM   Alcohol Cap Used Yes 5/2/2020  8:00 AM       Peripheral IV 05/02/20 Left Hand (Active)   Phlebitis Assessment 0 5/2/2020  8:00 AM   Infiltration Assessment 0 5/2/2020  8:00 AM   Dressing Status Clean, dry, & intact 5/2/2020  8:00 AM   Dressing Type Transparent 5/2/2020  8:00 AM   Hub Color/Line Status Pink;Capped;Flushed 5/2/2020  8:00 AM   Action Taken Open ports on tubing capped 5/2/2020  8:00 AM   Alcohol Cap Used Yes 5/2/2020  8:00 AM        Opportunity for questions and clarification was provided. Patient transported with:   Monitor  Registered Nurse    8153- patient walked from CVICU 34 to CVSU 451 with standby assist. He tolerated walk well, VSS.

## 2020-05-03 ENCOUNTER — HOME HEALTH ADMISSION (OUTPATIENT)
Dept: HOME HEALTH SERVICES | Facility: HOME HEALTH | Age: 70
End: 2020-05-03
Payer: MEDICARE

## 2020-05-03 ENCOUNTER — APPOINTMENT (OUTPATIENT)
Dept: GENERAL RADIOLOGY | Age: 70
DRG: 236 | End: 2020-05-03
Attending: NURSE PRACTITIONER
Payer: MEDICARE

## 2020-05-03 LAB
ANION GAP SERPL CALC-SCNC: 5 MMOL/L (ref 5–15)
APTT PPP: 29.1 SEC (ref 22.1–32)
BUN SERPL-MCNC: 23 MG/DL (ref 6–20)
BUN/CREAT SERPL: 18 (ref 12–20)
CALCIUM SERPL-MCNC: 8.7 MG/DL (ref 8.5–10.1)
CHLORIDE SERPL-SCNC: 102 MMOL/L (ref 97–108)
CO2 SERPL-SCNC: 29 MMOL/L (ref 21–32)
CREAT SERPL-MCNC: 1.27 MG/DL (ref 0.7–1.3)
ERYTHROCYTE [DISTWIDTH] IN BLOOD BY AUTOMATED COUNT: 15.9 % (ref 11.5–14.5)
GLUCOSE SERPL-MCNC: 132 MG/DL (ref 65–100)
HCT VFR BLD AUTO: 27.5 % (ref 36.6–50.3)
HGB BLD-MCNC: 8.7 G/DL (ref 12.1–17)
INR PPP: 1 (ref 0.9–1.1)
MAGNESIUM SERPL-MCNC: 2.7 MG/DL (ref 1.6–2.4)
MCH RBC QN AUTO: 26.5 PG (ref 26–34)
MCHC RBC AUTO-ENTMCNC: 31.6 G/DL (ref 30–36.5)
MCV RBC AUTO: 83.8 FL (ref 80–99)
NRBC # BLD: 0.02 K/UL (ref 0–0.01)
NRBC BLD-RTO: 0.3 PER 100 WBC
PLATELET # BLD AUTO: 145 K/UL (ref 150–400)
PMV BLD AUTO: 10 FL (ref 8.9–12.9)
POTASSIUM SERPL-SCNC: 4.1 MMOL/L (ref 3.5–5.1)
PROTHROMBIN TIME: 10.2 SEC (ref 9–11.1)
RBC # BLD AUTO: 3.28 M/UL (ref 4.1–5.7)
SODIUM SERPL-SCNC: 136 MMOL/L (ref 136–145)
THERAPEUTIC RANGE,PTTT: NORMAL SECS (ref 58–77)
WBC # BLD AUTO: 7.4 K/UL (ref 4.1–11.1)

## 2020-05-03 PROCEDURE — 65660000001 HC RM ICU INTERMED STEPDOWN

## 2020-05-03 PROCEDURE — 85730 THROMBOPLASTIN TIME PARTIAL: CPT

## 2020-05-03 PROCEDURE — 83735 ASSAY OF MAGNESIUM: CPT

## 2020-05-03 PROCEDURE — 36415 COLL VENOUS BLD VENIPUNCTURE: CPT

## 2020-05-03 PROCEDURE — 80048 BASIC METABOLIC PNL TOTAL CA: CPT

## 2020-05-03 PROCEDURE — 97116 GAIT TRAINING THERAPY: CPT

## 2020-05-03 PROCEDURE — 74011250637 HC RX REV CODE- 250/637: Performed by: NURSE PRACTITIONER

## 2020-05-03 PROCEDURE — 85027 COMPLETE CBC AUTOMATED: CPT

## 2020-05-03 PROCEDURE — 71046 X-RAY EXAM CHEST 2 VIEWS: CPT

## 2020-05-03 PROCEDURE — 85610 PROTHROMBIN TIME: CPT

## 2020-05-03 PROCEDURE — 74011250636 HC RX REV CODE- 250/636: Performed by: NURSE PRACTITIONER

## 2020-05-03 RX ADMIN — HEPARIN SODIUM 5000 UNITS: 5000 INJECTION INTRAVENOUS; SUBCUTANEOUS at 08:54

## 2020-05-03 RX ADMIN — CITALOPRAM HYDROBROMIDE 20 MG: 20 TABLET ORAL at 08:55

## 2020-05-03 RX ADMIN — Medication 10 ML: at 06:49

## 2020-05-03 RX ADMIN — FERROUS SULFATE TAB 325 MG (65 MG ELEMENTAL FE) 325 MG: 325 (65 FE) TAB at 08:55

## 2020-05-03 RX ADMIN — CHLORHEXIDINE GLUCONATE 10 ML: 1.2 RINSE ORAL at 08:54

## 2020-05-03 RX ADMIN — METOPROLOL TARTRATE 12.5 MG: 25 TABLET, FILM COATED ORAL at 21:20

## 2020-05-03 RX ADMIN — MUPIROCIN: 20 OINTMENT TOPICAL at 09:02

## 2020-05-03 RX ADMIN — PANTOPRAZOLE SODIUM 40 MG: 40 TABLET, DELAYED RELEASE ORAL at 06:49

## 2020-05-03 RX ADMIN — MAGNESIUM OXIDE TAB 400 MG (241.3 MG ELEMENTAL MG) 400 MG: 400 (241.3 MG) TAB at 17:52

## 2020-05-03 RX ADMIN — SENNOSIDES AND DOCUSATE SODIUM 1 TABLET: 8.6; 5 TABLET ORAL at 17:52

## 2020-05-03 RX ADMIN — METOPROLOL TARTRATE 12.5 MG: 25 TABLET, FILM COATED ORAL at 08:54

## 2020-05-03 RX ADMIN — SENNOSIDES AND DOCUSATE SODIUM 1 TABLET: 8.6; 5 TABLET ORAL at 08:55

## 2020-05-03 RX ADMIN — Medication 10 ML: at 14:00

## 2020-05-03 RX ADMIN — MAGNESIUM OXIDE TAB 400 MG (241.3 MG ELEMENTAL MG) 400 MG: 400 (241.3 MG) TAB at 08:55

## 2020-05-03 RX ADMIN — POLYETHYLENE GLYCOL 3350 17 G: 17 POWDER, FOR SOLUTION ORAL at 08:54

## 2020-05-03 RX ADMIN — HEPARIN SODIUM 5000 UNITS: 5000 INJECTION INTRAVENOUS; SUBCUTANEOUS at 17:52

## 2020-05-03 RX ADMIN — CEPHALEXIN 250 MG: 250 CAPSULE ORAL at 17:52

## 2020-05-03 RX ADMIN — CEPHALEXIN 250 MG: 250 CAPSULE ORAL at 21:21

## 2020-05-03 RX ADMIN — CEPHALEXIN 250 MG: 250 CAPSULE ORAL at 08:55

## 2020-05-03 RX ADMIN — HEPARIN SODIUM 5000 UNITS: 5000 INJECTION INTRAVENOUS; SUBCUTANEOUS at 01:01

## 2020-05-03 RX ADMIN — CHLORHEXIDINE GLUCONATE 10 ML: 1.2 RINSE ORAL at 21:19

## 2020-05-03 RX ADMIN — EZETIMIBE 10 MG: 10 TABLET ORAL at 08:55

## 2020-05-03 RX ADMIN — Medication 10 ML: at 21:20

## 2020-05-03 RX ADMIN — ASPIRIN 81 MG 81 MG: 81 TABLET ORAL at 08:55

## 2020-05-03 NOTE — PROGRESS NOTES
TRANSITION OF CARE  To daughter's home with home health: [facility]. CM met with patient who said that he will be discharged to home of daughter: Shyann Mcmahon [address], [facility] cell [phone]). Patient deferred choice of home health providers to daughter Elyssa Pearson. CM spoke with daughter Elyssa Pearson via phone at the patient's bedside and offered choice of home health providers. Daughter selected [facility]. CM placed Freedom of Choice form on chart. [facility], and the response is pending. Addendum--CM follow up. CM received Yes response to referral to [facility].  JOSE LUIS upajoel FOOTE.

## 2020-05-03 NOTE — PROGRESS NOTES
0730 Bedside shift change report given to Quita Bang RN (oncoming nurse) by Gayle Villarreal RN (offgoing nurse). Report included the following information SBAR, Kardex, Intake/Output, MAR and Recent Results. 0810 Pt off unit to xray. 0825 Pt back on unit and tele confirmed with monitor tech. 1000 RN educated pt on importance of accurate output. Hat placed in toilet and urinal left at bedside. 1300 Pt ambulated to bathroom twice without using urinal or hat. Hat located besdie toilet. RN reeducated pt on the importance of measuring output. Pt refusing to have output measure. 1930 Bedside shift change report given to Gayle Villarreal RN (oncoming nurse) by Quita Bang RN (offgoing nurse). Report included the following information SBAR, Kardex, Intake/Output, MAR and Recent Results.

## 2020-05-03 NOTE — PROGRESS NOTES
[facility] ICU Progress Note    Admit Date: 2020  POD:  5 Day Post-Op    Procedure:  Procedure(s):  ON PUMP CORONARY ARTERY BYPASS GRAFT X 3  WITH LEFT LEG EVH AND LIMA/CARDIOPULMONARY BYPASS/ GODWIN AND EPI AORTIC ULTRASOIND BY DR. Lei West. Subjective:   Pt seen with Dr. Daya Lanier. Afebrile, on RA. Up in chair. Feels well this morning. Objective:   Vitals:  Blood pressure 135/69, pulse 95, temperature 98.3 °F (36.8 °C), resp. rate 18, weight 192 lb 14.4 oz (87.5 kg), SpO2 99 %. Temp (24hrs), Av.4 °F (36.9 °C), Min:98 °F (36.7 °C), Max:98.6 °F (37 °C)    EKG/Rhythm: 100% paced 90s    Oxygen Therapy:  Oxygen Therapy  O2 Sat (%): 99 % (20 0735)  Pulse via Oximetry: 89 beats per minute (20 1600)  O2 Device: Room air (20 0735)  O2 Flow Rate (L/min): 2 l/min (20 0800)  FIO2 (%): 50 % (20 1800)    CXR:   CXR Results  (Last 48 hours)               20  XR CHEST PORT Final result    Impression:  IMPRESSION:   No significant interval change. Narrative:  PORTABLE CHEST RADIOGRAPH/S: 2020 4:11 AM       Clinical history: Chest tube   INDICATION:   Chest tube   COMPARISON: 2020       FINDINGS:   AP portable upright view of the chest demonstrates a stable significantly   enlarged cardiopericardial silhouette. The lungs are slightly improved in   volume. Mild interstitial edema with basilar atelectasis and effusion greater   on the left. Mediastinal and pleural drains remain. . A vascular sheath remains   in the right internal jugular vein. The osseous structures are unremarkable. Patient is on a cardiac monitor. 20  XR CHEST PORT Final result    Impression:  IMPRESSION:    No evidence of pneumothorax, status post left-sided pacer placement.            Narrative:  INDICATION:    Evaluate for pneumothorax        EXAMINATION:  AP CHEST, PORTABLE       COMPARISON: Earlier today       FINDINGS: Single AP portable view of the chest at 1859 hours demonstrates   interval placement of a left-sided pacer device with 2 intracardiac leads. There   is no evidence of pneumothorax. Remainder of the tubes and lines are unchanged. The cardiomediastinal silhouette is stable. There is no new airspace disease. Left basilar opacification has improved. Admission Weight: Last Weight   Weight: 186 lb 4.6 oz (84.5 kg) Weight: 192 lb 14.4 oz (87.5 kg)     Intake / Output / Drain:  Current Shift: No intake/output data recorded. Last 24 hrs.:     Intake/Output Summary (Last 24 hours) at 5/3/2020 0823  Last data filed at 5/2/2020 2300  Gross per 24 hour   Intake 1250 ml   Output 450 ml   Net 800 ml       EXAM:  General:  Pleasant, no apparent distress                                      Lungs:   Diminished throughout   Incision:   no erythema, drainage, or swelling   Heart:  Regular rate and rhythm - paced, S1, S2 normal, no murmur, click, or gallop. Abdomen:   Soft, non-tender. Bowel sounds hypoactive. No masses,  No organomegaly. Extremities:  +1 BLE edema. Palpable pulses bilat    Neurologic:  Gross motor and sensory apparatus intact. Labs:   Recent Labs     05/03/20  0509  05/01/20  0633   WBC 7.4   < >  --    HGB 8.7*   < >  --    HCT 27.5*   < >  --    *   < >  --       < >  --    K 4.1   < >  --    BUN 23*   < >  --    CREA 1.27   < >  --    *   < >  --    GLUCPOC  --   --  116*   INR 1.0  --   --     < > = values in this interval not displayed.         Assessment:     Active Problems:    CAD (coronary artery disease) (4/27/2020)      S/P CABG x 3 (4/28/2020)      Overview: ON PUMP CORONARY ARTERY BYPASS GRAFT X 3  WITH LIMA to LAD, RSVG to RCA,       RSVG to ramus      Left greater saphenous vein EVH       Postoperative anemia due to acute blood loss (4/29/2020)      Pacemaker (5/1/2020)      Overview: Medtronic dual chamber 5/1/2020      Third degree AV block ([address] (5/1/2020) Plan/Recommendations/Medical Decision Makin. S/p CABG: Continue ASA, pt intolerant of statins - resumed zetia. Cont BB    2. CHB s/p PPM: PPM placed by Dr. Carmelo Funk. Cont keflex for PPM ppx. 3. Hypotension: resolved. 4. Interstitial edema, pleural effusions, atelectasis: Increase IS use and activity, wean O2 sats to > 92%. Diureses as needed. 5. HTN: Cont BB    6. HLD: does not tolerate statin, on zetia, coq10 PTA. Continue Zetia. Can resume CoQ-10 on discharge. 7. DM type II: a1c 5.8, on januvia PTA. DTC following-AccuCheck AC & HS with SSI    8. CKD stage III: Cr 1.27, baseline appears to be around 1.4-1.5. Low urine output recorded, pt reports that he has voided multiple times. 9. Hx DVT/PE: developed PE/DVT after bowel resection in , not on anticoagulation prior to surgery. Must use his SCDs at all times. Cont sq heparin today. 10. Chronic pain/peripheral neuropathy: On gabapentin prn PTA - resumed     11. Anxiety: On celexa, ativan PTA    12. Alcohol abuse: PRN avitan, never needed any PRN ativan, CIWA discontinued    13. Prostate CA: PSA 10, sees Dr. Genia Gomez (urology) and Dr. Scott Allen (radiation oncology), was planning on scheduling radiation seed implant soon. Monitor for urinary retention once Givens removed. 14. Thrombocytopenia: improved, continue ASA. Switched pepcid to protonix. 15. Anemia:  monitor. Cont PO iron. Transfuse for Hgb < 7     16. DVT/GI ppx: SCD's, protonix. SQ heparin    Dispo: PT/OT/Cardiac Rehab eval. Anticipate dc home tomorrow. Pt reports that he will be staying with family on discharge.     Signed By: Kay Webster NP

## 2020-05-03 NOTE — PROGRESS NOTES
Problem: Mobility Impaired (Adult and Pediatric)  Goal: *Acute Goals and Plan of Care (Insert Text)  Description: FUNCTIONAL STATUS PRIOR TO ADMISSION: Patient was independent and active without use of DME. Denies falls. HOME SUPPORT PRIOR TO ADMISSION: The patient lived alone and has 4 daughters local to provide assistance. Plans to d/c to one of his daughter's homes for additional support post op. Physical Therapy Goals  Initiated 4/29/2020- Goals remain appropriate and updated goal #5 on 5/2/2020  1. Patient will move from supine to sit and sit to supine , scoot up and down and roll side to side in bed with modified independence within 5 days. 2.  Patient will perform sit to/from stand with modified independence within 5 days. 3.  Patient will ambulate 150 feet with least restrictive assistive device and modified independence within 5 days. 4.  Patient will ascend/descend 12 stairs with right handrail(s) with modified independence within 5 days. 5.  Patient will perform cardiac exercises per protocol with modified independence within 5 days. 6.  Patient will verbally recall and functionally demonstrate mindful-based movements (\"move in the tube\") principles and while complying with PPM precautions on L UE, without cues within 5 days. -updated on re-evaluation 5/2/2020          Outcome: Progressing Towards Goal   PHYSICAL THERAPY TREATMENT  Patient: Hua Ding (32 y.o. male)  Date: 5/3/2020  Diagnosis: CAD (coronary artery disease) [I25.10]   <principal problem not specified>  Procedure(s) (LRB):  INSERT PPM DUAL (N/A) 2 Days Post-Op  Precautions: Fall, Sternal(PPM precautions; move in the tube R )  Chart, physical therapy assessment, plan of care and goals were reviewed. ASSESSMENT  Patient continues with skilled PT services and is progressing towards goals. Pt presents with decreased memory and unable to recall precautions or strategies for \"move in the tube\" or pacemaker precautions.   Pt presents with decreased generalized strength, balance, decreased endurance,  with ambulation, and  impaired functional mobility below his  baseline. Pt received sitting in chair and not wearing his sling. Applied sling and educated pt on purpose of sling as well as reviewing sternal precautions. Pt tolerated increased ambulation with CGA. Pt has decreased step clearance and mild increased lateral trunk sway occasionally with no overt loss of balance episodes. Current Level of Function Impacting Discharge (mobility/balance): transfers with verbal cueing and minimal assistance, ambulation x 115 feet with CGA    Other factors to consider for discharge: lives alone, pt reports he plans to stay with his son for short time following discharge but is eager to return to his home         PLAN :  Patient continues to benefit from skilled intervention to address the above impairments. Continue treatment per established plan of care. to address goals. Recommendation for discharge: (in order for the patient to meet his/her long term goals)  Physical therapy at least 2 days/week in the home AND ensure assist and/or supervision for safety with mobility     This discharge recommendation:  Has not yet been discussed the attending provider and/or case management    IF patient discharges home will need the following DME: patient owns DME required for discharge       SUBJECTIVE:   Patient stated I really don't want to stay a long time at my sons.     OBJECTIVE DATA SUMMARY:   Patient mobilized on continuous portable monitor/telemetry.   Critical Behavior:  Neurologic State: Alert  Orientation Level: Oriented X4  Cognition: Appropriate decision making, Appropriate for age attention/concentration, Appropriate safety awareness, Follows commands  Safety/Judgement: Decreased insight into deficits, Decreased awareness of need for safety    Functional Mobility Training:  Bed Mobility:   Not assessed, OOB Transfers:  Sit to Stand: Minimum assistance;Assist x1  Stand to Sit: Assist x1;Contact guard assistance                             Balance:  Sitting: Intact  Standing: Impaired  Standing - Static: Constant support;Good  Standing - Dynamic : Constant support;Good  Ambulation/Gait Training:  Distance (ft): 115 Feet (ft)  Assistive Device: Gait belt  Ambulation - Level of Assistance: Assist x1;Contact guard assistance        Gait Abnormalities: Decreased step clearance;Trunk sway increased        Base of Support: Widened     Speed/Lois: Pace decreased (<100 feet/min)  Step Length: Left shortened;Right shortened     Pain Rating:  Verbal: denies incisional discomfort, reports pain in LLE, \"sciatica\", chronic per pt    Activity Tolerance:   Fair,  with minimal activity, limited activity mostly due to LLE pain  Please refer to the flowsheet for vital signs taken during this treatment. Vitals:    05/03/20 0735 05/03/20 0948 05/03/20 0949 05/03/20 0956   BP: 135/69 123/68 131/70 144/75   BP 1 Location: Right arm Right arm Right arm Right arm   BP Patient Position: Sitting Sitting; At rest Standing Sitting;Post activity   Pulse: 95 96 100 (!) 115   Resp: 18      Temp: 98.3 °F (36.8 °C)      SpO2: 99%      Weight:          After treatment patient left in no apparent distress:   Sitting in chair and Call bell within reach    COMMUNICATION/COLLABORATION:   The patients plan of care was discussed with: Registered nurse.      Amy Gill Cooks   Time Calculation: 18 mins

## 2020-05-04 VITALS
SYSTOLIC BLOOD PRESSURE: 96 MMHG | BODY MASS INDEX: 30.28 KG/M2 | DIASTOLIC BLOOD PRESSURE: 69 MMHG | TEMPERATURE: 97.8 F | OXYGEN SATURATION: 95 % | RESPIRATION RATE: 16 BRPM | WEIGHT: 193.34 LBS | HEART RATE: 82 BPM

## 2020-05-04 LAB
ANION GAP SERPL CALC-SCNC: 7 MMOL/L (ref 5–15)
BUN SERPL-MCNC: 21 MG/DL (ref 6–20)
BUN/CREAT SERPL: 17 (ref 12–20)
CALCIUM SERPL-MCNC: 8.7 MG/DL (ref 8.5–10.1)
CHLORIDE SERPL-SCNC: 101 MMOL/L (ref 97–108)
CO2 SERPL-SCNC: 29 MMOL/L (ref 21–32)
CREAT SERPL-MCNC: 1.27 MG/DL (ref 0.7–1.3)
ERYTHROCYTE [DISTWIDTH] IN BLOOD BY AUTOMATED COUNT: 16.5 % (ref 11.5–14.5)
GLUCOSE SERPL-MCNC: 122 MG/DL (ref 65–100)
HCT VFR BLD AUTO: 25.3 % (ref 36.6–50.3)
HGB BLD-MCNC: 8.3 G/DL (ref 12.1–17)
MAGNESIUM SERPL-MCNC: 2.6 MG/DL (ref 1.6–2.4)
MCH RBC QN AUTO: 27.5 PG (ref 26–34)
MCHC RBC AUTO-ENTMCNC: 32.8 G/DL (ref 30–36.5)
MCV RBC AUTO: 83.8 FL (ref 80–99)
NRBC # BLD: 0 K/UL (ref 0–0.01)
NRBC BLD-RTO: 0 PER 100 WBC
PLATELET # BLD AUTO: 166 K/UL (ref 150–400)
PMV BLD AUTO: 9.6 FL (ref 8.9–12.9)
POTASSIUM SERPL-SCNC: 3.8 MMOL/L (ref 3.5–5.1)
RBC # BLD AUTO: 3.02 M/UL (ref 4.1–5.7)
SODIUM SERPL-SCNC: 137 MMOL/L (ref 136–145)
WBC # BLD AUTO: 6.5 K/UL (ref 4.1–11.1)

## 2020-05-04 PROCEDURE — 74011250637 HC RX REV CODE- 250/637: Performed by: NURSE PRACTITIONER

## 2020-05-04 PROCEDURE — 36415 COLL VENOUS BLD VENIPUNCTURE: CPT

## 2020-05-04 PROCEDURE — 97168 OT RE-EVAL EST PLAN CARE: CPT

## 2020-05-04 PROCEDURE — 85027 COMPLETE CBC AUTOMATED: CPT

## 2020-05-04 PROCEDURE — 83735 ASSAY OF MAGNESIUM: CPT

## 2020-05-04 PROCEDURE — 97116 GAIT TRAINING THERAPY: CPT

## 2020-05-04 PROCEDURE — 74011250636 HC RX REV CODE- 250/636: Performed by: NURSE PRACTITIONER

## 2020-05-04 PROCEDURE — 80048 BASIC METABOLIC PNL TOTAL CA: CPT

## 2020-05-04 PROCEDURE — 97535 SELF CARE MNGMENT TRAINING: CPT

## 2020-05-04 RX ORDER — AMOXICILLIN 250 MG
1 CAPSULE ORAL
Status: SHIPPED | COMMUNITY
Start: 2020-05-04 | End: 2021-08-25 | Stop reason: SDUPTHER

## 2020-05-04 RX ORDER — OXYCODONE HYDROCHLORIDE 5 MG/1
5 TABLET ORAL
Qty: 30 TAB | Refills: 0 | Status: SHIPPED | OUTPATIENT
Start: 2020-05-04 | End: 2020-05-09

## 2020-05-04 RX ORDER — ACETAMINOPHEN 325 MG/1
650 TABLET ORAL
Status: SHIPPED | COMMUNITY
Start: 2020-05-04 | End: 2021-12-28

## 2020-05-04 RX ORDER — POLYETHYLENE GLYCOL 3350 17 G/17G
17 POWDER, FOR SOLUTION ORAL
Status: SHIPPED | COMMUNITY
Start: 2020-05-05 | End: 2020-06-10 | Stop reason: ALTCHOICE

## 2020-05-04 RX ORDER — CEPHALEXIN 250 MG/1
250 CAPSULE ORAL 3 TIMES DAILY
Qty: 9 CAP | Refills: 0 | Status: SHIPPED | OUTPATIENT
Start: 2020-05-04 | End: 2020-05-07

## 2020-05-04 RX ORDER — METOPROLOL TARTRATE 25 MG/1
12.5 TABLET, FILM COATED ORAL EVERY 12 HOURS
Qty: 30 TAB | Refills: 1 | Status: SHIPPED | OUTPATIENT
Start: 2020-05-04 | End: 2020-07-01 | Stop reason: SDUPTHER

## 2020-05-04 RX ORDER — LANOLIN ALCOHOL/MO/W.PET/CERES
325 CREAM (GRAM) TOPICAL
Qty: 30 TAB | Refills: 0 | Status: SHIPPED | OUTPATIENT
Start: 2020-05-05 | End: 2020-05-26 | Stop reason: SDUPTHER

## 2020-05-04 RX ADMIN — Medication 10 ML: at 07:20

## 2020-05-04 RX ADMIN — ASPIRIN 81 MG 81 MG: 81 TABLET ORAL at 08:24

## 2020-05-04 RX ADMIN — MAGNESIUM OXIDE TAB 400 MG (241.3 MG ELEMENTAL MG) 400 MG: 400 (241.3 MG) TAB at 08:23

## 2020-05-04 RX ADMIN — METOPROLOL TARTRATE 12.5 MG: 25 TABLET, FILM COATED ORAL at 08:23

## 2020-05-04 RX ADMIN — CEPHALEXIN 250 MG: 250 CAPSULE ORAL at 08:23

## 2020-05-04 RX ADMIN — CITALOPRAM HYDROBROMIDE 20 MG: 20 TABLET ORAL at 08:23

## 2020-05-04 RX ADMIN — HEPARIN SODIUM 5000 UNITS: 5000 INJECTION INTRAVENOUS; SUBCUTANEOUS at 04:50

## 2020-05-04 RX ADMIN — FERROUS SULFATE TAB 325 MG (65 MG ELEMENTAL FE) 325 MG: 325 (65 FE) TAB at 08:23

## 2020-05-04 RX ADMIN — CHLORHEXIDINE GLUCONATE 10 ML: 1.2 RINSE ORAL at 08:24

## 2020-05-04 RX ADMIN — HEPARIN SODIUM 5000 UNITS: 5000 INJECTION INTRAVENOUS; SUBCUTANEOUS at 08:23

## 2020-05-04 RX ADMIN — POLYETHYLENE GLYCOL 3350 17 G: 17 POWDER, FOR SOLUTION ORAL at 08:24

## 2020-05-04 RX ADMIN — PANTOPRAZOLE SODIUM 40 MG: 40 TABLET, DELAYED RELEASE ORAL at 07:20

## 2020-05-04 RX ADMIN — SENNOSIDES AND DOCUSATE SODIUM 1 TABLET: 8.6; 5 TABLET ORAL at 08:23

## 2020-05-04 RX ADMIN — EZETIMIBE 10 MG: 10 TABLET ORAL at 08:23

## 2020-05-04 NOTE — PROGRESS NOTES
Transitions of Care Plan:     Patient medically stable for discharge today; discharge to daughter's home     Disposition:  Home with [facility] [address] followed up with patient for discharge plan. Patient confirms discharging to daughter's home. Seym[address] approved for [facility] services. Medicare pt has received, reviewed, and signed 2nd IM letter informing them of their right to appeal the discharge. Signed copied has been placed on pt bedside chart. Patient received.     Daquan Bernstein, MPH

## 2020-05-04 NOTE — PROCEDURES
[facility] Procedure Note: Pacing Wire Removal    Patient placed in the bed. Pacing wire insertion sites cleaned with Chlorhexadine. Sutures removed. Atrial wire X 2 and ventricular bipolar pacing wire X 1 pulled, inspected-intact. Patient tolerated the procedure well. Bedrest X 1 hour.

## 2020-05-04 NOTE — PROGRESS NOTES
Problem: Self Care Deficits Care Plan (Adult)  Goal: *Acute Goals and Plan of Care (Insert Text)  Description: FUNCTIONAL STATUS PRIOR TO ADMISSION: Patient was modified independent using a rolling walker and single point cane for functional mobility occasionally. Patient is retired. HOME SUPPORT: The patient lived alone with a daughter who lives nearby to provide assistance. Patient plans to stay with his daughter post discharge. Occupational Therapy Goals      Upgraded 5/4/2020  1. Patient will perform ADLs standing 5 mins without fatigue or LOB with supervision within 5 day(s). 2.  Patient will perform lower body dressing with supervision using AE PRN within 5 day(s). 3.  Patient will perform gathering ADL items high and low 2/2 with supervision within 5 day(s). 4.  Patient will perform toilet transfers with supervision within 5 day(s). 5.  Patient will perform all aspects of toileting with supervision within 5 day(s). 6.  Patient will participate in cardiac/sternal upper extremity therapeutic exercise/activities to increase independence with ADLs with supervision/set-up for 5 minutes within 5 day(s). 7.  Patient will maintain RUE move-in-the-tube precautions and LUE PPM precautions with activities with minimal verbal cues within 5 day(s). Initiated 4/29/2020  1. Patient will perform ADLs standing 5 mins without fatigue or LOB with moderate assistance within 5 day(s). 2.  Patient will perform lower body ADLs with moderate assistance within 5 day(s). 3.  Patient will perform gathering ADL items high and low 2/2 with moderate assistance within 5 day(s). 4.  Patient will perform toilet transfers with moderate assistance within 5 day(s). 5.  Patient will perform all aspects of toileting with moderate assistance within 5 day(s).   6.  Patient will participate in cardiac/sternal upper extremity therapeutic exercise/activities to increase independence with ADLs with supervision/set-up for 5 minutes within 5 day(s). 5/4/2020 1417 by Monae Sales OT  Outcome: Progressing Towards Goal  OCCUPATIONAL THERAPY RE-EVALUATION  Patient: Karlos Lr (19 y.o. male)  Date: 5/4/2020  Diagnosis: CAD (coronary artery disease) [I25.10]   <principal problem not specified>  Procedure(s) (LRB):  INSERT PPM DUAL (N/A) 3 Days Post-Op  Precautions: Fall, Sternal(PPM precautions L )  Chart, occupational therapy assessment, plan of care, and goals were reviewed. ASSESSMENT  Patient seen for OT reevaluation s/p L PPM POD 3. Based on the objective data described below, patient is limited by impaired retention/ compliance with post-op precautions (RUE move-in-the-tube + LUE PPM), impaired LB access, impaired standing balance, and impaired functional endurance. Patient was receptive to extensive education/ review of post-op precautions and was able to immediately recall ~75% of instruction. Required max tactile and verbal cues to comply with precautions during activity. Patient was instructed in how to don underwear with reacher, which he did with CGA. Required multiple attempts for sit-stand with contact guard assistance and performed grooming standing at sink with [id number]. Patient will be staying with his daughter, who patient reports can provide 24/7 assistance. This Talya Douse discussed patient's post-op precautions with his daughter on telephone to promote reinforcement/ compliance at home. Recommend family support and HHOT at d/c. Current Level of Function Impacting Discharge (ADLs): infer up to minimum assistance UB ADLs and up to moderate assistance LB ADLs.   Contact guard sit-stand, ambulating to/from sink, and transfer to chair         PLAN :  Recommendations and Planned Interventions: self care training, functional mobility training, therapeutic exercise, balance training, therapeutic activities, endurance activities, patient education, home safety training, and family training/education    Frequency/Duration: Patient will be followed by occupational therapy 5 times a week to address goals. Recommendation for discharge: (in order for the patient to meet his/her long term goals)  Occupational therapy at least 2 days/week in the home AND ensure assist and/or supervision for safety with all OOB mobility and ADLs     This discharge recommendation:  Has been made in collaboration with the attending provider and/or case management       SUBJECTIVE:   Patient stated I feel okay.     OBJECTIVE DATA SUMMARY:   Hospital course since last seen and reason for reevaluation: L PPM POD 3    Cognitive/Behavioral Status:  Neurologic State: Alert  Orientation Level: Oriented X4  Cognition: Follows commands; Appropriate for age attention/concentration  Perception: Appears intact  Perseveration: No perseveration noted  Safety/Judgement: Decreased insight into deficits; Decreased awareness of need for safety    Skin: visible skin appears intact    Edema: none noted    Hearing: Auditory  Auditory Impairment: None    Vision/Perceptual:            WDL                         Range of Motion:    AROM: Generally decreased, functional                         Strength:    Strength: Generally decreased, functional                Coordination:  Coordination: Within functional limits  Fine Motor Skills-Upper: Left Intact; Right Intact    Gross Motor Skills-Upper: Left Intact; Right Intact    Tone & Sensation:    Tone: Normal  Sensation: Intact                        Functional Mobility and Transfers for ADLs:  Bed Mobility:  Sit to Supine: Minimum assistance  Scooting: Supervision; Additional time    Transfers:  Sit to Stand: Contact guard assistance(required multiple attempts and verbal cues for method)     Bed to Chair: Contact guard assistance    Balance:  Sitting: Intact  Standing: Impaired  Standing - Static: Good  Standing - Dynamic : Good;Fair    ADL Assessment:  Feeding: Independent(inferred)    Oral Facial Hygiene/Grooming: Contact guard assistance(washed hands standing at sink)    Bathing: Minimum assistance(inferred for distal LB)    Upper Body Dressing: Minimum assistance(inferred d/t precautions)    Lower Body Dressing: Moderate assistance(inferred overall d/t LB access. Donned underwear with CGA)    Toileting: Contact guard assistance(inferred)                ADL Intervention and task modifications:       Grooming  Washing Hands: Stand-by assistance(standing at sink)                   Lower Body Dressing Assistance  Underpants: Contact guard assistance(threaded seated with reacher, stood with CGA to raise)         Cognitive Retraining  Safety/Judgement: Decreased insight into deficits; Decreased awareness of need for safety    Pain:  Patient did not report pain    Activity Tolerance:   Fair, VSS    After treatment patient left in no apparent distress:   Sitting in chair and Call bell within reach    COMMUNICATION/COLLABORATION:   The patients plan of care was discussed with: Physical therapist and Registered nurse.      Artie Mcmahon OT  Time Calculation: 40 mins

## 2020-05-04 NOTE — CARDIO/PULMONARY
Cardiac Rehab: CABG education folder at bedside. Met with Viviana Mallory to review cardiac surgery post discharge instructions and to discuss participation in the Cardiac Rehab Program.     Educated using teach back method. Reviewed the use of bear for sternal support, daily weight and temperature monitoring, showering restrictions, signs and symptoms of infection at surgery sites, daily walking and arm exercises, and use of incentive spirometer. Viviana Mallory was able to demonstrate proper use of incentive spirometer, achieving 650-800 ml. . Discussed Heart Healthy/Low Sodium (2000 mg.) diet. Red reminder bracelet is in place. Discussed purpose of bracelet, duration of wear, and when to call surgeons office. Discussed Cardiac Rehab Program format, benefits, and encouraged participation. Viviana Mallory will be staying with his daughter Yoan Loza to recover so will discuss further with her. Initiated telephone call to Yoan Loza with permission of Viviana Mallory. Discussed post op instructions and she verified she does have a thermometer and scale for home use. She also verified that she wilL provide transportation to OP Cardiac Rehab once he is able to enroll. The contact information for the program at [facility] is on his AVS.       General questions answered. Viviana Mallory verbalized understanding.         Rakesh Venegas RN

## 2020-05-04 NOTE — DISCHARGE INSTRUCTIONS
Cardiac Surgery Specialist    [address] [place] [address] [address] [address]  Office- [phone]  Fax- [phone]       Office- [phone]  Fax- [phone]  _____________________________________________________________  Dr. Ashley Becerra [facility]   Alicja Thurman [facility] CorporationEVELIN Arriaza PA-C, PA-C, PA-C Jonny Anis  ______________________________________________________________    Name:Fred ELISE Gabriel [facility]     Surgery & Date: Procedure(s):  INSERT PPM DUAL    Discharge Date:  05/04/20     MEDICATIONS:  Please refer to your After Visit Summary for your medication list. If you do not have a prescription for a new medication, you may purchase the medication over the counter. DO NOT TAKE ANY MEDICATIONS THAT ARE NOT ON THIS LIST      INSTRUCTIONS:  1. NO SMOKING OR TOBACCO PRODUCTS  2. Follow all the instructions in your discharge book  3. You may shower. Wash all incisions twice daily with mild soap and water. No lotions, ointments or powder. 4. Call the office immediately for any redness, swelling, or drainage from your incision. 5. Take your temperature daily and call for a temperature of 101 degrees or higher or for any symptoms that make you think you have and infection. 6. Weigh yourself each morning. Call if you gain more than 5 pounds in 48 hours. 7. Use the incentive spirometer 6-8 times a day-10 breaths each time. Use a pillow or your bear to splint your breastbone when coughing or sneezing. 8. If you feel your breast bone clicking or popping, notify the office immediately. 9. Walk several hundred feet several times daily.     DIET  Eat an American Heart Association diet.  If you are having trouble with your appetite, eat what you can. Try eating small, frequent meals throughout the day. ACTIVITY  1. NO DRIVING--you will be evaluated to drive at your follow up visit. 2. Increase your activity by walking several times a day. Stay out of bed most of the day. 3. When sitting, keep your legs elevated. 4. You may ride in a car, but you must get out every hour and walk around. If you ride in a car with an airbag that can not be switched off, put the seat ALL the way back or ride in the back seat. 5. Any load bearing activity can be performed as long as it can be performed \"in the tube\". You can reach \"out of the tube\" when performing activities that don't require heavy lifting. Let pain be your guide, your pain level will keep you from doing anything extreme. FOLLOW UP  1. Your first follow up appointment will be on 5/11/20 at 1pm. Our office is located in [address] [place] [address]. Your second follow up appointment will be in four weeks, on 6/3/20 at 2pm. Please call our office at [phone] if you are unable to make either one of these appointments. 2. You will be receiving a call before your 5 day appointment to begin cardiac rehab. They are located in the [address] [place] [address] on [facility]. Their phone number is [phone]. Please call if you have not been contacted 2-3 weeks after discharge from the hospital.  3. We will make an appointment with your cardiologist at your last appointment. 4. Consult you primary care physician regarding your influenza &   pneumovax vaccines. 5.   Please bring all medications with you to your appointment. Signature:___________________________________________________          Smoking Cessation Program: This is a free, phone/text/email based, smoking cessation program. The program is individualized to meet each patient's needs. To enroll use the link - [employer]. com/quit or text Ascencion Plunkett to [phone] from any smart phone. PATIENT INSTRUCTIONS POST-PACEMAKER IMPLANT    1. No heavy lifting or exercises with the left arm for 4 weeks. This includes the following:  Do not raise arm above the shoulder level to comb hair, pull on clothes, etc... Do not use the affected arm to pull up or push up from a seated or laying  down position. Do not allow anyone else to pull on the affected arm. 2.  Keep site clean & dry x 1 week (no showers). In 1 week, you may remove the dressing & steri strips at pacemaker site. If you have difficulty doing so or note drainage or open area of incision, please call [phone].    3.  We will set up a remote transmission to occur manually from your transmitter box. Nothing is required of you other than making sure the box is plugged in to [employer] power. 4.  Do not drive for 3 days. 5.  Call Dr. Allegra Moeller at [phone] if you experience any of the following symptoms:  1. Redness at the pacemaker site  2. Swelling at or around the pacemaker or in the left arm  3. Pain around the pacemaker  4. Dizziness, lightheadedness, fainting spells  5. Lack of energy  6. Shortness of breath  7. Rapid heart rate  8. Chest or muscle twitches    6. Follow-up with Dr. Allegra Moeller as scheduled  Future Appointments   Date Time Provider Willian Paul   8/4/2020  9:15 AM Michelle COOK [address] [facility]   8/4/2020  9:20 AM Shaun Steinberg  [address]     7. You may use pain medication and ice pack for pain relief as needed. You may wear the sling as a reminder to keep your arm below the your shoulder. Unique Medrano M.D.  Formerly [facility]  Electrophysiology/Cardiology  [facility] [place]  [address] [facility], [facility] 91  [address] [facility], [address] [place] [address]  [phone]

## 2020-05-04 NOTE — PROGRESS NOTES
Hospital follow-up virtual PCP transitional care appointment has been scheduled with Dr. Jorge Avendano for Wednesday, 5/13/20 at 2:00 p.m. Pending patient discharge.   Mary Anne Slade, Care Management Specialist.

## 2020-05-04 NOTE — PROGRESS NOTES
CSS Progress Note    Admit Date: 2020  POD:  6 Day Post-Op    Procedure:  Procedure(s):  ON PUMP CORONARY ARTERY BYPASS GRAFT X 3  WITH LEFT LEG EVH AND LIMA/CARDIOPULMONARY BYPASS/ GODWIN AND EPI AORTIC ULTRASOIND BY DR. Dyann Hammans. Subjective:   Pt seen with Dr. Bulmaro Calvo. Afebrile, on RA. Up in chair. Feels well this morning. Would like to go home. Objective:   Vitals:  Blood pressure 123/86, pulse 84, temperature 98.2 °F (36.8 °C), resp. rate 16, weight 192 lb 14.4 oz (87.5 kg), SpO2 95 %. Temp (24hrs), Av.3 °F (36.8 °C), Min:97.6 °F (36.4 °C), Max:98.8 °F (37.1 °C)    EKG/Rhythm: paced 80s    Oxygen Therapy: Room air    CXR:   CXR Results  (Last 48 hours)               20 0821  XR CHEST PA LAT Final result    Impression:  Impression: Small bilateral pleural effusions with underlying atelectasis. Narrative:  Exam:  2 view chest       Indication: Recent heart surgery, chest tube removal       Comparison to 2020. PA and lateral views demonstrate normal heart size. The patient is status post   CABG procedure. Pacer leads are unchanged in position. The left-sided chest tube   has been removed in the interval. There are small bilateral pleural effusions   with underlying atelectasis. No pneumothorax is identified. Admission Weight: Last Weight   Weight: 186 lb 4.6 oz (84.5 kg) Weight: 192 lb 14.4 oz (87.5 kg)     Intake / Output / Drain:  Current Shift: No intake/output data recorded. Last 24 hrs.:     Intake/Output Summary (Last 24 hours) at 2020 4018  Last data filed at 5/3/2020 1906  Gross per 24 hour   Intake --   Output 200 ml   Net -200 ml       EXAM:  General:  Pleasant, no apparent distress                                      Lungs:   Diminished throughout   Incision:   no erythema, drainage, or swelling   Heart:  Regular rate and rhythm - paced, S1, S2 normal, no murmur, click, or gallop. Abdomen:   Soft, non-tender. Bowel sounds hypoactive.  No masses,  No organomegaly. +BM   Extremities:  Trace BLE edema. Palpable pulses bilat    Neurologic:  Gross motor and sensory apparatus intact. Labs:   Recent Labs     20  0452 20  0509   WBC 6.5 7.4   HGB 8.3* 8.7*   HCT 25.3* 27.5*    145*    136   K 3.8 4.1   BUN 21* 23*   CREA 1.27 1.27   * 132*   INR  --  1.0        Assessment:     Active Problems:    CAD (coronary artery disease) (2020)      S/P CABG x 3 (2020)      Overview: ON PUMP CORONARY ARTERY BYPASS GRAFT X 3  WITH LIMA to LAD, RSVG to RCA,       RSVG to ramus      Left greater saphenous vein EVH       Postoperative anemia due to acute blood loss (2020)      Pacemaker (2020)      Overview: Medtronic dual chamber 2020      Third degree AV block ([address] (2020)         Plan/Recommendations/Medical Decision Makin. S/p CABG: Continue ASA, BB. Patient intolerant of statins - resumed zetia. 2. CHB s/p PPM: PPM placed by Dr. Luanne Griffin. Cont keflex for PPM ppx. 3. Hypotension: resolved. 4. Interstitial edema, pleural effusions, atelectasis: Increase IS use and activity, wean O2 sats to > 92%. Diureses as needed. Now on room air    5.  HTN: Controlled on current medications. Continue BB    6. HLD: does not tolerate statin, on zetia, coq10 PTA. Continue Zetia. Can resume CoQ-10 on discharge. 7. DM type II: a1c 5.8, on januvia PTA. DTC following-AccuCheck AC & HS with SSI    8. CKD stage III: Cr 1.27, baseline appears to be around 1.4-1.5.     9. Hx DVT/PE: developed PE/DVT after bowel resection in , not on anticoagulation prior to surgery. Cont sq heparin today. Will not need anticoagulation on discharge. 10. Chronic pain/peripheral neuropathy: On gabapentin prn PTA - resumed     11. Anxiety: On celexa, ativan PTA    12. Alcohol abuse: PRN avitan, never needed any PRN ativan, CIWA discontinued    13.  Prostate CA: PSA 10, sees Dr. Caprice Slaughter (urology) and Dr. Nayeli Milner (radiation oncology), was planning on scheduling radiation seed implant soon. Monitor for urinary retention once Givens removed. 14. Thrombocytopenia: improved, continue ASA. Switched pepcid to protonix. 15. Anemia: H&H currently about transfusion threshold. monitor. Cont PO iron. Transfuse for Hgb < 7     16. DVT/GI ppx: SCD's, protonix. SQ heparin    Dispo: PT/OT/Cardiac Rehab eval. Anticipate dc home today. Pt reports that he will be staying with family on discharge.     Signed By: Zenovia Goldberg, NP

## 2020-05-04 NOTE — DISCHARGE SUMMARY
CSS Discharge Summary     Patient ID:  Cruzito Hernandez  [id number]  43 y.o.  1950    Admit date: 4/27/2020    Discharge date: 5/4/2020     Admitting Physician: Gume Zavala MD     Referring Cardiologist:  Dr. Marianna Saleh    PCP:  Shonda Correa MD     Admitting Diagnoses: CAD    Discharge Diagnoses:     Hospital Problems  Date Reviewed: 4/24/2020          Codes Class Noted POA    Pacemaker ICD-10-CM: Z95.0  ICD-9-CM: V45.01  5/1/2020 Unknown    Overview Signed 5/1/2020 12:06 PM by Alan Rodriguez MD     Medtronic dual chamber 5/1/2020             Third degree AV block ([address] ICD-10-CM: I44.2  ICD-9-CM: 426.0  5/1/2020 Unknown        Postoperative anemia due to acute blood loss ICD-10-CM: D62  ICD-9-CM: 285.1  4/29/2020 Unknown        S/P CABG x 3 ICD-10-CM: Z95.1  ICD-9-CM: V45.81  4/28/2020 Unknown    Overview Signed 4/28/2020 12:48 PM by THELMA Gama     ON PUMP CORONARY ARTERY BYPASS GRAFT X 3  WITH LIMA to LAD, RSVG to RCA, RSVG to ramus  Left greater saphenous vein EVH              CAD (coronary artery disease) ICD-10-CM: I25.10  ICD-9-CM: 414.00  4/27/2020 Unknown              Discharged Condition: stable    Disposition: home, see patient instructions for treatment and plan    Procedures for this admission:  Procedure(s):  INSERT PPM DUAL    Discharge Medications:      My Medications      START taking these medications      Instructions Each Dose to Equal Morning Noon Evening Bedtime   acetaminophen 325 mg tablet  Commonly known as:  TYLENOL    Your last dose was: Your next dose is: Take 2 Tabs by mouth every four (4) hours as needed for Pain. 650 mg                 cephALEXin 250 mg capsule  Commonly known as:  KEFLEX    Your last dose was: Your next dose is: Take 1 Cap by mouth three (3) times daily for 3 days. 250 mg                 ferrous sulfate 325 mg (65 mg iron) tablet  Start taking on: May 5, 2020    Your last dose was:       Your next dose is: Take 1 Tab by mouth daily (with breakfast) for 30 days. 325 mg                 metoprolol tartrate 25 mg tablet  Commonly known as:  LOPRESSOR    Your last dose was: Your next dose is: Take 0.5 Tabs by mouth every twelve (12) hours for 60 days. 12.5 mg                 oxyCODONE IR 5 mg immediate release tablet  Commonly known as:  ROXICODONE    Your last dose was: Your next dose is: Take 1 Tab by mouth every four (4) hours as needed for Pain for up to 5 days. Max Daily Amount: 30 mg.   5 mg                 polyethylene glycol 17 gram packet  Commonly known as:  MIRALAX  Start taking on: May 5, 2020    Your last dose was: Your next dose is: Take 1 Packet by mouth daily as needed for Constipation. 17 g                 senna-docusate 8.6-50 mg per tablet  Commonly known as:  PERICOLACE    Your last dose was: Your next dose is: Take 1 Tab by mouth two (2) times daily as needed for Constipation. 1 Tab                    CHANGE how you take these medications      Instructions Each Dose to Equal Morning Noon Evening Bedtime   traZODone 50 mg tablet  Commonly known as:  DESYREL  What changed:  See the new instructions. Your last dose was: Your next dose is:          TAKE 1 TABLET BY MOUTH AT BEDTIME                     CONTINUE taking these medications      Instructions Each Dose to Equal Morning Noon Evening Bedtime   Aspir-Low 81 mg tablet  Generic drug:  aspirin delayed-release    Your last dose was: Your next dose is: Take 81 mg by mouth. Taking every now and then   81 mg                 Blood-Glucose Meter monitoring kit    Your last dose was: Your next dose is:          Check blood sugar once daily, E11.21                  citalopram 20 mg tablet  Commonly known as:  CELEXA    Your last dose was: Your next dose is: Take 1 Tab by mouth daily.    20 mg                 Co Q-10 100 mg capsule  Generic drug:  co-enzyme Q-10    Your last dose was: Your next dose is: Take 100 mg by mouth daily. 100 mg                 cyanocobalamin 1,000 mcg tablet    Your last dose was: Your next dose is: Take 1,000 mcg by mouth daily. 1,000 mcg                 ezetimibe 10 mg tablet  Commonly known as:  ZETIA    Your last dose was: Your next dose is: Take 1 Tab by mouth daily. 10 mg                 FreeStyle Lancets 28 gauge Misc  Generic drug:  lancets    Your last dose was: Your next dose is:          USE TO CHECK BLOOD SUGAR ONCE DAILY                  gabapentin 300 mg capsule  Commonly known as:  NEURONTIN    Your last dose was: Your next dose is: Take 1 Cap by mouth nightly as needed for Pain. 300 mg                 Januvia 50 mg tablet  Generic drug:  SITagliptin    Your last dose was: Your next dose is: Take 1 tablet by mouth once daily                  LORazepam 0.5 mg tablet  Commonly known as:  ATIVAN    Your last dose was: Your next dose is:          TAKE 1 TABLET BY MOUTH ONCE DAILY AT NIGHT AS NEEDED FOR ANXIETY                  magnesium 250 mg Tab    Your last dose was: Your next dose is: Take  by mouth daily. pantoprazole 40 mg tablet  Commonly known as:  PROTONIX    Your last dose was: Your next dose is: Take 1 Tab by mouth Daily (before breakfast). 40 mg                 red yeast rice extract 600 mg Cap    Your last dose was: Your next dose is: Take 600 mg by mouth now. Every now and then   600 mg                 VITAMIN C PO    Your last dose was: Your next dose is: Take  by mouth daily. VITAMIN D3 PO    Your last dose was: Your next dose is: Take  by mouth daily.                      STOP taking these medications    amiodarone 200 mg tablet  Commonly known as:  CORDARONE        hydroCHLOROthiazide 12.5 mg tablet  Commonly known as: HYDRODIURIL        metoprolol succinate 50 mg XL tablet  Commonly known as:  TOPROL-XL              Where to Get Your Medications      These medications were sent to [facility], [address] [place], [place] [address] [id number]    Phone:  [phone]   · cephALEXin 250 mg capsule  · ferrous sulfate 325 mg (65 mg iron) tablet  · metoprolol tartrate 25 mg tablet  · oxyCODONE IR 5 mg immediate release tablet       HPI:  Copied from H&P dated 4/27/20:    Tyrone Gamino a 68 y. o. male who was referred for cardiac evaluation by Dr. Camacho Thomas multivessel CAD/NSTEMI. Pt transferred to [facility] for surgical intervention. The patient came to the emergency room for intermittent midsternal CP, that he described as a funny feeling in his chest. Nothing made the pain better or worse. He denies SOB, edema, orthopnea/PND, claudication, and dizziness. He does report significant recent stress due to several family members dying recently and watching a lot of the news. He has a medical history of HTN, DM type II, CDK 3, PE/DVT, spinal stenosis, chronic pain/peripheral neuropathy, prostate CA and anxiety. He has a surgical history of bowel resection and spinal surgery x2. He lives alone but he does have family locally. He states he would stay with his daughter after surgery if needed. He reports that he is active and independent. He denies smoking, does chew tobacco, and drinks alcohol regularly -when asked about amount he was unsure. He has a family history of heart disease.     Cardiac Testing     Cardiac catheterization 4/24/20:   Left Main   The vessel was visualized by angiography. The vessel is angiographically normal.   Left Anterior Descending   Prox LAD lesion 50% stenosed. .   Mid LAD lesion 75% stenosed. .   Ramus Intermedius   Ramus lesion 80% stenosed. .   Left Circumflex   Mid Cx lesion 50% stenosed. .   First Obtuse Marginal Branch   1st Mrg lesion 80% stenosed.  .   Right Coronary Artery   Mid RCA lesion 75% stenosed. .   Intervention      No interventions have been documented.         ECHO 4/25/20: Normal cavity size and systolic function (ejection fraction normal). Moderate to severe concentric hypertrophy. Estimated left ventricular ejection fraction is 60 - 65%. Left ventricular diastolic dysfunction. · Mild mitral annular calcification. Mild mitral valve regurgitation is present. Mildly dilated left atrium. Hospital Course: Viviana Mallory was taken to the OR on 4/28/20 for a coronary artery bypass X 3 (LIMA-LAD, RSVG-RCA, and RSVG-Ramus) with endovascular vein harvest of the LLE. He was taken from the OR to the CVICU in stable condition on the following drips:  Precedex, Insuli, Luis A-synephrine. He was extubated on 4/28/20 at 1640. When he was hemodynamically stable, he was transferred to the CVSU. He was felt stable for discharge on POD # 6 with the following assessment and plan:    1. S/p CABG: Continue ASA, BB. Patient intolerant of statins - resumed zetia.      2. CHB s/p PPM: PPM placed by Dr. Luiza Perez. Cont keflex for PPM ppx.     3. Hypotension: resolved.     4. Interstitial edema, pleural effusions, atelectasis: Increase IS use and activity, wean O2 sats to > 92%. Diureses as needed. Now on room air     5.  HTN: Controlled on current medications. Continue BB     6. HLD: does not tolerate statin, on zetia, coq10 PTA. Continue Zetia. Can resume CoQ-10 on discharge.     7. DM type II: a1c 5.8, on januvia PTA. DTC following-AccuCheck AC & HS with SSI     8. CKD stage III: Cr 1.27, baseline appears to be around 1.4-1.5.      9. Hx DVT/PE: developed PE/DVT after bowel resection in 2012, not on anticoagulation prior to surgery. Cont sq heparin today. Will not need anticoagulation on discharge.     10. Chronic pain/peripheral neuropathy: On gabapentin prn PTA - resumed     11. Anxiety: On celexa, ativan PTA     12.  Alcohol abuse: PRN avitan, never needed any PRN ativan, CIWA discontinued     13. Prostate CA: PSA 10, sees Dr. Yee Quintero (urology) and Dr. Renea Coleman (radiation oncology), was planning on scheduling radiation seed implant soon. Monitor for urinary retention once Givens removed.      14. Thrombocytopenia: improved, continue ASA. Switched pepcid to protonix.      15. Anemia: H&H currently about transfusion threshold. monitor. Cont PO iron. Transfuse for Hgb < 7      16. DVT/GI ppx: SCD's, protonix. SQ heparin     Dispo: PT/OT/Cardiac Rehab eval. Anticipate dc home today. Pt reports that he will be staying with family on discharge. Referral to outpatient cardiac rehab made. Discharge Vital Signs:   Visit Vitals  /72 (BP 1 Location: Left arm, BP Patient Position: At rest)   Pulse 89   Temp 98.4 °F (36.9 °C)   Resp 16   Wt 193 lb 5.5 oz (87.7 kg)   SpO2 92%   BMI 30.28 kg/m²       Labs:   Recent Labs     05/04/20  0452 05/03/20  0509   WBC 6.5 7.4   HGB 8.3* 8.7*   HCT 25.3* 27.5*    145*    136   K 3.8 4.1   BUN 21* 23*   CREA 1.27 1.27   * 132*   INR  --  1.0       Diagnostics:   CXR Results  (Last 48 hours)               05/03/20 0821  XR CHEST PA LAT Final result    Impression:  Impression: Small bilateral pleural effusions with underlying atelectasis. Narrative:  Exam:  2 view chest       Indication: Recent heart surgery, chest tube removal       Comparison to 5/2/2020. PA and lateral views demonstrate normal heart size. The patient is status post   CABG procedure. Pacer leads are unchanged in position. The left-sided chest tube   has been removed in the interval. There are small bilateral pleural effusions   with underlying atelectasis. No pneumothorax is identified. Patient Instructions/Follow Up Care:  Discharge instructions were reviewed with the patient and family present. Questions were also answered at this time. Prescriptions and medications were reviewed.  The patient has a follow up appointment with the Nurse Practitioner or [de-identified] Assistant on 5/11/20 at 1pm and with Dr. Camden Stevenson on 6/3/20 at 2pm. The patient was also instructed to follow up with his primary care physician as needed. The patient and family were encouraged to call with any questions or concerns.        Signed:  Susannah Hernandez NP  5/4/2020  10:29 AM

## 2020-05-04 NOTE — PROGRESS NOTES
0730: Bedside and Verbal shift change report given to Jasmin Faulkner (oncoming nurse) by Maria L Arzola (offgoing nurse). Report included the following information SBAR, Kardex, Intake/Output, MAR, Recent Results, and Cardiac Rhythm paced      0938: Douglas NP at bedside to pull wires    1305: I have reviewed discharge instructions with the patient. The patient verbalized understanding. Discharge medications reviewed with patient and appropriate educational materials and side effects teaching were provided. Post CABG and pacemaker instructions provided, activity restrictions reviewed with patient, pt verbalized understanding. Is without questions or concerns at this time. Pt to remain on tele monitoring until 1400.       1410: IVs and tele removed, awaiting daughter to transport    Problem: Falls - Risk of  Goal: *Absence of Falls  Description: Document Mascot Flow Fall Risk and appropriate interventions in the flowsheet. Outcome: Progressing Towards Goal  Note: Fall Risk Interventions:  Pt calls for help appropriately, uses assistive devices  Mobility Interventions: Patient to call before getting OOB         Medication Interventions: Teach patient to arise slowly, Patient to call before getting OOB    Elimination Interventions: Patient to call for help with toileting needs, Call light in reach              Problem: Patient Education: Go to Patient Education Activity  Goal: Patient/Family Education  Outcome: Progressing Towards Goal     Problem: Discharge Planning  Goal: *Discharge to safe environment  Outcome: Progressing Towards Goal  Goal: *Knowledge of medication management  Outcome: Progressing Towards Goal  Goal: *Knowledge of discharge instructions  Outcome: Progressing Towards Goal     Problem: Pressure Injury - Risk of  Goal: *Prevention of pressure injury  Description: Document Keegan Scale and appropriate interventions in the flowsheet.   Outcome: Progressing Towards Goal  Note: Pressure Injury Interventions:  Sensory Interventions: Assess changes in LOC, Keep linens dry and wrinkle-free, Maintain/enhance activity level, Minimize linen layers    Moisture Interventions: Absorbent underpads, Minimize layers    Activity Interventions: Pressure redistribution bed/mattress(bed type)    Mobility Interventions: Pressure redistribution bed/mattress (bed type)    Nutrition Interventions: Document food/fluid/supplement intake    Friction and Shear Interventions: Lift sheet, Minimize layers                Problem: Patient Education: Go to Patient Education Activity  Goal: Patient/Family Education  Outcome: Progressing Towards Goal     Problem: Patient Education: Go to Patient Education Activity  Goal: Patient/Family Education  Outcome: Progressing Towards Goal     Problem: CABG: Post-Op Day 5  Goal: Activity/Safety  Outcome: Progressing Towards Goal  Goal: Diagnostic Test/Procedures  Outcome: Progressing Towards Goal  Goal: Nutrition/Diet  Outcome: Progressing Towards Goal  Goal: Discharge Planning  Outcome: Progressing Towards Goal  Goal: Medications  Outcome: Progressing Towards Goal  Goal: Respiratory  Outcome: Progressing Towards Goal  Goal: Treatments/Interventions/Procedures  Outcome: Progressing Towards Goal  Goal: Psychosocial  Outcome: Progressing Towards Goal     Problem: CABG: Discharge Outcomes  Goal: *Hemodynamically stable  Outcome: Progressing Towards Goal  Goal: *Stable cardiac rhythm  Outcome: Progressing Towards Goal  Goal: *Lungs clear or at baseline  Outcome: Progressing Towards Goal  Goal: *Demonstrates ability to perform prescribed activity without shortness of breath or discomfort  Outcome: Progressing Towards Goal  Goal: *Verbalizes home exercise program, activity guidelines, cardiac precautions  Outcome: Progressing Towards Goal  Goal: *Optimal pain control at patient's stated goal  Outcome: Progressing Towards Goal  Goal: *Verbalizes understanding and describes prescribed diet  Outcome: Progressing Towards Goal  Goal: *Verbalizes name, dosage, time, side effects, and number of days to continue medications  Outcome: Progressing Towards Goal  Goal: *Weight  is stable  Outcome: Progressing Towards Goal  Goal: *No signs and symptoms of infection or wound complications  Outcome: Progressing Towards Goal  Goal: *Anxiety reduced or absent  Outcome: Progressing Towards Goal  Goal: *Verbalizes and demonstrates incision care  Outcome: Progressing Towards Goal  Goal: *Describes follow-up/return visits to physicians  Outcome: Progressing Towards Goal  Goal: *Describes available resources and support systems  Outcome: Progressing Towards Goal  Goal: *Expresses feelings about diagnosis and surgery  Outcome: Progressing Towards Goal     Problem: Patient Education: Go to Patient Education Activity  Goal: Patient/Family Education  Outcome: Progressing Towards Goal     Problem: Pacer/ICD: Discharge Outcomes  Goal: *Hemodynamically stable  Outcome: Progressing Towards Goal  Goal: *Stable cardiac rhythm  Outcome: Progressing Towards Goal  Goal: *Lungs clear or at baseline  Outcome: Progressing Towards Goal  Goal: *Demonstrates ability to perform prescribed activity without shortness of breath or discomfort  Outcome: Progressing Towards Goal  Goal: *Verbalizes home exercise program, activity guidelines, cardiac precautions  Outcome: Progressing Towards Goal  Goal: *Verbalizes understanding and describes prescribed diet  Outcome: Progressing Towards Goal  Goal: *Verbalizes understanding and describes medication purposes and frequencies  Outcome: Progressing Towards Goal  Goal: *Identifies cardiac risk factors  Outcome: Progressing Towards Goal  Goal: *No signs and symptoms of infection or wound complications  Outcome: Progressing Towards Goal  Goal: *Anxiety reduced or absent  Outcome: Progressing Towards Goal  Goal: *Describes follow-up/return visits to physicians  Outcome: Progressing Towards Goal  Goal: *Describes available resources and support systems  Outcome: Progressing Towards Goal  Goal: *Optimal pain control at patient's stated goal  Outcome: Progressing Towards Goal

## 2020-05-04 NOTE — PROGRESS NOTES
Cardiac Surgery Care Coordinator-  Met with Sajan Velez, reviewed plan of care and discharge instructions. Reinforced move in the tube, sternal precautions and continued use of the incentive spirometer. Sajan Velez is able to pull 1500cc with good effort. Reviewed the importance of daily temp,and  weight monitoring, discussed incisional and pacemaker care and reviewed signs and symptoms of infection. Red reminder bracelet on left wrist, reviewed purpose of the bracelet and when to call the MD. Using the teach back method reviewed new medications to include acetaminophen, cephalexin, ferrous sulfate, metoprolol, oxycodone, miralax and pericolace. Reminded pt of appts and encouraged participation in the Cardiac Wellness and rehab program after discharge once they are enrolling  Encouraged Sajan Velez to verbalize and emotional support given. Placed call to Mr Alonso's sister Honey Thorne, reviewed discharge instructions and encouraged her to verbalize. She stated her father does not have a glucose meter, confirmed this with Mr Margaret Dickinson. Placed call to Zayra Saavedra diabetic CNS, received recommendations. Mr Margaret Dickinson is to purchase the Rel[employer] meter at [facility] and monitor his blood sugar once a day. Placed follow up call to Honey Thorne and relayed this information. Will follow up with a phone tomorrow.  Jasson Marcum RN

## 2020-05-04 NOTE — PROGRESS NOTES
Problem: Falls - Risk of  Goal: *Absence of Falls  Description: Document Alireza Arias Fall Risk and appropriate interventions in the flowsheet.   Outcome: Progressing Towards Goal  Note: Fall Risk Interventions:  Mobility Interventions: OT consult for ADLs, Patient to call before getting OOB, PT Consult for mobility concerns         Medication Interventions: Evaluate medications/consider consulting pharmacy, Patient to call before getting OOB, Teach patient to arise slowly    Elimination Interventions: Call light in reach, Patient to call for help with toileting needs, Urinal in reach

## 2020-05-04 NOTE — PROGRESS NOTES
Problem: Mobility Impaired (Adult and Pediatric)  Goal: *Acute Goals and Plan of Care (Insert Text)  Description: FUNCTIONAL STATUS PRIOR TO ADMISSION: Patient was independent and active without use of DME. Denies falls. HOME SUPPORT PRIOR TO ADMISSION: The patient lived alone and has 4 daughters local to provide assistance. Plans to d/c to one of his daughter's homes for additional support post op. Physical Therapy Goals  Initiated 4/29/2020- Goals remain appropriate and updated goal #5 on 5/2/2020  1. Patient will move from supine to sit and sit to supine , scoot up and down and roll side to side in bed with modified independence within 5 days. 2.  Patient will perform sit to/from stand with modified independence within 5 days. 3.  Patient will ambulate 150 feet with least restrictive assistive device and modified independence within 5 days. 4.  Patient will ascend/descend 12 stairs with right handrail(s) with modified independence within 5 days. 5.  Patient will perform cardiac exercises per protocol with modified independence within 5 days. 6.  Patient will verbally recall and functionally demonstrate mindful-based movements (\"move in the tube\") principles and while complying with PPM precautions on L UE, without cues within 5 days. -updated on re-evaluation 5/2/2020    Outcome: Progressing Towards Goal  PHYSICAL THERAPY TREATMENT  Patient: Payal Randall (7 y.o. male)  Date: 5/4/2020  Diagnosis: CAD (coronary artery disease) [I25.10]   <principal problem not specified>  Procedure(s) (LRB):  INSERT PPM DUAL (N/A) 3 Days Post-Op  Precautions: Fall, Sternal(PPM precautions L )  Chart, physical therapy assessment, plan of care and goals were reviewed. ASSESSMENT  Patient continues with skilled PT services and is progressing towards goals. Patient received sitting EOB, complaining of fatigue and L low back and leg pain.  He knew to keep his L arm tucked in during transfers but could not state the he was not allowed to push or pull with L UE (PPM precautions). Also unable to verbalize move in the tube precautions for R UE. Educated on all of the above and asked RN to pass along to his daughter in d/c instructions via phone. Patient with intact balance during gait (no overt LOB but gait slow and with decreased step clearance). Educated on step to pattern on stairs, pacing, and energy conservation strategies for managing flight of stairs at home. Patient did use SPC during gait, however did not appreciate him to be pushing through cane, and more so touching cane down for balance due to pain in his L LE from sciatica. Patient is not verbalizing and is demonstrating understanding of mindful-based movements (\"move in the tube\") principles of keeping UEs proximal to ribcage to prevent lateral pull on the sternum during load-bearing activities with visual, verbal, and manual cues required for compliance. Current Level of Function Impacting Discharge (mobility/balance): SBA-CGA    Other factors to consider for discharge: will d/c to daughter's home         PLAN :  Patient continues to benefit from skilled intervention to address the above impairments. Continue treatment per established plan of care. to address goals. Recommendation for discharge: (in order for the patient to meet his/her long term goals)  Physical therapy at least 2 days/week in the home AND ensure assist and/or supervision for safety with household mobility     This discharge recommendation:  Has been made in collaboration with the attending provider and/or case management    IF patient discharges home will need the following DME: none       SUBJECTIVE:   Patient stated It hurts down this leg. It used to hurt on the right before my surgery    OBJECTIVE DATA SUMMARY:   Patient mobilized on continuous portable monitor/telemetry.   Critical Behavior:  Neurologic State: Alert  Orientation Level: Oriented X4  Cognition: Appropriate for age attention/concentration  Safety/Judgement: Decreased insight into deficits, Decreased awareness of need for safety  Functional Mobility Training:  Bed Mobility:  Sit to Supine: Minimum assistance  Scooting: Supervision; Additional time  Transfers:  Sit to Stand: Stand-by assistance; Additional time  Stand to Sit: Stand-by assistance; Additional time  Balance:  Sitting: Intact  Standing: Impaired  Standing - Static: Good  Standing - Dynamic : Good;Fair  Ambulation/Gait Training:  Distance (ft): 100 Feet (ft)  Assistive Device: Gait belt;Cane, straight(R)  Ambulation - Level of Assistance: Stand-by assistance  Gait Abnormalities: Decreased step clearance  Base of Support: Widened  Speed/Lois: Pace decreased (<100 feet/min)  Step Length: Left shortened;Right shortened    Cardiac diagnosis intervention:  Patient instructed and educated on mindful movement principles based on Move in The Tube concept to include maintaining bilateral elbows close to rib cage when performing any load-bearing activity such as getting in/out of bed, pushing up from a chair, opening a door, or lifting a box. Patient was given a handout with diagrams of each correct/incorrect method of performing each of the above tasks. Pain Rating:  Complaining of L sided pain in low back and down the leg-- sciatica     Activity Tolerance:   Fair, requires rest breaks, and more fatigued today than previously   Please refer to the flowsheet for vital signs taken during this treatment. After treatment patient left in no apparent distress:   Supine in bed, Call bell within reach, and Side rails x 3    COMMUNICATION/COLLABORATION:   The patients plan of care was discussed with: Registered nurse.      Mendel Curd, PT, DPT   Time Calculation: 14 mins

## 2020-05-05 ENCOUNTER — HOME CARE VISIT (OUTPATIENT)
Dept: SCHEDULING | Facility: HOME HEALTH | Age: 70
End: 2020-05-05
Payer: MEDICARE

## 2020-05-05 ENCOUNTER — HOME CARE VISIT (OUTPATIENT)
Dept: HOME HEALTH SERVICES | Facility: HOME HEALTH | Age: 70
End: 2020-05-05
Payer: MEDICARE

## 2020-05-05 ENCOUNTER — TELEPHONE (OUTPATIENT)
Dept: CASE MANAGEMENT | Age: 70
End: 2020-05-05

## 2020-05-05 PROCEDURE — G0299 HHS/HOSPICE OF RN EA 15 MIN: HCPCS

## 2020-05-05 PROCEDURE — 3331090001 HH PPS REVENUE CREDIT

## 2020-05-05 PROCEDURE — 400013 HH SOC

## 2020-05-05 PROCEDURE — 3331090002 HH PPS REVENUE DEBIT

## 2020-05-05 NOTE — TELEPHONE ENCOUNTER
Cardiac Surgery Discharge - Follow up call placed to RossElsy Lizetholiver Quijano. Spoke to his daughter Jaymie Lemus. Reviewed plan of care after discharge and encouraged her to verbalize. Discussed precautions and reviewed medications, they are without questions regarding medications. Encouraged continued use of the incentive spirometer. Confirmed follow up appts and reinforced importance of wearing red reminder bracelet They are  without questions or concerns.  Terrell Booth RN

## 2020-05-06 PROCEDURE — 3331090001 HH PPS REVENUE CREDIT

## 2020-05-06 PROCEDURE — 3331090002 HH PPS REVENUE DEBIT

## 2020-05-07 ENCOUNTER — OFFICE VISIT (OUTPATIENT)
Dept: CARDIOLOGY CLINIC | Age: 70
End: 2020-05-07

## 2020-05-07 ENCOUNTER — TELEPHONE (OUTPATIENT)
Dept: CARDIOLOGY CLINIC | Age: 70
End: 2020-05-07

## 2020-05-07 ENCOUNTER — VIRTUAL VISIT (OUTPATIENT)
Dept: INTERNAL MEDICINE CLINIC | Age: 70
End: 2020-05-07

## 2020-05-07 ENCOUNTER — HOME CARE VISIT (OUTPATIENT)
Dept: HOME HEALTH SERVICES | Facility: HOME HEALTH | Age: 70
End: 2020-05-07
Payer: MEDICARE

## 2020-05-07 DIAGNOSIS — I10 ESSENTIAL HYPERTENSION: ICD-10-CM

## 2020-05-07 DIAGNOSIS — M48.061 SPINAL STENOSIS, LUMBAR REGION, WITHOUT NEUROGENIC CLAUDICATION: ICD-10-CM

## 2020-05-07 DIAGNOSIS — I21.4 NSTEMI (NON-ST ELEVATED MYOCARDIAL INFARCTION) (HCC): ICD-10-CM

## 2020-05-07 DIAGNOSIS — I82.5Y3 CHRONIC DEEP VEIN THROMBOSIS (DVT) OF PROXIMAL VEIN OF BOTH LOWER EXTREMITIES (HCC): ICD-10-CM

## 2020-05-07 DIAGNOSIS — Z95.1 S/P CABG X 3: ICD-10-CM

## 2020-05-07 DIAGNOSIS — Z00.00 MEDICARE ANNUAL WELLNESS VISIT, SUBSEQUENT: Primary | ICD-10-CM

## 2020-05-07 DIAGNOSIS — Z95.0 CARDIAC PACEMAKER IN SITU: Primary | ICD-10-CM

## 2020-05-07 DIAGNOSIS — D62 POSTOPERATIVE ANEMIA DUE TO ACUTE BLOOD LOSS: ICD-10-CM

## 2020-05-07 DIAGNOSIS — F32.9 REACTIVE DEPRESSION: ICD-10-CM

## 2020-05-07 DIAGNOSIS — E78.2 MIXED HYPERLIPIDEMIA: ICD-10-CM

## 2020-05-07 DIAGNOSIS — E11.40 TYPE 2 DIABETES MELLITUS WITH DIABETIC NEUROPATHY, WITHOUT LONG-TERM CURRENT USE OF INSULIN (HCC): ICD-10-CM

## 2020-05-07 DIAGNOSIS — C61 PROSTATE CANCER (HCC): ICD-10-CM

## 2020-05-07 DIAGNOSIS — N18.30 STAGE 3 CHRONIC KIDNEY DISEASE (HCC): ICD-10-CM

## 2020-05-07 DIAGNOSIS — E11.21 TYPE 2 DIABETES WITH NEPHROPATHY (HCC): ICD-10-CM

## 2020-05-07 DIAGNOSIS — I25.10 CORONARY ARTERY DISEASE INVOLVING NATIVE CORONARY ARTERY OF NATIVE HEART WITHOUT ANGINA PECTORIS: ICD-10-CM

## 2020-05-07 DIAGNOSIS — I44.2 THIRD DEGREE AV BLOCK (HCC): ICD-10-CM

## 2020-05-07 PROCEDURE — 3331090002 HH PPS REVENUE DEBIT

## 2020-05-07 PROCEDURE — 3331090001 HH PPS REVENUE CREDIT

## 2020-05-07 RX ORDER — POTASSIUM CHLORIDE 750 MG/1
10 TABLET, EXTENDED RELEASE ORAL DAILY
Qty: 30 TAB | Refills: 1 | Status: SHIPPED | OUTPATIENT
Start: 2020-05-07 | End: 2020-07-01 | Stop reason: SDUPTHER

## 2020-05-07 RX ORDER — IBUPROFEN 200 MG
1 TABLET ORAL EVERY 24 HOURS
Qty: 30 PATCH | Refills: 0 | Status: SHIPPED | OUTPATIENT
Start: 2020-05-07 | End: 2020-06-06

## 2020-05-07 RX ORDER — FUROSEMIDE 20 MG/1
20 TABLET ORAL DAILY
Qty: 30 TAB | Refills: 0 | Status: SHIPPED | OUTPATIENT
Start: 2020-05-07 | End: 2020-05-24

## 2020-05-07 NOTE — TELEPHONE ENCOUNTER
I spoke with the patient's daughter and explained that he should be taking an ASA 81 mg daily as per his discharge instructions. She has that for him.  She also wanted us to know that his PCP started him on Lasix/potassium for some LE edema

## 2020-05-07 NOTE — PATIENT INSTRUCTIONS
Medicare Wellness Visit, Male The best way to live healthy is to have a lifestyle where you eat a well-balanced diet, exercise regularly, limit alcohol use, and quit all forms of tobacco/nicotine, if applicable. Regular preventive services are another way to keep healthy. Preventive services (vaccines, screening tests, monitoring & exams) can help personalize your care plan, which helps you manage your own care. Screening tests can find health problems at the earliest stages, when they are easiest to treat. [facility] follows the current, evidence-based guidelines published by the [facility] ([facility]) when recommending preventive services for our patients. Because we follow these guidelines, sometimes recommendations change over time as research supports it. (For example, a prostate screening blood test is no longer routinely recommended for men with no symptoms). Of course, you and your doctor may decide to screen more often for some diseases, based on your risk and co-morbidities (chronic disease you are already diagnosed with). Preventive services for you include: - Medicare offers their members a free annual wellness visit, which is time for you and your primary care provider to discuss and plan for your preventive service needs. Take advantage of this benefit every year! 
-All adults over age 72 should receive the recommended pneumonia vaccines. Current USPSTF guidelines recommend a series of two vaccines for the best pneumonia protection.  
-All adults should have a flu vaccine yearly and tetanus vaccine every 10 years. 
-All adults age 48 and older should receive the shingles vaccines (series of two vaccines).       
-All adults age 38-68 who are overweight should have a diabetes screening test once every three years.  
-Other screening tests & preventive services for persons with diabetes include: an eye exam to screen for diabetic retinopathy, a kidney function test, a foot exam, and stricter control over your cholesterol.  
-Cardiovascular screening for adults with routine risk involves an electrocardiogram (ECG) at intervals determined by the provider.  
-Colorectal cancer screening should be done for adults age 54-65 with no increased risk factors for colorectal cancer. There are a number of acceptable methods of screening for this type of cancer. Each test has its own benefits and drawbacks. Discuss with your provider what is most appropriate for you during your annual wellness visit. The different tests include: colonoscopy (considered the best screening method), a fecal occult blood test, a fecal DNA test, and sigmoidoscopy. 
-All adults born between [facility] should be screened once for Hepatitis C. 
-An Abdominal Aortic Aneurysm (AAA) Screening is recommended for men age 73-68 who has ever smoked in their lifetime. Here is a list of your current Health Maintenance items (your personalized list of preventive services) with a due date: 
Health Maintenance Due Topic Date Due  
 DTaP/Tdap/Td  (1 - Tdap) 07/05/1969  Shingles Vaccine (1 of 2) 07/05/1998 [address] [place] [address] Annual Well Visit  03/19/2020  Colonoscopy  09/15/2020

## 2020-05-07 NOTE — PROGRESS NOTES
HISTORY OF PRESENT ILLNESS  Butch Montejo is a 68 y.o. male. HPI   This is an established visit completed with telemedicine was completed with video assist  the patient acknowledges and agrees to this method of visitation  doxyme    Last here 1/20 . Pt is here to f/u on chronic conditions. Pt presents with his daughter who provides some of his hx. Spent over 40 min    Was admit for nstemi, had pacer and cabg    Had christina in hospital     The right resting CHRISTINA is normal. The left resting CHRISTINA is normal. PVR waveforms in the right ankle are normal. PVR waveforms at the right metatarsal region are normal. PVR waveforms in the left ankle are normal. PVR waveforms of the left metatarsal region are normal. Right toe PPG is dampened. Left toe PPG is dampened    Had US in [address]: 1950    DATE OF ADMISSION: 4/23/2020  4:55 PM    DATE OF DISCHARGE: target organ damage  PRIMARY CARE PROVIDER: Jacob Hobson MD      ATTENDING PHYSICIAN: Gareth Hong MD  DISCHARGING PROVIDER: Gareth Hong MD    To contact this individual call [phone] and ask the  to page. If unavailable ask to be transferred the Adult Hospitalist Department.     CONSULTATIONS: IP CONSULT TO CARDIOLOGY  IP CONSULT TO ANESTHESIOLOGY     PROCEDURES/SURGERIES: Procedure(s):  LEFT HEART CATH / CORONARY ANGIOGRAPHY  Left Ventriculography     ADMITTING [address] [place] [address] COURSE:   # CAD/NSTEMI, triple-vessel disease. Surgical plans per Dr. Rock Jerome, scheduled for 4/28/2020 at [facility]. Patient is transferred to [facility] for the same. # HTN: Continue BB, hold HCTZ for surgery  # HLD: Does not tolerate statin. On Zetia. Co-Q10 stopped preop per CVTS. 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 No OAC at present  # Chronic pain/peripheral neuropathy, on gabapentin  # History of prostate CA, PSA 10.   Follows with  [facility] (Uro) and Dr. Miriam Molina (Rad-onc), awaiting radiation seed implant  Soon  # Alcohol abuse, CIWA monitoring, getting as needed Ativan p.o., last dose on 4/25/2020 at 9 PM, 0.5 mg only     HISTORY OF PRESENT ILLNESS, on admission 4/23/2020:     Mr. Gavin Hightower is a 68 y.o.   male who is admitted with chest pain.  Mr. Gavin Hightower presented to the Emergency Department today complaining of chest pain, patient having some discomfort, on and off for the past week, chest mid sternal non radiating, he didn't feel right, felt like indigestion,belching. No SOB or cough. No fever or chills. No abdominal pain, no N/V. Patient denies any swelling of his legs. No weakness. No other complaints.    Patient admitted with NSTEMI, Chest Pain, cardiac evaluation.      Subjective: 04/27/20      Chief Complaint / Reason for Physician Visit  Follow up NSTEMI, CAD-MV ds. Patient seen and examined at the bedside. Labs, images and notes reviewed  Discussed with nursing staff, orders reviewed. Plan discussed with patient/Family     Doing well. Feeling well. No CP, S OB, palpitation. Intermittent chest tightness with exertion but doing comfortable currently at rest.  Ready for transfer to [facility] for CABG tomorrow. No other overnight events or concerns.     DISCHARGE DIAGNOSES / PLAN:       # CAD/NSTEMI, triple-vessel disease.    -Surgical plans per Dr. Esther Chakraborty, scheduled for 4/28/2020 at [facility].    -Patient is transferred to [facility] for the same.  -Continue aspirin, nitro as needed, beta-blocker, Zetia. Does not tolerate statin.  -Echo noted  Final result ·   Normal cavity size and systolic function (ejection fraction normal). Moderate to severe concentric hypertrophy. Estimated left ventricular ejection fraction is 60 - 65%. Left ventricular diastolic dysfunction. · Mild mitral annular calcification. Mild mitral valve regurgitation is present.   · Mildly dilated left atrium.      # HTN: Continue BB, hold HCTZ for surgery     # HLD: Does not tolerate statin. On Zetia. Co-Q10 stopped preop per CVTS.    # DM2, A1c 5.8, on Januvia PTA, on hold while inpatient. No sliding scale given well-controlled BG's at present. Monitor Accu-Cheks closely.     # CKD 3, creatinine 1.46  -Seems like from baseline. 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 No OAC at present     # Chronic pain/peripheral neuropathy, on gabapentin     # History of prostate CA, PSA 10. Follows with Dr. Ozzie Jay ([facility]) and Dr. Francis Ocampo (Rad-onc), awaiting radiation seed implant  Soon     # Alcohol abuse, CIWA monitoring, getting as needed Ativan p.o., last dose on 4/25/2020 at 9 PM, 0.5 mg only     CODE STATUS: Full code  DVT prophylaxis: SCDs. Lovenox full dose on hold per CVTS/cardiology     Disposition: Transfer to [facility] hospital for CABG surgery per CVTS on 4/28/2020.   Patient is admitted under cardiothoracic surgery service.          He is on oxycodone--used just once  Completed abx  Has iron for 1 month   Was briefly on amiodarone  He is not taking asa 81mg--told him to restart this  Not taking protonix  On zetia now but may be taking his crestor as well     Reviewed labs  Reviewed imaging      Dr castro did pacer    Dr Vijay Todd did cabg--seeing on Monday     Has appointment w both cards  Tomorrow will be seeing [facility] office    No longer drinking etoh  Has stopping smoking wants nicotine patch       Temp has been 97.2  97.7        BP today is controlled waqs 96/69, 124/71 in hospital   Has been checked by [place] nurse who is coming 2 times per week   Very good in the 130/70  Continues on toprol now 25mg half tab bid down from  50mg        He is DM   BS at home running around  113, 111  124, in the AM fasting   Highest reading has been 116   Pt checks BS about once a day   Continues januvia 50mg daily        Wt was 185 lbs --193.2 at home, 189.6 at6 home  Discussed low carb diet   Discussed diet and w/l       Reviewed labs     Having some foot swelling since d/c   mild         Pt follows annually with Dr. Sakshi Diaz (cardio)   Last visit was 12/7/18  Overdue, now with nstemi see above      Pt follows with Dr. Ron Crespo (nephro) for ckd in the past  Last visit was 3/2/18  He will only follow with me for his kidney dysfunction  Baseline cr 1.4-1. 5      Pt follows with [facility] (uro)  for prostate cancer--last there in the fall 2019   intermediate risk prostate cancer, spencer 7, more and more on L side,   Recall pt has FMHX (brother and uncle) prostate cancer  Pt also met with a radiation oncologist at [facility]  They will likely be meeting again to discuss radiation therapy in the future  Per his daughter, Mateus Lee get XRT with radioactive seeds   Was to get seeds placed but never occurred             Pt saw Dr Manuela Montana (podiatry) in 4/19  Pt was given terbinafine for fungal nails  Discussed that if the fungus is not bothering him that he could just leave it rather than treat it      Recall Pt had a hearing evaluation since lov  Per his daughter he had 40% decrease in both ears, and does not yet need hearing aids  Was recommended to f/u in 1 year for a repeat eval   Reviewed notes from ENT 5/28/19: has some hearing loss      Continues on crestor 10mg for cholesterol  Pt had previously been taking this infrequently d/t cramps  He is now taking this every day and is compliant   Discussed tonic water at night to help with cramps      Pt has been taking CoQ-10     Continues gabapentin 2-3 tabs qhs for his tingling/neuropathy pain in his hands/fingers, which works well for the most part   However he had been out of this  Just restarted 2-3 nights ago  Unclear if working   Having some sciatic pain to his left leg.      Continues celexa 20mg for depression/anxiety,   Has not been taking it, he will get it and restart  Gets occ down days but doing better since being home w daughter  Discussed he needs to take this daily for it to work      He also has ativan to use prn (not often, not in the past month) for anxiety --no recent use     Pt uses trazadone prn for sleep (infrequently, up to 3x per week)  He is sleeping bettr but snores and gets a little wheeze at night disvcussed sleep apnea testing    Saw sleep dr a year ago ()   but never completed the testing will do now        In the past, pt expressed concern about his memory  Previously, provided referral for Dr. Krishna Aguilar (neuropsych)  lov provided info for Dr Cosmo Lovett (neuropsych)  Had the appointment 19 --then refused testing in the end   Overall stable currently           ACP not on file. SDM is his daughter (Yareli Mehta). Provided information in the past.      Recall sees Dr. Khang Sorenson for h/o DVT, no longer on coumadin or xarelto, on ASA only.  Was on coumadin for h/o PE and DVT post operatively      PREVENTIVE:    Colonoscopy: 9/15/15, Dr. Omari Doran, repeat 5 years--due   EGD: 9/15/15, Dr. Omari Doran  PSA: 7/15 3.0, ,  3.8, ,  5.5  Dr Fior Ruth follows   AAA screen: CT , negative  Tdap: unknown   Pneumovax: 2012  [id number]   Shingrix: never completed  Flu shot: declines  Foot exam: 19   Microalbumin:    A1c:  ,  6.4,  6.6,  6.5,  6.0,  6.1, 3/19 6.0  5.9  POC 5.7  ,  5.8  Eye exam: Dr. Angelina Leggett Estopinal,  per patient  Hep C Screen: 10/15, negative  Lipids:  ldl 138  EK/17, PACs         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    Alcohol abuse F10.10    Insomnia G47.00    HTN (hypertension) I10    DVT of leg (deep venous thrombosis) ([facility] I82.409    Spinal stenosis, lumbar region, without neurogenic claudication M48.061    Hyperlipidemia E78.5    CKD (chronic kidney disease) N18.9    Cervical spinal stenosis M48.02    Reactive depression F32.9    Type 2 diabetes with nephropathy ([facility] E11.21    Prostate cancer ([facility] C61    Type 2 diabetes mellitus with diabetic neuropathy (HCC) E11.40    NSTEMI (non-ST elevated myocardial infarction) ([facility] I21.4    Bilateral carotid artery stenosis I65.23    Preop cardiovascular exam Z01.810    CAD (coronary artery disease) I25.10    S/P CABG x 3 Z95.1    Postoperative anemia due to acute blood loss D62    Pacemaker Z95.0    Third degree AV block (HCC) I44.2     Current Outpatient Medications   Medication Sig Dispense Refill    acetaminophen (TYLENOL) 325 mg tablet Take 2 Tabs by mouth every four (4) hours as needed for Pain.  cephALEXin (KEFLEX) 250 mg capsule Take 1 Cap by mouth three (3) times daily for 3 days. 9 Cap 0    ferrous sulfate 325 mg (65 mg iron) tablet Take 1 Tab by mouth daily (with breakfast) for 30 days. 30 Tab 0    oxyCODONE IR (ROXICODONE) 5 mg immediate release tablet Take 1 Tab by mouth every four (4) hours as needed for Pain for up to 5 days. Max Daily Amount: 30 mg. 30 Tab 0    polyethylene glycol (MIRALAX) 17 gram packet Take 1 Packet by mouth daily as needed for Constipation.  senna-docusate (PERICOLACE) 8.6-50 mg per tablet Take 1 Tab by mouth two (2) times daily as needed for Constipation.  metoprolol tartrate (LOPRESSOR) 25 mg tablet Take 0.5 Tabs by mouth every twelve (12) hours for 60 days. 30 Tab 1    pantoprazole (PROTONIX) 40 mg tablet Take 1 Tab by mouth Daily (before breakfast). 30 Tab 0    co-enzyme Q-10 (Co Q-10) 100 mg capsule Take 100 mg by mouth daily.  Januvia 50 mg tablet Take 1 tablet by mouth once daily 30 Tab 0    LORazepam (ATIVAN) 0.5 mg tablet TAKE 1 TABLET BY MOUTH ONCE DAILY AT NIGHT AS NEEDED FOR ANXIETY 20 Tab 0    traZODone (DESYREL) 50 mg tablet TAKE 1 TABLET BY MOUTH AT BEDTIME (Patient taking differently: Take 50 mg by mouth nightly as needed.) 30 Tab 0    Blood-Glucose Meter monitoring kit Check blood sugar once daily, E11.21 1 Kit 0    red yeast rice extract 600 mg cap Take 600 mg by mouth now.  Every now and then      ezetimibe (ZETIA) 10 mg tablet Take 1 Tab by mouth daily. 90 Tab 1    citalopram (CELEXA) 20 mg tablet Take 1 Tab by mouth daily. 90 Tab 1    gabapentin (NEURONTIN) 300 mg capsule Take 1 Cap by mouth nightly as needed for Pain. 180 Cap 1    cholecalciferol, vitamin D3, (VITAMIN D3 PO) Take  by mouth daily.  ascorbate calcium (VITAMIN C PO) Take  by mouth daily.  aspirin delayed-release (ASPIR-LOW) 81 mg tablet Take 81 mg by mouth. Taking every now and then      FREESTYLE LANCETS 28 gauge misc USE TO CHECK BLOOD SUGAR ONCE DAILY 100 Lancet 12    cyanocobalamin 1,000 mcg tablet Take 1,000 mcg by mouth daily.  magnesium 250 mg tab Take  by mouth daily. Lab Results   Component Value Date/Time    GFR est non-AA 56 (L) 05/04/2020 04:52 AM    GFRNA, POC 50 (L) 11/02/2016 07:15 AM    GFR est AA >60 05/04/2020 04:52 AM    GFRAA, POC >60 11/02/2016 07:15 AM    Creatinine 1.27 05/04/2020 04:52 AM    Creatinine (POC) 1.4 (H) 11/02/2016 07:15 AM    BUN 21 (H) 05/04/2020 04:52 AM    BUN (POC) 28 (H) 11/02/2016 07:15 AM    Sodium 137 05/04/2020 04:52 AM    Sodium (POC) 140 11/02/2016 07:15 AM    Potassium 3.8 05/04/2020 04:52 AM    Potassium (POC) 4.1 11/02/2016 07:15 AM    Chloride 101 05/04/2020 04:52 AM    Chloride (POC) 105 11/02/2016 07:15 AM    CO2 29 05/04/2020 04:52 AM    Magnesium 2.6 (H) 05/04/2020 04:52 AM    Phosphorus 3.0 10/17/2014 04:40 AM    Albumin, urine 59.5 10/23/2015 09:10 AM     Lab Results   Component Value Date/Time    TSH 1.98 04/25/2020 01:42 AM         Review of Systems   Constitutional: Negative for chills and fever. HENT: Negative for hearing loss and tinnitus. Eyes: Negative for blurred vision and double vision. Respiratory: Negative for shortness of breath and wheezing. Cardiovascular: Positive for leg swelling. Negative for chest pain. Gastrointestinal: Negative for nausea and vomiting. Genitourinary: Positive for frequency. Negative for dysuria.    Musculoskeletal: Positive for back pain and joint pain. Negative for falls. Skin: Negative for itching and rash. Neurological: Negative for dizziness and loss of consciousness. Psychiatric/Behavioral: Positive for depression (stable on celexa happy w dose ). The patient is not nervous/anxious. Physical Exam  Constitutional:       General: He is not in acute distress. Appearance: Normal appearance. He is not ill-appearing, toxic-appearing or diaphoretic. HENT:      Head: Normocephalic and atraumatic. Eyes:      General:         Right eye: No discharge. Left eye: No discharge. Conjunctiva/sclera: Conjunctivae normal.   Neurological:      General: No focal deficit present. Mental Status: He is alert and oriented to person, place, and time. Psychiatric:         Mood and Affect: Mood normal.         Behavior: Behavior normal.         ASSESSMENT and PLAN    ICD-10-CM ICD-9-CM    1. Medicare annual wellness visit, subsequent Z00.00 V70.0 SLEEP MEDICINE REFERRAL      METABOLIC PANEL, BASIC      CBC W/O DIFF      LIPID PANEL      MICROALBUMIN, UR, RAND W/ MICROALB/CREAT RATIO   2. Third degree AV block ([facility]    Status post pacemaker we will follow-up with  I44.2 426.0 SLEEP MEDICINE REFERRAL      METABOLIC PANEL, BASIC      CBC W/O DIFF      LIPID PANEL      MICROALBUMIN, UR, RAND W/ MICROALB/CREAT RATIO   3. NSTEMI (non-ST elevated myocardial infarction) ([facility]    Patient follows with Dr. Demario Greenfield was recently admitted with MI    Now status post three-vessel CABG    Has follow-up with cardiothoracic surgeon pending    He is not on amiodarone    Should be taking aspirin he is not he will go ahead and start this    He is asymptomatic and has home health coming    Ultimately will likely need to go to cardiac rehab in the future I21.4 410.70 Becca, BASIC      CBC W/O DIFF      LIPID PANEL      MICROALBUMIN, UR, RAND W/ MICROALB/CREAT RATIO   4.  Chronic deep vein thrombosis (DVT) of proximal vein of both lower extremities (HCC)    History of in the past currently not on a anticoagulant will start aspirin I82.5Y3 453.51 SLEEP MEDICINE REFERRAL      METABOLIC PANEL, BASIC      CBC W/O DIFF      LIPID PANEL      MICROALBUMIN, UR, RAND W/ MICROALB/CREAT RATIO   5. Type 2 diabetes with nephropathy (HCC)    Controlled on Januvia E11.21 250.40 SLEEP MEDICINE REFERRAL     519.98 METABOLIC PANEL, BASIC      CBC W/O DIFF      LIPID PANEL      MICROALBUMIN, UR, RAND W/ MICROALB/CREAT RATIO   6. Type 2 diabetes mellitus with diabetic neuropathy, without long-term current use of insulin ([facility]    See above E11.40 250.60 SLEEP MEDICINE REFERRAL     764.0 METABOLIC PANEL, BASIC      CBC W/O DIFF      LIPID PANEL      MICROALBUMIN, UR, RAND W/ MICROALB/CREAT RATIO   7. Prostate cancer [facility])      Follows with urologist Dr. Win Johnson he is supposed to have radioactive seeds implanted this has been pushed off due to recent MI C61 Radha Ruffin [address], BASIC      CBC W/O DIFF      LIPID PANEL      MICROALBUMIN, UR, RAND W/ MICROALB/CREAT RATIO   8. Coronary artery disease involving native coronary artery of native heart without angina pectoris    See above he is now medically managed status post three-vessel CABG I25.10 414.01 SLEEP MEDICINE REFERRAL      METABOLIC PANEL, BASIC      CBC W/O DIFF      LIPID PANEL      MICROALBUMIN, UR, RAND W/ MICROALB/CREAT RATIO   9. Mixed hyperlipidemia    He is supposed to be on Zetia daily this is unclear he may still be taking his Crestor now on a daily basis daughter will go through medications E78.2 272.2 SLEEP MEDICINE REFERRAL      METABOLIC PANEL, BASIC      CBC W/O DIFF      LIPID PANEL      MICROALBUMIN, UR, RAND W/ MICROALB/CREAT RATIO   10.  Essential hypertension    Now only on metoprolol twice daily 12.5 mg well-controlled I10 401.9 SLEEP MEDICINE REFERRAL      METABOLIC PANEL, BASIC      CBC W/O DIFF      LIPID PANEL      MICROALBUMIN, UR, RAND W/ MICROALB/CREAT RATIO   11. S/P CABG x 3    See above Z95.1 V45.81 SLEEP MEDICINE REFERRAL      METABOLIC PANEL, BASIC      CBC W/O DIFF      LIPID PANEL      MICROALBUMIN, UR, RAND W/ MICROALB/CREAT RATIO   12. Stage 3 chronic kidney disease (HCC) N18.3 585.3 SLEEP MEDICINE REFERRAL      METABOLIC PANEL, BASIC   Creatinine runs 1.4 baseline was 1.2-1.3 by the time he left the hospital we will check a BMP in a week   CBC W/O DIFF      LIPID PANEL      MICROALBUMIN, UR, RAND W/ MICROALB/CREAT RATIO   13. Reactive depression    We will get him back on his Celexa to take daily F32.9 300.4 SLEEP MEDICINE REFERRAL      METABOLIC PANEL, BASIC      CBC W/O DIFF      LIPID PANEL      MICROALBUMIN, UR, RAND W/ MICROALB/CREAT RATIO   14. Postoperative anemia due to acute blood loss    Monitor CBC to ensure improving we will repeat this in 1 week D62 285.1 SLEEP MEDICINE REFERRAL      METABOLIC PANEL, BASIC      CBC W/O DIFF      LIPID PANEL      MICROALBUMIN, UR, RAND W/ MICROALB/CREAT RATIO   15. Spinal stenosis, lumbar region, without neurogenic claudication    Patient with some sciatic symptoms he has not been on his gabapentin just restarted this he takes 3/day we will have him take 2 in the evening and 1 in the morning M48.061 724.02    16 tobacco use--currently not smoking will order nicotine patches  17 edema--we will start Lasix 20 mg daily plus Klor-Con will check creatinine and K level in 1 week  18 snoring reordered sleep study          This is the Subsequent Medicare Annual Wellness Exam, performed 12 months or more after the Initial AWV or the last Subsequent AWV    Consent: Len Ledezma, who was seen by synchronous (real-time) audio-video technology, and/or his healthcare decision maker, is aware that this patient-initiated, Telehealth encounter on 5/7/2020 is a billable service.  While AWVs are fully covered by Medicare, any services rendered on this date that are not included in an AWV are subject to additional billing, with coverage as determined by his insurance carrier. He is aware that he may receive a bill for any such additional services and has provided verbal consent to proceed: Yes. I have reviewed the patient's medical history in detail and updated the computerized patient record.      History     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    Alcohol abuse F10.10    Insomnia G47.00    HTN (hypertension) I10    DVT of leg (deep venous thrombosis) ([facility] I82.409    Spinal stenosis, lumbar region, without neurogenic claudication M48.061    Hyperlipidemia E78.5    CKD (chronic kidney disease) N18.9    Cervical spinal stenosis M48.02    Reactive depression F32.9    Type 2 diabetes with nephropathy (HCC) E11.21    Prostate cancer ([facility] C61    Type 2 diabetes mellitus with diabetic neuropathy ([facility] E11.40    NSTEMI (non-ST elevated myocardial infarction) ([facility] I21.4    Bilateral carotid artery stenosis I65.23    Preop cardiovascular exam Z01.810    CAD (coronary artery disease) I25.10    S/P CABG x 3 Z95.1    Postoperative anemia due to acute blood loss D62    Pacemaker Z95.0    Third degree AV block (HCC) I44.2     Past Medical History:   Diagnosis Date    Arthritis     Chronic kidney disease     Stage 3    Chronic pain     Abdoman, back, fingers per daughter   [facility] Diabetes ([facility]     DIET CONTROLLED    Hypertension     PE (pulmonary embolism)     Pneumonia     Psychiatric disorder     Depression      Past Surgical History:   Procedure Laterality Date    ABDOMEN SURGERY PROC UNLISTED      bowel resection    HX BACK SURGERY  2014    HX GI  11/2012    bowel resection    HX ORTHOPAEDIC      amputated left 4th finger    MA INS NEW/RPLCMT PRM PM W/TRANSV ELTRD ATRIAL&VENT N/A 5/1/2020    INSERT PPM DUAL performed by Luisa Zaldivar MD at [address] [facility] CATH LAB     Current Outpatient Medications Medication Sig Dispense Refill    acetaminophen (TYLENOL) 325 mg tablet Take 2 Tabs by mouth every four (4) hours as needed for Pain.  cephALEXin (KEFLEX) 250 mg capsule Take 1 Cap by mouth three (3) times daily for 3 days. 9 Cap 0    ferrous sulfate 325 mg (65 mg iron) tablet Take 1 Tab by mouth daily (with breakfast) for 30 days. 30 Tab 0    oxyCODONE IR (ROXICODONE) 5 mg immediate release tablet Take 1 Tab by mouth every four (4) hours as needed for Pain for up to 5 days. Max Daily Amount: 30 mg. 30 Tab 0    polyethylene glycol (MIRALAX) 17 gram packet Take 1 Packet by mouth daily as needed for Constipation.  senna-docusate (PERICOLACE) 8.6-50 mg per tablet Take 1 Tab by mouth two (2) times daily as needed for Constipation.  metoprolol tartrate (LOPRESSOR) 25 mg tablet Take 0.5 Tabs by mouth every twelve (12) hours for 60 days. 30 Tab 1    pantoprazole (PROTONIX) 40 mg tablet Take 1 Tab by mouth Daily (before breakfast). 30 Tab 0    co-enzyme Q-10 (Co Q-10) 100 mg capsule Take 100 mg by mouth daily.  Januvia 50 mg tablet Take 1 tablet by mouth once daily 30 Tab 0    LORazepam (ATIVAN) 0.5 mg tablet TAKE 1 TABLET BY MOUTH ONCE DAILY AT NIGHT AS NEEDED FOR ANXIETY 20 Tab 0    traZODone (DESYREL) 50 mg tablet TAKE 1 TABLET BY MOUTH AT BEDTIME (Patient taking differently: Take 50 mg by mouth nightly as needed.) 30 Tab 0    Blood-Glucose Meter monitoring kit Check blood sugar once daily, E11.21 1 Kit 0    red yeast rice extract 600 mg cap Take 600 mg by mouth now. Every now and then      ezetimibe (ZETIA) 10 mg tablet Take 1 Tab by mouth daily. 90 Tab 1    citalopram (CELEXA) 20 mg tablet Take 1 Tab by mouth daily. 90 Tab 1    gabapentin (NEURONTIN) 300 mg capsule Take 1 Cap by mouth nightly as needed for Pain. 180 Cap 1    cholecalciferol, vitamin D3, (VITAMIN D3 PO) Take  by mouth daily.  ascorbate calcium (VITAMIN C PO) Take  by mouth daily.       aspirin delayed-release (ASPIR-LOW) 81 mg tablet Take 81 mg by mouth. Taking every now and then      FREESTYLE LANCETS 28 gauge misc USE TO CHECK BLOOD SUGAR ONCE DAILY 100 Lancet 12    cyanocobalamin 1,000 mcg tablet Take 1,000 mcg by mouth daily.  magnesium 250 mg tab Take  by mouth daily. Allergies   Allergen Reactions    Arb-Angiotensin Receptor Antagonist Other (comments)     High k       Family History   Problem Relation Age of Onset    Cancer Mother     Heart Disease Father     Heart Disease Brother     Anesth Problems Neg Hx      Social History     Tobacco Use    Smoking status: Never Smoker    Smokeless tobacco: Current User     Types: Chew   Substance Use Topics    Alcohol use: Yes     Alcohol/week: 7.0 standard drinks     Types: 7 Cans of beer per week     Comment: 7-10 per week       Depression Risk Factor Screening:     3 most recent PHQ Screens 5/7/2020   PHQ Not Done -   Little interest or pleasure in doing things Several days   Feeling down, depressed, irritable, or hopeless Not at all   Total Score PHQ 2 1       Alcohol Risk Factor Screening (MALE > 65): Do you average more 1 drink per night or more than 7 drinks a week: No    In the past three months have you have had more than 4 drinks containing alcohol on one occasion: No  None currently     Currently not chewing tobbacco    Functional Ability and Level of Safety:   Hearing: Hearing is good. --has mild decrease this was eval by ent already     Activities of Daily Living: The home contains: handrails and grab bars  Patient needs help with:  transportation, shopping, preparing meals, laundry, housework, managing medications, managing money, dressing, bathing and hygiene    Ambulation: with difficulty, uses a cane and walker    Fall Risk:  Fall Risk Assessment, last 12 mths 5/7/2020   Able to walk? Yes   Fall in past 12 months?  No   Fall with injury? -   Number of falls in past 12 months -   Fall Risk Score -       Abuse Screen:  Patient is not abused  Lives w daughter  Cognitive Screening   Has your family/caregiver stated any concerns about your memory: yes - refered for neuropsych testing  Cognitive Screening: refer for neuropsyc testing  He declines tesint currently   Patient Care Team   Patient Care Team:  Marti Cook MD as PCP - General (Internal Medicine)  Marti Cook MD as PCP - [facility]  Karyn Guerrier MD as Physician (Cardiology)  Nerissa Rivera MD (Nephrology)  Geri Anthony MD (Neurosurgery)  Wil Pimentel MD (Ophthalmology)  Deena Silverman MD (Otolaryngology)  Shital Kinney MD (Urology)  Patricia Luther MD (Ophthalmology)  Amarjit Go DPM (Podiatry)  Jose J Velazquez, PHD (Psychology)  Cielo Barnett MD (Cardiothoracic Surgery)  Lonny Renner MD (Cardiology)  Isabel Membreno MD (Cardiology)  updated  Assessment/Plan   Education and counseling provided:  Are appropriate based on today's review and evaluation  End-of-Life planning (with patient's consent)  Cardiovascular screening blood test  Bone mass measurement (DEXA)  Screening for glaucoma  Diabetes screening test    Diagnoses and all orders for this visit:    1. Medicare annual wellness visit, subsequent          Discussed with patient about advance medical directive. Provided patient blank AMD and Your Right to Decide Booklet. Requested that if completed to provide a copy of AMD to PCP office. ACP not on file. SDM is his daughter (Yareli Winchester).   Provided information in the past.--will resend today     Colonoscopy: 9/15/15, Dr. Carolina Gonsalves, repeat 5 years--due 9/20  PSA: 4/19 Dr Duc Lutz follows has prostate cancer  AAA screen: CT 11/12, negative    Tdap: unknown   Pneumovax: 12/04/2012  [id number]:09/24/19   Shingrix: never completed not interested yet  Flu shot: declines    Eye exam: Dr. Dean Galeano Estopinal, 2/20 per patient    A1c:  4/20 5.8 q3 mo  Hep C Screen: 10/15, negative  Lipids: 1/20 ldl 138 annual   EK/17, PACs       Medication reconciliation completed by MA and reviewed by me. Medical/surgical/social/family history reviewed and updated by me. Patient provided AVS and preventative screening table. Patient verbalized understanding of all information discussed. Health Maintenance Due   Topic Date Due    DTaP/Tdap/Td series (1 - Tdap) 1969    Shingrix Vaccine Age 50> (1 of 2) 1998    Medicare Yearly Exam  2020    Colonoscopy  09/15/2020       Zohra Das is a 68 y.o. male who was evaluated by an audio-video encounter for concerns as above. Patient identification was verified prior to start of the visit. A caregiver was present when appropriate. Due to this being a TeleHealth encounter (During [place] public health emergency), evaluation of the following organ systems was limited: Vitals/Constitutional/EENT/Resp/CV/GI//MS/Neuro/Skin/Heme-Lymph-Imm. Pursuant to the emergency declaration under the [facility], 1135 waiver authority and the [employer] Act, this Virtual Visit was conducted, with patient's (and/or legal guardian's) consent, to reduce the patient's risk of exposure to COVID-19 and provide necessary medical care. Services were provided through a synchronous discussion virtually to substitute for in-person clinic visit. I was at home. The patient was at home.       Elie Hall MD

## 2020-05-07 NOTE — TELEPHONE ENCOUNTER
I called to remind patient of appt for Monday. Wife stated they went to see the PCP, he said he should be on aspirin, she wanted to know what she should get?

## 2020-05-08 ENCOUNTER — TELEPHONE (OUTPATIENT)
Dept: CARDIOLOGY CLINIC | Age: 70
End: 2020-05-08

## 2020-05-08 ENCOUNTER — HOME CARE VISIT (OUTPATIENT)
Dept: SCHEDULING | Facility: HOME HEALTH | Age: 70
End: 2020-05-08
Payer: MEDICARE

## 2020-05-08 VITALS
HEART RATE: 66 BPM | OXYGEN SATURATION: 95 % | TEMPERATURE: 97.7 F | RESPIRATION RATE: 18 BRPM | DIASTOLIC BLOOD PRESSURE: 78 MMHG | SYSTOLIC BLOOD PRESSURE: 118 MMHG

## 2020-05-08 PROCEDURE — G0300 HHS/HOSPICE OF LPN EA 15 MIN: HCPCS

## 2020-05-08 PROCEDURE — 3331090002 HH PPS REVENUE DEBIT

## 2020-05-08 PROCEDURE — 3331090001 HH PPS REVENUE CREDIT

## 2020-05-08 NOTE — TELEPHONE ENCOUNTER
Patient's daughter states she thought patient was supposed to have a remote check today because he was just released from hospital. She would like to discuss this further, please advise.      Phone: [phone]

## 2020-05-08 NOTE — TELEPHONE ENCOUNTER
If he is at home or able to come to office let's have him come in to office to be checked in person  With wound care that family is concerned and device programming we should bring him into office

## 2020-05-08 NOTE — TELEPHONE ENCOUNTER
Verified patient with two types of identifiers. Spoke with patient's daughter, Dee Paez. Explained to Dee Paez that patient's device has the capability to automatically send when a remote is programed. Apologized for any confusion. Notified Dee Paez that she can now remove the outer bandage. Daughter to call with any increased swelling to site, redness, drainage, or pain. Patient had a VV with PCP, Dr. Eusebio Lopez yesterday who started patient on Lasix and Potassium for increased swelling. Dee Paez also asked if patient should elevate legs. Notified Dee Paez that is helpful to decrease swelling. Notified Dee Paez to also call patient's primary cardiologist, Dr. Vinson Felty to notify of medication change. Patient's duaghter verbalized understanding and will call with any other questions.

## 2020-05-08 NOTE — TELEPHONE ENCOUNTER
Verified patient with two types of identifiers. Spoke with patient's daughter who says she feels comfortable after our conversation this morning and does not feel the need to come into the office. Patient's daughter verbalized understanding and will call with any other questions.     r

## 2020-05-08 NOTE — TELEPHONE ENCOUNTER
I spoke to her, Jaymie Lemus, about risk of stroke and bleeding  For now with short duration of atrial flutter arrhythmia and recent surgery we agree to continue to monitor and he is on aspirin for now  I mentioned eliquis as possible add on

## 2020-05-08 NOTE — TELEPHONE ENCOUNTER
Patient's daughter is calling again regarding the remote check being cancelled. She was under the impression that it was a VV and was concerned as to why it would be cancelled. I advised her that it was not a VV but a remote pacemaker check. She still has concerns as to when the patient can take the bandage off. Please advise.     Phone #: [phone]  Thanks

## 2020-05-09 PROCEDURE — 3331090001 HH PPS REVENUE CREDIT

## 2020-05-09 PROCEDURE — 3331090002 HH PPS REVENUE DEBIT

## 2020-05-10 PROCEDURE — 3331090001 HH PPS REVENUE CREDIT

## 2020-05-10 PROCEDURE — 3331090002 HH PPS REVENUE DEBIT

## 2020-05-11 ENCOUNTER — TELEPHONE (OUTPATIENT)
Dept: INTERNAL MEDICINE CLINIC | Age: 70
End: 2020-05-11

## 2020-05-11 ENCOUNTER — OFFICE VISIT (OUTPATIENT)
Dept: CARDIOTHORACIC SURGERY | Age: 70
End: 2020-05-11

## 2020-05-11 DIAGNOSIS — Z95.1 S/P CABG X 3: Primary | ICD-10-CM

## 2020-05-11 DIAGNOSIS — M79.89 SWELLING OF LEFT FOOT: ICD-10-CM

## 2020-05-11 DIAGNOSIS — M54.32 SCIATICA OF LEFT SIDE: Primary | ICD-10-CM

## 2020-05-11 PROCEDURE — 3331090002 HH PPS REVENUE DEBIT

## 2020-05-11 PROCEDURE — 3331090001 HH PPS REVENUE CREDIT

## 2020-05-11 NOTE — TELEPHONE ENCOUNTER
Tim, [facility] Office Pool             Level 1/Escalated Issue       Caller's first and last name and relationship (if not the patient):       Best contact number(s):[phone]       What are the symptoms: left hip and leg pains when standing and sitting       Transfer successful - yes/no (include outcome): N/A       Transfer declined - yes/no (include reason): N/A       Was caller advised to seek appropriate level of care - yes/no: Yes       Details to clarify the request: caller informed pt is still experiencing pain with prescribed medication. Caller is requesting a consultation with pcp.          Diana Welch

## 2020-05-11 NOTE — PROGRESS NOTES
Patient: Dee Bailon. Age: 68 y.o. Patient Care Team:  Lexie Noriega MD as PCP - General (Internal Medicine)  Lexie Noriega MD as PCP - [facility] Provider  Saritha Palacio MD as Physician (Cardiology)  Karolyn Landaverde MD (Nephrology)  Sarai Reid MD (Neurosurgery)  Zenaida Reich MD (Ophthalmology)  Charmayne Pavy, MD (Otolaryngology)  Jose Rafael Ponce MD (Urology)  Safia Page MD (Ophthalmology)  Lord Phil DPM (Podiatry)  Simeon Rosen, PHD (Psychology)  Roslyn Cox MD (Cardiothoracic Surgery)  Angeline Carmona MD (Cardiology)  Nisha Clement MD (Cardiology)    Diagnosis: The encounter diagnosis was S/P CABG x 3. 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    Alcohol abuse F10.10    Insomnia G47.00    HTN (hypertension) I10    DVT of leg (deep venous thrombosis) ([facility] [address] [address] I82.409    Spinal stenosis, lumbar region, without neurogenic claudication M48.061    Hyperlipidemia E78.5    CKD (chronic kidney disease) N18.9    Cervical spinal stenosis M48.02    Reactive depression F32.9    Type 2 diabetes with nephropathy (HCC) E11.21    Prostate cancer ([facility] [address] C61    Type 2 diabetes mellitus with diabetic neuropathy ([facility] [address] E11.40    NSTEMI (non-ST elevated myocardial infarction) ([address] I21.4    Bilateral carotid artery stenosis I65.23    Preop cardiovascular exam Z01.810    CAD (coronary artery disease) I25.10    S/P CABG x 3 Z95.1    Postoperative anemia due to acute blood loss D62    Pacemaker Z95.0    Third degree AV block (HCC) I44.2        This service was provided thru video telehealth via [employer] me, between Grace Greco NP at Cardiac Surgery Specialist's office and Fran Diana. at their home. Date of Surgery: 4/28/20    Surgery: CABG    HPI:  Pt is seen for post op follow up. He reports pain from sciatica preventing a lot of ambulation. He has some SOB with activity. LE edema is improving and weight is trending down. BS well controlled 100-120s. He is eating well. Current Medications:   Current Outpatient Medications   Medication Sig Dispense Refill    pumpkin seed extract-soy germ 300 mg cap Take  by mouth.  furosemide (LASIX) 20 mg tablet Take 1 Tab by mouth daily. 30 Tab 0    potassium chloride (KLOR-CON) 10 mEq tablet Take 1 Tab by mouth daily. 30 Tab 1    ferrous sulfate 325 mg (65 mg iron) tablet Take 1 Tab by mouth daily (with breakfast) for 30 days. 30 Tab 0    senna-docusate (PERICOLACE) 8.6-50 mg per tablet Take 1 Tab by mouth two (2) times daily as needed for Constipation.  metoprolol tartrate (LOPRESSOR) 25 mg tablet Take 0.5 Tabs by mouth every twelve (12) hours for 60 days. 30 Tab 1    co-enzyme Q-10 (Co Q-10) 100 mg capsule Take 100 mg by mouth daily.  Januvia 50 mg tablet Take 1 tablet by mouth once daily 30 Tab 0    LORazepam (ATIVAN) 0.5 mg tablet TAKE 1 TABLET BY MOUTH ONCE DAILY AT NIGHT AS NEEDED FOR ANXIETY 20 Tab 0    traZODone (DESYREL) 50 mg tablet TAKE 1 TABLET BY MOUTH AT BEDTIME (Patient taking differently: Take 50 mg by mouth nightly as needed.) 30 Tab 0    ezetimibe (ZETIA) 10 mg tablet Take 1 Tab by mouth daily. 90 Tab 1    citalopram (CELEXA) 20 mg tablet Take 1 Tab by mouth daily. 90 Tab 1    gabapentin (NEURONTIN) 300 mg capsule Take 1 Cap by mouth nightly as needed for Pain. 180 Cap 1    cholecalciferol, vitamin D3, (VITAMIN D3 PO) Take  by mouth daily.  ascorbate calcium (VITAMIN C PO) Take  by mouth daily.  aspirin delayed-release (ASPIR-LOW) 81 mg tablet Take 81 mg by mouth. Taking every now and then      cyanocobalamin 1,000 mcg tablet Take 1,000 mcg by mouth daily.  magnesium 250 mg tab Take  by mouth daily.  nicotine (NICODERM CQ) 21 mg/24 hr 1 Patch by TransDERmal route every twenty-four (24) hours for 30 days.  30 Patch 0    acetaminophen (TYLENOL) 325 mg tablet Take 2 Tabs by mouth every four (4) hours as needed for Pain.  polyethylene glycol (MIRALAX) 17 gram packet Take 1 Packet by mouth daily as needed for Constipation.  pantoprazole (PROTONIX) 40 mg tablet Take 1 Tab by mouth Daily (before breakfast). 30 Tab 0    red yeast rice extract 600 mg cap Take 600 mg by mouth now. Every now and then      FREESTYLE LANCETS 28 gauge misc USE TO CHECK BLOOD SUGAR ONCE DAILY 100 Lancet 12       Vitals: There were no vitals taken for this visit. Allergies: is allergic to arb-angiotensin receptor antagonist.    Physical Exam:  Wounds: clean, dry, no drainage    Assessment/Plan:   1. S/p CABG: Continue ASA, BB. Patient intolerant of statins - resumed zetia.      2. CHB s/p PPM: PPM placed by Dr. Amy Boggs. Completed abx.     3. Hypotension: resolved.      4. Interstitial edema, pleural effusions, atelectasis: Increase IS use and activity, on PO lasix.     5.  HTN: cont current meds     6.  HLD: does not tolerate statin, on zetia, coq10 PTA. Continue Zetia. Coq10.     7. DM type II: a1c 5.8, on januvia PTA. BS well controlled.     8. CKD stage III: Cr 1.27, baseline appears to be around 1.4-1.5.      9. Hx DVT/PE: developed PE/DVT after bowel resection in 2012, not on anticoagulation prior to surgery. not discharged on anticoagulation. Monitor.     10. Chronic pain/peripheral neuropathy: On gabapentin prn PTA - resumed     11. Anxiety: On celexa, ativan PTA     12. Alcohol abuse: PRN avitan, never needed any PRN ativan in the hospital     13. Prostate CA: PSA 10, sees Dr. Jesus Encarnacion (urology) and Dr. Germania Thompson (radiation oncology), was planning on scheduling radiation seed implant soon.      14. Thrombocytopenia: improved     15. Anemia: cont PO iron     Dispo: FU appointment in 2-3 weeks. Pt is ready to start cardiac rehab.      Rehab - y  Walking: y  Glucometer: y  Antibiotic card for valves: n/a

## 2020-05-11 NOTE — TELEPHONE ENCOUNTER
Name and  confirmed. Called pt's daughter back regarding the hip pain and the leg pain, pt's daughter stated that the Gabapentin isn't working for him. Pt's daughter states that ever since the heart bypass he is trying to walk, but the pain is too making it too difficult to walk. Pt's daughter states that he is taking one pil during midday and two pills at night and is still experiencing pain.

## 2020-05-12 ENCOUNTER — HOME CARE VISIT (OUTPATIENT)
Dept: SCHEDULING | Facility: HOME HEALTH | Age: 70
End: 2020-05-12
Payer: MEDICARE

## 2020-05-12 PROCEDURE — 3331090001 HH PPS REVENUE CREDIT

## 2020-05-12 PROCEDURE — 3331090002 HH PPS REVENUE DEBIT

## 2020-05-12 PROCEDURE — G0300 HHS/HOSPICE OF LPN EA 15 MIN: HCPCS

## 2020-05-12 NOTE — TELEPHONE ENCOUNTER
Spoke to [employer] (HIPAA). Two pt identifiers confirmed. Informed Michael Maloney of Duplex date/time. Informed Michael Maloney of what sx to look for for DVT-advised to go to ED. Pt verbalized understanding of information discussed w/ no further questions at this time.

## 2020-05-12 NOTE — TELEPHONE ENCOUNTER
Charlie Sierra [employer] Pool   Phone Number: [phone]             Caller's first and last name: David Mathur/Daughter   Reason for call: Daughter called wants Dr. Tony Park to order physical therapy for pt. Callback required yes/no and why: Yes, Daughter has questions for Dr. Tony Park.    Best contact number(s): [phone]   Details to clarify the request: Third request.     Koffi Nguyễn

## 2020-05-12 NOTE — TELEPHONE ENCOUNTER
#[phone] Tonio Humphreys calling as she states she did not get a call back yesterday, 5-11-20. Pt has leg leg pain that gabapentin is not helping. Still has swelling in left foot. Has been taking fluid pills for four days and that is not helping. Please call Tonio Humphreys today.

## 2020-05-12 NOTE — TELEPHONE ENCOUNTER
MD Yazan Silva, DILCIA   Caller: Unspecified (Yesterday,  1:07 PM)             Will need to get a stat duplex to r/o dvt in swollen foot     Will also get L-spine plain film regarding sciatic Ines Evener Dr. Quirino Watt, image orders signed.

## 2020-05-12 NOTE — TELEPHONE ENCOUNTER
Spoke to [employer] (HIPAA). Two pt identifiers confirmed. Yareli informed per Dr. Jessee Jo to have pt complete stat duplex and L-spine xr--ordered. Informed Consuelo that LPN RR will assist in scheduling US. Conseulo asking also if pt could have PT w/ his HH--informed Yareli LPN RR will reach out to [facility] to see if that can be added. Consuelo verbalized understanding of information discussed w/ no further questions at this time. Duplex scheduled for 1330 on 5/13/20--[facility].

## 2020-05-13 ENCOUNTER — HOME CARE VISIT (OUTPATIENT)
Dept: HOME HEALTH SERVICES | Facility: HOME HEALTH | Age: 70
End: 2020-05-13
Payer: MEDICARE

## 2020-05-13 ENCOUNTER — HOSPITAL ENCOUNTER (OUTPATIENT)
Dept: ULTRASOUND IMAGING | Age: 70
Discharge: HOME | End: 2020-05-13
Attending: INTERNAL MEDICINE
Payer: MEDICARE

## 2020-05-13 ENCOUNTER — HOSPITAL ENCOUNTER (OUTPATIENT)
Dept: GENERAL RADIOLOGY | Age: 70
Discharge: HOME | End: 2020-05-13
Attending: INTERNAL MEDICINE
Payer: MEDICARE

## 2020-05-13 VITALS
TEMPERATURE: 96.8 F | HEART RATE: 66 BPM | RESPIRATION RATE: 22 BRPM | DIASTOLIC BLOOD PRESSURE: 69 MMHG | SYSTOLIC BLOOD PRESSURE: 115 MMHG | OXYGEN SATURATION: 97 %

## 2020-05-13 DIAGNOSIS — M79.89 SWELLING OF LEFT FOOT: ICD-10-CM

## 2020-05-13 DIAGNOSIS — M54.32 SCIATICA OF LEFT SIDE: ICD-10-CM

## 2020-05-13 PROCEDURE — 93971 EXTREMITY STUDY: CPT

## 2020-05-13 PROCEDURE — 72100 X-RAY EXAM L-S SPINE 2/3 VWS: CPT

## 2020-05-13 PROCEDURE — 3331090001 HH PPS REVENUE CREDIT

## 2020-05-13 PROCEDURE — 3331090002 HH PPS REVENUE DEBIT

## 2020-05-13 NOTE — TELEPHONE ENCOUNTER
Spoke to [employer] (HIPAA). Two pt identifiers confirmed. Informed that PT added to [place]. Informed once imaging complete, she will be notified. Sheela Schroeder verbalized understanding of information discussed w/ no further questions at this time.

## 2020-05-14 ENCOUNTER — HOME CARE VISIT (OUTPATIENT)
Dept: SCHEDULING | Facility: HOME HEALTH | Age: 70
End: 2020-05-14
Payer: MEDICARE

## 2020-05-14 ENCOUNTER — TELEPHONE (OUTPATIENT)
Dept: INTERNAL MEDICINE CLINIC | Age: 70
End: 2020-05-14

## 2020-05-14 DIAGNOSIS — I10 ESSENTIAL HYPERTENSION: Primary | ICD-10-CM

## 2020-05-14 DIAGNOSIS — E78.2 MIXED HYPERLIPIDEMIA: ICD-10-CM

## 2020-05-14 DIAGNOSIS — E11.21 TYPE 2 DIABETES WITH NEPHROPATHY (HCC): ICD-10-CM

## 2020-05-14 PROCEDURE — 3331090002 HH PPS REVENUE DEBIT

## 2020-05-14 PROCEDURE — 3331090001 HH PPS REVENUE CREDIT

## 2020-05-14 PROCEDURE — G0300 HHS/HOSPICE OF LPN EA 15 MIN: HCPCS

## 2020-05-14 RX ORDER — BENZONATATE 200 MG/1
200 CAPSULE ORAL
Qty: 30 CAP | Refills: 0 | Status: SHIPPED | OUTPATIENT
Start: 2020-05-14 | End: 2020-06-10

## 2020-05-14 NOTE — TELEPHONE ENCOUNTER
Called, spoke to [facility] SURGICAL [place] (HIPAA). Two pt identifiers confirmed. Yareli informed of pt's XR and US results. [facility] states that pt is still having pain. [facility] states pt takes Samuel 300mg; 1 cap at midday and 2 caps qhs. Loco Nguyen to try giving pt 1 capsule am, 1 capsule midday, and 1 capsule qhs to see if that works. Informed if PT does not work, referral to Ortho may be likely. [facility] states pt has a slight cough with slight production intermittently and asking what can be given for that--asking for rx. Pt informed Dr. Jaymie Meek will be notified. Pt verbalized understanding of information discussed w/ no further questions at this time.

## 2020-05-14 NOTE — PROGRESS NOTES
Name and  confirmed. Spoke with pt's daughter  Informed her that there was no dvt shown on the Duplex, pt's daughter verbally understood.

## 2020-05-15 ENCOUNTER — HOME CARE VISIT (OUTPATIENT)
Dept: SCHEDULING | Facility: HOME HEALTH | Age: 70
End: 2020-05-15
Payer: MEDICARE

## 2020-05-15 VITALS
HEART RATE: 83 BPM | OXYGEN SATURATION: 97 % | SYSTOLIC BLOOD PRESSURE: 124 MMHG | DIASTOLIC BLOOD PRESSURE: 72 MMHG | RESPIRATION RATE: 22 BRPM

## 2020-05-15 LAB
CHOLEST SERPL-MCNC: 182 MG/DL (ref 100–199)
ERYTHROCYTE [DISTWIDTH] IN BLOOD BY AUTOMATED COUNT: 14.8 % (ref 11.6–15.4)
HCT VFR BLD AUTO: 29.9 % (ref 37.5–51)
HDLC SERPL-MCNC: 36 MG/DL
HGB BLD-MCNC: 9.5 G/DL (ref 13–17.7)
LDLC SERPL CALC-MCNC: 112 MG/DL (ref 0–99)
MCH RBC QN AUTO: 26.5 PG (ref 26.6–33)
MCHC RBC AUTO-ENTMCNC: 31.8 G/DL (ref 31.5–35.7)
MCV RBC AUTO: 84 FL (ref 79–97)
MICROALBUMIN UR-MCNC: 4.6 UG/ML
PLATELET # BLD AUTO: 334 X10E3/UL (ref 150–450)
RBC # BLD AUTO: 3.58 X10E6/UL (ref 4.14–5.8)
SPECIMEN STATUS REPORT, ROLRST: NORMAL
TRIGL SERPL-MCNC: 170 MG/DL (ref 0–149)
VLDLC SERPL CALC-MCNC: 34 MG/DL (ref 5–40)
WBC # BLD AUTO: 5.4 X10E3/UL (ref 3.4–10.8)

## 2020-05-15 PROCEDURE — 3331090001 HH PPS REVENUE CREDIT

## 2020-05-15 PROCEDURE — G0151 HHCP-SERV OF PT,EA 15 MIN: HCPCS

## 2020-05-15 PROCEDURE — 3331090002 HH PPS REVENUE DEBIT

## 2020-05-15 NOTE — TELEPHONE ENCOUNTER
Called, spoke to Consuelo. Two pt identifiers confirmed. Yareli informed per the pharmacy that Harshil Petbill is not covered per insurance. Yareli informed per Dr. Joanna Kate that there is no recommended at this time and pt could use flonase, zyrtec, regular mucinex/robitussin. Consuelo verbalized understanding of information discussed w/ no further questions at this time.

## 2020-05-16 ENCOUNTER — HOME CARE VISIT (OUTPATIENT)
Dept: HOME HEALTH SERVICES | Facility: HOME HEALTH | Age: 70
End: 2020-05-16
Payer: MEDICARE

## 2020-05-16 ENCOUNTER — HOME CARE VISIT (OUTPATIENT)
Dept: SCHEDULING | Facility: HOME HEALTH | Age: 70
End: 2020-05-16
Payer: MEDICARE

## 2020-05-16 VITALS
TEMPERATURE: 97.9 F | SYSTOLIC BLOOD PRESSURE: 115 MMHG | DIASTOLIC BLOOD PRESSURE: 76 MMHG | RESPIRATION RATE: 16 BRPM | HEART RATE: 85 BPM | OXYGEN SATURATION: 97 %

## 2020-05-16 VITALS
SYSTOLIC BLOOD PRESSURE: 132 MMHG | OXYGEN SATURATION: 97 % | WEIGHT: 185.8 LBS | DIASTOLIC BLOOD PRESSURE: 74 MMHG | BODY MASS INDEX: 29.1 KG/M2 | TEMPERATURE: 98.7 F | RESPIRATION RATE: 18 BRPM | HEART RATE: 78 BPM

## 2020-05-16 PROCEDURE — G0300 HHS/HOSPICE OF LPN EA 15 MIN: HCPCS

## 2020-05-16 PROCEDURE — 3331090001 HH PPS REVENUE CREDIT

## 2020-05-16 PROCEDURE — 3331090002 HH PPS REVENUE DEBIT

## 2020-05-17 PROCEDURE — 3331090002 HH PPS REVENUE DEBIT

## 2020-05-17 PROCEDURE — 3331090001 HH PPS REVENUE CREDIT

## 2020-05-18 ENCOUNTER — HOME CARE VISIT (OUTPATIENT)
Dept: SCHEDULING | Facility: HOME HEALTH | Age: 70
End: 2020-05-18
Payer: MEDICARE

## 2020-05-18 ENCOUNTER — TELEPHONE (OUTPATIENT)
Dept: INTERNAL MEDICINE CLINIC | Age: 70
End: 2020-05-18

## 2020-05-18 PROCEDURE — 3331090001 HH PPS REVENUE CREDIT

## 2020-05-18 PROCEDURE — G0299 HHS/HOSPICE OF RN EA 15 MIN: HCPCS

## 2020-05-18 PROCEDURE — 3331090002 HH PPS REVENUE DEBIT

## 2020-05-18 NOTE — TELEPHONE ENCOUNTER
----- Message from Kain Castro sent at 5/18/2020  2:48 PM EDT -----  Regarding: Dr. Kathye Angelucci: [phone]  [facility], patient's daughter is requesting a call back for patient's lab and urinalysis results.        Copy/paste envera

## 2020-05-19 ENCOUNTER — HOME CARE VISIT (OUTPATIENT)
Dept: SCHEDULING | Facility: HOME HEALTH | Age: 70
End: 2020-05-19
Payer: MEDICARE

## 2020-05-19 ENCOUNTER — HOME CARE VISIT (OUTPATIENT)
Dept: HOME HEALTH SERVICES | Facility: HOME HEALTH | Age: 70
End: 2020-05-19
Payer: MEDICARE

## 2020-05-19 VITALS
RESPIRATION RATE: 18 BRPM | DIASTOLIC BLOOD PRESSURE: 80 MMHG | OXYGEN SATURATION: 97 % | SYSTOLIC BLOOD PRESSURE: 116 MMHG | TEMPERATURE: 98 F | HEART RATE: 70 BPM

## 2020-05-19 PROCEDURE — G0151 HHCP-SERV OF PT,EA 15 MIN: HCPCS

## 2020-05-19 PROCEDURE — 3331090001 HH PPS REVENUE CREDIT

## 2020-05-19 PROCEDURE — 3331090002 HH PPS REVENUE DEBIT

## 2020-05-19 NOTE — TELEPHONE ENCOUNTER
Called, spoke to [facility] SURGICAL [place] (HIPAA). Two pt identifiers confirmed. Yareli informed per Dr. Mckayla Woodruff of pt's labs results. Javed Young states that pt still c/o back/sciatic pain--advised to have pt continue w/ PT. Ahmet Rome contact info to Dr. Shon Siegel (ortho) for persistent/worsening sx. Javed Young verbalized understanding of information discussed w/ no further questions at this time.

## 2020-05-20 ENCOUNTER — HOME CARE VISIT (OUTPATIENT)
Dept: SCHEDULING | Facility: HOME HEALTH | Age: 70
End: 2020-05-20
Payer: MEDICARE

## 2020-05-20 VITALS
OXYGEN SATURATION: 98 % | HEART RATE: 94 BPM | RESPIRATION RATE: 16 BRPM | DIASTOLIC BLOOD PRESSURE: 78 MMHG | SYSTOLIC BLOOD PRESSURE: 145 MMHG | TEMPERATURE: 98.1 F

## 2020-05-20 VITALS
SYSTOLIC BLOOD PRESSURE: 120 MMHG | RESPIRATION RATE: 18 BRPM | TEMPERATURE: 97.2 F | HEART RATE: 81 BPM | WEIGHT: 185.6 LBS | BODY MASS INDEX: 29.07 KG/M2 | OXYGEN SATURATION: 97 % | DIASTOLIC BLOOD PRESSURE: 72 MMHG

## 2020-05-20 PROCEDURE — G0300 HHS/HOSPICE OF LPN EA 15 MIN: HCPCS

## 2020-05-20 PROCEDURE — 3331090002 HH PPS REVENUE DEBIT

## 2020-05-20 PROCEDURE — 3331090001 HH PPS REVENUE CREDIT

## 2020-05-21 ENCOUNTER — HOME CARE VISIT (OUTPATIENT)
Dept: SCHEDULING | Facility: HOME HEALTH | Age: 70
End: 2020-05-21
Payer: MEDICARE

## 2020-05-21 PROCEDURE — 3331090001 HH PPS REVENUE CREDIT

## 2020-05-21 PROCEDURE — 3331090002 HH PPS REVENUE DEBIT

## 2020-05-21 PROCEDURE — G0151 HHCP-SERV OF PT,EA 15 MIN: HCPCS

## 2020-05-22 ENCOUNTER — HOME CARE VISIT (OUTPATIENT)
Dept: SCHEDULING | Facility: HOME HEALTH | Age: 70
End: 2020-05-22
Payer: MEDICARE

## 2020-05-22 VITALS
BODY MASS INDEX: 28.69 KG/M2 | OXYGEN SATURATION: 96 % | TEMPERATURE: 98.4 F | HEART RATE: 85 BPM | DIASTOLIC BLOOD PRESSURE: 80 MMHG | SYSTOLIC BLOOD PRESSURE: 116 MMHG | WEIGHT: 183.2 LBS | RESPIRATION RATE: 18 BRPM

## 2020-05-22 PROCEDURE — G0300 HHS/HOSPICE OF LPN EA 15 MIN: HCPCS

## 2020-05-22 PROCEDURE — 3331090001 HH PPS REVENUE CREDIT

## 2020-05-22 PROCEDURE — 3331090002 HH PPS REVENUE DEBIT

## 2020-05-23 PROCEDURE — 3331090001 HH PPS REVENUE CREDIT

## 2020-05-23 PROCEDURE — 3331090002 HH PPS REVENUE DEBIT

## 2020-05-24 PROBLEM — Z01.810 PREOP CARDIOVASCULAR EXAM: Status: RESOLVED | Noted: 2020-04-24 | Resolved: 2020-05-24

## 2020-05-24 PROCEDURE — 3331090001 HH PPS REVENUE CREDIT

## 2020-05-24 PROCEDURE — 3331090002 HH PPS REVENUE DEBIT

## 2020-05-24 RX ORDER — FUROSEMIDE 20 MG/1
TABLET ORAL
Qty: 30 TAB | Refills: 0 | Status: SHIPPED | OUTPATIENT
Start: 2020-05-24 | End: 2020-07-01 | Stop reason: SDUPTHER

## 2020-05-24 RX ORDER — SITAGLIPTIN 50 MG/1
TABLET, FILM COATED ORAL
Qty: 30 TAB | Refills: 0 | Status: SHIPPED | OUTPATIENT
Start: 2020-05-24 | End: 2020-06-29

## 2020-05-25 PROCEDURE — 3331090001 HH PPS REVENUE CREDIT

## 2020-05-25 PROCEDURE — 3331090002 HH PPS REVENUE DEBIT

## 2020-05-26 ENCOUNTER — HOME CARE VISIT (OUTPATIENT)
Dept: SCHEDULING | Facility: HOME HEALTH | Age: 70
End: 2020-05-26
Payer: MEDICARE

## 2020-05-26 VITALS
HEART RATE: 82 BPM | DIASTOLIC BLOOD PRESSURE: 65 MMHG | RESPIRATION RATE: 18 BRPM | TEMPERATURE: 97.9 F | OXYGEN SATURATION: 97 % | SYSTOLIC BLOOD PRESSURE: 108 MMHG

## 2020-05-26 DIAGNOSIS — D50.9 IRON DEFICIENCY ANEMIA, UNSPECIFIED IRON DEFICIENCY ANEMIA TYPE: Primary | ICD-10-CM

## 2020-05-26 PROCEDURE — 3331090002 HH PPS REVENUE DEBIT

## 2020-05-26 PROCEDURE — G0300 HHS/HOSPICE OF LPN EA 15 MIN: HCPCS

## 2020-05-26 PROCEDURE — G0151 HHCP-SERV OF PT,EA 15 MIN: HCPCS

## 2020-05-26 PROCEDURE — 3331090001 HH PPS REVENUE CREDIT

## 2020-05-26 RX ORDER — LANOLIN ALCOHOL/MO/W.PET/CERES
325 CREAM (GRAM) TOPICAL
Qty: 30 TAB | Refills: 0 | Status: SHIPPED | OUTPATIENT
Start: 2020-05-26 | End: 2020-06-03 | Stop reason: SDUPTHER

## 2020-05-26 NOTE — TELEPHONE ENCOUNTER
CP: Ronald Salinas MD    Last appt: 5/7/2020  Future Appointments   Date Time Provider Willian Paul   5/28/2020 10:00 AM Ramiro Quinonez PT [address]   5/29/2020 12:30 AM Torito Prince [address] [facility]   6/2/2020 To Be Determined Mary Toribio [address]   6/2/2020 To Be Determined Celestino Dickinson LPN [facility]   6/3/2020  2:00 PM Alison Lo MD [address] [facility]   6/4/2020 To Be Determined Mary Toribio [facility]   6/4/2020 To Be Determined Celestino Dickinson LPN [address] [facility]   6/8/2020 11:45 AM Ronald Salinas MD [facility]   6/9/2020 To Be Determined Ramiro Quinonez PT [facility]   6/9/2020 To Be Determined Celestino Dickinson LPN [facility]   6/11/2020 To Be Determined Mary Toribio [facility]   6/11/2020 To Be Determined Celestino Dickinson LPN [address]   6/16/2020 To Be Determined Mary Malu [address]   6/17/2020 To Be Determined Celestino Dickinson AZEEM [address]   6/18/2020 To Be Determined Mary Malu [address]   6/23/2020 To Be Determined Marykarely Toribio [address]   6/24/2020 To Be Determined Celestino Dickinson LPN [address] [facility]   6/25/2020 To Be Determined Mary Toribio [address]   7/1/2020 To Be Determined Jairo Gamboa [address]   8/4/2020  9:15 AM [email or web address], [address]   8/4/2020  9:20 AM Bernadette Wood  [address]       Requested Prescriptions     Pending Prescriptions Disp Refills    ferrous sulfate 325 mg (65 mg iron) tablet 30 Tab 0     Sig: Take 1 Tab by mouth daily (with breakfast) for 30 days.

## 2020-05-27 VITALS
OXYGEN SATURATION: 98 % | TEMPERATURE: 97.4 F | RESPIRATION RATE: 16 BRPM | SYSTOLIC BLOOD PRESSURE: 118 MMHG | DIASTOLIC BLOOD PRESSURE: 78 MMHG | HEART RATE: 88 BPM

## 2020-05-27 PROCEDURE — 3331090002 HH PPS REVENUE DEBIT

## 2020-05-27 PROCEDURE — 3331090001 HH PPS REVENUE CREDIT

## 2020-05-28 ENCOUNTER — HOME CARE VISIT (OUTPATIENT)
Dept: SCHEDULING | Facility: HOME HEALTH | Age: 70
End: 2020-05-28
Payer: MEDICARE

## 2020-05-28 VITALS
SYSTOLIC BLOOD PRESSURE: 120 MMHG | OXYGEN SATURATION: 97 % | TEMPERATURE: 97.5 F | RESPIRATION RATE: 22 BRPM | DIASTOLIC BLOOD PRESSURE: 70 MMHG | HEART RATE: 87 BPM

## 2020-05-28 PROCEDURE — G0151 HHCP-SERV OF PT,EA 15 MIN: HCPCS

## 2020-05-28 PROCEDURE — 3331090002 HH PPS REVENUE DEBIT

## 2020-05-28 PROCEDURE — 3331090001 HH PPS REVENUE CREDIT

## 2020-05-29 ENCOUNTER — TELEPHONE (OUTPATIENT)
Dept: INTERNAL MEDICINE CLINIC | Age: 70
End: 2020-05-29

## 2020-05-29 ENCOUNTER — HOME CARE VISIT (OUTPATIENT)
Dept: SCHEDULING | Facility: HOME HEALTH | Age: 70
End: 2020-05-29
Payer: MEDICARE

## 2020-05-29 ENCOUNTER — VIRTUAL VISIT (OUTPATIENT)
Dept: INTERNAL MEDICINE CLINIC | Age: 70
End: 2020-05-29

## 2020-05-29 ENCOUNTER — HOME CARE VISIT (OUTPATIENT)
Dept: HOME HEALTH SERVICES | Facility: HOME HEALTH | Age: 70
End: 2020-05-29
Payer: MEDICARE

## 2020-05-29 VITALS
TEMPERATURE: 97.5 F | OXYGEN SATURATION: 98 % | SYSTOLIC BLOOD PRESSURE: 122 MMHG | HEART RATE: 97 BPM | DIASTOLIC BLOOD PRESSURE: 72 MMHG | RESPIRATION RATE: 16 BRPM

## 2020-05-29 DIAGNOSIS — R06.83 SNORING: ICD-10-CM

## 2020-05-29 DIAGNOSIS — N18.30 STAGE 3 CHRONIC KIDNEY DISEASE (HCC): ICD-10-CM

## 2020-05-29 DIAGNOSIS — E78.2 MIXED HYPERLIPIDEMIA: ICD-10-CM

## 2020-05-29 DIAGNOSIS — F32.9 REACTIVE DEPRESSION: ICD-10-CM

## 2020-05-29 DIAGNOSIS — C61 PROSTATE CANCER (HCC): ICD-10-CM

## 2020-05-29 DIAGNOSIS — R60.0 LOCALIZED EDEMA: ICD-10-CM

## 2020-05-29 DIAGNOSIS — I25.10 CORONARY ARTERY DISEASE INVOLVING NATIVE CORONARY ARTERY OF NATIVE HEART WITHOUT ANGINA PECTORIS: ICD-10-CM

## 2020-05-29 DIAGNOSIS — E87.6 HYPOKALEMIA: ICD-10-CM

## 2020-05-29 DIAGNOSIS — M48.061 SPINAL STENOSIS, LUMBAR REGION, WITHOUT NEUROGENIC CLAUDICATION: ICD-10-CM

## 2020-05-29 DIAGNOSIS — Z95.0 PACEMAKER: ICD-10-CM

## 2020-05-29 DIAGNOSIS — D62 POSTOPERATIVE ANEMIA DUE TO ACUTE BLOOD LOSS: ICD-10-CM

## 2020-05-29 DIAGNOSIS — I10 ESSENTIAL HYPERTENSION: ICD-10-CM

## 2020-05-29 DIAGNOSIS — E11.21 TYPE 2 DIABETES WITH NEPHROPATHY (HCC): ICD-10-CM

## 2020-05-29 DIAGNOSIS — R05.9 COUGH: ICD-10-CM

## 2020-05-29 DIAGNOSIS — E11.40 TYPE 2 DIABETES MELLITUS WITH DIABETIC NEUROPATHY, WITHOUT LONG-TERM CURRENT USE OF INSULIN (HCC): ICD-10-CM

## 2020-05-29 DIAGNOSIS — I21.4 NSTEMI (NON-ST ELEVATED MYOCARDIAL INFARCTION) (HCC): Primary | ICD-10-CM

## 2020-05-29 PROCEDURE — 3331090001 HH PPS REVENUE CREDIT

## 2020-05-29 PROCEDURE — 3331090002 HH PPS REVENUE DEBIT

## 2020-05-29 RX ORDER — METHYLPREDNISOLONE 4 MG/1
TABLET ORAL
Qty: 1 DOSE PACK | Refills: 0 | Status: SHIPPED | OUTPATIENT
Start: 2020-05-29 | End: 2020-06-10 | Stop reason: ALTCHOICE

## 2020-05-29 RX ORDER — GABAPENTIN 100 MG/1
100 CAPSULE ORAL 3 TIMES DAILY
Qty: 90 CAP | Refills: 1 | Status: SHIPPED | OUTPATIENT
Start: 2020-05-29 | End: 2020-08-25

## 2020-05-29 NOTE — TELEPHONE ENCOUNTER
Called, spoke to [facility] (HIPAA). Two pt identifiers confirmed. Yareli informed per Dr. Jorge Morin to have pt do VV. Pt offered/accepted Virtual appt for 5/29/20 at 1045. Informed pt that it will be billed under insurance and pt may expect a co-pay. Informed that pt must have access to a smartphone and/or a computer w/ a camera and a microphone. Informed pt that a PSR may contact pt roughly 15 minutes prior to Se[address] 1237 for check-in. Pt verbalized understanding of information discussed w/ no further questions at this time.

## 2020-05-29 NOTE — PROGRESS NOTES
HISTORY OF PRESENT ILLNESS  Wandy Bryan is a 68 y.o. male. HPI           This is an established visit completed with telemedicine was completed with video assist  the patient acknowledges and agrees to this method of visitation  doxyme     Last here 5/20 . Pt is here to f/u on chronic conditions. Pt presents with his daughter who provides some of his hx. Spent over 40 min     Was admit for nstemi, had pacer and cabg     Had christina in hospital                            [address] [place]  # CAD/NSTEMI, triple-vessel disease.  Surgical plans per Dr. Letty Obrien, scheduled for 4/28/2020 at [facility]. Chica Prieto is transferred to [facility] for the same. # HTN: Continue BB, hold HCTZ for surgery  # HLD: Does not tolerate statin.  On Zetia.  Co-Q10 stopped preop per CVTS. 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  No OAC at present  # Chronic pain/peripheral neuropathy, on gabapentin  # History of prostate CA, PSA 10.  Follows with Dr. Tahir Prasad (Uro) and Dr. Leeanne Zaidi (Rad-onc), awaiting radiation seed implant  Soon  # Alcohol abuse, CIWA monitoring, getting as needed Ativan p.o., last dose on 4/25/2020 at 9 PM, 0.5 mg only                He is no longer on oxycodone--  Has iron for 1 month   Was briefly on amiodarone  He is now taking asa 81mg--  Not taking protonix  On zetia no longer on crestor verified     Reviewed labs  Reviewed imaging      had been having leg swelling did US leg no dvt reviewed  Had sciatica did l spine xray --just djd no fx  Still w pain in his leg w dr Arvin Silva has f/u for June 29th      Dr castro did pacer  Has f/u in august 2020     Dr Colt Rhodes did cabg--had f/u in 5/20  All was good, has f/u on June 3rd     Assessment/Plan:   1. S/p CABG: Continue ASA, BB. Patient intolerant of statins - resumed zetia.      2. CHB s/p PPM: PPM placed by Dr. Kareem Catalan. Completed abx.     3. Hypotension: resolved.      4. Interstitial edema, pleural effusions, atelectasis: Increase IS use and activity, on PO lasix.     5.  HTN: cont current meds     6.  HLD: does not tolerate statin, on zetia, coq10 PTA. Continue Zetia. Coq10.     7. DM type II: a1c 5.8, on januvia PTA. BS well controlled.     8. CKD stage III: Cr 1.27, baseline appears to be around 1.4-1.5.      9. Hx DVT/PE: developed PE/DVT after bowel resection in 2012, not on anticoagulation prior to surgery. not discharged on anticoagulation. Monitor.     10. Chronic pain/peripheral neuropathy: On gabapentin prn PTA - resumed     11. Anxiety: On celexa, ativan PTA     12. Alcohol abuse: PRN avitan, never needed any PRN ativan in the hospital     13. Prostate CA: PSA 10, sees Dr. Loyd Khan (urology) and Dr. Bautista Gardner (radiation oncology), was planning on scheduling radiation seed implant soon.      14. Thrombocytopenia: improved     15. Anemia: cont PO iron     Dispo: FU appointment in 2-3 weeks.     Pt is ready to start cardiac rehab. Has not yest started cardiac rehab--closed       Tomorrow will be seeing [facility] office     No longer drinking etoh  Has stopping smoking wants nicotine patch         No fevers at home  Temp 97.3  Has [place] nurse as well who gets vitals   wt 185. g      BP today is controlled runs 107/67, 120/66, 106/64 111/64, 100/63, 113/72--reports  nurse bp readings are a little higher  Has been checked by [place] nurse who is coming 2 times per week     Continues on toprol now 25mg half tab bid      He is taking lasix and klor con  Swelling has improved w this  Discussed leg elevation and compression hose        No etoh since being home  Home w daughter  Not smoking or chewing tobacco     He is DM   BS at home running around  98,105   101, 120, 104   in the AM fasting   Highest reading has been 116   Pt checks BS about once a day   Continues januvia 50mg daily        Wt was 185 lbs --stable at home  Discussed low carb diet   Discussed diet and w/l       Reviewed labs      Pt has continued cough  Ongoing for the last month  Tried tessalon did not help much  Takes mucinex prn as well   Thinks he has allergies  No fevers         Pt follows annually with Dr. Quintin Jhaveri (cardio)   Last visit was 12/19    now with nstemi see above  Needs to schedule f/u w him       Pt follows with Dr. Demond Coker (nephro) for ckd in the past  Last visit was 3/2/18  He will only follow with me for his kidney dysfunction  Baseline cr 1.4-1. 5      Pt follows with [facility] (uro)  for prostate cancer--last there in the fall 2019   intermediate risk prostate cancer, spencer 7, more and more on L side,   Recall pt has FMHX (brother and uncle) prostate cancer  Pt also met with a radiation oncologist at [facility]  They will likely be meeting again to discuss radiation therapy in the future  Per his daughter, Kathy Cole get XRT with radioactive seeds   Was to get seeds placed but never occurred d/t cabg--this is currently on hold  Will schedule f/u w aleksandr              Pt saw Dr Geronimo Cuellar (podiatry) in 4/19  Pt was given terbinafine for fungal nails  Discussed that if the fungus is not bothering him that he could just leave it rather than treat it      Recall Pt had a hearing evaluation in the past  Per his daughter he had 40% decrease in both ears, and does not yet need hearing aids  Was recommended to f/u in 1 year for a repeat eval   Recall notes from ENT 5/28/19: has some hearing loss        Pt has been taking CoQ-10     Continues gabapentinfor his tingling/neuropathy pain in his hands/fingers, which works well for the most part   He is currently taking it tid when he walks for a while he starts limping  Having some sciatic pain to his left leg.  overall minimally improved  No bladder /bowel incontine     Continues celexa 20mg for depression/anxiety, restarted since lov doing well w this    He also has ativan to use prn (not often, not in the past month) for anxiety --no recent use     Pt uses trazadone nightly sleep works well   He is sleeping better but snores and gets a little wheeze at night discussed sleep apnea testing--ordered sleep eval      Saw sleep dr a year ago ()   but never completed the testing will do now reordered eval and discussed furtehr           In the past, pt expressed concern about his memory  Previously, provided referral for Dr. Marnie Lorenzo (neuropsych)  lov provided info for Dr Hanna Blackburn (neuropsych)  Had the appointment 19 --then refused testing in the end   Overall stable currently            ACP not on file. SDM is his daughter (Yareli Mehta). Provided information in the past.      Recall sees Dr. Napoleon Voss for h/o DVT, no longer on coumadin or xarelto, on ASA only.  Was on coumadin for h/o PE and DVT post operatively      PREVENTIVE:    Colonoscopy: 9/15/15, Dr. Brian Woodruff, repeat 5 years--due   EGD: 9/15/15, Dr. Brian Woodruff  PSA: 7/15 3.0, ,  3.8, ,  5.5  Dr Carina Shah follows   AAA screen: CT , negative  Tdap: unknown   Pneumovax: 2012  [id number]   Shingrix: never completed  Flu shot: declines  Foot exam: 19   Microalbumin:   A1c:  ,  6.4,  6.6,  6.5,  6.0,  6.1, 3/19 6.0  5.9  POC 5.7  ,  5.8  Eye exam: Dr. Tremayne Latif Estsiennainal,  per patient  Hep C Screen: 10/15, negative  Lipids:    EK/17, PACs          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    Alcohol abuse F10.10    Insomnia G47.00    HTN (hypertension) I10    DVT of leg (deep venous thrombosis) ([facility] I82.409    Spinal stenosis, lumbar region, without neurogenic claudication M48.061    Hyperlipidemia E78.5    CKD (chronic kidney disease) N18.9    Cervical spinal stenosis M48.02    Reactive depression F32.9    Type 2 diabetes with nephropathy ([facility] E11.21    Prostate cancer ([facility] C61    Type 2 diabetes mellitus with diabetic neuropathy ([facility] E11.40    NSTEMI (non-ST elevated myocardial infarction) ([facility] I21.4    Bilateral carotid artery stenosis I65.23    CAD (coronary artery disease) I25.10    S/P CABG x 3 Z95.1    Postoperative anemia due to acute blood loss D62    Pacemaker Z95.0    Third degree AV block (HCC) I44.2     Current Outpatient Medications   Medication Sig Dispense Refill    ferrous sulfate 325 mg (65 mg iron) tablet Take 1 Tab by mouth daily (with breakfast) for 30 days. 30 Tab 0    Januvia 50 mg tablet Take 1 tablet by mouth once daily 30 Tab 0    furosemide (LASIX) 20 mg tablet Take 1 tablet by mouth once daily 30 Tab 0    pumpkin seed extract-soy germ 300 mg cap Take  by mouth.  nicotine (NICODERM CQ) 21 mg/24 hr 1 Patch by TransDERmal route every twenty-four (24) hours for 30 days. 30 Patch 0    potassium chloride (KLOR-CON) 10 mEq tablet Take 1 Tab by mouth daily. 30 Tab 1    acetaminophen (TYLENOL) 325 mg tablet Take 2 Tabs by mouth every four (4) hours as needed for Pain.  polyethylene glycol (MIRALAX) 17 gram packet Take 1 Packet by mouth daily as needed for Constipation.  senna-docusate (PERICOLACE) 8.6-50 mg per tablet Take 1 Tab by mouth two (2) times daily as needed for Constipation.  metoprolol tartrate (LOPRESSOR) 25 mg tablet Take 0.5 Tabs by mouth every twelve (12) hours for 60 days. 30 Tab 1    pantoprazole (PROTONIX) 40 mg tablet Take 1 Tab by mouth Daily (before breakfast). 30 Tab 0    co-enzyme Q-10 (Co Q-10) 100 mg capsule Take 100 mg by mouth daily.  LORazepam (ATIVAN) 0.5 mg tablet TAKE 1 TABLET BY MOUTH ONCE DAILY AT NIGHT AS NEEDED FOR ANXIETY 20 Tab 0    traZODone (DESYREL) 50 mg tablet TAKE 1 TABLET BY MOUTH AT BEDTIME (Patient taking differently: Take 50 mg by mouth nightly as needed.) 30 Tab 0    red yeast rice extract 600 mg cap Take 600 mg by mouth now. Every now and then      ezetimibe (ZETIA) 10 mg tablet Take 1 Tab by mouth daily.  90 Tab 1    citalopram (CELEXA) 20 mg tablet Take 1 Tab by mouth daily. 90 Tab 1    gabapentin (NEURONTIN) 300 mg capsule Take 1 Cap by mouth nightly as needed for Pain. 180 Cap 1    cholecalciferol, vitamin D3, (VITAMIN D3 PO) Take 1,000 Int'l Units by mouth daily.  ascorbate calcium (VITAMIN C PO) Take 900 Int'l Units by mouth daily.  aspirin delayed-release (ASPIR-LOW) 81 mg tablet Take 81 mg by mouth. Taking every now and then      FREESTYLE LANCETS 28 gauge misc USE TO CHECK BLOOD SUGAR ONCE DAILY 100 Lancet 12    cyanocobalamin 1,000 mcg tablet Take 1,000 mcg by mouth daily.  magnesium 250 mg tab Take 250 Tabs by mouth daily. Lab Results   Component Value Date/Time    GFR est non-AA 56 (L) 05/04/2020 04:52 AM    GFRNA, POC 50 (L) 11/02/2016 07:15 AM    GFR est AA >60 05/04/2020 04:52 AM    GFRAA, POC >60 11/02/2016 07:15 AM    Creatinine 1.27 05/04/2020 04:52 AM    Creatinine (POC) 1.4 (H) 11/02/2016 07:15 AM    BUN 21 (H) 05/04/2020 04:52 AM    BUN (POC) 28 (H) 11/02/2016 07:15 AM    Sodium 137 05/04/2020 04:52 AM    Sodium (POC) 140 11/02/2016 07:15 AM    Potassium 3.8 05/04/2020 04:52 AM    Potassium (POC) 4.1 11/02/2016 07:15 AM    Chloride 101 05/04/2020 04:52 AM    Chloride (POC) 105 11/02/2016 07:15 AM    CO2 29 05/04/2020 04:52 AM    Magnesium 2.6 (H) 05/04/2020 04:52 AM    Phosphorus 3.0 10/17/2014 04:40 AM    Albumin, urine 59.5 10/23/2015 09:10 AM        Review of Systems   Constitutional: Negative for chills and fever. HENT: Positive for congestion and hearing loss. Negative for sinus pain, sore throat and tinnitus. Eyes: Negative for blurred vision and double vision. Respiratory: Positive for cough. Negative for hemoptysis, sputum production, shortness of breath and wheezing. Cardiovascular: Positive for leg swelling. Negative for chest pain and palpitations. Gastrointestinal: Negative for nausea and vomiting. Genitourinary: Positive for frequency. Negative for dysuria.    Musculoskeletal: Positive for back pain and neck pain. Negative for falls. Skin: Negative for itching and rash. Neurological: Negative for dizziness, loss of consciousness and headaches. Psychiatric/Behavioral: Positive for depression. The patient has insomnia. The patient is not nervous/anxious. Physical Exam  Constitutional:       General: He is not in acute distress. Appearance: Normal appearance. He is not ill-appearing, toxic-appearing or diaphoretic. HENT:      Head: Normocephalic and atraumatic. Eyes:      General:         Right eye: No discharge. Left eye: No discharge. Conjunctiva/sclera: Conjunctivae normal.   Musculoskeletal:         General: Swelling present. Right lower leg: Edema present. Left lower leg: Edema present. Skin:     Comments: Healing incision midline chest from cabg  No eyrhtema    Pacer w steristrips no drainage   Neurological:      General: No focal deficit present. Mental Status: He is alert and oriented to person, place, and time. Psychiatric:         Mood and Affect: Mood normal.         Behavior: Behavior normal.         ASSESSMENT and PLAN 40-minute office visit with high complexity decision making    ICD-10-CM ICD-9-CM    1. NSTEMI (non-ST elevated myocardial infarction) [facility])    Patient had recent admission CABG now doing well and taking aspirin daily had follow-up with cardiothoracic surgeon still needs to schedule follow-up with Dr. Leo Singh had a pacemaker placed which is healing well will be following up with cardiologist  in August    No active signs of CAD or CHF I21.4 410.70    2. Type 2 diabetes mellitus with diabetic neuropathy, without long-term current use of insulin ([address]    Well-controlled on Januvia Home readings are good will check A1c with next labs E11.40 250.60      357.2    3.  Type 2 diabetes with nephropathy ([address]    See above    Also discussed that glucose levels will climb a bit when he is on a steroid continue to monitor glucose closely contact clinic with any questions E11.21 250.40      583.81    4. Prostate cancer [facility])    Still needs definitive treatment was supposed to have radioactive seeds placed but this was deferred due to MI and CABG need to follow-up with urology and discuss treatment plan C61 185    5. Coronary artery disease involving native coronary artery of native heart without angina pectoris    Now medically managed she does need to start cardiac rehab but has not done this yet due to the corona pandemic    He does continue to have home health come out I25.10 414.01    6. Stage 3 chronic kidney disease (HCC)      Creatinine runs around 1.4 baseline he no longer follows with nephrology I monitor this will repeat BMP as patient has been on Lasix need to monitor creatinine and K levels N18.3 585.3    7. Reactive depression    Patient is back on Celexa he is doing quite well his mood is up today continue current dose F32.9 300.4    8. Essential hypertension    Well-controlled metoprolol Home readings are great I10 401.9    9. Mixed hyperlipidemia    Intolerant of statin currently taking Zetia daily doing well E78.2 272.2    10. Spinal stenosis, lumbar region, without neurogenic claudication    Patient has a long history of spinal stenosis and cervical stenosis was previously followed by neurosurgery    Currently is on gabapentin 300 mg 3 times daily but continues to have pain that is radicular though improved from in the past    We will give Medrol Dosepak which should improve his symptoms and will increase gabapentin to 400 mg 3 times daily M48.061 724.02 gabapentin (NEURONTIN) 100 mg capsule   11. Pacemaker    Healing well has cardiology follow-up pending Z95.0 V45.01    12. Postoperative anemia due to acute blood loss    Monitor CBC D62 285.1    13. Localized edema    Stable swelling on Lasix monitor BMP R60.0 782.3    14. Hypokalemia    Now on Klor-Con check BMP E87.6 276.8    15.  Snoring    Discussed need to follow through with sleep specialist he met with him once but never R06.83 786.09 [facility]   16. Cough    Medrol dose pack     Start flonase daily     Appears to be related to allergic rhinitis if not improving will need chest x-ray   R05 786.2        Wandy Diallo. is a 68 y.o. male being evaluated by a Virtual Visit (video visit) encounter to address concerns as mentioned above. A caregiver was present when appropriate. Due to this being a TeleHealth encounter (During [facility] public health emergency), evaluation of the following organ systems was limited: Vitals/Constitutional/EENT/Resp/CV/GI//MS/Neuro/Skin/Heme-Lymph-Imm. Pursuant to the emergency declaration under the [address] [place] [address], [address] [place] [address] authority and the [employer] Act, this Virtual Visit was conducted with patient's (and/or legal guardian's) consent, to reduce the risk of exposure to COVID-19 and provide necessary medical care. Services were provided through a video synchronous discussion virtually to substitute for in-person encounter. --Óscar Whyte MD on 5/29/2020 at 1:00 PM    An electronic signature was used to authenticate this note.

## 2020-05-29 NOTE — TELEPHONE ENCOUNTER
----- Message from Hussain Vásquez sent at 5/29/2020  8:15 AM EDT -----  Regarding: Dr. French Needs: [phone]  Patient states that the pills prescribed to help his cough isn't working well enough and he would like to know if a cough syrup can be prescribed instead. Please send this over to [address] (on file). Please follow up with patient's daughter, Jaymie Lemus in reference to this.      Message copied/pasted from [facility]

## 2020-05-30 PROCEDURE — 3331090002 HH PPS REVENUE DEBIT

## 2020-05-30 PROCEDURE — 3331090001 HH PPS REVENUE CREDIT

## 2020-05-31 PROCEDURE — 3331090002 HH PPS REVENUE DEBIT

## 2020-05-31 PROCEDURE — 3331090001 HH PPS REVENUE CREDIT

## 2020-06-01 ENCOUNTER — HOME CARE VISIT (OUTPATIENT)
Dept: SCHEDULING | Facility: HOME HEALTH | Age: 70
End: 2020-06-01
Payer: MEDICARE

## 2020-06-01 VITALS
RESPIRATION RATE: 18 BRPM | DIASTOLIC BLOOD PRESSURE: 80 MMHG | OXYGEN SATURATION: 98 % | TEMPERATURE: 96.6 F | HEART RATE: 82 BPM | SYSTOLIC BLOOD PRESSURE: 120 MMHG

## 2020-06-01 PROCEDURE — 3331090001 HH PPS REVENUE CREDIT

## 2020-06-01 PROCEDURE — 3331090002 HH PPS REVENUE DEBIT

## 2020-06-01 PROCEDURE — G0300 HHS/HOSPICE OF LPN EA 15 MIN: HCPCS

## 2020-06-02 ENCOUNTER — HOME CARE VISIT (OUTPATIENT)
Dept: HOME HEALTH SERVICES | Facility: HOME HEALTH | Age: 70
End: 2020-06-02
Payer: MEDICARE

## 2020-06-02 ENCOUNTER — HOME CARE VISIT (OUTPATIENT)
Dept: SCHEDULING | Facility: HOME HEALTH | Age: 70
End: 2020-06-02
Payer: MEDICARE

## 2020-06-02 PROCEDURE — 3331090002 HH PPS REVENUE DEBIT

## 2020-06-02 PROCEDURE — G0151 HHCP-SERV OF PT,EA 15 MIN: HCPCS

## 2020-06-02 PROCEDURE — 3331090001 HH PPS REVENUE CREDIT

## 2020-06-03 ENCOUNTER — OFFICE VISIT (OUTPATIENT)
Dept: CARDIOTHORACIC SURGERY | Age: 70
End: 2020-06-03

## 2020-06-03 VITALS
OXYGEN SATURATION: 98 % | TEMPERATURE: 97.9 F | SYSTOLIC BLOOD PRESSURE: 135 MMHG | HEART RATE: 87 BPM | RESPIRATION RATE: 16 BRPM | DIASTOLIC BLOOD PRESSURE: 87 MMHG

## 2020-06-03 DIAGNOSIS — Z95.1 S/P CABG X 3: Primary | ICD-10-CM

## 2020-06-03 PROCEDURE — 3331090001 HH PPS REVENUE CREDIT

## 2020-06-03 PROCEDURE — 3331090002 HH PPS REVENUE DEBIT

## 2020-06-03 NOTE — PROGRESS NOTES
Patient: Gil Christian. Age: 68 y.o. Patient Care Team:  Robb Thompson MD as PCP - General (Internal Medicine)  Robb Thompson MD as PCP - [facility]  Veronica Perez MD as Physician (Cardiology)  Mer Palacio MD (Nephrology)  Zeenat Vines MD (Neurosurgery)  Maria Luisa Ramirez MD (Ophthalmology)  Edvin Becker MD (Otolaryngology)  Ghislaine Veloz MD (Urology)  Mariah Mares MD (Ophthalmology)  Radha Tang DPM (Podiatry)  Loco Dubois, PHD (Psychology)  Maria Esther Salcido MD (Cardiothoracic Surgery)  Slava Barcenas MD (Cardiology)  Melida Prieto MD (Cardiology)    Diagnosis: The encounter diagnosis was S/P CABG x 3. 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    Alcohol abuse F10.10    Insomnia G47.00    HTN (hypertension) I10    DVT of leg (deep venous thrombosis) ([address] I82.409    Spinal stenosis, lumbar region, without neurogenic claudication M48.061    Hyperlipidemia E78.5    CKD (chronic kidney disease) N18.9    Cervical spinal stenosis M48.02    Reactive depression F32.9    Type 2 diabetes with nephropathy (HCC) E11.21    Prostate cancer ([address] C61    Type 2 diabetes mellitus with diabetic neuropathy ([address] E11.40    NSTEMI (non-ST elevated myocardial infarction) ([address] I21.4    Bilateral carotid artery stenosis I65.23    CAD (coronary artery disease) I25.10    S/P CABG x 3 Z95.1    Postoperative anemia due to acute blood loss D62    Pacemaker Z95.0    Third degree AV block ([address] I44.2        This service was provided thru telehealth, between Rachel Ford NP at Cardiac Surgery Specialist's office and Christie Mac at their home. Date of Surgery: 4/28/20     Surgery: CABG    HPI:  Pt is here for post op follow up, he is ambulating well. He denies SOB, orthopnea/PND. He has some LE edema that is improving.  He is complaining of back pain/sciatica. Has fu appointment with Dr. Mohsen Amos. Was on steroids -finished today. Incision well healed, no redness/drainage. No fever. BS well controlled. Current Medications:   Current Outpatient Medications   Medication Sig Dispense Refill    methylPREDNISolone (MEDROL DOSEPACK) 4 mg tablet Per pack 1 Dose Pack 0    gabapentin (NEURONTIN) 100 mg capsule Take 1 Cap by mouth three (3) times daily. Max Daily Amount: 300 mg. 400mg total tid (has 300mg capsule at home) 90 Cap 1    ferrous sulfate 325 mg (65 mg iron) tablet Take 1 Tab by mouth daily (with breakfast) for 30 days. 30 Tab 0    Januvia 50 mg tablet Take 1 tablet by mouth once daily 30 Tab 0    furosemide (LASIX) 20 mg tablet Take 1 tablet by mouth once daily 30 Tab 0    pumpkin seed extract-soy germ 300 mg cap Take  by mouth.  potassium chloride (KLOR-CON) 10 mEq tablet Take 1 Tab by mouth daily. 30 Tab 1    acetaminophen (TYLENOL) 325 mg tablet Take 2 Tabs by mouth every four (4) hours as needed for Pain.  senna-docusate (PERICOLACE) 8.6-50 mg per tablet Take 1 Tab by mouth two (2) times daily as needed for Constipation.  metoprolol tartrate (LOPRESSOR) 25 mg tablet Take 0.5 Tabs by mouth every twelve (12) hours for 60 days. 30 Tab 1    co-enzyme Q-10 (Co Q-10) 100 mg capsule Take 100 mg by mouth daily.  traZODone (DESYREL) 50 mg tablet TAKE 1 TABLET BY MOUTH AT BEDTIME (Patient taking differently: Take 50 mg by mouth nightly as needed.) 30 Tab 0    red yeast rice extract 600 mg cap Take 600 mg by mouth now. Every now and then      ezetimibe (ZETIA) 10 mg tablet Take 1 Tab by mouth daily. 90 Tab 1    citalopram (CELEXA) 20 mg tablet Take 1 Tab by mouth daily. 90 Tab 1    gabapentin (NEURONTIN) 300 mg capsule Take 1 Cap by mouth nightly as needed for Pain. 180 Cap 1    cholecalciferol, vitamin D3, (VITAMIN D3 PO) Take 1,000 Int'l Units by mouth daily.       ascorbate calcium (VITAMIN C PO) Take 900 Int'l Units by mouth daily.  aspirin delayed-release (ASPIR-LOW) 81 mg tablet Take 81 mg by mouth. Taking every now and then      FREESTYLE LANCETS 28 gauge misc USE TO CHECK BLOOD SUGAR ONCE DAILY 100 Lancet 12    cyanocobalamin 1,000 mcg tablet Take 1,000 mcg by mouth daily.  magnesium 250 mg tab Take 250 Tabs by mouth daily.  nicotine (NICODERM CQ) 21 mg/24 hr 1 Patch by TransDERmal route every twenty-four (24) hours for 30 days. 30 Patch 0    polyethylene glycol (MIRALAX) 17 gram packet Take 1 Packet by mouth daily as needed for Constipation.  pantoprazole (PROTONIX) 40 mg tablet Take 1 Tab by mouth Daily (before breakfast). 30 Tab 0    LORazepam (ATIVAN) 0.5 mg tablet TAKE 1 TABLET BY MOUTH ONCE DAILY AT NIGHT AS NEEDED FOR ANXIETY 20 Tab 0       Vitals: There were no vitals taken for this visit. Allergies: is allergic to arb-angiotensin receptor antagonist.    Physical Exam:  Wounds: clean, dry, no drainage    Assessment/Plan:   1. S/p CABG: Continue ASA, BB. Patient intolerant of statins - resumed zetia.      2. CHB s/p PPM: PPM placed by Dr. Darrion Singh. Completed abx.     3. Hypotension: resolved.      4. Interstitial edema, pleural effusions, atelectasis: Increase IS use and activity, on PO lasix.     5.  HTN: cont current meds     6.  HLD: does not tolerate statin, on zetia, coq10 PTA. Continue Zetia. Coq10.     7. DM type II: a1c 5.8, on januvia PTA. BS well controlled.     8. CKD stage III: baseline appears to be around 1.4-1.5. Cr 1.58 on most recent labs, repeat labs ordered by PCP.     9. Hx DVT/PE: developed PE/DVT after bowel resection in 2012, not on anticoagulation prior to surgery. not discharged on anticoagulation. Monitor.     10. Chronic pain/peripheral neuropathy: On gabapentin     11. Anxiety: On celexa, ativan PTA     12. Alcohol abuse: PRN avitan, never needed any PRN ativan in the hospital     13.  Prostate CA: PSA 10, sees Dr. Anisha Brown (urology) and Dr. Les Zavala (radiation oncology), was planning on scheduling radiation seed implant soon.      14. Thrombocytopenia: improved     15. Anemia: cont PO iron     Dispo: FU with cardiology and PCP    Pt is ready to start cardiac rehab.      Rehab - y  Walking: y  Glucometer: y  Antibiotic card for valves: n/a

## 2020-06-04 ENCOUNTER — HOME CARE VISIT (OUTPATIENT)
Dept: SCHEDULING | Facility: HOME HEALTH | Age: 70
End: 2020-06-04
Payer: MEDICARE

## 2020-06-04 ENCOUNTER — HOME CARE VISIT (OUTPATIENT)
Dept: HOME HEALTH SERVICES | Facility: HOME HEALTH | Age: 70
End: 2020-06-04
Payer: MEDICARE

## 2020-06-04 VITALS
RESPIRATION RATE: 16 BRPM | TEMPERATURE: 97.3 F | DIASTOLIC BLOOD PRESSURE: 68 MMHG | OXYGEN SATURATION: 97 % | SYSTOLIC BLOOD PRESSURE: 121 MMHG | HEART RATE: 89 BPM

## 2020-06-04 VITALS
SYSTOLIC BLOOD PRESSURE: 121 MMHG | HEART RATE: 89 BPM | OXYGEN SATURATION: 97 % | DIASTOLIC BLOOD PRESSURE: 68 MMHG | TEMPERATURE: 97.3 F | RESPIRATION RATE: 16 BRPM

## 2020-06-04 PROCEDURE — G0300 HHS/HOSPICE OF LPN EA 15 MIN: HCPCS

## 2020-06-04 PROCEDURE — 400013 HH SOC

## 2020-06-04 PROCEDURE — 3331090002 HH PPS REVENUE DEBIT

## 2020-06-04 PROCEDURE — G0151 HHCP-SERV OF PT,EA 15 MIN: HCPCS

## 2020-06-04 PROCEDURE — 3331090001 HH PPS REVENUE CREDIT

## 2020-06-05 PROCEDURE — 3331090002 HH PPS REVENUE DEBIT

## 2020-06-05 PROCEDURE — 3331090001 HH PPS REVENUE CREDIT

## 2020-06-06 PROCEDURE — 3331090001 HH PPS REVENUE CREDIT

## 2020-06-06 PROCEDURE — 3331090002 HH PPS REVENUE DEBIT

## 2020-06-07 PROCEDURE — 3331090001 HH PPS REVENUE CREDIT

## 2020-06-07 PROCEDURE — 3331090002 HH PPS REVENUE DEBIT

## 2020-06-08 ENCOUNTER — HOME CARE VISIT (OUTPATIENT)
Dept: SCHEDULING | Facility: HOME HEALTH | Age: 70
End: 2020-06-08
Payer: MEDICARE

## 2020-06-08 VITALS
TEMPERATURE: 97.5 F | RESPIRATION RATE: 18 BRPM | DIASTOLIC BLOOD PRESSURE: 80 MMHG | OXYGEN SATURATION: 99 % | SYSTOLIC BLOOD PRESSURE: 128 MMHG | HEART RATE: 95 BPM

## 2020-06-08 PROCEDURE — 3331090001 HH PPS REVENUE CREDIT

## 2020-06-08 PROCEDURE — G0300 HHS/HOSPICE OF LPN EA 15 MIN: HCPCS

## 2020-06-08 PROCEDURE — 3331090002 HH PPS REVENUE DEBIT

## 2020-06-09 ENCOUNTER — HOME CARE VISIT (OUTPATIENT)
Dept: HOME HEALTH SERVICES | Facility: HOME HEALTH | Age: 70
End: 2020-06-09
Payer: MEDICARE

## 2020-06-09 ENCOUNTER — HOME CARE VISIT (OUTPATIENT)
Dept: SCHEDULING | Facility: HOME HEALTH | Age: 70
End: 2020-06-09
Payer: MEDICARE

## 2020-06-09 PROCEDURE — G0151 HHCP-SERV OF PT,EA 15 MIN: HCPCS

## 2020-06-09 PROCEDURE — 3331090002 HH PPS REVENUE DEBIT

## 2020-06-09 PROCEDURE — 3331090001 HH PPS REVENUE CREDIT

## 2020-06-10 ENCOUNTER — TELEPHONE (OUTPATIENT)
Dept: INTERNAL MEDICINE CLINIC | Age: 70
End: 2020-06-10

## 2020-06-10 VITALS
RESPIRATION RATE: 16 BRPM | SYSTOLIC BLOOD PRESSURE: 124 MMHG | HEART RATE: 83 BPM | OXYGEN SATURATION: 97 % | DIASTOLIC BLOOD PRESSURE: 72 MMHG | TEMPERATURE: 97.2 F

## 2020-06-10 DIAGNOSIS — I10 ESSENTIAL HYPERTENSION: ICD-10-CM

## 2020-06-10 DIAGNOSIS — E78.2 MIXED HYPERLIPIDEMIA: ICD-10-CM

## 2020-06-10 DIAGNOSIS — E11.21 TYPE 2 DIABETES WITH NEPHROPATHY (HCC): ICD-10-CM

## 2020-06-10 PROCEDURE — 3331090001 HH PPS REVENUE CREDIT

## 2020-06-10 PROCEDURE — 3331090002 HH PPS REVENUE DEBIT

## 2020-06-10 RX ORDER — BENZONATATE 200 MG/1
CAPSULE ORAL
Qty: 30 CAP | Refills: 0 | Status: SHIPPED | OUTPATIENT
Start: 2020-06-10 | End: 2020-07-08

## 2020-06-10 RX ORDER — FLUTICASONE PROPIONATE 50 MCG
2 SPRAY, SUSPENSION (ML) NASAL
COMMUNITY
End: 2021-11-15

## 2020-06-10 NOTE — TELEPHONE ENCOUNTER
Patient's daughter, Davey Mckeon, states she needs a call back to discuss patient's persistent cough & if any of Patient's medications would cause this type of coughing. Please call to discuss.  Thank you

## 2020-06-10 NOTE — TELEPHONE ENCOUNTER
Pharmacy Progress Note - Telephone Encounter    S/O: Mr. Tory Dozier. 68 y.o. male's daugher, Eliz Quinones, was contacted via an outbound telephone call to discuss her father's medications today. Verified patients identifiers (name & ) per HIPAA policy.     - Patient has been living w/ Eliz Quinones for the past month. Dtr is concerned about patient's persistent, non productive cough and occasional rhinitis  - Recently completed medrol dose pack, tessalon perles, and mucinex w/o much relief. - Now using flonase nasal spray - noticed some relief from this. - Pt is not taking protonix at this time. Could not recall reason for its discontinuation.   - Pt is not taking flomax. - Pt takes his OTC vitamins on an \"as needed basis\"   - Has been on [place] [facility] [place] for a few years     Allergies   Allergen Reactions    Arb-Angiotensin Receptor Antagonist Other (comments)     High k     Current Outpatient Medications   Medication Sig    ferrous sulfate 325 mg (65 mg iron) tablet Take 1 Tab by mouth daily (with breakfast) for 90 days.  methylPREDNISolone (MEDROL DOSEPACK) 4 mg tablet Per pack    gabapentin (NEURONTIN) 100 mg capsule Take 1 Cap by mouth three (3) times daily. Max Daily Amount: 300 mg. 400mg total tid (has 300mg capsule at home)    Januvia 50 mg tablet Take 1 tablet by mouth once daily    furosemide (LASIX) 20 mg tablet Take 1 tablet by mouth once daily    pumpkin seed extract-soy germ 300 mg cap Take  by mouth.  potassium chloride (KLOR-CON) 10 mEq tablet Take 1 Tab by mouth daily.  acetaminophen (TYLENOL) 325 mg tablet Take 2 Tabs by mouth every four (4) hours as needed for Pain.  polyethylene glycol (MIRALAX) 17 gram packet Take 1 Packet by mouth daily as needed for Constipation.  senna-docusate (PERICOLACE) 8.6-50 mg per tablet Take 1 Tab by mouth two (2) times daily as needed for Constipation.     metoprolol tartrate (LOPRESSOR) 25 mg tablet Take 0.5 Tabs by mouth every twelve (12) hours for 60 days.  pantoprazole (PROTONIX) 40 mg tablet Take 1 Tab by mouth Daily (before breakfast).  co-enzyme Q-10 (Co Q-10) 100 mg capsule Take 100 mg by mouth daily.  LORazepam (ATIVAN) 0.5 mg tablet TAKE 1 TABLET BY MOUTH ONCE DAILY AT NIGHT AS NEEDED FOR ANXIETY    traZODone (DESYREL) 50 mg tablet TAKE 1 TABLET BY MOUTH AT BEDTIME (Patient taking differently: Take 50 mg by mouth nightly as needed.)    red yeast rice extract 600 mg cap Take 600 mg by mouth now. Every now and then    ezetimibe (ZETIA) 10 mg tablet Take 1 Tab by mouth daily.  citalopram (CELEXA) 20 mg tablet Take 1 Tab by mouth daily.  gabapentin (NEURONTIN) 300 mg capsule Take 1 Cap by mouth nightly as needed for Pain.  cholecalciferol, vitamin D3, (VITAMIN D3 PO) Take 1,000 Int'l Units by mouth daily.  ascorbate calcium (VITAMIN C PO) Take 900 Int'l Units by mouth daily.  aspirin delayed-release (ASPIR-LOW) 81 mg tablet Take 81 mg by mouth. Taking every now and then    FREESTYLE LANCETS 28 gauge misc USE TO CHECK BLOOD SUGAR ONCE DAILY    cyanocobalamin 1,000 mcg tablet Take 1,000 mcg by mouth daily.  magnesium 250 mg tab Take 250 Tabs by mouth daily. No current facility-administered medications for this visit.       Lab Results   Component Value Date/Time    Sodium 137 06/01/2020 10:30 AM    Potassium 4.5 06/01/2020 10:30 AM    Chloride 98 06/01/2020 10:30 AM    CO2 22 06/01/2020 10:30 AM    Anion gap 7 05/04/2020 04:52 AM    Glucose 166 (H) 06/01/2020 10:30 AM    BUN 37 (H) 06/01/2020 10:30 AM    Creatinine 1.58 (H) 06/01/2020 10:30 AM    BUN/Creatinine ratio 23 06/01/2020 10:30 AM    GFR est AA 50 (L) 06/01/2020 10:30 AM    GFR est non-AA 43 (L) 06/01/2020 10:30 AM    Calcium 9.6 06/01/2020 10:30 AM     Lab Results   Component Value Date/Time    Hemoglobin A1c 5.7 (H) 06/01/2020 10:30 AM    Hemoglobin A1c 5.8 (H) 04/25/2020 01:42 AM    Hemoglobin A1c 5.8 (H) 01/09/2020 11:36 AM     Estimated Creatinine Clearance: 44.2 mL/min (A) (by C-G formula based on SCr of 1.58 mg/dL (H)). A/P:  Changes:  Frequency for OTC vitamins     Additions:  Flonase nasal spray     Recommendations:   - GERD vs nasopharyngitis  - Januvia may cause nasopharyngitis. - Will discuss with Dr. Genna Mina endorses understanding to the provided information. All questions answered at this time. Thank you,  Abigail Jean, PharmD, BCACP, CDE         Medications Discontinued During This Encounter   Medication Reason    methylPREDNISolone (MEDROL DOSEPACK) 4 mg tablet Therapy Completed    pantoprazole (PROTONIX) 40 mg tablet Not A Current Medication    polyethylene glycol (MIRALAX) 17 gram packet Therapy Completed    red yeast rice extract 600 mg cap Therapy Completed     Updated med table:  Current Outpatient Medications   Medication Sig    fluticasone propionate (Flonase Allergy Relief) 50 mcg/actuation nasal spray 2 Sprays by Both Nostrils route two (2) times daily as needed for Rhinitis.  ferrous sulfate 325 mg (65 mg iron) tablet Take 1 Tab by mouth daily (with breakfast) for 90 days.  gabapentin (NEURONTIN) 100 mg capsule Take 1 Cap by mouth three (3) times daily. Max Daily Amount: 300 mg. 400mg total tid (has 300mg capsule at home)    Januvia 50 mg tablet Take 1 tablet by mouth once daily    furosemide (LASIX) 20 mg tablet Take 1 tablet by mouth once daily    pumpkin seed extract-soy germ 300 mg cap Take 1 Cap by mouth daily as needed (prostate health).  potassium chloride (KLOR-CON) 10 mEq tablet Take 1 Tab by mouth daily.  senna-docusate (PERICOLACE) 8.6-50 mg per tablet Take 1 Tab by mouth daily as needed for Constipation.  metoprolol tartrate (LOPRESSOR) 25 mg tablet Take 0.5 Tabs by mouth every twelve (12) hours for 60 days.  co-enzyme Q-10 (Co Q-10) 100 mg capsule Take 100 mg by mouth daily as needed.     traZODone (DESYREL) 50 mg tablet TAKE 1 TABLET BY MOUTH AT BEDTIME (Patient taking differently: Take 50 mg by mouth nightly.)    ezetimibe (ZETIA) 10 mg tablet Take 1 Tab by mouth daily.  citalopram (CELEXA) 20 mg tablet Take 1 Tab by mouth daily.  gabapentin (NEURONTIN) 300 mg capsule Take 1 Cap by mouth nightly as needed for Pain.  cholecalciferol, vitamin D3, (VITAMIN D3 PO) Take 1,000 Int'l Units by mouth daily as needed.  ascorbate calcium (VITAMIN C PO) Take 1 Tab by mouth daily as needed.  aspirin delayed-release (ASPIR-LOW) 81 mg tablet Take 81 mg by mouth daily. Taking every now and then    FREESTYLE LANCETS 28 gauge misc USE TO CHECK BLOOD SUGAR ONCE DAILY    cyanocobalamin 1,000 mcg tablet Take 1,000 mcg by mouth daily as needed.  magnesium 250 mg tab Take 1 Tab by mouth daily as needed.  acetaminophen (TYLENOL) 325 mg tablet Take 2 Tabs by mouth every four (4) hours as needed for Pain.  LORazepam (ATIVAN) 0.5 mg tablet TAKE 1 TABLET BY MOUTH ONCE DAILY AT NIGHT AS NEEDED FOR ANXIETY     No current facility-administered medications for this visit.                            CLINICAL PHARMACY CONSULT: MED RECONCILIATION/REVIEW ADDENDUM    For Pharmacy Admin Tracking Only    PHSO: PHSO Patient?: Yes  Total # of Interventions Recommended: Count: 11  - Discontinued Medication #: 4 Discontinue Reason(s): Acute Therapy Complete and No Longer Used  - New Order #: 1 New Medication Order Reason(s): Needs Additional Medication Therapy  - Updated Order #: 6 Updated Order Reason(s): OTC    Time Spent (min): 20

## 2020-06-11 ENCOUNTER — HOME CARE VISIT (OUTPATIENT)
Dept: SCHEDULING | Facility: HOME HEALTH | Age: 70
End: 2020-06-11
Payer: MEDICARE

## 2020-06-11 VITALS
OXYGEN SATURATION: 97 % | RESPIRATION RATE: 18 BRPM | BODY MASS INDEX: 28.25 KG/M2 | TEMPERATURE: 97 F | DIASTOLIC BLOOD PRESSURE: 70 MMHG | SYSTOLIC BLOOD PRESSURE: 112 MMHG | HEART RATE: 72 BPM | WEIGHT: 180.4 LBS

## 2020-06-11 PROCEDURE — G0151 HHCP-SERV OF PT,EA 15 MIN: HCPCS

## 2020-06-11 PROCEDURE — 3331090002 HH PPS REVENUE DEBIT

## 2020-06-11 PROCEDURE — 3331090001 HH PPS REVENUE CREDIT

## 2020-06-11 PROCEDURE — G0299 HHS/HOSPICE OF RN EA 15 MIN: HCPCS

## 2020-06-12 VITALS
OXYGEN SATURATION: 98 % | HEART RATE: 78 BPM | DIASTOLIC BLOOD PRESSURE: 70 MMHG | TEMPERATURE: 96.8 F | RESPIRATION RATE: 16 BRPM | SYSTOLIC BLOOD PRESSURE: 122 MMHG

## 2020-06-12 PROCEDURE — 3331090002 HH PPS REVENUE DEBIT

## 2020-06-12 PROCEDURE — 3331090001 HH PPS REVENUE CREDIT

## 2020-06-13 PROCEDURE — 3331090001 HH PPS REVENUE CREDIT

## 2020-06-13 PROCEDURE — 3331090002 HH PPS REVENUE DEBIT

## 2020-06-14 PROCEDURE — 3331090002 HH PPS REVENUE DEBIT

## 2020-06-14 PROCEDURE — 3331090001 HH PPS REVENUE CREDIT

## 2020-06-15 PROCEDURE — 3331090001 HH PPS REVENUE CREDIT

## 2020-06-15 PROCEDURE — 3331090002 HH PPS REVENUE DEBIT

## 2020-06-16 ENCOUNTER — HOME CARE VISIT (OUTPATIENT)
Dept: HOME HEALTH SERVICES | Facility: HOME HEALTH | Age: 70
End: 2020-06-16
Payer: MEDICARE

## 2020-06-16 ENCOUNTER — HOME CARE VISIT (OUTPATIENT)
Dept: SCHEDULING | Facility: HOME HEALTH | Age: 70
End: 2020-06-16
Payer: MEDICARE

## 2020-06-16 PROCEDURE — G0151 HHCP-SERV OF PT,EA 15 MIN: HCPCS

## 2020-06-16 PROCEDURE — 3331090001 HH PPS REVENUE CREDIT

## 2020-06-16 PROCEDURE — 3331090002 HH PPS REVENUE DEBIT

## 2020-06-17 VITALS
OXYGEN SATURATION: 98 % | DIASTOLIC BLOOD PRESSURE: 68 MMHG | SYSTOLIC BLOOD PRESSURE: 126 MMHG | TEMPERATURE: 97.7 F | HEART RATE: 70 BPM | RESPIRATION RATE: 16 BRPM

## 2020-06-17 PROCEDURE — 3331090002 HH PPS REVENUE DEBIT

## 2020-06-17 PROCEDURE — 3331090001 HH PPS REVENUE CREDIT

## 2020-06-18 ENCOUNTER — HOME CARE VISIT (OUTPATIENT)
Dept: SCHEDULING | Facility: HOME HEALTH | Age: 70
End: 2020-06-18
Payer: MEDICARE

## 2020-06-18 VITALS
TEMPERATURE: 98.2 F | DIASTOLIC BLOOD PRESSURE: 76 MMHG | HEART RATE: 82 BPM | RESPIRATION RATE: 20 BRPM | SYSTOLIC BLOOD PRESSURE: 124 MMHG | OXYGEN SATURATION: 98 %

## 2020-06-18 PROCEDURE — 3331090001 HH PPS REVENUE CREDIT

## 2020-06-18 PROCEDURE — G0151 HHCP-SERV OF PT,EA 15 MIN: HCPCS

## 2020-06-18 PROCEDURE — G0300 HHS/HOSPICE OF LPN EA 15 MIN: HCPCS

## 2020-06-18 PROCEDURE — 3331090002 HH PPS REVENUE DEBIT

## 2020-06-19 VITALS
DIASTOLIC BLOOD PRESSURE: 78 MMHG | OXYGEN SATURATION: 94 % | RESPIRATION RATE: 16 BRPM | TEMPERATURE: 97.5 F | HEART RATE: 91 BPM | SYSTOLIC BLOOD PRESSURE: 122 MMHG

## 2020-06-19 LAB
ALBUMIN SERPL-MCNC: 4.2 G/DL (ref 3.7–4.7)
ALBUMIN/GLOB SERPL: 1.2 {RATIO} (ref 1.2–2.2)
ALP SERPL-CCNC: 88 IU/L (ref 39–117)
ALT SERPL-CCNC: 55 IU/L (ref 0–44)
AST SERPL-CCNC: 31 IU/L (ref 0–40)
BILIRUB SERPL-MCNC: 1 MG/DL (ref 0–1.2)
BUN SERPL-MCNC: 19 MG/DL (ref 8–27)
BUN/CREAT SERPL: 12 (ref 10–24)
CALCIUM SERPL-MCNC: 9.6 MG/DL (ref 8.6–10.2)
CHLORIDE SERPL-SCNC: 101 MMOL/L (ref 96–106)
CO2 SERPL-SCNC: 24 MMOL/L (ref 20–29)
CREAT SERPL-MCNC: 1.54 MG/DL (ref 0.76–1.27)
GLOBULIN SER CALC-MCNC: 3.5 G/DL (ref 1.5–4.5)
GLUCOSE SERPL-MCNC: 52 MG/DL (ref 65–99)
POTASSIUM SERPL-SCNC: 5 MMOL/L (ref 3.5–5.2)
PROT SERPL-MCNC: 7.7 G/DL (ref 6–8.5)
SODIUM SERPL-SCNC: 140 MMOL/L (ref 134–144)

## 2020-06-19 PROCEDURE — 3331090001 HH PPS REVENUE CREDIT

## 2020-06-19 PROCEDURE — 3331090002 HH PPS REVENUE DEBIT

## 2020-06-20 PROCEDURE — 3331090001 HH PPS REVENUE CREDIT

## 2020-06-20 PROCEDURE — 3331090002 HH PPS REVENUE DEBIT

## 2020-06-21 PROCEDURE — 3331090002 HH PPS REVENUE DEBIT

## 2020-06-21 PROCEDURE — 3331090001 HH PPS REVENUE CREDIT

## 2020-06-22 PROCEDURE — 3331090001 HH PPS REVENUE CREDIT

## 2020-06-22 PROCEDURE — 3331090002 HH PPS REVENUE DEBIT

## 2020-06-23 ENCOUNTER — HOME CARE VISIT (OUTPATIENT)
Dept: SCHEDULING | Facility: HOME HEALTH | Age: 70
End: 2020-06-23
Payer: MEDICARE

## 2020-06-23 PROCEDURE — 3331090001 HH PPS REVENUE CREDIT

## 2020-06-23 PROCEDURE — 3331090002 HH PPS REVENUE DEBIT

## 2020-06-24 ENCOUNTER — HOME CARE VISIT (OUTPATIENT)
Dept: SCHEDULING | Facility: HOME HEALTH | Age: 70
End: 2020-06-24
Payer: MEDICARE

## 2020-06-24 VITALS
HEART RATE: 93 BPM | RESPIRATION RATE: 18 BRPM | TEMPERATURE: 98 F | DIASTOLIC BLOOD PRESSURE: 76 MMHG | SYSTOLIC BLOOD PRESSURE: 120 MMHG | OXYGEN SATURATION: 98 %

## 2020-06-24 PROCEDURE — G0300 HHS/HOSPICE OF LPN EA 15 MIN: HCPCS

## 2020-06-24 PROCEDURE — 3331090002 HH PPS REVENUE DEBIT

## 2020-06-24 PROCEDURE — 3331090001 HH PPS REVENUE CREDIT

## 2020-06-25 ENCOUNTER — HOME CARE VISIT (OUTPATIENT)
Dept: SCHEDULING | Facility: HOME HEALTH | Age: 70
End: 2020-06-25
Payer: MEDICARE

## 2020-06-25 ENCOUNTER — HOME CARE VISIT (OUTPATIENT)
Dept: HOME HEALTH SERVICES | Facility: HOME HEALTH | Age: 70
End: 2020-06-25
Payer: MEDICARE

## 2020-06-25 PROCEDURE — G0151 HHCP-SERV OF PT,EA 15 MIN: HCPCS

## 2020-06-25 PROCEDURE — 3331090002 HH PPS REVENUE DEBIT

## 2020-06-25 PROCEDURE — 3331090001 HH PPS REVENUE CREDIT

## 2020-06-26 VITALS
RESPIRATION RATE: 16 BRPM | OXYGEN SATURATION: 98 % | SYSTOLIC BLOOD PRESSURE: 130 MMHG | TEMPERATURE: 97 F | HEART RATE: 88 BPM | DIASTOLIC BLOOD PRESSURE: 78 MMHG

## 2020-06-26 PROCEDURE — 3331090001 HH PPS REVENUE CREDIT

## 2020-06-26 PROCEDURE — 3331090002 HH PPS REVENUE DEBIT

## 2020-06-27 PROCEDURE — 3331090002 HH PPS REVENUE DEBIT

## 2020-06-27 PROCEDURE — 3331090001 HH PPS REVENUE CREDIT

## 2020-06-28 PROCEDURE — 3331090002 HH PPS REVENUE DEBIT

## 2020-06-28 PROCEDURE — 3331090001 HH PPS REVENUE CREDIT

## 2020-06-29 ENCOUNTER — TELEPHONE (OUTPATIENT)
Dept: CARDIOLOGY CLINIC | Age: 70
End: 2020-06-29

## 2020-06-29 PROCEDURE — 3331090002 HH PPS REVENUE DEBIT

## 2020-06-29 PROCEDURE — 3331090001 HH PPS REVENUE CREDIT

## 2020-06-29 RX ORDER — SITAGLIPTIN 50 MG/1
TABLET, FILM COATED ORAL
Qty: 30 TAB | Refills: 0 | Status: SHIPPED | OUTPATIENT
Start: 2020-06-29 | End: 2020-07-23

## 2020-06-29 NOTE — TELEPHONE ENCOUNTER
Returned pt's daughter call. Provided her the # to call and check on the status of her father's permanent pacemaker card. She verbalized understanding and had no further questions.

## 2020-06-29 NOTE — TELEPHONE ENCOUNTER
Patient's daughter, Adriana Clay, is inquiring about the patient's \"pacemaker card\". She would like to know if he will be receiving a permanent card. Please advise.     Phone #: [phone]  Thanks

## 2020-06-30 ENCOUNTER — HOME CARE VISIT (OUTPATIENT)
Dept: SCHEDULING | Facility: HOME HEALTH | Age: 70
End: 2020-06-30
Payer: MEDICARE

## 2020-06-30 ENCOUNTER — HOME CARE VISIT (OUTPATIENT)
Dept: HOME HEALTH SERVICES | Facility: HOME HEALTH | Age: 70
End: 2020-06-30
Payer: MEDICARE

## 2020-06-30 PROCEDURE — 3331090002 HH PPS REVENUE DEBIT

## 2020-06-30 PROCEDURE — 3331090001 HH PPS REVENUE CREDIT

## 2020-06-30 PROCEDURE — G0151 HHCP-SERV OF PT,EA 15 MIN: HCPCS

## 2020-07-01 ENCOUNTER — HOME CARE VISIT (OUTPATIENT)
Dept: SCHEDULING | Facility: HOME HEALTH | Age: 70
End: 2020-07-01
Payer: MEDICARE

## 2020-07-01 VITALS
TEMPERATURE: 98 F | WEIGHT: 175 LBS | HEART RATE: 70 BPM | OXYGEN SATURATION: 97 % | BODY MASS INDEX: 27.41 KG/M2 | DIASTOLIC BLOOD PRESSURE: 80 MMHG | SYSTOLIC BLOOD PRESSURE: 128 MMHG | RESPIRATION RATE: 18 BRPM

## 2020-07-01 VITALS
OXYGEN SATURATION: 99 % | DIASTOLIC BLOOD PRESSURE: 76 MMHG | HEART RATE: 85 BPM | TEMPERATURE: 97.6 F | SYSTOLIC BLOOD PRESSURE: 132 MMHG | RESPIRATION RATE: 16 BRPM

## 2020-07-01 PROCEDURE — 3331090001 HH PPS REVENUE CREDIT

## 2020-07-01 PROCEDURE — G0299 HHS/HOSPICE OF RN EA 15 MIN: HCPCS

## 2020-07-01 PROCEDURE — 3331090002 HH PPS REVENUE DEBIT

## 2020-07-01 RX ORDER — POTASSIUM CHLORIDE 750 MG/1
10 TABLET, EXTENDED RELEASE ORAL DAILY
Qty: 30 TAB | Refills: 1 | Status: SHIPPED | OUTPATIENT
Start: 2020-07-01 | End: 2020-07-08

## 2020-07-01 RX ORDER — FUROSEMIDE 20 MG/1
TABLET ORAL
Qty: 30 TAB | Refills: 1 | Status: SHIPPED | OUTPATIENT
Start: 2020-07-01 | End: 2020-07-08

## 2020-07-01 RX ORDER — METOPROLOL TARTRATE 25 MG/1
12.5 TABLET, FILM COATED ORAL EVERY 12 HOURS
Qty: 30 TAB | Refills: 1 | Status: SHIPPED | OUTPATIENT
Start: 2020-07-01 | End: 2020-08-19

## 2020-07-01 NOTE — TELEPHONE ENCOUNTER
Patient's Daughter, Sara Moreno states patient needs refill done thru [address] indicated. Please call if any questions.  Thank you        Sara Moreno states she needs to know if patient is to continue on Lasix & Potassium medications & if so needs refill on those medications also

## 2020-07-01 NOTE — TELEPHONE ENCOUNTER
PCP: Jason Mendenhall MD    Last appt: 5/29/2020  Future Appointments   Date Time Provider Willian Paul   7/2/2020  6:00 AM Eulogio [facility] [place] [address]   7/7/2020 11:00 AM [facility] CARDIOPULM SPECIALTY [facility]   7/8/2020  3:30 PM Braydon Fowler MD [address] [facility]   7/13/2020 11:40 AM Oksana Love  [address]   8/4/2020  9:15 AM [facility]   8/4/2020  9:20 AM Mandy Cooper  [address]       Requested Prescriptions     Pending Prescriptions Disp Refills    metoprolol tartrate (LOPRESSOR) 25 mg tablet 30 Tab 1     Sig: Take 0.5 Tabs by mouth every twelve (12) hours for 60 days.  potassium chloride (KLOR-CON) 10 mEq tablet 30 Tab 1     Sig: Take 1 Tab by mouth daily.     furosemide (LASIX) 20 mg tablet 30 Tab 1     Sig: Take 1 tablet by mouth once daily

## 2020-07-02 ENCOUNTER — HOME CARE VISIT (OUTPATIENT)
Dept: SCHEDULING | Facility: HOME HEALTH | Age: 70
End: 2020-07-02
Payer: MEDICARE

## 2020-07-02 ENCOUNTER — TELEPHONE (OUTPATIENT)
Dept: INTERNAL MEDICINE CLINIC | Age: 70
End: 2020-07-02

## 2020-07-02 PROCEDURE — G0151 HHCP-SERV OF PT,EA 15 MIN: HCPCS

## 2020-07-02 PROCEDURE — 3331090001 HH PPS REVENUE CREDIT

## 2020-07-02 PROCEDURE — 3331090003 HH PPS REVENUE ADJ

## 2020-07-02 PROCEDURE — 3331090002 HH PPS REVENUE DEBIT

## 2020-07-02 NOTE — TELEPHONE ENCOUNTER
Marilee Turk; pt can stop potassium lasix is sx are gone. Called, spoke to Veronica Mcnamara (HIPAA). Two pt identifiers confirmed. Yareli informed per Dr. Margarito Turk that pt can stop potassium and lasix if sx of swelling have stopped. Veronica Mcnamara states that she was to hear back from Dr. Elda Strong regarding pt's cough and if his medication(s) are causing it. Pt informed Dr. Elda Strong will be notified. Pt verbalized understanding of information discussed w/ no further questions at this time.

## 2020-07-03 PROCEDURE — 3331090002 HH PPS REVENUE DEBIT

## 2020-07-03 PROCEDURE — 3331090001 HH PPS REVENUE CREDIT

## 2020-07-06 VITALS
SYSTOLIC BLOOD PRESSURE: 136 MMHG | TEMPERATURE: 97.6 F | OXYGEN SATURATION: 98 % | HEART RATE: 99 BPM | RESPIRATION RATE: 16 BRPM | DIASTOLIC BLOOD PRESSURE: 78 MMHG

## 2020-07-07 ENCOUNTER — HOSPITAL ENCOUNTER (OUTPATIENT)
Dept: CARDIAC REHAB | Age: 70
Discharge: HOME | End: 2020-07-07
Payer: MEDICARE

## 2020-07-07 VITALS — WEIGHT: 184 LBS | HEIGHT: 66 IN | BODY MASS INDEX: 29.57 KG/M2

## 2020-07-07 LAB
GLUCOSE BLD STRIP.AUTO-MCNC: 113 MG/DL (ref 65–100)
SERVICE CMNT-IMP: ABNORMAL

## 2020-07-07 PROCEDURE — 93798 PHYS/QHP OP CAR RHAB W/ECG: CPT

## 2020-07-07 NOTE — CARDIO/PULMONARY
Sasha Alston   65 y.o.    presented to cardiac rehab for orientation and exercise tolerance test today with a primary diagnosis of CABG . Sasha Alston has a history of HTN, DVT, NSTEMI, CAD, 3rd degree AV block, pacemaker, type 2DM, prostate cancer, spinal stenosis, hyperlipidemia, reactive depression disorder and sedentary lifestyle. Cardiac risk factors include htn, diabetes and sedentary lifestyle. Sasha Alston is not  and lives with his daughter. social hx. PHQ9 11 This was faxed to his pcp. The result was discussed with patient who affirms score to be accurate. Patient denied chest pain or SOB during 6 minute walk test and was in a paced rhythym without ectopy. Exercise prescription developed around patient stated goals, to be supplemented with home exercise recommendations. EF is 55%. Education manual given to patient and reviewed.  Patient will attend cardiac rehab 2-3 times/week and education

## 2020-07-07 NOTE — PROGRESS NOTES
1. Have you been to the ER, urgent care clinic since your last visit? Hospitalized since your last visit? Yes When: on 4/28/20 for triple bypass     2. Have you seen or consulted any other health care providers outside of the [address] [place] [address] since your last visit? Include any pap smears or colon screening. No    Pharmacy verified. Medications verified by patient's daughter, Polly Chew on HIPAA.

## 2020-07-08 ENCOUNTER — OFFICE VISIT (OUTPATIENT)
Dept: CARDIOLOGY CLINIC | Age: 70
End: 2020-07-08

## 2020-07-08 VITALS
RESPIRATION RATE: 18 BRPM | HEIGHT: 66 IN | SYSTOLIC BLOOD PRESSURE: 118 MMHG | HEART RATE: 89 BPM | DIASTOLIC BLOOD PRESSURE: 76 MMHG | BODY MASS INDEX: 29.78 KG/M2 | WEIGHT: 185.3 LBS | OXYGEN SATURATION: 98 %

## 2020-07-08 DIAGNOSIS — I10 ESSENTIAL HYPERTENSION: ICD-10-CM

## 2020-07-08 DIAGNOSIS — E78.2 MIXED HYPERLIPIDEMIA: ICD-10-CM

## 2020-07-08 DIAGNOSIS — Z95.1 S/P CABG X 3: ICD-10-CM

## 2020-07-08 DIAGNOSIS — Z95.0 CARDIAC PACEMAKER IN SITU: ICD-10-CM

## 2020-07-08 DIAGNOSIS — Z78.9 STATIN INTOLERANCE: ICD-10-CM

## 2020-07-08 DIAGNOSIS — I25.10 ASHD (ARTERIOSCLEROTIC HEART DISEASE): Primary | ICD-10-CM

## 2020-07-08 DIAGNOSIS — I44.2 THIRD DEGREE AV BLOCK (HCC): ICD-10-CM

## 2020-07-08 RX ORDER — EZETIMIBE 10 MG/1
10 TABLET ORAL DAILY
Qty: 90 TAB | Refills: 3 | Status: SHIPPED | OUTPATIENT
Start: 2020-07-08 | End: 2021-10-04

## 2020-07-08 RX ORDER — ROSUVASTATIN CALCIUM 5 MG/1
5 TABLET, COATED ORAL DAILY
Qty: 90 TAB | Refills: 4 | Status: SHIPPED | OUTPATIENT
Start: 2020-07-08 | End: 2020-11-23

## 2020-07-08 NOTE — PROGRESS NOTES
[address], [address] [facility]  [phone]     Subjective:      Nacho Burleson. is a 65 y.o. male is here for routine f/u. Last seen by us in 12/19. [facility], underwent cardiac cath and was found to have 3vessel disease. He underwent CABG at [facility], also s/p PM for CHB post surgery. Today, reports soreness on incisional site. Feels tired but started cardiac rehab yesterday and looking forward to working with then 2-3 times a week. He has cut back on alcohol---drinks on weekends. Occasional bryan with overexertion but did ok with cardiac rehab yesterday. The patient denies chest pain, orthopnea, PND, LE edema, palpitations, syncope, or presyncope.        Patient Active Problem List    Diagnosis Date Noted    Pacemaker 05/01/2020    Third degree AV block ([facility] [address] 05/01/2020    Postoperative anemia due to acute blood loss 04/29/2020    S/P CABG x 3 04/28/2020    CAD (coronary artery disease) 04/27/2020    Bilateral carotid artery stenosis 04/24/2020    NSTEMI (non-ST elevated myocardial infarction) ([address] 04/23/2020    Type 2 diabetes mellitus with diabetic neuropathy ([address] 01/09/2020    Prostate cancer ([address] 06/04/2018    Type 2 diabetes with nephropathy ([address] 02/28/2018    Reactive depression 11/15/2016    Cervical spinal stenosis 11/03/2016    CKD (chronic kidney disease) 10/31/2016    Hyperlipidemia 07/10/2015    Spinal stenosis, lumbar region, without neurogenic claudication 10/16/2014    DVT of leg (deep venous thrombosis) ([address] 12/18/2012    HTN (hypertension) 12/12/2012    Perforated diverticulum of large intestine 12/04/2012    Alcohol abuse 12/04/2012    Insomnia 12/04/2012      Gia Palacio MD  Past Medical History:   Diagnosis Date    Arthritis     Chronic kidney disease     Stage 3    Chronic pain     Abdoman, back, fingers per daughter   [place] [facility])     DIET CONTROLLED    Hypertension     PE (pulmonary embolism)     Pneumonia     Psychiatric disorder     Depression      Past Surgical History:   Procedure Laterality Date    ABDOMEN SURGERY PROC UNLISTED      bowel resection    HX BACK SURGERY  2014    HX GI  11/2012    bowel resection    HX ORTHOPAEDIC      amputated left 4th finger    CO INS NEW/RPLCMT PRM PM W/TRANSV ELTRD ATRIAL&VENT N/A 5/1/2020    INSERT PPM DUAL performed by Luis Cantrell MD at [address] [facility]     Allergies   Allergen Reactions    Arb-Angiotensin Receptor Antagonist Other (comments)     High k      Family History   Problem Relation Age of Onset    Cancer Mother     Heart Disease Father     Heart Disease Brother     Anesth Problems Neg Hx       Social History     Socioeconomic History    Marital status:      Spouse name: Not on file    Number of children: 1    Years of education: 15    Highest education level: High school graduate   Occupational History    Not on file   Social Needs    Financial resource strain: Not hard at all   Lauro-Young insecurity     Worry: Never true     Inability: Never true   [employer] needs     Medical: No     Non-medical: No   Tobacco Use    Smoking status: Never Smoker    Smokeless tobacco: Current User     Types: Chew   Substance and Sexual Activity    Alcohol use:  Yes     Alcohol/week: 7.0 standard drinks     Types: 7 Cans of beer per week     Comment: drinks on weekends    Drug use: No    Sexual activity: Not Currently   Lifestyle    Physical activity     Days per week: 0 days     Minutes per session: 0 min    Stress: Rather much   Relationships    Social connections     Talks on phone: Never     Gets together: Never     Attends Jewish service: Never     Active member of club or organization: No     Attends meetings of clubs or organizations: Never     Relationship status:     Intimate partner violence     Fear of current or ex partner: No     Emotionally abused: No     Physically abused: No     Forced sexual activity: No   Other Topics Concern     Service No    Blood Transfusions No    Caffeine Concern No    Occupational Exposure No    Hobby Hazards No    Sleep Concern Yes    Stress Concern Yes    Weight Concern No    Special Diet No    Back Care Yes    Exercise No    Bike Helmet Not Asked    Seat Belt Yes    Self-Exams Not Asked   Social History Narrative    ** Merged History Encounter **           Current Outpatient Medications   Medication Sig    ezetimibe (ZETIA) 10 mg tablet Take 1 Tab by mouth daily.  metoprolol tartrate (LOPRESSOR) 25 mg tablet Take 0.5 Tabs by mouth every twelve (12) hours for 60 days.  Januvia 50 mg tablet Take 1 tablet by mouth once daily    fluticasone propionate (Flonase Allergy Relief) 50 mcg/actuation nasal spray 2 Sprays by Both Nostrils route two (2) times daily as needed for Rhinitis.  ferrous sulfate 325 mg (65 mg iron) tablet Take 1 Tab by mouth daily (with breakfast) for 90 days.  gabapentin (NEURONTIN) 100 mg capsule Take 1 Cap by mouth three (3) times daily. Max Daily Amount: 300 mg. 400mg total tid (has 300mg capsule at home)    pumpkin seed extract-soy germ 300 mg cap Take 1 Cap by mouth daily as needed (prostate health).  acetaminophen (TYLENOL) 325 mg tablet Take 2 Tabs by mouth every four (4) hours as needed for Pain.  senna-docusate (PERICOLACE) 8.6-50 mg per tablet Take 1 Tab by mouth daily as needed for Constipation.  co-enzyme Q-10 (Co Q-10) 100 mg capsule Take 100 mg by mouth daily as needed.  traZODone (DESYREL) 50 mg tablet TAKE 1 TABLET BY MOUTH AT BEDTIME (Patient taking differently: Take 50 mg by mouth nightly.)    citalopram (CELEXA) 20 mg tablet Take 1 Tab by mouth daily.  gabapentin (NEURONTIN) 300 mg capsule Take 1 Cap by mouth nightly as needed for Pain.  cholecalciferol, vitamin D3, (VITAMIN D3 PO) Take 1,000 Int'l Units by mouth daily as needed.     ascorbate calcium (VITAMIN C PO) Take 1 Tab by mouth daily as needed.  aspirin delayed-release (ASPIR-LOW) 81 mg tablet Take 81 mg by mouth daily. Taking every now and then    FREESTYLE LANCETS 28 gauge misc USE TO CHECK BLOOD SUGAR ONCE DAILY    cyanocobalamin 1,000 mcg tablet Take 1,000 mcg by mouth daily as needed.  magnesium 250 mg tab Take 1 Tab by mouth daily as needed. No current facility-administered medications for this visit. Review of Symptoms:  11 systems reviewed, negative other than as stated in the HPI    Physical ExamPhysical Exam:    Vitals:    07/08/20 1533 07/08/20 1547   BP: 112/84 118/76   Pulse: 89    Resp: 18    SpO2: 98%    Weight: 185 lb 4.8 oz (84.1 kg)    Height: 5' 6\" (1.676 m)      Body mass index is 29.91 kg/m². General PE  Gen:  NAD  Mental Status - Alert. General Appearance - Not in acute distress. HEENT:  PERRL, no carotid bruits or JVD  Chest and Lung Exam   Inspection: Accessory muscles - No use of accessory muscles in breathing. Auscultation:   Breath sounds: - Normal.   Cardiovascular   Inspection: Jugular vein - Bilateral - Inspection Normal.   Palpation/Percussion:   Apical Impulse: - Normal.   Auscultation: Rhythm - Regular. Heart Sounds - S1 WNL and S2 WNL. No S3 or S4. Murmurs & Other Heart Sounds: Auscultation of the heart reveals - No Murmurs. Peripheral Vascular   Upper Extremity: Inspection - Bilateral - No Cyanotic nailbeds or Digital clubbing. Lower Extremity:   Palpation: Edema - Bilateral - No edema. Abdomen:   Soft, non-tender, bowel sounds are active.   Neuro: A&O times 3, CN and motor grossly WNL    Labs:   Lab Results   Component Value Date/Time    Cholesterol, total 182 05/14/2020 01:45 PM    Cholesterol, total 213 (H) 01/09/2020 11:36 AM    Cholesterol, total 244 (H) 06/25/2019 09:23 AM    Cholesterol, total 231 (H) 09/17/2018 09:20 AM    Cholesterol, total 251 (H) 02/28/2018 08:43 AM    HDL Cholesterol 36 (L) 05/14/2020 01:45 PM    HDL Cholesterol 57 01/09/2020 11:36 AM    HDL Cholesterol 62 06/25/2019 09:23 AM    HDL Cholesterol 53 09/17/2018 09:20 AM    HDL Cholesterol 58 02/28/2018 08:43 AM    LDL, calculated 112 (H) 05/14/2020 01:45 PM    LDL, calculated 138 (H) 01/09/2020 11:36 AM    LDL, calculated 164 (H) 06/25/2019 09:23 AM    LDL, calculated 150 (H) 09/17/2018 09:20 AM    LDL, calculated 177 (H) 02/28/2018 08:43 AM    Triglyceride 170 (H) 05/14/2020 01:45 PM    Triglyceride 92 01/09/2020 11:36 AM    Triglyceride 88 06/25/2019 09:23 AM    Triglyceride 140 09/17/2018 09:20 AM    Triglyceride 78 02/28/2018 08:43 AM     No results found for: CPK, CPKX, CPX  Lab Results   Component Value Date/Time    Sodium 140 06/18/2020 10:30 AM    Potassium 5.0 06/18/2020 10:30 AM    Chloride 101 06/18/2020 10:30 AM    CO2 24 06/18/2020 10:30 AM    Anion gap 7 05/04/2020 04:52 AM    Glucose 52 (L) 06/18/2020 10:30 AM    BUN 19 06/18/2020 10:30 AM    Creatinine 1.54 (H) 06/18/2020 10:30 AM    BUN/Creatinine ratio 12 06/18/2020 10:30 AM    GFR est AA 52 (L) 06/18/2020 10:30 AM    GFR est non-AA 45 (L) 06/18/2020 10:30 AM    Calcium 9.6 06/18/2020 10:30 AM    Bilirubin, total 1.0 06/18/2020 10:30 AM    Alk. phosphatase 88 06/18/2020 10:30 AM    Protein, total 7.7 06/18/2020 10:30 AM    Albumin 4.2 06/18/2020 10:30 AM    Globulin 3.1 05/02/2020 04:14 AM    A-G Ratio 1.2 06/18/2020 10:30 AM    ALT (SGPT) 55 (H) 06/18/2020 10:30 AM       EKG:  Paced     Assessment:     Assessment:      1. ASHD (arteriosclerotic heart disease)    2. Third degree AV block (HCC)    3. Cardiac pacemaker in situ    4. S/P CABG x 3    5. Mixed hyperlipidemia    6. Essential hypertension    7. Statin intolerance        Orders Placed This Encounter    PA DISCHARGE MEDS RECONCILED W/ CURRENT OUTPATIENT MED LIST    AMB POC EKG ROUTINE W/ 12 LEADS, INTER & REP     Order Specific Question:   Reason for Exam:     Answer:   routine    ezetimibe (ZETIA) 10 mg tablet     Sig: Take 1 Tab by mouth daily.      Dispense:  90 Tab Refill:  3        Plan:     Patient presents for follow up, last seen by us in 12/19. Admitted at [facility] for NSTEMI, underwent cardiac cath and was found to have 3vessel disease. He underwent CABG at [facility] (due to initial COVID 23 outbreak), also s/p PM for CHB post surgery. Today, reports soreness on incisional site. Feels tired but started cardiac rehab yesterday and looking forward to working with then 2-3 times a week. He has cut back on alcohol---drinks on weekends. Occasional bryan with overexertion but did ok with cardiac rehab yesterday. ASHD Hx CABG x 3 in 4/2020---LIMA to LAD, SVG to RCA and Ramus  Continue ASA BB   Intolerant to statin    CHB s/p PPM 5/2020  Device followed by Dr Dawson---has follow up next month    HTN  Controlled with current therapy    HLD  Statin intolerance---In chart review has been intolerant to Crestor, Lipitor, pravastatin  5/2020 LDL at 112 [address] and lipids per PCP  Consider Repatha/Praluent---does not want injectables   Willing to try lowest dose of Crestor again, repeat labs in 2 months. Will try coenzyme every 10 daily if mild myalgias, let us know if greater than mild myalgias. Emphasized strong mortality benefit as well as reduction in stroke and MI.    CKD III  Cr 1.54 in 6/2020    DM  On oral agent      Hx DVT/PE: developed PE/DVT after bowel resection in 2012        Continue current care and f/u in 6 months, sooner if needed.           Ju Calderón MD

## 2020-07-09 NOTE — TELEPHONE ENCOUNTER
Pt called and Spoke with Daughter Helga Valdez)  And verified name and date of birth and notified her that it was in regards to a previous message for his symptoms. Linus Barker said that his Cough is better. Barrigaragini Barker said that he was taking Flonase but she will try him on zyrtec. And also the Pepcid. She was given the number to ENT specialist to set up an appt if symptoms get worse. She was notified to call us back if she has any questions. Linus Barker acknowledged and call was ended.

## 2020-07-13 ENCOUNTER — HOSPITAL ENCOUNTER (OUTPATIENT)
Dept: CARDIAC REHAB | Age: 70
Discharge: HOME | End: 2020-07-13
Payer: MEDICARE

## 2020-07-13 ENCOUNTER — VIRTUAL VISIT (OUTPATIENT)
Dept: SLEEP MEDICINE | Age: 70
End: 2020-07-13

## 2020-07-13 VITALS — WEIGHT: 179 LBS | BODY MASS INDEX: 28.89 KG/M2

## 2020-07-13 DIAGNOSIS — G47.00 INSOMNIA, UNSPECIFIED TYPE: ICD-10-CM

## 2020-07-13 DIAGNOSIS — G47.33 OBSTRUCTIVE SLEEP APNEA (ADULT) (PEDIATRIC): Primary | ICD-10-CM

## 2020-07-13 DIAGNOSIS — I25.10 CORONARY ARTERY DISEASE INVOLVING NATIVE CORONARY ARTERY OF NATIVE HEART WITHOUT ANGINA PECTORIS: ICD-10-CM

## 2020-07-13 DIAGNOSIS — E11.21 TYPE 2 DIABETES WITH NEPHROPATHY (HCC): ICD-10-CM

## 2020-07-13 PROCEDURE — 93798 PHYS/QHP OP CAR RHAB W/ECG: CPT

## 2020-07-13 NOTE — PROGRESS NOTES
217 [facility] Saeed. [place], [address]  Tel.  [phone]  Fax. 100 [facility] 60  [place], [address] [facility]  Tel.  [phone]  Fax. [phone] [address]  Tel.  [phone]  Fax. [phone]         Subjective:        Telemedicine visit performed with verbal consent of the patient. ID confirmed with date of birth and 's license provided by patient. Patient was seen at home  Jossue Brandt is a 65 y.o. male who was seen by synchronous (real-time) audio-video technology on 7/13/2020. Consent:  He and/or his healthcare decision maker is aware that this patient-initiated Telehealth encounter is the equivalent to a face to face encounter in the sleep disorder center and has provided verbal consent to proceed: Yes    I was at home while conducting this encounter. Jossue Brandt is an 65 y.o. male referred for evaluation for a sleep disorder. He complains of snoring, snorting associated with excessive daytime sleepiness, frequent awakenings at night and poor sleep quality. Symptoms began several years ago, unchanged since that time. He usually can fall asleep in variable minutes. Family or house members note snoring. He denies falling asleep while during conversation. He was seen in 2019 but sleep study not done at that time (he was to have a sleep study after addressing his anxiety)  Jossue Brandt does wake up frequently at night. He is bothered by waking up too early and left unable to get back to sleep. He actually sleeps about 9 hours at night and wakes up about 7 times during the night. He does not work shifts: Sofi Lopez indicates he does get too little sleep at night. His bedtime is 0900. But he stays in bed not sleeping He awakens at 0600. He does not take naps. He does not take intentional naps  . He has the following observed behaviors: Loud snoring; Nightmares.   Other remarks:      [place] Sleepiness Score: 3  Allergies   Allergen Reactions    Arb-Angiotensin Receptor Antagonist Other (comments)     High k    Statins-Hmg-Coa Reductase Inhibitors Myalgia         Current Outpatient Medications:     ezetimibe (ZETIA) 10 mg tablet, Take 1 Tab by mouth daily. , Disp: 90 Tab, Rfl: 3    rosuvastatin (CRESTOR) 5 mg tablet, Take 1 Tab by mouth daily. , Disp: 90 Tab, Rfl: 4    metoprolol tartrate (LOPRESSOR) 25 mg tablet, Take 0.5 Tabs by mouth every twelve (12) hours for 60 days. , Disp: 30 Tab, Rfl: 1    Januvia 50 mg tablet, Take 1 tablet by mouth once daily, Disp: 30 Tab, Rfl: 0    fluticasone propionate (Flonase Allergy Relief) 50 mcg/actuation nasal spray, 2 Sprays by Both Nostrils route two (2) times daily as needed for Rhinitis., Disp: , Rfl:     ferrous sulfate 325 mg (65 mg iron) tablet, Take 1 Tab by mouth daily (with breakfast) for 90 days. , Disp: 30 Tab, Rfl: 2    gabapentin (NEURONTIN) 100 mg capsule, Take 1 Cap by mouth three (3) times daily. Max Daily Amount: 300 mg. 400mg total tid (has 300mg capsule at home), Disp: 90 Cap, Rfl: 1    pumpkin seed extract-soy germ 300 mg cap, Take 1 Cap by mouth daily as needed (prostate health). , Disp: , Rfl:     acetaminophen (TYLENOL) 325 mg tablet, Take 2 Tabs by mouth every four (4) hours as needed for Pain., Disp: , Rfl:     senna-docusate (PERICOLACE) 8.6-50 mg per tablet, Take 1 Tab by mouth daily as needed for Constipation. , Disp: , Rfl:     co-enzyme Q-10 (Co Q-10) 100 mg capsule, Take 100 mg by mouth daily as needed. , Disp: , Rfl:     traZODone (DESYREL) 50 mg tablet, TAKE 1 TABLET BY MOUTH AT BEDTIME (Patient taking differently: Take 50 mg by mouth nightly.), Disp: 30 Tab, Rfl: 0    gabapentin (NEURONTIN) 300 mg capsule, Take 1 Cap by mouth nightly as needed for Pain., Disp: 180 Cap, Rfl: 1    cholecalciferol, vitamin D3, (VITAMIN D3 PO), Take 1,000 Int'l Units by mouth daily as needed. , Disp: , Rfl:     ascorbate calcium (VITAMIN C PO), Take 1 Tab by mouth daily as needed. , Disp: , Rfl:     aspirin delayed-release (ASPIR-LOW) 81 mg tablet, Take 81 mg by mouth daily. Taking every now and then, Disp: , Rfl:     FREESTYLE LANCETS 28 gauge misc, USE TO CHECK BLOOD SUGAR ONCE DAILY, Disp: 100 Lancet, Rfl: 12    cyanocobalamin 1,000 mcg tablet, Take 1,000 mcg by mouth daily as needed. , Disp: , Rfl:     magnesium 250 mg tab, Take 1 Tab by mouth daily as needed. , Disp: , Rfl:     citalopram (CELEXA) 20 mg tablet, Take 1 Tab by mouth daily. , Disp: 80 Tab, Rfl: 1     He  has a past medical history of Arthritis, Chronic kidney disease, Chronic pain, Diabetes ([address] Hypertension, PE (pulmonary embolism), Pneumonia, and Psychiatric disorder. He  has a past surgical history that includes hx gi (11/2012); pr abdomen surgery proc unlisted; hx orthopaedic; hx back surgery (2014); and pr ins new/rplcmt prm pm w/transv eltrd atrial&vent (N/A, 5/1/2020). He family history includes Cancer in his mother; Heart Disease in his brother and father. He  reports that he has never smoked. His smokeless tobacco use includes chew. He reports current alcohol use of about 7.0 standard drinks of alcohol per week. He reports that he does not use drugs. Review of Systems:  Constitutional:  No significant weight loss or weight gain  Eyes:  No blurred vision  CVS:  No significant chest pain  Pulm:  No significant shortness of breath  GI:  No significant nausea or vomiting  :  + significant nocturia  Musculoskeletal:  +some  joint pain at night  Skin:  No significant rashes  Neuro:  No significant dizziness   Psych:  Intermittent anxiety    Sleep Review of Systems: notable for + difficulty falling asleep; +frequent awakenings at night;  + dreaming noted; + nightmares ; no  early morning headaches; + memory problems; + concentration issues; Objective: There were no vitals taken for this visit.       Vital Signs: (As obtained by patient/caregiver at home)        Constitutional: [x] Appears well-developed and well-nourished [x] No apparent distress      [] Abnormal -     Mental status: [x] Alert and awake  [x] Oriented to person/place/time [x] Able to follow commands    [] Abnormal -     Eyes:   EOM    [x]  Normal    [] Abnormal -   Sclera  [x]  Normal    [] Abnormal -          Discharge [x]  None visible   [] Abnormal -     HENT: [x] Normocephalic, atraumatic  [] Abnormal -   [x] Mouth/Throat: Mucous membranes are moist               Class 3 oropharynx, thick tongue base                  Low-lying soft palate,absent retrognathia    External Ears [x] Normal  [] Abnormal -    Neck: [x] No visualized mass [] Abnormal -     Pulmonary/Chest: [x] Respiratory effort normal   [x] No visualized signs of difficulty breathing or respiratory distress        [] Abnormal -       Neurological:        [x] No Facial Asymmetry (Cranial nerve 7 motor function) (limited exam due to video visit)          [x] No gaze palsy        [] Abnormal -          Skin:        [x] No significant exanthematous lesions or discoloration noted on facial skin         [] Abnormal -            Psychiatric:       [x] Normal Affect [] Abnormal -       Other pertinent observable physical exam findings:-          Assessment:       ICD-10-CM ICD-9-CM    1. Obstructive sleep apnea (adult) (pediatric)  G47.33 327.23 SLEEP STUDY UNATTENDED, 4 CHANNEL   2. Insomnia, unspecified type  G47.00 780.52    3. Coronary artery disease involving native coronary artery of native heart without angina pectoris  I25.10 414.01    4. Type 2 diabetes with nephropathy (HCC)  E11.21 250.40      583.81          Plan:       * Sleep testing was ordered for initial evaluation. * He was provided information on sleep apnea including coresponding risk factors and the importance of proper treatment. * Treatment options for sleep apnea were reviewed today. Patient agrees to a trial of APAP therapy if indicated.   * Counseling was provided regarding proper sleep hygiene, appropriate sleep schedule, need for sleep environment safety and safe driving. * Effect of sleep disturbance on weight was reviewed. We have recommended a dedicated weight loss through appropriate diet and an exercise regimen as significant weight reduction has been shown to reduce severity of obstructive sleep apnea. He was advised to avoid tobacco/alcohol. * Patient agrees to telephone follow-up by sleep technologist shortly after sleep study to review results and plan final management. The treatment plan was reviewed with the patient in detail and reviewed with the patient . he understands that the lead technologist will be calling him  with the results and assisting with the next step in the treatment plan as outlined today during the consultation with me. All of his questions were addressed. 2.Insomnia/inadequate sleep hygiene- I have advised a regular sleep wake cycle. he  was advised to avoid looking at the clock during the night. Ideally, the clock face should be turned away. he was advised to minimize caffeine use and to avoid caffeine-containing beverages 8 hours prior to bedtime. Naps were discouraged. Watching TV, using laptops, tablets and smartphones in the evening was discouraged. he  was advised to keep the bedroom cool and dark. All of his questions were addressed. 3. Coronary Artery Disease - he continues on his current regimen. I have reviewed the relationship between heart disease and sleep disordered breathing. 4. Type II diabetes - he continues on his current regimen. I have reviewed the relationship between sleep disordered breathing as it relates to diabetes. Office visit exceeded 25 minutes with counseling and direction of care taking up more than 50% of the allotted time. Thank you for allowing us to participate in your patient's medical care. We'll keep you updated on these investigations.     Pursuant to the emergency declaration under the [facility], [facility] waiver authority and the [employer] Act, this Virtual  Visit was conducted, with patient's consent, to reduce the patient's risk of exposure to COVID-19 and provide continuity of care for an established patient. Services were provided through a video synchronous discussion virtually to substitute for in-person clinic visit.     Zonia Zambrano MD    Electronically signed by    Thea Clemente MD  Diplomate in Sleep Medicine  [facility]

## 2020-07-13 NOTE — Clinical Note
Thank you for the referral.  I will keep you informed of his progress.   [address],  Marvin Bonilla

## 2020-07-13 NOTE — PATIENT INSTRUCTIONS
217 [facility] [place]aeed. [address] [facility], [address] Tel.  [phone] Fax. 100 [facility] 60 [place], [address] [facility] [address] Tel.  [phone] Fax. [phone] [address] Tel.  [phone] Fax. [phone] Sleep Apnea: After Your Visit Your Care Instructions Sleep apnea occurs when you frequently stop breathing for 10 seconds or longer during sleep. It can be mild to severe, based on the number of times per hour that you stop breathing or have slowed breathing. Blocked or narrowed airways in your nose, mouth, or throat can cause sleep apnea. Your airway can become blocked when your throat muscles and tongue relax during sleep. Sleep apnea is common, occurring in 1 out of 20 individuals. Individuals having any of the following characteristics should be evaluated and treated right away due to high risk and detrimental consequences from untreated sleep apnea: 
1. Obesity 2. Congestive Heart failure 3. Atrial Fibrillation 4. Uncontrolled Hypertension 5. Type II Diabetes 6. Night-time Arrhythmias 7. Stroke 8. Pulmonary Hypertension 9. High-risk Driving Populations (pilots, truck drivers, etc.) 10. Patients Considering Weight-loss Surgery How do you know you have sleep apnea? You probably have sleep apnea if you answer 'yes' to 3 or more of the following questions: S - Have you been told that you Snore? T - Are you often Tired during the day? O - Has anyone Observed you stop breathing while sleeping? P- Do you have (or are being treated for) high blood Pressure? B - Are you obese (Body Mass Index > 35)? A - Is your Age 48years old or older? N - Is your Neck size greater than 16 inches? G - Are you male Gender? A sleep physician can prescribe a breathing device that prevents tissues in the throat from blocking your airway.  Or your doctor may recommend using a dental device (oral breathing device) to help keep your airway open. In some cases, surgery may be needed to remove enlarged tissues in the throat. Follow-up care is a key part of your treatment and safety. Be sure to make and go to all appointments, and call your doctor if you are having problems. It's also a good idea to know your test results and keep a list of the medicines you take. How can you care for yourself at home? · Lose weight, if needed. It may reduce the number of times you stop breathing or have slowed breathing. · Go to bed at the same time every night. · Sleep on your side. It may stop mild apnea. If you tend to roll onto your back, sew a pocket in the back of your pajama top. Put a tennis ball into the pocket, and stitch the pocket shut. This will help keep you from sleeping on your back. · Avoid alcohol and medicines such as sleeping pills and sedatives before bed. · Do not smoke. Smoking can make sleep apnea worse. If you need help quitting, talk to your doctor about stop-smoking programs and medicines. These can increase your chances of quitting for good. · Prop up the head of your bed 4 to 6 inches by putting bricks under the legs of the bed. · Treat breathing problems, such as a stuffy nose, caused by a cold or allergies. · Use a continuous positive airway pressure (CPAP) breathing machine if lifestyle changes do not help your apnea and your doctor recommends it. The machine keeps your airway from closing when you sleep. · If CPAP does not help you, ask your doctor whether you should try other breathing machines. A bilevel positive airway pressure machine has two types of air pressureâone for breathing in and one for breathing out. Another device raises or lowers air pressure as needed while you breathe. · If your nose feels dry or bleeds when using one of these machines, talk with your doctor about increasing moisture in the air. A humidifier may help.  
· If your nose is runny or stuffy from using a breathing machine, talk with your doctor about using decongestants or a corticosteroid nasal spray. When should you call for help? Watch closely for changes in your health, and be sure to contact your doctor if: 
· You still have sleep apnea even though you have made lifestyle changes. · You are thinking of trying a device such as CPAP. · You are having problems using a CPAP or similar machine. Where can you learn more? Go to [employer]. Enter U338 in the search box to learn more about \"Sleep Apnea: After Your Visit. \"  
© 0370-5841 Healthwise, Incorporated. Care instructions adapted under license by [address] [place] [address] [employer] (which disclaims liability or warranty for this information). This care instruction is for use with your licensed healthcare professional. If you have questions about a medical condition or this instruction, always ask your healthcare professional. Shayna Ply any warranty or liability for your use of this information. PROPER SLEEP HYGIENE What to avoid · Do not have drinks with caffeine, such as coffee or black tea, for 8 hours before bed. · Do not smoke or use other types of tobacco near bedtime. Nicotine is a stimulant and can keep you awake. · Avoid drinking alcohol late in the evening, because it can cause you to wake in the middle of the night. · Do not eat a big meal close to bedtime. If you are hungry, eat a light snack. · Do not drink a lot of water close to bedtime, because the need to urinate may wake you up during the night. · Do not read or watch TV in bed. Use the bed only for sleeping and sexual activity. What to try · Go to bed at the same time every night, and wake up at the same time every morning. Do not take naps during the day. · Keep your bedroom quiet, dark, and cool. · Get regular exercise, but not within 3 to 4 hours of your bedtime. Aneesh Jones · Sleep on a comfortable pillow and mattress. · If watching the clock makes you anxious, turn it facing away from you so you cannot see the time. · If you worry when you lie down, start a worry book. Well before bedtime, write down your worries, and then set the book and your concerns aside. · Try meditation or other relaxation techniques before you go to bed. · If you cannot fall asleep, get up and go to another room until you feel sleepy. Do something relaxing. Repeat your bedtime routine before you go to bed again. · Make your house quiet and calm about an hour before bedtime. Turn down the lights, turn off the TV, log off the computer, and turn down the volume on music. This can help you relax after a busy day. Drowsy Driving [facility] cites drowsiness as a causing factor in more than 669,861 police reported crashes annually, resulting in 76,000 injuries and 1,500 deaths. Other surveys suggest 55% of people polled have driven while drowsy in the past year, 23% had fallen asleep but not crashed, 3% crashed, and 2% had and accident due to drowsy driving. Who is at risk? Young Drivers: One study of drowsy driving accidents states that 55% of the drivers were under 25 years. Of those, 75% were male. Shift Workers and Travelers: People who work overnight or travel across time zones frequently are at higher risk of experiencing Circadian Rhythm Disorders. They are trying to work and function when their body is programed to sleep. Sleep Deprived: Lack of sleep has a serious impact on your ability to pay attention or focus on a task. Consistently getting less than the average of 8 hours your body needs creates partial or cumulative sleep deprivation. Untreated Sleep Disorders: Sleep Apnea, Narcolepsy, R.L.S., and other sleep disorders (untreated) prevent a person from getting enough restful sleep.  This leads to excessive daytime sleepiness and increases the risk for drowsy driving accidents by up to 7 times. Medications / Alcohol: Even over the counter medications can cause drowsiness. Medications that impair a drivers attention should have a warning label. Alcohol naturally makes you sleepy and on its own can cause accidents. Combined with excessive drowsiness its effects are amplified. Signs of Drowsy Driving: * You don't remember driving the last few miles * You may drift out of your pierre * You are unable to focus and your thoughts wander * You may yawn more often than normal 
 * You have difficulty keeping your eyes open / nodding off * Missing traffic signs, speeding, or tailgating Prevention-  
Good sleep hygiene, lifestyle and behavioral choices have the most impact on drowsy driving. There is no substitute for sleep and the average person requires 8 hours nightly. If you find yourself driving drowsy, stop and sleep. Consider the sleep hygiene tips provided during your visit as well. Medication Refill Policy: Refills for all medications require 1 week advance notice. Please have your pharmacy fax a refill request. We are unable to fax, or call in \"controled substance\" medications and you will need to pick these prescriptions up from our office. [employer] Thank you for requesting access to [employer]. Please follow the instructions below to securely access and download your online medical record. [employer] allows you to send messages to your doctor, view your test results, renew your prescriptions, schedule appointments, and more. How Do I Sign Up? 1. In your internet browser, go to [email or web address]. 2. Click on the First Time User? Click Here link in the Sign In box. You will see the New Member Sign Up page. 3. Enter your [employer] Access Code exactly as it appears below. You will not need to use this code after youve completed the sign-up process.  If you do not sign up before the expiration date, you must request a new code. [employer] Access Code: [email or web address] Expires: 2020  8:40 AM (This is the date your [employer] access code will ) 4. Enter the last four digits of your Social Security Number (xxxx) and Date of Birth (mm/dd/yyyy) as indicated and click Submit. You will be taken to the next sign-up page. 5. Create a [employer] ID. This will be your [employer] login ID and cannot be changed, so think of one that is secure and easy to remember. 6. Create a [employer] password. You can change your password at any time. 7. Enter your Password Reset Question and Answer. This can be used at a later time if you forget your password. 8. Enter your e-mail address. You will receive e-mail notification when new information is available in [address] 9. Click Sign Up. You can now view and download portions of your medical record. 10. Click the Download Summary menu link to download a portable copy of your medical information. Additional Information If you have questions, please call [phone]. Remember, [employer] is NOT to be used for urgent needs. For medical emergencies, dial 911.

## 2020-07-15 ENCOUNTER — HOSPITAL ENCOUNTER (OUTPATIENT)
Dept: CARDIAC REHAB | Age: 70
Discharge: HOME | End: 2020-07-15
Payer: MEDICARE

## 2020-07-15 VITALS — WEIGHT: 186 LBS | BODY MASS INDEX: 30.02 KG/M2

## 2020-07-15 PROCEDURE — 93798 PHYS/QHP OP CAR RHAB W/ECG: CPT

## 2020-07-15 PROCEDURE — 93797 PHYS/QHP OP CAR RHAB WO ECG: CPT

## 2020-07-20 ENCOUNTER — HOSPITAL ENCOUNTER (OUTPATIENT)
Dept: CARDIAC REHAB | Age: 70
Discharge: HOME | End: 2020-07-20
Payer: MEDICARE

## 2020-07-20 ENCOUNTER — APPOINTMENT (OUTPATIENT)
Dept: CARDIAC REHAB | Age: 70
End: 2020-07-20
Payer: MEDICARE

## 2020-07-22 ENCOUNTER — HOSPITAL ENCOUNTER (OUTPATIENT)
Dept: CARDIAC REHAB | Age: 70
Discharge: HOME | End: 2020-07-22
Payer: MEDICARE

## 2020-07-22 ENCOUNTER — APPOINTMENT (OUTPATIENT)
Dept: CARDIAC REHAB | Age: 70
End: 2020-07-22
Payer: MEDICARE

## 2020-07-22 VITALS — BODY MASS INDEX: 30.02 KG/M2 | WEIGHT: 186 LBS

## 2020-07-22 PROCEDURE — 93798 PHYS/QHP OP CAR RHAB W/ECG: CPT

## 2020-07-23 ENCOUNTER — TELEPHONE (OUTPATIENT)
Dept: SLEEP MEDICINE | Age: 70
End: 2020-07-23

## 2020-07-23 DIAGNOSIS — M54.50 LOW BACK PAIN WITHOUT SCIATICA, UNSPECIFIED BACK PAIN LATERALITY, UNSPECIFIED CHRONICITY: ICD-10-CM

## 2020-07-23 RX ORDER — SITAGLIPTIN 50 MG/1
TABLET, FILM COATED ORAL
Qty: 30 TAB | Refills: 0 | Status: SHIPPED | OUTPATIENT
Start: 2020-07-23 | End: 2020-08-10 | Stop reason: SDUPTHER

## 2020-07-23 RX ORDER — GABAPENTIN 300 MG/1
CAPSULE ORAL
Qty: 180 CAP | Refills: 0 | Status: SHIPPED | OUTPATIENT
Start: 2020-07-23 | End: 2020-08-25 | Stop reason: SDUPTHER

## 2020-07-27 ENCOUNTER — HOSPITAL ENCOUNTER (OUTPATIENT)
Dept: RADIATION THERAPY | Age: 70
Discharge: HOME | End: 2020-07-27

## 2020-07-27 ENCOUNTER — HOSPITAL ENCOUNTER (OUTPATIENT)
Dept: CARDIAC REHAB | Age: 70
Discharge: HOME | End: 2020-07-27
Payer: MEDICARE

## 2020-07-27 VITALS — BODY MASS INDEX: 30.73 KG/M2 | WEIGHT: 190.4 LBS

## 2020-07-27 PROCEDURE — 93798 PHYS/QHP OP CAR RHAB W/ECG: CPT

## 2020-07-29 ENCOUNTER — APPOINTMENT (OUTPATIENT)
Dept: CARDIAC REHAB | Age: 70
End: 2020-07-29
Payer: MEDICARE

## 2020-07-29 ENCOUNTER — HOSPITAL ENCOUNTER (OUTPATIENT)
Dept: CARDIAC REHAB | Age: 70
Discharge: HOME | End: 2020-07-29
Payer: MEDICARE

## 2020-07-29 VITALS — WEIGHT: 190 LBS | BODY MASS INDEX: 30.67 KG/M2

## 2020-07-29 PROCEDURE — 93798 PHYS/QHP OP CAR RHAB W/ECG: CPT

## 2020-07-31 ENCOUNTER — TELEPHONE (OUTPATIENT)
Dept: SLEEP MEDICINE | Age: 70
End: 2020-07-31

## 2020-07-31 DIAGNOSIS — G47.33 OBSTRUCTIVE SLEEP APNEA (ADULT) (PEDIATRIC): Primary | ICD-10-CM

## 2020-07-31 NOTE — TELEPHONE ENCOUNTER
2nd failed HSAT. No readable data on 30 minute recording. Schedule attended polysomnogram  Order attached.    Staff to inform patient and schedule

## 2020-08-05 ENCOUNTER — HOSPITAL ENCOUNTER (OUTPATIENT)
Dept: CARDIAC REHAB | Age: 70
Discharge: HOME | End: 2020-08-05
Payer: MEDICARE

## 2020-08-05 VITALS — WEIGHT: 183.2 LBS | BODY MASS INDEX: 29.57 KG/M2

## 2020-08-05 PROCEDURE — 93798 PHYS/QHP OP CAR RHAB W/ECG: CPT

## 2020-08-10 ENCOUNTER — APPOINTMENT (OUTPATIENT)
Dept: CARDIAC REHAB | Age: 70
End: 2020-08-10
Payer: MEDICARE

## 2020-08-10 ENCOUNTER — HOSPITAL ENCOUNTER (OUTPATIENT)
Dept: CARDIAC REHAB | Age: 70
Discharge: HOME | End: 2020-08-10
Payer: MEDICARE

## 2020-08-10 ENCOUNTER — HOSPITAL ENCOUNTER (OUTPATIENT)
Dept: RADIATION THERAPY | Age: 70
Discharge: HOME | End: 2020-08-10
Payer: MEDICARE

## 2020-08-10 VITALS — BODY MASS INDEX: 29.54 KG/M2 | WEIGHT: 183 LBS

## 2020-08-10 PROCEDURE — 77300 RADIATION THERAPY DOSE PLAN: CPT

## 2020-08-10 PROCEDURE — 93798 PHYS/QHP OP CAR RHAB W/ECG: CPT

## 2020-08-10 PROCEDURE — 77338 DESIGN MLC DEVICE FOR IMRT: CPT

## 2020-08-10 PROCEDURE — 77301 RADIOTHERAPY DOSE PLAN IMRT: CPT

## 2020-08-10 NOTE — TELEPHONE ENCOUNTER
Previous 30 day supply, pharmacy requesting 90 day supply.   Future Appointments:  Future Appointments   Date Time Provider Willian Paul   8/17/2020  9:00 AM MR[facility] [id number] REG   8/17/2020 10:00 AM [facility] NUTRITION [facility] REG   8/24/2020  9:00 AM [facility] CARDIOPULM EXERCISE [facility]   8/25/2020  1:15 PM Aleyda Santos MD [facility] BS AMB   8/28/2020  8:30 PM BEDRM 2 [facility] [address] SLEEP LAB ME   8/31/2020  9:00 AM MRM CARDIOPULM EXERCISE [facility] REG   9/8/2020  9:00 AM PACEMAKER3, HODA WYATT BS AMB   9/8/2020  9:20 AM Louella Goodell, MD CAVREY BS AMB   9/14/2020  9:00 AM MRM CARDIOPULM EXERCISE [facility] REG   9/21/2020  9:00 AM MRM CARDIOPULM EXERCISE [facility] REG   9/28/2020  9:00 AM MRM CARDIOPULM EXERCISE [facility] REG   1/15/2021  2:00 PM MD ANTWON Nair BS AMB        Last Appointment With Me:  5/29/2020     Last Appointment My Department:  Visit date not found    Requested Prescriptions      No prescriptions requested or ordered in this encounter

## 2020-08-11 ENCOUNTER — HOSPITAL ENCOUNTER (OUTPATIENT)
Dept: RADIATION THERAPY | Age: 70
Discharge: HOME | End: 2020-08-11
Payer: MEDICARE

## 2020-08-11 PROCEDURE — 77385 HC IMRT TRMT DLVR SMPL: CPT

## 2020-08-12 ENCOUNTER — APPOINTMENT (OUTPATIENT)
Dept: CARDIAC REHAB | Age: 70
End: 2020-08-12
Payer: MEDICARE

## 2020-08-12 ENCOUNTER — HOSPITAL ENCOUNTER (OUTPATIENT)
Dept: RADIATION THERAPY | Age: 70
Discharge: HOME | End: 2020-08-12
Payer: MEDICARE

## 2020-08-12 PROCEDURE — 77385 HC IMRT TRMT DLVR SMPL: CPT

## 2020-08-13 ENCOUNTER — HOSPITAL ENCOUNTER (OUTPATIENT)
Dept: RADIATION THERAPY | Age: 70
Discharge: HOME | End: 2020-08-13
Payer: MEDICARE

## 2020-08-13 ENCOUNTER — TELEPHONE (OUTPATIENT)
Dept: INTERNAL MEDICINE CLINIC | Age: 70
End: 2020-08-13

## 2020-08-13 PROCEDURE — 77385 HC IMRT TRMT DLVR SMPL: CPT

## 2020-08-13 NOTE — TELEPHONE ENCOUNTER
----- Message from Yudelka Johnson sent at 8/13/2020  9:13 AM EDT -----  Regarding: Dr. Russel Garcia  Contact: [phone]  Caller's first and last name and relationship to patient (if not the patient): [facility] (Daughter)  Best contact number:  [phone]  Preferred date and time: 8/25/20 earlier in the day  Scheduled appointment date and time: Currently scheduled for 8/25/20 @ 1:15pm  Reason for appointment: Pt needs early appt, requesting for same day  Details to clarify the request: 8/25/20 @ 1:15pm appt has NOT been cancelled.     Message copied/pasted from [facility]

## 2020-08-14 ENCOUNTER — HOSPITAL ENCOUNTER (OUTPATIENT)
Dept: RADIATION THERAPY | Age: 70
Discharge: HOME | End: 2020-08-14
Payer: MEDICARE

## 2020-08-14 PROCEDURE — 77385 HC IMRT TRMT DLVR SMPL: CPT

## 2020-08-14 NOTE — TELEPHONE ENCOUNTER
MD Sandeep Pratt LPN    Caller: Unspecified (Yesterday,  9:23 AM)               Can come in 8 am that day, check in 745 for vitals

## 2020-08-17 ENCOUNTER — HOSPITAL ENCOUNTER (OUTPATIENT)
Dept: RADIATION THERAPY | Age: 70
Discharge: HOME | End: 2020-08-17
Payer: MEDICARE

## 2020-08-17 ENCOUNTER — HOSPITAL ENCOUNTER (OUTPATIENT)
Dept: CARDIAC REHAB | Age: 70
Discharge: HOME | End: 2020-08-17
Payer: MEDICARE

## 2020-08-17 ENCOUNTER — APPOINTMENT (OUTPATIENT)
Dept: CARDIAC REHAB | Age: 70
End: 2020-08-17
Payer: MEDICARE

## 2020-08-17 VITALS — BODY MASS INDEX: 29.38 KG/M2 | WEIGHT: 182 LBS

## 2020-08-17 PROCEDURE — 77385 HC IMRT TRMT DLVR SMPL: CPT

## 2020-08-17 PROCEDURE — 93797 PHYS/QHP OP CAR RHAB WO ECG: CPT | Performed by: DIETITIAN, REGISTERED

## 2020-08-17 PROCEDURE — 93798 PHYS/QHP OP CAR RHAB W/ECG: CPT

## 2020-08-17 PROCEDURE — 77336 RADIATION PHYSICS CONSULT: CPT

## 2020-08-17 NOTE — TELEPHONE ENCOUNTER
Spoke to [employer] (HIPAA). Two pt identifiers confirmed. Pt informed per Dr. Jose Avendano that pt can be seen 8/25 at 0800 (0745 check-in)--Yareli agreed. Nakia Bull offered and accepted in-office appt for 8/25/20 at 46. Nakia Bull verbalized understanding of information discussed w/ no further questions at this time.

## 2020-08-17 NOTE — PROGRESS NOTES
Cardiac Rehab Nutrition Assessment - 1:1 Evaluation       NAME: Desi Gray : 1950 AGE: 65 y.o. GENDER: male  CARDIAC REHAB ADMITTING DIAGNOSIS: CABG x3, NSTEMI    Relevant Comorbidites: HTN, dyslipidemia, DM    LABS:   Lab Results   Component Value Date/Time    Hemoglobin A1c 5.7 (H) 2020 10:30 AM    Hemoglobin A1c (POC) 5.7 2019 08:33 AM     Lab Results   Component Value Date/Time    Cholesterol, total 182 2020 01:45 PM    Cholesterol (POC) 227 2015 03:30 PM    HDL Cholesterol 36 (L) 2020 01:45 PM    HDL Cholesterol (POC) 49 2015 03:30 PM    LDL Cholesterol (POC) 164 2015 03:30 PM    LDL, calculated 112 (H) 2020 01:45 PM    VLDL, calculated 34 2020 01:45 PM    Triglyceride 170 (H) 2020 01:45 PM    Triglycerides (POC) 68 2015 03:30 PM       MEDICATIONS/SUPPLEMENTS:   [unfilled]  Prior to Admission medications    Medication Sig Start Date End Date Taking? Authorizing Provider   SITagliptin (Januvia) 50 mg tablet Take 1 tablet by mouth once daily 8/10/20   Deo Urbina MD   gabapentin (NEURONTIN) 300 mg capsule TAKE 2 TO 3 CAPSULES BY MOUTH ONCE DAILY IN THE EVENING AS NEEDED FOR PAIN 20   Deo Urbina MD   ezetimibe (ZETIA) 10 mg tablet Take 1 Tab by mouth daily. 20   Kenna Ledesma NP   rosuvastatin (CRESTOR) 5 mg tablet Take 1 Tab by mouth daily. 20   Asher Issa MD   metoprolol tartrate (LOPRESSOR) 25 mg tablet Take 0.5 Tabs by mouth every twelve (12) hours for 60 days. 20  Deo Urbina MD   fluticasone propionate (Flonase Allergy Relief) 50 mcg/actuation nasal spray 2 Sprays by Both Nostrils route two (2) times daily as needed for Rhinitis. Provider, Historical   ferrous sulfate 325 mg (65 mg iron) tablet Take 1 Tab by mouth daily (with breakfast) for 90 days. 6/3/20 9/1/20  Deo Urbina MD   gabapentin (NEURONTIN) 100 mg capsule Take 1 Cap by mouth three (3) times daily. Max Daily Amount: 300 mg. 400mg total tid (has 300mg capsule at home) 5/29/20   Anitra Olea MD   pumpkin seed extract-soy germ 300 mg cap Take 1 Cap by mouth daily as needed (prostate health). Provider, Historical   acetaminophen (TYLENOL) 325 mg tablet Take 2 Tabs by mouth every four (4) hours as needed for Pain. 5/4/20   Drew Palma NP   senna-docusate (PERICOLACE) 8.6-50 mg per tablet Take 1 Tab by mouth daily as needed for Constipation. 5/4/20   Drew Palma NP   co-enzyme Q-10 (Co Q-10) 100 mg capsule Take 100 mg by mouth daily as needed. Other, MD Camilla   traZODone (DESYREL) 50 mg tablet TAKE 1 TABLET BY MOUTH AT BEDTIME  Patient taking differently: Take 50 mg by mouth nightly. 3/9/20   Anitra Olea MD   citalopram (CELEXA) 20 mg tablet Take 1 Tab by mouth daily. 12/5/19   Anitra Olea MD   cholecalciferol, vitamin D3, (VITAMIN D3 PO) Take 1,000 Int'l Units by mouth daily as needed. 5/11/20   Provider, Historical   ascorbate calcium (VITAMIN C PO) Take 1 Tab by mouth daily as needed. 5/11/20   Provider, Historical   aspirin delayed-release (ASPIR-LOW) 81 mg tablet Take 81 mg by mouth daily. Taking every now and then 3/16/16   Provider, Historical   FREESTYLE LANCETS 28 gauge misc USE TO CHECK BLOOD SUGAR ONCE DAILY 1/14/18   Anitra Olea MD   cyanocobalamin 1,000 mcg tablet Take 1,000 mcg by mouth daily as needed. Provider, Historical   magnesium 250 mg tab Take 1 Tab by mouth daily as needed. Provider, Historical       ANTHROPOMETRICS:    Ht Readings from Last 1 Encounters:   07/08/20 5' 6\" (1.676 m)      Wt Readings from Last 1 Encounters:   08/17/20 82.6 kg (182 lb)      IBW: 142 # +/- 10%  %IBW: 128 % +/- 10%    BMI: 29.4 kg/M2 Category:  Overweight  Waist: 39 inches    Reported Wt Hx:  Wt Readings from Last 10 Encounters:   08/17/20 82.6 kg (182 lb)   08/10/20 83 kg (183 lb)   08/05/20 83.1 kg (183 lb 3.2 oz)   07/29/20 86.2 kg (190 lb)   07/27/20 86.4 kg (190 lb 6.4 oz)   07/22/20 84.4 kg (186 lb)   07/15/20 84.4 kg (186 lb)   07/13/20 81.2 kg (179 lb)   07/08/20 84.1 kg (185 lb 4.8 oz)   07/07/20 83.5 kg (184 lb)     Reported Diet Hx:    Rate Your Plate Score: 55  (Score 58-72: Making many healthy choices; 41-57: Some choices need improving 24-40: many choices need improving)     24 Hour Diet Recall  Breakfast Boost Glucose control or oatmeal or wheaties, berries or smoothie   Lunch [place], chips or chicken or green smoothie   Dinner BBQ chicken, broccoli or spaghetti   Snacks Peanut  Butter crackers, chips,    Beverages Water, juice, soda       Environmental/Social:  Pt lives alone; has 4 daughters that check on him and provide some of his meals      NUTRITION INTERVENTION:  Nutrition 60 minute one-on-one education & goal setting with Desi Zuniga. Reviewed with Desi Zuniga. relevant labs compared to ideals. Reviewed weight history and patient's verbalized weight goal as well as any real or perceived barriers to obtaining the goal. Collaborated with patient to set a specific short and long term weight goal.     Reviewed Rate Your Plate and conducted a verbal diet recall. Assessed for environmental, financial, psychosocial, physical and comorbidities that may impact the food and eating patterns / behaviors of Desi Zuniga. Collaborated with patient to set specific nutrient goals as well as specific food / behavior changes that will help patient meet the overall goal of following a heart healthy eating pattern (using guidelines as set forth by the American Heart Association and modeled after healthful eating patterns as recognized by the USDA Dietary Guidelines such as DASH, Mediterranean or plant-based).     Briefly reviewed with Desi Zuniga. the nutrition information in the Cardiac Rehab patient education book and encouraged Desi Zuniga. to read thoroughly, ask questions as needed, and use for future reference for heart healthy nutrition information. Sakshi Taylor. is not scheduled to participate in Cardiac Rehab group nutrition classes. PATIENT GOALS:    Weight Goals:  Short Term Weight Goal: 175 lbs  Long Term Weight Goal:175 lbs    Nutrition Goals:  Daily Recommendations:  Calories: ~1500 /day  (using [place] Natalie with AF x1.2-1.3-500)    Saturated Fat: no more than 10 g/day  Trans Fat: 0 g/day  Sodium: no more than 5769-2280 mg/day  Fruit: 2 cups / day  Vegetables: 2-3 cups/day    Other:  1. Eat on a schedule- consistent meal times  2. No soda  3. Limit cheese to 1 portion per day  4. Read food labels for saturated fat and sodium and limit    Keeping a food diary was recommended. Questions addressed. Follow-up plans discussed. Sakshi Gregory verbalized understanding.             Dean Galeana RD

## 2020-08-18 ENCOUNTER — HOSPITAL ENCOUNTER (OUTPATIENT)
Dept: RADIATION THERAPY | Age: 70
Discharge: HOME | End: 2020-08-18
Payer: MEDICARE

## 2020-08-18 ENCOUNTER — TELEPHONE (OUTPATIENT)
Dept: INTERNAL MEDICINE CLINIC | Age: 70
End: 2020-08-18

## 2020-08-18 DIAGNOSIS — E78.2 MIXED HYPERLIPIDEMIA: ICD-10-CM

## 2020-08-18 DIAGNOSIS — Z13.29 SCREENING FOR THYROID DISORDER: ICD-10-CM

## 2020-08-18 DIAGNOSIS — E11.40 TYPE 2 DIABETES MELLITUS WITH DIABETIC NEUROPATHY, WITHOUT LONG-TERM CURRENT USE OF INSULIN (HCC): Primary | ICD-10-CM

## 2020-08-18 PROCEDURE — 77385 HC IMRT TRMT DLVR SMPL: CPT

## 2020-08-18 NOTE — TELEPHONE ENCOUNTER
Spoke to [employer] (HIPAA). Two pt identifiers confirmed. Informed that orders placed. Informed Marzena Jesus that the office lab cannot take walk-ins d/t COVID--asking be sent to [facility]. Marzena Jesus verbalized understanding of information discussed w/ no further questions at this time.

## 2020-08-18 NOTE — TELEPHONE ENCOUNTER
#[phone] daughter, Consuelo states pt has an upcoming appt. Does Dr. Vishnu Castro want labs done prior to visit? Please call Consuelo to let her know. Thanks.

## 2020-08-18 NOTE — TELEPHONE ENCOUNTER
MD Alta Gray, LPN    Caller: Unspecified (Today,  8:43 AM)               Lipids, cmp, a1c, tsh, Shelly Sauernelson Mccray; labs ordered.

## 2020-08-19 ENCOUNTER — APPOINTMENT (OUTPATIENT)
Dept: CARDIAC REHAB | Age: 70
End: 2020-08-19
Payer: MEDICARE

## 2020-08-19 ENCOUNTER — HOSPITAL ENCOUNTER (OUTPATIENT)
Dept: LAB | Age: 70
Discharge: HOME | End: 2020-08-19
Payer: MEDICARE

## 2020-08-19 ENCOUNTER — HOSPITAL ENCOUNTER (OUTPATIENT)
Dept: RADIATION THERAPY | Age: 70
Discharge: HOME | End: 2020-08-19
Payer: MEDICARE

## 2020-08-19 PROCEDURE — 36415 COLL VENOUS BLD VENIPUNCTURE: CPT

## 2020-08-19 PROCEDURE — 83036 HEMOGLOBIN GLYCOSYLATED A1C: CPT

## 2020-08-19 PROCEDURE — 77385 HC IMRT TRMT DLVR SMPL: CPT

## 2020-08-19 PROCEDURE — 80053 COMPREHEN METABOLIC PANEL: CPT

## 2020-08-19 PROCEDURE — 84443 ASSAY THYROID STIM HORMONE: CPT

## 2020-08-19 PROCEDURE — 85027 COMPLETE CBC AUTOMATED: CPT

## 2020-08-19 PROCEDURE — 80061 LIPID PANEL: CPT

## 2020-08-19 RX ORDER — METOPROLOL TARTRATE 25 MG/1
TABLET, FILM COATED ORAL
Qty: 30 TAB | Refills: 0 | Status: SHIPPED | OUTPATIENT
Start: 2020-08-19 | End: 2020-09-04 | Stop reason: SDUPTHER

## 2020-08-20 ENCOUNTER — HOSPITAL ENCOUNTER (OUTPATIENT)
Dept: RADIATION THERAPY | Age: 70
Discharge: HOME | End: 2020-08-20
Payer: MEDICARE

## 2020-08-20 LAB
ALBUMIN SERPL-MCNC: 3.6 G/DL (ref 3.7–4.7)
ALBUMIN/GLOB SERPL: 1.3 {RATIO} (ref 1.2–2.2)
ALP SERPL-CCNC: 77 IU/L (ref 39–117)
ALT SERPL-CCNC: 43 IU/L (ref 0–44)
AST SERPL-CCNC: 43 IU/L (ref 0–40)
BILIRUB SERPL-MCNC: 0.5 MG/DL (ref 0–1.2)
BUN SERPL-MCNC: 24 MG/DL (ref 8–27)
BUN/CREAT SERPL: 16 (ref 10–24)
CALCIUM SERPL-MCNC: 9 MG/DL (ref 8.6–10.2)
CHLORIDE SERPL-SCNC: 102 MMOL/L (ref 96–106)
CHOLEST SERPL-MCNC: 103 MG/DL (ref 100–199)
CO2 SERPL-SCNC: 23 MMOL/L (ref 20–29)
CREAT SERPL-MCNC: 1.54 MG/DL (ref 0.76–1.27)
ERYTHROCYTE [DISTWIDTH] IN BLOOD BY AUTOMATED COUNT: 14.2 % (ref 11.6–15.4)
EST. AVERAGE GLUCOSE BLD GHB EST-MCNC: 143 MG/DL
GLOBULIN SER CALC-MCNC: 2.8 G/DL (ref 1.5–4.5)
GLUCOSE SERPL-MCNC: 113 MG/DL (ref 65–99)
HBA1C MFR BLD: 6.6 % (ref 4.8–5.6)
HCT VFR BLD AUTO: 39.6 % (ref 37.5–51)
HDLC SERPL-MCNC: 42 MG/DL
HGB BLD-MCNC: 12.2 G/DL (ref 13–17.7)
LDLC SERPL CALC-MCNC: 48 MG/DL (ref 0–99)
MCH RBC QN AUTO: 24.8 PG (ref 26.6–33)
MCHC RBC AUTO-ENTMCNC: 30.8 G/DL (ref 31.5–35.7)
MCV RBC AUTO: 81 FL (ref 79–97)
PLATELET # BLD AUTO: 239 X10E3/UL (ref 150–450)
POTASSIUM SERPL-SCNC: 4.1 MMOL/L (ref 3.5–5.2)
PROT SERPL-MCNC: 6.4 G/DL (ref 6–8.5)
RBC # BLD AUTO: 4.91 X10E6/UL (ref 4.14–5.8)
SODIUM SERPL-SCNC: 138 MMOL/L (ref 134–144)
TRIGL SERPL-MCNC: 63 MG/DL (ref 0–149)
TSH SERPL DL<=0.005 MIU/L-ACNC: 2.07 UIU/ML (ref 0.45–4.5)
VLDLC SERPL CALC-MCNC: 13 MG/DL (ref 5–40)
WBC # BLD AUTO: 5.3 X10E3/UL (ref 3.4–10.8)

## 2020-08-20 PROCEDURE — 77385 HC IMRT TRMT DLVR SMPL: CPT

## 2020-08-21 ENCOUNTER — HOSPITAL ENCOUNTER (OUTPATIENT)
Dept: RADIATION THERAPY | Age: 70
Discharge: HOME | End: 2020-08-21
Payer: MEDICARE

## 2020-08-21 DIAGNOSIS — D50.9 IRON DEFICIENCY ANEMIA, UNSPECIFIED IRON DEFICIENCY ANEMIA TYPE: ICD-10-CM

## 2020-08-21 PROCEDURE — 77385 HC IMRT TRMT DLVR SMPL: CPT

## 2020-08-21 NOTE — PROGRESS NOTES
HISTORY OF PRESENT ILLNESS  Regan Bhandari. is a 65 y.o. male. HPI     Last here 5/29/20 Pt is here to f/u on chronic conditions. Pt presents with his daughter who provides some of his hx.      BP today is 117/75  BP at cardio 112/84  Continues on toprol now 25mg half tab bid      He is no longer taking lasix or klor con for swelling he is not having any swelling     He is DM   BS 99 102 89 - has not been checking often because he needs a new meter  Continues januvia 50mg daily        Wt is 182 lbs - down 3 lbs x lov  He wonders if he can eat popcorn   Discussed small amounts are okay   Discussed diet and w/l       Reviewed labs 8/20  Cr 1.4-1.5 stable  Slight anemia - will continue with iron pills, reassess at next visit  Ordered labs      He takes ASA 81 mg      Pt follows annually with Dr. Kateri Cowden (cardio) for CAD and history of CABG  Reviewed note 7/8/20: Patient presents for follow up, last seen by us in 12/19. Admitted at [id number] [place] [address] for NSTEMI, underwent cardiac cath and was found to have 3vessel disease. He underwent CABG at [address] [place] [address] (due to initial COVID 23 outbreak), also s/p PM for CHB post surgery. Today, reports soreness on incisional site. Feels tired but started cardiac rehab yesterday and looking forward to working with then 2-3 times a week. He has cut back on alcohol---drinks on weekends. Occasional bryan with overexertion but did ok with cardiac rehab yesterday. ASHD Hx CABG x 3 in 4/2020---LIMA to LAD, SVG to RCA and Ramus  Continue ASA BB   Intolerant to statin  CHB s/p PPM 5/2020  Device followed by Dr Dawson---has follow up next month  HTN  Controlled with current therapy  HLD  Statin intolerance---In chart review has been intolerant to Crestor, Lipitor, pravastatin  5/2020 LDL at 112 [address] and lipids per PCP  Consider Repatha/Praluent---does not want injectables   Willing to try lowest dose of Crestor again, repeat labs in 2 months.   Will try coenzyme every 10 daily if mild myalgias, let us know if greater than mild myalgias. Emphasized strong mortality benefit as well as reduction in stroke and MI.  CKD III  Cr 1.54 in 6/2020  DM  On oral agen  Hx DVT/PE: developed PE/DVT after bowel resection in 201  Continue current care and f/u in 6 months, sooner if needed   now with nstemi see above      Patient is not taking Crestor and tolerating it well lipids at goal discussed with him that Cresenciano Forester was another option if he was unable to tolerate statin        He saw Dr Inna Rashid (cardio) for CAD  Reviewed note 6/3/20: 1. S/p CABG: Continue ASA, BB. Patient intolerant of statins - resumed zetia. 2. CHB s/p PPM: PPM placed by Dr. Castro. Completed abx. 3. Hypotension: resolved  4. Interstitial edema, pleural effusions, atelectasis: Increase IS use and activity, on PO lasix. 5. HTN: cont current meds  6. HLD: does not tolerate statin, on zetia, coq10 PTA. Continue Zetia. Coq10.  7. DM type II: a1c 5.8, on januvia PTA. BS well controlled. 8. CKD stage III: baseline appears to be around 1.4-1.5. Cr 1.58 on most recent labs, repeat labs ordered by PCP. 9. Hx DVT/PE: developed PE/DVT after bowel resection in 2012, not on anticoagulation prior to surgery. not discharged on anticoagulation. Monitor. 10. Chronic pain/peripheral neuropathy: On gabapentin  11. Anxiety: On celexa, ativan PTA  12. Alcohol abuse: PRN avitan, never needed any PRN ativan in the hospital  13. Prostate CA: PSA 10, sees Dr. Bayron Earl (urology) and Dr. Jaylen Diaz (radiation oncology), was planning on scheduling radiation seed implant soon. 14. Thrombocytopenia: improved  15. Anemia: cont PO iron  Dispo: FU with cardiology and PCP  Pt is ready to start cardiac rehab.    Rehab - y  Walking: y  Glucometer: y  Antibiotic card for valves: n/a  He has been completing cardiac rehab    Umer Hawarden had pacemaker placed with Dr castro (cardio)  Has f/u in august 2020        Pt follows with Dr. Juliana Parker (nephro) for ckd in the past  Last visit was 3/2/18  He will only follow with me for his kidney dysfunction  Baseline cr 1.4-1.5 stable on recent labs      Pt follows with Edin (uro)  for prostate cancer--   intermediate risk prostate cancer, spencer 7, more and more on L side,   Recall pt has FMHX (brother and uncle) prostate cancer  Pt also met with a radiation oncologist at [facility]   he is now getting radiation therapy  He is not getting radioactive seeds because of his recent CABG  He has been receiving daily radiation for prostate cancer with Dr. Nathalia Francisco  Today is day 6 out of [address]     Pt saw Dr Alana Baer (podiatry) in 4/19  Pt was given terbinafine for fungal nails  Discussed that if the fungus is not bothering him that he could just leave it rather than treat it      Recall Pt had a hearing evaluation in the past  Recall notes from ENT 5/28/19: has some hearing loss         Pt has been taking CoQ-10     Continues gabapentin 300 mg nightly for his tingling/neuropathy pain in his hands/fingers, which works well for the most part is still having some symptoms    He has been taking 300 qhs   Will increase to 2 300 qhs   Having some sciatic pain to his left leg  No bladder /bowel incontine     Continues celexa 20mg for depression/anxiety- not overally sad or down -happy with dose doing well with this he is not drinking much alcohol     He also has ativan to use prn (not often, not in the past month) for anxiety --no recent use     Pt uses trazadone nightly  sleep works well   He is sleeping better but snores and gets a little wheeze at night discussed sleep apnea testing--ordered sleep eval      He has been seeing Dr. Eliz Ortiz (sleep) for NIMO he is currently getting evaluated for this he failed to take home sleep studies and has a in person scheduled for Friday  Reviewed note 7/13/20: * Sleep testing was ordered for initial evaluation. * He was provided information on sleep apnea including coresponding risk factors and the importance of proper treatment.    * Treatment options for sleep apnea were reviewed today. Patient agrees to a trial of APAP therapy if indicated. * Counseling was provided regarding proper sleep hygiene, appropriate sleep schedule, need for sleep environment safety and safe driving. * Effect of sleep disturbance on weight was reviewed. We have recommended a dedicated weight loss through appropriate diet and an exercise regimen as significant weight reduction has been shown to reduce severity of obstructive sleep apnea. He was advised to avoid tobacco/alcohol. * Patient agrees to telephone follow-up by sleep technologist shortly after sleep study to review results and plan final management. The treatment plan was reviewed with the patient in detail and reviewed with the patient . he understands that the lead technologist will be calling him  with the results and assisting with the next step in the treatment plan as outlined today during the consultation with me. All of his questions were addressed. 2.Insomnia/inadequate sleep hygiene- I have advised a regular sleep wake cycle. he  was advised to avoid looking at the clock during the night. Ideally, the clock face should be turned away. he was advised to minimize caffeine use and to avoid caffeine-containing beverages 8 hours prior to bedtime. Naps were discouraged. Watching TV, using laptops, tablets and smartphones in the evening was discouraged. he  was advised to keep the bedroom cool and dark. All of his questions were addressed. 3. Coronary Artery Disease - he continues on his current regimen. I have reviewed the relationship between heart disease and sleep disordered breathing. 4. Type II diabetes - he continues on his current regimen. I have reviewed the relationship between sleep disordered breathing as it relates to diabetes. Office visit exceeded 25 minutes with counseling and direction of care taking up more than 50% of the allotted time.   Thank you for allowing us to participate in your patient's medical care. We'll keep you updated on these investigations. They are scheduling an in office test because at home tests failed-  20    He has been seeing Dr. Gricelda Feldman (ortho) for back pain  Reviwed note 20 : I discussed treatment options with  [facility] today. He presents with bilateral 4/5 hip flexor weaknesses. X-rays taken shows a scoliosis. At this time, I provided a prescription to PT and scheduled L L4-L5 L5-S1 ESIs with Dr. Maddy Booth and prescribed 2 Ativan. Follow up with Dr. Maddy Booth after the injections. Ladi Frimonale on crestor 5 mg and zetia 10 mg for cholesterol     In the past, pt expressed concern about his memory  Previously, provided referral for Dr. Kamla Caldwell (neuropsych)  lov provided info for Dr Sam Spears (neuropsych)  Had the appointment 19 --then refused testing in the end   Overall stable currently          ACP not on file. SDM is his daughter (Yareli [address] [place] [address] Street). Provided information in the past.      Recall sees Dr. David Crandall for h/o DVT, no longer on coumadin or xarelto, on ASA only.  Was on coumadin for h/o PE and DVT post operatively      PREVENTIVE:    Colonoscopy: 9/15/15, Dr. Alannah Talbot, repeat 5 years--due ,will need to be cleared by cardio  EGD: 9/15/15, Dr. Alannah Talbot  PSA: 7/15 3.0, ,  3.8, ,  5.5  Dr Mirtha Vernon follows   AAA screen: CT , negative  Tdap: unknown   Pneumovax: 2012  [id number]:19   Shingrix: never completed  Flu shot: declines  Foot exam: 19   Microalbumin:   A1c:  ,  6.4,  6.6,  6.5,  6.0,  6.1, 3/19 6.0  5.9  POC 5.7  ,  5.8  6.6  Eye exam: Dr. Cornelius Reyna Estsiennainal,  per patient  Hep C Screen: 10/15, negative  Lipids:  48  EK/17, PACs    Patient Active Problem List    Diagnosis Date Noted    Pacemaker 2020    Third degree AV block ([address] 2020    Postoperative anemia due to acute blood loss 2020    S/P CABG x 3 2020    CAD (coronary artery disease) 2020  Bilateral carotid artery stenosis 04/24/2020    NSTEMI (non-ST elevated myocardial infarction) ([facility] 04/23/2020    Type 2 diabetes mellitus with diabetic neuropathy ([facility] 01/09/2020    Prostate cancer ([facility] 06/04/2018    Type 2 diabetes with nephropathy ([facility] 02/28/2018    Reactive depression 11/15/2016    Cervical spinal stenosis 11/03/2016    CKD (chronic kidney disease) 10/31/2016    Hyperlipidemia 07/10/2015    Spinal stenosis, lumbar region, without neurogenic claudication 10/16/2014    DVT of leg (deep venous thrombosis) ([facility] 12/18/2012    HTN (hypertension) 12/12/2012    Perforated diverticulum of large intestine 12/04/2012    Alcohol abuse 12/04/2012    Insomnia 12/04/2012     Current Outpatient Medications   Medication Sig Dispense Refill    gabapentin (NEURONTIN) 300 mg capsule TAKE 2 CAPSULES BY MOUTH ONCE DAILY IN THE EVENING AS NEEDED FOR PAIN 180 Cap 0    ferrous sulfate 325 mg (65 mg iron) tablet Take 1 Tab by mouth daily (with breakfast) for 90 days. 30 Tab 2    metoprolol tartrate (LOPRESSOR) 25 mg tablet TAKE 1/2 (ONE-HALF) TABLET BY MOUTH EVERY 12 HOURS 30 Tab 0    SITagliptin (Januvia) 50 mg tablet Take 1 tablet by mouth once daily 90 Tab 0    ezetimibe (ZETIA) 10 mg tablet Take 1 Tab by mouth daily. 90 Tab 3    rosuvastatin (CRESTOR) 5 mg tablet Take 1 Tab by mouth daily. 90 Tab 4    fluticasone propionate (Flonase Allergy Relief) 50 mcg/actuation nasal spray 2 Sprays by Both Nostrils route two (2) times daily as needed for Rhinitis.  pumpkin seed extract-soy germ 300 mg cap Take 1 Cap by mouth daily as needed (prostate health).  acetaminophen (TYLENOL) 325 mg tablet Take 2 Tabs by mouth every four (4) hours as needed for Pain.  senna-docusate (PERICOLACE) 8.6-50 mg per tablet Take 1 Tab by mouth daily as needed for Constipation.  co-enzyme Q-10 (Co Q-10) 100 mg capsule Take 100 mg by mouth daily as needed.       traZODone (DESYREL) 50 mg tablet TAKE 1 TABLET BY MOUTH AT BEDTIME (Patient taking differently: Take 50 mg by mouth nightly.) 30 Tab 0    citalopram (CELEXA) 20 mg tablet Take 1 Tab by mouth daily. 90 Tab 1    cholecalciferol, vitamin D3, (VITAMIN D3 PO) Take 1,000 Int'l Units by mouth daily as needed.  ascorbate calcium (VITAMIN C PO) Take 1 Tab by mouth daily as needed.  aspirin delayed-release (ASPIR-LOW) 81 mg tablet Take 81 mg by mouth daily. Taking every now and then      FREESTYLE LANCETS 28 gauge misc USE TO CHECK BLOOD SUGAR ONCE DAILY 100 Lancet 12    cyanocobalamin 1,000 mcg tablet Take 1,000 mcg by mouth daily as needed.  magnesium 250 mg tab Take 1 Tab by mouth daily as needed.        Past Surgical History:   Procedure Laterality Date    ABDOMEN SURGERY PROC UNLISTED      bowel resection    HX BACK SURGERY  2014    HX GI  11/2012    bowel resection    HX ORTHOPAEDIC      amputated left 4th finger    [place] INS NEW/RPLCMT PRM PM W/TRANSV ELTRD ATRIAL&VENT N/A 5/1/2020    INSERT PPM DUAL performed by Luis Cantrell MD at [place] [address], [facility] [address] CATH LAB      Lab Results   Component Value Date/Time    WBC 5.3 08/19/2020 09:31 AM    HGB 12.2 (L) 08/19/2020 09:31 AM    Hemoglobin (POC) 13.3 11/02/2016 07:15 AM    HCT 39.6 08/19/2020 09:31 AM    Hematocrit (POC) 39 11/02/2016 07:15 AM    PLATELET 913 56/55/4451 09:31 AM    MCV 81 08/19/2020 09:31 AM     Lab Results   Component Value Date/Time    Cholesterol, total 103 08/19/2020 09:31 AM    Cholesterol (POC) 227 02/06/2015 03:30 PM    HDL Cholesterol 42 08/19/2020 09:31 AM    LDL, calculated 48 08/19/2020 09:31 AM    LDL Cholesterol (POC) 164 02/06/2015 03:30 PM    Triglyceride 63 08/19/2020 09:31 AM    Triglycerides (POC) 68 02/06/2015 03:30 PM     Lab Results   Component Value Date/Time    GFR est non-AA 44 (L) 08/19/2020 09:31 AM    GFRNA, POC 50 (L) 11/02/2016 07:15 AM    GFR est AA 51 (L) 08/19/2020 09:31 AM    GFRAA, POC >60 11/02/2016 07:15 AM    Creatinine 1.54 (H) 08/19/2020 09:31 AM    Creatinine (POC) 1.4 (H) 11/02/2016 07:15 AM    BUN 24 08/19/2020 09:31 AM    BUN (POC) 28 (H) 11/02/2016 07:15 AM    Sodium 138 08/19/2020 09:31 AM    Sodium (POC) 140 11/02/2016 07:15 AM    Potassium 4.1 08/19/2020 09:31 AM    Potassium (POC) 4.1 11/02/2016 07:15 AM    Chloride 102 08/19/2020 09:31 AM    Chloride (POC) 105 11/02/2016 07:15 AM    CO2 23 08/19/2020 09:31 AM    Magnesium 2.6 (H) 05/04/2020 04:52 AM    Phosphorus 3.0 10/17/2014 04:40 AM    Albumin, urine 59.5 10/23/2015 09:10 AM        Review of Systems   Respiratory: Negative for shortness of breath. Cardiovascular: Negative for chest pain. Physical Exam  Constitutional:       General: He is not in acute distress. Appearance: Normal appearance. He is not ill-appearing, toxic-appearing or diaphoretic. HENT:      Head: Normocephalic and atraumatic. Right Ear: External ear normal.      Left Ear: External ear normal.   Eyes:      General:         Right eye: No discharge. Left eye: No discharge. Conjunctiva/sclera: Conjunctivae normal.      Pupils: Pupils are equal, round, and reactive to light. Neck:      Musculoskeletal: Normal range of motion and neck supple. Cardiovascular:      Rate and Rhythm: Normal rate and regular rhythm. Pulses: Normal pulses. Heart sounds: Normal heart sounds. No murmur. No friction rub. No gallop. Pulmonary:      Effort: No respiratory distress. Breath sounds: Normal breath sounds. No wheezing or rales. Chest:      Chest wall: No tenderness. Musculoskeletal: Normal range of motion. Right lower leg: No edema. Left lower leg: No edema. Skin:     General: Skin is warm and dry. Neurological:      Mental Status: He is alert and oriented to person, place, and time. Mental status is at baseline.       Coordination: Coordination normal.      Gait: Gait normal.   Psychiatric:         Mood and Affect: Mood normal.         Behavior: Behavior normal. ASSESSMENT and PLAN    ICD-10-CM ICD-9-CM    1. Type 2 diabetes mellitus with diabetic neuropathy, without long-term current use of insulin (HCC)     Well-controlled on Januvia continue no change to dose    Adjusting gabapentin     E11.40 250.60      357.2    2. Type 2 diabetes with nephropathy (HCC)  E11.21 250.40    See above stable renal function  583.81    3. Prostate cancer ([facility]     Getting XRT for 20 days C61 185    4. Coronary artery disease involving native coronary artery of native heart without angina pectoris       Medically managed asymptomatic status post CABG I25.10 414.01    5. Essential hypertension       Well-controlled on Toprol I10 401.9    6. Reactive depression       Well-controlled Celexa has Ativan for as needed use F32.9 300.4    7. Stage 3 chronic kidney disease (HCC)       Creatinine runs 1.4-1.5 baseline stable N18.3 585.3    8. Mixed hyperlipidemia       Controlled on Crestor and Zetia tolerating well E78.2 272.2    9. Spinal stenosis, lumbar region, without neurogenic claudication       Improved with gabapentin increasing this to 300 mg 2 tablets nightly as needed M48.061 724.02    10. Low back pain without sciatica, unspecified back pain laterality, unspecified chronicity     See above M54.5 724.2 gabapentin (NEURONTIN) 300 mg capsule           Scribed by Margarita Enrique [facility], as dictated by Dr. Jennifer Luis. Current diagnosis and concerns discussed with pt at length. Pt understands risks and benefits or current treatment plan and medications, and accepts the treatment and medication with any possible risks. Pt asks appropriate questions, which were answered. Pt was instructed to call with any concerns or problems. I have reviewed the note documented by the scribe. The services provided are my own.   The documentation is accurate

## 2020-08-22 RX ORDER — LANOLIN ALCOHOL/MO/W.PET/CERES
325 CREAM (GRAM) TOPICAL
Qty: 30 TAB | Refills: 2 | Status: SHIPPED | OUTPATIENT
Start: 2020-08-22 | End: 2020-09-08

## 2020-08-24 ENCOUNTER — HOSPITAL ENCOUNTER (OUTPATIENT)
Dept: RADIATION THERAPY | Age: 70
Discharge: HOME | End: 2020-08-24
Payer: MEDICARE

## 2020-08-24 ENCOUNTER — HOSPITAL ENCOUNTER (OUTPATIENT)
Dept: CARDIAC REHAB | Age: 70
Discharge: HOME | End: 2020-08-24
Payer: MEDICARE

## 2020-08-24 ENCOUNTER — APPOINTMENT (OUTPATIENT)
Dept: CARDIAC REHAB | Age: 70
End: 2020-08-24
Payer: MEDICARE

## 2020-08-24 VITALS — WEIGHT: 178.4 LBS | BODY MASS INDEX: 28.79 KG/M2

## 2020-08-24 PROCEDURE — 77385 HC IMRT TRMT DLVR SMPL: CPT

## 2020-08-24 PROCEDURE — 77336 RADIATION PHYSICS CONSULT: CPT

## 2020-08-24 PROCEDURE — 93798 PHYS/QHP OP CAR RHAB W/ECG: CPT

## 2020-08-24 PROCEDURE — 77014 HC CT SCAN FOR THERAPY GUIDE: CPT

## 2020-08-25 ENCOUNTER — HOSPITAL ENCOUNTER (OUTPATIENT)
Dept: RADIATION THERAPY | Age: 70
Discharge: HOME | End: 2020-08-25
Payer: MEDICARE

## 2020-08-25 ENCOUNTER — OFFICE VISIT (OUTPATIENT)
Dept: INTERNAL MEDICINE CLINIC | Age: 70
End: 2020-08-25
Payer: MEDICARE

## 2020-08-25 VITALS
DIASTOLIC BLOOD PRESSURE: 75 MMHG | OXYGEN SATURATION: 97 % | BODY MASS INDEX: 29.35 KG/M2 | HEART RATE: 89 BPM | TEMPERATURE: 97.8 F | SYSTOLIC BLOOD PRESSURE: 117 MMHG | HEIGHT: 66 IN | RESPIRATION RATE: 16 BRPM | WEIGHT: 182.6 LBS

## 2020-08-25 DIAGNOSIS — C61 PROSTATE CANCER (HCC): ICD-10-CM

## 2020-08-25 DIAGNOSIS — M48.061 SPINAL STENOSIS, LUMBAR REGION, WITHOUT NEUROGENIC CLAUDICATION: ICD-10-CM

## 2020-08-25 DIAGNOSIS — I25.10 CORONARY ARTERY DISEASE INVOLVING NATIVE CORONARY ARTERY OF NATIVE HEART WITHOUT ANGINA PECTORIS: ICD-10-CM

## 2020-08-25 DIAGNOSIS — M54.50 LOW BACK PAIN WITHOUT SCIATICA, UNSPECIFIED BACK PAIN LATERALITY, UNSPECIFIED CHRONICITY: ICD-10-CM

## 2020-08-25 DIAGNOSIS — F32.9 REACTIVE DEPRESSION: ICD-10-CM

## 2020-08-25 DIAGNOSIS — E11.40 TYPE 2 DIABETES MELLITUS WITH DIABETIC NEUROPATHY, WITHOUT LONG-TERM CURRENT USE OF INSULIN (HCC): Primary | ICD-10-CM

## 2020-08-25 DIAGNOSIS — N18.30 STAGE 3 CHRONIC KIDNEY DISEASE (HCC): ICD-10-CM

## 2020-08-25 DIAGNOSIS — E78.2 MIXED HYPERLIPIDEMIA: ICD-10-CM

## 2020-08-25 DIAGNOSIS — E11.21 TYPE 2 DIABETES WITH NEPHROPATHY (HCC): ICD-10-CM

## 2020-08-25 DIAGNOSIS — I10 ESSENTIAL HYPERTENSION: ICD-10-CM

## 2020-08-25 PROCEDURE — G8427 DOCREV CUR MEDS BY ELIG CLIN: HCPCS | Performed by: INTERNAL MEDICINE

## 2020-08-25 PROCEDURE — G8754 DIAS BP LESS 90: HCPCS | Performed by: INTERNAL MEDICINE

## 2020-08-25 PROCEDURE — G8536 NO DOC ELDER MAL SCRN: HCPCS | Performed by: INTERNAL MEDICINE

## 2020-08-25 PROCEDURE — G8752 SYS BP LESS 140: HCPCS | Performed by: INTERNAL MEDICINE

## 2020-08-25 PROCEDURE — G8417 CALC BMI ABV UP PARAM F/U: HCPCS | Performed by: INTERNAL MEDICINE

## 2020-08-25 PROCEDURE — 2022F DILAT RTA XM EVC RTNOPTHY: CPT | Performed by: INTERNAL MEDICINE

## 2020-08-25 PROCEDURE — 1101F PT FALLS ASSESS-DOCD LE1/YR: CPT | Performed by: INTERNAL MEDICINE

## 2020-08-25 PROCEDURE — G9717 DOC PT DX DEP/BP F/U NT REQ: HCPCS | Performed by: INTERNAL MEDICINE

## 2020-08-25 PROCEDURE — G0463 HOSPITAL OUTPT CLINIC VISIT: HCPCS | Performed by: INTERNAL MEDICINE

## 2020-08-25 PROCEDURE — 3044F HG A1C LEVEL LT 7.0%: CPT | Performed by: INTERNAL MEDICINE

## 2020-08-25 PROCEDURE — 3017F COLORECTAL CA SCREEN DOC REV: CPT | Performed by: INTERNAL MEDICINE

## 2020-08-25 PROCEDURE — 99214 OFFICE O/P EST MOD 30 MIN: CPT | Performed by: INTERNAL MEDICINE

## 2020-08-25 RX ORDER — GABAPENTIN 300 MG/1
CAPSULE ORAL
Qty: 180 CAP | Refills: 0 | Status: SHIPPED | OUTPATIENT
Start: 2020-08-25 | End: 2020-10-19

## 2020-08-26 ENCOUNTER — APPOINTMENT (OUTPATIENT)
Dept: CARDIAC REHAB | Age: 70
End: 2020-08-26
Payer: MEDICARE

## 2020-08-26 ENCOUNTER — HOSPITAL ENCOUNTER (OUTPATIENT)
Dept: RADIATION THERAPY | Age: 70
Discharge: HOME | End: 2020-08-26
Payer: MEDICARE

## 2020-08-26 DIAGNOSIS — N18.30 STAGE 3 CHRONIC KIDNEY DISEASE (HCC): ICD-10-CM

## 2020-08-26 DIAGNOSIS — C61 PROSTATE CANCER (HCC): ICD-10-CM

## 2020-08-26 DIAGNOSIS — I25.10 CORONARY ARTERY DISEASE INVOLVING NATIVE CORONARY ARTERY OF NATIVE HEART WITHOUT ANGINA PECTORIS: ICD-10-CM

## 2020-08-26 DIAGNOSIS — M54.50 LOW BACK PAIN WITHOUT SCIATICA, UNSPECIFIED BACK PAIN LATERALITY, UNSPECIFIED CHRONICITY: ICD-10-CM

## 2020-08-26 DIAGNOSIS — I10 ESSENTIAL HYPERTENSION: ICD-10-CM

## 2020-08-26 DIAGNOSIS — E11.21 TYPE 2 DIABETES WITH NEPHROPATHY (HCC): ICD-10-CM

## 2020-08-26 DIAGNOSIS — E11.40 TYPE 2 DIABETES MELLITUS WITH DIABETIC NEUROPATHY, WITHOUT LONG-TERM CURRENT USE OF INSULIN (HCC): ICD-10-CM

## 2020-08-26 DIAGNOSIS — E78.2 MIXED HYPERLIPIDEMIA: ICD-10-CM

## 2020-08-26 DIAGNOSIS — M48.061 SPINAL STENOSIS, LUMBAR REGION, WITHOUT NEUROGENIC CLAUDICATION: ICD-10-CM

## 2020-08-26 DIAGNOSIS — F32.9 REACTIVE DEPRESSION: ICD-10-CM

## 2020-08-26 PROCEDURE — 77014 HC CT SCAN FOR THERAPY GUIDE: CPT

## 2020-08-26 PROCEDURE — 77385 HC IMRT TRMT DLVR SMPL: CPT

## 2020-08-27 ENCOUNTER — HOSPITAL ENCOUNTER (OUTPATIENT)
Dept: RADIATION THERAPY | Age: 70
Discharge: HOME | End: 2020-08-27
Payer: MEDICARE

## 2020-08-27 PROCEDURE — 77014 HC CT SCAN FOR THERAPY GUIDE: CPT

## 2020-08-27 PROCEDURE — 77385 HC IMRT TRMT DLVR SMPL: CPT

## 2020-08-28 ENCOUNTER — HOSPITAL ENCOUNTER (OUTPATIENT)
Dept: RADIATION THERAPY | Age: 70
Discharge: HOME | End: 2020-08-28
Payer: MEDICARE

## 2020-08-31 ENCOUNTER — HOSPITAL ENCOUNTER (OUTPATIENT)
Dept: RADIATION THERAPY | Age: 70
Discharge: HOME | End: 2020-08-31
Payer: MEDICARE

## 2020-08-31 ENCOUNTER — APPOINTMENT (OUTPATIENT)
Dept: CARDIAC REHAB | Age: 70
End: 2020-08-31
Payer: MEDICARE

## 2020-08-31 ENCOUNTER — HOSPITAL ENCOUNTER (OUTPATIENT)
Dept: CARDIAC REHAB | Age: 70
Discharge: HOME | End: 2020-08-31
Payer: MEDICARE

## 2020-08-31 VITALS — WEIGHT: 182 LBS | BODY MASS INDEX: 29.38 KG/M2

## 2020-08-31 PROCEDURE — 77385 HC IMRT TRMT DLVR SMPL: CPT

## 2020-08-31 PROCEDURE — 93798 PHYS/QHP OP CAR RHAB W/ECG: CPT

## 2020-08-31 PROCEDURE — 77014 HC CT SCAN FOR THERAPY GUIDE: CPT

## 2020-09-01 ENCOUNTER — HOSPITAL ENCOUNTER (OUTPATIENT)
Dept: RADIATION THERAPY | Age: 70
Discharge: HOME | End: 2020-09-01
Payer: MEDICARE

## 2020-09-01 PROCEDURE — 77387 GUIDANCE FOR RADJ TX DLVR: CPT

## 2020-09-01 PROCEDURE — 77014 HC CT SCAN FOR THERAPY GUIDE: CPT

## 2020-09-01 PROCEDURE — 77385 HC IMRT TRMT DLVR SMPL: CPT

## 2020-09-02 ENCOUNTER — APPOINTMENT (OUTPATIENT)
Dept: CARDIAC REHAB | Age: 70
End: 2020-09-02
Payer: MEDICARE

## 2020-09-02 ENCOUNTER — HOSPITAL ENCOUNTER (OUTPATIENT)
Dept: RADIATION THERAPY | Age: 70
Discharge: HOME | End: 2020-09-02
Payer: MEDICARE

## 2020-09-02 PROCEDURE — 77387 GUIDANCE FOR RADJ TX DLVR: CPT

## 2020-09-02 PROCEDURE — 77014 HC CT SCAN FOR THERAPY GUIDE: CPT

## 2020-09-02 PROCEDURE — 77385 HC IMRT TRMT DLVR SMPL: CPT

## 2020-09-03 ENCOUNTER — HOSPITAL ENCOUNTER (OUTPATIENT)
Dept: RADIATION THERAPY | Age: 70
Discharge: HOME | End: 2020-09-03
Payer: MEDICARE

## 2020-09-03 PROCEDURE — 77336 RADIATION PHYSICS CONSULT: CPT

## 2020-09-03 PROCEDURE — 77385 HC IMRT TRMT DLVR SMPL: CPT

## 2020-09-04 ENCOUNTER — HOSPITAL ENCOUNTER (OUTPATIENT)
Dept: RADIATION THERAPY | Age: 70
Discharge: HOME | End: 2020-09-04
Payer: MEDICARE

## 2020-09-04 PROCEDURE — 77385 HC IMRT TRMT DLVR SMPL: CPT

## 2020-09-04 RX ORDER — METOPROLOL TARTRATE 25 MG/1
TABLET, FILM COATED ORAL
Qty: 30 TAB | Refills: 0 | Status: SHIPPED | OUTPATIENT
Start: 2020-09-04 | End: 2020-10-19

## 2020-09-08 ENCOUNTER — HOSPITAL ENCOUNTER (OUTPATIENT)
Dept: RADIATION THERAPY | Age: 70
Discharge: HOME | End: 2020-09-08
Payer: MEDICARE

## 2020-09-08 ENCOUNTER — CLINICAL SUPPORT (OUTPATIENT)
Dept: CARDIOLOGY CLINIC | Age: 70
End: 2020-09-08
Payer: MEDICARE

## 2020-09-08 ENCOUNTER — TELEPHONE (OUTPATIENT)
Dept: CARDIOLOGY CLINIC | Age: 70
End: 2020-09-08

## 2020-09-08 DIAGNOSIS — Z95.0 CARDIAC PACEMAKER IN SITU: ICD-10-CM

## 2020-09-08 DIAGNOSIS — E78.2 MIXED HYPERLIPIDEMIA: ICD-10-CM

## 2020-09-08 DIAGNOSIS — Z95.0 CARDIAC PACEMAKER IN SITU: Primary | ICD-10-CM

## 2020-09-08 DIAGNOSIS — I25.10 ASHD (ARTERIOSCLEROTIC HEART DISEASE): ICD-10-CM

## 2020-09-08 DIAGNOSIS — Z78.9 STATIN INTOLERANCE: ICD-10-CM

## 2020-09-08 DIAGNOSIS — I44.2 THIRD DEGREE AV BLOCK (HCC): ICD-10-CM

## 2020-09-08 DIAGNOSIS — I10 ESSENTIAL HYPERTENSION: ICD-10-CM

## 2020-09-08 DIAGNOSIS — Z95.1 S/P CABG X 3: ICD-10-CM

## 2020-09-08 PROCEDURE — 93280 PM DEVICE PROGR EVAL DUAL: CPT | Performed by: INTERNAL MEDICINE

## 2020-09-08 PROCEDURE — 77014 HC CT SCAN FOR THERAPY GUIDE: CPT

## 2020-09-08 PROCEDURE — 77385 HC IMRT TRMT DLVR SMPL: CPT

## 2020-09-08 NOTE — TELEPHONE ENCOUNTER
Please call patient daughter (Jimmy Larose)  Looking for recommendation on when patient should have a colonoscopy.     [phone]

## 2020-09-09 ENCOUNTER — APPOINTMENT (OUTPATIENT)
Dept: CARDIAC REHAB | Age: 70
End: 2020-09-09
Payer: MEDICARE

## 2020-09-09 ENCOUNTER — HOSPITAL ENCOUNTER (OUTPATIENT)
Dept: RADIATION THERAPY | Age: 70
Discharge: HOME | End: 2020-09-09
Payer: MEDICARE

## 2020-09-09 PROCEDURE — 77014 HC CT SCAN FOR THERAPY GUIDE: CPT

## 2020-09-09 PROCEDURE — 77385 HC IMRT TRMT DLVR SMPL: CPT

## 2020-09-10 ENCOUNTER — HOSPITAL ENCOUNTER (OUTPATIENT)
Dept: RADIATION THERAPY | Age: 70
Discharge: HOME | End: 2020-09-10
Payer: MEDICARE

## 2020-09-10 PROCEDURE — 77014 HC CT SCAN FOR THERAPY GUIDE: CPT

## 2020-09-10 PROCEDURE — 77385 HC IMRT TRMT DLVR SMPL: CPT

## 2020-09-10 NOTE — TELEPHONE ENCOUNTER
informed that a year after surgery is recommended unless an emergency due to bleeding issues then physicians will discuss

## 2020-09-10 NOTE — TELEPHONE ENCOUNTER
Please call patient daughter (Leo David)  Looking for recommendation on when patient should have a colonoscopy.     [phone]

## 2020-09-14 ENCOUNTER — HOSPITAL ENCOUNTER (OUTPATIENT)
Dept: CARDIAC REHAB | Age: 70
Discharge: HOME | End: 2020-09-14
Payer: MEDICARE

## 2020-09-14 ENCOUNTER — APPOINTMENT (OUTPATIENT)
Dept: CARDIAC REHAB | Age: 70
End: 2020-09-14
Payer: MEDICARE

## 2020-09-14 VITALS — BODY MASS INDEX: 29.54 KG/M2 | WEIGHT: 183 LBS

## 2020-09-14 PROCEDURE — 93798 PHYS/QHP OP CAR RHAB W/ECG: CPT

## 2020-09-16 ENCOUNTER — APPOINTMENT (OUTPATIENT)
Dept: CARDIAC REHAB | Age: 70
End: 2020-09-16
Payer: MEDICARE

## 2020-09-17 ENCOUNTER — TELEPHONE (OUTPATIENT)
Dept: CARDIOLOGY CLINIC | Age: 70
End: 2020-09-17

## 2020-09-17 NOTE — TELEPHONE ENCOUNTER
[facility] requesting change from Crestor 5 mg to Lipitor 10 mg /Zocor 40 mg /Lovastatin 40 mg or Pravastatin 40 mg.  Please advise thanks

## 2020-09-21 ENCOUNTER — HOSPITAL ENCOUNTER (OUTPATIENT)
Dept: CARDIAC REHAB | Age: 70
Discharge: HOME | End: 2020-09-21
Payer: MEDICARE

## 2020-09-21 ENCOUNTER — HOSPITAL ENCOUNTER (OUTPATIENT)
Dept: SLEEP MEDICINE | Age: 70
Discharge: HOME | End: 2020-09-21
Attending: INTERNAL MEDICINE
Payer: MEDICARE

## 2020-09-21 VITALS
SYSTOLIC BLOOD PRESSURE: 126 MMHG | OXYGEN SATURATION: 98 % | TEMPERATURE: 98.3 F | WEIGHT: 186 LBS | DIASTOLIC BLOOD PRESSURE: 84 MMHG | HEART RATE: 95 BPM | BODY MASS INDEX: 30.02 KG/M2

## 2020-09-21 VITALS — WEIGHT: 186.4 LBS | BODY MASS INDEX: 30.09 KG/M2

## 2020-09-21 DIAGNOSIS — G47.33 OBSTRUCTIVE SLEEP APNEA (ADULT) (PEDIATRIC): ICD-10-CM

## 2020-09-21 PROCEDURE — 93798 PHYS/QHP OP CAR RHAB W/ECG: CPT

## 2020-09-21 PROCEDURE — 95810 POLYSOM 6/> YRS 4/> PARAM: CPT | Performed by: INTERNAL MEDICINE

## 2020-09-25 ENCOUNTER — TELEPHONE (OUTPATIENT)
Dept: SLEEP MEDICINE | Age: 70
End: 2020-09-25

## 2020-09-25 DIAGNOSIS — G47.33 OBSTRUCTIVE SLEEP APNEA (ADULT) (PEDIATRIC): Primary | ICD-10-CM

## 2020-09-25 NOTE — TELEPHONE ENCOUNTER
Results of sleep study in Handseeing Information to convey results to patient  Attended polysomnogram showed an AHI of 13/hour and lowest oxygen saturation was 87%. This is consistent with significant sleep apnea. Based on these results, PAP therapy would be beneficial. I am ordering an APAP device set on a range. It will start low so you can get used to it and will be able to choose an interface/ mask style that you tolerate and as you sleep it will gradually go up as needed. I will see him in follow-up after he has used it for a month or two to assess his response to PAP and make further adjustments if needed. Order PAP and call patient and let them know which DME company they should be hearing from. Schedule for first adherence visit in 6 weeks.

## 2020-09-28 ENCOUNTER — DOCUMENTATION ONLY (OUTPATIENT)
Dept: SLEEP MEDICINE | Age: 70
End: 2020-09-28

## 2020-09-28 ENCOUNTER — HOSPITAL ENCOUNTER (OUTPATIENT)
Dept: CARDIAC REHAB | Age: 70
Discharge: HOME | End: 2020-09-28
Payer: MEDICARE

## 2020-09-28 VITALS — WEIGHT: 184.6 LBS | BODY MASS INDEX: 29.8 KG/M2

## 2020-09-28 PROCEDURE — 93798 PHYS/QHP OP CAR RHAB W/ECG: CPT

## 2020-09-29 ENCOUNTER — DOCUMENTATION ONLY (OUTPATIENT)
Dept: SLEEP MEDICINE | Age: 70
End: 2020-09-29

## 2020-10-05 ENCOUNTER — HOSPITAL ENCOUNTER (OUTPATIENT)
Dept: CARDIAC REHAB | Age: 70
Discharge: HOME | End: 2020-10-05
Payer: MEDICARE

## 2020-10-05 VITALS — BODY MASS INDEX: 29.7 KG/M2 | WEIGHT: 184 LBS

## 2020-10-05 PROCEDURE — 93798 PHYS/QHP OP CAR RHAB W/ECG: CPT

## 2020-10-12 ENCOUNTER — APPOINTMENT (OUTPATIENT)
Dept: CARDIAC REHAB | Age: 70
End: 2020-10-12
Payer: MEDICARE

## 2020-10-19 DIAGNOSIS — M54.50 LOW BACK PAIN WITHOUT SCIATICA, UNSPECIFIED BACK PAIN LATERALITY, UNSPECIFIED CHRONICITY: ICD-10-CM

## 2020-10-19 RX ORDER — METOPROLOL TARTRATE 25 MG/1
TABLET, FILM COATED ORAL
Qty: 30 TAB | Refills: 0 | Status: SHIPPED | OUTPATIENT
Start: 2020-10-19 | End: 2020-11-18

## 2020-10-19 RX ORDER — GABAPENTIN 300 MG/1
CAPSULE ORAL
Qty: 180 CAP | Refills: 0 | Status: SHIPPED | OUTPATIENT
Start: 2020-10-19 | End: 2021-01-15

## 2020-10-19 RX ORDER — TRAZODONE HYDROCHLORIDE 50 MG/1
TABLET ORAL
Qty: 30 TAB | Refills: 0 | Status: SHIPPED | OUTPATIENT
Start: 2020-10-19 | End: 2020-11-26

## 2020-10-23 NOTE — TELEPHONE ENCOUNTER
Spoke with patients daughter, Delma Dior, on [place]. Delma Dior states the only cholesterol med patient is taking is zetia. The crestor was giving him pain.  Looking back in the med history he has tried atorvastatin in the past.

## 2020-10-26 ENCOUNTER — TELEPHONE (OUTPATIENT)
Dept: INTERNAL MEDICINE CLINIC | Age: 70
End: 2020-10-26

## 2020-10-26 ENCOUNTER — HOSPITAL ENCOUNTER (OUTPATIENT)
Dept: CARDIAC REHAB | Age: 70
Discharge: HOME | End: 2020-10-26
Payer: MEDICARE

## 2020-10-26 VITALS — WEIGHT: 190.4 LBS | BODY MASS INDEX: 30.73 KG/M2

## 2020-10-26 PROCEDURE — 93798 PHYS/QHP OP CAR RHAB W/ECG: CPT

## 2020-10-26 NOTE — TELEPHONE ENCOUNTER
Daija Sánchez [facility] Front Office Pool               General Message     Emir first and last name: Daughter, Tatiana Frost     Reason for call: Pt's went to cardio-rehab appt this morning (10/26/20); Pt's oxygen level was low. Callback required yes/no and why: Yes to discuss low level.      Best contact number(s): [phone]     Details to clarify the request: n/a     Copy/Paste  ENVERA

## 2020-10-27 NOTE — TELEPHONE ENCOUNTER
Spoke to "[employer]" (HIPAA). Two pt identifiers confirmed. Consuelo states that pt's O2 sat was at 81% just prior to starting Cardiac Rehab yesterday. Consuelo thinks likely related to walking from the parking lot. O2 went up into the 90's and was able to successfully go through the rehab that session. Consuelo states she was told it was unclear as to why his O2 was low. Consuelo asking if pt should see a Chris Downs informed Dr. Aguilar Mayfield will be notified. Consuelo verbalized understanding of information discussed w/ no further questions at this time.

## 2020-10-27 NOTE — TELEPHONE ENCOUNTER
Emily//[facility] states she did an Insurance Assessment on Patient yesterday, 10/26/20 & needs to discuss Medical Concerns. Please call.  Thank you

## 2020-10-28 NOTE — TELEPHONE ENCOUNTER
Pt's daughter, Amy Dennison, came into office today for an appt. Informed Amy Dennison the per Dr. Lucille Rivers, Pt should first start with his cardiologist in regards to his lo O2 stats and see what their advice is. Eugenio Pena understanding of the instructions and has no further questions at this time.

## 2020-10-30 ENCOUNTER — TELEPHONE (OUTPATIENT)
Dept: INTERNAL MEDICINE CLINIC | Age: 70
End: 2020-10-30

## 2020-11-02 ENCOUNTER — HOSPITAL ENCOUNTER (OUTPATIENT)
Dept: CARDIAC REHAB | Age: 70
Discharge: HOME | End: 2020-11-02
Payer: MEDICARE

## 2020-11-02 VITALS — BODY MASS INDEX: 30.34 KG/M2 | WEIGHT: 188 LBS

## 2020-11-02 PROCEDURE — 93798 PHYS/QHP OP CAR RHAB W/ECG: CPT

## 2020-11-03 NOTE — TELEPHONE ENCOUNTER
Spoke with patients daughter, Mariangel Donohue, on [place]. She states patient is taking the coQ10.

## 2020-11-03 NOTE — TELEPHONE ENCOUNTER
Spoke to Toni Powell. Annamarie Dawson states pt has been complaining more of ROLON lately. Annamarie Dawson reports pt has not been taking his iron for it gives him constipation. Annamarie Dawson was suggesting if pt should have a referral to [facility] for a walking test to test if supplemental O2 is needed for pt. Annamarie Dawson informed Dr. Augusto Underwood will be notified. Informed Emily pt has upcoming appt w/ Dr. Augusto Underwood and should address it then.

## 2020-11-04 NOTE — TELEPHONE ENCOUNTER
Spoke with patients daughter, Keisha Bennett , on [place]. Asked if patient has tried the rosuvastatin with the CoQ10. Keishaxiomara Bennett states she did not know. She will check with patient. If he has not she will advise to try taking with CoQ10. Advised to call us if he continues to get pain. May need to try injectable. Keishaxiomara Bennett states patient already refused to take an injectable.

## 2020-11-09 ENCOUNTER — HOSPITAL ENCOUNTER (OUTPATIENT)
Dept: CARDIAC REHAB | Age: 70
Discharge: HOME | End: 2020-11-09
Payer: MEDICARE

## 2020-11-09 VITALS — BODY MASS INDEX: 30.34 KG/M2 | WEIGHT: 188 LBS

## 2020-11-09 PROCEDURE — 93798 PHYS/QHP OP CAR RHAB W/ECG: CPT

## 2020-11-16 ENCOUNTER — HOSPITAL ENCOUNTER (OUTPATIENT)
Dept: CARDIAC REHAB | Age: 70
Discharge: HOME | End: 2020-11-16
Payer: MEDICARE

## 2020-11-16 VITALS — BODY MASS INDEX: 29.86 KG/M2 | WEIGHT: 185 LBS

## 2020-11-16 PROCEDURE — 93798 PHYS/QHP OP CAR RHAB W/ECG: CPT

## 2020-11-18 ENCOUNTER — HOSPITAL ENCOUNTER (OUTPATIENT)
Dept: LAB | Age: 70
Discharge: HOME | End: 2020-11-18
Payer: MEDICARE

## 2020-11-18 PROCEDURE — 36415 COLL VENOUS BLD VENIPUNCTURE: CPT

## 2020-11-18 PROCEDURE — 80053 COMPREHEN METABOLIC PANEL: CPT

## 2020-11-18 PROCEDURE — 82550 ASSAY OF CK (CPK): CPT

## 2020-11-18 PROCEDURE — 83036 HEMOGLOBIN GLYCOSYLATED A1C: CPT

## 2020-11-18 PROCEDURE — 80061 LIPID PANEL: CPT

## 2020-11-18 RX ORDER — METOPROLOL TARTRATE 25 MG/1
TABLET, FILM COATED ORAL
Qty: 30 TAB | Refills: 0 | Status: SHIPPED | OUTPATIENT
Start: 2020-11-18 | End: 2020-12-24

## 2020-11-19 LAB
ALBUMIN SERPL-MCNC: 3.7 G/DL (ref 3.7–4.7)
ALBUMIN SERPL-MCNC: 3.9 G/DL (ref 3.7–4.7)
ALBUMIN/GLOB SERPL: 1.4 {RATIO} (ref 1.2–2.2)
ALBUMIN/GLOB SERPL: 1.5 {RATIO} (ref 1.2–2.2)
ALP SERPL-CCNC: 72 IU/L (ref 39–117)
ALP SERPL-CCNC: 74 IU/L (ref 39–117)
ALT SERPL-CCNC: 39 IU/L (ref 0–44)
ALT SERPL-CCNC: 40 IU/L (ref 0–44)
AST SERPL-CCNC: 34 IU/L (ref 0–40)
AST SERPL-CCNC: 35 IU/L (ref 0–40)
BILIRUB SERPL-MCNC: 1.6 MG/DL (ref 0–1.2)
BILIRUB SERPL-MCNC: 1.6 MG/DL (ref 0–1.2)
BUN SERPL-MCNC: 26 MG/DL (ref 8–27)
BUN SERPL-MCNC: 27 MG/DL (ref 8–27)
BUN/CREAT SERPL: 16 (ref 10–24)
BUN/CREAT SERPL: 17 (ref 10–24)
CALCIUM SERPL-MCNC: 9.5 MG/DL (ref 8.6–10.2)
CALCIUM SERPL-MCNC: 9.8 MG/DL (ref 8.6–10.2)
CHLORIDE SERPL-SCNC: 104 MMOL/L (ref 96–106)
CHLORIDE SERPL-SCNC: 107 MMOL/L (ref 96–106)
CHOLEST SERPL-MCNC: 156 MG/DL (ref 100–199)
CK SERPL-CCNC: 158 U/L (ref 41–331)
CO2 SERPL-SCNC: 21 MMOL/L (ref 20–29)
CO2 SERPL-SCNC: 22 MMOL/L (ref 20–29)
CREAT SERPL-MCNC: 1.62 MG/DL (ref 0.76–1.27)
CREAT SERPL-MCNC: 1.63 MG/DL (ref 0.76–1.27)
EST. AVERAGE GLUCOSE BLD GHB EST-MCNC: 126 MG/DL
GLOBULIN SER CALC-MCNC: 2.6 G/DL (ref 1.5–4.5)
GLOBULIN SER CALC-MCNC: 2.7 G/DL (ref 1.5–4.5)
GLUCOSE SERPL-MCNC: 100 MG/DL (ref 65–99)
GLUCOSE SERPL-MCNC: 102 MG/DL (ref 65–99)
HBA1C MFR BLD: 6 % (ref 4.8–5.6)
HDLC SERPL-MCNC: 49 MG/DL
INTERPRETATION, 910389: NORMAL
INTERPRETATION: NORMAL
LDLC SERPL CALC-MCNC: 93 MG/DL (ref 0–99)
PDF IMAGE, 910387: NORMAL
POTASSIUM SERPL-SCNC: 4.5 MMOL/L (ref 3.5–5.2)
POTASSIUM SERPL-SCNC: 4.6 MMOL/L (ref 3.5–5.2)
PROT SERPL-MCNC: 6.4 G/DL (ref 6–8.5)
PROT SERPL-MCNC: 6.5 G/DL (ref 6–8.5)
SODIUM SERPL-SCNC: 141 MMOL/L (ref 134–144)
SODIUM SERPL-SCNC: 142 MMOL/L (ref 134–144)
TRIGL SERPL-MCNC: 69 MG/DL (ref 0–149)
VLDLC SERPL CALC-MCNC: 14 MG/DL (ref 5–40)

## 2020-11-20 NOTE — PROGRESS NOTES
HISTORY OF PRESENT ILLNESS  Itzel Shea is a 65 y.o. male. HPI     Last here 8/25/20 Pt is here to f/u on chronic conditions. This is an established visit completed with telemedicine was completed with video assist  the patient acknowledges and agrees to this method of visitation binh  Pt presents with his daughter who provides some of his hx. He c/o sob, especially on exertion   Nurse at cardiac rehab discusesd this is due to his heart not his lungs  Discussed he is completing cardiac rehab to improve this   Will get cxr to double check lungs     BP at rehab today 126/84  Continues on toprol now 25mg half tab bid      He is no longer taking lasix or klor con for swelling he is not having any swelling     He is DM   BS at home 85, 103, 166   Continues januvia 50mg daily        Wt is 189 lbs - up 7 lbs x lov    Discussed diet and w/l       Reviewed labs   Cr 1.4-1.5 stable  Cr 1.6 on last labs  Ordered labs      He takes ASA 81 mg      Pt follows annually with Dr. Lawyer Kurtz (cardio) for CAD and history of CABG 4/20  Last there 7/8/20   now with nstemi see above  Next apt 12/4/20  Completing cardiac rehab     Patient is not taking Crestor and tolerating it well lipids at goal discussed with him that Norina Gloss was another option if he was unable to tolerate statin  He is taking Zetia     He saw Dr Molina Shrestha (cardio) for CAD  Last there 6/3/20  He has been completing cardiac rehab     [facility] had pacemaker placed with Dr castro (cardio)  Reviewed note 9/8/20: Patient pacemaker check today show proper function. The patient is dependent on RV pacing.   Battery is estimated 11 years but I explained to him and his family that his pacemaker is new since May 2020 but with 100% right ventricular pacing, the battery longevity is probably 8 to 10 years if the pacing threshold is not elevated  He is involved in cardiac rehab  Previously he had short atrial flutter but the device check today did not show the new AMS arrhythmic event  His blood pressure is controlled     Pt follows with Dr. Burt Wick (nephro) for ckd in the past  Last visit was 3/2/18  He will only follow with me for his kidney dysfunction  Baseline cr 1.4-1.5 stable on recent labs      Pt follows with Edin (uro)  for prostate cancer--  Recall pt has [facility] (brother and uncle) prostate cancer  Pt also met with a radiation oncologist [facility]  He completed radiation therapy with Dr. Kaylee Londono  He is not getting radioactive seeds because of his recent CABG  Next apt 12/10/20     Pt saw Dr Uyen Vasquez (podiatry) in 4/19  Pt was given terbinafine for fungal nails      Recall Pt had a hearing evaluation in the past  Recall notes from ENT 5/28/19: has some hearing loss      Pt has been taking CoQ-10     Continues gabapentin 300 mg nightly for his tingling/neuropathy pain in his hands/fingers, which works well for the most part is still having some symptoms  No bladder /bowel incontine     Continues celexa 20mg for depression/anxiety- not overally sad or down -happy with dose doing well with this he is not drinking much alcohol     He also has ativan to use prn (not often, not in the past month) for anxiety --no recent use     Pt uses trazadone nightly  sleep works well   He is sleeping better but snores and gets a little wheeze at night discussed sleep apnea testing--ordered sleep eval      He has been seeing Dr. Prerna Santos (sleep) for NIMO    Not compliant with cpap 11/20 - discussed importance of wearing this   Last there 7/13/20   Next apt 1/21     Not taking crestor 5 mg, only taking zetia 10 mg for cholesterol     In the past, pt expressed concern about his memory  Previously, provided referral for Dr. Nicho Hutson (neuropsych)  lov provided info for Dr Christopher Bauer (neuropsych)  Had the appointment 09/24/19 --then refused testing in the end   Overall stable currently      ACP not on file. SDM is his daughter (Yareli [facility]).   Provided information in the past.      Recall sees Dr. Yue Mckeon for h/o DVT, no longer on coumadin or xarelto, on ASA only.  Was on coumadin for h/o PE and DVT post operatively      PREVENTIVE:    Colonoscopy: 9/15/15, Dr. Devante Becerril, repeat 5 years- will be able to complete  per cardio   EGD: 9/15/15, Dr. Devante Becerril  PSA: 7/15 3.0, ,  3.8, ,  5.5  Dr Leydi Cast follows   AAA screen: CT , negative  Tdap: unknown   Pneumovax: 2012  Epwxron19:19   Shingrix: never completed  Flu shot: declines, will get one this year   Foot exam: 19   Microalbumin:   A1c:  ,  6.4,  6.6,  6.5,  6.0,  6.1, 3/19 6.0  5.9  POC 5.7  ,  5.8  6.6  6.0  Eye exam: Dr. Matty Hardy Estsiennainal,  per patient  Hep C Screen: 10/15, negative  Lipids:  48  EK/17, PACs       Patient Active Problem List    Diagnosis Date Noted    Pacemaker 2020    Third degree AV block ([address] 2020    Postoperative anemia due to acute blood loss 2020    S/P CABG x 3 2020    CAD (coronary artery disease) 2020    Bilateral carotid artery stenosis 2020    NSTEMI (non-ST elevated myocardial infarction) ([address] 2020    Type 2 diabetes mellitus with diabetic neuropathy ([address] 2020    Prostate cancer ([address] 2018    Type 2 diabetes with nephropathy ([address] 2018    Reactive depression 11/15/2016    Cervical spinal stenosis 2016    CKD (chronic kidney disease) 10/31/2016    Hyperlipidemia 07/10/2015    Spinal stenosis, lumbar region, without neurogenic claudication 10/16/2014    DVT of leg (deep venous thrombosis) ([address] 2012    HTN (hypertension) 2012    Perforated diverticulum of large intestine 2012    Alcohol abuse 2012    Insomnia 2012     Current Outpatient Medications   Medication Sig Dispense Refill    SITagliptin (Januvia) 50 mg tablet Take 1 tablet by mouth once daily 90 Tab 0    metoprolol tartrate (LOPRESSOR) 25 mg tablet TAKE 1/2 (ONE-HALF) TABLET BY MOUTH EVERY 12 HOURS 30 Tab 0    traZODone (DESYREL) 50 mg tablet TAKE 1 TABLET BY MOUTH AT BEDTIME 30 Tab 0    gabapentin (NEURONTIN) 300 mg capsule TAKE 2 TO 3 CAPSULES BY MOUTH ONCE DAILY IN THE EVENING AS NEEDED FOR PAIN 180 Cap 0    ezetimibe (ZETIA) 10 mg tablet Take 1 Tab by mouth daily. 90 Tab 3    fluticasone propionate (Flonase Allergy Relief) 50 mcg/actuation nasal spray 2 Sprays by Both Nostrils route two (2) times daily as needed for Rhinitis.  pumpkin seed extract-soy germ 300 mg cap Take 1 Cap by mouth daily as needed (prostate health).  acetaminophen (TYLENOL) 325 mg tablet Take 2 Tabs by mouth every four (4) hours as needed for Pain.  senna-docusate (PERICOLACE) 8.6-50 mg per tablet Take 1 Tab by mouth daily as needed for Constipation.  co-enzyme Q-10 (Co Q-10) 100 mg capsule Take 100 mg by mouth daily as needed.  citalopram (CELEXA) 20 mg tablet Take 1 Tab by mouth daily. 90 Tab 1    ascorbate calcium (VITAMIN C PO) Take 1 Tab by mouth daily as needed.  cyanocobalamin 1,000 mcg tablet Take 1,000 mcg by mouth daily as needed.  magnesium 250 mg tab Take 1 Tab by mouth daily as needed.  cholecalciferol, vitamin D3, (VITAMIN D3 PO) Take 1,000 Int'l Units by mouth daily as needed.  aspirin delayed-release (ASPIR-LOW) 81 mg tablet Take 81 mg by mouth daily.  Taking every now and then      FREESTYLE LANCETS 28 gauge misc USE TO CHECK BLOOD SUGAR ONCE DAILY 100 Lancet 12     Past Surgical History:   Procedure Laterality Date    ABDOMEN SURGERY PROC UNLISTED      bowel resection    HX BACK SURGERY  2014    HX GI  11/2012    bowel resection    HX ORTHOPAEDIC      amputated left 4th finger    MT INS NEW/RPLCMT PRM PM W/TRANSV ELTRD ATRIAL&VENT N/A 5/1/2020    INSERT PPM DUAL performed by Leni Biggs MD at [facility] [address] [facility]/s Dr [facility]      Lab Results   Component Value Date/Time    WBC 5.3 08/19/2020 09:31 AM    HGB 12.2 (L) 08/19/2020 09:31 AM    Hemoglobin (POC) 13.3 11/02/2016 07:15 AM    HCT 39.6 08/19/2020 09:31 AM    Hematocrit (POC) 39 11/02/2016 07:15 AM    PLATELET 569 44/49/2712 09:31 AM    MCV 81 08/19/2020 09:31 AM     Lab Results   Component Value Date/Time    Cholesterol, total 156 11/18/2020 09:53 AM    Cholesterol (POC) 227 02/06/2015 03:30 PM    HDL Cholesterol 49 11/18/2020 09:53 AM    LDL, calculated 48 08/19/2020 09:31 AM    LDL Chol Calc (NIH) 93 11/18/2020 09:53 AM    LDL Cholesterol (POC) 164 02/06/2015 03:30 PM    Triglyceride 69 11/18/2020 09:53 AM    Triglycerides (POC) 68 02/06/2015 03:30 PM     Lab Results   Component Value Date/Time    GFR est non-AA 41 (L) 11/18/2020 09:53 AM    GFRNA, POC 50 (L) 11/02/2016 07:15 AM    GFR est AA 48 (L) 11/18/2020 09:53 AM    GFRAA, POC >60 11/02/2016 07:15 AM    Creatinine 1.63 (H) 11/18/2020 09:53 AM    Creatinine (POC) 1.4 (H) 11/02/2016 07:15 AM    BUN 27 11/18/2020 09:53 AM    BUN (POC) 28 (H) 11/02/2016 07:15 AM    Sodium 141 11/18/2020 09:53 AM    Sodium (POC) 140 11/02/2016 07:15 AM    Potassium 4.5 11/18/2020 09:53 AM    Potassium (POC) 4.1 11/02/2016 07:15 AM    Chloride 104 11/18/2020 09:53 AM    Chloride (POC) 105 11/02/2016 07:15 AM    CO2 22 11/18/2020 09:53 AM    Magnesium 2.6 (H) 05/04/2020 04:52 AM    Phosphorus 3.0 10/17/2014 04:40 AM    Albumin, urine 59.5 10/23/2015 09:10 AM        Review of Systems   Respiratory: Positive for shortness of breath. Cardiovascular: Negative for chest pain. Physical Exam  Constitutional:       General: He is not in acute distress. Appearance: Normal appearance. He is not ill-appearing, toxic-appearing or diaphoretic. HENT:      Head: Normocephalic and atraumatic. Eyes:      General:         Right eye: No discharge. Left eye: No discharge. Conjunctiva/sclera: Conjunctivae normal.   Pulmonary:      Effort: No respiratory distress.    Neurological:      Mental Status: He is alert and oriented to person, place, and time. Mental status is at baseline. Gait: Gait normal.   Psychiatric:         Mood and Affect: Mood normal.         Behavior: Behavior normal.         ASSESSMENT and PLAN    ICD-10-CM ICD-9-CM    1. Third degree AV block ([facility])              L71.4 419.7 METABOLIC PANEL, BASIC      CBC W/O DIFF      HEMOGLOBIN A1C WITH EAG      TSH 3RD GENERATION      MICROALBUMIN, UR, RAND W/ MICROALB/CREAT RATIO   2. NSTEMI (non-ST elevated myocardial infarction) ([facility])  A39.8 309.82 METABOLIC PANEL, BASIC      CBC W/O DIFF      HEMOGLOBIN A1C WITH EAG   History of status post CABG going to cardiac rehab stable   TSH 3RD GENERATION      MICROALBUMIN, UR, RAND W/ MICROALB/CREAT RATIO   3. Chronic deep vein thrombosis (DVT) of proximal vein of both lower extremities ([facility])  C56.2V7 702.58 METABOLIC PANEL, BASIC      CBC W/O DIFF   History of remote history just on aspirin   HEMOGLOBIN A1C WITH EAG      TSH 3RD GENERATION      MICROALBUMIN, UR, RAND W/ MICROALB/CREAT RATIO   4. Type 2 diabetes mellitus with diabetic neuropathy, without long-term current use of insulin ([facility])  F19.14 594.00 METABOLIC PANEL, BASIC     357.2 CBC W/O DIFF   Adequately controlled on Januvia    Gabapentin for neuropathy   HEMOGLOBIN A1C WITH EAG      TSH 3RD GENERATION      MICROALBUMIN, UR, RAND W/ MICROALB/CREAT RATIO   5. Type 2 diabetes with nephropathy ([facility])  Q82.81 850.99 METABOLIC PANEL, BASIC     583.81 CBC W/O DIFF   Adequately controlled on Januvia    Renal function stable closely monitoring   HEMOGLOBIN A1C WITH EAG      TSH 3RD GENERATION      MICROALBUMIN, UR, RAND W/ MICROALB/CREAT RATIO   6. Prostate cancer ([facility]  U59 549 METABOLIC PANEL, BASIC      CBC W/O DIFF   Status post radiation has follow-up with urology pending next month   HEMOGLOBIN A1C WITH EAG      TSH 3RD GENERATION      MICROALBUMIN, UR, RAND W/ MICROALB/CREAT RATIO   7.  Coronary artery disease involving native coronary artery of native heart without angina pectoris  I25.10 685.80 METABOLIC PANEL, BASIC      CBC W/O DIFF      HEMOGLOBIN A1C WITH EAG   Medically managed going to cardiac rehab has some mild shortness of breath for which she is going to cardiac rehab to improve endurance has follow-up with cardiology pending we will address this further will be getting a chest x-ray   TSH 3RD GENERATION      MICROALBUMIN, UR, RAND W/ MICROALB/CREAT RATIO   8. Essential hypertension  T61 313.9 METABOLIC PANEL, BASIC      CBC W/O DIFF   Has been well controlled at cardiac rehab on Toprol continue   HEMOGLOBIN A1C WITH EAG      TSH 3RD GENERATION      MICROALBUMIN, UR, RAND W/ MICROALB/CREAT RATIO   9. Stage 3a chronic kidney disease  I51.72 016.6 METABOLIC PANEL, BASIC      CBC W/O DIFF   Creatinine runs 1.4-1.6 baseline stable on recent labs   HEMOGLOBIN A1C WITH EAG      TSH 3RD GENERATION      MICROALBUMIN, UR, RAND W/ MICROALB/CREAT RATIO   10. Mixed hyperlipidemia  J88.3 274.0 METABOLIC PANEL, BASIC      CBC W/O DIFF   Intolerant of statin just on Zetia LDL at goal   HEMOGLOBIN A1C WITH EAG      TSH 3RD GENERATION      MICROALBUMIN, UR, RAND W/ MICROALB/CREAT RATIO   11. Spinal stenosis, lumbar region, without neurogenic claudication  Y40.499 264.39 METABOLIC PANEL, BASIC      CBC W/O DIFF   Gabapentin improves the symptoms continue no change dose   HEMOGLOBIN A1C WITH EAG      TSH 3RD GENERATION      MICROALBUMIN, UR, RAND W/ MICROALB/CREAT RATIO   12. Reactive depression  H04.9 495.0 METABOLIC PANEL, BASIC      CBC W/O DIFF   Stable on Celexa   HEMOGLOBIN A1C WITH EAG      TSH 3RD GENERATION      MICROALBUMIN, UR, RAND W/ MICROALB/CREAT RATIO   13. NIMO on CPAP  X65.59 653.02 METABOLIC PANEL, BASIC    A17.13 V46.8 CBC W/O DIFF   Diagnosis sleep apnea given CPAP he is not compliant with this stressed the importance of compliance   HEMOGLOBIN A1C WITH EAG      TSH 3RD GENERATION      MICROALBUMIN, UR, RAND W/ MICROALB/CREAT RATIO   14.  SOB (shortness of breath)       Not new just persistent suspect this is related to deconditioning and CABG from last year going to cardiac rehab to improve endurance    Did have small pleural effusion last chest x-ray and atelectasis back in May we will repeat chest x-ray given his symptoms and he will address this further with cardiology as well R06.02 786.05 XR CHEST PA LAT           Scribed by Jacklyn Taylor of [address] [place] [address] [address], as dictated by Dr. Leo Hardy. Current diagnosis and concerns discussed with pt at length. Pt understands risks and benefits or current treatment plan and medications, and accepts the treatment and medication with any possible risks. Pt asks appropriate questions, which were answered. Pt was instructed to call with any concerns or problems. I have reviewed the note documented by the scribe. The services provided are my own. The documentation is accurate     Anel Hoffman, who was evaluated through a synchronous (real-time) audio-video encounter, and/or his healthcare decision maker, is aware that it is a billable service, with coverage as determined by his insurance carrier. He provided verbal consent to proceed: Yes, and patient identification was verified. It was conducted pursuant to the emergency declaration under the [facility], [address] [place] [address] authority and the [employer] General Act. A caregiver was present when appropriate. Ability to conduct physical exam was limited. I was at home. The patient was at home.

## 2020-11-23 ENCOUNTER — VIRTUAL VISIT (OUTPATIENT)
Dept: INTERNAL MEDICINE CLINIC | Age: 70
End: 2020-11-23
Payer: MEDICARE

## 2020-11-23 DIAGNOSIS — G47.33 OSA ON CPAP: ICD-10-CM

## 2020-11-23 DIAGNOSIS — I25.10 CORONARY ARTERY DISEASE INVOLVING NATIVE CORONARY ARTERY OF NATIVE HEART WITHOUT ANGINA PECTORIS: ICD-10-CM

## 2020-11-23 DIAGNOSIS — I82.5Y3 CHRONIC DEEP VEIN THROMBOSIS (DVT) OF PROXIMAL VEIN OF BOTH LOWER EXTREMITIES (HCC): ICD-10-CM

## 2020-11-23 DIAGNOSIS — C61 PROSTATE CANCER (HCC): ICD-10-CM

## 2020-11-23 DIAGNOSIS — M48.061 SPINAL STENOSIS, LUMBAR REGION, WITHOUT NEUROGENIC CLAUDICATION: ICD-10-CM

## 2020-11-23 DIAGNOSIS — I10 ESSENTIAL HYPERTENSION: ICD-10-CM

## 2020-11-23 DIAGNOSIS — Z99.89 OSA ON CPAP: ICD-10-CM

## 2020-11-23 DIAGNOSIS — I21.4 NSTEMI (NON-ST ELEVATED MYOCARDIAL INFARCTION) (HCC): ICD-10-CM

## 2020-11-23 DIAGNOSIS — N18.31 STAGE 3A CHRONIC KIDNEY DISEASE (HCC): ICD-10-CM

## 2020-11-23 DIAGNOSIS — E11.40 TYPE 2 DIABETES MELLITUS WITH DIABETIC NEUROPATHY, WITHOUT LONG-TERM CURRENT USE OF INSULIN (HCC): ICD-10-CM

## 2020-11-23 DIAGNOSIS — R06.02 SOB (SHORTNESS OF BREATH): ICD-10-CM

## 2020-11-23 DIAGNOSIS — E11.21 TYPE 2 DIABETES WITH NEPHROPATHY (HCC): ICD-10-CM

## 2020-11-23 DIAGNOSIS — E78.2 MIXED HYPERLIPIDEMIA: ICD-10-CM

## 2020-11-23 DIAGNOSIS — F32.9 REACTIVE DEPRESSION: ICD-10-CM

## 2020-11-23 DIAGNOSIS — I44.2 THIRD DEGREE AV BLOCK (HCC): Primary | ICD-10-CM

## 2020-11-23 PROCEDURE — 3044F HG A1C LEVEL LT 7.0%: CPT | Performed by: INTERNAL MEDICINE

## 2020-11-23 PROCEDURE — 3017F COLORECTAL CA SCREEN DOC REV: CPT | Performed by: INTERNAL MEDICINE

## 2020-11-23 PROCEDURE — 99214 OFFICE O/P EST MOD 30 MIN: CPT | Performed by: INTERNAL MEDICINE

## 2020-11-23 PROCEDURE — 2022F DILAT RTA XM EVC RTNOPTHY: CPT | Performed by: INTERNAL MEDICINE

## 2020-11-23 PROCEDURE — G9717 DOC PT DX DEP/BP F/U NT REQ: HCPCS | Performed by: INTERNAL MEDICINE

## 2020-11-23 PROCEDURE — G8427 DOCREV CUR MEDS BY ELIG CLIN: HCPCS | Performed by: INTERNAL MEDICINE

## 2020-11-23 PROCEDURE — 1101F PT FALLS ASSESS-DOCD LE1/YR: CPT | Performed by: INTERNAL MEDICINE

## 2020-11-23 PROCEDURE — G0463 HOSPITAL OUTPT CLINIC VISIT: HCPCS | Performed by: INTERNAL MEDICINE

## 2020-11-23 PROCEDURE — G8756 NO BP MEASURE DOC: HCPCS | Performed by: INTERNAL MEDICINE

## 2020-11-23 NOTE — PROGRESS NOTES
LDL not at goal of 70. If he is taking crestor at 5 mg plus zetia and tolerating ok, increase crestor to 10 mg. Will repeat labs in 3 mos. Offer injectables (repatha or praluent), noted previous hesitance. Kidney fxn is up, not new to him. Would not be a bad idea to see renal doc given his hx of diabetes, refer to dr Barby Read if in agreement.   Other wise, continue monitoring with [facility]

## 2020-11-25 ENCOUNTER — TELEPHONE (OUTPATIENT)
Dept: CARDIOLOGY CLINIC | Age: 70
End: 2020-11-25

## 2020-11-25 ENCOUNTER — TELEPHONE (OUTPATIENT)
Dept: INTERNAL MEDICINE CLINIC | Age: 70
End: 2020-11-25

## 2020-11-25 NOTE — TELEPHONE ENCOUNTER
----- Message from Concetta Craig NP sent at 11/23/2020  5:43 AM EST -----  LDL not at goal of 70. If he is taking crestor at 5 mg plus zetia and tolerating ok, increase crestor to 10 mg. Will repeat labs in 3 mos. Offer injectables (repatha or praluent), noted previous hesitance. Kidney fxn is up, not new to him. Would not be a bad idea to see renal doc given his hx of diabetes, refer to dr Sheryle Garland if in agreement.   Other wise, continue monitoring with [facility]

## 2020-11-25 NOTE — TELEPHONE ENCOUNTER
----- Message from [employer] sent at 11/25/2020  8:43 AM EST -----  Regarding: /Telephone  Contact: [phone]  General Message/Vendor Calls    Caller's first and last name:Yareli Mehta, kashmir      Reason for call:lab review - liver function      Callback required yes/no and why:yes to discuss pt POC      Best contact number(s):[phone]      Details to clarify the request:daughter has questions about pr liver function, please advise      Message from [facility]

## 2020-11-26 RX ORDER — TRAZODONE HYDROCHLORIDE 50 MG/1
TABLET ORAL
Qty: 30 TAB | Refills: 0 | Status: SHIPPED | OUTPATIENT
Start: 2020-11-26 | End: 2020-12-24

## 2020-11-27 NOTE — TELEPHONE ENCOUNTER
Spoke to [employer] (HIPAA). Two pt identifiers confirmed. Delaney Stacy states that pt was told he should see a Neph per Card--d/t Cr levels. Delaney Stacy asking why Dr. Aguilar Mayfield did not refer pt to a Neph. Informed Delaney Stacy that Dr. Aguilar Mayfield has it noted that he follows her and if there was any abnormality, pt would have been referred to [facility]. Delaney Stacy states pt has a Neph appt upcoming--Yareli states \"Dr. Norman Downs informed Dr. Aguilar Mayfield will be notified.

## 2020-11-30 ENCOUNTER — HOSPITAL ENCOUNTER (OUTPATIENT)
Dept: GENERAL RADIOLOGY | Age: 70
Discharge: HOME | End: 2020-11-30
Payer: MEDICARE

## 2020-11-30 ENCOUNTER — HOSPITAL ENCOUNTER (OUTPATIENT)
Dept: CARDIAC REHAB | Age: 70
Discharge: HOME | End: 2020-11-30
Payer: MEDICARE

## 2020-11-30 VITALS — WEIGHT: 189 LBS | BODY MASS INDEX: 30.51 KG/M2

## 2020-11-30 DIAGNOSIS — R06.02 SOB (SHORTNESS OF BREATH): ICD-10-CM

## 2020-11-30 PROCEDURE — 71046 X-RAY EXAM CHEST 2 VIEWS: CPT

## 2020-11-30 PROCEDURE — 93798 PHYS/QHP OP CAR RHAB W/ECG: CPT

## 2020-12-02 NOTE — PROGRESS NOTES
[address]ell Ch[address], [place], [address] [facility]  [phone]     Subjective:      Ronald Bedolla is a 65 y.o. male is here for routine f/u. He has pmhx CAD s/p CABG, CHB s/p PM, HTN, HLD, DM  And CKD III. Last seen by us in 7/2020. Saw Dr Maurilio Snyder in 9/2020 for device f/u. Since surgery, he never completely got his strength back and has only been participating in cardiac rehab once a week. He and his daughter notes some dyspnea on exertion. The patient denies chest pain/ orthopnea, PND, LE edema, palpitations, syncope, or presyncope.        Patient Active Problem List    Diagnosis Date Noted    Pacemaker 05/01/2020    Third degree AV block ([address] 05/01/2020    Postoperative anemia due to acute blood loss 04/29/2020    S/P CABG x 3 04/28/2020    CAD (coronary artery disease) 04/27/2020    Bilateral carotid artery stenosis 04/24/2020    NSTEMI (non-ST elevated myocardial infarction) ([address] 04/23/2020    Type 2 diabetes mellitus with diabetic neuropathy ([address] 01/09/2020    Prostate cancer ([address] 06/04/2018    Type 2 diabetes with nephropathy ([address] 02/28/2018    Reactive depression 11/15/2016    Cervical spinal stenosis 11/03/2016    CKD (chronic kidney disease) 10/31/2016    Hyperlipidemia 07/10/2015    Spinal stenosis, lumbar region, without neurogenic claudication 10/16/2014    DVT of leg (deep venous thrombosis) ([address] 12/18/2012    HTN (hypertension) 12/12/2012    Perforated diverticulum of large intestine 12/04/2012    Alcohol abuse 12/04/2012    Insomnia 12/04/2012      Kathleen Rankin MD  Past Medical History:   Diagnosis Date    Arthritis     Chronic kidney disease     Stage 3    Chronic pain     Abdoman, back, fingers per daughter   Lalita Marino Diabetes [facility])     DIET CONTROLLED    Heart attack ([address]     Hypertension     PE (pulmonary embolism)     Pneumonia     Psychiatric disorder     Depression      Past Surgical History:   Procedure Laterality Date    ABDOMEN SURGERY [address] [employer] UNLISTED      bowel resection    HX BACK SURGERY  2014    HX GI  11/2012    bowel resection    HX ORTHOPAEDIC      amputated left 4th finger    NH INS NEW/RPLCMT PRM PM W/TRANSV ELTRD ATRIAL&VENT N/A 5/1/2020    INSERT PPM DUAL performed by Letty Garner MD at [address]  CATH LAB     Allergies   Allergen Reactions    Arb-Angiotensin Receptor Antagonist Other (comments)     High k    Statins-Hmg-Coa Reductase Inhibitors Myalgia      Family History   Problem Relation Age of Onset    Cancer Mother     Heart Disease Father     Heart Disease Brother     Anesth Problems Neg Hx       Social History     Socioeconomic History    Marital status:      Spouse name: Not on file    Number of children: 1    Years of education: 15    Highest education level: High school graduate   Occupational History    Not on file   Social Needs    Financial resource strain: Not hard at all   [employer] insecurity     Worry: Never true     Inability: Never true   [employer] needs     Medical: No     Non-medical: No   Tobacco Use    Smoking status: Never Smoker    Smokeless tobacco: Current User     Types: Chew   Substance and Sexual Activity    Alcohol use:  Yes     Alcohol/week: 7.0 standard drinks     Types: 7 Cans of beer per week     Comment: drinks on weekends    Drug use: No    Sexual activity: Not Currently   Lifestyle    Physical activity     Days per week: 0 days     Minutes per session: 0 min    Stress: Rather much   Relationships    Social connections     Talks on phone: Never     Gets together: Never     Attends Restorationist service: Never     Active member of club or organization: No     Attends meetings of clubs or organizations: Never     Relationship status:     Intimate partner violence     Fear of current or ex partner: No     Emotionally abused: No     Physically abused: No     Forced sexual activity: No   Other Topics Concern     Service No    Blood Transfusions No    Caffeine Concern No    Occupational Exposure No    Hobby Hazards No    Sleep Concern Yes    Stress Concern Yes    Weight Concern No    Special Diet No    Back Care Yes    Exercise No    Bike Helmet Not Asked    Seat Belt Yes    Self-Exams Not Asked   Social History Narrative    ** Merged History Encounter **           Current Outpatient Medications   Medication Sig    traZODone (DESYREL) 50 mg tablet TAKE 1 TABLET BY MOUTH AT BEDTIME    SITagliptin (Januvia) 50 mg tablet Take 1 tablet by mouth once daily    metoprolol tartrate (LOPRESSOR) 25 mg tablet TAKE 1/2 (ONE-HALF) TABLET BY MOUTH EVERY 12 HOURS    gabapentin (NEURONTIN) 300 mg capsule TAKE 2 TO 3 CAPSULES BY MOUTH ONCE DAILY IN THE EVENING AS NEEDED FOR PAIN (Patient taking differently: Take  by mouth two (2) times a day.)    ezetimibe (ZETIA) 10 mg tablet Take 1 Tab by mouth daily.  pumpkin seed extract-soy germ 300 mg cap Take 1 Cap by mouth daily as needed (prostate health).  acetaminophen (TYLENOL) 325 mg tablet Take 2 Tabs by mouth every four (4) hours as needed for Pain.  senna-docusate (PERICOLACE) 8.6-50 mg per tablet Take 1 Tab by mouth daily as needed for Constipation.  co-enzyme Q-10 (Co Q-10) 100 mg capsule Take 100 mg by mouth daily as needed.  cholecalciferol, vitamin D3, (VITAMIN D3 PO) Take 1,000 Int'l Units by mouth daily as needed.  ascorbate calcium (VITAMIN C PO) Take 1 Tab by mouth daily as needed.  aspirin delayed-release (ASPIR-LOW) 81 mg tablet Take 81 mg by mouth daily. Taking every now and then    FREESTYLE LANCETS 28 gauge misc USE TO CHECK BLOOD SUGAR ONCE DAILY    cyanocobalamin 1,000 mcg tablet Take 1,000 mcg by mouth daily as needed.  fluticasone propionate (Flonase Allergy Relief) 50 mcg/actuation nasal spray 2 Sprays by Both Nostrils route two (2) times daily as needed for Rhinitis.  citalopram (CELEXA) 20 mg tablet Take 1 Tab by mouth daily.  (Patient not taking: Reported on 12/4/2020)    magnesium 250 mg tab Take 1 Tab by mouth daily as needed. No current facility-administered medications for this visit. Review of Symptoms:  11 systems reviewed, negative other than as stated in the HPI    Physical ExamPhysical Exam:    Vitals:    12/04/20 1505   BP: 120/80   Pulse: 70   Resp: 16   SpO2: 95%   Weight: 192 lb 12.8 oz (87.5 kg)   Height: 5' 7\" (1.702 m)     Body mass index is 30.2 kg/m². General PE  Gen:  NAD  Mental Status - Alert. General Appearance - Not in acute distress. HEENT:  PERRL, no carotid bruits or JVD  Chest and Lung Exam   Inspection: Accessory muscles - No use of accessory muscles in breathing. Auscultation:   Breath sounds: - Normal.   Cardiovascular   Inspection: Jugular vein - Bilateral - Inspection Normal.   Palpation/Percussion:   Apical Impulse: - Normal.   Auscultation: Rhythm - Regular. Heart Sounds - S1 WNL and S2 WNL. No S3 or S4. Murmurs & Other Heart Sounds: Auscultation of the heart reveals - No Murmurs. Peripheral Vascular   Upper Extremity: Inspection - Bilateral - No Cyanotic nailbeds or Digital clubbing. Lower Extremity:   Palpation: Edema - Bilateral - No edema. Abdomen:   Soft, non-tender, bowel sounds are active.   Neuro: A&O times 3, CN and motor grossly WNL    Labs:   Lab Results   Component Value Date/Time    Cholesterol, total 156 11/18/2020 09:53 AM    Cholesterol, total 103 08/19/2020 09:31 AM    Cholesterol, total 182 05/14/2020 01:45 PM    Cholesterol, total 213 (H) 01/09/2020 11:36 AM    Cholesterol, total 244 (H) 06/25/2019 09:23 AM    HDL Cholesterol 49 11/18/2020 09:53 AM    HDL Cholesterol 42 08/19/2020 09:31 AM    HDL Cholesterol 36 (L) 05/14/2020 01:45 PM    HDL Cholesterol 57 01/09/2020 11:36 AM    HDL Cholesterol 62 06/25/2019 09:23 AM    LDL, calculated 48 08/19/2020 09:31 AM    LDL, calculated 112 (H) 05/14/2020 01:45 PM    LDL, calculated 138 (H) 01/09/2020 11:36 AM    LDL, calculated 164 (H) 06/25/2019 09:23 AM    LDL, calculated 150 (H) 09/17/2018 09:20 AM    LDL Chol Calc (NIH) 93 11/18/2020 09:53 AM    Triglyceride 69 11/18/2020 09:53 AM    Triglyceride 63 08/19/2020 09:31 AM    Triglyceride 170 (H) 05/14/2020 01:45 PM    Triglyceride 92 01/09/2020 11:36 AM    Triglyceride 88 06/25/2019 09:23 AM     No results found for: CPK, CPKX, CPX  Lab Results   Component Value Date/Time    Sodium 141 11/18/2020 09:53 AM    Potassium 4.5 11/18/2020 09:53 AM    Chloride 104 11/18/2020 09:53 AM    CO2 22 11/18/2020 09:53 AM    Anion gap 7 05/04/2020 04:52 AM    Glucose 100 (H) 11/18/2020 09:53 AM    BUN 27 11/18/2020 09:53 AM    Creatinine 1.63 (H) 11/18/2020 09:53 AM    BUN/Creatinine ratio 17 11/18/2020 09:53 AM    GFR est AA 48 (L) 11/18/2020 09:53 AM    GFR est non-AA 41 (L) 11/18/2020 09:53 AM    Calcium 9.8 11/18/2020 09:53 AM    Bilirubin, total 1.6 (H) 11/18/2020 09:53 AM    Alk. phosphatase 74 11/18/2020 09:53 AM    Protein, total 6.4 11/18/2020 09:53 AM    Albumin 3.7 11/18/2020 09:53 AM    Globulin 3.1 05/02/2020 04:14 AM    A-G Ratio 1.4 11/18/2020 09:53 AM    ALT (SGPT) 39 11/18/2020 09:53 AM       EKG:  AS      Assessment:        1. ASHD (arteriosclerotic heart disease)    2. S/P CABG x 3    3. Statin intolerance    4. Essential hypertension    5. Mixed hyperlipidemia    6. Cardiac pacemaker in situ    7. Third degree AV block ([address])    8. Diabetes mellitus without complication ([address])        Orders Placed This Encounter    AMB POC EKG ROUTINE W/ 12 LEADS, INTER & REP     Order Specific Question:   Reason for Exam:     Answer:   routine        Plan:        ASHD Hx CABG x 3 in 4/2020---LIMA to LAD, SVG to RCA and Ramus  Continue ASA BB   Intolerant to statin  Long discussion-he says cramps were mild to moderate, but not severe, and he is willing to retry a lower dose of statin  Patient notes resolution of chest pain since his bypass. Echo in April 2020 showed normal LVEF, mild mitral regurgitation.   Still in cardiac rehab, but only once a week  Patient has been 100% RV paced and has not had echo since-repeat echo for some dyspnea on exertion  If EF is normal, and he gradually improves with increased frequency of cardiac rehab, improvement in diet and exercise at home, follow-up in 3 months     CHB s/p PPM 5/2020  Device followed by Dr Dawson---last seen 9/2020: 9/8/2020: Patient pacemaker check today show proper function. The patient is dependent on RV pacing. Previously he had short atrial flutter but the device check today did not show the new AMS arrhythmic event     HTN  Controlled with current therapy     HLD  Statin intolerance---In chart review has been intolerant to Crestor, Lipitor, pravastatin  5/2020 LDL at 112 On Zetia  Retrying low-dose Crestor-repeat labs in 3 months. We will go every other day at this time  If greater than mild cramps at 3-month follow-up that require discontinuation, we will consider Repatha/Praluent---as on last OV in 7/2020, we retrialed low dose crestor and his LDL on 8/2020 is at  50 but he stopped taking statin as he started having muscle pains again  Repeat LDL 93 now that he is off of his statin 11/18/2020       CKD III  Cr. 1.62 in 11/2020  Cr 1.54 in 6/2020     DM  On oral agent        Hx DVT/PE: developed PE/DVT after bowel resection in 2012           Continue current care and f/u in  3 months after increasing frequency of cardiac rehab and exercise, and after repeat labs.   Sooner if significantly abnormal echo.         Johanna Hayes MD

## 2020-12-04 ENCOUNTER — OFFICE VISIT (OUTPATIENT)
Dept: CARDIOLOGY CLINIC | Age: 70
End: 2020-12-04
Payer: MEDICARE

## 2020-12-04 VITALS
DIASTOLIC BLOOD PRESSURE: 80 MMHG | HEIGHT: 67 IN | SYSTOLIC BLOOD PRESSURE: 120 MMHG | WEIGHT: 192.8 LBS | HEART RATE: 70 BPM | RESPIRATION RATE: 16 BRPM | BODY MASS INDEX: 30.26 KG/M2 | OXYGEN SATURATION: 95 %

## 2020-12-04 DIAGNOSIS — R06.09 DOE (DYSPNEA ON EXERTION): ICD-10-CM

## 2020-12-04 DIAGNOSIS — E11.9 DIABETES MELLITUS WITHOUT COMPLICATION (HCC): ICD-10-CM

## 2020-12-04 DIAGNOSIS — I25.10 ASHD (ARTERIOSCLEROTIC HEART DISEASE): Primary | ICD-10-CM

## 2020-12-04 DIAGNOSIS — I10 ESSENTIAL HYPERTENSION: ICD-10-CM

## 2020-12-04 DIAGNOSIS — Z95.0 CARDIAC PACEMAKER IN SITU: ICD-10-CM

## 2020-12-04 DIAGNOSIS — I44.2 THIRD DEGREE AV BLOCK (HCC): ICD-10-CM

## 2020-12-04 DIAGNOSIS — Z95.1 S/P CABG X 3: ICD-10-CM

## 2020-12-04 DIAGNOSIS — E78.2 MIXED HYPERLIPIDEMIA: ICD-10-CM

## 2020-12-04 DIAGNOSIS — Z78.9 STATIN INTOLERANCE: ICD-10-CM

## 2020-12-04 PROCEDURE — 2022F DILAT RTA XM EVC RTNOPTHY: CPT | Performed by: INTERNAL MEDICINE

## 2020-12-04 PROCEDURE — 3017F COLORECTAL CA SCREEN DOC REV: CPT | Performed by: INTERNAL MEDICINE

## 2020-12-04 PROCEDURE — G8427 DOCREV CUR MEDS BY ELIG CLIN: HCPCS | Performed by: INTERNAL MEDICINE

## 2020-12-04 PROCEDURE — G8536 NO DOC ELDER MAL SCRN: HCPCS | Performed by: INTERNAL MEDICINE

## 2020-12-04 PROCEDURE — G8754 DIAS BP LESS 90: HCPCS | Performed by: INTERNAL MEDICINE

## 2020-12-04 PROCEDURE — G8752 SYS BP LESS 140: HCPCS | Performed by: INTERNAL MEDICINE

## 2020-12-04 PROCEDURE — 1101F PT FALLS ASSESS-DOCD LE1/YR: CPT | Performed by: INTERNAL MEDICINE

## 2020-12-04 PROCEDURE — 99214 OFFICE O/P EST MOD 30 MIN: CPT | Performed by: INTERNAL MEDICINE

## 2020-12-04 PROCEDURE — 93000 ELECTROCARDIOGRAM COMPLETE: CPT | Performed by: INTERNAL MEDICINE

## 2020-12-04 PROCEDURE — G8417 CALC BMI ABV UP PARAM F/U: HCPCS | Performed by: INTERNAL MEDICINE

## 2020-12-04 PROCEDURE — G9717 DOC PT DX DEP/BP F/U NT REQ: HCPCS | Performed by: INTERNAL MEDICINE

## 2020-12-04 PROCEDURE — 3044F HG A1C LEVEL LT 7.0%: CPT | Performed by: INTERNAL MEDICINE

## 2020-12-04 RX ORDER — ROSUVASTATIN CALCIUM 5 MG/1
5 TABLET, COATED ORAL EVERY OTHER DAY
Qty: 45 TAB | Refills: 1 | Status: SHIPPED | OUTPATIENT
Start: 2020-12-04 | End: 2021-01-22 | Stop reason: ALTCHOICE

## 2020-12-04 NOTE — PROGRESS NOTES
Chief Complaint   Patient presents with    Follow-up     ASHD    Hypertension       1. Have you been to the ER, urgent care clinic since your last visit? Hospitalized since your last visit? No    2. Have you seen or consulted any other health care providers outside of the [address] [place] [address] since your last visit? Include any pap smears or colon screening.      Patient C/O fatigue and chest discomfort at times

## 2020-12-04 NOTE — LETTER
12/4/20 Patient: Clarita Granger. YOB: 1950 Date of Visit: 12/4/2020 John Alexis MD 
[address] [address] VIA In Basket Dear John Alexis MD, Thank you for referring Mr. Fortino Steward to [address] [place] [address] for evaluation. My notes for this consultation are attached. If you have questions, please do not hesitate to call me. I look forward to following your patient along with you.  
 
 
Sincerely, 
 
Moises Alberts MD

## 2020-12-14 ENCOUNTER — HOSPITAL ENCOUNTER (OUTPATIENT)
Dept: CARDIAC REHAB | Age: 70
Discharge: HOME | End: 2020-12-14
Payer: MEDICARE

## 2020-12-14 PROCEDURE — 93798 PHYS/QHP OP CAR RHAB W/ECG: CPT

## 2020-12-15 ENCOUNTER — ANCILLARY PROCEDURE (OUTPATIENT)
Dept: CARDIOLOGY CLINIC | Age: 70
End: 2020-12-15
Payer: MEDICARE

## 2020-12-15 VITALS
SYSTOLIC BLOOD PRESSURE: 120 MMHG | BODY MASS INDEX: 30.13 KG/M2 | HEIGHT: 67 IN | WEIGHT: 192 LBS | DIASTOLIC BLOOD PRESSURE: 80 MMHG

## 2020-12-15 DIAGNOSIS — Z78.9 STATIN INTOLERANCE: ICD-10-CM

## 2020-12-15 DIAGNOSIS — R06.09 DOE (DYSPNEA ON EXERTION): ICD-10-CM

## 2020-12-15 DIAGNOSIS — I10 ESSENTIAL HYPERTENSION: ICD-10-CM

## 2020-12-15 DIAGNOSIS — I25.10 ASHD (ARTERIOSCLEROTIC HEART DISEASE): ICD-10-CM

## 2020-12-15 DIAGNOSIS — I44.2 THIRD DEGREE AV BLOCK (HCC): ICD-10-CM

## 2020-12-15 DIAGNOSIS — Z95.1 S/P CABG X 3: ICD-10-CM

## 2020-12-15 DIAGNOSIS — E11.9 DIABETES MELLITUS WITHOUT COMPLICATION (HCC): ICD-10-CM

## 2020-12-15 DIAGNOSIS — Z95.0 CARDIAC PACEMAKER IN SITU: ICD-10-CM

## 2020-12-15 DIAGNOSIS — E78.2 MIXED HYPERLIPIDEMIA: ICD-10-CM

## 2020-12-15 LAB
ECHO AO ASC DIAM: 3.63 CM
ECHO AO ROOT DIAM: 3.31 CM
ECHO AV AREA PEAK VELOCITY: 2.83 CM2
ECHO AV AREA/BSA PEAK VELOCITY: 1.4 CM2/M2
ECHO AV PEAK GRADIENT: 1.99 MMHG
ECHO AV PEAK VELOCITY: 67.57 CM/S
ECHO EST RA PRESSURE: 10 MMHG
ECHO LA VOL 2C: 116.16 ML (ref 18–58)
ECHO LA VOL 4C: 106.37 ML (ref 18–58)
ECHO LA VOLUME INDEX A2C: 58.45 ML/M2 (ref 16–28)
ECHO LA VOLUME INDEX A4C: 53.53 ML/M2 (ref 16–28)
ECHO LV INTERNAL DIMENSION DIASTOLIC: 3.83 CM (ref 4.2–5.9)
ECHO LV INTERNAL DIMENSION SYSTOLIC: 2.91 CM
ECHO LV IVSD: 1.5 CM (ref 0.6–1)
ECHO LV MASS 2D: 251.6 G (ref 88–224)
ECHO LV MASS INDEX 2D: 126.6 G/M2 (ref 49–115)
ECHO LV POSTERIOR WALL DIASTOLIC: 1.77 CM (ref 0.6–1)
ECHO LVOT DIAM: 1.98 CM
ECHO LVOT PEAK GRADIENT: 1.54 MMHG
ECHO LVOT PEAK VELOCITY: 61.98 CM/S
ECHO LVOT SV: 34.2 ML
ECHO LVOT VTI: 11.08 CM
ECHO MV A VELOCITY: 34.48 CENTIMETER/SECOND
ECHO MV E DECELERATION TIME (DT): 150.11 MS
ECHO MV E VELOCITY: 69.29 CENTIMETER/SECOND
ECHO RA AREA 4C: 25.04 CM2
ECHO RIGHT VENTRICULAR SYSTOLIC PRESSURE (RVSP): 27.22 MMHG
ECHO TV REGURGITANT MAX VELOCITY: 207.48 CM/S
ECHO TV REGURGITANT PEAK GRADIENT: 17.22 MMHG
LVOT MG: 0.86 MMHG

## 2020-12-15 PROCEDURE — 93306 TTE W/DOPPLER COMPLETE: CPT | Performed by: INTERNAL MEDICINE

## 2020-12-15 NOTE — PROGRESS NOTES
Ivone,    Please call patient and inform that EF appears lower than before -- was 60-65%, now about 45-50%. I want him to try lisinopril 2.5 mg daily to see if this helps the weak heart function and his symptoms overall. Have him f/u with Dr. Melissa Ashby in 1 month after starting med.   If he is unsure, then he can come in for f/u with Dr. Melissa Ashby first.    Thanks,  Muriel Crowley

## 2020-12-16 ENCOUNTER — TELEPHONE (OUTPATIENT)
Dept: CARDIOLOGY CLINIC | Age: 70
End: 2020-12-16

## 2020-12-16 NOTE — TELEPHONE ENCOUNTER
----- Message from Jennifer Dee NP sent at 12/15/2020  3:31 PM EST -----  Ivone,    Please call patient and inform that EF appears lower than before -- was 60-65%, now about 45-50%. I want him to try lisinopril 2.5 mg daily to see if this helps the weak heart function and his symptoms overall. Have him f/u with Dr. Margaret Johnson in 1 month after starting med.   If he is unsure, then he can come in for f/u with Dr. Margaret Johnson first.    Thanks,  Tyshawn Wilburn

## 2020-12-16 NOTE — TELEPHONE ENCOUNTER
[facility] on [address] [place] [address] Verified patient with two identifiers.  Patient allergic to inhibitors and we try another medication had coughing with Lisinopril in the past . Please advise thanks

## 2020-12-17 NOTE — TELEPHONE ENCOUNTER
Spoke with [facility] request she have Dr Libia Muller discuss his care and medications at next appt to better help this patient.

## 2020-12-17 NOTE — TELEPHONE ENCOUNTER
Patients daughter called [facility]) has questions about echo results    [phone]    Thanks  Bobo Conde

## 2020-12-21 ENCOUNTER — OFFICE VISIT (OUTPATIENT)
Dept: CARDIOLOGY CLINIC | Age: 70
End: 2020-12-21
Payer: MEDICARE

## 2020-12-21 DIAGNOSIS — Z95.0 CARDIAC PACEMAKER IN SITU: Primary | ICD-10-CM

## 2020-12-21 PROCEDURE — 93294 REM INTERROG EVL PM/LDLS PM: CPT | Performed by: INTERNAL MEDICINE

## 2020-12-21 PROCEDURE — 93296 REM INTERROG EVL PM/IDS: CPT | Performed by: INTERNAL MEDICINE

## 2020-12-24 RX ORDER — METOPROLOL TARTRATE 25 MG/1
TABLET, FILM COATED ORAL
Qty: 30 TAB | Refills: 0 | Status: SHIPPED | OUTPATIENT
Start: 2020-12-24 | End: 2021-01-25

## 2020-12-24 RX ORDER — TRAZODONE HYDROCHLORIDE 50 MG/1
TABLET ORAL
Qty: 30 TAB | Refills: 0 | Status: SHIPPED | OUTPATIENT
Start: 2020-12-24 | End: 2021-02-03

## 2020-12-28 ENCOUNTER — HOSPITAL ENCOUNTER (OUTPATIENT)
Dept: CARDIAC REHAB | Age: 70
Discharge: HOME | End: 2020-12-28
Payer: MEDICARE

## 2020-12-28 VITALS — BODY MASS INDEX: 29.29 KG/M2 | WEIGHT: 187 LBS

## 2020-12-28 PROCEDURE — 93798 PHYS/QHP OP CAR RHAB W/ECG: CPT

## 2020-12-29 ENCOUNTER — TELEPHONE (OUTPATIENT)
Dept: CARDIOLOGY CLINIC | Age: 70
End: 2020-12-29

## 2020-12-29 NOTE — TELEPHONE ENCOUNTER
Patient will think about injectable asking to hold Crestor at this time due to aches will continue with Zetia  and coq 10 for now.

## 2020-12-29 NOTE — TELEPHONE ENCOUNTER
Patient complaining of leg cramping from rosuvastatin.   Unable to take can you prescribe something else       [phone] Chelsey Ambriz)      Thanks  Estefany Arceo

## 2021-01-04 ENCOUNTER — TELEPHONE (OUTPATIENT)
Dept: CARDIAC REHAB | Age: 71
End: 2021-01-04

## 2021-01-04 ENCOUNTER — VIRTUAL VISIT (OUTPATIENT)
Dept: SLEEP MEDICINE | Age: 71
End: 2021-01-04
Payer: MEDICARE

## 2021-01-04 DIAGNOSIS — I25.10 CORONARY ARTERY DISEASE INVOLVING NATIVE CORONARY ARTERY OF NATIVE HEART WITHOUT ANGINA PECTORIS: ICD-10-CM

## 2021-01-04 DIAGNOSIS — G47.33 OBSTRUCTIVE SLEEP APNEA (ADULT) (PEDIATRIC): Primary | ICD-10-CM

## 2021-01-04 DIAGNOSIS — G47.33 OBSTRUCTIVE SLEEP APNEA (ADULT) (PEDIATRIC): ICD-10-CM

## 2021-01-04 PROCEDURE — 1101F PT FALLS ASSESS-DOCD LE1/YR: CPT | Performed by: INTERNAL MEDICINE

## 2021-01-04 PROCEDURE — G8756 NO BP MEASURE DOC: HCPCS | Performed by: INTERNAL MEDICINE

## 2021-01-04 PROCEDURE — 99213 OFFICE O/P EST LOW 20 MIN: CPT | Performed by: INTERNAL MEDICINE

## 2021-01-04 PROCEDURE — 3017F COLORECTAL CA SCREEN DOC REV: CPT | Performed by: INTERNAL MEDICINE

## 2021-01-04 PROCEDURE — G9717 DOC PT DX DEP/BP F/U NT REQ: HCPCS | Performed by: INTERNAL MEDICINE

## 2021-01-04 PROCEDURE — G8427 DOCREV CUR MEDS BY ELIG CLIN: HCPCS | Performed by: INTERNAL MEDICINE

## 2021-01-04 NOTE — TELEPHONE ENCOUNTER
1/4/2021 Cardiac Wellness: Mr. Quentin Doyle Jr.'s daughter called to let us know he wants to come to [facility] location instead of [facility]. He is due to start here 1/18/2021 at 1000am, I emailed April to make her aware of this change and ask for his exercise Rx and log.  Joleen Louis

## 2021-01-04 NOTE — PROGRESS NOTES
[address] [place]. [place], [address]  Tel.  [phone]  Fax. [address] [facility] [address]  [place], [address] [facility]  Tel.  [phone]  Fax. [phone] [address] [facility]  Tel.  [phone]  Fax. [phone]       Telemedicine visit performed with verbal consent of the patient. Patient called and identity confirmed with 2 patient identifers    Patient was seen at home  Geraldo Porter. is a 65 y.o. male who was seen by synchronous (real-time) audio-video technology on 1/4/2021. Consent:  He and/or his healthcare decision maker is aware that this patient-initiated Telehealth encounter is the equivalent to a face to face encounter in the sleep disorder center and has provided verbal consent to proceed: Yes    I was at home while conducting this encounter. S>Fredopal Brown. is a 65 y.o. male seen at this telemedicine visit for a positive airway pressure follow-up. He reports some problems using the device. He is 2% compliant over the past 90 days. The following problems are identified:    Drowsiness no Problems exhaling no   Snoring no Forget to put on Yes, falls asleep with tv on (it keeps him company)   Mask Comfortable yes  Can't fall asleep no   Dry Mouth no Mask falls off no   Air Leaking no Frequent awakenings no       Download reviewed. He admits that his sleep has improved on PAP therapy using full mask and heatedtubing.   He feels more relaxed when he sleeps with it  Allergies   Allergen Reactions    Arb-Angiotensin Receptor Antagonist Other (comments)     High k    Statins-Hmg-Coa Reductase Inhibitors Myalgia    Ace Inhibitors Cough       He has a current medication list which includes the following prescription(s): trazodone, metoprolol tartrate, rosuvastatin, sitagliptin, gabapentin, ezetimibe, fluticasone propionate, pumpkin seed extract-soy germ, acetaminophen, senna-docusate, co-enzyme q-10, citalopram, cholecalciferol (vitamin d3), ascorbic acid, aspirin delayed-release, freestyle lancets, cyanocobalamin, and magnesium. Heddy  He  has a past medical history of Arthritis, Chronic kidney disease, Chronic pain, Diabetes ([address] Heart attack ([address] Hypertension, PE (pulmonary embolism), Pneumonia, and Psychiatric disorder. Calvert Sleepiness Score: 7   and        O>      Weight 188 lb  Vital Signs: (As obtained by patient/caregiver at home)        Constitutional: [x] Appears well-developed and well-nourished [x] No apparent distress      [] Abnormal -     Mental status: [x] Alert and awake  [x] Oriented to person/place/time [x] Able to follow commands    [] Abnormal -     Eyes:   EOM    [x]  Normal    [] Abnormal -   Sclera  [x]  Normal    [] Abnormal -          Discharge [x]  None visible   [] Abnormal -     HENT: [x] Normocephalic, atraumatic  [] Abnormal -     External Ears [x] Normal  [] Abnormal -    Neck: [x] No visualized mass [] Abnormal -     Pulmonary/Chest: [x] Respiratory effort normal   [x] No visualized signs of difficulty breathing or respiratory distress        [] Abnormal -       Neurological:        [x] No Facial Asymmetry (Cranial nerve 7 motor function) (limited exam due to video visit)          [x] No gaze palsy        [] Abnormal -          Skin:        [x] No significant exanthematous lesions or discoloration noted on facial skin         [] Abnormal -            Psychiatric:       [x] Normal Affect [] Abnormal -        Other pertinent observable physical exam findings:-            A>    ICD-10-CM ICD-9-CM    1. Obstructive sleep apnea (adult) (pediatric)  G47.33 327.23 SLEEP LAB (PAP TITRATION)      NOVEL CORONAVIRUS (COVID-19)   2. Coronary artery disease involving native coronary artery of native heart without angina pectoris  I25.10 414.01      AHI = 67 (7-18). On CPAP :  4-13 cmH2O.     Compliant:      no    Therapeutic Response:  Positive    P>    I have reviewed medicare requirements regarding PAP usage  Treatment options for sleep apnea were reviewed. We will schedule him for a PAP titration to optimize settings and address barriers to adherence. PAP continues to benefit patient and remains necessary for control of his sleep apnea. CPAP setting - he will continue on his current pressure settings. He will see the [email or web address] company for a mask fitting and to ensure machine functioning adequately. * We have recommended a dedicated weight loss through appropriate diet and an exercise regimen as significant weight reduction has been shown to reduce severity of obstructive sleep apnea. * he was advised not to sleep with tv on. Sleep hygiene reviewed with him today     * He was asked to contact our office for any problems regarding PAP therapy. * Counseling was provided regarding the importance of regular PAP use and on proper sleep hygiene and safe driving. * Re-enforced proper and regular cleaning for the device. 2. Coronary Artery Disease - he continues on his current regimen. I have reviewed the relationship between heart disease and sleep disordered breathing. All of his questions were addressed. Pursuant to the emergency declaration under the [facility] waiver authority and the [employer] and [employer] Act, this Virtual  Visit was conducted, with patient's consent, to reduce the patient's risk of exposure to COVID-19 and provide continuity of care for an established patient. Services were provided through a video synchronous discussion virtually to substitute for in-person clinic visit.     Yogesh Love MD    Electronically signed by    Leah Geller MD  Diplomate in Sleep Medicine  [facility]

## 2021-01-04 NOTE — PATIENT INSTRUCTIONS
217 [facility] Saeed. 1668 [facility], [address] Tel.  [phone] Fax. 100 [facility] [address] [place], [address] [facility] [address] [facility] [address] Tel.  [phone] Fax. [phone] 9250 [facility] [address] [place] Tel.  [phone] Fax. [phone] PROPER SLEEP HYGIENE What to avoid · Do not have drinks with caffeine, such as coffee or black tea, for 8 hours before bed. · Do not smoke or use other types of tobacco near bedtime. Nicotine is a stimulant and can keep you awake. · Avoid drinking alcohol late in the evening, because it can cause you to wake in the middle of the night. · Do not eat a big meal close to bedtime. If you are hungry, eat a light snack. · Do not drink a lot of water close to bedtime, because the need to urinate may wake you up during the night. · Do not read or watch TV in bed. Use the bed only for sleeping and sexual activity. What to try · Go to bed at the same time every night, and wake up at the same time every morning. Do not take naps during the day. · Keep your bedroom quiet, dark, and cool. · Get regular exercise, but not within 3 to 4 hours of your bedtime. Alan Dayhoamadou · Sleep on a comfortable pillow and mattress. · If watching the clock makes you anxious, turn it facing away from you so you cannot see the time. · If you worry when you lie down, start a worry book. Well before bedtime, write down your worries, and then set the book and your concerns aside. · Try meditation or other relaxation techniques before you go to bed. · If you cannot fall asleep, get up and go to another room until you feel sleepy. Do something relaxing. Repeat your bedtime routine before you go to bed again. · Make your house quiet and calm about an hour before bedtime. Turn down the lights, turn off the TV, log off the computer, and turn down the volume on music. This can help you relax after a busy day. Drowsy Driving [employer] cites drowsiness as a causing factor in more than 243,280 police reported crashes annually, resulting in 76,000 injuries and 1,500 deaths. Other surveys suggest 55% of people polled have driven while drowsy in the past year, 23% had fallen asleep but not crashed, 3% crashed, and 2% had and accident due to drowsy driving. Who is at risk? Young Drivers: One study of drowsy driving accidents states that 55% of the drivers were under 25 years. Of those, 75% were male. Shift Workers and Travelers: People who work overnight or travel across time zones frequently are at higher risk of experiencing Circadian Rhythm Disorders. They are trying to work and function when their body is programed to sleep. Sleep Deprived: Lack of sleep has a serious impact on your ability to pay attention or focus on a task. Consistently getting less than the average of 8 hours your body needs creates partial or cumulative sleep deprivation. Untreated Sleep Disorders: Sleep Apnea, Narcolepsy, R.L.S., and other sleep disorders (untreated) prevent a person from getting enough restful sleep. This leads to excessive daytime sleepiness and increases the risk for drowsy driving accidents by up to 7 times. Medications / Alcohol: Even over the counter medications can cause drowsiness. Medications that impair a drivers attention should have a warning label. Alcohol naturally makes you sleepy and on its own can cause accidents. Combined with excessive drowsiness its effects are amplified. Signs of Drowsy Driving: * You don't remember driving the last few miles * You may drift out of your pierre * You are unable to focus and your thoughts wander * You may yawn more often than normal 
 * You have difficulty keeping your eyes open / nodding off * Missing traffic signs, speeding, or tailgating Prevention-  
 Good sleep hygiene, lifestyle and behavioral choices have the most impact on drowsy driving. There is no substitute for sleep and the average person requires 8 hours nightly. If you find yourself driving drowsy, stop and sleep. Consider the sleep hygiene tips provided during your visit as well. Medication Refill Policy: Refills for all medications require 1 week advance notice. Please have your pharmacy fax a refill request. We are unable to fax, or call in \"controled substance\" medications and you will need to pick these prescriptions up from our office. [employer] Activation Thank you for requesting access to [employer]. Please follow the instructions below to securely access and download your online medical record. [employer] allows you to send messages to your doctor, view your test results, renew your prescriptions, schedule appointments, and more. How Do I Sign Up? 1. In your internet browser, go to [email or web address]. 2. Click on the First Time User? Click Here link in the Sign In box. You will see the New Member Sign Up page. 3. Enter your [employer] Access Code exactly as it appears below. You will not need to use this code after youve completed the sign-up process. If you do not sign up before the expiration date, you must request a new code. [employer] Access Code: [email or web address] Expires: 2021  9:10 AM (This is the date your [employer] access code will ) 4. Enter the last four digits of your Social Security Number (xxxx) and Date of Birth (mm/dd/yyyy) as indicated and click Submit. You will be taken to the next sign-up page. 5. Create a [employer] ID. This will be your [employer] login ID and cannot be changed, so think of one that is secure and easy to remember. 6. Create a [employer] password. You can change your password at any time. 7. Enter your Password Reset Question and Answer. This can be used at a later time if you forget your password. 8. Enter your e-mail address. You will receive e-mail notification when new information is available in [address]. 9. Click Sign Up. You can now view and download portions of your medical record. 10. Click the Download Summary menu link to download a portable copy of your medical information. Additional Information If you have questions, please call [phone]. Remember, [employer] is NOT to be used for urgent needs. For medical emergencies, dial 911.

## 2021-01-15 DIAGNOSIS — M54.50 LOW BACK PAIN WITHOUT SCIATICA, UNSPECIFIED BACK PAIN LATERALITY, UNSPECIFIED CHRONICITY: ICD-10-CM

## 2021-01-15 RX ORDER — GABAPENTIN 300 MG/1
CAPSULE ORAL
Qty: 180 CAP | Refills: 0 | Status: SHIPPED | OUTPATIENT
Start: 2021-01-15 | End: 2021-05-28

## 2021-01-18 ENCOUNTER — HOSPITAL ENCOUNTER (OUTPATIENT)
Dept: CARDIAC REHAB | Age: 71
Discharge: HOME | End: 2021-01-18
Payer: MEDICARE

## 2021-01-18 VITALS — WEIGHT: 200 LBS | BODY MASS INDEX: 31.32 KG/M2

## 2021-01-18 PROCEDURE — 93798 PHYS/QHP OP CAR RHAB W/ECG: CPT

## 2021-01-22 ENCOUNTER — OFFICE VISIT (OUTPATIENT)
Dept: CARDIOLOGY CLINIC | Age: 71
End: 2021-01-22
Payer: MEDICARE

## 2021-01-22 VITALS
DIASTOLIC BLOOD PRESSURE: 92 MMHG | HEIGHT: 67 IN | SYSTOLIC BLOOD PRESSURE: 132 MMHG | OXYGEN SATURATION: 97 % | RESPIRATION RATE: 16 BRPM | BODY MASS INDEX: 30.45 KG/M2 | WEIGHT: 194 LBS | HEART RATE: 83 BPM

## 2021-01-22 DIAGNOSIS — E78.2 MIXED HYPERLIPIDEMIA: ICD-10-CM

## 2021-01-22 DIAGNOSIS — I42.9 CARDIOMYOPATHY, UNSPECIFIED TYPE (HCC): ICD-10-CM

## 2021-01-22 DIAGNOSIS — I10 ESSENTIAL HYPERTENSION: ICD-10-CM

## 2021-01-22 DIAGNOSIS — I25.10 ASHD (ARTERIOSCLEROTIC HEART DISEASE): Primary | ICD-10-CM

## 2021-01-22 DIAGNOSIS — Z78.9 STATIN INTOLERANCE: ICD-10-CM

## 2021-01-22 DIAGNOSIS — Z95.1 S/P CABG X 3: ICD-10-CM

## 2021-01-22 DIAGNOSIS — I44.2 THIRD DEGREE AV BLOCK (HCC): ICD-10-CM

## 2021-01-22 PROCEDURE — G8755 DIAS BP > OR = 90: HCPCS | Performed by: INTERNAL MEDICINE

## 2021-01-22 PROCEDURE — 99214 OFFICE O/P EST MOD 30 MIN: CPT | Performed by: INTERNAL MEDICINE

## 2021-01-22 PROCEDURE — G8536 NO DOC ELDER MAL SCRN: HCPCS | Performed by: INTERNAL MEDICINE

## 2021-01-22 PROCEDURE — G8427 DOCREV CUR MEDS BY ELIG CLIN: HCPCS | Performed by: INTERNAL MEDICINE

## 2021-01-22 PROCEDURE — 3017F COLORECTAL CA SCREEN DOC REV: CPT | Performed by: INTERNAL MEDICINE

## 2021-01-22 PROCEDURE — 1101F PT FALLS ASSESS-DOCD LE1/YR: CPT | Performed by: INTERNAL MEDICINE

## 2021-01-22 PROCEDURE — G0463 HOSPITAL OUTPT CLINIC VISIT: HCPCS | Performed by: INTERNAL MEDICINE

## 2021-01-22 PROCEDURE — G8417 CALC BMI ABV UP PARAM F/U: HCPCS | Performed by: INTERNAL MEDICINE

## 2021-01-22 PROCEDURE — G8752 SYS BP LESS 140: HCPCS | Performed by: INTERNAL MEDICINE

## 2021-01-22 PROCEDURE — G9717 DOC PT DX DEP/BP F/U NT REQ: HCPCS | Performed by: INTERNAL MEDICINE

## 2021-01-22 NOTE — PROGRESS NOTES
2 [address], 200 [address] [facility]  [phone]     Subjective:      Renetta Vega. is a 65 y.o. male is here for one mos f/u and discuss test result. He has pmhx CAD s/p CABG, CHB s/p PM, HTN, HLD, DM  And CKD III. Last  OV 12/2020:  C/o ROLON---ordered echo  Echo: EF appears lower than before -- was 60-65%, now about 45-50%. We started low dose lisinopril. Today,     The patient denies chest pain/ orthopnea, PND, LE edema, palpitations, syncope, or presyncope. Echocardiogram 12/15/2020  · LV: Estimated LVEF is 45 - 50%. Visually measured ejection fraction. Normal cavity size. Moderate concentric hypertrophy. Globally reduced systolic function. Mild (grade 1) left ventricular diastolic dysfunction. · LA: Moderately dilated left atrium. Left Atrium volume index is 60.46 mL/m2. · RA: Moderately dilated right atrium. · MV: Mitral valve thickening. Mild mitral valve regurgitation is present.          Patient Active Problem List    Diagnosis Date Noted    Pacemaker 05/01/2020    Third degree AV block ([address] 05/01/2020    Postoperative anemia due to acute blood loss 04/29/2020    S/P CABG x 3 04/28/2020    CAD (coronary artery disease) 04/27/2020    Bilateral carotid artery stenosis 04/24/2020    NSTEMI (non-ST elevated myocardial infarction) ([address] 04/23/2020    Type 2 diabetes mellitus with diabetic neuropathy ([address] 01/09/2020    Prostate cancer ([address] 06/04/2018    Type 2 diabetes with nephropathy ([address] 02/28/2018    Reactive depression 11/15/2016    Cervical spinal stenosis 11/03/2016    CKD (chronic kidney disease) 10/31/2016    Hyperlipidemia 07/10/2015    Spinal stenosis, lumbar region, without neurogenic claudication 10/16/2014    DVT of leg (deep venous thrombosis) ([address] 12/18/2012    HTN (hypertension) 12/12/2012    Perforated diverticulum of large intestine 12/04/2012    Alcohol abuse 12/04/2012    Insomnia 12/04/2012      David Nicole, MD  Past Medical History:   Diagnosis Date    Arthritis     Chronic kidney disease     Stage 3    Chronic pain     Abdoman, back, fingers per daughter   [facility])     DIET CONTROLLED    Heart attack ([address]     Hypertension     PE (pulmonary embolism)     Pneumonia     Psychiatric disorder     Depression      Past Surgical History:   Procedure Laterality Date    HX BACK SURGERY  2014    HX GI  11/2012    bowel resection    HX ORTHOPAEDIC      amputated left 4th finger    GA ABDOMEN SURGERY PROC UNLISTED      bowel resection    GA INS NEW/RPLCMT PRM PM W/TRANSV ELTRD ATRIAL&VENT N/A 5/1/2020    INSERT PPM DUAL performed by Luisa Zaldivar MD at [address] CATH LAB     Allergies   Allergen Reactions    Arb-Angiotensin Receptor Antagonist Other (comments)     High k    Statins-Hmg-Coa Reductase Inhibitors Myalgia    Ace Inhibitors Cough      Family History   Problem Relation Age of Onset    Cancer Mother     Heart Disease Father     Heart Disease Brother     Anesth Problems Neg Hx       Social History     Socioeconomic History    Marital status:      Spouse name: Not on file    Number of children: 1    Years of education: 15    Highest education level: High school graduate   Occupational History    Not on file   Social Needs    Financial resource strain: Not hard at all   [employer] insecurity     Worry: Never true     Inability: Never true   [employer] needs     Medical: No     Non-medical: No   Tobacco Use    Smoking status: Never Smoker    Smokeless tobacco: Current User     Types: Chew   Substance and Sexual Activity    Alcohol use:  Yes     Alcohol/week: 7.0 standard drinks     Types: 7 Cans of beer per week     Comment: drinks on weekends    Drug use: No    Sexual activity: Not Currently   Lifestyle    Physical activity     Days per week: 0 days     Minutes per session: 0 min    Stress: Rather much   Relationships    Social connections     Talks on phone: Never Gets together: Never     Attends Orthodox service: Never     Active member of club or organization: No     Attends meetings of clubs or organizations: Never     Relationship status:     Intimate partner violence     Fear of current or ex partner: No     Emotionally abused: No     Physically abused: No     Forced sexual activity: No   Other Topics Concern     Service No    Blood Transfusions No    Caffeine Concern No    Occupational Exposure No    Hobby Hazards No    Sleep Concern Yes    Stress Concern Yes    Weight Concern No    Special Diet No    Back Care Yes    Exercise No    Bike Helmet Not Asked    Seat Belt Yes    Self-Exams Not Asked   Social History Narrative    ** Merged History Encounter **           Current Outpatient Medications   Medication Sig    evolocumab (REPATHA SURECLICK) pen injection 1 mL by SubCUTAneous route every fourteen (14) days.  gabapentin (NEURONTIN) 300 mg capsule TAKE 2 TO 3 CAPSULES BY MOUTH IN THE EVENING AS NEEDED FOR PAIN    traZODone (DESYREL) 50 mg tablet TAKE 1 TABLET BY MOUTH AT BEDTIME    metoprolol tartrate (LOPRESSOR) 25 mg tablet TAKE 1/2 (ONE-HALF) TABLET BY MOUTH EVERY 12 HOURS    SITagliptin (Januvia) 50 mg tablet Take 1 tablet by mouth once daily    ezetimibe (ZETIA) 10 mg tablet Take 1 Tab by mouth daily.  pumpkin seed extract-soy germ 300 mg cap Take 1 Cap by mouth daily as needed (prostate health).  co-enzyme Q-10 (Co Q-10) 100 mg capsule Take 100 mg by mouth daily as needed. 200 mg    cholecalciferol, vitamin D3, (VITAMIN D3 PO) Take 1,000 Int'l Units by mouth daily as needed.  ascorbate calcium (VITAMIN C PO) Take 1 Tab by mouth daily as needed.  aspirin delayed-release (ASPIR-LOW) 81 mg tablet Take 81 mg by mouth daily. Taking every now and then    FREESTYLE LANCETS 28 gauge misc USE TO CHECK BLOOD SUGAR ONCE DAILY    cyanocobalamin 1,000 mcg tablet Take 1,000 mcg by mouth daily as needed.     magnesium 250 mg tab Take 1 Tab by mouth daily as needed.  fluticasone propionate (Flonase Allergy Relief) 50 mcg/actuation nasal spray 2 Sprays by Both Nostrils route two (2) times daily as needed for Rhinitis.  acetaminophen (TYLENOL) 325 mg tablet Take 2 Tabs by mouth every four (4) hours as needed for Pain.  senna-docusate (PERICOLACE) 8.6-50 mg per tablet Take 1 Tab by mouth daily as needed for Constipation.  citalopram (CELEXA) 20 mg tablet Take 1 Tab by mouth daily. No current facility-administered medications for this visit. Review of Symptoms:  11 systems reviewed, negative other than as stated in the HPI    Physical ExamPhysical Exam:    Vitals:    01/22/21 1400   BP: (!) 132/92   Pulse: 83   Resp: 16   SpO2: 97%   Weight: 194 lb (88 kg)   Height: 5' 7\" (1.702 m)     Body mass index is 30.38 kg/m². General PE  Gen:  NAD  Mental Status - Alert. General Appearance - Not in acute distress. HEENT:  PERRL, no carotid bruits or JVD  Chest and Lung Exam   Inspection: Accessory muscles - No use of accessory muscles in breathing. Auscultation:   Breath sounds: - Normal.   Cardiovascular   Inspection: Jugular vein - Bilateral - Inspection Normal.   Palpation/Percussion:   Apical Impulse: - Normal.   Auscultation: Rhythm - Regular. Heart Sounds - S1 WNL and S2 WNL. No S3 or S4. Murmurs & Other Heart Sounds: Auscultation of the heart reveals - No Murmurs. Peripheral Vascular   Upper Extremity: Inspection - Bilateral - No Cyanotic nailbeds or Digital clubbing. Lower Extremity:   Palpation: Edema - Bilateral - No edema. Abdomen:   Soft, non-tender, bowel sounds are active.   Neuro: A&O times 3, CN and motor grossly WNL    Labs:   Lab Results   Component Value Date/Time    Cholesterol, total 156 11/18/2020 09:53 AM    Cholesterol, total 103 08/19/2020 09:31 AM    Cholesterol, total 182 05/14/2020 01:45 PM    Cholesterol, total 213 (H) 01/09/2020 11:36 AM    Cholesterol, total 244 (H) 06/25/2019 09:23 AM    HDL Cholesterol 49 11/18/2020 09:53 AM    HDL Cholesterol 42 08/19/2020 09:31 AM    HDL Cholesterol 36 (L) 05/14/2020 01:45 PM    HDL Cholesterol 57 01/09/2020 11:36 AM    HDL Cholesterol 62 06/25/2019 09:23 AM    LDL, calculated 93 11/18/2020 09:53 AM    LDL, calculated 48 08/19/2020 09:31 AM    LDL, calculated 112 (H) 05/14/2020 01:45 PM    LDL, calculated 138 (H) 01/09/2020 11:36 AM    LDL, calculated 164 (H) 06/25/2019 09:23 AM    LDL, calculated 150 (H) 09/17/2018 09:20 AM    Triglyceride 69 11/18/2020 09:53 AM    Triglyceride 63 08/19/2020 09:31 AM    Triglyceride 170 (H) 05/14/2020 01:45 PM    Triglyceride 92 01/09/2020 11:36 AM    Triglyceride 88 06/25/2019 09:23 AM     No results found for: CPK, CPKX, CPX  Lab Results   Component Value Date/Time    Sodium 141 11/18/2020 09:53 AM    Potassium 4.5 11/18/2020 09:53 AM    Chloride 104 11/18/2020 09:53 AM    CO2 22 11/18/2020 09:53 AM    Anion gap 7 05/04/2020 04:52 AM    Glucose 100 (H) 11/18/2020 09:53 AM    BUN 27 11/18/2020 09:53 AM    Creatinine 1.63 (H) 11/18/2020 09:53 AM    BUN/Creatinine ratio 17 11/18/2020 09:53 AM    GFR est AA 48 (L) 11/18/2020 09:53 AM    GFR est non-AA 41 (L) 11/18/2020 09:53 AM    Calcium 9.8 11/18/2020 09:53 AM    Bilirubin, total 1.6 (H) 11/18/2020 09:53 AM    Alk. phosphatase 74 11/18/2020 09:53 AM    Protein, total 6.4 11/18/2020 09:53 AM    Albumin 3.7 11/18/2020 09:53 AM    Globulin 3.1 05/02/2020 04:14 AM    A-G Ratio 1.4 11/18/2020 09:53 AM    ALT (SGPT) 39 11/18/2020 09:53 AM       EKG:       Assessment:        1. ASHD (arteriosclerotic heart disease)    2. S/P CABG x 3    3. Statin intolerance    4. Essential hypertension    5. Mixed hyperlipidemia    6.  Third degree AV block ([address]    7. Cardiomyopathy, unspecified type ([address]        Orders Placed This Encounter    LIPID PANEL     Standing Status:   Future     Standing Expiration Date:   7/22/2021    CK     Standing Status:   Future     Standing Expiration Date:   60    METABOLIC PANEL, COMPREHENSIVE     Standing Status:   Future     Standing Expiration Date:   2021    evolocumab (REPATHA SURECLICK) pen injection     Si mL by SubCUTAneous route every fourteen (14) days. Dispense:  6 Pen     Refill:  2        Plan:        ASHD Hx CABG x 3 in 2020---LIMA to LAD, SVG to RCA and Ramus  Continue ASA BB   Intolerant to statins  Patient notes resolution of chest pain since his bypass. Still in cardiac rehab, but only once a week      CM, new  Echo 2020 showed EF 45-50% mild MR  Echo in 2020 showed EF 60-65% , mild mitral regurgitation. On BB. He is intolerant of lisinopril and ARB's as per allergies section. Since ejection fraction is only minimally reduced to low normal, will hold off on additional medications but can consider hydralazine and nitrates in the future. Patient has been 100% RV paced  Repeat echo in 6 months.     CHB s/p PPM 2020  Device followed by Dr Qian Eastman seen  2020: Patient pacemaker check today show proper function. The patient is dependent on RV pacing. Previously he had short atrial flutter but the device check today did not show the new AMS arrhythmic event     HTN  Controlled with current therapy     HLD  Statin intolerance---In chart review has been intolerant to Crestor, Lipitor, pravastatin  2020 LDL at 112 On Zetia  as on last OV in 2020, we retrialed low dose crestor and his LDL on 2020 is at  50 but he stopped taking statin as he started having muscle pains again  Repeat LDL 93 now that he is off of his statin 2020  Now willing to try Almsita Carpentern for cardiovascular event reduction as he is intolerant of multiple statins and LDL is above goal.  Repeat labs in 3 months.        CKD III  Cr. 1.62 in 2020  Cr 1.54 in 2020     DM  On oral agent        Hx DVT/PE: developed PE/DVT after bowel resection in        Continue current care and f/u in  6 months after repeat echo.       Rogelio Ask Aidee Wood MD

## 2021-01-22 NOTE — LETTER
1/22/2021 Patient: Cruzito Andino. YOB: 1950 Date of Visit: 1/22/2021 Lori Velasco MD 
[address] [address] [address] [employer] Dear Lori Velasco MD, Thank you for referring Mr. Phoebe Jean Baptiste to [address] [place] [address] for evaluation. My notes for this consultation are attached. If you have questions, please do not hesitate to call me. I look forward to following your patient along with you.  
 
 
Sincerely, 
 
Wilbert De Los Santos MD

## 2021-01-22 NOTE — PROGRESS NOTES
Chief Complaint   Patient presents with    Results     Visit Vitals  BP (!) 132/92 (BP 1 Location: Left arm, BP Patient Position: Sitting)   Pulse 83   Resp 16   Ht 5' 7\" (1.702 m)   Wt 194 lb (88 kg)   SpO2 97%   BMI 30.38 kg/m²     Chest pain   SOB   Swelling in hands/feet   Dizziness denied

## 2021-01-25 ENCOUNTER — HOSPITAL ENCOUNTER (OUTPATIENT)
Dept: CARDIAC REHAB | Age: 71
Discharge: HOME | End: 2021-01-25
Payer: MEDICARE

## 2021-01-25 PROCEDURE — 93798 PHYS/QHP OP CAR RHAB W/ECG: CPT

## 2021-01-25 RX ORDER — METOPROLOL TARTRATE 25 MG/1
TABLET, FILM COATED ORAL
Qty: 30 TAB | Refills: 0 | Status: SHIPPED | OUTPATIENT
Start: 2021-01-25 | End: 2021-02-16

## 2021-01-27 RX ORDER — ALIROCUMAB 75 MG/ML
75 INJECTION, SOLUTION SUBCUTANEOUS EVERY 2 WEEKS
Qty: 6 SYRINGE | Refills: 1 | Status: SHIPPED | OUTPATIENT
Start: 2021-01-27 | End: 2021-07-21

## 2021-01-27 RX ORDER — ALIROCUMAB 75 MG/ML
75 INJECTION, SOLUTION SUBCUTANEOUS EVERY 2 WEEKS
COMMUNITY
End: 2021-01-27 | Stop reason: SDUPTHER

## 2021-02-02 ENCOUNTER — TELEPHONE (OUTPATIENT)
Dept: INTERNAL MEDICINE CLINIC | Age: 71
End: 2021-02-02

## 2021-02-02 NOTE — TELEPHONE ENCOUNTER
#[phone]  Pt's daughter,  Sheela Schroeder would like to know if pt can take tylenol for a headache? Please call back as soon as possible Sheela Schroeder ask.

## 2021-02-03 RX ORDER — TRAZODONE HYDROCHLORIDE 50 MG/1
TABLET ORAL
Qty: 30 TAB | Refills: 0 | Status: SHIPPED | OUTPATIENT
Start: 2021-02-03 | End: 2021-02-24

## 2021-02-08 ENCOUNTER — HOSPITAL ENCOUNTER (OUTPATIENT)
Dept: CARDIAC REHAB | Age: 71
Discharge: HOME | End: 2021-02-08
Payer: MEDICARE

## 2021-02-08 VITALS — WEIGHT: 191.6 LBS | BODY MASS INDEX: 30.01 KG/M2

## 2021-02-08 PROCEDURE — 93798 PHYS/QHP OP CAR RHAB W/ECG: CPT

## 2021-02-09 ENCOUNTER — TELEPHONE (OUTPATIENT)
Dept: INTERNAL MEDICINE CLINIC | Age: 71
End: 2021-02-09

## 2021-02-09 NOTE — TELEPHONE ENCOUNTER
----- Message from Shannan Carlos sent at 2/9/2021  1:19 PM EST -----  Regarding: Dr. Nicho Holt first and last name: [facility] [place]/daughter      Reason for call: question about a med      Callback required yes/no and why: yes/ answer question      Best contact number(s):[phone]      Details to clarify the request: 'pialucent'  was giving to pt by heart MD, concerns/questions if can take with current med.       Message from [facility]

## 2021-02-11 NOTE — TELEPHONE ENCOUNTER
Called out and spoke to [facility] [place] (HIPAA). Two pt identifiers confirmed. Yareli informed per Dr. Jessee Jo that pt is fine to take the Praluent Pen. Sheela Schroeder verbalized understanding of information discussed w/ no further questions at this time.

## 2021-02-11 NOTE — TELEPHONE ENCOUNTER
Called out and spoke to [facility] [place] (HIPAA). Two pt identifiers confirmed. Honey Thorne states that pt was just put on the Praluent Pen and pt wants to make sure Dr. Kolby Veronica is okay w/ him going on this. Yareli informed Dr. Kolby Veronica will be notified. Honey Thorne verbalized understanding of information discussed w/ no further questions at this time.

## 2021-02-15 ENCOUNTER — TELEPHONE (OUTPATIENT)
Dept: CARDIAC REHAB | Age: 71
End: 2021-02-15

## 2021-02-15 NOTE — TELEPHONE ENCOUNTER
I called Geraldo Porter. today because he did not come to his appointment at cardiac rehab. I left a vm with our phone number asking him to call us to check in and verify his schedule. His last visit at [facility] was on 2/8/21. Will continue to follow.   Mechelle Hooper RN

## 2021-02-16 RX ORDER — METOPROLOL TARTRATE 25 MG/1
TABLET, FILM COATED ORAL
Qty: 30 TAB | Refills: 0 | Status: SHIPPED | OUTPATIENT
Start: 2021-02-16 | End: 2021-03-31

## 2021-02-22 ENCOUNTER — HOSPITAL ENCOUNTER (OUTPATIENT)
Dept: CARDIAC REHAB | Age: 71
Discharge: HOME | End: 2021-02-22
Payer: MEDICARE

## 2021-02-22 ENCOUNTER — TELEPHONE (OUTPATIENT)
Dept: CARDIAC REHAB | Age: 71
End: 2021-02-22

## 2021-02-22 VITALS — BODY MASS INDEX: 30.17 KG/M2 | WEIGHT: 192.6 LBS

## 2021-02-22 PROCEDURE — 93798 PHYS/QHP OP CAR RHAB W/ECG: CPT

## 2021-02-22 NOTE — TELEPHONE ENCOUNTER
Cardiac Rehab: Received call from pt daughter as pt had told her his \"pressures were high in cardiac rehab\" and she wanted more information. Reviewed with her his BP numbers of 141/96 and 140/93. He also has previous readings of 109/82 and 121/74. Informed her I had spoken to him about minimizing his cheese intake (higher in sodium food item) which he said has been eating lately. I had encouraged deep breathing exercises and relaxation techniques for pt as he has \"a lot on his mind\". He said he enjoys reading. Cardiac rehab will continue to monitor his BP numbers and if needed to correspond with his cardiologist. She said that would be [place]. Also advised that pt needs to discontinue chewing tobacco. All questions were answered.

## 2021-02-24 RX ORDER — TRAZODONE HYDROCHLORIDE 50 MG/1
TABLET ORAL
Qty: 30 TAB | Refills: 0 | Status: SHIPPED | OUTPATIENT
Start: 2021-02-24 | End: 2021-05-05

## 2021-02-26 ENCOUNTER — TELEPHONE (OUTPATIENT)
Dept: INTERNAL MEDICINE CLINIC | Age: 71
End: 2021-02-26

## 2021-02-26 NOTE — TELEPHONE ENCOUNTER
Yareli Mathur  [phone]    States pt tells her there is blood in his stool when he wipes, when he strains. Happened today and Wednesday when he was straining.

## 2021-02-27 ENCOUNTER — APPOINTMENT (OUTPATIENT)
Dept: CT IMAGING | Age: 71
End: 2021-02-27
Attending: PHYSICIAN ASSISTANT
Payer: MEDICARE

## 2021-02-27 ENCOUNTER — HOSPITAL ENCOUNTER (EMERGENCY)
Age: 71
Discharge: HOME | End: 2021-02-28
Attending: EMERGENCY MEDICINE | Admitting: EMERGENCY MEDICINE
Payer: MEDICARE

## 2021-02-27 DIAGNOSIS — K62.5 RECTAL BLEEDING: Primary | ICD-10-CM

## 2021-02-27 LAB
ABO + RH BLD: NORMAL
ALBUMIN SERPL-MCNC: 3.7 G/DL (ref 3.5–5)
ALBUMIN/GLOB SERPL: 0.9 {RATIO} (ref 1.1–2.2)
ALP SERPL-CCNC: 98 U/L (ref 45–117)
ALT SERPL-CCNC: 61 U/L (ref 12–78)
ANION GAP SERPL CALC-SCNC: 5 MMOL/L (ref 5–15)
APPEARANCE UR: CLEAR
AST SERPL-CCNC: 58 U/L (ref 15–37)
BACTERIA URNS QL MICRO: NEGATIVE /HPF
BASOPHILS # BLD: 0 K/UL (ref 0–0.1)
BASOPHILS NFR BLD: 1 % (ref 0–1)
BILIRUB SERPL-MCNC: 2.2 MG/DL (ref 0.2–1)
BILIRUB UR QL: NEGATIVE
BLOOD GROUP ANTIBODIES SERPL: NORMAL
BUN SERPL-MCNC: 29 MG/DL (ref 6–20)
BUN/CREAT SERPL: 18 (ref 12–20)
CALCIUM SERPL-MCNC: 9.3 MG/DL (ref 8.5–10.1)
CHLORIDE SERPL-SCNC: 109 MMOL/L (ref 97–108)
CO2 SERPL-SCNC: 26 MMOL/L (ref 21–32)
COLOR UR: ABNORMAL
COMMENT, HOLDF: NORMAL
CREAT SERPL-MCNC: 1.65 MG/DL (ref 0.7–1.3)
DIFFERENTIAL METHOD BLD: ABNORMAL
EOSINOPHIL # BLD: 0 K/UL (ref 0–0.4)
EOSINOPHIL NFR BLD: 0 % (ref 0–7)
EPITH CASTS URNS QL MICRO: ABNORMAL /LPF
ERYTHROCYTE [DISTWIDTH] IN BLOOD BY AUTOMATED COUNT: 18.1 % (ref 11.5–14.5)
GLOBULIN SER CALC-MCNC: 4.2 G/DL (ref 2–4)
GLUCOSE SERPL-MCNC: 110 MG/DL (ref 65–100)
GLUCOSE UR STRIP.AUTO-MCNC: NEGATIVE MG/DL
HCT VFR BLD AUTO: 40.8 % (ref 36.6–50.3)
HEMOCCULT STL QL: POSITIVE
HGB BLD-MCNC: 13.3 G/DL (ref 12.1–17)
HGB UR QL STRIP: NEGATIVE
HYALINE CASTS URNS QL MICRO: ABNORMAL /LPF (ref 0–5)
IMM GRANULOCYTES # BLD AUTO: 0 K/UL (ref 0–0.04)
IMM GRANULOCYTES NFR BLD AUTO: 0 % (ref 0–0.5)
KETONES UR QL STRIP.AUTO: ABNORMAL MG/DL
LACTATE SERPL-SCNC: 1.2 MMOL/L (ref 0.4–2)
LEUKOCYTE ESTERASE UR QL STRIP.AUTO: NEGATIVE
LIPASE SERPL-CCNC: 331 U/L (ref 73–393)
LYMPHOCYTES # BLD: 1 K/UL (ref 0.8–3.5)
LYMPHOCYTES NFR BLD: 19 % (ref 12–49)
MCH RBC QN AUTO: 27.3 PG (ref 26–34)
MCHC RBC AUTO-ENTMCNC: 32.6 G/DL (ref 30–36.5)
MCV RBC AUTO: 83.8 FL (ref 80–99)
MONOCYTES # BLD: 0.7 K/UL (ref 0–1)
MONOCYTES NFR BLD: 14 % (ref 5–13)
NEUTS SEG # BLD: 3.2 K/UL (ref 1.8–8)
NEUTS SEG NFR BLD: 65 % (ref 32–75)
NITRITE UR QL STRIP.AUTO: NEGATIVE
NRBC # BLD: 0 K/UL (ref 0–0.01)
NRBC BLD-RTO: 0 PER 100 WBC
PH UR STRIP: 5 [PH] (ref 5–8)
PLATELET # BLD AUTO: 121 K/UL (ref 150–400)
PMV BLD AUTO: 11.2 FL (ref 8.9–12.9)
POTASSIUM SERPL-SCNC: 4.9 MMOL/L (ref 3.5–5.1)
PROT SERPL-MCNC: 7.9 G/DL (ref 6.4–8.2)
PROT UR STRIP-MCNC: 100 MG/DL
RBC # BLD AUTO: 4.87 M/UL (ref 4.1–5.7)
RBC #/AREA URNS HPF: ABNORMAL /HPF (ref 0–5)
SAMPLES BEING HELD,HOLD: NORMAL
SODIUM SERPL-SCNC: 140 MMOL/L (ref 136–145)
SP GR UR REFRACTOMETRY: 1.02 (ref 1–1.03)
SPECIMEN EXP DATE BLD: NORMAL
UR CULT HOLD, URHOLD: NORMAL
UROBILINOGEN UR QL STRIP.AUTO: 0.2 EU/DL (ref 0.2–1)
WBC # BLD AUTO: 5 K/UL (ref 4.1–11.1)
WBC URNS QL MICRO: ABNORMAL /HPF (ref 0–4)

## 2021-02-27 PROCEDURE — 83690 ASSAY OF LIPASE: CPT

## 2021-02-27 PROCEDURE — 83605 ASSAY OF LACTIC ACID: CPT

## 2021-02-27 PROCEDURE — 74011000636 HC RX REV CODE- 636: Performed by: RADIOLOGY

## 2021-02-27 PROCEDURE — 82272 OCCULT BLD FECES 1-3 TESTS: CPT

## 2021-02-27 PROCEDURE — 36415 COLL VENOUS BLD VENIPUNCTURE: CPT

## 2021-02-27 PROCEDURE — 96374 THER/PROPH/DIAG INJ IV PUSH: CPT

## 2021-02-27 PROCEDURE — 74178 CT ABD&PLV WO CNTR FLWD CNTR: CPT

## 2021-02-27 PROCEDURE — 80053 COMPREHEN METABOLIC PANEL: CPT

## 2021-02-27 PROCEDURE — 86901 BLOOD TYPING SEROLOGIC RH(D): CPT

## 2021-02-27 PROCEDURE — 99283 EMERGENCY DEPT VISIT LOW MDM: CPT

## 2021-02-27 PROCEDURE — 85025 COMPLETE CBC W/AUTO DIFF WBC: CPT

## 2021-02-27 PROCEDURE — 96361 HYDRATE IV INFUSION ADD-ON: CPT

## 2021-02-27 PROCEDURE — 74011250636 HC RX REV CODE- 250/636: Performed by: PHYSICIAN ASSISTANT

## 2021-02-27 PROCEDURE — C9113 INJ PANTOPRAZOLE SODIUM, VIA: HCPCS | Performed by: PHYSICIAN ASSISTANT

## 2021-02-27 PROCEDURE — 81001 URINALYSIS AUTO W/SCOPE: CPT

## 2021-02-27 RX ORDER — PANTOPRAZOLE SODIUM 40 MG/1
40 TABLET, DELAYED RELEASE ORAL
Qty: 20 TAB | Refills: 0 | Status: SHIPPED | OUTPATIENT
Start: 2021-02-27 | End: 2021-03-19

## 2021-02-27 RX ORDER — DOCUSATE SODIUM 100 MG/1
100 CAPSULE, LIQUID FILLED ORAL 2 TIMES DAILY
Qty: 30 CAP | Refills: 0 | Status: SHIPPED | OUTPATIENT
Start: 2021-02-27 | End: 2022-07-01 | Stop reason: ALTCHOICE

## 2021-02-27 RX ORDER — PANTOPRAZOLE SODIUM 40 MG/10ML
40 INJECTION, POWDER, LYOPHILIZED, FOR SOLUTION INTRAVENOUS
Status: DISCONTINUED | OUTPATIENT
Start: 2021-02-27 | End: 2021-02-27 | Stop reason: SDUPTHER

## 2021-02-27 RX ADMIN — SODIUM CHLORIDE 500 ML: 9 INJECTION, SOLUTION INTRAVENOUS at 22:17

## 2021-02-27 RX ADMIN — IOPAMIDOL 100 ML: 755 INJECTION, SOLUTION INTRAVENOUS at 23:06

## 2021-02-27 RX ADMIN — PANTOPRAZOLE SODIUM 40 MG: 40 INJECTION, POWDER, FOR SOLUTION INTRAVENOUS at 21:19

## 2021-02-28 VITALS
HEART RATE: 88 BPM | OXYGEN SATURATION: 99 % | DIASTOLIC BLOOD PRESSURE: 88 MMHG | TEMPERATURE: 98.2 F | RESPIRATION RATE: 18 BRPM | SYSTOLIC BLOOD PRESSURE: 150 MMHG

## 2021-02-28 NOTE — ED TRIAGE NOTES
PT arrives from home d/t blood streaks on his TP when he has a bowel movement. Denies blood clots in toilet or dark, tarry stools, or abdominal pain. Has been reporting constipation recently. Hx of MI last year and he has pacemaker.

## 2021-02-28 NOTE — ED PROVIDER NOTES
Janae Dennis is a 65 y.o. male with PMH of HTN, CKD, depression, MI, PE- no longer on anticoagulation presents to emergency room with family for evaluation of BRB with wiping after having a BM since Wednesday. He notes 2 episodes of BRB on the toilet paper \"and maybe in the stool once today. \"  Chart review: Last colonoscopy was in 2015 with Dr. Chávez, he was scheduled to have another colonoscopy last year but was delayed due to the COVID-19 pandemic. Hx of prostate CA, finished radiation tx in September. He denies history of diverticulosis, diverticulitis or rectal bleeding. PCP: Myriam Alonso MD  GI: Pili Pavon, last colonoscopy 2015    Surgical hx- bowel ressection  Social hx- no tobacco    The patient has no other complaints at this time.              Past Medical History:   Diagnosis Date    Arthritis     Chronic kidney disease     Stage 3    Chronic pain     Abdoman, back, fingers per daughter   Dossie Amdison Diabetes [facility])     DIET CONTROLLED    Heart attack ([address]     Hypertension     PE (pulmonary embolism)     Pneumonia     Psychiatric disorder     Depression       Past Surgical History:   Procedure Laterality Date    HX BACK SURGERY  2014    HX GI  11/2012    bowel resection    HX ORTHOPAEDIC      amputated left 4th finger    CA ABDOMEN SURGERY PROC UNLISTED      bowel resection    CA INS NEW/RPLCMT PRM PM W/TRANSV ELTRD ATRIAL&VENT N/A 5/1/2020    INSERT PPM DUAL performed by Fidel Marshall MD at [address] [facility]         Family History:   Problem Relation Age of Onset    Cancer Mother     Heart Disease Father     Heart Disease Brother     Anesth Problems Neg Hx        Social History     Socioeconomic History    Marital status:      Spouse name: Not on file    Number of children: 1    Years of education: 15    Highest education level: High school graduate   Occupational History    Not on file   Social Needs    Financial resource strain: Not hard at all   Lauro-Young insecurity Worry: Never true     Inability: Never true    Transportation needs     Medical: No     Non-medical: No   Tobacco Use    Smoking status: Never Smoker    Smokeless tobacco: Current User     Types: Chew   Substance and Sexual Activity    Alcohol use: Yes     Alcohol/week: 7.0 standard drinks     Types: 7 Cans of beer per week     Comment: drinks on weekends    Drug use: No    Sexual activity: Not Currently   Lifestyle    Physical activity     Days per week: 0 days     Minutes per session: 0 min    Stress: Rather much   Relationships    Social connections     Talks on phone: Never     Gets together: Never     Attends Sikh service: Never     Active member of club or organization: No     Attends meetings of clubs or organizations: Never     Relationship status:     Intimate partner violence     Fear of current or ex partner: No     Emotionally abused: No     Physically abused: No     Forced sexual activity: No   Other Topics Concern     Service No    Blood Transfusions No    Caffeine Concern No    Occupational Exposure No    Hobby Hazards No    Sleep Concern Yes    Stress Concern Yes    Weight Concern No    Special Diet No    Back Care Yes    Exercise No    Bike Helmet Not Asked    Seat Belt Yes    Self-Exams Not Asked   Social History Narrative    ** Merged History Encounter **              ALLERGIES: Arb-angiotensin receptor antagonist, Statins-hmg-coa reductase inhibitors, and Ace inhibitors    Review of Systems   Constitutional: Negative. Negative for activity change, chills, fatigue and unexpected weight change. HENT: Negative for trouble swallowing. Respiratory: Negative for cough, chest tightness, shortness of breath and wheezing. Cardiovascular: Negative. Negative for chest pain and palpitations. Gastrointestinal: Positive for blood in stool. Negative for abdominal pain, diarrhea, nausea and vomiting. Genitourinary: Negative.   Negative for dysuria, flank pain, frequency and hematuria. Musculoskeletal: Negative. Negative for arthralgias, back pain, neck pain and neck stiffness. Skin: Negative. Negative for color change and rash. Neurological: Negative. Negative for dizziness, numbness and headaches. All other systems reviewed and are negative. Vitals:    02/27/21 1902 02/27/21 1959 02/28/21 0005   BP: (!) 162/96 (!) 153/96 (!) 150/88   Pulse: (!) 102 94 88   Resp: 18     Temp: 98.2 °F (36.8 °C)     SpO2: 99% 98% 99%            Physical Exam  Vitals signs and nursing note reviewed. Constitutional:       General: He is not in acute distress. Appearance: He is well-developed. He is not toxic-appearing or diaphoretic. Comments: AA male, elevated BMI   HENT:      Head: Normocephalic and atraumatic. Eyes:      General:         Right eye: No discharge. Left eye: No discharge. Conjunctiva/sclera: Conjunctivae normal.      Pupils: Pupils are equal, round, and reactive to light. Neck:      Musculoskeletal: Full passive range of motion without pain and normal range of motion. Trachea: No tracheal tenderness. Cardiovascular:      Rate and Rhythm: Normal rate and regular rhythm. Pulses: Normal pulses. Heart sounds: Normal heart sounds. No murmur. No friction rub. No gallop. Pulmonary:      Effort: Pulmonary effort is normal. No respiratory distress. Breath sounds: Normal breath sounds. No wheezing or rales. Chest:      Chest wall: No tenderness. Abdominal:      General: Bowel sounds are normal. There is no distension. Palpations: Abdomen is soft. There is no mass. Tenderness: There is no abdominal tenderness. There is no right CVA tenderness, left CVA tenderness, guarding or rebound. Hernia: No hernia is present. Musculoskeletal: Normal range of motion. General: No tenderness. Skin:     General: Skin is warm and dry.       Capillary Refill: Capillary refill takes less than 2 seconds. Findings: No abrasion, erythema or rash. Neurological:      Mental Status: He is alert and oriented to person, place, and time. Cranial Nerves: No cranial nerve deficit. Sensory: No sensory deficit. Coordination: Coordination normal.   Psychiatric:         Speech: Speech normal.         Behavior: Behavior normal.          MDM  Number of Diagnoses or Management Options  Rectal bleeding  Diagnosis management comments:   DDx: GI bleed, hemorrhoid, blood loss anemia, electrolyte abnormality, dehydration, diverticulitis, diverticular bleed       Amount and/or Complexity of Data Reviewed  Clinical lab tests: reviewed and ordered  Tests in the radiology section of CPT®: ordered and reviewed  Decide to obtain previous medical records or to obtain history from someone other than the patient: yes  Discuss the patient with other providers: yes    Patient Progress  Patient progress: stable         Procedures    I discussed patient's PMH, exam findings as well as careplan with the ER attending who agrees with care plan. Radha Zamudio PA-C     Procedure Note - Rectal Exam:   9:21 PM  Performed by: Radha Zamudio PA-C  Chaperoned by: primary nurse  Rectal exam performed. Scant brown stool was collected. Stool was collected and sent to the lab for Hemoccult testing. Other findings: no masses, no TTP, no sign of perirectal abscess or hemorrhoid seen or palpated. The procedure took 1-15 minutes, and pt tolerated well. 59-year-old male with 3-day history of right red blood with wiping after having BM, presents for evaluation for rectal bleeding. He has no gross blood on NORA, is Hemoccult positive. H&H stable, no tachycardia and no signs of active bleeding on bleeding scan. His abdomen is soft and nontender and he has no acute complaints currently. Give patient option for admission for observation versus discharge home with precautions to return should bleeding resume.   Patient states he would like to go home, will monitor stools and will reach out to his GI doctor on Monday. Family with him agrees with care plan and will assist with follow-up. IMAGING RESULTS:  Ct Abd Pelv W Wo Cont    Result Date: 2/27/2021  No active GI bleed. No bowel obstruction. Trace ascites and diffuse body wall edema. No other acute abnormality in the abdomen or pelvis. MEDICATIONS GIVEN:  Medications   pantoprazole (PROTONIX) 40 mg in 0.9% sodium chloride 10 mL injection (40 mg IntraVENous Given 2/27/21 2119)   sodium chloride 0.9 % bolus infusion 500 mL (0 mL IntraVENous IV Completed 2/27/21 2317)   iopamidoL (ISOVUE-370) 76 % injection 100 mL (100 mL IntraVENous Given 2/27/21 2306)         DISCHARGE NOTE:  10:44 AM  The patient's results have been reviewed with them and/or available family. Patient and/or family verbally conveyed their understanding and agreement of the patient's signs, symptoms, diagnosis, treatment and prognosis and additionally agree to follow up as recommended in the discharge instructions or to return to the Emergency Room should their condition change prior to their follow-up appointment. The patient/family verbally agrees with the care-plan and verbally conveys that all of their questions have been answered. The discharge instructions have also been provided to the patient and/or family with some educational information regarding the patient's diagnosis as well a list of reasons why the patient would want to return to the ER prior to their follow-up appointment, should their condition change. Plan:  1. F/U with GI, pcp  2.  Rx protonix and stool softener  Return precautions discussed and advised to return to ER if worse

## 2021-03-01 ENCOUNTER — APPOINTMENT (OUTPATIENT)
Dept: CARDIAC REHAB | Age: 71
End: 2021-03-01
Payer: MEDICARE

## 2021-03-01 ENCOUNTER — TELEPHONE (OUTPATIENT)
Dept: CARDIAC REHAB | Age: 71
End: 2021-03-01

## 2021-03-01 ENCOUNTER — TELEPHONE (OUTPATIENT)
Dept: INTERNAL MEDICINE CLINIC | Age: 71
End: 2021-03-01

## 2021-03-01 ENCOUNTER — PATIENT OUTREACH (OUTPATIENT)
Dept: CASE MANAGEMENT | Age: 71
End: 2021-03-01

## 2021-03-01 NOTE — PROGRESS NOTES
Patient contacted regarding recent discharge and COVID-19 risk. Discussed COVID-19 related testing which was not done at this time. Test results were not done. Patient informed of results, if available? n/a    Outreach made within 2 business days of discharge: Yes    Ambulatory Care Manager contacted the patient by telephone to perform post discharge assessment. Verified name and  with patient as identifiers. Patient has following risk factors of: diabetes, chronic kidney disease and ED visit 21. ACM reviewed discharge instructions, medical action plan and red flags related to discharge diagnosis. Reviewed and educated them on any new and changed medications related to discharge diagnosis. Patient reports having placed a call to schedule a follow up with GI doctor and is awaiting call back. A follow up with PCP is scheduled for 3/10/21. Advance Care Planning:   Does patient have an Advance Directive: currently not on file; education provided  ACM offered to help with completion of document and supplied contact information should patient need help. Patient verbalized understanding. Patient verified healthcare decision makers as correctly listed in chart: Titi Garrido (daughter) [phone] and Jacky Michelle (daughter) [phone]      Education provided regarding infection prevention, and signs and symptoms of COVID-19 and when to seek medical attention with patient who verbalized understanding. Discussed exposure protocols and quarantine from [address] you at higher risk for severe illness  and given an opportunity for questions and concerns. The patient agrees to contact the COVID-19 hotline 675-563-9816 or PCP office for questions related to their healthcare. ACM provided contact information for future reference. From CDC: Are you at higher risk for severe illness?  Wash your hands often.    Avoid close contact (6 feet, which is about two arm lengths) with people who are sick.  Put distance between yourself and other people if COVID-19 is spreading in your community.  Clean and disinfect frequently touched surfaces.  Avoid all cruise travel and non-essential air travel.  Call your healthcare professional if you have concerns about COVID-19 and your underlying condition or if you are sick. For more information on steps you can take to protect yourself, see CDC's How to Protect Yourself      Patient/family/caregiver given information for GetWell Loop and agrees to enroll yes  Patient's preferred e-mail:  [email or web address]  Patient's preferred phone number: [phone]  Based on Loop alert triggers, patient will be contacted by nurse care manager for worsening symptoms. Pt will be further monitored by COVID Loop Team, pending account activation by patient.  based on severity of symptoms and risk factors. Begin CCM at next outreach      Ambulatory Care Management Note    Date/Time:  3/1/2021 10:41 AM    This patient was received as a referral from Daily Assignment   Ambulatory Care Manager outreached to patient today to offer care management services. Introduction to self and role of care manager provided. Patient accepted care management services at this time. Follow up call scheduled at this time. Patient has Ambulatory Care Manager's contact number for for any questions or concerns.    Etienne Henry RN  Ambulatory Care Manager

## 2021-03-01 NOTE — TELEPHONE ENCOUNTER
3/1/2021 [facility]ness: Mr. Geraldo Porter. called to cancel today's appointment d/t hospitalization over the weekend for GI bleed. Will return for next appointment.  Samson Polanco

## 2021-03-01 NOTE — TELEPHONE ENCOUNTER
Called out and spoke to [facility] [place] [facility] (HIPAA). Two pt identifiers confirmed. Elyssa Pearson states that pt had spotting blood on tissue paper and blood in stool via occult blood. Elyssa Pearson awaiting callback from GI. Elyssa Pearson states pt has h/o internal hemorrhoids. Elyssa Pearson states that BP has been going up some. Advised to monitor/record readings to bring in at 3/10/21 f/u. Elyssa Pearson verbalized understanding of information discussed w/ no further questions at this time.

## 2021-03-01 NOTE — TELEPHONE ENCOUNTER
----- Message from Swati Bone sent at 3/1/2021  8:58 AM EST -----  Regarding: Dr. Bowling/telephone  Caller's first and last name:Tash Mathur. Daughter  Reason for call: pt was in ER & daughter would like to discuss  Callback required yes/no and why: yes. To discuss  Best contact number(s):[phone]  Details to clarify the request: Pt was in the ER and you discuss \"what's going on with him\"    Message from TEDDY

## 2021-03-01 NOTE — ACP (ADVANCE CARE PLANNING)
Advance Care Planning:   Does patient have an Advance Directive: currently not on file; education provided  ACM offered to help with completion of document and supplied contact information should patient need help. Patient verbalized understanding.      Patient verified healthcare decision makers as correctly listed in chart: Duc Dasilva (daughter) [phone] and [employer] (daughter) [phone]    Kita Lewis, [place]  Ambulatory Care Manager

## 2021-03-04 DIAGNOSIS — Z78.9 STATIN INTOLERANCE: ICD-10-CM

## 2021-03-04 DIAGNOSIS — I25.10 ASHD (ARTERIOSCLEROTIC HEART DISEASE): ICD-10-CM

## 2021-03-04 DIAGNOSIS — Z95.1 S/P CABG X 3: ICD-10-CM

## 2021-03-04 DIAGNOSIS — E78.2 MIXED HYPERLIPIDEMIA: ICD-10-CM

## 2021-03-04 DIAGNOSIS — Z95.0 CARDIAC PACEMAKER IN SITU: ICD-10-CM

## 2021-03-04 DIAGNOSIS — I44.2 THIRD DEGREE AV BLOCK (HCC): ICD-10-CM

## 2021-03-04 DIAGNOSIS — I10 ESSENTIAL HYPERTENSION: ICD-10-CM

## 2021-03-04 DIAGNOSIS — E11.9 DIABETES MELLITUS WITHOUT COMPLICATION (HCC): ICD-10-CM

## 2021-03-04 DIAGNOSIS — R06.09 DOE (DYSPNEA ON EXERTION): ICD-10-CM

## 2021-03-05 ENCOUNTER — DOCUMENTATION ONLY (OUTPATIENT)
Dept: CARDIOLOGY CLINIC | Age: 71
End: 2021-03-05

## 2021-03-05 NOTE — PROGRESS NOTES
Faxed Device management form to [address] [place] [address] at fax number [phone]. Fax confirmation received.

## 2021-03-08 ENCOUNTER — TELEPHONE (OUTPATIENT)
Dept: INTERNAL MEDICINE CLINIC | Age: 71
End: 2021-03-08

## 2021-03-08 NOTE — PROGRESS NOTES
HISTORY OF PRESENT ILLNESS  Cruzito Dodson is a 65 y.o. male. HPI     Last here 11/23/20 Pt is here to f/u on chronic conditions. Pt presents with his daughter who provides some of his hx.     Lov, He c/o sob, especially on exertion -c/o this again today really about the same not changed  Reviewed cxr 11/30/20: negative  Discussed he needs to f/u with cardio and try to have echo sooner     He was in ED 2/27/21 for rectal bleeding  Reviewed ct abd 2/27/21: No active GI bleed. No bowel obstruction. Trace ascites and diffuse body wall  edema. No other acute abnormality in the abdomen or pelvis. He had positive stool test   He was given stool softener and protonix   Patient complains of blood in general will be having an endoscopy and colonoscopy  He was having issues with constipation - but now having more regular BM   They met with Dr. Carissa Sebastian office and are waiting for dates for colo/egd   Discussed mirilax for constipation     He c/o swelling in R leg x several weeks or months unclear significantly more swollen than left leg in the past left leg has been issue had graft pulled from his left leg  He prev c/o swelling in L leg, had nl duplex  Will get duplex  Recall had DVT in the past    BP today is 134/74  BP at home 120/87 129/88 144/83   Continues on toprol now 25mg half tab bid      He is no longer taking lasix      He is DM      He does not check at home, daughter will start checking once a week   Continues januvia 50mg daily        Wt is 188 lbs - down 1 lb x lov     Discussed diet and w/l       Reviewed labs     Has history of CKD  Cr 1.4-1.5 stable      Will get labs today      He takes ASA 81 mg      Pt follows annually with Dr. Stan Zurita (cardio) for CAD and history of CABG 4/20  Reviewed note 1/22/21:ASHD Hx CABG x 3 in 4/2020---LIMA to LAD, SVG to RCA and Ramus  Continue ASA BB   Intolerant to statins  Patient notes resolution of chest pain since his bypass.   Still in cardiac rehab, but only once a week  CM, new  Echo 12/2020 showed EF 45-50% mild MR  Echo in April 2020 showed EF 60-65% , mild mitral regurgitation. On BB. He is intolerant of lisinopril and ARB's as per allergies section. Since ejection fraction is only minimally reduced to low normal, will hold off on additional medications but can consider hydralazine and nitrates in the future. Patient has been 100% RV paced  Repeat echo in 6 months. CHB s/p PPM 5/2020  Device followed by Dr Dawson---last seen  9/8/2020: Patient pacemaker check today show proper function.  The patient is dependent on RV pacing.  Previously he had short atrial flutter but the device check today did not show the new AMS arrhythmic event  HTN  Controlled with current therapy  HLD  Statin intolerance---In chart review has been intolerant to Crestor, Lipitor, pravastatin  5/2020 LDL at 112 On Zetia  as on last OV in 7/2020, we retrialed low dose crestor and his LDL on 8/2020 is at  50 but he stopped taking statin as he started having muscle pains again  Repeat LDL 93 now that he is off of his statin 11/18/2020  Now willing to try Gala Calkin for cardiovascular event reduction as he is intolerant of multiple statins and LDL is above goal.  Repeat labs in 3 months.   CKD III  Cr. 1.62 in 11/2020  Cr 1.54 in 6/2020  DM  On oral agent  Completing cardiac rehab  Will have repeat echo in 7/21  Now taking praluent      Patient is not taking Crestor   He is taking Zetia   He is now taking praluent per cardio      He saw Dr montiel (cardio) for CAD  Last there 6/3/20  He has been completing cardiac rehab     [facility] had pacemaker placed with Dr dawson (cardio)  Last there 9/8/20  Had pacemaker check 12/21/20     Pt follows with Dr. Srinath Salgado (nephro) for ckd in the past  Last visit was 3/2/18  He will only follow with me for his kidney dysfunction  Baseline cr 1.4-1.5 stable on recent labs      Pt follows with Edin (uro)  for prostate cancer--  Recall pt has [facility] (brother and uncle) prostate cancer  Last there 12/20 - psa was good per pt   Pt also met with a radiation oncologist at [facility]  He completed radiation therapy with Dr. Enrique Costello  He is not getting radioactive seeds because of his recent CABG  Next apt later this month  3/21     Pt saw Dr Leonora Alvarado (podiatry) in 4/19  Pt was given terbinafine for fungal nails      Recall Pt had a hearing evaluation in the past  Recall notes from ENT 5/28/19: has some hearing loss      Pt has been taking CoQ-10     He is not taking gabapentin 300 mg nightly for his tingling/neuropathy pain in his hands/fingers   His symptoms are minimal will take it occasionally as needed     He has not been taking celexa 20mg for depression/anxiety   Discussed he needs to take this everyday if he is feeling depressed   Overall has been doing okay without it     He also has ativan to use prn (not often, not in the past month) for anxiety --no recent use     Pt uses trazodone nightly  sleep works well      He has been seeing Dr. Preethi Mccullough (sleep) for NIMO    Not compliant with cpap 3/21 - declined sleep study   Reviewed note 1/4/21: I have reviewed medicare requirements regarding PAP usage  Treatment options for sleep apnea were reviewed. We will schedule him for a PAP titration to optimize settings and address barriers to adherence. PAP continues to benefit patient and remains necessary for control of his sleep apnea. CPAP setting - he will continue on his current pressure settings. He will see the [employer] for a mask fitting and to ensure machine functioning adequately. * We have recommended a dedicated weight loss through appropriate diet and an exercise regimen as significant weight reduction has been shown to reduce severity of obstructive sleep apnea. 2. Coronary Artery Disease - he continues on his current regimen.   I have reviewed the relationship between heart disease and sleep disordered breathing.          In the past, pt expressed concern about his memory  Previously, provided referral for Dr. Cheryl Booth (neuropsych)  lov provided info for Dr Erasto Combs (neuropsych)  Had the appointment 19 --then refused testing in the end   Overall stable currently     Discussed covid vaccine      ACP not on file. SDM is his daughter (Yareli Andrade). Provided information in the past.      Recall sees Dr. Rivera Hoffmann for h/o DVT, no longer on coumadin or xarelto, on ASA only.  Was on coumadin for h/o PE and DVT post operatively      PREVENTIVE:    Colonoscopy: 9/15/15, Dr. Velasquez Anderson, repeat 5 years- will be able to complete  per cardio, working on schedulingfit positive working on colonoscopy  EGD: 9/15/15, Dr. Velasquez Anderson  PSA: 7/15 3.0, ,  3.8, ,  5.5  Dr Lizeth Montana follows   AAA screen: CT , negative  Tdap: unknown   Pneumovax: 2012  [id number]   Shingrix: never completed  Flu shot: declines   Foot exam: 03/10/21  Microalbumin:   A1c:  ,,  6.0,  6.1, 3/19 6.0  5.9  POC 5.7  ,  5.8  6.6  6.0  Eye exam: Dr. My Boyd: 10/15, negative  Lipids:  48  EK/17, PACs    Patient Active Problem List    Diagnosis Date Noted    Pacemaker 2020    Third degree AV block ([facility] 2020    Postoperative anemia due to acute blood loss 2020    S/P CABG x 3 2020    CAD (coronary artery disease) 2020    Bilateral carotid artery stenosis 2020    NSTEMI (non-ST elevated myocardial infarction) ([address] 2020    Type 2 diabetes mellitus with diabetic neuropathy ([facility] 2020    Prostate cancer ([address] 2018    Type 2 diabetes with nephropathy ([address] 2018    Reactive depression 11/15/2016    Cervical spinal stenosis 2016    CKD (chronic kidney disease) 10/31/2016    Hyperlipidemia 07/10/2015    Spinal stenosis, lumbar region, without neurogenic claudication 10/16/2014    DVT of leg (deep venous thrombosis) ([facility] 2012    HTN (hypertension) 12/12/2012    Perforated diverticulum of large intestine 12/04/2012    Alcohol abuse 12/04/2012    Insomnia 12/04/2012     Current Outpatient Medications   Medication Sig Dispense Refill    pantoprazole (Protonix) 40 mg tablet Take 1 Tab by mouth every morning for 20 days. 20 Tab 0    docusate sodium (Dulcolax Stool Softener, dss,) 100 mg capsule Take 1 Cap by mouth two (2) times a day. 30 Cap 0    traZODone (DESYREL) 50 mg tablet TAKE 1 TABLET BY MOUTH AT BEDTIME 30 Tab 0    SITagliptin (Januvia) 50 mg tablet Take 1 tablet by mouth once daily 90 Tab 0    metoprolol tartrate (LOPRESSOR) 25 mg tablet TAKE 1/2 (ONE-HALF) TABLET BY MOUTH EVERY 12 HOURS 30 Tab 0    alirocumab (Praluent Pen) 75 mg/mL injector pen 1 mL by SubCUTAneous route Once every 2 weeks. 6 Syringe 1    gabapentin (NEURONTIN) 300 mg capsule TAKE 2 TO 3 CAPSULES BY MOUTH IN THE EVENING AS NEEDED FOR PAIN 180 Cap 0    ezetimibe (ZETIA) 10 mg tablet Take 1 Tab by mouth daily. 90 Tab 3    fluticasone propionate (Flonase Allergy Relief) 50 mcg/actuation nasal spray 2 Sprays by Both Nostrils route two (2) times daily as needed for Rhinitis.  pumpkin seed extract-soy germ 300 mg cap Take 1 Cap by mouth daily as needed (prostate health).  acetaminophen (TYLENOL) 325 mg tablet Take 2 Tabs by mouth every four (4) hours as needed for Pain.  senna-docusate (PERICOLACE) 8.6-50 mg per tablet Take 1 Tab by mouth daily as needed for Constipation.  co-enzyme Q-10 (Co Q-10) 100 mg capsule Take 100 mg by mouth daily as needed. 200 mg      citalopram (CELEXA) 20 mg tablet Take 1 Tab by mouth daily. 90 Tab 1    cholecalciferol, vitamin D3, (VITAMIN D3 PO) Take 1,000 Int'l Units by mouth daily as needed.  ascorbate calcium (VITAMIN C PO) Take 1 Tab by mouth daily as needed.  aspirin delayed-release (ASPIR-LOW) 81 mg tablet Take 81 mg by mouth daily.  Taking every now and then      FREESTYLE LANCETS 28 gauge misc USE TO CHECK BLOOD SUGAR ONCE DAILY 100 Lancet 12    cyanocobalamin 1,000 mcg tablet Take 1,000 mcg by mouth daily as needed.  magnesium 250 mg tab Take 1 Tab by mouth daily as needed.        Past Surgical History:   Procedure Laterality Date    HX BACK SURGERY  2014    HX GI  11/2012    bowel resection    HX ORTHOPAEDIC      amputated left 4th finger    VA ABDOMEN SURGERY PROC UNLISTED      bowel resection    VA INS NEW/RPLCMT PRM PM W/TRANSV ELTRD ATRIAL&VENT N/A 5/1/2020    INSERT PPM DUAL performed by Venkata Zuluaga MD at [place] [address] [place] [id number], [facility]/s  CATH LAB      Lab Results   Component Value Date/Time    WBC 5.0 02/27/2021 08:35 PM    HGB 13.3 02/27/2021 08:35 PM    Hemoglobin (POC) 13.3 11/02/2016 07:15 AM    HCT 40.8 02/27/2021 08:35 PM    Hematocrit (POC) 39 11/02/2016 07:15 AM    PLATELET 783 (L) 00/42/5268 08:35 PM    MCV 83.8 02/27/2021 08:35 PM     Lab Results   Component Value Date/Time    Cholesterol, total 156 11/18/2020 09:53 AM    Cholesterol (POC) 227 02/06/2015 03:30 PM    HDL Cholesterol 49 11/18/2020 09:53 AM    LDL, calculated 93 11/18/2020 09:53 AM    LDL, calculated 48 08/19/2020 09:31 AM    LDL Cholesterol (POC) 164 02/06/2015 03:30 PM    Triglyceride 69 11/18/2020 09:53 AM    Triglycerides (POC) 68 02/06/2015 03:30 PM     Lab Results   Component Value Date/Time    GFR est non-AA 41 (L) 02/27/2021 08:35 PM    GFRNA, POC 50 (L) 11/02/2016 07:15 AM    GFR est AA 50 (L) 02/27/2021 08:35 PM    GFRAA, POC >60 11/02/2016 07:15 AM    Creatinine 1.65 (H) 02/27/2021 08:35 PM    Creatinine (POC) 1.4 (H) 11/02/2016 07:15 AM    BUN 29 (H) 02/27/2021 08:35 PM    BUN (POC) 28 (H) 11/02/2016 07:15 AM    Sodium 140 02/27/2021 08:35 PM    Sodium (POC) 140 11/02/2016 07:15 AM    Potassium 4.9 02/27/2021 08:35 PM    Potassium (POC) 4.1 11/02/2016 07:15 AM    Chloride 109 (H) 02/27/2021 08:35 PM    Chloride (POC) 105 11/02/2016 07:15 AM    CO2 26 02/27/2021 08:35 PM    Magnesium 2.6 (H) 05/04/2020 04:52 AM    Phosphorus 3.0 10/17/2014 04:40 AM    Albumin, urine 59.5 10/23/2015 09:10 AM        Review of Systems   Respiratory: Positive for shortness of breath. Cardiovascular: Positive for leg swelling. Negative for chest pain. Gastrointestinal: Positive for constipation and heartburn. Physical Exam  Constitutional:       General: He is not in acute distress. Appearance: Normal appearance. He is not ill-appearing, toxic-appearing or diaphoretic. HENT:      Head: Normocephalic and atraumatic. Right Ear: External ear normal.      Left Ear: External ear normal.   Eyes:      General:         Right eye: No discharge. Left eye: No discharge. Conjunctiva/sclera: Conjunctivae normal.      Pupils: Pupils are equal, round, and reactive to light. Neck:      Musculoskeletal: Normal range of motion and neck supple. Cardiovascular:      Rate and Rhythm: Normal rate and regular rhythm. Pulses: Normal pulses. Heart sounds: Normal heart sounds. No murmur. No friction rub. No gallop. Pulmonary:      Effort: No respiratory distress. Breath sounds: Normal breath sounds. No wheezing or rales. Chest:      Chest wall: No tenderness. Musculoskeletal: Normal range of motion. Right lower leg: Edema (2+ edema) present. Skin:     General: Skin is warm and dry. Neurological:      Mental Status: He is alert and oriented to person, place, and time. Mental status is at baseline. Coordination: Coordination normal.      Gait: Gait normal.      Comments: Sensory exam of the foot is normal, tested with the monofilament. Good pulses, no lesions or ulcers, good peripheral pulses. Thickened toenails and calluses    Psychiatric:         Mood and Affect: Mood normal.         Behavior: Behavior normal.         ASSESSMENT and PLAN    ICD-10-CM ICD-9-CM    1.  Type 2 diabetes mellitus with diabetic neuropathy, without long-term current use of insulin ([facility])  E11.40 250.60          Controlled on Januvia 50 mg daily Home readings good monitor A1c adjust dose as needed    Has gabapentin for as needed use      357.2    2. Type 2 diabetes with nephropathy (HCC)  E11.21 250.40        See above monitoring renal function      583.81    3. Prostate cancer [facility])       Follows with urology routinely    Has completed radiation therapy    Continues to follow with urology  Will get notes for review   C61 185    4. NSTEMI (non-ST elevated myocardial infarction) ([facility]       Medically managed status post CABG     I21.4 410.70    5. Chronic deep vein thrombosis (DVT) of proximal vein of both lower extremities (HCC)       History of years ago was on oral anticoagulant    Medicine aspirin    Will be getting duplex due to new right leg swelling     I82.5Y3 453.51    6. Coronary artery disease involving native coronary artery of native heart without angina pectoris       Medically managed status post CABG up-to-date with cardiology     I25.10 414.01    7. Essential hypertension     Controlled on Toprol half tablet twice daily       I10 401.9    8. Stage 3a chronic kidney disease       Creatinine runs 1.4-1.5 baseline    Saw nephrology in the past    Only go back for progressive decline in renal function   N18.31 585.3    9. Mixed hyperlipidemia       Now on Praluent and Zetia monitor lipids we will repeat lipids at follow-up he is only been on Praluent for about a week     E78.2 272.2    10. Reactive depression     Improved on Celexa in the past however recently he stopped taking it his mood is improved if his mood declines in the future he will resume this medication       F32.9 300.4    11. Primary insomnia     Has trazodone to use at night as needed   F51.01 307.42    12. Postoperative anemia due to acute blood loss     CBC fine on recent check in the ER of note he will be getting a GI evaluation D62 285.1       13 neuropathy uses gabapentin 300 mg as needed    14.   leg swelling--get duplex of the right leg to rule out DVT    15 gerd/constipation--patient with bloating and constipation was recently in the ER had a CT of the abdomen that was unrevealing we will have him continue with the Protonix discussed MiraLAX as needed to have a daily bowel movement at least every other day will be getting endoscopy and colonoscopy in the near future will need to be cleared by his cardiologist to proceed with the procedure he was supposed to be cleared by April he may be able to move this up a few weeks given his symptoms had a positive stool test     Scribed by Shankar Whitney of [address], as dictated by Dr. Charlie Bassett. Current diagnosis and concerns discussed with pt at length. Pt understands risks and benefits or current treatment plan and medications, and accepts the treatment and medication with any possible risks. Pt asks appropriate questions, which were answered. Pt was instructed to call with any concerns or problems. I have reviewed the note documented by the scribe. The services provided are my own.   The documentation is accurate

## 2021-03-08 NOTE — TELEPHONE ENCOUNTER
Spoke to [employer] (HIPAA). Two pt identifiers confirmed. Landy Louis seeking a recommendation for a GI.  (has appt w/ Dr. Rosamaria Randle). Landy Louis states that his insurance is not fully covered for visits/procedures and would like to a recommendation of another GI Dr--gave info to Lee. Advised that if they do not take his insurance, she needs to call pt's insurance to see who is in network. Landy Louis verbalized understanding of information discussed w/ no further questions at this time.

## 2021-03-08 NOTE — TELEPHONE ENCOUNTER
----- Message from Iker Peña sent at 3/8/2021 10:39 AM EST -----  Regarding: Dr. Randi Rooney Message/Vendor Calls    Caller's first and last name:Tash Gaona. daughter      Reason for call: gastro referral       Callback required yes/no and why: yes.  To discuss      Best contact number(s):[phone]      Details to clarify the request: Pt's daughter would like to have a recommendation for her father to see a gastro specialist.       Message from [facility]

## 2021-03-10 ENCOUNTER — OFFICE VISIT (OUTPATIENT)
Dept: INTERNAL MEDICINE CLINIC | Age: 71
End: 2021-03-10
Attending: INTERNAL MEDICINE
Payer: MEDICARE

## 2021-03-10 ENCOUNTER — HOSPITAL ENCOUNTER (OUTPATIENT)
Dept: ULTRASOUND IMAGING | Age: 71
Discharge: HOME | End: 2021-03-10
Attending: INTERNAL MEDICINE
Payer: MEDICARE

## 2021-03-10 VITALS
BODY MASS INDEX: 29.6 KG/M2 | HEART RATE: 91 BPM | OXYGEN SATURATION: 99 % | DIASTOLIC BLOOD PRESSURE: 74 MMHG | WEIGHT: 188.6 LBS | TEMPERATURE: 97.8 F | SYSTOLIC BLOOD PRESSURE: 134 MMHG | HEIGHT: 67 IN | RESPIRATION RATE: 16 BRPM

## 2021-03-10 DIAGNOSIS — D62 POSTOPERATIVE ANEMIA DUE TO ACUTE BLOOD LOSS: ICD-10-CM

## 2021-03-10 DIAGNOSIS — R60.0 LEG EDEMA, RIGHT: ICD-10-CM

## 2021-03-10 DIAGNOSIS — N18.31 STAGE 3A CHRONIC KIDNEY DISEASE (HCC): ICD-10-CM

## 2021-03-10 DIAGNOSIS — K21.9 GASTROESOPHAGEAL REFLUX DISEASE WITHOUT ESOPHAGITIS: ICD-10-CM

## 2021-03-10 DIAGNOSIS — R06.02 SOB (SHORTNESS OF BREATH): ICD-10-CM

## 2021-03-10 DIAGNOSIS — I10 ESSENTIAL HYPERTENSION: ICD-10-CM

## 2021-03-10 DIAGNOSIS — F51.01 PRIMARY INSOMNIA: ICD-10-CM

## 2021-03-10 DIAGNOSIS — E78.2 MIXED HYPERLIPIDEMIA: ICD-10-CM

## 2021-03-10 DIAGNOSIS — E11.21 TYPE 2 DIABETES WITH NEPHROPATHY (HCC): ICD-10-CM

## 2021-03-10 DIAGNOSIS — C61 PROSTATE CANCER (HCC): ICD-10-CM

## 2021-03-10 DIAGNOSIS — F32.9 REACTIVE DEPRESSION: ICD-10-CM

## 2021-03-10 DIAGNOSIS — I21.4 NSTEMI (NON-ST ELEVATED MYOCARDIAL INFARCTION) (HCC): ICD-10-CM

## 2021-03-10 DIAGNOSIS — I82.5Y3 CHRONIC DEEP VEIN THROMBOSIS (DVT) OF PROXIMAL VEIN OF BOTH LOWER EXTREMITIES (HCC): ICD-10-CM

## 2021-03-10 DIAGNOSIS — I25.10 CORONARY ARTERY DISEASE INVOLVING NATIVE CORONARY ARTERY OF NATIVE HEART WITHOUT ANGINA PECTORIS: ICD-10-CM

## 2021-03-10 DIAGNOSIS — E11.40 TYPE 2 DIABETES MELLITUS WITH DIABETIC NEUROPATHY, WITHOUT LONG-TERM CURRENT USE OF INSULIN (HCC): Primary | ICD-10-CM

## 2021-03-10 PROCEDURE — 1101F PT FALLS ASSESS-DOCD LE1/YR: CPT | Performed by: INTERNAL MEDICINE

## 2021-03-10 PROCEDURE — G8754 DIAS BP LESS 90: HCPCS | Performed by: INTERNAL MEDICINE

## 2021-03-10 PROCEDURE — 3046F HEMOGLOBIN A1C LEVEL >9.0%: CPT | Performed by: INTERNAL MEDICINE

## 2021-03-10 PROCEDURE — 93971 EXTREMITY STUDY: CPT

## 2021-03-10 PROCEDURE — G8427 DOCREV CUR MEDS BY ELIG CLIN: HCPCS | Performed by: INTERNAL MEDICINE

## 2021-03-10 PROCEDURE — G8417 CALC BMI ABV UP PARAM F/U: HCPCS | Performed by: INTERNAL MEDICINE

## 2021-03-10 PROCEDURE — 3017F COLORECTAL CA SCREEN DOC REV: CPT | Performed by: INTERNAL MEDICINE

## 2021-03-10 PROCEDURE — G8752 SYS BP LESS 140: HCPCS | Performed by: INTERNAL MEDICINE

## 2021-03-10 PROCEDURE — 2022F DILAT RTA XM EVC RTNOPTHY: CPT | Performed by: INTERNAL MEDICINE

## 2021-03-10 PROCEDURE — G8536 NO DOC ELDER MAL SCRN: HCPCS | Performed by: INTERNAL MEDICINE

## 2021-03-10 PROCEDURE — G9717 DOC PT DX DEP/BP F/U NT REQ: HCPCS | Performed by: INTERNAL MEDICINE

## 2021-03-10 PROCEDURE — 99214 OFFICE O/P EST MOD 30 MIN: CPT | Performed by: INTERNAL MEDICINE

## 2021-03-10 PROCEDURE — G0463 HOSPITAL OUTPT CLINIC VISIT: HCPCS | Performed by: INTERNAL MEDICINE

## 2021-03-11 ENCOUNTER — TELEPHONE (OUTPATIENT)
Dept: INTERNAL MEDICINE CLINIC | Age: 71
End: 2021-03-11

## 2021-03-11 DIAGNOSIS — R79.89 D-DIMER, ELEVATED: Primary | ICD-10-CM

## 2021-03-11 LAB
ALBUMIN SERPL-MCNC: 3.9 G/DL (ref 3.7–4.7)
ALBUMIN/GLOB SERPL: 1.3 {RATIO} (ref 1.2–2.2)
ALP SERPL-CCNC: 85 IU/L (ref 39–117)
ALT SERPL-CCNC: 31 IU/L (ref 0–44)
AST SERPL-CCNC: 40 IU/L (ref 0–40)
BILIRUB SERPL-MCNC: 1.9 MG/DL (ref 0–1.2)
BNP SERPL-MCNC: 478.4 PG/ML (ref 0–100)
BUN SERPL-MCNC: 22 MG/DL (ref 8–27)
BUN/CREAT SERPL: 15 (ref 10–24)
CALCIUM SERPL-MCNC: 9.5 MG/DL (ref 8.6–10.2)
CHLORIDE SERPL-SCNC: 105 MMOL/L (ref 96–106)
CHOLEST SERPL-MCNC: 100 MG/DL (ref 100–199)
CK SERPL-CCNC: 142 U/L (ref 41–331)
CO2 SERPL-SCNC: 22 MMOL/L (ref 20–29)
CREAT SERPL-MCNC: 1.47 MG/DL (ref 0.76–1.27)
D DIMER PPP FEU-MCNC: 2.94 MG/L FEU (ref 0–0.49)
EST. AVERAGE GLUCOSE BLD GHB EST-MCNC: 137 MG/DL
GLOBULIN SER CALC-MCNC: 2.9 G/DL (ref 1.5–4.5)
GLUCOSE SERPL-MCNC: 78 MG/DL (ref 65–99)
HBA1C MFR BLD: 6.4 % (ref 4.8–5.6)
HDLC SERPL-MCNC: 46 MG/DL
LDLC SERPL CALC-MCNC: 38 MG/DL (ref 0–99)
POTASSIUM SERPL-SCNC: 4.7 MMOL/L (ref 3.5–5.2)
PROT SERPL-MCNC: 6.8 G/DL (ref 6–8.5)
SODIUM SERPL-SCNC: 141 MMOL/L (ref 134–144)
SPECIMEN STATUS REPORT, ROLRST: NORMAL
TRIGL SERPL-MCNC: 80 MG/DL (ref 0–149)
TSH SERPL DL<=0.005 MIU/L-ACNC: 3.45 UIU/ML (ref 0.45–4.5)
VLDLC SERPL CALC-MCNC: 16 MG/DL (ref 5–40)

## 2021-03-11 NOTE — PROGRESS NOTES
Called out and spoke to [facility] [place] (HIPAA). Two pt identifiers confirmed. Yareli informed per Dr. Minnie Bates pt's Ddimer was elevated and he needs a Stat CTA chest r/o PE--ordered. Michael Juarez to have pt hydrate well before and after ct scan. Yareli informed per Dr. Minnie Bates pt's heart failure test elevated--advised Mio Moseley to move up echo as discussed in clinic. Mio Moseley verbalized understanding of information discussed w/ no further questions at this time.

## 2021-03-11 NOTE — PROGRESS NOTES
Please call patient back about results  ddimer elevated needs stat cta chest r/o PE, hydrate well before and after ct scan    Also heart failure test elevated--please move up echo as discussed in clinic

## 2021-03-11 NOTE — TELEPHONE ENCOUNTER
Rossana Veronica; CTA ordered. Scheduled 3/12/21 0830am.  Npo 4hrs, NSAIDS. Honey Thorne informed of pt's CTA appt.

## 2021-03-12 ENCOUNTER — HOSPITAL ENCOUNTER (OUTPATIENT)
Dept: CT IMAGING | Age: 71
Discharge: HOME | End: 2021-03-12
Attending: INTERNAL MEDICINE
Payer: MEDICARE

## 2021-03-12 DIAGNOSIS — R79.89 D-DIMER, ELEVATED: ICD-10-CM

## 2021-03-12 PROCEDURE — 74011000636 HC RX REV CODE- 636: Performed by: INTERNAL MEDICINE

## 2021-03-12 PROCEDURE — 71275 CT ANGIOGRAPHY CHEST: CPT

## 2021-03-12 RX ADMIN — IOPAMIDOL 80 ML: 755 INJECTION, SOLUTION INTRAVENOUS at 09:03

## 2021-03-13 NOTE — PROGRESS NOTES
Please call patient back about results  Let him know there is no PE    There is evidence of fluid overload--he has his echo now scheduled for 3/17    Lets start him back on lasix 20mg daily --keep scheduled cardiology f/u for 4/23    Repeat bmp in 1-2 weeks to monitor cr/k levels    Lasix should help swelling and breathing

## 2021-03-15 ENCOUNTER — IMMUNIZATION (OUTPATIENT)
Dept: FAMILY MEDICINE CLINIC | Age: 71
End: 2021-03-15
Payer: MEDICARE

## 2021-03-15 DIAGNOSIS — I10 ESSENTIAL HYPERTENSION: Primary | ICD-10-CM

## 2021-03-15 DIAGNOSIS — Z23 ENCOUNTER FOR IMMUNIZATION: Primary | ICD-10-CM

## 2021-03-15 PROCEDURE — 91300 COVID-19, MRNA, LNP-S, PF, 30MCG/0.3ML DOSE(PFIZER): CPT | Performed by: FAMILY MEDICINE

## 2021-03-15 RX ORDER — FUROSEMIDE 20 MG/1
20 TABLET ORAL DAILY
Qty: 90 TAB | Refills: 0 | Status: SHIPPED | OUTPATIENT
Start: 2021-03-15 | End: 2021-11-15

## 2021-03-15 NOTE — PROGRESS NOTES
Called out and spoke to [facility] SURGICAL [place] (HIPAA). Two pt identifiers confirmed. Yareli informed per Dr. Joycelyn Burch there is no PE on pt's exam.  Vianey Cavanaugh informed per Dr. Joycelyn Burch there is evidence of fluid overload. Yareli informed per Dr. Joycelyn Burch to start pt back on lasix 20mg daily--pended/ordered. Yareli informed per Dr. Joycelyn Burch to have pt repeat bmp in 1-2 weeks to monitor cr/k levels--Labs ordered and mailed to pt. Yareli informed per Dr. Joycelyn Burch the lasix should help swelling and breathing. Vianey Cavaanugh verbalized understanding of information discussed w/ no further questions at this time.

## 2021-03-16 ENCOUNTER — TRANSCRIBE ORDER (OUTPATIENT)
Dept: REGISTRATION | Age: 71
End: 2021-03-16

## 2021-03-16 ENCOUNTER — HOSPITAL ENCOUNTER (OUTPATIENT)
Dept: PREADMISSION TESTING | Age: 71
Discharge: HOME | End: 2021-03-16
Payer: MEDICARE

## 2021-03-16 DIAGNOSIS — Z01.812 PRE-PROCEDURE LAB EXAM: ICD-10-CM

## 2021-03-16 DIAGNOSIS — Z01.812 PRE-PROCEDURE LAB EXAM: Primary | ICD-10-CM

## 2021-03-16 PROCEDURE — U0003 INFECTIOUS AGENT DETECTION BY NUCLEIC ACID (DNA OR RNA); SEVERE ACUTE RESPIRATORY SYNDROME CORONAVIRUS 2 (SARS-COV-2) (CORONAVIRUS DISEASE [COVID-19]), AMPLIFIED PROBE TECHNIQUE, MAKING USE OF HIGH THROUGHPUT TECHNOLOGIES AS DESCRIBED BY CMS-2020-01-R: HCPCS

## 2021-03-17 ENCOUNTER — ANCILLARY PROCEDURE (OUTPATIENT)
Dept: CARDIOLOGY CLINIC | Age: 71
End: 2021-03-17
Payer: MEDICARE

## 2021-03-17 VITALS
BODY MASS INDEX: 29.51 KG/M2 | DIASTOLIC BLOOD PRESSURE: 74 MMHG | WEIGHT: 188 LBS | SYSTOLIC BLOOD PRESSURE: 134 MMHG | HEIGHT: 67 IN

## 2021-03-17 DIAGNOSIS — I42.9 CARDIOMYOPATHY, UNSPECIFIED TYPE (HCC): ICD-10-CM

## 2021-03-17 DIAGNOSIS — I25.10 ASHD (ARTERIOSCLEROTIC HEART DISEASE): ICD-10-CM

## 2021-03-17 DIAGNOSIS — Z95.1 S/P CABG X 3: ICD-10-CM

## 2021-03-17 DIAGNOSIS — E78.2 MIXED HYPERLIPIDEMIA: ICD-10-CM

## 2021-03-17 DIAGNOSIS — I44.2 THIRD DEGREE AV BLOCK (HCC): ICD-10-CM

## 2021-03-17 DIAGNOSIS — Z78.9 STATIN INTOLERANCE: ICD-10-CM

## 2021-03-17 DIAGNOSIS — I10 ESSENTIAL HYPERTENSION: ICD-10-CM

## 2021-03-17 LAB — SARS-COV-2, COV2NT: NOT DETECTED

## 2021-03-17 PROCEDURE — 93306 TTE W/DOPPLER COMPLETE: CPT | Performed by: INTERNAL MEDICINE

## 2021-03-18 ENCOUNTER — ANESTHESIA EVENT (OUTPATIENT)
Dept: ENDOSCOPY | Age: 71
End: 2021-03-18
Payer: MEDICARE

## 2021-03-18 LAB
ECHO AO ASC DIAM: 3.27 CM
ECHO AO ROOT DIAM: 3.45 CM
ECHO AV PEAK GRADIENT: 1.9 MMHG
ECHO AV PEAK VELOCITY: 68.96 CM/S
ECHO EST RA PRESSURE: 15 MMHG
ECHO LA AREA 4C: 25.37 CM2
ECHO LA MAJOR AXIS: 4.5 CM
ECHO LA MINOR AXIS: 2.28 CM
ECHO LA VOL 2C: 75.42 ML (ref 18–58)
ECHO LA VOL 4C: 72.87 ML (ref 18–58)
ECHO LA VOL BP: 88.48 ML (ref 18–58)
ECHO LA VOL/BSA BIPLANE: 44.92 ML/M2 (ref 16–28)
ECHO LA VOLUME INDEX A2C: 38.29 ML/M2 (ref 16–28)
ECHO LA VOLUME INDEX A4C: 37 ML/M2 (ref 16–28)
ECHO LV E' LATERAL VELOCITY: 4.34 CM/S
ECHO LV E' SEPTAL VELOCITY: 3.75 CM/S
ECHO LV EDV A2C: 44.6 ML
ECHO LV EDV A4C: 66.28 ML
ECHO LV EDV BP: 55.51 ML (ref 67–155)
ECHO LV EDV INDEX A4C: 33.7 ML/M2
ECHO LV EDV INDEX BP: 28.2 ML/M2
ECHO LV EDV NDEX A2C: 22.6 ML/M2
ECHO LV EJECTION FRACTION A2C: 44 PERCENT
ECHO LV EJECTION FRACTION A4C: 35 PERCENT
ECHO LV EJECTION FRACTION BIPLANE: 37.9 PERCENT (ref 55–100)
ECHO LV ESV A2C: 24.76 ML
ECHO LV ESV A4C: 43.38 ML
ECHO LV ESV BP: 34.49 ML (ref 22–58)
ECHO LV ESV INDEX A2C: 12.6 ML/M2
ECHO LV ESV INDEX A4C: 22 ML/M2
ECHO LV ESV INDEX BP: 17.5 ML/M2
ECHO LV GLOBAL LONGITUDINAL STRAIN (GLS): -6.7 PERCENT
ECHO LV INTERNAL DIMENSION DIASTOLIC: 3.76 CM (ref 4.2–5.9)
ECHO LV INTERNAL DIMENSION SYSTOLIC: 2.39 CM
ECHO LV ISOVOLUMETRIC RELAXATION TIME (IVRT): 76.12 MS
ECHO LV IVSD: 1.8 CM (ref 0.6–1)
ECHO LV MASS 2D: 285.4 G (ref 88–224)
ECHO LV MASS INDEX 2D: 144.9 G/M2 (ref 49–115)
ECHO LV POSTERIOR WALL DIASTOLIC: 1.78 CM (ref 0.6–1)
ECHO LVOT PEAK GRADIENT: 1.24 MMHG
ECHO LVOT PEAK VELOCITY: 55.77 CM/S
ECHO MV "A" WAVE DURATION: 156.04 MS
ECHO MV A VELOCITY: 23.18 CM/S
ECHO MV E DECELERATION TIME (DT): 127.98 MS
ECHO MV E VELOCITY: 67.41 CM/S
ECHO MV E/A RATIO: 2.91
ECHO MV E/E' LATERAL: 15.53
ECHO MV E/E' RATIO (AVERAGED): 16.75
ECHO MV E/E' SEPTAL: 17.98
ECHO MV EROA PISA: 0.18 CM2
ECHO MV REGURGITANT RADIUS PISA: 0.71 CM
ECHO MV REGURGITANT VOLUME: 24.85 ML
ECHO MV REGURGITANT VTIA: 139.17 CM
ECHO RIGHT VENTRICULAR SYSTOLIC PRESSURE (RVSP): 32.97 MMHG
ECHO TV REGURGITANT MAX VELOCITY: 211.94 CM/S
ECHO TV REGURGITANT PEAK GRADIENT: 17.97 MMHG
GLOBAL LONGITUDINAL STRAIN 2 CHAMBER: -7.8 PERCENT
GLOBAL LONGITUDINAL STRAIN 4 CHAMBER: -7.4 PERCENT
GLOBAL LONGITUDINAL STRAIN LONG AXIS: -4.8 PERCENT
LA VOL DISK BP: 84.67 ML (ref 18–58)
MR PISA PV: 427.11 CM/S

## 2021-03-18 NOTE — ANESTHESIA PREPROCEDURE EVALUATION
Anesthetic History   No history of anesthetic complications            Review of Systems / Medical History  Patient summary reviewed, nursing notes reviewed and pertinent labs reviewed    Pulmonary          Pneumonia         Neuro/Psych         Psychiatric history    Comments: Cervical spinal stenosis s/p PCF (11/2/16)  S/P Lumbar Laminectomy (10/16/14)    Depression Cardiovascular    Hypertension  Valvular problems/murmurs: tricuspid insufficiency, mitral insufficiency and aortic insufficiency      Dysrhythmias   Pacemaker, past MI, CAD and CABG    Exercise tolerance: >4 METS  Comments: H/O PE/DVT  Bilateral carotid artery stenosis  Pacer for 3rd degree AV block  CABG x 3 vessels (4/28/20)    TTE (3/17/20):  ·LV: Estimated LVEF is 40 - 45%. Small left ventricle. Severe concentric hypertrophy. Globally reduced systolic function. Severe (grade 3) left ventricular diastolic dysfunction. ·LA: Moderately dilated left atrium. ·RV: Reduced systolic function. Pacing wire present  ·RA: Moderately dilated right atrium. ·AV: Mild aortic valve regurgitation is present. ·MV: Mild to moderate mitral valve regurgitation is present. ·TV: Severe tricuspid valve regurgitation is present. GI/Hepatic/Renal         Renal disease: CRI      Comments: CRI, Stage III    H/O Perforated diverticulum S/P colon resection (2012) Endo/Other    Diabetes (diet controlled): well controlled, type 2    Obesity, arthritis and cancer    Comments:  Thrombocytopenia    H/O Prostate Cancer Other Findings   Comments: Chronic pain  ETOH abuse         Physical Exam    Airway  Mallampati: III  TM Distance: 4 - 6 cm  Neck ROM: normal range of motion   Mouth opening: Normal     Cardiovascular  Regular rate and rhythm,  S1 and S2 normal,  no murmur, click, rub, or gallop             Dental    Dentition: Edentulous     Pulmonary  Breath sounds clear to auscultation               Abdominal  GI exam deferred       Other Findings            Anesthetic Plan    ASA: 3  Anesthesia type: MAC and total IV anesthesia          Induction: Intravenous  Anesthetic plan and risks discussed with: Patient

## 2021-03-19 ENCOUNTER — ANESTHESIA (OUTPATIENT)
Dept: ENDOSCOPY | Age: 71
End: 2021-03-19
Payer: MEDICARE

## 2021-03-19 ENCOUNTER — HOSPITAL ENCOUNTER (OUTPATIENT)
Age: 71
Setting detail: OUTPATIENT SURGERY
Discharge: HOME | End: 2021-03-19
Attending: INTERNAL MEDICINE | Admitting: INTERNAL MEDICINE
Payer: MEDICARE

## 2021-03-19 ENCOUNTER — TELEPHONE (OUTPATIENT)
Dept: CARDIOLOGY CLINIC | Age: 71
End: 2021-03-19

## 2021-03-19 VITALS
RESPIRATION RATE: 19 BRPM | BODY MASS INDEX: 27.62 KG/M2 | HEART RATE: 85 BPM | TEMPERATURE: 97.3 F | HEIGHT: 67 IN | OXYGEN SATURATION: 98 % | DIASTOLIC BLOOD PRESSURE: 69 MMHG | WEIGHT: 176 LBS | SYSTOLIC BLOOD PRESSURE: 108 MMHG

## 2021-03-19 PROCEDURE — 88305 TISSUE EXAM BY PATHOLOGIST: CPT

## 2021-03-19 PROCEDURE — 74011250636 HC RX REV CODE- 250/636: Performed by: INTERNAL MEDICINE

## 2021-03-19 PROCEDURE — 74011000250 HC RX REV CODE- 250: Performed by: NURSE ANESTHETIST, CERTIFIED REGISTERED

## 2021-03-19 PROCEDURE — 2709999900 HC NON-CHARGEABLE SUPPLY: Performed by: INTERNAL MEDICINE

## 2021-03-19 PROCEDURE — 77030008565 HC TBNG SUC IRR ERBE -B: Performed by: INTERNAL MEDICINE

## 2021-03-19 PROCEDURE — 76060000032 HC ANESTHESIA 0.5 TO 1 HR: Performed by: INTERNAL MEDICINE

## 2021-03-19 PROCEDURE — 74011250636 HC RX REV CODE- 250/636: Performed by: NURSE ANESTHETIST, CERTIFIED REGISTERED

## 2021-03-19 PROCEDURE — 76040000007: Performed by: INTERNAL MEDICINE

## 2021-03-19 PROCEDURE — 77030013992 HC SNR POLYP ENDOSC BSC -B: Performed by: INTERNAL MEDICINE

## 2021-03-19 RX ORDER — FLUMAZENIL 0.1 MG/ML
0.2 INJECTION INTRAVENOUS
Status: DISCONTINUED | OUTPATIENT
Start: 2021-03-19 | End: 2021-03-19 | Stop reason: HOSPADM

## 2021-03-19 RX ORDER — MIDAZOLAM HYDROCHLORIDE 1 MG/ML
.25-5 INJECTION, SOLUTION INTRAMUSCULAR; INTRAVENOUS
Status: DISCONTINUED | OUTPATIENT
Start: 2021-03-19 | End: 2021-03-19 | Stop reason: HOSPADM

## 2021-03-19 RX ORDER — DEXTROMETHORPHAN/PSEUDOEPHED 2.5-7.5/.8
1.2 DROPS ORAL
Status: DISCONTINUED | OUTPATIENT
Start: 2021-03-19 | End: 2021-03-19 | Stop reason: HOSPADM

## 2021-03-19 RX ORDER — PROPOFOL 10 MG/ML
INJECTION, EMULSION INTRAVENOUS AS NEEDED
Status: DISCONTINUED | OUTPATIENT
Start: 2021-03-19 | End: 2021-03-19 | Stop reason: HOSPADM

## 2021-03-19 RX ORDER — SODIUM CHLORIDE 9 MG/ML
75 INJECTION, SOLUTION INTRAVENOUS CONTINUOUS
Status: DISCONTINUED | OUTPATIENT
Start: 2021-03-19 | End: 2021-03-19 | Stop reason: HOSPADM

## 2021-03-19 RX ORDER — EPINEPHRINE 0.1 MG/ML
1 INJECTION INTRACARDIAC; INTRAVENOUS
Status: DISCONTINUED | OUTPATIENT
Start: 2021-03-19 | End: 2021-03-19 | Stop reason: HOSPADM

## 2021-03-19 RX ORDER — SODIUM CHLORIDE 0.9 % (FLUSH) 0.9 %
5-40 SYRINGE (ML) INJECTION AS NEEDED
Status: DISCONTINUED | OUTPATIENT
Start: 2021-03-19 | End: 2021-03-19 | Stop reason: HOSPADM

## 2021-03-19 RX ORDER — EPHEDRINE SULFATE/0.9% NACL/PF 50 MG/5 ML
SYRINGE (ML) INTRAVENOUS AS NEEDED
Status: DISCONTINUED | OUTPATIENT
Start: 2021-03-19 | End: 2021-03-19 | Stop reason: HOSPADM

## 2021-03-19 RX ORDER — NALOXONE HYDROCHLORIDE 0.4 MG/ML
0.4 INJECTION, SOLUTION INTRAMUSCULAR; INTRAVENOUS; SUBCUTANEOUS
Status: DISCONTINUED | OUTPATIENT
Start: 2021-03-19 | End: 2021-03-19 | Stop reason: HOSPADM

## 2021-03-19 RX ORDER — SODIUM CHLORIDE 0.9 % (FLUSH) 0.9 %
5-40 SYRINGE (ML) INJECTION EVERY 8 HOURS
Status: DISCONTINUED | OUTPATIENT
Start: 2021-03-19 | End: 2021-03-19 | Stop reason: HOSPADM

## 2021-03-19 RX ORDER — ATROPINE SULFATE 0.1 MG/ML
0.5 INJECTION INTRAVENOUS
Status: DISCONTINUED | OUTPATIENT
Start: 2021-03-19 | End: 2021-03-19 | Stop reason: HOSPADM

## 2021-03-19 RX ADMIN — Medication 10 MG: at 13:07

## 2021-03-19 RX ADMIN — SODIUM CHLORIDE 75 ML/HR: 9 INJECTION, SOLUTION INTRAVENOUS at 12:43

## 2021-03-19 RX ADMIN — PROPOFOL 10 MG: 10 INJECTION, EMULSION INTRAVENOUS at 13:16

## 2021-03-19 RX ADMIN — Medication 15 MG: at 13:16

## 2021-03-19 RX ADMIN — PROPOFOL 30 MG: 10 INJECTION, EMULSION INTRAVENOUS at 13:08

## 2021-03-19 RX ADMIN — PROPOFOL 100 MG: 10 INJECTION, EMULSION INTRAVENOUS at 12:59

## 2021-03-19 RX ADMIN — PROPOFOL 30 MG: 10 INJECTION, EMULSION INTRAVENOUS at 13:04

## 2021-03-19 RX ADMIN — PROPOFOL 30 MG: 10 INJECTION, EMULSION INTRAVENOUS at 13:12

## 2021-03-19 NOTE — H&P
Pre-endoscopy H and P    The patient was seen and examined in the room/pre-op holding area. The airway was assessed and documented. The problem list, past medical history, and medications were reviewed. 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    Alcohol abuse F10.10    Insomnia G47.00    HTN (hypertension) I10    DVT of leg (deep venous thrombosis) ([facility] I82.409    Spinal stenosis, lumbar region, without neurogenic claudication M48.061    Hyperlipidemia E78.5    CKD (chronic kidney disease) N18.9    Cervical spinal stenosis M48.02    Reactive depression F32.9    Type 2 diabetes with nephropathy ([facility]) E11.21    Prostate cancer ([facility] C61    Type 2 diabetes mellitus with diabetic neuropathy ([facility]) E11.40    NSTEMI (non-ST elevated myocardial infarction) ([facility]) I21.4    Bilateral carotid artery stenosis I65.23    CAD (coronary artery disease) I25.10    S/P CABG x 3 Z95.1    Postoperative anemia due to acute blood loss D62    Pacemaker Z95.0    Third degree AV block ([facility]) I44.2     Social History     Socioeconomic History    Marital status:      Spouse name: Not on file    Number of children: 1    Years of education: 15    Highest education level: High school graduate   Occupational History    Not on file   Social Needs    Financial resource strain: Not hard at all   Lauro-Young insecurity     Worry: Never true     Inability: Never true   [employer] needs     Medical: No     Non-medical: No   Tobacco Use    Smoking status: Never Smoker    Smokeless tobacco: Current User     Types: Chew    Tobacco comment: Chews tobacco every day   Substance and Sexual Activity    Alcohol use:  Yes     Alcohol/week: 7.0 standard drinks     Types: 7 Cans of beer per week     Comment: drinks on weekends    Drug use: No    Sexual activity: Not Currently   Lifestyle    Physical activity     Days per week: 0 days     Minutes per session: 0 min    Stress: Rather much   Relationships    Social connections     Talks on phone: Never     Gets together: Never     Attends [employer] service: Never     Active member of club or organization: No     Attends meetings of clubs or organizations: Never     Relationship status:     Intimate partner violence     Fear of current or ex partner: No     Emotionally abused: No     Physically abused: No     Forced sexual activity: No   Other Topics Concern     Service No    Blood Transfusions No    Caffeine Concern No    Occupational Exposure No    Hobby Hazards No    Sleep Concern Yes    Stress Concern Yes    Weight Concern No    Special Diet No    Back Care Yes    Exercise No    Bike Helmet Not Asked    Seat Belt Yes    Self-Exams Not Asked   Social History Narrative    ** Merged History Encounter **          Past Medical History:   Diagnosis Date    Arthritis     Cancer ([facility]     Prostate ca    Chronic kidney disease     Stage 3    Chronic pain     Abdoman, back, fingers per daughter    Diabetes ([facility]     Heart attack ([facility]     Hypertension     PE (pulmonary embolism)     Pneumonia     Psychiatric disorder     Depression         Prior to Admission Medications   Prescriptions Last Dose Informant Patient Reported? Taking? FREESTYLE LANCETS 28 gauge misc   No Yes   Sig: USE TO CHECK BLOOD SUGAR ONCE DAILY   SITagliptin (Januvia) 50 mg tablet   No Yes   Sig: Take 1 tablet by mouth once daily   acetaminophen (TYLENOL) 325 mg tablet   Yes Yes   Sig: Take 2 Tabs by mouth every four (4) hours as needed for Pain. alirocumab (Praluent Pen) 75 mg/mL injector pen   No Yes   Si mL by SubCUTAneous route Once every 2 weeks. ascorbate calcium (VITAMIN C PO)   Yes Yes   Sig: Take 1 Tab by mouth daily as needed. aspirin delayed-release (ASPIR-LOW) 81 mg tablet   Yes Yes   Sig: Take 81 mg by mouth daily.  Taking every now and then   cholecalciferol, vitamin D3, (VITAMIN D3 PO)   Yes Yes Sig: Take 1,000 Int'l Units by mouth daily as needed. co-enzyme Q-10 (Co Q-10) 100 mg capsule   Yes Yes   Sig: Take 100 mg by mouth daily as needed. 200 mg   cyanocobalamin 1,000 mcg tablet   Yes Yes   Sig: Take 1,000 mcg by mouth daily as needed. docusate sodium (Dulcolax Stool Softener, dss,) 100 mg capsule   No Yes   Sig: Take 1 Cap by mouth two (2) times a day.   ezetimibe (ZETIA) 10 mg tablet   No Yes   Sig: Take 1 Tab by mouth daily. fluticasone propionate (Flonase Allergy Relief) 50 mcg/actuation nasal spray  Self Yes Yes   Si Sprays by Both Nostrils route two (2) times daily as needed for Rhinitis. furosemide (LASIX) 20 mg tablet   No Yes   Sig: Take 1 Tab by mouth daily. gabapentin (NEURONTIN) 300 mg capsule   No Yes   Sig: TAKE 2 TO 3 CAPSULES BY MOUTH IN THE EVENING AS NEEDED FOR PAIN   magnesium 250 mg tab   Yes Yes   Sig: Take 1 Tab by mouth daily as needed. metoprolol tartrate (LOPRESSOR) 25 mg tablet   No Yes   Sig: TAKE 1/2 (ONE-HALF) TABLET BY MOUTH EVERY 12 HOURS   pantoprazole (Protonix) 40 mg tablet   No Yes   Sig: Take 1 Tab by mouth every morning for 20 days. pumpkin seed extract-soy germ 300 mg cap   Yes Yes   Sig: Take 1 Cap by mouth daily as needed (prostate health). senna-docusate (PERICOLACE) 8.6-50 mg per tablet   Yes Yes   Sig: Take 1 Tab by mouth daily as needed for Constipation. traZODone (DESYREL) 50 mg tablet   No Yes   Sig: TAKE 1 TABLET BY MOUTH AT BEDTIME      Facility-Administered Medications: None           The review of systems is:  Negative  for shortness of breath or chest pain      The heart, lungs, and mental status were satisfactory for the administration of deep sedation and for the procedure. I discussed with the patient the objectives, risks, consequences and alternatives to the procedure.       Omayra Andrew MD  3/19/2021  12:32 PM

## 2021-03-19 NOTE — PERIOP NOTES
Endoscope was pre-cleaned at bedside immediately following procedure by MIRIAN Clark   Medications     Medication Rate/Dose/Volume Action Route Date Time   Administering User Audit    propofol 10 mg/mL (mg) 100 mg Given IntraVENous 03/19/21  1259  Grizzard, [employer] A, CRNA      30 mg Given IntraVENous  1304  Grizzard, [employer] A, CRNA      30 mg Given IntraVENous  1308  Grizzard, Daniel A, CRNA      30 mg Given IntraVENous  1312  Grizzard, [employer] A, CRNA      10 mg Given IntraVENous  1316  Grizzard, Daniel A, CRNA     ePHEDrine (MISTOLE) 50 mg/mL injection (mg) 10 mg Given IntraVENous 03/19/21  1307  Grizzard, [employer] A, CRNA      15 mg Given IntraVENous  1316  Grizzard, [employer] A, CRNA     0.9% sodium chloride infusion (mL) 550 mL Anesthesia Volume Adjustment IntraVENous 03/19/21  1320  GravilaardNavneet, CRNA

## 2021-03-19 NOTE — PROGRESS NOTES
Wilfred Hong.  1950  [id number]    Situation:  Verbal report received from: Louisa Harmon RN  Procedure: Procedure(s):  COLONOSCOPY  ENDOSCOPIC POLYPECTOMY  ENDOSCOPIC ARGON PLASMA COAGULATION    Background:    Preoperative diagnosis: colon  Postoperative diagnosis: Polyp, Radiation Telangiectasia, Hemorrhoids     :  Dr. Yoan Frank  Assistant(s): Endoscopy Technician-1: Castillo Christian  Endoscopy RN-1: Maddi Carlisle    Specimens:   ID Type Source Tests Collected by Time Destination   1 : polyp Preservative Sigmoid  Sarah Beth Veliz MD 3/19/2021 1303 Pathology     H. Pylori  no    Assessment:  Intra-procedure medications     Anesthesia gave intra-procedure sedation and medications, see anesthesia flow sheet yes    Intravenous fluids: NS@ KVO     Vital signs stable yes  Abdominal assessment: round and soft yes    Recommendation:  Discharge patient per MD order yes  Return to floor no  Family or Friend yes, micky  Permission to share finding with family or friend yes

## 2021-03-19 NOTE — PROGRESS NOTES
Heart function mildly reduced. Continue current medications, proceed with healthy diet and exercise, and follow-up in the next couple months please.

## 2021-03-19 NOTE — PROCEDURES
Colonoscopy Procedure Note    Lucas Muñiz  1950  [id number]    Indications:  Please see below. Pre-operative Diagnosis: hematochezia    Post-operative Diagnosis: radiation telangiectasia of rectum, polyp sigmoid, internal hemorrhoids,diverticulosis      : Jamir Morocho MD    Referring Provider: Haider Nelson MD    Sedation:  MAC anesthesia Propofol        Procedure Details:    After detailed informed consent was obtained with all risks and benefits of procedure explained and preoperative exam completed, the patient was taken to the endoscopy suite and placed in the left lateral decubitus position. Upon sequential sedation as per above, a digital rectal exam was performed  And was normal.  The Olympus videocolonoscope  was inserted in the rectum and carefully advanced to the surgical anastomosis . The quality of preparation was inadequate. The colonoscope was slowly withdrawn with careful evaluation between folds. Retroflexion in the rectum was performed. Findings:   · The Olympus Video High-Definition Colonoscope was advanced to anastomosis. Prep is poor with solid stool there. · A single 8 mm sigmoid colon polyp was removed with a cold snare. · Mild sigmoid diverticulosis. · Moderate oozing with multiple/numeorus radiation related rectal telangiectasia are noted. Using APC all the telangiectasia are ablated easily. · Moderate sized internal hemorrhoids with rectal stool is noted. Therapies:  1 complete polypectomy was performed using cold snare  and the polyp retrieved  APC of radiation telangiectasia done    Specimen:  Specimens were collected as described above and sent to pathology. Complications: None were encountered during the procedure. EBL:  None. Recommendations:     -Await pathology. -Repeat colonoscopy in 1 year or earlier for symptoms(bleeding;anemia)   -High fiber diet. Jamir Morocho MD  3/19/2021  1:21 PM

## 2021-03-19 NOTE — TELEPHONE ENCOUNTER
Patient daughter informed Rasheed Oro on [address] Verified patient with two identifiers.  Blanca please call to schedule the follow up with Dr King Elkins thanks

## 2021-03-19 NOTE — DISCHARGE INSTRUCTIONS
[place] Office: [phone]    Anurag Irizarry  [id number]  1950    EGD/COLONOSCOPY DISCHARGE INSTRUCTIONS  Discomfort:  Sore throat- throat lozenges or warm salt water gargle  redness at IV site- apply warm compress to area; if redness or soreness persist- contact your physician  Gaseous discomfort- walking, belching will help relieve any discomfort  You may not operate a vehicle for 12 hours  You may not engage in an occupation involving machinery or appliances for rest of today. You may not drink alcoholic beverages for at least 12 hours  Avoid making any critical decisions for at least 24 hour  DIET  You may resume your regular diet - however -  remember your colon is empty and a heavy meal will produce gas. Avoid these foods:  fried / greasy foods, excessive carbonated drinks or too much caffeine  MEDICATIONS   Regarding Aspirin or Nonsteroidal medications specifically, please see below. ACTIVITY  You may resume your normal daily activities. Spend the remainder of the day resting -  avoid any strenuous activity. CALL M.D. ANY SIGN OF   Increasing pain, nausea, vomiting  Abdominal distension (swelling)  New increased bleeding (oral or rectal)  Fever (chills)  Pain in chest area  Bloody discharge from nose or mouth  Shortness of breath    You may not take any Advil, Aspirin, Ibuprofen, Motrin, Aleve, or Goodys for 7 days, ONLY  Tylenol as needed for pain. Follow-up Instructions:   Call  Jamir Cox MD for any questions or concerns  Results of procedure / biopsy in 7 days   Telephone # [phone]      Follow-up Information    None

## 2021-03-19 NOTE — ANESTHESIA POSTPROCEDURE EVALUATION
Procedure(s):  COLONOSCOPY  ENDOSCOPIC POLYPECTOMY  ENDOSCOPIC ARGON PLASMA COAGULATION. MAC, total IV anesthesia    Anesthesia Post Evaluation        Patient location during evaluation: PACU  Note status: Adequate. Level of consciousness: responsive to verbal stimuli and sleepy but conscious  Pain management: satisfactory to patient  Airway patency: patent  Anesthetic complications: no  Cardiovascular status: acceptable  Respiratory status: acceptable  Hydration status: acceptable  Comments: +Post-Anesthesia Evaluation and Assessment    Patient: Cliff Morrison MRN: [id number]  SSN: [id number]   YOB: 1950  Age: 65 y.o. Sex: male      Cardiovascular Function/Vital Signs    /69   Pulse 85   Temp 36.3 °C (97.3 °F)   Resp 19   Ht 5' 7\" (1.702 m)   Wt 79.8 kg (176 lb)   SpO2 98%   BMI 27.57 kg/m²     Patient is status post Procedure(s):  COLONOSCOPY  ENDOSCOPIC POLYPECTOMY  ENDOSCOPIC ARGON PLASMA COAGULATION. Nausea/Vomiting: Controlled. Postoperative hydration reviewed and adequate. Pain:  Pain Scale 1: Numeric (0 - 10) (03/19/21 1351)  Pain Intensity 1: 0 (03/19/21 1351)   Managed. Neurological Status: At baseline. Mental Status and Level of Consciousness: Arousable. Pulmonary Status:   O2 Device: Room air (03/19/21 1351)   Adequate oxygenation and airway patent. Complications related to anesthesia: None    Post-anesthesia assessment completed. No concerns. Signed By: Joann Abraham MD    3/19/2021  Post anesthesia nausea and vomiting:  controlled      INITIAL Post-op Vital signs:   Vitals Value Taken Time   /69 03/19/21 1351   Temp 36.3 °C (97.3 °F) 03/19/21 1337   Pulse 72 03/19/21 1353   Resp 18 03/19/21 1353   SpO2 99 % 03/19/21 1353   Vitals shown include unvalidated device data.

## 2021-03-19 NOTE — TELEPHONE ENCOUNTER
----- Message from Keshav Larsen MD sent at 3/19/2021  9:41 AM EDT -----  Heart function mildly reduced. Continue current medications, proceed with healthy diet and exercise, and follow-up in the next couple months please.

## 2021-03-22 ENCOUNTER — HOSPITAL ENCOUNTER (OUTPATIENT)
Dept: CARDIAC REHAB | Age: 71
Discharge: HOME | End: 2021-03-22
Payer: MEDICARE

## 2021-03-22 VITALS — WEIGHT: 181.8 LBS | BODY MASS INDEX: 28.47 KG/M2

## 2021-03-22 PROCEDURE — 93798 PHYS/QHP OP CAR RHAB W/ECG: CPT

## 2021-03-23 ENCOUNTER — PATIENT OUTREACH (OUTPATIENT)
Dept: CASE MANAGEMENT | Age: 71
End: 2021-03-23

## 2021-03-24 ENCOUNTER — TELEPHONE (OUTPATIENT)
Dept: CARDIOLOGY CLINIC | Age: 71
End: 2021-03-24

## 2021-03-24 ENCOUNTER — OFFICE VISIT (OUTPATIENT)
Dept: CARDIOLOGY CLINIC | Age: 71
End: 2021-03-24
Payer: MEDICARE

## 2021-03-24 ENCOUNTER — HOSPITAL ENCOUNTER (OUTPATIENT)
Dept: RADIATION THERAPY | Age: 71
Discharge: HOME | End: 2021-03-24

## 2021-03-24 DIAGNOSIS — Z95.0 CARDIAC PACEMAKER IN SITU: Primary | ICD-10-CM

## 2021-03-24 PROCEDURE — 93296 REM INTERROG EVL PM/IDS: CPT | Performed by: INTERNAL MEDICINE

## 2021-03-24 PROCEDURE — 93294 REM INTERROG EVL PM/LDLS PM: CPT | Performed by: INTERNAL MEDICINE

## 2021-03-24 RX ORDER — PENTOXIFYLLINE 400 MG/1
400 TABLET, EXTENDED RELEASE ORAL
Qty: 90 TAB | Refills: 6 | Status: SHIPPED | OUTPATIENT
Start: 2021-03-24 | End: 2021-12-28

## 2021-03-24 RX ORDER — VITAMIN E CAP 100 UNIT 100 UNIT
CAP ORAL
Qty: 90 CAP | Refills: 6 | Status: SHIPPED | OUTPATIENT
Start: 2021-03-24

## 2021-03-24 NOTE — TELEPHONE ENCOUNTER
He has CAD but TAMICA was normal  Is this drug used for PAD?   I did not know he had it  However I am ok with the drug but not sure what it is for  We monitor pacemaker as usual

## 2021-03-24 NOTE — TELEPHONE ENCOUNTER
Joann Fall w/ Radiation Oncology, Dr. Vania Tian office, stated that the doctor wants to prescribe pentoxifylline and would like to know what Dr. Le Wheeler thoughts are. Please advise.     Phone #: [phone]  Thanks

## 2021-03-24 NOTE — PROGRESS NOTES
Ambulatory Care Management Note      Date/Time:  3/24/2021 9:27 AM    This patient was received as a referral from Daily assignment   Top Challenges reviewed with the provider   No ACP on file    Needs PHI GEORGINA form updated    Complains of constipation-Dr. Robert Austin recommended including more fruits and vegetables in diet and take Miralax PRN. Daughter stated patient is reluctant to have BM's due to fear of seeing blood. 8 sessions left of once weekly cardiac rehab       Ambulatory  contacted patient for discussion and case management of rectal bleeding   Summary of patients top problems:   1. No ACP on file. Discussed importance of completion of this document along with PHI GEORGINA needs updating with daughter at today's outreach. Daughter verbalized understanding. 2. Constipation. GI recommendation incorporate more fruits and vegetables in diet and take Miralax PRN. Daughter stated patient is reluctant to have BM's d/t fear of seeing blood. 3. Strength building. Continue cardiac rehab for remaining 8 sessions. Patient attends one time weekly. Patient's challenges to self management identified:   depression and lack of knowledge about disease      Medication Management:  good adherence    Advance Care Planning:   Does patient have an Advance Directive:  currently not on file; education provided     [employer], Referrals, and Durable Medical Equipment: unknown at this outreach. ACM will assess at next outreach.      PCP/Specialist follow up:   Future Appointments   Date Time Provider Willian Paul   3/24/2021 10:00 AM RAD ONC THERAPY [address]   3/24/2021 10:30 AM HODA MCCARTHY BS AMB   3/29/2021 10:00 AM [facility] CARDIAC WELLNESS EXERCISE [facility]   4/5/2021 10:20 AM [facility] COVID VACCINE 21 DAY [facility] BS AMB   4/12/2021 10:00  [facility]   4/19/2021 10:00  [facility]   4/26/2021 10:00 AM [address] [facility]   5/3/2021 10:00 AM [address] [facility]   5/7/2021  2:45 PM MD ANTWON Sanderson BS AMB   5/17/2021 10:00 AM [address] [facility]   5/24/2021 10:00  [facility]   6/23/2021 10:00 AM Javed Loera MD [facility] BS AMB   6/28/2021  9:45 AM REMOTE1, HODA WYATT BS AMB   9/14/2021  9:20 AM PACEMAKER3, HODA WYATT BS AMB   9/14/2021  9:40 AM MD YOSELIN Olivares BS AMB          Goals      Develop action plan for Self-Management Chronic Disease. 3/24/21 Skills and education necessary to properly manage complaint of rectal bleeding at ED visit. History: Patient seen in ED 2/27/21 for rectal bleeding. PMH: HTN, CKD, Dep., MI, PE (no longer on anticoag.) Also Hx prostate cancer. Radiation treatment finished in September 2020. Seen By Dr. Omar Bull (GI), colonoscopy done 3/19/21 (polyp removed) repeat in 5 years. Per daughter, patient was told rectal bleeding came from radiation treatment. Patient has appointment today with Dr. Soila Castillo (radiologist) to check PSA. Attends cardiac rehab once weekly-has 8 sessions left. Current level of understanding: Daughter is very knowledgeable regarding patient health concerns and accompanies him to all doctor appointments. Desired Outcome: Patient will not have an admission for the next 90 days  Plan: Help alleviate fear of BM's by educating patient on rectal bleeding. Per daughter, GI stated bleeding due to radiation treatment finished in September 2020. Encourage better diet to promote BM's incorporating more fruits and vegetables, continued exercise to build strength, attend follow up appointments. Get new PHI GEORGINA form in chart and ACP document - discussed today with daughter. Perform general assessment and med rec at next outreach.   Imelda Dillon RN  Ambulatory Care Manager               Patient verbalized understanding of all information discussed. Patient has this Ambulatory Care Manager's contact information for any further questions, concerns, or needs.   Deanne Hernandez RN  Ambulatory Care Manager

## 2021-03-24 NOTE — TELEPHONE ENCOUNTER
Brendon Turk from radiation oncology calling questing if patient is cleared from cardiology  to take Vit E and pentoxifylline.  Please advise chino

## 2021-03-24 NOTE — TELEPHONE ENCOUNTER
Verified patient with two types of identifiers. Hardy Zhu called back and said per Dr. Chavez Postal, medication was being prescribed for Radiation Fibrosis. Reiterated that Dr. Carmelo Funk is okay with the medication. We will continue to monitor pacemaker as usual. Hardy Zhu verbalized understanding and will call with any other questions.

## 2021-03-25 ENCOUNTER — TELEPHONE (OUTPATIENT)
Dept: CARDIOLOGY CLINIC | Age: 71
End: 2021-03-25

## 2021-03-25 NOTE — TELEPHONE ENCOUNTER
Patient's daughter, Sujatha Spaulding, would like to know results of remote check from today.     Phone: [phone]

## 2021-03-25 NOTE — TELEPHONE ENCOUNTER
Returned pt daughter call. Confirmed that we received the scheduled remote and that she will receive a confirmation letter in the mail next week. Explained that remote check looked normal and device was within normal working limits and next scheduled remote is set for 6/28. Pt's daughter verbalized understanding and had no further questions.

## 2021-03-29 ENCOUNTER — HOSPITAL ENCOUNTER (OUTPATIENT)
Dept: CARDIAC REHAB | Age: 71
Discharge: HOME | End: 2021-03-29
Payer: MEDICARE

## 2021-03-29 VITALS — BODY MASS INDEX: 27.78 KG/M2 | WEIGHT: 177.4 LBS

## 2021-03-29 PROCEDURE — 93798 PHYS/QHP OP CAR RHAB W/ECG: CPT

## 2021-04-05 ENCOUNTER — IMMUNIZATION (OUTPATIENT)
Dept: FAMILY MEDICINE CLINIC | Age: 71
End: 2021-04-05
Payer: MEDICARE

## 2021-04-05 DIAGNOSIS — Z23 ENCOUNTER FOR IMMUNIZATION: Primary | ICD-10-CM

## 2021-04-05 PROCEDURE — 91300 COVID-19, MRNA, LNP-S, PF, 30MCG/0.3ML DOSE(PFIZER): CPT | Performed by: FAMILY MEDICINE

## 2021-04-12 ENCOUNTER — PATIENT OUTREACH (OUTPATIENT)
Dept: CASE MANAGEMENT | Age: 71
End: 2021-04-12

## 2021-04-12 ENCOUNTER — HOSPITAL ENCOUNTER (OUTPATIENT)
Dept: CARDIAC REHAB | Age: 71
Discharge: HOME | End: 2021-04-12
Payer: MEDICARE

## 2021-04-12 VITALS — WEIGHT: 177.8 LBS | BODY MASS INDEX: 27.85 KG/M2

## 2021-04-12 PROCEDURE — 93798 PHYS/QHP OP CAR RHAB W/ECG: CPT

## 2021-04-19 ENCOUNTER — HOSPITAL ENCOUNTER (OUTPATIENT)
Dept: CARDIAC REHAB | Age: 71
Discharge: HOME | End: 2021-04-19
Payer: MEDICARE

## 2021-04-19 VITALS — BODY MASS INDEX: 27.82 KG/M2 | WEIGHT: 177.6 LBS

## 2021-04-19 PROCEDURE — 93798 PHYS/QHP OP CAR RHAB W/ECG: CPT

## 2021-04-20 RX ORDER — HYDROCORTISONE ACETATE 25 MG/1
25 SUPPOSITORY RECTAL
Qty: 5 SUPPOSITORY | Refills: 2 | Status: SHIPPED | OUTPATIENT
Start: 2021-04-20 | End: 2021-11-15

## 2021-04-26 ENCOUNTER — HOSPITAL ENCOUNTER (OUTPATIENT)
Dept: CARDIAC REHAB | Age: 71
Discharge: HOME | End: 2021-04-26
Payer: MEDICARE

## 2021-04-26 VITALS — WEIGHT: 187 LBS | BODY MASS INDEX: 29.29 KG/M2

## 2021-04-26 LAB
ALBUMIN SERPL-MCNC: 3.6 G/DL (ref 3.5–5)
ALBUMIN/GLOB SERPL: 1 {RATIO} (ref 1.1–2.2)
ALP SERPL-CCNC: 83 U/L (ref 45–117)
ALT SERPL-CCNC: 39 U/L (ref 12–78)
ANION GAP SERPL CALC-SCNC: 7 MMOL/L (ref 5–15)
AST SERPL-CCNC: 36 U/L (ref 15–37)
BILIRUB SERPL-MCNC: 1.5 MG/DL (ref 0.2–1)
BUN SERPL-MCNC: 28 MG/DL (ref 6–20)
BUN/CREAT SERPL: 18 (ref 12–20)
CALCIUM SERPL-MCNC: 9.2 MG/DL (ref 8.5–10.1)
CHLORIDE SERPL-SCNC: 106 MMOL/L (ref 97–108)
CHOLEST SERPL-MCNC: 99 MG/DL
CK SERPL-CCNC: 128 U/L (ref 39–308)
CO2 SERPL-SCNC: 27 MMOL/L (ref 21–32)
CREAT SERPL-MCNC: 1.57 MG/DL (ref 0.7–1.3)
GLOBULIN SER CALC-MCNC: 3.6 G/DL (ref 2–4)
GLUCOSE SERPL-MCNC: 123 MG/DL (ref 65–100)
HDLC SERPL-MCNC: 60 MG/DL
HDLC SERPL: 1.7 {RATIO} (ref 0–5)
LDLC SERPL CALC-MCNC: 29.6 MG/DL (ref 0–100)
LIPID PROFILE,FLP: NORMAL
POTASSIUM SERPL-SCNC: 4.4 MMOL/L (ref 3.5–5.1)
PROT SERPL-MCNC: 7.2 G/DL (ref 6.4–8.2)
SODIUM SERPL-SCNC: 140 MMOL/L (ref 136–145)
TRIGL SERPL-MCNC: 47 MG/DL (ref ?–150)
VLDLC SERPL CALC-MCNC: 9.4 MG/DL

## 2021-04-26 PROCEDURE — 93798 PHYS/QHP OP CAR RHAB W/ECG: CPT

## 2021-04-27 ENCOUNTER — TELEPHONE (OUTPATIENT)
Dept: CARDIOLOGY CLINIC | Age: 71
End: 2021-04-27

## 2021-04-27 NOTE — TELEPHONE ENCOUNTER
----- [facility] MARIYA sent at 4/27/2021  2:31 PM EDT -----  Lipids at goal with praluent. Kidney fxn up but stable. Liver fxn ok.

## 2021-05-03 DIAGNOSIS — Z78.9 STATIN INTOLERANCE: ICD-10-CM

## 2021-05-03 DIAGNOSIS — Z95.1 S/P CABG X 3: ICD-10-CM

## 2021-05-03 DIAGNOSIS — I44.2 THIRD DEGREE AV BLOCK (HCC): ICD-10-CM

## 2021-05-03 DIAGNOSIS — I25.10 ASHD (ARTERIOSCLEROTIC HEART DISEASE): ICD-10-CM

## 2021-05-03 DIAGNOSIS — E78.2 MIXED HYPERLIPIDEMIA: ICD-10-CM

## 2021-05-03 DIAGNOSIS — I42.9 CARDIOMYOPATHY, UNSPECIFIED TYPE (HCC): ICD-10-CM

## 2021-05-03 DIAGNOSIS — I10 ESSENTIAL HYPERTENSION: ICD-10-CM

## 2021-05-05 RX ORDER — TRAZODONE HYDROCHLORIDE 50 MG/1
TABLET ORAL
Qty: 30 TAB | Refills: 0 | Status: SHIPPED | OUTPATIENT
Start: 2021-05-05 | End: 2021-06-01

## 2021-05-06 NOTE — PROGRESS NOTES
[address] [facility], [place], [address] [facility]  [phone]     Subjective:      Yari Dupont is a 65 y.o. male is here for routine f/u. He has pmhx CAD s/p CABG, CHB s/p PM, HTN, HLD, DM  And CKD III. Last OV  1/2021: No cardiac complaints. Last  OV 12/2020:  C/o ROLON---ordered echo. Echo: EF appears lower than before -- was 60-65%, now about 45-50%. We started low dose lisinopril which he did not tolerate.     Today,  he reports need for repeat upper and lower endoscopy this coming week for ongoing hematochezia. Dr. Mariluz Granados. Mild ROLON unchanged. The patient denies chest pain, orthopnea, PND, LE edema, palpitations, syncope, or presyncope. ECHO 3/17/2021    · LV: Estimated LVEF is 40 - 45%. Small left ventricle. Severe concentric hypertrophy. Globally reduced systolic function. Severe (grade 3) left ventricular diastolic dysfunction. · LA: Moderately dilated left atrium. · RV: Reduced systolic function. Pacing wire present  · RA: Moderately dilated right atrium. · AV: Mild aortic valve regurgitation is present. · MV: Mild to moderate mitral valve regurgitation is present. · TV: Severe tricuspid valve regurgitation is present.          Patient Active Problem List    Diagnosis Date Noted    Pacemaker 05/01/2020    Third degree AV block ([facility] [address] 05/01/2020    Postoperative anemia due to acute blood loss 04/29/2020    S/P CABG x 3 04/28/2020    CAD (coronary artery disease) 04/27/2020    Bilateral carotid artery stenosis 04/24/2020    NSTEMI (non-ST elevated myocardial infarction) ([facility] [address] 04/23/2020    Type 2 diabetes mellitus with diabetic neuropathy ([address] 01/09/2020    Prostate cancer ([address] 06/04/2018    Type 2 diabetes with nephropathy ([address] 02/28/2018    Reactive depression 11/15/2016    Cervical spinal stenosis 11/03/2016    CKD (chronic kidney disease) 10/31/2016    Hyperlipidemia 07/10/2015    Spinal stenosis, lumbar region, without neurogenic claudication 10/16/2014    DVT of leg (deep venous thrombosis) ([facility] [address] 12/18/2012    HTN (hypertension) 12/12/2012    Perforated diverticulum of large intestine 12/04/2012    Alcohol abuse 12/04/2012    Insomnia 12/04/2012      Collin Jackson MD  Past Medical History:   Diagnosis Date    Arthritis     Cancer [facility])     Prostate ca    Chronic kidney disease     Stage 3    Chronic pain     Abdoman, back, fingers per daughter    Diabetes ([facility] [address]     Heart attack ([facility] [address]     Hypertension     PE (pulmonary embolism)     Pneumonia     Psychiatric disorder     Depression      Past Surgical History:   Procedure Laterality Date    COLONOSCOPY N/A 3/19/2021    COLONOSCOPY performed by Parul Washington MD at \[facility]"" ENDOSCOPY    HX BACK SURGERY  2014    HX CERVICAL LAMINECTOMY      HX GI  11/2012    bowel resection    HX ORTHOPAEDIC      amputated left 4th finger    MO ABDOMEN SURGERY PROC UNLISTED      bowel resection    MO INS NEW/RPLCMT PRM PM W/TRANSV ELTRD ATRIAL&VENT N/A 5/1/2020    INSERT PPM DUAL performed by Colette Carlos MD at [facility]  CATH LAB     Allergies   Allergen Reactions    Arb-Angiotensin Receptor Antagonist Other (comments)     High k    Statins-Hmg-Coa Reductase Inhibitors Myalgia    Ace Inhibitors Cough      Family History   Problem Relation Age of Onset    Cancer Mother     Heart Disease Father     Heart Disease Brother     Anesth Problems Neg Hx       Social History     Socioeconomic History    Marital status:      Spouse name: Not on file    Number of children: 1    Years of education: 15    Highest education level: High school graduate   Occupational History    Not on file   Social Needs    Financial resource strain: Not hard at all   Lauro-Young insecurity     Worry: Never true     Inability: Never true    Transportation needs     Medical: No     Non-medical: No   Tobacco Use    Smoking status: Never Smoker    Smokeless tobacco: Current User     Types: Chew    Tobacco comment: Chews tobacco every day   Substance and Sexual Activity    Alcohol use: Yes     Alcohol/week: 7.0 standard drinks     Types: 7 Cans of beer per week     Comment: drinks on weekends    Drug use: Never    Sexual activity: Not Currently   Lifestyle    Physical activity     Days per week: 0 days     Minutes per session: 0 min    Stress: Rather much   Relationships    Social connections     Talks on phone: Never     Gets together: Never     Attends Rastafari service: Never     Active member of club or organization: No     Attends meetings of clubs or organizations: Never     Relationship status:     Intimate partner violence     Fear of current or ex partner: No     Emotionally abused: No     Physically abused: No     Forced sexual activity: No   Other Topics Concern     Service No    Blood Transfusions No    Caffeine Concern No    Occupational Exposure No    Hobby Hazards No    Sleep Concern Yes    Stress Concern Yes    Weight Concern No    Special Diet No    Back Care Yes    Exercise No    Bike Helmet Not Asked    Seat Belt Yes    Self-Exams Not Asked   Social History Narrative    ** Merged History Encounter **           Current Outpatient Medications   Medication Sig    traZODone (DESYREL) 50 mg tablet TAKE 1 TABLET BY MOUTH AT BEDTIME    metoprolol tartrate (LOPRESSOR) 25 mg tablet TAKE 1/2 (ONE-HALF) TABLET BY MOUTH EVERY 12 HOURS    vitamin e (E GEMS) 100 unit capsule Take 1 capsule three times daily    pentoxifylline CR (TRENTAL) 400 mg CR tablet Take 1 Tab by mouth three (3) times daily (with meals). (Patient taking differently: Take 400 mg by mouth as needed.)    SITagliptin (Januvia) 50 mg tablet Take 1 tablet by mouth once daily    alirocumab (Praluent Pen) 75 mg/mL injector pen 1 mL by SubCUTAneous route Once every 2 weeks.     gabapentin (NEURONTIN) 300 mg capsule TAKE 2 TO 3 CAPSULES BY MOUTH IN THE EVENING AS NEEDED FOR PAIN    ezetimibe (ZETIA) 10 mg tablet Take 1 Tab by mouth daily.  pumpkin seed extract-soy germ 300 mg cap Take 1 Cap by mouth as needed (prostate health).  senna-docusate (PERICOLACE) 8.6-50 mg per tablet Take 1 Tab by mouth daily as needed for Constipation.  co-enzyme Q-10 (Co Q-10) 100 mg capsule Take 100 mg by mouth daily as needed. 200 mg    cholecalciferol, vitamin D3, (VITAMIN D3 PO) Take 1,000 Int'l Units by mouth daily as needed.  ascorbate calcium (VITAMIN C PO) Take 1 Tab by mouth daily as needed.  aspirin delayed-release (ASPIR-LOW) 81 mg tablet Take 81 mg by mouth daily.  FREESTYLE LANCETS 28 gauge misc USE TO CHECK BLOOD SUGAR ONCE DAILY    cyanocobalamin 1,000 mcg tablet Take 1,000 mcg by mouth daily as needed.  magnesium 250 mg tab Take 1 Tab by mouth daily as needed.  hydrocortisone (Anusol-HC) 25 mg supp Insert 1 Suppository into rectum every twelve (12) hours as needed for Pain. Indications: inflammation of the rectum (Patient not taking: Reported on 5/7/2021)    furosemide (LASIX) 20 mg tablet Take 1 Tab by mouth daily. (Patient not taking: Reported on 5/7/2021)    docusate sodium (Dulcolax Stool Softener, dss,) 100 mg capsule Take 1 Cap by mouth two (2) times a day. (Patient taking differently: Take 100 mg by mouth as needed.)    fluticasone propionate (Flonase Allergy Relief) 50 mcg/actuation nasal spray 2 Sprays by Both Nostrils route two (2) times daily as needed for Rhinitis.  acetaminophen (TYLENOL) 325 mg tablet Take 2 Tabs by mouth every four (4) hours as needed for Pain. No current facility-administered medications for this visit. Review of Symptoms:  11 systems reviewed, negative other than as stated in the HPI    Physical ExamPhysical Exam:    Vitals:    05/07/21 1522 05/07/21 1523   BP: 110/80 120/80   Pulse: 70    Resp: 16    SpO2: 97%    Weight: 178 lb 6.4 oz (80.9 kg)    Height: 5' 7\" (1.702 m)      Body mass index is 27.94 kg/m².   General PE  Gen:  NAD  Mental Status - Alert. General Appearance - Not in acute distress. HEENT:  PERRL, no carotid bruits or JVD  Chest and Lung Exam   Inspection: Accessory muscles - No use of accessory muscles in breathing. Auscultation:   Breath sounds: - Normal.   Cardiovascular   Inspection: Jugular vein - Bilateral - Inspection Normal.   Palpation/Percussion:   Apical Impulse: - Normal.   Auscultation: Rhythm - Regular. Heart Sounds - S1 WNL and S2 WNL. No S3 or S4. Murmurs & Other Heart Sounds: Auscultation of the heart reveals - No Murmurs. Peripheral Vascular   Upper Extremity: Inspection - Bilateral - No Cyanotic nailbeds or Digital clubbing. Lower Extremity:   Palpation: Edema - Bilateral - No edema. Abdomen:   Soft, non-tender, bowel sounds are active.   Neuro: A&O times 3, CN and motor grossly WNL    Labs:   Lab Results   Component Value Date/Time    Cholesterol, total 99 04/26/2021 10:54 AM    Cholesterol, total 100 03/10/2021 10:13 AM    Cholesterol, total 156 11/18/2020 09:53 AM    Cholesterol, total 103 08/19/2020 09:31 AM    Cholesterol, total 182 05/14/2020 01:45 PM    HDL Cholesterol 60 04/26/2021 10:54 AM    HDL Cholesterol 46 03/10/2021 10:13 AM    HDL Cholesterol 49 11/18/2020 09:53 AM    HDL Cholesterol 42 08/19/2020 09:31 AM    HDL Cholesterol 36 (L) 05/14/2020 01:45 PM    LDL, calculated 29.6 04/26/2021 10:54 AM    LDL, calculated 38 03/10/2021 10:13 AM    LDL, calculated 93 11/18/2020 09:53 AM    LDL, calculated 48 08/19/2020 09:31 AM    LDL, calculated 112 (H) 05/14/2020 01:45 PM    LDL, calculated 138 (H) 01/09/2020 11:36 AM    LDL, calculated 164 (H) 06/25/2019 09:23 AM    Triglyceride 47 04/26/2021 10:54 AM    Triglyceride 80 03/10/2021 10:13 AM    Triglyceride 69 11/18/2020 09:53 AM    Triglyceride 63 08/19/2020 09:31 AM    Triglyceride 170 (H) 05/14/2020 01:45 PM    CHOL/HDL Ratio 1.7 04/26/2021 10:54 AM     Lab Results   Component Value Date/Time     04/26/2021 10:54 AM     Lab Results   Component Value Date/Time    Sodium 140 04/26/2021 10:54 AM    Potassium 4.4 04/26/2021 10:54 AM    Chloride 106 04/26/2021 10:54 AM    CO2 27 04/26/2021 10:54 AM    Anion gap 7 04/26/2021 10:54 AM    Glucose 123 (H) 04/26/2021 10:54 AM    BUN 28 (H) 04/26/2021 10:54 AM    Creatinine 1.57 (H) 04/26/2021 10:54 AM    BUN/Creatinine ratio 18 04/26/2021 10:54 AM    GFR est AA 53 (L) 04/26/2021 10:54 AM    GFR est non-AA 44 (L) 04/26/2021 10:54 AM    Calcium 9.2 04/26/2021 10:54 AM    Bilirubin, total 1.5 (H) 04/26/2021 10:54 AM    Alk. phosphatase 83 04/26/2021 10:54 AM    Protein, total 7.2 04/26/2021 10:54 AM    Albumin 3.6 04/26/2021 10:54 AM    Globulin 3.6 04/26/2021 10:54 AM    A-G Ratio 1.0 (L) 04/26/2021 10:54 AM    ALT (SGPT) 39 04/26/2021 10:54 AM       EKG:  AS-       Assessment:        1. Coronary artery disease due to lipid rich plaque    2. NSTEMI (non-ST elevated myocardial infarction) ([address]    3. Essential hypertension    4. Mixed hyperlipidemia    5. S/P CABG x 3    6. Statin intolerance        Orders Placed This Encounter    AMB POC EKG ROUTINE W/ 12 LEADS, INTER & REP     Order Specific Question:   Reason for Exam:     Answer:   routine        Plan:        ASHD Hx CABG x 3 in 4/2020---LIMA to LAD, SVG to RCA and Ramus  Continue ASA BB   Intolerant to statins, now on Praluent  Patient notes resolution of chest pain since his bypass.       CM, new  Echo 3/2021 showed EF 40-45% mild- mod MR mild AR  Echo 12/2020 showed EF 45-50% mild MR  Echo in April 2020 showed EF 60-65% , mild mitral regurgitation. On BB. He is intolerant of lisinopril and ARB's as per allergies section. BP borderline low, so will not add hydralazine or isosorbide mononitrate at this time  Patient has been 100% RV paced  Check Lexiscan Myoview to rule out occult ischemia. If negative, refer to Dr. Vic Henderson to consider upgrade to biventricular pacemaker. Scheduled appointment with EP.   If ischemia is evident, cancel EP appointment and follow-up with me to consider catheterization.     CHB s/p PPM 5/2020  Device followed by Dr Lawyer Becker seen  9/8/2020: Patient pacemaker check today show proper function.  The patient is dependent on RV pacing. Previously he had short atrial flutter but the device check today did not show the new AMS arrhythmic event     HTN  Controlled with current therapy     HLD  Statin intolerance---In chart review has been intolerant to Crestor, Lipitor, pravastatin  5/2020 LDL at 112 On Zetia  Restarted Praluent in 1/2021 and most recent LDL at goal 29 on 4/2021          CKD III  Cr 1.57 in 4/2021  Cr. 1.62 in 11/2020  Cr 1.54 in 6/2020     DM  On oral agent        Hx DVT/PE: developed PE/DVT after bowel resection in 2012        Continue current care and f/u in 6 months with me if stress test is negative, and sooner with electrophysiology as noted above. Please note that the patient was seen by myself without the nurse practitioner today.     Danielle Fleming MD

## 2021-05-07 ENCOUNTER — HOSPITAL ENCOUNTER (OUTPATIENT)
Dept: PREADMISSION TESTING | Age: 71
Discharge: HOME | End: 2021-05-07
Payer: MEDICARE

## 2021-05-07 ENCOUNTER — OFFICE VISIT (OUTPATIENT)
Dept: CARDIOLOGY CLINIC | Age: 71
End: 2021-05-07
Payer: MEDICARE

## 2021-05-07 VITALS
SYSTOLIC BLOOD PRESSURE: 120 MMHG | DIASTOLIC BLOOD PRESSURE: 80 MMHG | HEART RATE: 70 BPM | WEIGHT: 178.4 LBS | BODY MASS INDEX: 28 KG/M2 | OXYGEN SATURATION: 97 % | HEIGHT: 67 IN | RESPIRATION RATE: 16 BRPM

## 2021-05-07 DIAGNOSIS — Z78.9 STATIN INTOLERANCE: ICD-10-CM

## 2021-05-07 DIAGNOSIS — Z95.1 S/P CABG X 3: ICD-10-CM

## 2021-05-07 DIAGNOSIS — I25.10 CORONARY ARTERY DISEASE DUE TO LIPID RICH PLAQUE: Primary | ICD-10-CM

## 2021-05-07 DIAGNOSIS — I25.83 CORONARY ARTERY DISEASE DUE TO LIPID RICH PLAQUE: Primary | ICD-10-CM

## 2021-05-07 DIAGNOSIS — R06.09 DOE (DYSPNEA ON EXERTION): ICD-10-CM

## 2021-05-07 DIAGNOSIS — E78.2 MIXED HYPERLIPIDEMIA: ICD-10-CM

## 2021-05-07 DIAGNOSIS — I42.9 CARDIOMYOPATHY, UNSPECIFIED TYPE (HCC): ICD-10-CM

## 2021-05-07 DIAGNOSIS — I21.4 NSTEMI (NON-ST ELEVATED MYOCARDIAL INFARCTION) (HCC): ICD-10-CM

## 2021-05-07 DIAGNOSIS — I10 ESSENTIAL HYPERTENSION: ICD-10-CM

## 2021-05-07 LAB — SARS-COV-2, COV2: NORMAL

## 2021-05-07 PROCEDURE — 1101F PT FALLS ASSESS-DOCD LE1/YR: CPT | Performed by: INTERNAL MEDICINE

## 2021-05-07 PROCEDURE — G9717 DOC PT DX DEP/BP F/U NT REQ: HCPCS | Performed by: INTERNAL MEDICINE

## 2021-05-07 PROCEDURE — G8417 CALC BMI ABV UP PARAM F/U: HCPCS | Performed by: INTERNAL MEDICINE

## 2021-05-07 PROCEDURE — G8536 NO DOC ELDER MAL SCRN: HCPCS | Performed by: INTERNAL MEDICINE

## 2021-05-07 PROCEDURE — G8752 SYS BP LESS 140: HCPCS | Performed by: INTERNAL MEDICINE

## 2021-05-07 PROCEDURE — 3017F COLORECTAL CA SCREEN DOC REV: CPT | Performed by: INTERNAL MEDICINE

## 2021-05-07 PROCEDURE — 93000 ELECTROCARDIOGRAM COMPLETE: CPT | Performed by: INTERNAL MEDICINE

## 2021-05-07 PROCEDURE — 99214 OFFICE O/P EST MOD 30 MIN: CPT | Performed by: INTERNAL MEDICINE

## 2021-05-07 PROCEDURE — U0003 INFECTIOUS AGENT DETECTION BY NUCLEIC ACID (DNA OR RNA); SEVERE ACUTE RESPIRATORY SYNDROME CORONAVIRUS 2 (SARS-COV-2) (CORONAVIRUS DISEASE [COVID-19]), AMPLIFIED PROBE TECHNIQUE, MAKING USE OF HIGH THROUGHPUT TECHNOLOGIES AS DESCRIBED BY CMS-2020-01-R: HCPCS

## 2021-05-07 PROCEDURE — G8427 DOCREV CUR MEDS BY ELIG CLIN: HCPCS | Performed by: INTERNAL MEDICINE

## 2021-05-07 PROCEDURE — G8754 DIAS BP LESS 90: HCPCS | Performed by: INTERNAL MEDICINE

## 2021-05-07 NOTE — LETTER
5/7/2021 Patient: Kelsi Patient. YOB: 1950 Date of Visit: 5/7/2021 Alyssa Arzate MD 
[address] [address] [address] [employer] Dear Alyssa Arzate MD, Thank you for referring Mr. Kar Marc to [address] [place] [address] for evaluation. My notes for this consultation are attached. If you have questions, please do not hesitate to call me. I look forward to following your patient along with you.  
 
 
Sincerely, 
 
Griffin Marc MD

## 2021-05-07 NOTE — PROGRESS NOTES
Chief Complaint   Patient presents with    Follow-up    Coronary Artery Disease    Hypertension    Cholesterol Problem     1. Have you been to the ER, urgent care clinic since your last visit? NO Hospitalized since your last visit? No    2. Have you seen or consulted any other health care providers outside of the [address] [place] [address] since your last visit? No  Include any pap smears or colon screening. Yes 3/10/21    Has endoscopic procedure 5/10/21 still having bleeding concerns with GI He is taking a lot of OTC vitamins.

## 2021-05-08 LAB — SARS-COV-2, COV2NT: NOT DETECTED

## 2021-05-10 ENCOUNTER — ANESTHESIA EVENT (OUTPATIENT)
Dept: ENDOSCOPY | Age: 71
End: 2021-05-10
Payer: MEDICARE

## 2021-05-10 ENCOUNTER — ANESTHESIA (OUTPATIENT)
Dept: ENDOSCOPY | Age: 71
End: 2021-05-10
Payer: MEDICARE

## 2021-05-10 ENCOUNTER — HOSPITAL ENCOUNTER (OUTPATIENT)
Age: 71
Setting detail: OUTPATIENT SURGERY
Discharge: HOME | End: 2021-05-10
Attending: INTERNAL MEDICINE | Admitting: INTERNAL MEDICINE
Payer: MEDICARE

## 2021-05-10 VITALS
HEART RATE: 74 BPM | OXYGEN SATURATION: 100 % | WEIGHT: 175 LBS | DIASTOLIC BLOOD PRESSURE: 59 MMHG | HEIGHT: 67 IN | TEMPERATURE: 97.8 F | RESPIRATION RATE: 17 BRPM | SYSTOLIC BLOOD PRESSURE: 127 MMHG | BODY MASS INDEX: 27.47 KG/M2

## 2021-05-10 PROCEDURE — 74011250636 HC RX REV CODE- 250/636: Performed by: INTERNAL MEDICINE

## 2021-05-10 PROCEDURE — 74011250636 HC RX REV CODE- 250/636: Performed by: NURSE ANESTHETIST, CERTIFIED REGISTERED

## 2021-05-10 PROCEDURE — 88305 TISSUE EXAM BY PATHOLOGIST: CPT

## 2021-05-10 PROCEDURE — 77030010936 HC CLP LIG BSC -C: Performed by: INTERNAL MEDICINE

## 2021-05-10 PROCEDURE — 76040000007: Performed by: INTERNAL MEDICINE

## 2021-05-10 PROCEDURE — 2709999900 HC NON-CHARGEABLE SUPPLY: Performed by: INTERNAL MEDICINE

## 2021-05-10 PROCEDURE — 77030004927 HC CATH ELECHEMSTAS BSC -C: Performed by: INTERNAL MEDICINE

## 2021-05-10 PROCEDURE — 76060000032 HC ANESTHESIA 0.5 TO 1 HR: Performed by: INTERNAL MEDICINE

## 2021-05-10 PROCEDURE — 74011000250 HC RX REV CODE- 250: Performed by: NURSE ANESTHETIST, CERTIFIED REGISTERED

## 2021-05-10 PROCEDURE — 74011250637 HC RX REV CODE- 250/637: Performed by: INTERNAL MEDICINE

## 2021-05-10 PROCEDURE — 77030019988 HC FCPS ENDOSC DISP BSC -B: Performed by: INTERNAL MEDICINE

## 2021-05-10 DEVICE — WORKING LENGTH 235CM, WORKING CHANNEL 2.8MM
Type: IMPLANTABLE DEVICE | Site: RECTUM | Status: FUNCTIONAL
Brand: RESOLUTION 360 CLIP

## 2021-05-10 RX ORDER — MIDAZOLAM HYDROCHLORIDE 1 MG/ML
.25-5 INJECTION, SOLUTION INTRAMUSCULAR; INTRAVENOUS
Status: DISCONTINUED | OUTPATIENT
Start: 2021-05-10 | End: 2021-05-10 | Stop reason: HOSPADM

## 2021-05-10 RX ORDER — FLUMAZENIL 0.1 MG/ML
0.2 INJECTION INTRAVENOUS
Status: DISCONTINUED | OUTPATIENT
Start: 2021-05-10 | End: 2021-05-10 | Stop reason: HOSPADM

## 2021-05-10 RX ORDER — SODIUM CHLORIDE 0.9 % (FLUSH) 0.9 %
5-40 SYRINGE (ML) INJECTION EVERY 8 HOURS
Status: DISCONTINUED | OUTPATIENT
Start: 2021-05-10 | End: 2021-05-10 | Stop reason: HOSPADM

## 2021-05-10 RX ORDER — NALOXONE HYDROCHLORIDE 0.4 MG/ML
0.4 INJECTION, SOLUTION INTRAMUSCULAR; INTRAVENOUS; SUBCUTANEOUS
Status: DISCONTINUED | OUTPATIENT
Start: 2021-05-10 | End: 2021-05-10 | Stop reason: HOSPADM

## 2021-05-10 RX ORDER — PROPOFOL 10 MG/ML
INJECTION, EMULSION INTRAVENOUS AS NEEDED
Status: DISCONTINUED | OUTPATIENT
Start: 2021-05-10 | End: 2021-05-10 | Stop reason: HOSPADM

## 2021-05-10 RX ORDER — DEXTROMETHORPHAN/PSEUDOEPHED 2.5-7.5/.8
1.2 DROPS ORAL
Status: DISCONTINUED | OUTPATIENT
Start: 2021-05-10 | End: 2021-05-10 | Stop reason: HOSPADM

## 2021-05-10 RX ORDER — LIDOCAINE HYDROCHLORIDE 20 MG/ML
INJECTION, SOLUTION EPIDURAL; INFILTRATION; INTRACAUDAL; PERINEURAL AS NEEDED
Status: DISCONTINUED | OUTPATIENT
Start: 2021-05-10 | End: 2021-05-10 | Stop reason: HOSPADM

## 2021-05-10 RX ORDER — SODIUM CHLORIDE 0.9 % (FLUSH) 0.9 %
5-40 SYRINGE (ML) INJECTION AS NEEDED
Status: DISCONTINUED | OUTPATIENT
Start: 2021-05-10 | End: 2021-05-10 | Stop reason: HOSPADM

## 2021-05-10 RX ORDER — SODIUM CHLORIDE 9 MG/ML
75 INJECTION, SOLUTION INTRAVENOUS CONTINUOUS
Status: DISCONTINUED | OUTPATIENT
Start: 2021-05-10 | End: 2021-05-10 | Stop reason: HOSPADM

## 2021-05-10 RX ORDER — ATROPINE SULFATE 0.1 MG/ML
0.5 INJECTION INTRAVENOUS
Status: DISCONTINUED | OUTPATIENT
Start: 2021-05-10 | End: 2021-05-10 | Stop reason: HOSPADM

## 2021-05-10 RX ORDER — EPHEDRINE SULFATE/0.9% NACL/PF 50 MG/5 ML
SYRINGE (ML) INTRAVENOUS AS NEEDED
Status: DISCONTINUED | OUTPATIENT
Start: 2021-05-10 | End: 2021-05-10 | Stop reason: HOSPADM

## 2021-05-10 RX ORDER — PHENYLEPHRINE HCL IN 0.9% NACL 0.4MG/10ML
SYRINGE (ML) INTRAVENOUS AS NEEDED
Status: DISCONTINUED | OUTPATIENT
Start: 2021-05-10 | End: 2021-05-10 | Stop reason: HOSPADM

## 2021-05-10 RX ORDER — EPINEPHRINE 0.1 MG/ML
1 INJECTION INTRACARDIAC; INTRAVENOUS
Status: DISCONTINUED | OUTPATIENT
Start: 2021-05-10 | End: 2021-05-10 | Stop reason: HOSPADM

## 2021-05-10 RX ADMIN — Medication 40 MCG: at 10:28

## 2021-05-10 RX ADMIN — Medication 40 MCG: at 10:06

## 2021-05-10 RX ADMIN — SODIUM CHLORIDE: 900 INJECTION, SOLUTION INTRAVENOUS at 09:52

## 2021-05-10 RX ADMIN — LIDOCAINE HYDROCHLORIDE 40 MG: 20 INJECTION, SOLUTION EPIDURAL; INFILTRATION; INTRACAUDAL; PERINEURAL at 10:05

## 2021-05-10 RX ADMIN — PROPOFOL 50 MG: 10 INJECTION, EMULSION INTRAVENOUS at 10:23

## 2021-05-10 RX ADMIN — Medication 40 MCG: at 10:17

## 2021-05-10 RX ADMIN — SODIUM CHLORIDE 75 ML/HR: 900 INJECTION, SOLUTION INTRAVENOUS at 09:49

## 2021-05-10 RX ADMIN — Medication 20 MG: at 10:35

## 2021-05-10 RX ADMIN — SIMETHICONE 80 MG: 20 SUSPENSION/ DROPS ORAL at 10:21

## 2021-05-10 RX ADMIN — PROPOFOL 50 MG: 10 INJECTION, EMULSION INTRAVENOUS at 10:18

## 2021-05-10 RX ADMIN — PROPOFOL 50 MG: 10 INJECTION, EMULSION INTRAVENOUS at 10:12

## 2021-05-10 RX ADMIN — Medication 40 MCG: at 10:23

## 2021-05-10 RX ADMIN — PROPOFOL 50 MG: 10 INJECTION, EMULSION INTRAVENOUS at 10:05

## 2021-05-10 NOTE — ANESTHESIA POSTPROCEDURE EVALUATION
Procedure(s):  COLONOSCOPY  ESOPHAGOGASTRODUODENOSCOPY (EGD)  ESOPHAGOGASTRODUODENAL (EGD) BIOPSY  BICAP  RESOLUTION CLIP x2. total IV anesthesia    Anesthesia Post Evaluation        Patient location during evaluation: PACU  Note status: Adequate. Level of consciousness: responsive to verbal stimuli and sleepy but conscious  Pain management: satisfactory to patient  Airway patency: patent  Anesthetic complications: no  Cardiovascular status: acceptable  Respiratory status: acceptable  Hydration status: acceptable  Comments: +Post-Anesthesia Evaluation and Assessment    Patient: Lisa Eden MRN: [id number]  SSN: [id number]   YOB: 1950  Age: 65 y.o. Sex: male      Cardiovascular Function/Vital Signs    /61   Pulse 78   Temp 36.6 °C (97.8 °F)   Resp 17   Ht 5' 7\" (1.702 m)   Wt 79.4 kg (175 lb)   SpO2 99%   BMI 27.41 kg/m²     Patient is status post Procedure(s):  COLONOSCOPY  ESOPHAGOGASTRODUODENOSCOPY (EGD)  ESOPHAGOGASTRODUODENAL (EGD) BIOPSY  BICAP  RESOLUTION CLIP x2. Nausea/Vomiting: Controlled. Postoperative hydration reviewed and adequate. Pain:  Pain Scale 1: Numeric (0 - 10) (05/10/21 1042)  Pain Intensity 1: 0 (05/10/21 1042)   Managed. Neurological Status: At baseline. Mental Status and Level of Consciousness: Arousable. Pulmonary Status:   O2 Device: None (Room air) (05/10/21 1051)   Adequate oxygenation and airway patent. Complications related to anesthesia: None    Post-anesthesia assessment completed. No concerns. Signed By: Prasanth Saleh MD    5/10/2021  Post anesthesia nausea and vomiting:  controlled      INITIAL Post-op Vital signs:   Vitals Value Taken Time   /61 05/10/21 1051   Temp 36.6 °C (97.8 °F) 05/10/21 1042   Pulse 93 05/10/21 1053   Resp 14 05/10/21 1053   SpO2 98 % 05/10/21 1053   Vitals shown include unvalidated device data.

## 2021-05-10 NOTE — ANESTHESIA PREPROCEDURE EVALUATION
Anesthetic History   No history of anesthetic complications            Review of Systems / Medical History  Patient summary reviewed, nursing notes reviewed and pertinent labs reviewed    Pulmonary          Pneumonia         Neuro/Psych         Psychiatric history    Comments: Cervical spinal stenosis s/p PCF (11/2/16)  S/P Lumbar Laminectomy (10/16/14)  Depression  Cervical spinal stenosis Cardiovascular    Hypertension  Valvular problems/murmurs: tricuspid insufficiency, mitral insufficiency and aortic insufficiency      Dysrhythmias   Pacemaker, past MI, CAD, CABG and hyperlipidemia    Exercise tolerance: <4 METS  Comments: H/O PE/DVT  Bilateral carotid artery stenosis  Pacer for 3rd degree AV block  CABG x 3 vessels (4/28/20)    TTE (3/17/20):  ·LV: Estimated LVEF is 40 - 45%. Small left ventricle. Severe concentric hypertrophy. Globally reduced systolic function. Severe (grade 3) left ventricular diastolic dysfunction. ·LA: Moderately dilated left atrium. ·RV: Reduced systolic function. Pacing wire present  ·RA: Moderately dilated right atrium. ·AV: Mild aortic valve regurgitation is present. ·MV: Mild to moderate mitral valve regurgitation is present. ·TV: Severe tricuspid valve regurgitation is present. GI/Hepatic/Renal         Renal disease: CRI      Comments: CRI, Stage III    H/O Perforated diverticulum S/P colon resection (2012) Endo/Other    Diabetes (diet controlled): well controlled, type 2    Obesity, arthritis and cancer    Comments:  Thrombocytopenia    H/O Prostate Cancer Other Findings   Comments: Chronic pain  ETOH abuse  Hx PE (pulmonary embolism)   Prostate ca         Physical Exam    Airway  Mallampati: III  TM Distance: 4 - 6 cm  Neck ROM: normal range of motion   Mouth opening: Normal     Cardiovascular  Regular rate and rhythm,  S1 and S2 normal,  no murmur, click, rub, or gallop             Dental    Dentition: Edentulous     Pulmonary  Breath sounds clear to auscultation               Abdominal  GI exam deferred       Other Findings            Anesthetic Plan    ASA: 3  Anesthesia type: total IV anesthesia          Induction: Intravenous  Anesthetic plan and risks discussed with: Patient

## 2021-05-10 NOTE — PROCEDURES
Colonoscopy Procedure Note    Adwoa Evans.  1950  [id number]    Indications:  Please see below. Pre-operative Diagnosis: GI bleeding    Post-operative Diagnosis: rectal ulcer, radiation telangiectasia, internal hemorrhoids,diverticulosis      : Jamir Briones MD    Referring Provider: Lucinda Domingo MD    Sedation:  MAC anesthesia Propofol        Procedure Details:    After detailed informed consent was obtained with all risks and benefits of procedure explained and preoperative exam completed, the patient was taken to the endoscopy suite and placed in the left lateral decubitus position. Upon sequential sedation as per above, a digital rectal exam was performed  And was normal.  The Olympus videocolonoscope  was inserted in the rectum and carefully advanced to the surgical anastomosis . The quality of preparation was good. The colonoscope was slowly withdrawn with careful evaluation between folds. Retroflexion in the rectum was performed. Findings:   ·  The Olympus Video High-Definition Colonoscope was advanced to right sided anastomosis. · A large white irregular rectal ulcer (2 cm with a central pigmented vessel) is  noted with moderate surrounding radiation related telangiectasia. I initially used a 10 Fr BICAP Goldprobe catheter to ablate the vessel in the ulcer. Then 2  Resolution Clips were applied to close the ulcer. · There is very easy contact bleeding of the surrounding telangiectasia. I used the BICAP catheter to control oozing. · I did not use APC as I am not sure why he has developed the ulcer(? Post APC related)  · Moderate sized internal hemorrhoids          Therapies:  endoclip was placed for hemorrhage  BICAP cautery of rectal ulcer and surrounding area    Specimen:  none     Complications: None were encountered during the procedure. EBL:  None. Recommendations:     -Carafate enema trial.  -Repeat Flex sig in 6-8 weeks. Jamir Briones MD  5/10/2021  10:33 AM

## 2021-05-10 NOTE — DISCHARGE INSTRUCTIONS
[place] Office: [phone]    Sha Pennington  [id number]  1950    EGD/COLONOSCOPY DISCHARGE INSTRUCTIONS  Discomfort:  Sore throat- throat lozenges or warm salt water gargle  redness at IV site- apply warm compress to area; if redness or soreness persist- contact your physician  Gaseous discomfort- walking, belching will help relieve any discomfort  You may not operate a vehicle for 12 hours  You may not engage in an occupation involving machinery or appliances for rest of today. You may not drink alcoholic beverages for at least 12 hours  Avoid making any critical decisions for at least 24 hour  DIET  You may resume your regular diet - however -  remember your colon is empty and a heavy meal will produce gas. Avoid these foods:  fried / greasy foods, excessive carbonated drinks or too much caffeine  MEDICATIONS   Regarding Aspirin or Nonsteroidal medications specifically, please see below. ACTIVITY  You may resume your normal daily activities. Spend the remainder of the day resting -  avoid any strenuous activity. CALL M.D. ANY SIGN OF   Increasing pain, nausea, vomiting  Abdominal distension (swelling)  New increased bleeding (oral or rectal)  Fever (chills)  Pain in chest area  Bloody discharge from nose or mouth  Shortness of breath    You may not take any Advil, Aspirin, Ibuprofen, Motrin, Aleve, or Goodys for 7 days, ONLY  Tylenol as needed for pain. Follow-up Instructions:   Call  Jamir Rodrigez MD for any questions or concerns  Results of procedure / biopsy in 7 days   Telephone # [phone]      Follow-up Information    None

## 2021-05-10 NOTE — H&P
Pre-endoscopy H and P    The patient was seen and examined in the room/pre-op holding area. The airway was assessed and documented. The problem list, past medical history, and medications were reviewed. 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    Alcohol abuse F10.10    Insomnia G47.00    HTN (hypertension) I10    DVT of leg (deep venous thrombosis) ([facility] I82.409    Spinal stenosis, lumbar region, without neurogenic claudication M48.061    Hyperlipidemia E78.5    CKD (chronic kidney disease) N18.9    Cervical spinal stenosis M48.02    Reactive depression F32.9    Type 2 diabetes with nephropathy ([facility]) E11.21    Prostate cancer ([facility] C61    Type 2 diabetes mellitus with diabetic neuropathy ([facility]) E11.40    NSTEMI (non-ST elevated myocardial infarction) ([facility]) I21.4    Bilateral carotid artery stenosis I65.23    CAD (coronary artery disease) I25.10    S/P CABG x 3 Z95.1    Postoperative anemia due to acute blood loss D62    Pacemaker Z95.0    Third degree AV block ([facility]) I44.2     Social History     Socioeconomic History    Marital status:      Spouse name: Not on file    Number of children: 1    Years of education: 15    Highest education level: High school graduate   Occupational History    Not on file   Social Needs    Financial resource strain: Not hard at all   Lauro-Young insecurity     Worry: Never true     Inability: Never true   [employer] needs     Medical: No     Non-medical: No   Tobacco Use    Smoking status: Never Smoker    Smokeless tobacco: Current User     Types: Chew    Tobacco comment: Chews tobacco every day   Substance and Sexual Activity    Alcohol use:  Yes     Alcohol/week: 7.0 standard drinks     Types: 7 Cans of beer per week     Comment: drinks on weekends    Drug use: Never    Sexual activity: Not Currently   Lifestyle    Physical activity     Days per week: 0 days     Minutes per session: 0 min    Stress: Rather much   Relationships    Social connections     Talks on phone: Never     Gets together: Never     Attends [employer] service: Never     Active member of club or organization: No     Attends meetings of clubs or organizations: Never     Relationship status:     Intimate partner violence     Fear of current or ex partner: No     Emotionally abused: No     Physically abused: No     Forced sexual activity: No   Other Topics Concern     Service No    Blood Transfusions No    Caffeine Concern No    Occupational Exposure No    Hobby Hazards No    Sleep Concern Yes    Stress Concern Yes    Weight Concern No    Special Diet No    Back Care Yes    Exercise No    Bike Helmet Not Asked    Seat Belt Yes    Self-Exams Not Asked   Social History Narrative    ** Merged History Encounter **          Past Medical History:   Diagnosis Date    Arthritis     Cancer ([facility]     Prostate ca    Chronic kidney disease     Stage 3    Chronic pain     Abdoman, back, fingers per daughter    Diabetes ([facility]     Heart attack ([facility]     Hypertension     PE (pulmonary embolism)     Pneumonia     Psychiatric disorder     Depression         Prior to Admission Medications   Prescriptions Last Dose Informant Patient Reported? Taking? FREESTYLE LANCETS 28 gauge misc 2021 at Unknown time  No Yes   Sig: USE TO CHECK BLOOD SUGAR ONCE DAILY   SITagliptin (Januvia) 50 mg tablet 2021  No Yes   Sig: Take 1 tablet by mouth once daily   acetaminophen (TYLENOL) 325 mg tablet Not Taking at Unknown time  Yes No   Sig: Take 2 Tabs by mouth every four (4) hours as needed for Pain. alirocumab (Praluent Pen) 75 mg/mL injector pen 2021  No Yes   Si mL by SubCUTAneous route Once every 2 weeks. ascorbate calcium (VITAMIN C PO) 2021  Yes Yes   Sig: Take 1 Tab by mouth daily as needed. aspirin delayed-release (ASPIR-LOW) 81 mg tablet 2021  Yes Yes   Sig: Take 81 mg by mouth daily. cholecalciferol, vitamin D3, (VITAMIN D3 PO) 2021  Yes Yes   Sig: Take 1,000 Int'l Units by mouth daily as needed. co-enzyme Q-10 (Co Q-10) 100 mg capsule   Yes Yes   Sig: Take 100 mg by mouth daily as needed. 200 mg   cyanocobalamin 1,000 mcg tablet 2021  Yes Yes   Sig: Take 1,000 mcg by mouth daily as needed. docusate sodium (Dulcolax Stool Softener, dss,) 100 mg capsule 2021  No Yes   Sig: Take 1 Cap by mouth two (2) times a day. Patient taking differently: Take 100 mg by mouth as needed. ezetimibe (ZETIA) 10 mg tablet 2021  No Yes   Sig: Take 1 Tab by mouth daily. fluticasone propionate (Flonase Allergy Relief) 50 mcg/actuation nasal spray Not Taking at Unknown time Self Yes No   Si Sprays by Both Nostrils route two (2) times daily as needed for Rhinitis. furosemide (LASIX) 20 mg tablet 2021  No Yes   Sig: Take 1 Tab by mouth daily. Patient not taking: Reported on 2021   gabapentin (NEURONTIN) 300 mg capsule 4/10/2021 at Unknown time  No Yes   Sig: TAKE 2 TO 3 CAPSULES BY MOUTH IN THE EVENING AS NEEDED FOR PAIN   hydrocortisone (Anusol-HC) 25 mg supp 2021  No Yes   Sig: Insert 1 Suppository into rectum every twelve (12) hours as needed for Pain. Indications: inflammation of the rectum   Patient not taking: Reported on 2021   magnesium 250 mg tab 2021  Yes Yes   Sig: Take 1 Tab by mouth daily as needed. metoprolol tartrate (LOPRESSOR) 25 mg tablet 2021  No Yes   Sig: TAKE 1/2 (ONE-HALF) TABLET BY MOUTH EVERY 12 HOURS   pentoxifylline CR (TRENTAL) 400 mg CR tablet Not Taking at Unknown time  No No   Sig: Take 1 Tab by mouth three (3) times daily (with meals). Patient taking differently: Take 400 mg by mouth as needed. pumpkin seed extract-soy germ 300 mg cap 2021  Yes Yes   Sig: Take 1 Cap by mouth as needed (prostate health).    senna-docusate (PERICOLACE) 8.6-50 mg per tablet 2021  Yes Yes   Sig: Take 1 Tab by mouth daily as needed for Constipation. traZODone (DESYREL) 50 mg tablet 5/8/2021  No Yes   Sig: TAKE 1 TABLET BY MOUTH AT BEDTIME   vitamin e (E GEMS) 100 unit capsule 5/8/2021  No Yes   Sig: Take 1 capsule three times daily      Facility-Administered Medications: None           The review of systems is:  Negative  for shortness of breath or chest pain      The heart, lungs, and mental status were satisfactory for the administration of deep sedation and for the procedure. I discussed with the patient the objectives, risks, consequences and alternatives to the procedure.       Uriel Miller MD  5/10/2021  10:04 AM

## 2021-05-10 NOTE — ROUTINE PROCESS
Allison Shaffer.  1950  [id number]    Situation:  Verbal report received from: Jacob Melvin  Procedure: Procedure(s):  COLONOSCOPY  ESOPHAGOGASTRODUODENOSCOPY (EGD)  ESOPHAGOGASTRODUODENAL (EGD) BIOPSY  BICAP  RESOLUTION CLIP x2    Background:    Preoperative diagnosis: GASTROESOPHAGEAL REFLUX DISEASE, screening  Postoperative diagnosis: egd- gastritis, gastric erosions, hiatal hernia  colon rectal ulcer, hemorrhoids, radiation telangiectasia    :  Dr. Rufus Raymond  Assistant(s): Endoscopy Technician-1: Dianne Montoya  Endoscopy RN-1: Cecilia West RN    Specimens:   ID Type Source Tests Collected by Time Destination   1 : gastric bx Preservative Gastric  Danni MD Andre 5/10/2021 8294 Pathology     H. Pylori  no    Assessment:  Intra-procedure medications     Anesthesia gave intra-procedure sedation and medications, see anesthesia flow sheet yes    Intravenous fluids: NS@ KVO     Vital signs stable     Abdominal assessment: round and soft     Recommendation:  Discharge patient per MD order  Family or Friend   Permission to share finding with family or friend yes    Endoscopy discharge instructions have been reviewed and given to patient. The patient verbalized understanding and acceptance of instructions. Dr. Rufus Raymond discussed with daughter procedure findings and next steps.

## 2021-05-10 NOTE — PROCEDURES
[place] Office: [phone]      Esophagogastroduodenoscopy Procedure Note      Linda Mobley  1950  [id number]    Indication: GI bleeding     : Saurav Wyatt MD    Referring Provider:  Lizeth Dykes MD    Sedation:  MAC anesthesia Propofol    Procedure Details:  After detailed informed consent was obtained for the procedure, with all risks and benefits of procedure explained the patient was taken to the endoscopy suite and placed in the left lateral decubitus position. Following sequential administration of sedation as per above, the endoscope was inserted into the mouth and advanced under direct vision to second portion of the duodenum. A careful inspection was made as the gastroscope was withdrawn, including a retroflexed view of the proximal stomach; findings and interventions are described below. Findings:     Esophagus: The esophageal mucosa in the proximal, mid and distal esophagus is normal.   The squamo-columnar junction is at 40 cm where the Z-line was noted. Possible tiny hiatal hernia noted. Stomach: The gastric mucosa has a few antral erosions and body erythema. I took mucosal biopsies. The fundus was found to be normal with no lesions noted on retroflexion. The angularis is normal as well. Duodenum:   The bulb and post bulbar mucosa is normal in appearance. The duodenal folds are normal.     Therapies:  biopsy of stomach antrum/body    Specimen:  Specimens were collected as described and send to the laboratory. Complications:   None were encountered during the procedure. EBL:  None. Recommendations:     -Continue acid suppression. ,   -Await pathology. ,   -Follow symptoms. ,   -Follow up with primary care physician        Jamir Sosa MD  5/10/2021  10:14 AM

## 2021-05-17 ENCOUNTER — TELEPHONE (OUTPATIENT)
Dept: CARDIAC REHAB | Age: 71
End: 2021-05-17

## 2021-05-17 NOTE — TELEPHONE ENCOUNTER
Cardiac Rehab: Call placed to Jorge to check on his absence today from the program and to schedule his final discharge appointment. Spoke with his daughter who manages his appointments and he will not be back due to ongoing issues with ulcerations that are aggravated with the biking. Will discharge from the Darwin Dickson RN

## 2021-05-24 ENCOUNTER — APPOINTMENT (OUTPATIENT)
Dept: CARDIAC REHAB | Age: 71
End: 2021-05-24

## 2021-05-24 ENCOUNTER — ANCILLARY PROCEDURE (OUTPATIENT)
Dept: CARDIOLOGY CLINIC | Age: 71
End: 2021-05-24
Payer: MEDICARE

## 2021-05-24 VITALS — WEIGHT: 180 LBS | BODY MASS INDEX: 25.2 KG/M2 | HEIGHT: 71 IN

## 2021-05-24 DIAGNOSIS — I25.83 CORONARY ARTERY DISEASE DUE TO LIPID RICH PLAQUE: ICD-10-CM

## 2021-05-24 DIAGNOSIS — I10 ESSENTIAL HYPERTENSION: ICD-10-CM

## 2021-05-24 DIAGNOSIS — E78.2 MIXED HYPERLIPIDEMIA: ICD-10-CM

## 2021-05-24 DIAGNOSIS — I25.10 CORONARY ARTERY DISEASE DUE TO LIPID RICH PLAQUE: ICD-10-CM

## 2021-05-24 DIAGNOSIS — I42.9 CARDIOMYOPATHY, UNSPECIFIED TYPE (HCC): ICD-10-CM

## 2021-05-24 DIAGNOSIS — R06.09 DOE (DYSPNEA ON EXERTION): ICD-10-CM

## 2021-05-24 DIAGNOSIS — I21.4 NSTEMI (NON-ST ELEVATED MYOCARDIAL INFARCTION) (HCC): ICD-10-CM

## 2021-05-24 DIAGNOSIS — Z95.1 S/P CABG X 3: ICD-10-CM

## 2021-05-24 DIAGNOSIS — Z78.9 STATIN INTOLERANCE: ICD-10-CM

## 2021-05-24 PROCEDURE — A9502 TC99M TETROFOSMIN: HCPCS

## 2021-05-24 PROCEDURE — 93016 CV STRESS TEST SUPVJ ONLY: CPT | Performed by: INTERNAL MEDICINE

## 2021-05-24 PROCEDURE — 93018 CV STRESS TEST I&R ONLY: CPT | Performed by: INTERNAL MEDICINE

## 2021-05-24 PROCEDURE — 78452 HT MUSCLE IMAGE SPECT MULT: CPT | Performed by: INTERNAL MEDICINE

## 2021-05-24 PROCEDURE — A9502 TC99M TETROFOSMIN: HCPCS | Performed by: INTERNAL MEDICINE

## 2021-05-24 RX ADMIN — TETROFOSMIN 9.1 MILLICURIE: 1.38 INJECTION, POWDER, LYOPHILIZED, FOR SOLUTION INTRAVENOUS at 13:17

## 2021-05-27 ENCOUNTER — OFFICE VISIT (OUTPATIENT)
Dept: CARDIOLOGY CLINIC | Age: 71
End: 2021-05-27
Payer: MEDICARE

## 2021-05-27 VITALS
BODY MASS INDEX: 25.62 KG/M2 | OXYGEN SATURATION: 97 % | HEIGHT: 71 IN | HEART RATE: 68 BPM | RESPIRATION RATE: 18 BRPM | WEIGHT: 183 LBS | DIASTOLIC BLOOD PRESSURE: 80 MMHG | SYSTOLIC BLOOD PRESSURE: 130 MMHG

## 2021-05-27 DIAGNOSIS — I25.10 ASHD (ARTERIOSCLEROTIC HEART DISEASE): ICD-10-CM

## 2021-05-27 DIAGNOSIS — I10 ESSENTIAL HYPERTENSION: ICD-10-CM

## 2021-05-27 DIAGNOSIS — I44.2 THIRD DEGREE AV BLOCK (HCC): ICD-10-CM

## 2021-05-27 DIAGNOSIS — I42.9 CARDIOMYOPATHY, UNSPECIFIED TYPE (HCC): Primary | ICD-10-CM

## 2021-05-27 DIAGNOSIS — E78.2 MIXED HYPERLIPIDEMIA: ICD-10-CM

## 2021-05-27 DIAGNOSIS — Z95.0 PACEMAKER: ICD-10-CM

## 2021-05-27 DIAGNOSIS — M54.50 LOW BACK PAIN WITHOUT SCIATICA, UNSPECIFIED BACK PAIN LATERALITY, UNSPECIFIED CHRONICITY: ICD-10-CM

## 2021-05-27 DIAGNOSIS — Z95.1 S/P CABG X 3: ICD-10-CM

## 2021-05-27 DIAGNOSIS — Z95.0 CARDIAC PACEMAKER IN SITU: ICD-10-CM

## 2021-05-27 PROCEDURE — G8752 SYS BP LESS 140: HCPCS | Performed by: INTERNAL MEDICINE

## 2021-05-27 PROCEDURE — 99204 OFFICE O/P NEW MOD 45 MIN: CPT | Performed by: INTERNAL MEDICINE

## 2021-05-27 PROCEDURE — G8427 DOCREV CUR MEDS BY ELIG CLIN: HCPCS | Performed by: INTERNAL MEDICINE

## 2021-05-27 PROCEDURE — G8754 DIAS BP LESS 90: HCPCS | Performed by: INTERNAL MEDICINE

## 2021-05-27 PROCEDURE — G0463 HOSPITAL OUTPT CLINIC VISIT: HCPCS | Performed by: INTERNAL MEDICINE

## 2021-05-27 PROCEDURE — G8536 NO DOC ELDER MAL SCRN: HCPCS | Performed by: INTERNAL MEDICINE

## 2021-05-27 PROCEDURE — G9717 DOC PT DX DEP/BP F/U NT REQ: HCPCS | Performed by: INTERNAL MEDICINE

## 2021-05-27 PROCEDURE — 1101F PT FALLS ASSESS-DOCD LE1/YR: CPT | Performed by: INTERNAL MEDICINE

## 2021-05-27 PROCEDURE — G8417 CALC BMI ABV UP PARAM F/U: HCPCS | Performed by: INTERNAL MEDICINE

## 2021-05-27 PROCEDURE — 3017F COLORECTAL CA SCREEN DOC REV: CPT | Performed by: INTERNAL MEDICINE

## 2021-05-27 NOTE — LETTER
5/27/2021 Patient: Allie Crawford. YOB: 1950 Date of Visit: 5/27/2021 Fidel Robin MD 
[address] [address] [address] [employer] Dear Fidel Robin MD, Thank you for referring Mr. Natali Galeano to [address] [place] [address] for evaluation. My notes for this consultation are attached. If you have questions, please do not hesitate to call me. I look forward to following your patient along with you. Sincerely, Keith Ledbetter MD

## 2021-05-27 NOTE — PROGRESS NOTES
ELECTROPHYSIOLOGY        Subjective:      Grisel Engle is a 65 y.o. male is here for EP consult. He is a current patent of Dr Odessa Jha and Dr Rigoberto Hall with pmhx of ASHD, s/p CABG, cardiomyopathy, % RVP, HTN, CJD III and DM. His EF has declined despite max med therapy. He endorses dyspnea and fatigue. Here to discuss decline in EF.      Patient Active Problem List    Diagnosis Date Noted    Pacemaker 05/01/2020    Third degree AV block ([address] 05/01/2020    Postoperative anemia due to acute blood loss 04/29/2020    S/P CABG x 3 04/28/2020    CAD (coronary artery disease) 04/27/2020    Bilateral carotid artery stenosis 04/24/2020    NSTEMI (non-ST elevated myocardial infarction) ([address] 04/23/2020    Type 2 diabetes mellitus with diabetic neuropathy ([address] 01/09/2020    Prostate cancer ([address] 06/04/2018    Type 2 diabetes with nephropathy ([address] 02/28/2018    Reactive depression 11/15/2016    Cervical spinal stenosis 11/03/2016    CKD (chronic kidney disease) 10/31/2016    Hyperlipidemia 07/10/2015    Spinal stenosis, lumbar region, without neurogenic claudication 10/16/2014    DVT of leg (deep venous thrombosis) ([address] 12/18/2012    HTN (hypertension) 12/12/2012    Perforated diverticulum of large intestine 12/04/2012    Alcohol abuse 12/04/2012    Insomnia 12/04/2012      Elizabeth Narayan MD  Past Medical History:   Diagnosis Date    Arthritis     Cancer [facility])     Prostate ca    Chronic kidney disease     Stage 3    Chronic pain     Abdoman, back, fingers per daughter    Diabetes ([address]     Heart attack ([address]     Hypertension     PE (pulmonary embolism)     Pneumonia     Psychiatric disorder     Depression      Past Surgical History:   Procedure Laterality Date    COLONOSCOPY N/A 3/19/2021    COLONOSCOPY performed by Shakira Garrido MD at [facility] ENDOSCOPY    COLONOSCOPY N/A 5/10/2021    COLONOSCOPY performed by Sh[facility]ira Garrido MD at [facility] ENDOSCOPY    HX BACK SURGERY  2014    HX CERVICAL LAMINECTOMY      HX GI  11/2012    bowel resection    HX ORTHOPAEDIC      amputated left 4th finger    OK ABDOMEN SURGERY PROC UNLISTED      bowel resection    OK INS NEW/RPLCMT PRM PM W/TRANSV ELTRD ATRIAL&VENT N/A 5/1/2020    INSERT PPM DUAL performed by Shaneka Pascual MD at [address] [facility]     Allergies   Allergen Reactions    Arb-Angiotensin Receptor Antagonist Other (comments)     High k    Statins-Hmg-Coa Reductase Inhibitors Myalgia    Ace Inhibitors Cough      Family History   Problem Relation Age of Onset    Cancer Mother     Heart Disease Father     Heart Disease Brother     Anesth Problems Neg Hx     negative for cardiac disease  Social History     Socioeconomic History    Marital status:      Spouse name: Not on file    Number of children: 1    Years of education: 15    Highest education level: High school graduate   Tobacco Use    Smoking status: Never Smoker    Smokeless tobacco: Current User     Types: Chew    Tobacco comment: Chews tobacco every day   Vaping Use    Vaping Use: Never used   Substance and Sexual Activity    Alcohol use:  Yes     Alcohol/week: 7.0 standard drinks     Types: 7 Cans of beer per week     Comment:  3-4 beers daily     Drug use: Never    Sexual activity: Not Currently   Other Topics Concern     Service No    Blood Transfusions No    Caffeine Concern No    Occupational Exposure No    Hobby Hazards No    Sleep Concern Yes    Stress Concern Yes    Weight Concern No    Special Diet No    Back Care Yes    Exercise No    Seat Belt Yes   Social History Narrative    ** Merged History Encounter **          Social Determinants of Health     Financial Resource Strain: Low Risk     Difficulty of Paying Living Expenses: Not hard at all   Food Insecurity: No Food Insecurity    Worried About Running Out of Food in the Last Year: Never true    Jenna of Food in the Last Year: Never true   Transportation Needs: No Transportation Needs    Lack of Transportation (Medical): No    Lack of Transportation (Non-Medical): No   Physical Activity: Inactive    Days of Exercise per Week: 0 days    Minutes of Exercise per Session: 0 min   Stress: Stress Concern Present    Feeling of Stress : Rather much   Social Connections: Socially Isolated    Frequency of Communication with Friends and Family: Never    Frequency of Social Gatherings with Friends and Family: Never    Attends Scientologist Services: Never    Active Member of Clubs or Organizations: No    Attends Club or Organization Meetings: Never    Marital Status:      Current Outpatient Medications   Medication Sig    traZODone (DESYREL) 50 mg tablet TAKE 1 TABLET BY MOUTH AT BEDTIME    hydrocortisone (Anusol-HC) 25 mg supp Insert 1 Suppository into rectum every twelve (12) hours as needed for Pain. Indications: inflammation of the rectum    metoprolol tartrate (LOPRESSOR) 25 mg tablet TAKE 1/2 (ONE-HALF) TABLET BY MOUTH EVERY 12 HOURS    docusate sodium (Dulcolax Stool Softener, dss,) 100 mg capsule Take 1 Cap by mouth two (2) times a day. (Patient taking differently: Take 100 mg by mouth as needed.)    SITagliptin (Januvia) 50 mg tablet Take 1 tablet by mouth once daily    alirocumab (Praluent Pen) 75 mg/mL injector pen 1 mL by SubCUTAneous route Once every 2 weeks.  gabapentin (NEURONTIN) 300 mg capsule TAKE 2 TO 3 CAPSULES BY MOUTH IN THE EVENING AS NEEDED FOR PAIN    ezetimibe (ZETIA) 10 mg tablet Take 1 Tab by mouth daily.  pumpkin seed extract-soy germ 300 mg cap Take 1 Cap by mouth as needed (prostate health).  acetaminophen (TYLENOL) 325 mg tablet Take 2 Tabs by mouth every four (4) hours as needed for Pain.  co-enzyme Q-10 (Co Q-10) 100 mg capsule Take 100 mg by mouth daily as needed. 200 mg    cholecalciferol, vitamin D3, (VITAMIN D3 PO) Take 1,000 Int'l Units by mouth daily as needed.     ascorbate calcium (VITAMIN C PO) Take 1 Tab by mouth daily as needed.  aspirin delayed-release (ASPIR-LOW) 81 mg tablet Take 81 mg by mouth daily. Does not take every day    FREESTYLE LANCETS 28 gauge misc USE TO CHECK BLOOD SUGAR ONCE DAILY    cyanocobalamin 1,000 mcg tablet Take 1,000 mcg by mouth daily as needed.  magnesium 250 mg tab Take 1 Tab by mouth daily as needed.  vitamin e (E GEMS) 100 unit capsule Take 1 capsule three times daily (Patient not taking: Reported on 5/27/2021)    pentoxifylline CR (TRENTAL) 400 mg CR tablet Take 1 Tab by mouth three (3) times daily (with meals). (Patient not taking: Reported on 5/27/2021)    furosemide (LASIX) 20 mg tablet Take 1 Tab by mouth daily. (Patient not taking: Reported on 5/7/2021)    fluticasone propionate (Flonase Allergy Relief) 50 mcg/actuation nasal spray 2 Sprays by Both Nostrils route two (2) times daily as needed for Rhinitis. (Patient not taking: Reported on 5/27/2021)    senna-docusate (PERICOLACE) 8.6-50 mg per tablet Take 1 Tab by mouth daily as needed for Constipation. (Patient not taking: Reported on 5/27/2021)     No current facility-administered medications for this visit. Vitals:    05/27/21 0919   BP: 130/80   Pulse: 68   Resp: 18   SpO2: 97%   Weight: 183 lb (83 kg)   Height: 5' 11\" (1.803 m)       I have reviewed the nurses notes, vitals, problem list, allergy list, medical history, family, social history and medications. Review of Symptoms:    General: Pt denies excessive weight gain or loss. Pt is able to conduct ADL's  HEENT: Denies blurred vision, headaches, epistaxis and difficulty swallowing.   Respiratory: reports shortness of breath, ROLON   Cardiovascular: Denies precordial pain, palpitations, edema or PND  Gastrointestinal: Denies poor appetite, indigestion, abdominal pain or blood in stool  Urinary: Denies dysuria, pyuria  Musculoskeletal: Denies pain or swelling from muscles or joints  Neurologic: Denies tremor, paresthesias, or sensory motor disturbance  Skin: Denies rash, itching or texture change. Psych: Denies depression      Physical Exam:      General: Well developed, in no acute distress. HEENT: Eyes - PERRL  Heart:  Normal S1/S2 negative S3 or S4. Regular, no murmur  Respiratory: Clear bilaterally x 4, no wheezing or rales  Extremities:  No edema, no cyanosis. Musculoskeletal: No clubbing  Neuro: A&Ox3, speech clear  Skin: No visible rashes or lesions. Non diaphoretic.  No visible ulcers  Vascular: 2+ pulses symmetric in all extremities  Psych - judgement intact and orientation is wnl       Cardiographics     Results for orders placed or performed during the hospital encounter of 04/23/20   EKG, 12 LEAD, INITIAL   Result Value Ref Range    Ventricular Rate 90 BPM    Atrial Rate 90 BPM    P-R Interval 188 ms    QRS Duration 144 ms    Q-T Interval 412 ms    QTC Calculation (Bezet) 504 ms    Calculated P Axis 41 degrees    Calculated R Axis -79 degrees    Calculated T Axis 79 degrees    Diagnosis       Normal sinus rhythm  Left axis deviation  Right bundle branch block  Septal infarct , age undetermined    Confirmed by Corine Moreau P.VElsy ([id number]) on 4/24/2020 4:42:38 PM           Lab Results   Component Value Date/Time    WBC 5.0 02/27/2021 08:35 PM    Hemoglobin (POC) 13.3 11/02/2016 07:15 AM    HGB 13.3 02/27/2021 08:35 PM    Hematocrit (POC) 39 11/02/2016 07:15 AM    HCT 40.8 02/27/2021 08:35 PM    PLATELET 729 (L) 28/66/6992 08:35 PM    MCV 83.8 02/27/2021 08:35 PM      Lab Results   Component Value Date/Time    Sodium 140 04/26/2021 10:54 AM    Potassium 4.4 04/26/2021 10:54 AM    Chloride 106 04/26/2021 10:54 AM    CO2 27 04/26/2021 10:54 AM    Anion gap 7 04/26/2021 10:54 AM    Glucose 123 (H) 04/26/2021 10:54 AM    BUN 28 (H) 04/26/2021 10:54 AM    Creatinine 1.57 (H) 04/26/2021 10:54 AM    BUN/Creatinine ratio 18 04/26/2021 10:54 AM    GFR est AA 53 (L) 04/26/2021 10:54 AM    GFR est non-AA 44 (L) 04/26/2021 10:54 AM    Calcium 9.2 04/26/2021 10:54 AM    Bilirubin, total 1.5 (H) 04/26/2021 10:54 AM    Alk. phosphatase 83 04/26/2021 10:54 AM    Protein, total 7.2 04/26/2021 10:54 AM    Albumin 3.6 04/26/2021 10:54 AM    Globulin 3.6 04/26/2021 10:54 AM    A-G Ratio 1.0 (L) 04/26/2021 10:54 AM    ALT (SGPT) 39 04/26/2021 10:54 AM      Lab Results   Component Value Date/Time    TSH 3.450 03/10/2021 10:13 AM      Echo 3/17/21-  · LV: Estimated LVEF is 40 - 45%. Small left ventricle. Severe concentric hypertrophy. Globally reduced systolic function. Severe (grade 3) left ventricular diastolic dysfunction. · LA: Moderately dilated left atrium. · RV: Reduced systolic function. Pacing wire present  · RA: Moderately dilated right atrium. · AV: Mild aortic valve regurgitation is present. · MV: Mild to moderate mitral valve regurgitation is present. · TV: Severe tricuspid valve regurgitation is present. Assessment:           ICD-10-CM ICD-9-CM    1. Cardiomyopathy, unspecified type ([address]  I42.9 425.4    2. Pacemaker  Z95.0 V45.01 AMB POC EKG ROUTINE W/ 12 LEADS, INTER & REP   3. ASHD (arteriosclerotic heart disease)  I25.10 414.00    4. Cardiac pacemaker in situ  Z95.0 V45.01    5. Essential hypertension  I10 401.9    6. Mixed hyperlipidemia  E78.2 272.2    7. S/P CABG x 3  Z95.1 V45.81    8. Third degree AV block (HCC)  I44.2 426.0      Orders Placed This Encounter    AMB POC EKG ROUTINE W/ 12 LEADS, INTER & REP     Order Specific Question:   Reason for Exam:     Answer:   ROUTINE        Plan:     New cardiomyopathy with EF 45%. Echo in April 2020 showed EF 60-65% , mild mitral regurgitation. On BB. He is intolerant of lisinopril and ARB's as per allergies section. Since ejection fraction is only minimally reduced to low normal, will hold off on additional medications but can consider hydralazine and nitrates in the future. Patient has been 100% RV paced.    During this visit,  the patient and I had a comprehensive discussion of device management using principles of shared decision making. we reviewed device therapy, including the potential risks and benefits of device management. These risks include death, myocardial infarction, stroke, cardiac perforation, vascular injury, injury, pacing induced cardiomyopathy, inappropriate shocks (defibrillator) and other less severe complications. The patient demonstrated a clear understanding of these issues during out discussion. Our plans, determined together after thorough consideration, are outlined else where in this note. Awaiting his nuclear stress test results. If no ischemia he will need to follow up with EP to discuss LV lead. Addressed all patient questions and concerns at this visit. Continue medical management for cardiomyopathy. Discussed side effects, efficacy and good medication compliance with pt re: bb. Thank you for allowing me to participate in Allison Valle 's care. Evelin Ly NP    Patient seen and examined by me with nurse practitioner. I personally performed all components of the history, physical, and medical decision making and agree with the assessment and plan with minor modifications as noted. Pt with ischemic cardiomyopathy, chb sp dc ppm who's lvef has declined despite bb therapy (intolerant of ace/arb). He has sob and is a candidate for a biv ppm (provided his nuclear stress is wnl). He is interested in having this procedure done at [facility] and I asked him to contact Dr Alan Corbett office to schedule. I discussed the risks/benefits/alternatives of the procedure with the patient. Risks include (but are not limited to) bleeding, heart block, infection, cva/mi/tamponade/death. The patient understands and agrees to proceed. Thank you for this interesting consultation.     Cont med rx for cad, ischemic cardiomyopathy and htn      Juliane Dueñas MD, [facility]          On this date 05/27/2021 I have spent 49 minutes reviewing previous notes, test results and face to face with the patient discussing the diagnosis and importance of compliance with the treatment plan as well as documenting on the day of the visit.

## 2021-05-27 NOTE — PROGRESS NOTES
Chief Complaint   Patient presents with    New Patient     referred by Dr Brandi Rosario to discuss pm-    Chest Pain     \" at times\"    Shortness of Breath     upom exertion \" cant do what i use to do\"    Foot Swelling     swelling in yarelis feet      1. Have you been to the ER, urgent care clinic since your last visit? Hospitalized since your last visit? No     2. Have you seen or consulted any other health care providers outside of the [address] [place] [address] since your last visit? Include any pap smears or colon screening.   No

## 2021-05-28 ENCOUNTER — APPOINTMENT (OUTPATIENT)
Dept: CARDIOLOGY CLINIC | Age: 71
End: 2021-05-28

## 2021-05-28 LAB
STRESS BASELINE DIAS BP: 80 MMHG
STRESS BASELINE HR: 73 BPM
STRESS BASELINE SYS BP: 128 MMHG
STRESS PEAK DIAS BP: 80 MMHG
STRESS PEAK SYS BP: 128 MMHG
STRESS PERCENT HR ACHIEVED: 57 %
STRESS POST PEAK HR: 85 BPM
STRESS RATE PRESSURE PRODUCT: NORMAL BPM*MMHG
STRESS ST DEPRESSION: 0 MM
STRESS ST ELEVATION: 0 MM
STRESS TARGET HR: 148 BPM

## 2021-05-28 RX ORDER — GABAPENTIN 300 MG/1
CAPSULE ORAL
Qty: 180 CAPSULE | Refills: 0 | Status: SHIPPED | OUTPATIENT
Start: 2021-05-28 | End: 2021-07-23

## 2021-05-28 RX ADMIN — TETROFOSMIN 34 MILLICURIE: 1.38 INJECTION, POWDER, LYOPHILIZED, FOR SOLUTION INTRAVENOUS at 08:45

## 2021-05-28 RX ADMIN — REGADENOSON 0.4 MG: 0.08 INJECTION, SOLUTION INTRAVENOUS at 08:45

## 2021-06-01 ENCOUNTER — TELEPHONE (OUTPATIENT)
Dept: CARDIOLOGY CLINIC | Age: 71
End: 2021-06-01

## 2021-06-01 RX ORDER — TRAZODONE HYDROCHLORIDE 50 MG/1
TABLET ORAL
Qty: 30 TABLET | Refills: 0 | Status: SHIPPED | OUTPATIENT
Start: 2021-06-01 | End: 2021-06-25

## 2021-06-01 NOTE — TELEPHONE ENCOUNTER
----- Message from Irma Lucio NP sent at 6/1/2021  1:52 PM EDT -----  Please advise patient stress test without evidence of flow-limiting blockages in the arteries of the heart. EF per NST low, known to have low EF.

## 2021-06-01 NOTE — PROGRESS NOTES
Please advise patient stress test without evidence of flow-limiting blockages in the arteries of the heart. EF per NST low, known to have low EF.

## 2021-06-02 ENCOUNTER — DOCUMENTATION ONLY (OUTPATIENT)
Dept: CARDIOLOGY CLINIC | Age: 71
End: 2021-06-02

## 2021-06-02 ENCOUNTER — TELEPHONE (OUTPATIENT)
Dept: CARDIOLOGY CLINIC | Age: 71
End: 2021-06-02

## 2021-06-02 DIAGNOSIS — I42.9 CARDIOMYOPATHY, UNSPECIFIED TYPE (HCC): Primary | ICD-10-CM

## 2021-06-02 NOTE — TELEPHONE ENCOUNTER
Patient's daughter, Antoine Sampson, states patient is experiencing shortness of breath and was told by cardiologist, Topher Wilson, after stress test that \"wires need to be connected on the other side. Antoine Bermeouser was not with patient at time of call.       Phone: [phone]

## 2021-06-02 NOTE — PROGRESS NOTES
Called pt daughter  two patient identifiers confirmed. Notified Skipper Rolando about Stress test results and Dr. Yuliana Odonnell recommendations.  Scheduled pt for BIV PPM on 8/13/2021 @ 1:00 pm. Scheduled pt to see Brody Brown on 7/23/2021 @ 11:40 am

## 2021-06-02 NOTE — LETTER
6/2/2021 2:53 PM    Mr. Chen Vaughn  [place] Pacemaker upgrade procedure has been scheduled for 8/13/2021 at 1:00 pm, at [facility].    Please report to Admitting Department by 11:00 am, or 2 hours prior to your scheduled procedure. Please bring a list of your current medications and medication bottles, if able, to the hospital on this day. You will be unable to drive after your procedure so please make sure to bring someone with you to your procedure. You will need to have nothing to eat or drink after midnight, the night prior to your procedure. You may have small sips of water, if needed, to take with your medication. You will need labs drawn prior to your procedure. Please go to [facility] to have this done no sooner than 7/13/2021 and no later than 8/9/2021. You will also need to see Dr. Susi Bourgeois Nurse Practitioner, [place], in office prior to your procedure. An appointment has been scheduled for 7/23/2021 at 11:40 am.    You should not stop your medication. After your procedure, you will need to follow up with Dr. Sabine Dennison. Your follow-up appointment has been scheduled for 8/27/2021 at 8:40 am.     Hibiclens 4% topical solution has been ordered and sent into your pharmacy  Patient it start Hibiclens application 5 days prior to procedure date    Directions Hibiclens 4%: Start cleanse 5 days prior to procedure   1. Rinse area (upper chest and upper arms) with water. 2. Apply minimum amount necessary to scrub the upper chest area from shoulder/neck to mid line of chest and to below the nipple each of  5 nights before the day of the procedure  3. Let solution dry.          Sincerely,      Landy Lentz MD

## 2021-06-02 NOTE — PROGRESS NOTES
Recent nuclear stress test reported LVEF declined to 37% and he is RV pacing dependent  No ischemia  pls inform patient that we recommend biventricular pacer upgrade by adding a new LV pacing lead

## 2021-06-10 NOTE — CARDIO/PULMONARY
Carlota Gonzales.  65 y.o. With diagnosis of CABG on 04/26/2021 attended phase II cardiac rehab for 34 sessions. He attended 25 of them at [facility] and then transferred to our clinic. He is not able to continue to exercise due to ulcerations. He has been discharged.     Eb Narayan RN  6/10/2021

## 2021-06-16 ENCOUNTER — HOSPITAL ENCOUNTER (OUTPATIENT)
Dept: LAB | Age: 71
Discharge: HOME | End: 2021-06-16
Payer: MEDICARE

## 2021-06-16 PROCEDURE — 83036 HEMOGLOBIN GLYCOSYLATED A1C: CPT

## 2021-06-16 PROCEDURE — 82043 UR ALBUMIN QUANTITATIVE: CPT

## 2021-06-16 PROCEDURE — 36415 COLL VENOUS BLD VENIPUNCTURE: CPT

## 2021-06-16 PROCEDURE — 84443 ASSAY THYROID STIM HORMONE: CPT

## 2021-06-16 PROCEDURE — 85027 COMPLETE CBC AUTOMATED: CPT

## 2021-06-16 PROCEDURE — 80048 BASIC METABOLIC PNL TOTAL CA: CPT

## 2021-06-17 LAB
ALBUMIN/CREAT UR: 361 MG/G CREAT (ref 0–29)
BUN SERPL-MCNC: 24 MG/DL (ref 8–27)
BUN/CREAT SERPL: 14 (ref 10–24)
CALCIUM SERPL-MCNC: 9.2 MG/DL (ref 8.6–10.2)
CHLORIDE SERPL-SCNC: 101 MMOL/L (ref 96–106)
CO2 SERPL-SCNC: 26 MMOL/L (ref 20–29)
CREAT SERPL-MCNC: 1.75 MG/DL (ref 0.76–1.27)
CREAT UR-MCNC: 145.4 MG/DL
ERYTHROCYTE [DISTWIDTH] IN BLOOD BY AUTOMATED COUNT: 14.5 % (ref 11.6–15.4)
EST. AVERAGE GLUCOSE BLD GHB EST-MCNC: 131 MG/DL
GLUCOSE SERPL-MCNC: 90 MG/DL (ref 65–99)
HBA1C MFR BLD: 6.2 % (ref 4.8–5.6)
HCT VFR BLD AUTO: 38.8 % (ref 37.5–51)
HGB BLD-MCNC: 12.5 G/DL (ref 13–17.7)
MCH RBC QN AUTO: 28.3 PG (ref 26.6–33)
MCHC RBC AUTO-ENTMCNC: 32.2 G/DL (ref 31.5–35.7)
MCV RBC AUTO: 88 FL (ref 79–97)
MICROALBUMIN UR-MCNC: 524.7 UG/ML
PLATELET # BLD AUTO: 160 X10E3/UL (ref 150–450)
POTASSIUM SERPL-SCNC: 4.6 MMOL/L (ref 3.5–5.2)
RBC # BLD AUTO: 4.42 X10E6/UL (ref 4.14–5.8)
SODIUM SERPL-SCNC: 139 MMOL/L (ref 134–144)
TSH SERPL DL<=0.005 MIU/L-ACNC: 3.68 UIU/ML (ref 0.45–4.5)
WBC # BLD AUTO: 4.5 X10E3/UL (ref 3.4–10.8)

## 2021-06-22 NOTE — PROGRESS NOTES
HISTORY OF PRESENT ILLNESS  Danni Walter is a 65 y.o. male. HPI     Last here 3/10/21. Pt is here to f/u on chronic conditions. Pt presents with his daughter who provides some of his hx. He fell last week and hurt his knee  Was reaching for trash can outdoors - no freq falls        Lov, He c/o sob, especially on exertion   Chest CT 3/11/21: There is no pulmonary embolism. There is no aortic aneurysm or dissection. Cardiomegaly and coronary artery disease.       Lov he c/o swelling in R leg   Recall had DVT in the past  Reviewed duplex 3/21 negative   He is taking lasix   He is still have swelling, Discussed wearing compression hose during day and sleeping with legs elevated to help with swelling     BP today is 133/85  No home readings  Continues on toprol now 25mg half tab bid      He is DM   No BS readings  Continues januvia 50mg daily        Wt is 190 lbs, up 2 lbs [employer] he is gaining wt due to gabapentin, discussed diet and wt/l     Reviewed labs   CBC good despite rectal bleeding  Ordered labs     Has history of CKD  Cr 1.4-1.5 stable, last 1.75       He takes ASA 81 mg      Pt follows annually with Dr. Hemal Johnson (cardio) for CAD and history of CABG 4/20  Last visit 5/07/21  Reviewed note 5/07/21:  ASHD Hx CABG x 3 in 4/2020---LIMA to LAD, SVG to RCA and Ramus  Continue ASA BB   Intolerant to statins, now on Praluent  Patient notes resolution of chest pain since his bypass. CM, new  Echo 3/2021 showed EF 40-45% mild- mod MR mild AR  Echo 12/2020 showed EF 45-50% mild MR  Echo in April 2020 showed EF 60-65% , mild mitral regurgitation. On BB.  He is intolerant of lisinopril and ARB's as per allergies section. BP borderline low, so will not add hydralazine or isosorbide mononitrate at this time  Patient has been 100% RV paced  Check Lexiscan Myoview to rule out occult ischemia. If negative, refer to Dr. Conrad Hopper to consider upgrade to biventricular pacemaker.   Scheduled appointment with EP.  If ischemia is evident, cancel EP appointment and follow-up with me to consider catheterization. CHB s/p PPM 5/2020  Device followed by Dr Israel Anton seen  9/8/2020: Patient pacemaker check today show proper function.  The patient is dependent on RV pacing. Previously he had short atrial flutter but the device check today did not show the new AMS arrhythmic event  HTN  Controlled with current therapy  HLD  Statin intolerance---In chart review has been intolerant to Crestor, Lipitor, pravastatin  5/2020 LDL at 112 On Zetia  Restarted Praluent in 1/2021 and most recent LDL at goal 29 on 4/2021  CKD III  Cr 1.57 in 4/2021  Cr. 1.62 in 11/2020  Cr 1.54 in 6/2020  DM  On oral agent  Hx DVT/PE: developed PE/DVT after bowel resection in 2012  Continue current care and f/u in 6 months with me if stress test is negative, and sooner with electrophysiology as noted above. Please note that the patient was seen by myself without the nurse practitioner today. Reviewed echo 3/21 : ef 40 - 45%. Small left ventricle. Severe concentric hypertrophy. Globally reduced systolic function. Severe (grade 3) left ventricular diastolic dysfunction. · LA: Moderately dilated left atrium. · RV: Reduced systolic function. Pacing wire present  · RA: Moderately dilated right atrium. · AV: Mild aortic valve regurgitation is present. · MV: Mild to moderate mitral valve regurgitation is present. TV: Severe tricuspid valve regurgitation is present. Reviewed stress test 5/21: Left ventricular function post-stress was abnormal. Calculated ejection fraction is 37%. There is no evidence of transient ischemic dilation (TID). Now taking praluent      Patient is not taking Crestor   He is taking Zetia   He is now taking praluent per cardio     He saw Dr. Dave Guerrier (cardio) for cardiomyopathy  Last visit 5/27/21  Reviewed note:  New cardiomyopathy with EF 45%. Echo in April 2020 showed EF 60-65% , mild mitral regurgitation.  On BB.  He is intolerant of lisinopril and ARB's as per allergies section. Since ejection fraction is only minimally reduced to low normal, will hold off on additional medications but can consider hydralazine and nitrates in the future. Patient has been 100% RV paced. During this visit,  the patient and I had a comprehensive discussion of device management using principles of shared decision making. we reviewed device therapy, including the potential risks and benefits of device management. These risks include death, myocardial infarction, stroke, cardiac perforation, vascular injury, injury, pacing induced cardiomyopathy, inappropriate shocks (defibrillator) and other less severe complications. The patient demonstrated a clear understanding of these issues during out discussion. Our plans, determined together after thorough consideration, are outlined else where in this note. Awaiting his nuclear stress test results.  If no ischemia he will need to follow up with EP to discuss LV lead.      He saw Dr montiel (cardio) for CAD  Last there 6/3/20  He was completing cardiac rehab, no longer doing this     Manju Pascal had pacemaker placed with Dr castro (cardio)  Last there 9/8/20  Had pacemaker check 3/24/21 with Dr. Salma Ham   Will be having pacemaker replaced 8/21     Pt follows with Dr. Leonardo Peres (nephro) for ckd in the past  Last visit was 3/2/18, next apt next week 6/21  Baseline cr 1.4-1.5 stable on recent labs, 1.75 on recent labs      Pt follows with Edin (uro)  for prostate cancer--  Recall pt has [facility] (brother and uncle) prostate cancer  Last there 12/20      Pt also met with a radiation oncologist at [facility]  He completed radiation therapy with Dr. Lydia Ellis  He is not getting radioactive seeds because of his recent CABG  Next apt later today 6/21  He was put on pentoxifylline, not taking this    Pt saw Dr Roque Padilla (podiatry) in 4/19  Pt was given terbinafine for fungal nails      Recall Pt had a hearing evaluation in the past  Recall notes from ENT 5/28/19: has some hearing loss      Pt has been taking CoQ-10      He has started taking gabapentin 300 mg again for pain in hands/feet, this could be cause for weight gain  Wonders about other meds, discussed this is the best med possible for pain     He has not been taking celexa 20mg for depression/anxiety   He has been more depressed with recent health issues   Will give zoloft     He also has ativan to use prn (not often, not in the past month) for anxiety --no recent use     Pt uses trazodone nightly  sleep works well      He has been seeing Dr. Novella Kayser (sleep) for NIMO    Not compliant with cpap 3/21 - declined sleep study   Last visit 1/4/21:       In the past, pt expressed concern about his memory  Previously, provided referral for Dr. Festus Mckenzie (neuropsych)  lov provided info for Dr Radha Diana (neuropsych)  Had the appointment 09/24/19 --then refused testing in the end       He has been following with Dr. Bradley Laboy (GI) for stomach bleeding  Reviewed EGD 5/10/21:  Esophagus: The esophageal mucosa in the proximal, mid and distal esophagus is normal.   The squamo-columnar junction is at 40 cm where the Z-line was noted. Possible tiny hiatal hernia noted. Stomach: The gastric mucosa has a few antral erosions and body erythema. I took mucosal biopsies. The fundus was found to be normal with no lesions noted on retroflexion. The angularis is normal as well. Duodenum:   The bulb and post bulbar mucosa is normal in appearance. The duodenal folds are normal.   Reviewed colonoscopy 5/10/21:  ·  The [employer] Video High-Definition Colonoscope was advanced to right sided anastomosis. · A large white irregular rectal ulcer (2 cm with a central pigmented vessel) is  noted with moderate surrounding radiation related telangiectasia. I initially used a 10 Fr BICAP Goldprobe catheter to ablate the vessel in the ulcer. Then 2  Resolution Clips were applied to close the ulcer.   · There is very easy contact bleeding of the surrounding telangiectasia. I used the BICAP catheter to control oozing. · I did not use APC as I am not sure why he has developed the ulcer (? Post APC related)  · Moderate sized internal hemorrhoid  Reviewed colonoscopy 3/19/21:  · The [employer]us Video High-Definition Colonoscope was advanced to anastomosis. Prep is poor with solid stool there. · A single 8 mm sigmoid colon polyp was removed with a cold snare. · Mild sigmoid diverticulosis. · Moderate oozing with multiple/numeorus radiation related rectal telangiectasia are noted. Using APC all the telangiectasia are ablated easily. · Moderate sized internal hemorrhoids with rectal stool is noted. He is still having issues with rectal bleeding  Was given sucrafate enema to help with BM and inflammation   Has repeat flexsig in 3-6 weeks  Will be referred to colorectal surgeon in future      Pt lives by himself    Pt is functionally independent   No safety equipment in house  Walks with cane  Doesn't drink as much alcohol as much as he used to, but drinks beer ocassionally  Doesn't drive  His daughter helps him      No memory concerns   Knows the month, date, year, location   Can recall 3/3 objects     ACP not on file. SDM is his daughter (Yareli South Tyson). Provided information in the past.      Recall sees Dr. Silver Comes for h/o DVT, no longer on coumadin or xarelto, on ASA only.  Was on coumadin for h/o PE and DVT post operatively          PREVENTIVE:    Colonoscopy: 5/10/21 , Dr. Bradley Laboy, repeat 5 years    EGD: 5/10/21, Dr. Bradley Laboy  PSA: 7/15 3.0, 7/16, 5/17 3.8, , 4/18 5.5 4/19 Dr Akila Sr follows, 3/21 <1  AAA screen: CT 11/12, negative  Tdap: unknown, due will get at pharm  Pneumovax: 12/04/2012, will update today 6/21  [id number]: 09/24/19   Shingrix: due  Flu shot: declines   Foot exam: 03/10/21  Microalbumin: 6/21  A1c: 9/19 POC 5.7  , 4/20 5.8 8/20 6.6 11/20 6.0 6/21 6.2  Eye exam: Dr. Ernie Franklin Estopinal, 2/21  Hep C Screen: 10/15, negative  Lipids:  29.6  EK/17, PACs  Covid: both (pfizer)    Patient Active Problem List    Diagnosis Date Noted    Pacemaker 2020    Third degree AV block ([email or web address] 2020    Postoperative anemia due to acute blood loss 2020    S/P CABG x 3 2020    CAD (coronary artery disease) 2020    Bilateral carotid artery stenosis 2020    NSTEMI (non-ST elevated myocardial infarction) ([email or web address] 2020    Type 2 diabetes mellitus with diabetic neuropathy ([email or web address] 2020    Prostate cancer ([email or web address] 2018    Type 2 diabetes with nephropathy ([email or web address] 2018    Reactive depression 11/15/2016    Cervical spinal stenosis 2016    CKD (chronic kidney disease) 10/31/2016    Hyperlipidemia 07/10/2015    Spinal stenosis, lumbar region, without neurogenic claudication 10/16/2014    DVT of leg (deep venous thrombosis) ([email or web address] 2012    HTN (hypertension) 2012    Perforated diverticulum of large intestine 2012    Alcohol abuse 2012    Insomnia 2012     Current Outpatient Medications   Medication Sig Dispense Refill    varicella-zoster recombinant, PF, (Shingrix, PF,) 50 mcg/0.5 mL susr injection 0.5mL by IntraMUSCular route once now and then repeat in 2-6 months 0.5 mL 1    diph,pertuss,acel,,tetanus vac,PF, (ADACEL) 2 Lf-(2.5-5-3-5 mcg)-5Lf/0.5 mL syrg vaccine 0.5 mL by IntraMUSCular route once for 1 dose. 0.5 mL 0    sucralfate (CARAFATE) 100 mg/mL suspension       sertraline (ZOLOFT) 50 mg tablet Take 1 Tablet by mouth daily. 90 Tablet 1    SITagliptin (Januvia) 50 mg tablet Take 1 tablet by mouth once daily 90 Tablet 0    traZODone (DESYREL) 50 mg tablet TAKE 1 TABLET BY MOUTH AT BEDTIME 30 Tablet 0    gabapentin (NEURONTIN) 300 mg capsule TAKE 2 TO 3 CAPSULES BY MOUTH IN THE EVENING AS NEEDED FOR PAIN 180 Capsule 0    hydrocortisone (Anusol-HC) 25 mg supp Insert 1 Suppository into rectum every twelve (12) hours as needed for Pain.  Indications: inflammation of the rectum 5 Suppository 2    metoprolol tartrate (LOPRESSOR) 25 mg tablet TAKE 1/2 (ONE-HALF) TABLET BY MOUTH EVERY 12 HOURS 90 Tab 0    vitamin e (E GEMS) 100 unit capsule Take 1 capsule three times daily 90 Cap 6    pentoxifylline CR (TRENTAL) 400 mg CR tablet Take 1 Tab by mouth three (3) times daily (with meals). 90 Tab 6    furosemide (LASIX) 20 mg tablet Take 1 Tab by mouth daily. 90 Tab 0    docusate sodium (Dulcolax Stool Softener, dss,) 100 mg capsule Take 1 Cap by mouth two (2) times a day. (Patient taking differently: Take 100 mg by mouth as needed.) 30 Cap 0    alirocumab (Praluent Pen) 75 mg/mL injector pen 1 mL by SubCUTAneous route Once every 2 weeks. 6 Syringe 1    ezetimibe (ZETIA) 10 mg tablet Take 1 Tab by mouth daily. 90 Tab 3    pumpkin seed extract-soy germ 300 mg cap Take 1 Cap by mouth as needed (prostate health).  senna-docusate (PERICOLACE) 8.6-50 mg per tablet Take 1 Tablet by mouth daily as needed for Constipation.  co-enzyme Q-10 (Co Q-10) 100 mg capsule Take 100 mg by mouth daily as needed. 200 mg      cholecalciferol, vitamin D3, (VITAMIN D3 PO) Take 1,000 Int'l Units by mouth daily as needed.  ascorbate calcium (VITAMIN C PO) Take 1 Tab by mouth daily as needed.  aspirin delayed-release (ASPIR-LOW) 81 mg tablet Take 81 mg by mouth daily. Does not take every day      FREESTYLE LANCETS 28 gauge [facility] USE TO CHECK BLOOD SUGAR ONCE DAILY 100 Lancet 12    cyanocobalamin 1,000 mcg tablet Take 1,000 mcg by mouth daily as needed.  magnesium 250 mg tab Take 1 Tab by mouth daily as needed.  fluticasone propionate (Flonase Allergy Relief) 50 mcg/actuation nasal spray 2 Sprays by Both Nostrils route two (2) times daily as needed for Rhinitis. (Patient not taking: Reported on 6/23/2021)      acetaminophen (TYLENOL) 325 mg tablet Take 2 Tabs by mouth every four (4) hours as needed for Pain.  (Patient not taking: Reported on 6/23/2021) Past Surgical History:   Procedure Laterality Date    COLONOSCOPY N/A 3/19/2021    COLONOSCOPY performed by [facility] MD at [facility] ENDOSCOPY    COLONOSCOPY N/A 5/10/2021    COLONOSCOPY performed by Stephanie Hernandez MD at [facility] ENDOSCOPY    HX BACK SURGERY  2014    HX CERVICAL LAMINECTOMY      HX GI  11/2012    bowel resection    HX ORTHOPAEDIC      amputated left 4th finger    RI ABDOMEN SURGERY PROC UNLISTED      bowel resection    RI INS NEW/RPLCMT PRM PM W/TRANSV ELTRD ATRIAL&VENT N/A 5/1/2020    INSERT PPM DUAL performed by Jonathan Ramirez MD at [place] [address], [facility]/s  CATH LAB      Lab Results   Component Value Date/Time    WBC 4.5 06/16/2021 10:28 AM    HGB 12.5 (L) 06/16/2021 10:28 AM    Hemoglobin (POC) 13.3 11/02/2016 07:15 AM    HCT 38.8 06/16/2021 10:28 AM    Hematocrit (POC) 39 11/02/2016 07:15 AM    PLATELET 134 14/36/8568 10:28 AM    MCV 88 06/16/2021 10:28 AM     Lab Results   Component Value Date/Time    Cholesterol, total 99 04/26/2021 10:54 AM    Cholesterol (POC) 227 02/06/2015 03:30 PM    HDL Cholesterol 60 04/26/2021 10:54 AM    LDL, calculated 29.6 04/26/2021 10:54 AM    LDL Cholesterol (POC) 164 02/06/2015 03:30 PM    Triglyceride 47 04/26/2021 10:54 AM    Triglycerides (POC) 68 02/06/2015 03:30 PM    CHOL/HDL Ratio 1.7 04/26/2021 10:54 AM     Lab Results   Component Value Date/Time    GFR est non-AA 38 (L) 06/16/2021 10:28 AM    GFRNA, POC 50 (L) 11/02/2016 07:15 AM    GFR est AA 44 (L) 06/16/2021 10:28 AM    GFRAA, POC >60 11/02/2016 07:15 AM    Creatinine 1.75 (H) 06/16/2021 10:28 AM    Creatinine (POC) 1.4 (H) 11/02/2016 07:15 AM    BUN 24 06/16/2021 10:28 AM    BUN (POC) 28 (H) 11/02/2016 07:15 AM    Sodium 139 06/16/2021 10:28 AM    Sodium (POC) 140 11/02/2016 07:15 AM    Potassium 4.6 06/16/2021 10:28 AM    Potassium (POC) 4.1 11/02/2016 07:15 AM    Chloride 101 06/16/2021 10:28 AM    Chloride (POC) 105 11/02/2016 07:15 AM    CO2 26 06/16/2021 10:28 AM    Magnesium 2.6 (H) 05/04/2020 04:52 AM    Phosphorus 3.0 10/17/2014 04:40 AM    Albumin, urine 59.5 10/23/2015 09:10 AM        Review of Systems   Respiratory: Negative for shortness of breath. Cardiovascular: Negative for chest pain. Physical Exam  Constitutional:       General: He is not in acute distress. Appearance: Normal appearance. He is not ill-appearing, toxic-appearing or diaphoretic. HENT:      Head: Normocephalic and atraumatic. Right Ear: External ear normal.      Left Ear: External ear normal.   Eyes:      General:         Right eye: No discharge. Left eye: No discharge. Conjunctiva/sclera: Conjunctivae normal.      Pupils: Pupils are equal, round, and reactive to light. Cardiovascular:      Rate and Rhythm: Normal rate and regular rhythm. Heart sounds: No murmur heard. No friction rub. No gallop. Pulmonary:      Effort: No respiratory distress. Breath sounds: Normal breath sounds. No wheezing or rales. Chest:      Chest wall: No tenderness. Musculoskeletal:         General: Normal range of motion. Cervical back: Normal range of motion and neck supple. Right lower leg: Edema present. Left lower leg: Edema present. Comments: +1 pitting   Skin:     General: Skin is warm and dry. Neurological:      Mental Status: He is alert and oriented to person, place, and time. Mental status is at baseline. Coordination: Coordination normal.      Gait: Gait normal.   Psychiatric:         Mood and Affect: Mood normal.         Behavior: Behavior normal.         ASSESSMENT and PLAN    ICD-10-CM ICD-9-CM    1.  Type 2 diabetes mellitus with diabetic neuropathy, without long-term current use of insulin ([address]       Has been well controlled on Januvia    Encouraged home blood sugar checks    Repeat A1c prior to follow-up    Has gabapentin for neuropathy         I47.30 534.40 METABOLIC PANEL, COMPREHENSIVE     357.2 HEMOGLOBIN A1C WITH EAG      METABOLIC PANEL, COMPREHENSIVE HEMOGLOBIN A1C WITH EAG   2. Type 2 diabetes with nephropathy ([facility])  A06.74 570.43 METABOLIC PANEL, COMPREHENSIVE     583.81 HEMOGLOBIN A1C WITH EAG   See above closely monitoring renal function creatinine runs around 1.4-1.5 baseline most recently around 1.7 has follow-up with nephrology pending for in a week or 2   METABOLIC PANEL, COMPREHENSIVE      HEMOGLOBIN A1C WITH EAG   3. NSTEMI (non-ST elevated myocardial infarction) ([facility]  W91.3 797.28 METABOLIC PANEL, COMPREHENSIVE      HEMOGLOBIN A1C WITH EAG   History of the status post bypass currently medically managed and goes to cardiac rehab up-to-date with cardiology   METABOLIC PANEL, COMPREHENSIVE      HEMOGLOBIN A1C WITH EAG   4. Third degree AV block ([facility])  X40.4 967.7 METABOLIC PANEL, COMPREHENSIVE      HEMOGLOBIN A1C WITH EAG   With history of pacemaker will be getting a new device placed later on this summer   METABOLIC PANEL, COMPREHENSIVE      HEMOGLOBIN A1C WITH EAG   5. Prostate cancer ([facility]  U89 985 METABOLIC PANEL, COMPREHENSIVE      HEMOGLOBIN A1C WITH EAG   Status post XRT has some radiation proctitis and is following with GI regarding this has follow-up with radiation oncology today   METABOLIC PANEL, COMPREHENSIVE      HEMOGLOBIN A1C WITH EAG   6. Reactive depression  Q80.9 349.0 METABOLIC PANEL, COMPREHENSIVE      HEMOGLOBIN A1C WITH EAG   We'll start Zoloft he is no longer taking Celexa   METABOLIC PANEL, COMPREHENSIVE      HEMOGLOBIN A1C WITH EAG   7. Spinal stenosis, lumbar region, without neurogenic claudication  H36.360 271.13 METABOLIC PANEL, COMPREHENSIVE      HEMOGLOBIN A1C WITH EAG   Symptoms improved with gabapentin discussed this is the best option for him Tylenol is okay as well   METABOLIC PANEL, COMPREHENSIVE      HEMOGLOBIN A1C WITH EAG   8.  Stage 3a chronic kidney disease ([facility])  A75.46 378.0 METABOLIC PANEL, COMPREHENSIVE      HEMOGLOBIN A1C WITH EAG   See above creatinine runs 1.4-1.5 baseline has follow-up with nephrology later on this month in a week or 2 most recent creatinine was overall stable 1.7   METABOLIC PANEL, COMPREHENSIVE      HEMOGLOBIN A1C WITH EAG   9. Coronary artery disease involving native coronary artery of native heart without angina pectoris  Y35.85 951.56 METABOLIC PANEL, COMPREHENSIVE      HEMOGLOBIN A1C WITH EAG   See above medically managed going to cardiac rehab   METABOLIC PANEL, COMPREHENSIVE      HEMOGLOBIN A1C WITH EAG   10. Essential hypertension  H74 179.9 METABOLIC PANEL, COMPREHENSIVE      HEMOGLOBIN A1C WITH EAG   Controlled on Toprol-XL   METABOLIC PANEL, COMPREHENSIVE      HEMOGLOBIN A1C WITH EAG   11. Mixed hyperlipidemia  I72.8 861.2 METABOLIC PANEL, COMPREHENSIVE      HEMOGLOBIN A1C WITH EAG   Controlled with Praluent and Zetia   METABOLIC PANEL, COMPREHENSIVE      HEMOGLOBIN A1C WITH EAG   12. Bilateral carotid artery stenosis  E75.10 804.23 METABOLIC PANEL, COMPREHENSIVE     433.30 HEMOGLOBIN A1C WITH EAG   Minimal stenosis April 2020 we'll follow-up in a year to   METABOLIC PANEL, COMPREHENSIVE      HEMOGLOBIN A1C WITH EAG   13. Primary insomnia  N82.99 654.43 METABOLIC PANEL, COMPREHENSIVE      HEMOGLOBIN A1C WITH EAG   Has trazodone to use as needed   METABOLIC PANEL, COMPREHENSIVE      HEMOGLOBIN A1C WITH EAG   14. Medicare annual wellness visit, subsequent  T43.59 I23.1 METABOLIC PANEL, COMPREHENSIVE      HEMOGLOBIN A1C WITH EAG      METABOLIC PANEL, COMPREHENSIVE      HEMOGLOBIN A1C WITH EAG   15. Rectal bleeding  V02.5 632.4 METABOLIC PANEL, COMPREHENSIVE      HEMOGLOBIN A1C WITH EAG   From radiation proctitis seeing GI for this will be getting a follow-up flex sig for continued valuation gets rectal bleeding from this   METABOLIC PANEL, COMPREHENSIVE      HEMOGLOBIN A1C WITH EAG   16. Pacemaker  O52.3 N02.45 METABOLIC PANEL, COMPREHENSIVE      HEMOGLOBIN A1C WITH EAG   New device is being placed later this summer   METABOLIC PANEL, COMPREHENSIVE      HEMOGLOBIN A1C WITH EAG   17. Decreased hearing of both ears refer to ENT for hearing test H91.93 389.9 REFERRAL TO ENT-OTOLARYNGOLOGY          Scribed by Ronen Stinson of [address] [place] [address] [address], as dictated by Dr. Latoya Sifuentes. Current diagnosis and concerns discussed with pt at length. Pt understands risks and benefits or current treatment plan and medications, and accepts the treatment and medication with any possible risks. Pt asks appropriate questions, which were answered. Pt was instructed to call with any concerns or problems. I have reviewed the note documented by the scribe. The services provided are my own.   The documentation is accurate

## 2021-06-23 ENCOUNTER — OFFICE VISIT (OUTPATIENT)
Dept: INTERNAL MEDICINE CLINIC | Age: 71
End: 2021-06-23
Payer: MEDICARE

## 2021-06-23 VITALS
RESPIRATION RATE: 18 BRPM | OXYGEN SATURATION: 99 % | BODY MASS INDEX: 26.68 KG/M2 | WEIGHT: 190.6 LBS | DIASTOLIC BLOOD PRESSURE: 85 MMHG | SYSTOLIC BLOOD PRESSURE: 133 MMHG | HEIGHT: 71 IN | HEART RATE: 90 BPM

## 2021-06-23 DIAGNOSIS — Z95.0 PACEMAKER: ICD-10-CM

## 2021-06-23 DIAGNOSIS — C61 PROSTATE CANCER (HCC): ICD-10-CM

## 2021-06-23 DIAGNOSIS — I65.23 BILATERAL CAROTID ARTERY STENOSIS: ICD-10-CM

## 2021-06-23 DIAGNOSIS — F51.01 PRIMARY INSOMNIA: ICD-10-CM

## 2021-06-23 DIAGNOSIS — E11.40 TYPE 2 DIABETES MELLITUS WITH DIABETIC NEUROPATHY, WITHOUT LONG-TERM CURRENT USE OF INSULIN (HCC): Primary | ICD-10-CM

## 2021-06-23 DIAGNOSIS — I21.4 NSTEMI (NON-ST ELEVATED MYOCARDIAL INFARCTION) (HCC): ICD-10-CM

## 2021-06-23 DIAGNOSIS — H91.93 DECREASED HEARING OF BOTH EARS: ICD-10-CM

## 2021-06-23 DIAGNOSIS — Z00.00 MEDICARE ANNUAL WELLNESS VISIT, SUBSEQUENT: ICD-10-CM

## 2021-06-23 DIAGNOSIS — E78.2 MIXED HYPERLIPIDEMIA: ICD-10-CM

## 2021-06-23 DIAGNOSIS — K62.5 RECTAL BLEEDING: ICD-10-CM

## 2021-06-23 DIAGNOSIS — M48.061 SPINAL STENOSIS, LUMBAR REGION, WITHOUT NEUROGENIC CLAUDICATION: ICD-10-CM

## 2021-06-23 DIAGNOSIS — I44.2 THIRD DEGREE AV BLOCK (HCC): ICD-10-CM

## 2021-06-23 DIAGNOSIS — I10 ESSENTIAL HYPERTENSION: ICD-10-CM

## 2021-06-23 DIAGNOSIS — I25.10 CORONARY ARTERY DISEASE INVOLVING NATIVE CORONARY ARTERY OF NATIVE HEART WITHOUT ANGINA PECTORIS: ICD-10-CM

## 2021-06-23 DIAGNOSIS — F32.9 REACTIVE DEPRESSION: ICD-10-CM

## 2021-06-23 DIAGNOSIS — E11.21 TYPE 2 DIABETES WITH NEPHROPATHY (HCC): ICD-10-CM

## 2021-06-23 DIAGNOSIS — N18.31 STAGE 3A CHRONIC KIDNEY DISEASE (HCC): ICD-10-CM

## 2021-06-23 DIAGNOSIS — Z23 ENCOUNTER FOR IMMUNIZATION: ICD-10-CM

## 2021-06-23 PROCEDURE — 2022F DILAT RTA XM EVC RTNOPTHY: CPT | Performed by: INTERNAL MEDICINE

## 2021-06-23 PROCEDURE — 90732 PPSV23 VACC 2 YRS+ SUBQ/IM: CPT | Performed by: INTERNAL MEDICINE

## 2021-06-23 PROCEDURE — G8754 DIAS BP LESS 90: HCPCS | Performed by: INTERNAL MEDICINE

## 2021-06-23 PROCEDURE — 3044F HG A1C LEVEL LT 7.0%: CPT | Performed by: INTERNAL MEDICINE

## 2021-06-23 PROCEDURE — 3017F COLORECTAL CA SCREEN DOC REV: CPT | Performed by: INTERNAL MEDICINE

## 2021-06-23 PROCEDURE — G0463 HOSPITAL OUTPT CLINIC VISIT: HCPCS | Performed by: INTERNAL MEDICINE

## 2021-06-23 PROCEDURE — 1101F PT FALLS ASSESS-DOCD LE1/YR: CPT | Performed by: INTERNAL MEDICINE

## 2021-06-23 PROCEDURE — G9717 DOC PT DX DEP/BP F/U NT REQ: HCPCS | Performed by: INTERNAL MEDICINE

## 2021-06-23 PROCEDURE — G8536 NO DOC ELDER MAL SCRN: HCPCS | Performed by: INTERNAL MEDICINE

## 2021-06-23 PROCEDURE — G8427 DOCREV CUR MEDS BY ELIG CLIN: HCPCS | Performed by: INTERNAL MEDICINE

## 2021-06-23 PROCEDURE — G8752 SYS BP LESS 140: HCPCS | Performed by: INTERNAL MEDICINE

## 2021-06-23 PROCEDURE — G0439 PPPS, SUBSEQ VISIT: HCPCS | Performed by: INTERNAL MEDICINE

## 2021-06-23 PROCEDURE — 99214 OFFICE O/P EST MOD 30 MIN: CPT | Performed by: INTERNAL MEDICINE

## 2021-06-23 PROCEDURE — G8417 CALC BMI ABV UP PARAM F/U: HCPCS | Performed by: INTERNAL MEDICINE

## 2021-06-23 RX ORDER — SUCRALFATE 1 G/10ML
SUSPENSION ORAL
COMMUNITY
Start: 2021-06-03 | End: 2021-11-15

## 2021-06-23 RX ORDER — ZOSTER VACCINE RECOMBINANT, ADJUVANTED 50 MCG/0.5
KIT INTRAMUSCULAR
Qty: 0.5 ML | Refills: 1 | Status: SHIPPED | OUTPATIENT
Start: 2021-06-23 | End: 2021-11-15

## 2021-06-23 RX ORDER — SERTRALINE HYDROCHLORIDE 50 MG/1
50 TABLET, FILM COATED ORAL DAILY
Qty: 90 TABLET | Refills: 1 | Status: SHIPPED | OUTPATIENT
Start: 2021-06-23 | End: 2021-11-04

## 2021-06-23 NOTE — LETTER
6/24/2021 2:08 PM    Mr. Tracy Aviles [phone]        Please send over the note on the above patient.        Sincerely,      Natan Meza MD

## 2021-06-23 NOTE — PROGRESS NOTES
This is the Subsequent Medicare Annual Wellness Exam, performed 12 months or more after the Initial AWV or the last Subsequent AWV    I have reviewed the patient's medical history in detail and updated the computerized patient record. Assessment/Plan   Education and counseling provided:  Are appropriate based on today's review and evaluation    1. Type 2 diabetes mellitus with diabetic neuropathy, without long-term current use of insulin ([facility]  2. Type 2 diabetes with nephropathy ([facility]  3. NSTEMI (non-ST elevated myocardial infarction) ([facility]  4. Third degree AV block ([facility]  5. Prostate cancer ([facility]  6. Reactive depression  7. Spinal stenosis, lumbar region, without neurogenic claudication  8. Stage 3a chronic kidney disease ([facility]  9. Coronary artery disease involving native coronary artery of native heart without angina pectoris  10. Essential hypertension  11. Mixed hyperlipidemia  12. Bilateral carotid artery stenosis  13. Primary insomnia  14.  Medicare annual wellness visit, subsequent       Depression Risk Factor Screening     3 most recent PHQ Screens 6/23/2021   PHQ Not Done -   Little interest or pleasure in doing things Several days   Feeling down, depressed, irritable, or hopeless Nearly every day   Total Score PHQ 2 4   Trouble falling or staying asleep, or sleeping too much Nearly every day   Feeling tired or having little energy Nearly every day   Poor appetite, weight loss, or overeating Not at all   Feeling bad about yourself - or that you are a failure or have let yourself or your family down Nearly every day   Trouble concentrating on things such as school, work, reading, or watching TV Several days   Moving or speaking so slowly that other people could have noticed; or the opposite being so fidgety that others notice -   Thoughts of being better off dead, or hurting yourself in some way Several days   PHQ 9 Score -   How difficult have these problems made it for you to do your work, take care of your home and get along with others Somewhat difficult   Start Zoloft    Alcohol Risk Screen    Do you average more than 1 drink per night or more than 7 drinks a week: drinks beer occasionally     In the past three months have you have had more than 4 drinks containing alcohol on one occasion: beer occasional        Functional Ability and Level of Safety    Hearing: The patient needs further evaluation. Activities of Daily Living: The home contains: no safety equipment. Patient needs help with:  transportation, shopping, laundry, housework, managing medications and managing money      Ambulation: with difficulty, uses a cane     Fall Risk:  Fall Risk Assessment, last 12 mths 6/23/2021   Able to walk? Yes   Fall in past 12 months? 1   Do you feel unsteady? 1   Are you worried about falling 1   Is TUG test greater than 12 seconds? 0   Is the gait abnormal? 0   Number of falls in past 12 months -   Fall with injury?  1   tripped outside with trashcan       Abuse Screen:  Patient is not abused   Lives alone       Cognitive Screening    Has your family/caregiver stated any concerns about your memory: no     Cognitive Screening: Normal - Mini Cog Test     No memory concerns   Pt knows the month, day and year   Can recall 3/3 objects     Health Maintenance Due     Health Maintenance Due   Topic Date Due    DTaP/Tdap/Td series (1 - Tdap) Never done    Shingrix Vaccine Age 50> (1 of 2) Never done    Pneumococcal 65+ years (2 of 2 - PPSV23) 09/24/2020    Foot Exam Q1  04/27/2021    Medicare Yearly Exam  05/08/2021       Patient Care Team   Patient Care Team:  Emil Jesus MD as PCP - General (Internal Medicine)  Emil Jesus MD as PCP - [facility] Empaneled Provider  Carlos Eason MD as Physician (Cardiology)  Paulino Stallings MD as Physician (Nephrology)  Brett Oliva MD (Neurosurgery)  Coretta Chamorro MD (Ophthalmology)  Erika Giordano MD (Otolaryngology)  Brendon Bermeo MD GISELLE (Urology)  Naresh Church MD (Ophthalmology)  Stan Mclaughlin DPM (Podiatry)  Cathleen Heimlich, PHD (Psychology)  Bret Sewell MD (Cardiothoracic Surgery)  Benjie German MD (Cardiology)  Enrico Osuna MD (Cardiology)  Kristan Alexis MD as Physician (Nephrology)  Loreatha Hammans, MD (Gastroenterology)   [facility] urology  [facility] NP     updated    History     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    Alcohol abuse F10.10    Insomnia G47.00    HTN (hypertension) I10    DVT of leg (deep venous thrombosis) ([address] I82.409    Spinal stenosis, lumbar region, without neurogenic claudication M48.061    Hyperlipidemia E78.5    CKD (chronic kidney disease) N18.9    Cervical spinal stenosis M48.02    Reactive depression F32.9    Type 2 diabetes with nephropathy ([address] E11.21    Prostate cancer ([address] C61    Type 2 diabetes mellitus with diabetic neuropathy ([address] E11.40    NSTEMI (non-ST elevated myocardial infarction) ([address] I21.4    Bilateral carotid artery stenosis I65.23    CAD (coronary artery disease) I25.10    S/P CABG x 3 Z95.1    Postoperative anemia due to acute blood loss D62    Pacemaker Z95.0    Third degree AV block ([address] I44.2     Past Medical History:   Diagnosis Date    Arthritis     Cancer ([address]     Prostate ca    Chronic kidney disease     Stage 3    Chronic pain     Abdoman, back, fingers per daughter    Diabetes ([address]     Heart attack ([address]     Hypertension     PE (pulmonary embolism)     Pneumonia     Psychiatric disorder     Depression      Past Surgical History:   Procedure Laterality Date    COLONOSCOPY N/A 3/19/2021    COLONOSCOPY performed by Loreatha Hammans, MD at [facility] ENDOSCOPY    COLONOSCOPY N/A 5/10/2021    COLONOSCOPY performed by Loreatha Hammans, MD at [facility] ENDOSCOPY    HX BACK SURGERY  2014    HX CERVICAL LAMINECTOMY      HX GI  11/2012    bowel resection    HX ORTHOPAEDIC amputated left 4th finger    WI ABDOMEN SURGERY PROC UNLISTED      bowel resection    WI INS NEW/RPLCMT PRM PM W/TRANSV ELTRD ATRIAL&VENT N/A 5/1/2020    INSERT PPM DUAL performed by Lashawn Brennan MD [facility]     Current Outpatient Medications   Medication Sig Dispense Refill    SITagliptin (Januvia) 50 mg tablet Take 1 tablet by mouth once daily 90 Tablet 0    traZODone (DESYREL) 50 mg tablet TAKE 1 TABLET BY MOUTH AT BEDTIME 30 Tablet 0    gabapentin (NEURONTIN) 300 mg capsule TAKE 2 TO 3 CAPSULES BY MOUTH IN THE EVENING AS NEEDED FOR PAIN 180 Capsule 0    hydrocortisone (Anusol-HC) 25 mg supp Insert 1 Suppository into rectum every twelve (12) hours as needed for Pain. Indications: inflammation of the rectum 5 Suppository 2    metoprolol tartrate (LOPRESSOR) 25 mg tablet TAKE 1/2 (ONE-HALF) TABLET BY MOUTH EVERY 12 HOURS 90 Tab 0    vitamin e (E GEMS) 100 unit capsule Take 1 capsule three times daily (Patient not taking: Reported on 5/27/2021) 90 Cap 6    pentoxifylline CR (TRENTAL) 400 mg CR tablet Take 1 Tab by mouth three (3) times daily (with meals). (Patient not taking: Reported on 5/27/2021) 90 Tab 6    furosemide (LASIX) 20 mg tablet Take 1 Tab by mouth daily. (Patient not taking: Reported on 5/7/2021) 90 Tab 0    docusate sodium (Dulcolax Stool Softener, dss,) 100 mg capsule Take 1 Cap by mouth two (2) times a day. (Patient taking differently: Take 100 mg by mouth as needed.) 30 Cap 0    alirocumab (Praluent Pen) 75 mg/mL injector pen 1 mL by SubCUTAneous route Once every 2 weeks. 6 Syringe 1    ezetimibe (ZETIA) 10 mg tablet Take 1 Tab by mouth daily. 90 Tab 3    fluticasone propionate (Flonase Allergy Relief) 50 mcg/actuation nasal spray 2 Sprays by Both Nostrils route two (2) times daily as needed for Rhinitis. (Patient not taking: Reported on 5/27/2021)      pumpkin seed extract-soy germ 300 mg cap Take 1 Cap by mouth as needed (prostate health).       acetaminophen (TYLENOL) 325 mg tablet Take 2 Tabs by mouth every four (4) hours as needed for Pain.  senna-docusate (PERICOLACE) 8.6-50 mg per tablet Take 1 Tab by mouth daily as needed for Constipation. (Patient not taking: Reported on 5/27/2021)      co-enzyme Q-10 (Co Q-10) 100 mg capsule Take 100 mg by mouth daily as needed. 200 mg      cholecalciferol, vitamin D3, (VITAMIN D3 PO) Take 1,000 Int'l Units by mouth daily as needed.  ascorbate calcium (VITAMIN C PO) Take 1 Tab by mouth daily as needed.  aspirin delayed-release (ASPIR-LOW) 81 mg tablet Take 81 mg by mouth daily. Does not take every day      FREESTYLE LANCETS 28 gauge misc USE TO CHECK BLOOD SUGAR ONCE DAILY 100 Lancet 12    cyanocobalamin 1,000 mcg tablet Take 1,000 mcg by mouth daily as needed.  magnesium 250 mg tab Take 1 Tab by mouth daily as needed. Allergies   Allergen Reactions    Arb-Angiotensin Receptor Antagonist Other (comments)     High k    Statins-Hmg-Coa Reductase Inhibitors Myalgia    Ace Inhibitors Cough       Family History   Problem Relation Age of Onset    Cancer Mother     Heart Disease Father     Heart Disease Brother     Anesth Problems Neg Hx      Social History     Tobacco Use    Smoking status: Never Smoker    Smokeless tobacco: Current User     Types: Chew    Tobacco comment: Chews tobacco every day   Substance Use Topics    Alcohol use: Yes     Alcohol/week: 7.0 standard drinks     Types: 7 Cans of beer per week     Comment:  3-4 beers daily      ACP not on file. SDM is his daughter (Yareli Mehta).   Provided information in the past.        Colonoscopy: 5/10/21 , Dr. Ishmael Medellin, repeat 5 years      PSA: 4/20 Dr Rosenberg Diss follows   AAA screen: CT 11/12, negative    Tdap: unknown --ordered for pharmacy   Pneumovax: 12/04/2012  [id number]:09/24/19   Shingrix: never completed--ordered  Flu shot: declines     Eye exam: Dr. Dolores Gaines [place], 2/21 annual     A1c:  6/21 6.2 q3 month   Hep C Screen: 10/15, negative  Lipids:  annual     EK/17, PACs  Covid: both (pfizer)    Medication reconciliation completed by MA and reviewed by me. Medical/surgical/social/family history reviewed and updated by me. Patient provided AVS and preventative screening table. Patient verbalized understanding of all information discussed.       Roberto Farooq MD

## 2021-06-25 ENCOUNTER — TELEPHONE (OUTPATIENT)
Dept: INTERNAL MEDICINE CLINIC | Age: 71
End: 2021-06-25

## 2021-06-25 RX ORDER — TRAZODONE HYDROCHLORIDE 50 MG/1
TABLET ORAL
Qty: 30 TABLET | Refills: 0 | Status: SHIPPED | OUTPATIENT
Start: 2021-06-25 | End: 2021-08-09

## 2021-06-25 RX ORDER — METOPROLOL TARTRATE 25 MG/1
TABLET, FILM COATED ORAL
Qty: 90 TABLET | Refills: 0 | Status: SHIPPED | OUTPATIENT
Start: 2021-06-25 | End: 2021-07-23 | Stop reason: SDUPTHER

## 2021-06-25 NOTE — TELEPHONE ENCOUNTER
Patient's Daughter,  Alpa Zazueta states she needs a call back to discuss Information discussed at patient's recent appt & confirming information given. Please call.  Thank you

## 2021-06-28 ENCOUNTER — OFFICE VISIT (OUTPATIENT)
Dept: CARDIOLOGY CLINIC | Age: 71
End: 2021-06-28
Payer: MEDICARE

## 2021-06-28 DIAGNOSIS — Z95.0 PACEMAKER: Primary | ICD-10-CM

## 2021-06-28 PROCEDURE — 93294 REM INTERROG EVL PM/LDLS PM: CPT | Performed by: INTERNAL MEDICINE

## 2021-06-28 PROCEDURE — 93296 REM INTERROG EVL PM/IDS: CPT | Performed by: INTERNAL MEDICINE

## 2021-06-28 NOTE — TELEPHONE ENCOUNTER
Called out and spoke to pt. Two pt identifiers confirmed. Pt informed me that she was concerned about something that was said at a visit about something \"leaking of the heart valve\".

## 2021-06-29 NOTE — TELEPHONE ENCOUNTER
Called out and spoke to pt. Two pt identifiers confirmed. Informed pt that this occurs with age and that he is on medication for his heart as well as follows up with cardio. Pt verbalized understanding of information discussed w/ no further questions at this time.

## 2021-07-01 ENCOUNTER — TELEPHONE (OUTPATIENT)
Dept: INTERNAL MEDICINE CLINIC | Age: 71
End: 2021-07-01

## 2021-07-01 NOTE — TELEPHONE ENCOUNTER
[phone]  (on HIPAA dated 9/24/19)    States that she would like patients lab results mailed to her home address:    [address]  [facility] [phone]

## 2021-07-19 NOTE — PROGRESS NOTES
Cardiac Electrophysiology TELEPHONE VIRTUAL VISIT Note   Pursuant to the emergency declaration under the [facility] waiver authority and the [employer] Act, this Virtual  Visit was conducted, with patient's consent, to reduce the patient's risk of exposure to COVID-19 and provide continuity of care for an established patient. Services were provided through an audio synchronous discussion virtually to substitute for in-person clinic visit. Subjective:      Laura Thornton is a 66 y.o. patient who is addressed virtually via phone for H&P update prior to upcoming biventricular pacemaker upgrade scheduled for 08/13/2021, currently has Medtronic dual chamber pacemaker (DOI 05/01/2020). Daughter is present on call as well. Pacer dependent due to complete AVB. Recent nuclear stress test reported LVEF declined to 37%. NICM with LVEF 37% via nuclear stress test in 05/2021. NYHA II chronic systolic CHF, but admits he hasn't been exerting himself much. On low dose Lopressor, no ACEi/ARB due to allergy. BP controlled. Previous:  GI bleed due to rectal ulcer in 05/2021. Endoclip placed, BICAP cautery performed. S/p Medtronic dual chamber pacemaker (DOI 05/01/2020) due to persistent complete AVB s/p CABG. NSTEMI (septal) & CABG (LIMA-LAD, SVG-RCA & Ramus) 04/2020. Primary cardiologist is Dr. Boni Fenton.       Problem List  Date Reviewed: 7/23/2021        Codes Class Noted    Pacemaker ICD-10-CM: Z95.0  ICD-9-CM: V45.01  5/1/2020    Overview Signed 5/1/2020 12:06 PM by Tahir Archibald MD     Medtronic dual chamber 5/1/2020             Third degree AV block ([facility] ICD-10-CM: I44.2  ICD-9-CM: 426.0  5/1/2020        Postoperative anemia due to acute blood loss ICD-10-CM: D62  ICD-9-CM: 285.1  4/29/2020        S/P CABG x 3 ICD-10-CM: Z95.1  ICD-9-CM: V45.81  4/28/2020    Overview Signed 4/28/2020 12:48 PM by THELMA Renae     ON PUMP CORONARY ARTERY BYPASS GRAFT X 3  WITH LIMA to LAD, RSVG to RCA, RSVG to ramus  Left greater saphenous vein EVH              CAD (coronary artery disease) ICD-10-CM: I25.10  ICD-9-CM: 414.00  4/27/2020        Bilateral carotid artery stenosis ICD-10-CM: I65.23  ICD-9-CM: 433.10, 433.30  4/24/2020        NSTEMI (non-ST elevated myocardial infarction) [facility]) ICD-10-CM: I21.4  ICD-9-CM: 410.70  4/23/2020        Type 2 diabetes mellitus with diabetic neuropathy ([facility] ICD-10-CM: E11.40  ICD-9-CM: 250.60, 357.2  1/9/2020        Prostate cancer ([facility] ICD-10-CM: C61  ICD-9-CM: 185  6/4/2018    Overview Signed 6/4/2018  8:25 AM by Landy Farley MD     Duncan stage 7 follows [email or web address]             Type 2 diabetes with nephropathy ([facility] ICD-10-CM: E11.21  ICD-9-CM: 250.40, 583.81  2/28/2018        Reactive depression ICD-10-CM: F32.9  ICD-9-CM: 300.4  11/15/2016        Cervical spinal stenosis ICD-10-CM: M48.02  ICD-9-CM: 723.0  11/3/2016        CKD (chronic kidney disease) ICD-10-CM: N18.9  ICD-9-CM: 585.9  10/31/2016    Overview Signed 10/31/2016  9:55 AM by Landy Farley MD     [email or web address]               Hyperlipidemia ICD-10-CM: E78.5  ICD-9-CM: 272.4  7/10/2015        Spinal stenosis, lumbar region, without neurogenic claudication ICD-10-CM: M48.061  ICD-9-CM: 724.02  10/16/2014    Overview Signed 10/16/2014 12:03 PM by Yisel Ahumada MD     [id number]             DVT of leg (deep venous thrombosis) ([facility] ICD-10-CM: I82.409  ICD-9-CM: 453.40  12/18/2012        HTN (hypertension) ICD-10-CM: I10  ICD-9-CM: 401.9  12/12/2012        Perforated diverticulum of large intestine ICD-10-CM: K57.20  ICD-9-CM: 562.10  12/4/2012        Alcohol abuse ICD-10-CM: F10.10  ICD-9-CM: 305.00  12/4/2012        Insomnia ICD-10-CM: G47.00  ICD-9-CM: 780.52  12/4/2012              Current Outpatient Medications   Medication Sig Dispense Refill    chlorhexidine (Hibiclens) 4 % liquid Apply 118 mL to affected area now for 1 dose. 118 mL 0    metoprolol tartrate (LOPRESSOR) 25 mg tablet Take 1 Tablet by mouth daily. 90 Tablet 3    Praluent Pen 75 mg/mL injector pen INJECT 1 ML SUBCUTANEOUSLY  EVERY TWO WEEKS 6 mL 3    traZODone (DESYREL) 50 mg tablet TAKE 1 TABLET BY MOUTH AT BEDTIME 30 Tablet 0    varicella-zoster recombinant, PF, (Shingrix, PF,) 50 mcg/0.5 mL susr injection 0.5mL by IntraMUSCular route once now and then repeat in 2-6 months 0.5 mL 1    sucralfate (CARAFATE) 100 mg/mL suspension       sertraline (ZOLOFT) 50 mg tablet Take 1 Tablet by mouth daily. 90 Tablet 1    SITagliptin (Januvia) 50 mg tablet Take 1 tablet by mouth once daily 90 Tablet 0    hydrocortisone (Anusol-HC) 25 mg supp Insert 1 Suppository into rectum every twelve (12) hours as needed for Pain. Indications: inflammation of the rectum 5 Suppository 2    vitamin e (E GEMS) 100 unit capsule Take 1 capsule three times daily 90 Cap 6    pentoxifylline CR (TRENTAL) 400 mg CR tablet Take 1 Tab by mouth three (3) times daily (with meals). 90 Tab 6    furosemide (LASIX) 20 mg tablet Take 1 Tab by mouth daily. 90 Tab 0    docusate sodium (Dulcolax Stool Softener, dss,) 100 mg capsule Take 1 Cap by mouth two (2) times a day. (Patient taking differently: Take 100 mg by mouth as needed.) 30 Cap 0    ezetimibe (ZETIA) 10 mg tablet Take 1 Tab by mouth daily. 90 Tab 3    fluticasone propionate (Flonase Allergy Relief) 50 mcg/actuation nasal spray 2 Sprays by Both Nostrils route two (2) times daily as needed for Rhinitis. (Patient not taking: Reported on 6/23/2021)      pumpkin seed extract-soy germ 300 mg cap Take 1 Cap by mouth as needed (prostate health).  acetaminophen (TYLENOL) 325 mg tablet Take 2 Tabs by mouth every four (4) hours as needed for Pain. (Patient not taking: Reported on 6/23/2021)      senna-docusate (PERICOLACE) 8.6-50 mg per tablet Take 1 Tablet by mouth daily as needed for Constipation.       co-enzyme Q-10 (Co Q-10) 100 mg capsule Take 100 mg by mouth daily as needed. 200 mg      cholecalciferol, vitamin D3, (VITAMIN D3 PO) Take 1,000 Int'l Units by mouth daily as needed.  ascorbate calcium (VITAMIN C PO) Take 1 Tab by mouth daily as needed.  aspirin delayed-release (ASPIR-LOW) 81 mg tablet Take 81 mg by mouth daily. Does not take every day      FREESTYLE LANCETS 28 gauge misc USE TO CHECK BLOOD SUGAR ONCE DAILY 100 Lancet 12    cyanocobalamin 1,000 mcg tablet Take 1,000 mcg by mouth daily as needed.  magnesium 250 mg tab Take 1 Tab by mouth daily as needed.        Allergies   Allergen Reactions    Arb-Angiotensin Receptor Antagonist Other (comments)     High k    Statins-Hmg-Coa Reductase Inhibitors Myalgia    Ace Inhibitors Cough     Past Medical History:   Diagnosis Date    Arthritis     Cancer ([address]     Prostate ca    Chronic kidney disease     Stage 3    Chronic pain     Abdoman, back, fingers per daughter    Diabetes ([address]     Heart attack ([address]     Hypertension     PE (pulmonary embolism)     Pneumonia     Psychiatric disorder     Depression     Past Surgical History:   Procedure Laterality Date    COLONOSCOPY N/A 3/19/2021    COLON[facility]COPY performed [facility] MD at [facility] ENDOSCOPY    COLONOSCOPY N/A 5/10/2021    COLONOSCOPY performed by [facility] at [facility] ENDOSCOPY    HX BACK SURGERY  2014    HX CERVICAL LAMINECTOMY      HX GI  11/2012    bowel resection    HX ORTHOPAEDIC      amputated left 4th finger    MI ABDOMEN SURGERY PROC UNLISTED      bowel resection    MI INS NEW/RPLCMT PRM PM W/TRANSV ELTRD ATRIAL&VENT N/A 5/1/2020    INSERT PPM DUAL performed by Mariah Contreras MD at [address] [facility]  CATH LAB     Family History   Problem Relation Age of Onset    Cancer Mother     Heart Disease Father     Heart Disease Brother     Anesth Problems Neg Hx      Social History     Tobacco Use    Smoking status: Never Smoker    Smokeless tobacco: Current User     Types: Chew    Tobacco comment: Chews tobacco every day   Substance Use Topics    Alcohol use: Yes     Alcohol/week: 7.0 standard drinks     Types: 7 Cans of beer per week     Comment:  3-4 beers daily         Review of Systems: All other review of systems otherwise negative. Constitutional: Negative for fever, chills, weight loss, + malaise/fatigue. HEENT: Negative for nosebleeds, vision changes. Respiratory: Negative for cough, hemoptysis. Cardiovascular: Negative for chest pain, palpitations, orthopnea, claudication, leg swelling, syncope, and PND. Gastrointestinal: Negative for nausea, vomiting, diarrhea, blood in stool and melena. Genitourinary: Negative for dysuria, and hematuria. Musculoskeletal: Negative for myalgias, arthralgia. Skin: Negative for rash. Heme: Does not bleed or bruise easily. Neurological: Negative for speech change and focal weakness. Objective:   Due to this being a TeleHealth evaluation, many elements of the physical examination are unable to be assessed. General: Well developed, in no acute distress, cooperative and alert  HEENT: Pupils equal/round. No marked JVD visible on video. Respiratory: No audible wheezing, no signs of respiratory distress, lips non cyanotic  Extremities:  No edema  Neuro: A&Ox3, speech clear, no facial droop, answering questions appropriately  Skin: Skin color is normal. No rashes or lesions. Non diaphoretic on visible skin during exam      Assessment/Plan:     Imaging/Studies:  Lexiscan cardiolite stress (05/24/2021): LVEF 37%. Normal LV myocardial perfusion. Echo (03/17/2021): LVEF 40-45%, small LV, severe concentric LVH, grade 3 diastolic dysfunction. Reduced RVEEF. Mod AMY. Mild to mod MR. Mild AR. Severe TR. ICD-10-CM ICD-9-CM    1. Pacemaker  Z95.0 V45.01    2. Essential hypertension  I10 401.9    3. Coronary artery disease involving native coronary artery of native heart without angina pectoris  I25.10 414.01    4.  S/P CABG (coronary artery bypass graft)  Z95.1 V45.81    5. Third degree AV block (HCC)  I44.2 426.0    6. NICM (nonischemic cardiomyopathy) ([facility]  I42.8 425.4         Medtronic dual chamber pacemaker (DOI 05/01/2020): Implanted for complete AVB post CABG. Device check on 06/28/2021 showed proper lead & generator function. Generator longevity estimated 10.75 yrs. RA 21.91%. RV pacer dependent. Since 03/24/2021, no episodes noted. Reviewed risks/benefits of upgrade to biventricular pacemaker. He agrees to proceed as scheduled. Reviewed Hibiclens. Labs ordered. NICM: Suspect due to chronic RV pacing. LVEF 37% via Lexiscan cardiolite stress test in 05/2021. NYHA II chronic systolic CHF. Currently on Lopressor 12.5 mg po bid, will switch to Toprol XL 25 mg po daily. No ACEi/ARB due to allergy. CRT upgrade is indicated (as above). AFL: Short episode previously noted, but none further on device check. Not on [facility], had rectal hemorrhage in 05/2021. HTN: Controlled. Continue current regimen. CAD: No angina, is s/p CABG in 05/2020. Recent Lexiscan cardiolite stress test showed normal LV perfusion. Statin allergy, but will continue Zetia. Future Appointments   Date Time Provider Willian Paul   8/27/2021  8:40 AM Reid Noriega BS AMB   8/27/2021  9:00 AM WOUND CHECKS, HODA WYATT BS AMB   9/29/2021  1:30 PM Yessenia Lr MD [facility] BS AMB   11/19/2021  1:00 PM MD ANTWON Manning [place] BS AMB       We discussed the expected course, resolution and complications of the diagnosis(es) in detail. Medication risks, benefits, costs, interactions, and alternatives were discussed as indicated. I advised him to contact the office if his condition worsens, changes or fails to improve as anticipated. He expressed understanding with the diagnosis(es) and plan.     Patient was made aware and verbalized understanding that an appointment will be scheduled for them for a virtual visit and/or office visit within the above time frame. Patient understanding his/her responsibility to call and change time/date if he/she so chooses. Call duration: 16 minutes. Thank you for involving me in this patient's care and please call with further concerns or questions.     ANTHONY Tineo  [facility]  07/23/21

## 2021-07-21 ENCOUNTER — TELEPHONE (OUTPATIENT)
Dept: CARDIOLOGY CLINIC | Age: 71
End: 2021-07-21

## 2021-07-21 RX ORDER — CHLORHEXIDINE GLUCONATE 4 G/100ML
1 SOLUTION TOPICAL
Qty: 118 ML | Refills: 0 | Status: SHIPPED | OUTPATIENT
Start: 2021-07-21 | End: 2021-07-21

## 2021-07-21 RX ORDER — ALIROCUMAB 75 MG/ML
INJECTION, SOLUTION SUBCUTANEOUS
Qty: 6 ML | Refills: 3 | Status: SHIPPED | OUTPATIENT
Start: 2021-07-21

## 2021-07-21 NOTE — TELEPHONE ENCOUNTER
Patient's daughter, Rehan Gonzalez, states they received a letter stating the topical cream needed prior to procedure has been sent to the pharmacy, but they do not have not received it yet.      Phone: [phone]

## 2021-07-23 ENCOUNTER — VIRTUAL VISIT (OUTPATIENT)
Dept: CARDIOLOGY CLINIC | Age: 71
End: 2021-07-23
Payer: MEDICARE

## 2021-07-23 DIAGNOSIS — I44.2 THIRD DEGREE AV BLOCK (HCC): ICD-10-CM

## 2021-07-23 DIAGNOSIS — I42.8 NICM (NONISCHEMIC CARDIOMYOPATHY) (HCC): ICD-10-CM

## 2021-07-23 DIAGNOSIS — Z95.1 S/P CABG (CORONARY ARTERY BYPASS GRAFT): ICD-10-CM

## 2021-07-23 DIAGNOSIS — Z95.0 PACEMAKER: Primary | ICD-10-CM

## 2021-07-23 DIAGNOSIS — I10 ESSENTIAL HYPERTENSION: ICD-10-CM

## 2021-07-23 DIAGNOSIS — I25.10 CORONARY ARTERY DISEASE INVOLVING NATIVE CORONARY ARTERY OF NATIVE HEART WITHOUT ANGINA PECTORIS: ICD-10-CM

## 2021-07-23 PROCEDURE — 99442 PR PHYS/QHP TELEPHONE EVALUATION 11-20 MIN: CPT | Performed by: NURSE PRACTITIONER

## 2021-07-23 RX ORDER — METOPROLOL TARTRATE 25 MG/1
25 TABLET, FILM COATED ORAL DAILY
Qty: 90 TABLET | Refills: 3 | Status: SHIPPED | OUTPATIENT
Start: 2021-07-23 | End: 2021-07-28 | Stop reason: ALTCHOICE

## 2021-07-23 RX ORDER — CHLORHEXIDINE GLUCONATE 4 G/100ML
1 SOLUTION TOPICAL
Qty: 118 ML | Refills: 0 | Status: SHIPPED | OUTPATIENT
Start: 2021-07-23 | End: 2021-07-23

## 2021-07-27 ENCOUNTER — TELEPHONE (OUTPATIENT)
Dept: CARDIOLOGY CLINIC | Age: 71
End: 2021-07-27

## 2021-07-27 NOTE — TELEPHONE ENCOUNTER
Patient's daughter, Valery Cool, states she had a few questions in regards to recent virtual visit.      Phone: [phone]

## 2021-07-28 RX ORDER — METOPROLOL SUCCINATE 25 MG/1
25 TABLET, EXTENDED RELEASE ORAL DAILY
Qty: 90 TABLET | Refills: 1 | Status: SHIPPED | OUTPATIENT
Start: 2021-07-28 | End: 2022-01-03

## 2021-07-28 NOTE — TELEPHONE ENCOUNTER
Called pt two patient identifiers confirmed. Went over instructions with the Aníbal Toro and pt. Pt verbalized understanding of information discussed w/ no further questions at this time.

## 2021-08-02 ENCOUNTER — ANESTHESIA (OUTPATIENT)
Dept: ENDOSCOPY | Age: 71
End: 2021-08-02
Payer: MEDICARE

## 2021-08-02 ENCOUNTER — HOSPITAL ENCOUNTER (OUTPATIENT)
Age: 71
Setting detail: OUTPATIENT SURGERY
Discharge: HOME | End: 2021-08-02
Attending: INTERNAL MEDICINE | Admitting: INTERNAL MEDICINE
Payer: MEDICARE

## 2021-08-02 ENCOUNTER — ANESTHESIA EVENT (OUTPATIENT)
Dept: ENDOSCOPY | Age: 71
End: 2021-08-02
Payer: MEDICARE

## 2021-08-02 VITALS
OXYGEN SATURATION: 100 % | RESPIRATION RATE: 22 BRPM | BODY MASS INDEX: 25.11 KG/M2 | WEIGHT: 160 LBS | DIASTOLIC BLOOD PRESSURE: 69 MMHG | HEIGHT: 67 IN | HEART RATE: 67 BPM | TEMPERATURE: 98.2 F | SYSTOLIC BLOOD PRESSURE: 119 MMHG

## 2021-08-02 PROCEDURE — 77030010936 HC CLP LIG BSC -C: Performed by: INTERNAL MEDICINE

## 2021-08-02 PROCEDURE — 76040000019: Performed by: INTERNAL MEDICINE

## 2021-08-02 PROCEDURE — 74011000250 HC RX REV CODE- 250: Performed by: ANESTHESIOLOGY

## 2021-08-02 PROCEDURE — 74011250636 HC RX REV CODE- 250/636: Performed by: ANESTHESIOLOGY

## 2021-08-02 PROCEDURE — 76060000031 HC ANESTHESIA FIRST 0.5 HR: Performed by: INTERNAL MEDICINE

## 2021-08-02 PROCEDURE — 2709999900 HC NON-CHARGEABLE SUPPLY: Performed by: INTERNAL MEDICINE

## 2021-08-02 PROCEDURE — 74011250636 HC RX REV CODE- 250/636: Performed by: INTERNAL MEDICINE

## 2021-08-02 DEVICE — WORKING LENGTH 235CM, WORKING CHANNEL 2.8MM
Type: IMPLANTABLE DEVICE | Site: RECTUM | Status: FUNCTIONAL
Brand: RESOLUTION 360 CLIP

## 2021-08-02 RX ORDER — SODIUM CHLORIDE 0.9 % (FLUSH) 0.9 %
5-40 SYRINGE (ML) INJECTION AS NEEDED
Status: DISCONTINUED | OUTPATIENT
Start: 2021-08-02 | End: 2021-08-02 | Stop reason: HOSPADM

## 2021-08-02 RX ORDER — DEXTROMETHORPHAN/PSEUDOEPHED 2.5-7.5/.8
1.2 DROPS ORAL
Status: DISCONTINUED | OUTPATIENT
Start: 2021-08-02 | End: 2021-08-02 | Stop reason: HOSPADM

## 2021-08-02 RX ORDER — PHENYLEPHRINE HCL IN 0.9% NACL 0.4MG/10ML
SYRINGE (ML) INTRAVENOUS AS NEEDED
Status: DISCONTINUED | OUTPATIENT
Start: 2021-08-02 | End: 2021-08-02 | Stop reason: HOSPADM

## 2021-08-02 RX ORDER — NALOXONE HYDROCHLORIDE 0.4 MG/ML
0.4 INJECTION, SOLUTION INTRAMUSCULAR; INTRAVENOUS; SUBCUTANEOUS
Status: DISCONTINUED | OUTPATIENT
Start: 2021-08-02 | End: 2021-08-02 | Stop reason: HOSPADM

## 2021-08-02 RX ORDER — ATROPINE SULFATE 0.1 MG/ML
0.5 INJECTION INTRAVENOUS
Status: DISCONTINUED | OUTPATIENT
Start: 2021-08-02 | End: 2021-08-02 | Stop reason: HOSPADM

## 2021-08-02 RX ORDER — EPINEPHRINE 0.1 MG/ML
1 INJECTION INTRACARDIAC; INTRAVENOUS
Status: DISCONTINUED | OUTPATIENT
Start: 2021-08-02 | End: 2021-08-02 | Stop reason: HOSPADM

## 2021-08-02 RX ORDER — MIDAZOLAM HYDROCHLORIDE 1 MG/ML
.25-5 INJECTION, SOLUTION INTRAMUSCULAR; INTRAVENOUS
Status: DISCONTINUED | OUTPATIENT
Start: 2021-08-02 | End: 2021-08-02 | Stop reason: HOSPADM

## 2021-08-02 RX ORDER — PROPOFOL 10 MG/ML
INJECTION, EMULSION INTRAVENOUS AS NEEDED
Status: DISCONTINUED | OUTPATIENT
Start: 2021-08-02 | End: 2021-08-02 | Stop reason: HOSPADM

## 2021-08-02 RX ORDER — SODIUM CHLORIDE 0.9 % (FLUSH) 0.9 %
5-40 SYRINGE (ML) INJECTION EVERY 8 HOURS
Status: DISCONTINUED | OUTPATIENT
Start: 2021-08-02 | End: 2021-08-02 | Stop reason: HOSPADM

## 2021-08-02 RX ORDER — SODIUM CHLORIDE 9 MG/ML
75 INJECTION, SOLUTION INTRAVENOUS CONTINUOUS
Status: DISCONTINUED | OUTPATIENT
Start: 2021-08-02 | End: 2021-08-02 | Stop reason: HOSPADM

## 2021-08-02 RX ORDER — LIDOCAINE HYDROCHLORIDE 20 MG/ML
INJECTION, SOLUTION EPIDURAL; INFILTRATION; INTRACAUDAL; PERINEURAL AS NEEDED
Status: DISCONTINUED | OUTPATIENT
Start: 2021-08-02 | End: 2021-08-02 | Stop reason: HOSPADM

## 2021-08-02 RX ORDER — FLUMAZENIL 0.1 MG/ML
0.2 INJECTION INTRAVENOUS
Status: DISCONTINUED | OUTPATIENT
Start: 2021-08-02 | End: 2021-08-02 | Stop reason: HOSPADM

## 2021-08-02 RX ADMIN — Medication 40 MCG: at 10:18

## 2021-08-02 RX ADMIN — LIDOCAINE HYDROCHLORIDE 20 MG: 20 INJECTION, SOLUTION EPIDURAL; INFILTRATION; INTRACAUDAL; PERINEURAL at 10:09

## 2021-08-02 RX ADMIN — SODIUM CHLORIDE 75 ML/HR: 900 INJECTION, SOLUTION INTRAVENOUS at 09:44

## 2021-08-02 RX ADMIN — PROPOFOL 60 MG: 10 INJECTION, EMULSION INTRAVENOUS at 10:19

## 2021-08-02 RX ADMIN — Medication 40 MCG: at 10:20

## 2021-08-02 NOTE — H&P
Pre-endoscopy H and P    The patient was seen and examined in the room/pre-op holding area. The airway was assessed and documented. The problem list, past medical history, and medications were reviewed. 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    Alcohol abuse F10.10    Insomnia G47.00    HTN (hypertension) I10    DVT of leg (deep venous thrombosis) ([facility] I82.409    Spinal stenosis, lumbar region, without neurogenic claudication M48.061    Hyperlipidemia E78.5    CKD (chronic kidney disease) N18.9    Cervical spinal stenosis M48.02    Reactive depression F32.9    Type 2 diabetes with nephropathy ([facility]) E11.21    Prostate cancer ([facility] C61    Type 2 diabetes mellitus with diabetic neuropathy ([facility]) E11.40    NSTEMI (non-ST elevated myocardial infarction) ([facility]) I21.4    Bilateral carotid artery stenosis I65.23    CAD (coronary artery disease) I25.10    S/P CABG x 3 Z95.1    Postoperative anemia due to acute blood loss D62    Pacemaker Z95.0    Third degree AV block ([facility]) I44.2     Social History     Socioeconomic History    Marital status:      Spouse name: Not on file    Number of children: 1    Years of education: 15    Highest education level: High school graduate   Occupational History    Not on file   Tobacco Use    Smoking status: Never Smoker    Smokeless tobacco: Current User     Types: Chew    Tobacco comment: Chews tobacco every day   Vaping Use    Vaping Use: Never used   Substance and Sexual Activity    Alcohol use:  Yes     Alcohol/week: 7.0 standard drinks     Types: 7 Cans of beer per week     Comment:  3-4 beers daily     Drug use: Never    Sexual activity: Not Currently   Other Topics Concern     Service No    Blood Transfusions No    Caffeine Concern No    Occupational Exposure No    Hobby Hazards No    Sleep Concern Yes    Stress Concern Yes    Weight Concern No    Special Diet No    Back Care Yes    Exercise No    Bike Helmet Not Asked    Seat Belt Yes    Self-Exams Not Asked   Social History Narrative    ** Merged History Encounter **          Social Determinants of Health     Financial Resource Strain:     Difficulty of Paying Living Expenses:    Food Insecurity:     Worried About Running Out of Food in the Last Year:     Ran Out of Food in the Last Year:    Transportation Needs:     Lack of Transportation (Medical):  Lack of Transportation (Non-Medical):    Physical Activity:     Days of Exercise per Week:     Minutes of Exercise per Session:    Stress:     Feeling of Stress :    Social Connections:     Frequency of Communication with Friends and Family:     Frequency of Social Gatherings with Friends and Family:     Attends Orthodoxy Services:     Active Member of Clubs or Organizations:     Attends Club or Organization Meetings:     Marital Status:    Intimate Partner Violence:     Fear of Current or Ex-Partner:     Emotionally Abused:     Physically Abused:     Sexually Abused:      Past Medical History:   Diagnosis Date    Arthritis     Cancer ([facility]     Prostate ca    Chronic kidney disease     Stage 3    Chronic pain     Abdoman, back, fingers per daughter    Diabetes ([facility]     Heart attack ([facility]     Heart failure ([facility] 2020    Dr. Amanda Ellington Hypertension     PE (pulmonary embolism)     Pneumonia     Psychiatric disorder     Depression    Sleep apnea     not use CPAP         Prior to Admission Medications   Prescriptions Last Dose Informant Patient Reported? Taking?    FREESTYLE LANCETS 28 gauge misc   No Yes   Sig: USE TO CHECK BLOOD SUGAR ONCE DAILY   Praluent Pen 75 mg/mL injector pen 7/31/2021  No Yes   Sig: INJECT 1 ML SUBCUTANEOUSLY  EVERY TWO WEEKS   SITagliptin (Januvia) 50 mg tablet 8/2/2021 at Unknown time  No Yes   Sig: Take 1 tablet by mouth once daily   acetaminophen (TYLENOL) 325 mg tablet Not Taking at Unknown time  Yes No   Sig: Take 2 Tabs by mouth every four (4) hours as needed for Pain. Patient not taking: Reported on 2021   ascorbate calcium (VITAMIN C PO) 2021 at Unknown time  Yes Yes   Sig: Take 1 Tab by mouth daily as needed. aspirin delayed-release (ASPIR-LOW) 81 mg tablet 2021 at Unknown time  Yes Yes   Sig: Take 81 mg by mouth daily. Does not take every day   cholecalciferol, vitamin D3, (VITAMIN D3 PO) 2021 at Unknown time  Yes Yes   Sig: Take 1,000 Int'l Units by mouth daily as needed. co-enzyme Q-10 (Co Q-10) 100 mg capsule 2021 at Unknown time  Yes Yes   Sig: Take 100 mg by mouth daily as needed. 200 mg   cyanocobalamin 1,000 mcg tablet 2021 at Unknown time  Yes Yes   Sig: Take 1,000 mcg by mouth daily as needed. docusate sodium (Dulcolax Stool Softener, dss,) 100 mg capsule Unknown at Unknown time  No No   Sig: Take 1 Cap by mouth two (2) times a day. Patient taking differently: Take 100 mg by mouth as needed. ezetimibe (ZETIA) 10 mg tablet 2021 at Unknown time  No Yes   Sig: Take 1 Tab by mouth daily. fluticasone propionate (Flonase Allergy Relief) 50 mcg/actuation nasal spray Not Taking at Unknown time Self Yes No   Si Sprays by Both Nostrils route two (2) times daily as needed for Rhinitis. Patient not taking: Reported on 2021   furosemide (LASIX) 20 mg tablet Unknown at Unknown time  No No   Sig: Take 1 Tab by mouth daily. Patient not taking: Reported on 2021   hydrocortisone (Anusol-HC) 25 mg supp Unknown at Unknown time  No No   Sig: Insert 1 Suppository into rectum every twelve (12) hours as needed for Pain. Indications: inflammation of the rectum   magnesium 250 mg tab 2021 at Unknown time  Yes Yes   Sig: Take 1 Tab by mouth daily as needed. metoprolol succinate (TOPROL-XL) 25 mg XL tablet 2021 at Unknown time  No Yes   Sig: Take 1 Tablet by mouth daily.    pentoxifylline CR (TRENTAL) 400 mg CR tablet Not Taking at Unknown time  No No   Sig: Take 1 Tab by mouth three (3) times daily (with meals). Patient not taking: Reported on 2021   pumpkin seed extract-soy germ 300 mg cap Unknown at Unknown time  Yes No   Sig: Take 1 Cap by mouth as needed (prostate health). senna-docusate (PERICOLACE) 8.6-50 mg per tablet Unknown at Unknown time  Yes No   Sig: Take 1 Tablet by mouth daily as needed for Constipation. sertraline (ZOLOFT) 50 mg tablet 2021 at Unknown time  No Yes   Sig: Take 1 Tablet by mouth daily. sucralfate (CARAFATE) 100 mg/mL suspension Not Taking at Unknown time  Yes No   Patient not taking: Reported on 2021   traZODone (DESYREL) 50 mg tablet 2021 at Unknown time  No Yes   Sig: TAKE 1 TABLET BY MOUTH AT BEDTIME   varicella-zoster recombinant, PF, (Shingrix, PF,) 50 mcg/0.5 mL susr injection   No Yes   Si.5mL by IntraMUSCular route once now and then repeat in 2-6 months   vitamin e (E GEMS) 100 unit capsule 2021 at Unknown time  No Yes   Sig: Take 1 capsule three times daily      Facility-Administered Medications: None           The review of systems is:  Negative  for shortness of breath or chest pain      The heart, lungs, and mental status were satisfactory for the administration of deep sedation and for the procedure. I discussed with the patient the objectives, risks, consequences and alternatives to the procedure.       Soni Salazar MD  2021  10:06 AM

## 2021-08-02 NOTE — ROUTINE PROCESS
Waymond Fabry.  1950  [id number]    Situation:  Verbal report received from: Isai Flight  Procedure: Procedure(s): FLEXIBLE SIGMOIDOSCOPY  RESOLUTION CLIP    Background:    Preoperative diagnosis: ULCER OF RECTUM, HEMORRAGE OF ANUS AND RECTUM  Postoperative diagnosis: Mild to Moderate Telangectasia, Rectal Ulcer, Hemorrhoids    :  Dr. Celsa Diaz  Assistant(s): Endoscopy Technician-1: Katherin Pillai  Endoscopy RN-1: Eleanor Rodriguez RN    Specimens: * No specimens in log *  H. Pylori  no    Assessment:  Intra-procedure medications     Anesthesia gave intra-procedure sedation and medications, see anesthesia flow sheet yes    Intravenous fluids: NS@ KVO     Vital signs stable     Abdominal assessment: round and soft     Recommendation:  Discharge patient per MD order. Family or Friend   Permission to share finding with family or friend yes    Endoscopy discharge instructions have been reviewed and given to patient. The patient verbalized understanding and acceptance of instructions. Dr. Celsa Diaz discussed with daughter procedure findings and next steps.

## 2021-08-02 NOTE — ANESTHESIA POSTPROCEDURE EVALUATION
Procedure(s): FLEXIBLE SIGMOIDOSCOPY  RESOLUTION CLIP.    total IV anesthesia    Anesthesia Post Evaluation        Patient location during evaluation: PACU  Note status: Adequate. Level of consciousness: responsive to verbal stimuli and sleepy but conscious  Pain management: satisfactory to patient  Airway patency: patent  Anesthetic complications: no  Cardiovascular status: acceptable  Respiratory status: acceptable  Hydration status: acceptable  Comments: +Post-Anesthesia Evaluation and Assessment    Patient: Shankar Chamberlain MRN: [id number]  SSN: [id number]   YOB: 1950  Age: 66 y.o. Sex: male      Cardiovascular Function/Vital Signs    /69   Pulse 67   Temp 36.8 °C (98.2 °F)   Resp 22   Ht 5' 7\" (1.702 m)   Wt 72.6 kg (160 lb)   SpO2 100%   BMI 25.06 kg/m²     Patient is status post Procedure(s): FLEXIBLE SIGMOIDOSCOPY  RESOLUTION CLIP. Nausea/Vomiting: Controlled. Postoperative hydration reviewed and adequate. Pain:  Pain Scale 1: Numeric (0 - 10) (08/02/21 1054)  Pain Intensity 1: 0 (08/02/21 1054)   Managed. Neurological Status: At baseline. Mental Status and Level of Consciousness: Arousable. Pulmonary Status:   O2 Device: None (Room air) (08/02/21 1054)   Adequate oxygenation and airway patent. Complications related to anesthesia: None    Post-anesthesia assessment completed. No concerns. Signed By: John Ortiz MD    8/2/2021  Post anesthesia nausea and vomiting:  controlled      INITIAL Post-op Vital signs:   Vitals Value Taken Time   /69 08/02/21 1054   Temp 36.8 °C (98.2 °F) 08/02/21 1031   Pulse 66 08/02/21 1056   Resp 16 08/02/21 1056   SpO2 99 % 08/02/21 1056   Vitals shown include unvalidated device data.

## 2021-08-02 NOTE — PROCEDURES
Flexible Sigmoidoscopy w/ biopsies        Pre Procedure Diagnosis: hx of rectal ulcer and radiation telangiectasia    Post Procedure Diagnoses: Rectal ulcer,inetrnal hemorrhoids and radiation telangiectasia    Pre-Medication:  MAC      Description of the procedure:  Prior to the procedure its objectives, risks consequences and alternatives were discussed with the patient who then elected to proceed. Digital Rectal Examination . The Olympus video colonoscope was inserted in the rectum and advanced to what appeared to be the sigmoid colon. The colonoscope was slowly and carefully withdrawn as the colon was inspected. Abnormalities were noted(see below). Retroflexion in the rectum showed:      Findings:    A medium sized non bleeding clean based rectal ulcer was noted. There is moderate surrdounding radiation telangiectasia. They ooze on contact.  A single BS Ultra 360 resolution clip was applied to the ulcer with success. No bleeding noted.  Medium internal hemorrhoids.  Sigmoid mucosa is normal.      Complications: None. Plan:    OP f.u in clinic in next few weeks to discuss further treatment.       Jovanna Blank MD  9:30 AM  10/9/2019

## 2021-08-02 NOTE — DISCHARGE INSTRUCTIONS
[place] Office: [phone]    Latoya Davila  [id number]  1950    EGD/COLONOSCOPY DISCHARGE INSTRUCTIONS  Discomfort:  Sore throat- throat lozenges or warm salt water gargle  redness at IV site- apply warm compress to area; if redness or soreness persist- contact your physician  Gaseous discomfort- walking, belching will help relieve any discomfort  You may not operate a vehicle for 12 hours  You may not engage in an occupation involving machinery or appliances for rest of today. You may not drink alcoholic beverages for at least 12 hours  Avoid making any critical decisions for at least 24 hour  DIET  You may resume your regular diet - however -  remember your colon is empty and a heavy meal will produce gas. Avoid these foods:  fried / greasy foods, excessive carbonated drinks or too much caffeine  MEDICATIONS   Regarding Aspirin or Nonsteroidal medications specifically, please see below. ACTIVITY  You may resume your normal daily activities. Spend the remainder of the day resting -  avoid any strenuous activity. CALL M.D. ANY SIGN OF   Increasing pain, nausea, vomiting  Abdominal distension (swelling)  New increased bleeding (oral or rectal)  Fever (chills)  Pain in chest area  Bloody discharge from nose or mouth  Shortness of breath    You may not take any Advil, Aspirin, Ibuprofen, Motrin, Aleve, or Goodys for 7 days, ONLY  Tylenol as needed for pain. Follow-up Instructions:   Call  Jamir Law MD for any questions or concerns  s   Telephone # [phone]      Follow-up Information    None

## 2021-08-02 NOTE — ANESTHESIA PREPROCEDURE EVALUATION
Anesthetic History   No history of anesthetic complications            Review of Systems / Medical History  Patient summary reviewed, nursing notes reviewed and pertinent labs reviewed    Pulmonary        Sleep apnea: No treatment  Pneumonia         Neuro/Psych         Psychiatric history    Comments: Cervical spinal stenosis s/p PCF (11/2/16)  S/P Lumbar Laminectomy (10/16/14)  Depression  Cervical spinal stenosis Cardiovascular    Hypertension  Valvular problems/murmurs: tricuspid insufficiency, mitral insufficiency and aortic insufficiency      Dysrhythmias   Pacemaker, past MI, CAD, CABG and hyperlipidemia    Exercise tolerance: <4 METS  Comments: H/O PE/DVT  Bilateral carotid artery stenosis  Pacer for 3rd degree AV block  CABG x 3 vessels (4/28/20)    TTE (3/17/20):  ·LV: Estimated LVEF is 40 - 45%. Small left ventricle. Severe concentric hypertrophy. Globally reduced systolic function. Severe (grade 3) left ventricular diastolic dysfunction. ·LA: Moderately dilated left atrium. ·RV: Reduced systolic function. Pacing wire present  ·RA: Moderately dilated right atrium. ·AV: Mild aortic valve regurgitation is present. ·MV: Mild to moderate mitral valve regurgitation is present. ·TV: Severe tricuspid valve regurgitation is present. GI/Hepatic/Renal         Renal disease: CRI      Comments: CRI, Stage III    H/O Perforated diverticulum S/P colon resection (2012) Endo/Other    Diabetes (diet controlled): well controlled, type 2    Obesity, arthritis and cancer    Comments:  Thrombocytopenia    H/O Prostate Cancer Other Findings   Comments: Chronic pain  ETOH abuse  Hx PE (pulmonary embolism)   Prostate ca           Physical Exam    Airway  Mallampati: III  TM Distance: 4 - 6 cm  Neck ROM: normal range of motion   Mouth opening: Normal     Cardiovascular  Regular rate and rhythm,  S1 and S2 normal,  no murmur, click, rub, or gallop             Dental    Dentition: Edentulous     Pulmonary  Breath sounds clear to auscultation               Abdominal  GI exam deferred       Other Findings            Anesthetic Plan    ASA: 3  Anesthesia type: total IV anesthesia and general          Induction: Intravenous  Anesthetic plan and risks discussed with: Patient      Propofol MAC

## 2021-08-04 RX ORDER — SODIUM CHLORIDE 0.9 % (FLUSH) 0.9 %
5-40 SYRINGE (ML) INJECTION AS NEEDED
Status: CANCELLED | OUTPATIENT
Start: 2021-08-04

## 2021-08-04 RX ORDER — SODIUM CHLORIDE 0.9 % (FLUSH) 0.9 %
5-40 SYRINGE (ML) INJECTION EVERY 8 HOURS
Status: CANCELLED | OUTPATIENT
Start: 2021-08-04

## 2021-08-09 ENCOUNTER — TELEPHONE (OUTPATIENT)
Dept: CARDIOLOGY CLINIC | Age: 71
End: 2021-08-09

## 2021-08-09 DIAGNOSIS — I42.8 NICM (NONISCHEMIC CARDIOMYOPATHY) (HCC): Primary | ICD-10-CM

## 2021-08-09 RX ORDER — TRAZODONE HYDROCHLORIDE 50 MG/1
TABLET ORAL
Qty: 30 TABLET | Refills: 0 | Status: SHIPPED | OUTPATIENT
Start: 2021-08-09 | End: 2021-09-07

## 2021-08-09 NOTE — TELEPHONE ENCOUNTER
Verified patient with two types of identifiers. Spoke with patient's daughter, verified on PHI. Siddharth Barefoot states patient would like to cancel BiV PPM upgrade on 8/13/21. Patient would like to repeat echo again to check heart function prior to moving forward with procedure.  Will notify MD/NP for recommendations regarding echo

## 2021-08-10 NOTE — TELEPHONE ENCOUNTER
Verified patient with two types of identifiers. Spoke with Madhav Son, verified on PHI. Notified MD okay with cancelling procedure on Friday and scheduling repeat Echo for updated LVEF. Scheduled Echo. Patient's daughter verbalized understanding and will call with any other questions.       Future Appointments   Date Time Provider Willian Paul   9/13/2021 11:00 AM VASCULAR, HODA [facility] BS AMB   9/29/2021  1:30 PM Frances Mari MD [facility] BS AMB   11/19/2021  1:00 PM MD ANTWON Suárez BS AMB

## 2021-08-25 ENCOUNTER — TELEPHONE (OUTPATIENT)
Dept: INTERNAL MEDICINE CLINIC | Age: 71
End: 2021-08-25

## 2021-08-25 DIAGNOSIS — K59.00 CONSTIPATION, UNSPECIFIED CONSTIPATION TYPE: Primary | ICD-10-CM

## 2021-08-25 RX ORDER — AMOXICILLIN 250 MG
1 CAPSULE ORAL
Qty: 90 TABLET | Refills: 2 | Status: SHIPPED | OUTPATIENT
Start: 2021-08-25 | End: 2022-07-01 | Stop reason: ALTCHOICE

## 2021-08-25 NOTE — TELEPHONE ENCOUNTER
PCP: Kierra Barba MD    Last appt: 6/23/2021  Future Appointments   Date Time Provider Willian Paul   9/13/2021 11:00 AM VASCULAR, [facility]A [facility] BS AMB   9/29/2021  1:30 PM Kierra Barba MD [facility] BS AMB   11/19/2021  1:00 PM MD ANTWON Str[place] BS AMB       Requested Prescriptions     Pending Prescriptions Disp Refills    senna-docusate (PERICOLACE) 8.6-50 mg per tablet       Sig: Take 1 Tablet by mouth daily as needed for Constipation. Called, spoke with [facility] [place] (HIPAA). Two pt identifiers confirmed. Marleni Eller stated patient gets constipated off and on and currently work on day two. Marleni Eller wanted to get a refill on medication patient uses for constipation. Yareli informed I'll send the request to Dr. Maureen Bautista. Pt verbalized understanding of information discussed w/ no further questions at this time.

## 2021-08-25 NOTE — TELEPHONE ENCOUNTER
----- Message from Licia Gosselin sent at 8/25/2021 10:15 AM EDT -----  Regarding: Dr Miladis Gonzalez (if not patient):aYreli Anushka Vinson      Relationship of caller (if not patient):daughter      Best contact number(s):[phone]      Name of medication and dosage if known:n Stool Softener      Is patient out of this medication (yes/no):      [address] listed in chart? (yes/no):yes  Pharmacy phone YHKSOE:[phone]      Details to clarify the request:Ms. [address] is requesting a Rx for a stool softener to be called to the pharmacy on file.      Message from [facility]

## 2021-08-31 ENCOUNTER — TELEPHONE (OUTPATIENT)
Dept: INTERNAL MEDICINE CLINIC | Age: 71
End: 2021-08-31

## 2021-08-31 NOTE — TELEPHONE ENCOUNTER
----- Message from Johnna Tirado sent at 8/31/2021 11:42 AM EDT -----  Regarding: Dr. Jose D Silva: [phone]  General Message/Vendor Calls    Caller's first and last name: Briana Friedman - Daughter      Reason for call: Requesting prescription for cough and runny nose PT has had for a couple of days - says it is coming from post nasal drip    Callback required yes/no and why: yes/confirm called in       Best contact number(s):[phone]      Details to clarify the request: [facility]  - phone: [phone]. PT's daughter says PT has not been exposed to anyone with COVID-19.        Johnna Tirado

## 2021-08-31 NOTE — TELEPHONE ENCOUNTER
Fer Arredondo MD  You 17 minutes ago (3:32 PM)   Vaughn Grossman  Mucinex otc     Can do vv in am if needed    Message text

## 2021-08-31 NOTE — TELEPHONE ENCOUNTER
Called, spoke with [facility] [place] (HIPAA). Two pt identifiers confirmed. [facility] informed to get Mucinex over the counter to see if that helps. [facility] informed if not to call back for a virtual appt with Dr. Michael Reid. [facility] stated she will try that. [facility] verbalized understanding of information discussed w/ no further questions at this time.

## 2021-09-07 RX ORDER — TRAZODONE HYDROCHLORIDE 50 MG/1
TABLET ORAL
Qty: 30 TABLET | Refills: 0 | Status: SHIPPED | OUTPATIENT
Start: 2021-09-07 | End: 2021-10-04

## 2021-09-13 ENCOUNTER — ANCILLARY PROCEDURE (OUTPATIENT)
Dept: CARDIOLOGY CLINIC | Age: 71
End: 2021-09-13
Payer: MEDICARE

## 2021-09-13 VITALS
DIASTOLIC BLOOD PRESSURE: 70 MMHG | BODY MASS INDEX: 25.11 KG/M2 | SYSTOLIC BLOOD PRESSURE: 120 MMHG | HEIGHT: 67 IN | WEIGHT: 160 LBS

## 2021-09-13 DIAGNOSIS — I42.8 NICM (NONISCHEMIC CARDIOMYOPATHY) (HCC): ICD-10-CM

## 2021-09-13 LAB
ECHO LV E' LATERAL VELOCITY: 5.04 CM/S
ECHO LV E' SEPTAL VELOCITY: 4.03 CM/S
ECHO LV EDV A2C: 100.69 ML
ECHO LV EDV A4C: 105.65 ML
ECHO LV EDV BP: 107.09 ML (ref 67–155)
ECHO LV EDV INDEX A4C: 57.4 ML/M2
ECHO LV EDV INDEX BP: 58.2 ML/M2
ECHO LV EDV NDEX A2C: 54.7 ML/M2
ECHO LV EJECTION FRACTION A2C: 45 PERCENT
ECHO LV EJECTION FRACTION A4C: 52 PERCENT
ECHO LV EJECTION FRACTION BIPLANE: 47.7 PERCENT (ref 55–100)
ECHO LV ESV A2C: 55.75 ML
ECHO LV ESV A4C: 50.99 ML
ECHO LV ESV BP: 56.05 ML (ref 22–58)
ECHO LV ESV INDEX A2C: 30.3 ML/M2
ECHO LV ESV INDEX A4C: 27.7 ML/M2
ECHO LV ESV INDEX BP: 30.5 ML/M2
ECHO LV INTERNAL DIMENSION DIASTOLIC: 3.74 CM (ref 4.2–5.9)
ECHO LV INTERNAL DIMENSION SYSTOLIC: 2.93 CM
ECHO LV IVSD: 1.78 CM (ref 0.6–1)
ECHO LV MASS 2D: 279.4 G (ref 88–224)
ECHO LV MASS INDEX 2D: 151.9 G/M2 (ref 49–115)
ECHO LV POSTERIOR WALL DIASTOLIC: 1.77 CM (ref 0.6–1)
ECHO MV A VELOCITY: 23.4 CM/S
ECHO MV AREA PHT: 6.39 CM2
ECHO MV E DECELERATION TIME (DT): 118.64 MS
ECHO MV E VELOCITY: 74.31 CM/S
ECHO MV E/A RATIO: 3.18
ECHO MV E/E' LATERAL: 14.74
ECHO MV E/E' RATIO (AVERAGED): 16.59
ECHO MV E/E' SEPTAL: 18.44
ECHO MV PRESSURE HALF TIME (PHT): 34.4 MS

## 2021-09-13 PROCEDURE — 93321 DOPPLER ECHO F-UP/LMTD STD: CPT | Performed by: INTERNAL MEDICINE

## 2021-09-13 PROCEDURE — 93308 TTE F-UP OR LMTD: CPT | Performed by: INTERNAL MEDICINE

## 2021-09-13 PROCEDURE — 93325 DOPPLER ECHO COLOR FLOW MAPG: CPT | Performed by: INTERNAL MEDICINE

## 2021-09-14 ENCOUNTER — TELEPHONE (OUTPATIENT)
Dept: CARDIOLOGY CLINIC | Age: 71
End: 2021-09-14

## 2021-09-14 LAB
ALBUMIN SERPL-MCNC: 4 G/DL (ref 3.7–4.7)
ALBUMIN/GLOB SERPL: 1.4 {RATIO} (ref 1.2–2.2)
ALP SERPL-CCNC: 80 IU/L (ref 44–121)
ALT SERPL-CCNC: 22 IU/L (ref 0–44)
AST SERPL-CCNC: 32 IU/L (ref 0–40)
BILIRUB SERPL-MCNC: 1.2 MG/DL (ref 0–1.2)
BUN SERPL-MCNC: 23 MG/DL (ref 8–27)
BUN/CREAT SERPL: 15 (ref 10–24)
CALCIUM SERPL-MCNC: 9.3 MG/DL (ref 8.6–10.2)
CHLORIDE SERPL-SCNC: 106 MMOL/L (ref 96–106)
CO2 SERPL-SCNC: 27 MMOL/L (ref 20–29)
CREAT SERPL-MCNC: 1.49 MG/DL (ref 0.76–1.27)
EST. AVERAGE GLUCOSE BLD GHB EST-MCNC: 128 MG/DL
GLOBULIN SER CALC-MCNC: 2.9 G/DL (ref 1.5–4.5)
GLUCOSE SERPL-MCNC: 81 MG/DL (ref 65–99)
HBA1C MFR BLD: 6.1 % (ref 4.8–5.6)
POTASSIUM SERPL-SCNC: 4.8 MMOL/L (ref 3.5–5.2)
PROT SERPL-MCNC: 6.9 G/DL (ref 6–8.5)
SODIUM SERPL-SCNC: 142 MMOL/L (ref 134–144)

## 2021-09-14 NOTE — PROGRESS NOTES
Mild reduction of LVEF  RV chronic pacing  Still meet criteria for BIV pacer upgrade but patient had postpone procedure before  Continue to follow up over time   Need device check scheduling

## 2021-09-14 NOTE — TELEPHONE ENCOUNTER
----- Message from Victorina Camacho MD sent at 9/13/2021 10:14 PM EDT -----  Mild reduction of LVEF  RV chronic pacing  Still meet criteria for BIV pacer upgrade but patient had postpone procedure before  Continue to follow up over time   Need device check scheduling

## 2021-09-14 NOTE — TELEPHONE ENCOUNTER
Verified patient with two types of identifiers. Spoke with patient's daughter verified on PHI. Notified of results and MD recommendations. Daughter states patient still does not want to move forward but will discuss. Scheduled 3 month follow up/. Daughter to call sooner if patient changes his mind and would like to move forward with device upgrade. Patient's daughter verbalized understanding and will call with any other questions.

## 2021-09-27 RX ORDER — TAMSULOSIN HYDROCHLORIDE 0.4 MG/1
0.4 CAPSULE ORAL
Qty: 90 CAPSULE | Refills: 5 | Status: SHIPPED | OUTPATIENT
Start: 2021-09-27 | End: 2021-11-15

## 2021-09-28 NOTE — PROGRESS NOTES
HISTORY OF PRESENT ILLNESS  Nallely Linton is a 66 y.o. male. HPI     Last here 6/23/21. Pt is here to f/u on chronic conditions. Pt presents with his daughter who provides some of his hx.        Discussed that he will be eligible for the [employer] booster next month 10/21     Lov he c/o swelling in R leg improved overall chronically right leg is more swollen than the left  Recall had DVT in the past  He is taking lasix occasionally-unclear how often he is taking this  Discussed he does not need to take this unless he is having more swelling overall swelling improved with weight loss     BP today is 121/72  Continues on toprol now 25mg half tab bid      He is DM   No BS checks not interested in checking  Continues januvia 50mg daily        Wt is 168 lbs, down 22 lbs x lov  Discussed that this wt/l is good  He hasn't been eating as much, eating less fried foods, drinking more smoothies had some stomach issues please recall that he has had a full evaluation in the last year to include EGD and colonoscopy in May and had CTs of the abdomen chest and pelvis and February and March he is making a concerted effort to change his diet     Reviewed labs   Ordered labs          He takes ASA 81 mg      Pt follows annually with Dr. Dorothy Almanza (cardio) for CAD and history of CABG 4/20  Last visit 5/07/21  Now taking praluent and Zetia  F/u is scheduled 11/21    For hyperlipidemia  He is taking Zetia   And praluent per cardio     He saw Dr. Alphonso Alpers (cardio) for cardiomyopathy  Last visit 5/27/21     He saw Dr. Tami Medellin) for CAD/CABG   Last there 6/3/20  He was completing cardiac rehab, no longer doing this      Carolina Saleem had pacemaker placed with Dr. Dawson (cardio)  Reviewed note 7/23/21 with NP Meena Ely:  Medtronic dual chamber pacemaker (DOI 05/01/2020): Implanted for complete AVB post CABG. Device check on 06/28/2021 showed proper lead & generator function. Generator longevity estimated 10.75 yrs. RA 21.91%. RV pacer dependent.   Since 03/24/2021, no episodes noted. Reviewed risks/benefits of upgrade to biventricular pacemaker. He agrees to proceed as scheduled. Reviewed Hibiclens. Labs ordered. NICM: Suspect due to chronic RV pacing. LVEF 37% via Lexiscan cardiolite stress test in 05/2021. NYHA II chronic systolic CHF. Currently on Lopressor 12.5 mg po bid, will switch to Toprol XL 25 mg po daily. No ACEi/ARB due to allergy. CRT upgrade is indicated (as above). AFL: Short episode previously noted, but none further on device check. Not on [address] [place] [address] Road, had rectal hemorrhage in 05/2021. HTN: Controlled. Continue current regimen. CAD: No angina, is s/p CABG in 05/2020. Recent Lexiscan cardiolite stress test showed normal LV perfusion. Statin allergy, but will continue Zetia.   Had pacemaker check 6/28/21 with Dr. Ashil Vizcarra   Will f/u 12/21    Just had echocardiogram September 2021 reviewed EF around 45 to 50%     Pt follows with Dr. John Camacho (nephro) for ckd in the past  Last visit was 6/21 does not remember the name of the nephrologist  Seeing a new nephro will f/u 11/21  Baseline cr 1.4-1.5 stable on recent labs,      Pt follows with [facility] (uro) for prostate cancer--  Recall pt has [facility] (brother and uncle) prostate cancer  Last there 12/20       Pt also met with a radiation oncologist at [address] [place] [address]  He completed radiation therapy with Dr. Loreta Live  Last visit 9/21: PSA down to 1.2 per daughter  He is not getting radioactive seeds because of his recent CABG  He was put on pentoxifylline, not taking this     Pt saw Dr Tiffani Mercado (podiatry) in 4/19  Pt was given terbinafine for fungal nails      Recall Pt had a hearing evaluation in the past  Recall notes from ENT 5/28/19: has some hearing loss      Pt has been taking CoQ-10      He is taking gabapentin 300 mg again for pain in hands/feet occasionally     He has zoloft 50mg, he has not been taking this every day he just takes it sporadically he does feel down at times discussed taking on daily basis  Discussed that he needs to take this every day for it to work     He also has ativan to use prn (not often, not in the past month) for anxiety --no recent use     Pt uses trazodone nightly sleep works well sometimes discussed sleep hygiene     He has been seeing Dr. Juno Dukes (sleep) for NIMO    Not compliant with cpap  - declined sleep study   Last visit 21    Discussed should schedule f/u    In the past, pt expressed concern about his memory  Previously, provided referral for Dr. Lizeth Cline (neuropsych)  lov provided info for Dr Sulema Leahy (neuropsych)  Had the appointment 19 --then refused testing in the end      He has been following with Dr. Momo Tristan (GI) for stomach bleeding  Recall in May 2021 he had an endoscopy and colonoscopy  Reviewed flexible sigmoidoscopy 21:  · A medium sized non bleeding clean based rectal ulcer was noted. There is moderate surrdounding radiation telangiectasia. They ooze on contact. · A single BS Ultra 360 resolution clip was applied to the ulcer with success. No bleeding noted. · Medium internal hemorrhoids. · Sigmoid mucosa is normal.     ACP not on file. SDM is his daughter (Yareli Mehta). Provided information in the past.      Recall sees Dr. Kaylan Calloway for h/o DVT, no longer on coumadin or xarelto, on ASA only.  Was on coumadin for h/o PE and DVT post operatively     PREVENTIVE:    Colonoscopy: 5/10/21 , Dr. Momo Tristan, repeat 5 years    EGD: 5/10/21, Dr. Momo Tirstan  PSA: 7/15 3.0, ,  3.8, ,  5.5  Dr Humphrey Ruano follows, 3/21 <1  AAA screen: CT , negative  Tdap: unknown, due will get at pharm  Pneumovax: 2012, will update today   [id number]: 19   Shingrix: due  Flu shot: will get this today 21  Foot exam: 03/10/21  Microalbumin:   A1c:  POC 5.7  ,  5.8  6.6  6.0  6.2  6.1  Eye exam: Dr. Abdiel Valentin Estopinal,   Hep C Screen: 10/15, negative  Lipids:  29.6  EK/17, PACs  Covid: both (Griselda Freeze)    Patient Active Problem List    Diagnosis Date Noted    Pacemaker 05/01/2020    Third degree [facility] block [facility] 05/01/2020    Postoperative anemia due to acute blood loss 04/29/2020    S/P CABG x 3 04/28/2020    CAD (coronary artery disease) 04/27/2020    Bilateral carotid artery stenosis 04/24/2020    NSTEMI (non-ST elevated myocardial infarction) ([facility] 04/23/2020    Type 2 diabetes mellitus with diabetic neuropathy ([facility] 01/09/2020    Prostate cancer ([facility] 06/04/2018    Type 2 diabetes with nephropathy ([facility] 02/28/2018    Reactive depression 11/15/2016    Cervical spinal stenosis 11/03/2016    CKD (chronic kidney disease) 10/31/2016    Hyperlipidemia 07/10/2015    Spinal stenosis, lumbar region, without neurogenic claudication 10/16/2014    DVT of leg (deep venous thrombosis) ([facility] 12/18/2012    HTN (hypertension) 12/12/2012    Perforated diverticulum of large intestine 12/04/2012    Alcohol abuse 12/04/2012    Insomnia 12/04/2012     Current Outpatient Medications   Medication Sig Dispense Refill    tamsulosin (Flomax) 0.4 mg capsule Take 1 Capsule by mouth daily (after dinner). 90 Capsule 5    SITagliptin (Januvia) 50 mg tablet Take 1 tablet by mouth once daily 90 Tablet 0    traZODone (DESYREL) 50 mg tablet TAKE 1 TABLET BY MOUTH AT BEDTIME 30 Tablet 0    senna-docusate (PERICOLACE) 8.6-50 mg per tablet Take 1 Tablet by mouth daily as needed for Constipation. 90 Tablet 2    metoprolol succinate (TOPROL-XL) 25 mg XL tablet Take 1 Tablet by mouth daily. 90 Tablet 1    Praluent Pen 75 mg/mL injector pen INJECT 1 ML SUBCUTANEOUSLY  EVERY TWO WEEKS 6 mL 3    varicella-zoster recombinant, PF, (Shingrix, PF,) 50 mcg/0.5 mL susr injection 0.5mL by IntraMUSCular route once now and then repeat in 2-6 months 0.5 mL 1    sucralfate (CARAFATE) 100 mg/mL suspension  (Patient not taking: Reported on 7/30/2021)      sertraline (ZOLOFT) 50 mg tablet Take 1 Tablet by mouth daily.  90 Tablet 1  hydrocortisone (Anusol-HC) 25 mg supp Insert 1 Suppository into rectum every twelve (12) hours as needed for Pain. Indications: inflammation of the rectum 5 Suppository 2    vitamin e (E GEMS) 100 unit capsule Take 1 capsule three times daily 90 Cap 6    pentoxifylline CR (TRENTAL) 400 mg CR tablet Take 1 Tab by mouth three (3) times daily (with meals). (Patient not taking: Reported on 7/30/2021) 90 Tab 6    furosemide (LASIX) 20 mg tablet Take 1 Tab by mouth daily. (Patient not taking: Reported on 7/30/2021) 90 Tab 0    docusate sodium (Dulcolax Stool Softener, dss,) 100 mg capsule Take 1 Cap by mouth two (2) times a day. (Patient taking differently: Take 100 mg by mouth as needed.) 30 Cap 0    ezetimibe (ZETIA) 10 mg tablet Take 1 Tab by mouth daily. 90 Tab 3    fluticasone propionate (Flonase Allergy Relief) 50 mcg/actuation nasal spray 2 Sprays by Both Nostrils route two (2) times daily as needed for Rhinitis. (Patient not taking: Reported on 6/23/2021)      pumpkin seed extract-soy germ 300 mg cap Take 1 Cap by mouth as needed (prostate health).  acetaminophen (TYLENOL) 325 mg tablet Take 2 Tabs by mouth every four (4) hours as needed for Pain. (Patient not taking: Reported on 6/23/2021)      co-enzyme Q-10 (Co Q-10) 100 mg capsule Take 100 mg by mouth daily as needed. 200 mg      cholecalciferol, vitamin D3, (VITAMIN D3 PO) Take 1,000 Int'l Units by mouth daily as needed.  ascorbate calcium (VITAMIN C PO) Take 1 Tab by mouth daily as needed.  aspirin delayed-release (ASPIR-LOW) 81 mg tablet Take 81 mg by mouth daily. Does not take every day      FREESTYLE LANCETS 28 gauge misc USE TO CHECK BLOOD SUGAR ONCE DAILY 100 Lancet 12    cyanocobalamin 1,000 mcg tablet Take 1,000 mcg by mouth daily as needed.  magnesium 250 mg tab Take 1 Tab by mouth daily as needed.        Past Surgical History:   Procedure Laterality Date    COLONOSCOPY N/A 3/19/2021    COLONOSCOPY performed by Simeon Frausto MD at [facility] ENDOSCOPY    COLONOSCOPY N/A 5/10/2021    COLONOSCOPY performed by Simeon Frausto MD at [place] [address] N/A 8/2/2021    FLEXIBLE SIGMOIDOSCOPY performed by Simeon Frausto MD at [facility] ENDOSCOPY    HX BACK SURGERY  2014    HX CERVICAL LAMINECTOMY      HX GI  11/2012    bowel resection    HX ORTHOPAEDIC      amputated left 4th finger    KS ABDOMEN SURGERY PROC UNLISTED      bowel resection    KS INS NEW/RPLCMT PRM PM W/TRANSV ELTRD ATRIAL&VENT N/A 5/1/2020    INSERT PPM DUAL performed by Charley Honeycutt MD at [address]/s  CATH LAB      Lab Results   Component Value Date/Time    WBC 4.5 06/16/2021 10:28 AM    HGB 12.5 (L) 06/16/2021 10:28 AM    Hemoglobin (POC) 13.3 11/02/2016 07:15 AM    HCT 38.8 06/16/2021 10:28 AM    Hematocrit (POC) 39 11/02/2016 07:15 AM    PLATELET 769 44/53/3043 10:28 AM    MCV 88 06/16/2021 10:28 AM     Lab Results   Component Value Date/Time    Cholesterol, total 99 04/26/2021 10:54 AM    Cholesterol (POC) 227 02/06/2015 03:30 PM    HDL Cholesterol 60 04/26/2021 10:54 AM    LDL, calculated 29.6 04/26/2021 10:54 AM    LDL Cholesterol (POC) 164 02/06/2015 03:30 PM    Triglyceride 47 04/26/2021 10:54 AM    Triglycerides (POC) 68 02/06/2015 03:30 PM    CHOL/HDL Ratio 1.7 04/26/2021 10:54 AM     Lab Results   Component Value Date/Time    GFR est non-AA 46 (L) 09/13/2021 12:00 AM    GFRNA, POC 50 (L) 11/02/2016 07:15 AM    GFR est AA 53 (L) 09/13/2021 12:00 AM    GFRAA, POC >60 11/02/2016 07:15 AM    Creatinine 1.49 (H) 09/13/2021 12:00 AM    Creatinine (POC) 1.4 (H) 11/02/2016 07:15 AM    BUN 23 09/13/2021 12:00 AM    BUN (POC) 28 (H) 11/02/2016 07:15 AM    Sodium 142 09/13/2021 12:00 AM    Sodium (POC) 140 11/02/2016 07:15 AM    Potassium 4.8 09/13/2021 12:00 AM    Potassium (POC) 4.1 11/02/2016 07:15 AM    Chloride 106 09/13/2021 12:00 AM    Chloride (POC) 105 11/02/2016 07:15 AM    CO2 27 09/13/2021 12:00 AM    Magnesium 2.6 (H) 05/04/2020 04:52 AM    Phosphorus 3.0 10/17/2014 04:40 AM    Albumin, urine 59.5 10/23/2015 09:10 AM        Review of Systems   Respiratory: Negative for shortness of breath. Cardiovascular: Negative for chest pain. Physical Exam  Constitutional:       General: He is not in acute distress. Appearance: Normal appearance. He is not ill-appearing, toxic-appearing or diaphoretic. HENT:      Head: Normocephalic and atraumatic. Right Ear: External ear normal.      Left Ear: External ear normal.   Eyes:      General:         Right eye: No discharge. Left eye: No discharge. Conjunctiva/sclera: Conjunctivae normal.      Pupils: Pupils are equal, round, and reactive to light. Cardiovascular:      Rate and Rhythm: Normal rate and regular rhythm. Heart sounds: No murmur heard. No friction rub. No gallop. Pulmonary:      Effort: No respiratory distress. Breath sounds: Normal breath sounds. No wheezing or rales. Chest:      Chest wall: No tenderness. Abdominal:      General: Abdomen is flat. There is no distension. Palpations: Abdomen is soft. There is no mass. Tenderness: There is no abdominal tenderness. There is no guarding or rebound. Hernia: No hernia is present. Musculoskeletal:         General: Normal range of motion. Cervical back: Normal range of motion and neck supple. Right lower leg: Edema present. Left lower leg: Edema present. Comments: Mild edema   Skin:     General: Skin is warm and dry. Neurological:      Mental Status: He is alert and oriented to person, place, and time. Mental status is at baseline. Coordination: Coordination normal.      Gait: Gait normal.   Psychiatric:         Mood and Affect: Mood normal.         Behavior: Behavior normal.         ASSESSMENT and PLAN    ICD-10-CM ICD-9-CM    1.  Type 2 diabetes with nephropathy (HCC)  P88.55 757.80 METABOLIC PANEL, COMPREHENSIVE     583.81 HEMOGLOBIN A1C WITH EAG   Has been well controlled on Januvia A1c at goal continue   LIPID PANEL   2. Prostate cancer ([facility]  H97 158 METABOLIC PANEL, COMPREHENSIVE      HEMOGLOBIN A1C WITH EAG   Status post radiation up-to-date with radiation oncology follows with urology in remission       LIPID PANEL   3. Chronic deep vein thrombosis (DVT) of proximal vein of both lower extremities ([facility])  K26.6H5 341.47 METABOLIC PANEL, COMPREHENSIVE      HEMOGLOBIN A1C WITH EAG   History of in the past not just on aspirin for DVT prevention   LIPID PANEL   4. Type 2 diabetes mellitus with diabetic neuropathy, without long-term current use of insulin ([facility])  P54.86 724.91 METABOLIC PANEL, COMPREHENSIVE     357.2 HEMOGLOBIN A1C WITH EAG   See above controlled on Januvia no longer taking gabapentin   LIPID PANEL   5. NSTEMI (non-ST elevated myocardial infarction) ([facility]  O71.9 854.50 METABOLIC PANEL, COMPREHENSIVE      HEMOGLOBIN A1C WITH EAG   History of status post CABG up-to-date with cardiology   LIPID PANEL   6.  on Essential hypertension  I48 120.7 METABOLIC PANEL, COMPREHENSIVE       Controlled metoprolol   HEMOGLOBIN A1C WITH EAG      LIPID PANEL   7. Reactive depression  N83.9 514.3 METABOLIC PANEL, COMPREHENSIVE   Discussed taking Zoloft every day not as needed   HEMOGLOBIN A1C WITH EAG      LIPID PANEL   8. Mixed hyperlipidemia  K57.6 146.5 METABOLIC PANEL, COMPREHENSIVE   On Praluent and Zetia improved   HEMOGLOBIN A1C WITH EAG      LIPID PANEL   9.  Coronary artery disease involving native coronary artery of native heart without angina pectoris  B03.38 792.96 METABOLIC PANEL, COMPREHENSIVE      HEMOGLOBIN A1C WITH EAG   See above medically managed   LIPID PANEL   10. Stage 3a chronic kidney disease ([facility])  D89.87 534.1 METABOLIC PANEL, COMPREHENSIVE      HEMOGLOBIN A1C WITH EAG   Creatinine running around 1.4-1.5 baseline up-to-date with nephrology will be following up next month   LIPID PANEL   11. Spinal stenosis, lumbar region, without neurogenic claudication  W35.179 417.13 METABOLIC PANEL, COMPREHENSIVE      HEMOGLOBIN A1C WITH EAG       No longer using his gabapentin might take it sporadically   LIPID PANEL   12. NIMO (obstructive sleep apnea)     Not interested in CPAP G47.33 327.23    13. Primary insomnia       Has trazodone to use as needed discussed sleep hygiene F51.01 307.42    14. Overweight     Weight has improved significantly since last office visit he is down to good 20 pounds    He reports making significant changes to his diet for this to happen    Of note he had been having difficulty with GI upset he did have an endoscopy and colonoscopy in May    More recently had a flexible sigmoidoscopy    And has had a CT of the chest abdomen pelvis in February and March    He has been duly imaged    We will keep an eye on this and intervene further if he continues to lose weight E66.3 278.02         Scribed by Shamar Mention of [place], as dictated by Dr. Mindy Rothman. Current diagnosis and concerns discussed with pt at length. Pt understands risks and benefits or current treatment plan and medications, and accepts the treatment and medication with any possible risks. Pt asks appropriate questions, which were answered. Pt was instructed to call with any concerns or problems. I have reviewed the note documented by the scribe. The services provided are my own.   The documentation is accurate

## 2021-09-29 ENCOUNTER — OFFICE VISIT (OUTPATIENT)
Dept: INTERNAL MEDICINE CLINIC | Age: 71
End: 2021-09-29
Payer: MEDICARE

## 2021-09-29 VITALS
DIASTOLIC BLOOD PRESSURE: 72 MMHG | SYSTOLIC BLOOD PRESSURE: 121 MMHG | BODY MASS INDEX: 23.52 KG/M2 | HEART RATE: 91 BPM | WEIGHT: 168 LBS | RESPIRATION RATE: 16 BRPM | OXYGEN SATURATION: 99 % | TEMPERATURE: 98.1 F | HEIGHT: 71 IN

## 2021-09-29 DIAGNOSIS — Z23 NEEDS FLU SHOT: ICD-10-CM

## 2021-09-29 DIAGNOSIS — Z95.0 PACEMAKER: ICD-10-CM

## 2021-09-29 DIAGNOSIS — I25.10 CORONARY ARTERY DISEASE INVOLVING NATIVE CORONARY ARTERY OF NATIVE HEART WITHOUT ANGINA PECTORIS: ICD-10-CM

## 2021-09-29 DIAGNOSIS — I82.5Y3 CHRONIC DEEP VEIN THROMBOSIS (DVT) OF PROXIMAL VEIN OF BOTH LOWER EXTREMITIES (HCC): ICD-10-CM

## 2021-09-29 DIAGNOSIS — E66.3 OVERWEIGHT: ICD-10-CM

## 2021-09-29 DIAGNOSIS — I42.8 NICM (NONISCHEMIC CARDIOMYOPATHY) (HCC): ICD-10-CM

## 2021-09-29 DIAGNOSIS — E78.2 MIXED HYPERLIPIDEMIA: ICD-10-CM

## 2021-09-29 DIAGNOSIS — I44.2 THIRD DEGREE AV BLOCK (HCC): ICD-10-CM

## 2021-09-29 DIAGNOSIS — Z95.1 S/P CABG X 3: ICD-10-CM

## 2021-09-29 DIAGNOSIS — N18.31 STAGE 3A CHRONIC KIDNEY DISEASE (HCC): ICD-10-CM

## 2021-09-29 DIAGNOSIS — F51.01 PRIMARY INSOMNIA: ICD-10-CM

## 2021-09-29 DIAGNOSIS — G47.33 OSA (OBSTRUCTIVE SLEEP APNEA): ICD-10-CM

## 2021-09-29 DIAGNOSIS — E11.40 TYPE 2 DIABETES MELLITUS WITH DIABETIC NEUROPATHY, WITHOUT LONG-TERM CURRENT USE OF INSULIN (HCC): ICD-10-CM

## 2021-09-29 DIAGNOSIS — E11.21 TYPE 2 DIABETES WITH NEPHROPATHY (HCC): Primary | ICD-10-CM

## 2021-09-29 DIAGNOSIS — C61 PROSTATE CANCER (HCC): ICD-10-CM

## 2021-09-29 DIAGNOSIS — I10 ESSENTIAL HYPERTENSION: ICD-10-CM

## 2021-09-29 DIAGNOSIS — I21.4 NSTEMI (NON-ST ELEVATED MYOCARDIAL INFARCTION) (HCC): ICD-10-CM

## 2021-09-29 DIAGNOSIS — F32.9 REACTIVE DEPRESSION: ICD-10-CM

## 2021-09-29 DIAGNOSIS — M48.061 SPINAL STENOSIS, LUMBAR REGION, WITHOUT NEUROGENIC CLAUDICATION: ICD-10-CM

## 2021-09-29 PROCEDURE — 3044F HG A1C LEVEL LT 7.0%: CPT | Performed by: INTERNAL MEDICINE

## 2021-09-29 PROCEDURE — G8536 NO DOC ELDER MAL SCRN: HCPCS | Performed by: INTERNAL MEDICINE

## 2021-09-29 PROCEDURE — G8752 SYS BP LESS 140: HCPCS | Performed by: INTERNAL MEDICINE

## 2021-09-29 PROCEDURE — G8427 DOCREV CUR MEDS BY ELIG CLIN: HCPCS | Performed by: INTERNAL MEDICINE

## 2021-09-29 PROCEDURE — 90694 VACC AIIV4 NO PRSRV 0.5ML IM: CPT | Performed by: INTERNAL MEDICINE

## 2021-09-29 PROCEDURE — G8754 DIAS BP LESS 90: HCPCS | Performed by: INTERNAL MEDICINE

## 2021-09-29 PROCEDURE — 3017F COLORECTAL CA SCREEN DOC REV: CPT | Performed by: INTERNAL MEDICINE

## 2021-09-29 PROCEDURE — G8420 CALC BMI NORM PARAMETERS: HCPCS | Performed by: INTERNAL MEDICINE

## 2021-09-29 PROCEDURE — G9717 DOC PT DX DEP/BP F/U NT REQ: HCPCS | Performed by: INTERNAL MEDICINE

## 2021-09-29 PROCEDURE — 99214 OFFICE O/P EST MOD 30 MIN: CPT | Performed by: INTERNAL MEDICINE

## 2021-09-29 PROCEDURE — G0008 ADMIN INFLUENZA VIRUS VAC: HCPCS | Performed by: INTERNAL MEDICINE

## 2021-09-29 PROCEDURE — 1101F PT FALLS ASSESS-DOCD LE1/YR: CPT | Performed by: INTERNAL MEDICINE

## 2021-09-29 PROCEDURE — G0463 HOSPITAL OUTPT CLINIC VISIT: HCPCS | Performed by: INTERNAL MEDICINE

## 2021-09-29 PROCEDURE — 2022F DILAT RTA XM EVC RTNOPTHY: CPT | Performed by: INTERNAL MEDICINE

## 2021-09-29 RX ORDER — TURMERIC 400 MG
CAPSULE ORAL
COMMUNITY

## 2021-10-04 RX ORDER — TRAZODONE HYDROCHLORIDE 50 MG/1
TABLET ORAL
Qty: 30 TABLET | Refills: 0 | Status: SHIPPED | OUTPATIENT
Start: 2021-10-04 | End: 2021-11-04

## 2021-11-04 RX ORDER — SERTRALINE HYDROCHLORIDE 50 MG/1
TABLET, FILM COATED ORAL
Qty: 90 TABLET | Refills: 0 | Status: SHIPPED | OUTPATIENT
Start: 2021-11-04 | End: 2022-02-07

## 2021-11-04 RX ORDER — TRAZODONE HYDROCHLORIDE 50 MG/1
TABLET ORAL
Qty: 30 TABLET | Refills: 0 | Status: SHIPPED | OUTPATIENT
Start: 2021-11-04 | End: 2021-12-08

## 2021-11-11 LAB
BASOPHILS # BLD AUTO: 0 X10E3/UL (ref 0–0.2)
BASOPHILS NFR BLD AUTO: 1 %
BUN SERPL-MCNC: 25 MG/DL (ref 8–27)
BUN/CREAT SERPL: 18 (ref 10–24)
CALCIUM SERPL-MCNC: 9.5 MG/DL (ref 8.6–10.2)
CHLORIDE SERPL-SCNC: 98 MMOL/L (ref 96–106)
CO2 SERPL-SCNC: 23 MMOL/L (ref 20–29)
CREAT SERPL-MCNC: 1.4 MG/DL (ref 0.76–1.27)
EOSINOPHIL # BLD AUTO: 0 X10E3/UL (ref 0–0.4)
EOSINOPHIL NFR BLD AUTO: 0 %
ERYTHROCYTE [DISTWIDTH] IN BLOOD BY AUTOMATED COUNT: 14.2 % (ref 11.6–15.4)
GLUCOSE SERPL-MCNC: 71 MG/DL (ref 65–99)
HCT VFR BLD AUTO: 38 % (ref 37.5–51)
HGB BLD-MCNC: 12.5 G/DL (ref 13–17.7)
IMM GRANULOCYTES # BLD AUTO: 0 X10E3/UL (ref 0–0.1)
IMM GRANULOCYTES NFR BLD AUTO: 0 %
INTERPRETATION: NORMAL
LYMPHOCYTES # BLD AUTO: 1 X10E3/UL (ref 0.7–3.1)
LYMPHOCYTES NFR BLD AUTO: 21 %
MCH RBC QN AUTO: 28.5 PG (ref 26.6–33)
MCHC RBC AUTO-ENTMCNC: 32.9 G/DL (ref 31.5–35.7)
MCV RBC AUTO: 87 FL (ref 79–97)
MONOCYTES # BLD AUTO: 0.7 X10E3/UL (ref 0.1–0.9)
MONOCYTES NFR BLD AUTO: 16 %
NEUTROPHILS # BLD AUTO: 3 X10E3/UL (ref 1.4–7)
NEUTROPHILS NFR BLD AUTO: 62 %
PLATELET # BLD AUTO: 154 X10E3/UL (ref 150–450)
POTASSIUM SERPL-SCNC: 4.4 MMOL/L (ref 3.5–5.2)
RBC # BLD AUTO: 4.39 X10E6/UL (ref 4.14–5.8)
SODIUM SERPL-SCNC: 136 MMOL/L (ref 134–144)
WBC # BLD AUTO: 4.8 X10E3/UL (ref 3.4–10.8)

## 2021-11-13 NOTE — PROGRESS NOTES
[address], [place], [address] [facility]  [phone]     Subjective:      Trang Birmingham is a 66 y.o. male is here for routine f/u. He has pmhx CAD s/p CABG, CHB s/p PM, HTN, HLD, DM  And CKD III. Last OV 5/7/2021:  he reports need for repeat upper and lower endoscopy this coming week for ongoing hematochezia. Dr. Brian Woodruff. Mild ROLON unchanged. Last OV  1/2021: No cardiac complaints. Last  OV 12/2020:  C/o ROLON---ordered echo. Echo: EF appears lower than before -- was 60-65%, now about 45-50%. We started low dose lisinopril which he did not tolerate.     7/2021 EP  prior to upcoming biventricular pacemaker upgrade scheduled for 08/13/2021, currently has Medtronic dual chamber pacemaker (DOI 05/01/2020). Daughter is present on call as well.   Pacer dependent due to complete AVB. Recent nuclear stress test reported LVEF declined to 37%.   NICM with LVEF 37% via nuclear stress test in 05/2021. NYHA II chronic systolic CHF, but admits he hasn't been exerting himself much. On low dose Lopressor, no ACEi/ARB due to allergy.     Today, had seen Dr Rikki Nick then  Dr Luiza Perez for pacemaker, plan was for BiV uprade on 8/13/2021 but he canceled as EF improved some. He will follow up next mo. He denies any cardiac complaints. Daughter is here to help answer questions. The patient denies chest pain/ shortness of breath, orthopnea, PND, LE edema, palpitations, syncope, or presyncope. Echo 9/13/2021  · LV: Estimated LVEF is 45 - 50%. Biplane method used to measure ejection fraction. Normal cavity size. Severe concentric hypertrophy. Globally reduced systolic function. Severe (grade 3) left ventricular diastolic dysfunction. · LA: Severely dilated left atrium. · RV: Mildly dilated right ventricle. Mildly reduced systolic function. Pacing wire present  · RA: Moderately dilated right atrium. · MV: Mitral valve thickening. NST 5/24/2021  Dates: 5/24/2021 and 5/28/2021.    Left ventricular perfusion is normal. Myocardial perfusion imaging supports a low risk stress test.   There is no prior study available for comparison.  .       Patient Active Problem List    Diagnosis Date Noted    Pacemaker 05/01/2020    Third degree AV block ([address] 05/01/2020    Postoperative anemia due to acute blood loss 04/29/2020    S/P CABG x 3 04/28/2020    CAD (coronary artery disease) 04/27/2020    Bilateral carotid artery stenosis 04/24/2020    NSTEMI (non-ST elevated myocardial infarction) ([address] 04/23/2020    Type 2 diabetes mellitus with diabetic neuropathy ([address] 01/09/2020    Prostate cancer ([address] 06/04/2018    Type 2 diabetes with nephropathy ([address] 02/28/2018    Reactive depression 11/15/2016    Cervical spinal stenosis 11/03/2016    CKD (chronic kidney disease) 10/31/2016    Hyperlipidemia 07/10/2015    Spinal stenosis, lumbar region, without neurogenic claudication 10/16/2014    DVT of leg (deep venous thrombosis) ([address] 12/18/2012    HTN (hypertension) 12/12/2012    Perforated diverticulum of large intestine 12/04/2012    Alcohol abuse 12/04/2012    Insomnia 12/04/2012      Dylan Zaidi MD  Past Medical History:   Diagnosis Date    Arthritis     Cancer [facility])     Prostate ca    Chronic kidney disease     Stage 3    Chronic pain     Abdoman, back, fingers per daughter   Bree Me Diabetes ([address]     Heart attack ([address]     Heart failure ([address] 2020    Dr. Cami Lord Hypertension     PE (pulmonary embolism)     Pneumonia     Psychiatric disorder     Depression    Sleep apnea     not use CPAP      Past Surgical History:   Procedure Laterality Date    COLONOSCOPY N/A 3/19/2021    COLONOSCOPY performed by Quintin Banks MD at [facility] ENDOSCOPY    COLONOSCOPY N/A 5/10/2021    COLONOSCOPY performed by Quintin Banks MD at [facility] ENDOSCOPY    FLEXIBLE SIGMOIDOSCOPY N/A 8/2/2021    FLEXIBLE SIGMOIDOSCOPY performed by Quintin Banks MD at [facility] ENDOSCOPY    HX BACK SURGERY  2014    HX CERVICAL LAMINECTOMY      HX GI  11/2012    bowel resection    HX ORTHOPAEDIC      amputated left 4th finger    MN ABDOMEN SURGERY PROC UNLISTED      bowel resection    MN INS NEW/RPLCMT PRM PM W/TRANSV ELTRD ATRIAL&VENT N/A 5/1/2020    INSERT PPM DUAL performed by Luisa Zaldivar MD at [address] [facility]     Allergies   Allergen Reactions    Arb-Angiotensin Receptor Antagonist Other (comments)     High k    Statins-Hmg-Coa Reductase Inhibitors Myalgia    Ace Inhibitors Cough      Family History   Problem Relation Age of Onset   [facility] Cancer Mother         unknown    Heart Disease Father     Heart Disease Brother     Anesth Problems Neg Hx       Social History     Socioeconomic History    Marital status:      Spouse name: Not on file    Number of children: 1    Years of education: 15    Highest education level: High school graduate   Occupational History    Not on file   Tobacco Use    Smoking status: Never Smoker    Smokeless tobacco: Current User     Types: Chew    Tobacco comment: Chews tobacco every day   Vaping Use    Vaping Use: Never used   Substance and Sexual Activity    Alcohol use:  Yes     Alcohol/week: 7.0 standard drinks     Types: 7 Cans of beer per week     Comment:  3-4 beers daily     Drug use: Never    Sexual activity: Not Currently   Other Topics Concern     Service No    Blood Transfusions No    Caffeine Concern No    Occupational Exposure No    Hobby Hazards No    Sleep Concern Yes    Stress Concern Yes    Weight Concern No    Special Diet No    Back Care Yes    Exercise No    Bike Helmet Not Asked    Seat Belt Yes    Self-Exams Not Asked   Social History Narrative    ** Merged History Encounter **          Social Determinants of Health     Financial Resource Strain:     Difficulty of Paying Living Expenses: Not on file   Food Insecurity:     Worried About Running Out of Food in the Last Year: Not on file    Jenna of Food in the Last Year: Not on file Transportation Needs:     Lack of Transportation (Medical): Not on file    Lack of Transportation (Non-Medical): Not on file   Physical Activity:     Days of Exercise per Week: Not on file    Minutes of Exercise per Session: Not on file   Stress:     Feeling of Stress : Not on file   Social Connections:     Frequency of Communication with Friends and Family: Not on file    Frequency of Social Gatherings with Friends and Family: Not on file    Attends Yarsani Services: Not on file    Active Member of [address] [place] [address] or Organizations: Not on file    Attends Club or Organization Meetings: Not on file    Marital Status: Not on file   Intimate Partner Violence:     Fear of Current or Ex-Partner: Not on file    Emotionally Abused: Not on file    Physically Abused: Not on file    Sexually Abused: Not on file   Housing Stability:     Unable to Pay for Housing in the Last Year: Not on file    Number of [place] in the Last Year: Not on file    Unstable Housing in the Last Year: Not on file      Current Outpatient Medications   Medication Sig    traZODone (DESYREL) 50 mg tablet TAKE 1 TABLET BY MOUTH AT BEDTIME    sertraline (ZOLOFT) 50 mg tablet Take 1 tablet by mouth once daily    ezetimibe (ZETIA) 10 mg tablet Take 1 tablet by mouth once daily    turmeric 400 mg cap Take  by mouth.  tamsulosin (Flomax) 0.4 mg capsule Take 1 Capsule by mouth daily (after dinner).  SITagliptin (Januvia) 50 mg tablet Take 1 tablet by mouth once daily    senna-docusate (PERICOLACE) 8.6-50 mg per tablet Take 1 Tablet by mouth daily as needed for Constipation. (Patient not taking: Reported on 9/29/2021)    metoprolol succinate (TOPROL-XL) 25 mg XL tablet Take 1 Tablet by mouth daily.     Praluent Pen 75 mg/mL injector pen INJECT 1 ML SUBCUTANEOUSLY  EVERY TWO WEEKS    varicella-zoster recombinant, PF, (Shingrix, PF,) 50 mcg/0.5 mL susr injection 0.5mL by IntraMUSCular route once now and then repeat in 2-6 months (Patient not taking: Reported on 9/29/2021)    sucralfate (CARAFATE) 100 mg/mL suspension  (Patient not taking: Reported on 7/30/2021)    hydrocortisone (Anusol-HC) 25 mg supp Insert 1 Suppository into rectum every twelve (12) hours as needed for Pain. Indications: inflammation of the rectum (Patient not taking: Reported on 9/29/2021)    vitamin e (E GEMS) 100 unit capsule Take 1 capsule three times daily    pentoxifylline CR (TRENTAL) 400 mg CR tablet Take 1 Tab by mouth three (3) times daily (with meals).  furosemide (LASIX) 20 mg tablet Take 1 Tab by mouth daily.  docusate sodium (Dulcolax Stool Softener, dss,) 100 mg capsule Take 1 Cap by mouth two (2) times a day. (Patient not taking: Reported on 9/29/2021)    fluticasone propionate (Flonase Allergy Relief) 50 mcg/actuation nasal spray 2 Sprays by Both Nostrils route two (2) times daily as needed for Rhinitis. (Patient not taking: Reported on 6/23/2021)    pumpkin seed extract-soy germ 300 mg cap Take 1 Cap by mouth as needed (prostate health).  acetaminophen (TYLENOL) 325 mg tablet Take 2 Tabs by mouth every four (4) hours as needed for Pain. (Patient not taking: Reported on 6/23/2021)    co-enzyme Q-10 (Co Q-10) 100 mg capsule Take 100 mg by mouth daily as needed. 200 mg    cholecalciferol, vitamin D3, (VITAMIN D3 PO) Take 1,000 Int'l Units by mouth daily as needed.  ascorbate calcium (VITAMIN C PO) Take 1 Tab by mouth daily as needed.  aspirin delayed-release (ASPIR-LOW) 81 mg tablet Take 81 mg by mouth daily. Does not take every day    FREESTYLE LANCETS 28 gauge misc USE TO CHECK BLOOD SUGAR ONCE DAILY    cyanocobalamin 1,000 mcg tablet Take 1,000 mcg by mouth daily as needed.  magnesium 250 mg tab Take 1 Tab by mouth daily as needed. No current facility-administered medications for this visit.          Review of Symptoms:  11 systems reviewed, negative other than as stated in the HPI    Physical ExamPhysical Exam:    There were no vitals filed for this visit. There is no height or weight on file to calculate BMI. General PE  Gen:  NAD  Mental Status - Alert. General Appearance - Not in acute distress. HEENT:  PERRL, no carotid bruits or JVD  Chest and Lung Exam   Inspection: Accessory muscles - No use of accessory muscles in breathing. Auscultation:   Breath sounds: - Normal.   Cardiovascular   Inspection: Jugular vein - Bilateral - Inspection Normal.   Palpation/Percussion:   Apical Impulse: - Normal.   Auscultation: Rhythm - Regular. Heart Sounds - S1 WNL and S2 WNL. No S3 or S4. Murmurs & Other Heart Sounds: Auscultation of the heart reveals - No Murmurs. Peripheral Vascular   Upper Extremity: Inspection - Bilateral - No Cyanotic nailbeds or Digital clubbing. Lower Extremity:   Palpation: Edema - Bilateral - No edema. Abdomen:   Soft, non-tender, bowel sounds are active.   Neuro: A&O times 3, CN and motor grossly WNL    Labs:   Lab Results   Component Value Date/Time    Cholesterol, total 99 04/26/2021 10:54 AM    Cholesterol, total 100 03/10/2021 10:13 AM    Cholesterol, total 156 11/18/2020 09:53 AM    Cholesterol, total 103 08/19/2020 09:31 AM    Cholesterol, total 182 05/14/2020 01:45 PM    HDL Cholesterol 60 04/26/2021 10:54 AM    HDL Cholesterol 46 03/10/2021 10:13 AM    HDL Cholesterol 49 11/18/2020 09:53 AM    HDL Cholesterol 42 08/19/2020 09:31 AM    HDL Cholesterol 36 (L) 05/14/2020 01:45 PM    LDL, calculated 29.6 04/26/2021 10:54 AM    LDL, calculated 38 03/10/2021 10:13 AM    LDL, calculated 93 11/18/2020 09:53 AM    LDL, calculated 48 08/19/2020 09:31 AM    LDL, calculated 112 (H) 05/14/2020 01:45 PM    LDL, calculated 138 (H) 01/09/2020 11:36 AM    LDL, calculated 164 (H) 06/25/2019 09:23 AM    Triglyceride 47 04/26/2021 10:54 AM    Triglyceride 80 03/10/2021 10:13 AM    Triglyceride 69 11/18/2020 09:53 AM    Triglyceride 63 08/19/2020 09:31 AM    Triglyceride 170 (H) 05/14/2020 01:45 PM    CHOL/HDL Ratio 1.7 04/26/2021 10:54 AM     Lab Results   Component Value Date/Time     04/26/2021 10:54 AM     Lab Results   Component Value Date/Time    Sodium 136 11/10/2021 11:36 AM    Potassium 4.4 11/10/2021 11:36 AM    Chloride 98 11/10/2021 11:36 AM    CO2 23 11/10/2021 11:36 AM    Anion gap 7 04/26/2021 10:54 AM    Glucose 71 11/10/2021 11:36 AM    BUN 25 11/10/2021 11:36 AM    Creatinine 1.40 (H) 11/10/2021 11:36 AM    BUN/Creatinine ratio 18 11/10/2021 11:36 AM    GFR est AA 57 (L) 11/10/2021 11:36 AM    GFR est non-AA 49 (L) 11/10/2021 11:36 AM    Calcium 9.5 11/10/2021 11:36 AM    Bilirubin, total 1.2 09/13/2021 12:00 AM    Alk. phosphatase 80 09/13/2021 12:00 AM    Protein, total 6.9 09/13/2021 12:00 AM    Albumin 4.0 09/13/2021 12:00 AM    Globulin 3.6 04/26/2021 10:54 AM    A-G Ratio 1.4 09/13/2021 12:00 AM    ALT (SGPT) 22 09/13/2021 12:00 AM       EKG:  Paced     Assessment:     Assessment:        ICD-10-CM ICD-9-CM    1. NICM (nonischemic cardiomyopathy) (HCC)  I42.8 425.4    2. Coronary artery disease involving native coronary artery of native heart without angina pectoris  I25.10 414.01    3. S/P CABG x 3  Z95.1 V45.81    4. Essential hypertension  I10 401.9    5. Third degree AV block (HCC)  I44.2 426.0    6. Pacemaker  Z95.0 V45.01    7. Mixed hyperlipidemia  E78.2 272.2        No orders of the defined types were placed in this encounter. Plan:       ASHD Hx CABG x 3 in 4/2020---LIMA to LAD, SVG to RCA and Ramus  Negative NST in 5/24/2021  Continue BB   Intolerant to statins, now on Praluent  Holding ASA per urology        Dilated CM  Echo 9/2021 showed EF 45-50%   Echo 3/2021 showed EF 40-45% mild- mod MR mild AR  Echo 12/2020 showed EF 45-50% mild MR  Echo in April 2020 showed EF 60-65% , mild mitral regurgitation. On BB.  He is intolerant of lisinopril and ARB's as per allergies section.   Hold adding isosorbide / hydralazine for now as EF improved, has f/u with EP next mos for repeat echo  Patient has been 100% RV paced. Both Dr Wm Mueller and Dr Belkis Craig recommended biventricular pacemaker upgrade but holding off since his EF improved 45-50% with most  Recent echo. He has f/u with Dr Belkis Craig next mos         CHB s/p PPM 5/2020  Implanted for complete AVB post CABG  Reviewed note 7/23/21 with NP Matthew Nieves:  Medtronic dual chamber pacemaker (DOI 05/01/2020): Implanted for complete AVB post CABG.  Device check on 06/28/2021 showed proper lead & generator function.  Generator longevity estimated 10.75 yrs.  RA 21.91%.  RV pacer dependent.  Since 03/24/2021, no episodes noted. Reviewed risks/benefits of upgrade to biventricular pacemaker. Shantell Gramajo agrees to proceed as scheduled.  Reviewed Hibiclens.  Labs ordered       HTN  Controlled with current therapy     HLD  Statin intolerance---In chart review has been intolerant to Crestor, Lipitor, pravastatin  5/2020 LDL at 112 On Zetia  Restarted Praluent in 1/2021 and most recent LDL at goal 29 on 4/2021  Will dc zetia, repeat labs in 3 mos        CKD III  Followed by Dr Eugenio Gonsales  Cr 1.4 in 11/2021; Cr 1.57 in 4/2021; Cr. 1.62 in 11/2020; Cr 1.54 in 6/2020     DM  On oral agent     Hx GI bleed due to rectal ulcer in 05/2021. Endoclip placed, BICAP cautery performed.     Hx DVT/PE: developed PE/DVT after bowel resection in 2012       Continue current care and f/u in 6 months.     Saul Gillette NP

## 2021-11-15 ENCOUNTER — OFFICE VISIT (OUTPATIENT)
Dept: CARDIOLOGY CLINIC | Age: 71
End: 2021-11-15
Payer: MEDICARE

## 2021-11-15 VITALS
OXYGEN SATURATION: 98 % | DIASTOLIC BLOOD PRESSURE: 80 MMHG | HEART RATE: 80 BPM | HEIGHT: 67 IN | RESPIRATION RATE: 16 BRPM | WEIGHT: 169.3 LBS | SYSTOLIC BLOOD PRESSURE: 140 MMHG | BODY MASS INDEX: 26.57 KG/M2

## 2021-11-15 DIAGNOSIS — I42.8 NICM (NONISCHEMIC CARDIOMYOPATHY) (HCC): Primary | ICD-10-CM

## 2021-11-15 DIAGNOSIS — Z95.1 S/P CABG X 3: ICD-10-CM

## 2021-11-15 DIAGNOSIS — I44.2 THIRD DEGREE AV BLOCK (HCC): ICD-10-CM

## 2021-11-15 DIAGNOSIS — Z95.0 PACEMAKER: ICD-10-CM

## 2021-11-15 DIAGNOSIS — I10 ESSENTIAL HYPERTENSION: ICD-10-CM

## 2021-11-15 DIAGNOSIS — I25.10 CORONARY ARTERY DISEASE INVOLVING NATIVE CORONARY ARTERY OF NATIVE HEART WITHOUT ANGINA PECTORIS: ICD-10-CM

## 2021-11-15 DIAGNOSIS — E78.2 MIXED HYPERLIPIDEMIA: ICD-10-CM

## 2021-11-15 PROCEDURE — G8536 NO DOC ELDER MAL SCRN: HCPCS | Performed by: NURSE PRACTITIONER

## 2021-11-15 PROCEDURE — G8417 CALC BMI ABV UP PARAM F/U: HCPCS | Performed by: NURSE PRACTITIONER

## 2021-11-15 PROCEDURE — G8753 SYS BP > OR = 140: HCPCS | Performed by: NURSE PRACTITIONER

## 2021-11-15 PROCEDURE — 93010 ELECTROCARDIOGRAM REPORT: CPT | Performed by: NURSE PRACTITIONER

## 2021-11-15 PROCEDURE — G8427 DOCREV CUR MEDS BY ELIG CLIN: HCPCS | Performed by: NURSE PRACTITIONER

## 2021-11-15 PROCEDURE — G0463 HOSPITAL OUTPT CLINIC VISIT: HCPCS | Performed by: NURSE PRACTITIONER

## 2021-11-15 PROCEDURE — 93005 ELECTROCARDIOGRAM TRACING: CPT | Performed by: NURSE PRACTITIONER

## 2021-11-15 PROCEDURE — 1101F PT FALLS ASSESS-DOCD LE1/YR: CPT | Performed by: NURSE PRACTITIONER

## 2021-11-15 PROCEDURE — G8754 DIAS BP LESS 90: HCPCS | Performed by: NURSE PRACTITIONER

## 2021-11-15 PROCEDURE — G9717 DOC PT DX DEP/BP F/U NT REQ: HCPCS | Performed by: NURSE PRACTITIONER

## 2021-11-15 PROCEDURE — 3017F COLORECTAL CA SCREEN DOC REV: CPT | Performed by: NURSE PRACTITIONER

## 2021-11-15 PROCEDURE — 99214 OFFICE O/P EST MOD 30 MIN: CPT | Performed by: NURSE PRACTITIONER

## 2021-11-15 NOTE — PROGRESS NOTES
Chief Complaint   Patient presents with    Follow-up    Coronary Artery Disease    Hypertension    Cholesterol Problem     1. Have you been to the ER, urgent care clinic since your last visit? No Hospitalized since your last visit? No    2. Have you seen or consulted any other health care providers outside of the [address] [place] [address] since your last visit? Yes Nephrologist , oncologist and Urology  Include any pap smears or colon screening.  NO

## 2021-12-08 RX ORDER — TRAZODONE HYDROCHLORIDE 50 MG/1
TABLET ORAL
Qty: 30 TABLET | Refills: 0 | Status: SHIPPED | OUTPATIENT
Start: 2021-12-08 | End: 2022-01-01

## 2021-12-28 ENCOUNTER — OFFICE VISIT (OUTPATIENT)
Dept: CARDIOLOGY CLINIC | Age: 71
End: 2021-12-28
Payer: MEDICARE

## 2021-12-28 ENCOUNTER — CLINICAL SUPPORT (OUTPATIENT)
Dept: CARDIOLOGY CLINIC | Age: 71
End: 2021-12-28
Payer: MEDICARE

## 2021-12-28 VITALS
HEIGHT: 67 IN | WEIGHT: 171 LBS | SYSTOLIC BLOOD PRESSURE: 110 MMHG | BODY MASS INDEX: 26.84 KG/M2 | HEART RATE: 98 BPM | OXYGEN SATURATION: 99 % | RESPIRATION RATE: 16 BRPM | DIASTOLIC BLOOD PRESSURE: 70 MMHG

## 2021-12-28 DIAGNOSIS — Z95.0 CARDIAC PACEMAKER IN SITU: Primary | ICD-10-CM

## 2021-12-28 DIAGNOSIS — I25.10 CORONARY ARTERY DISEASE DUE TO LIPID RICH PLAQUE: ICD-10-CM

## 2021-12-28 DIAGNOSIS — I10 ESSENTIAL HYPERTENSION: ICD-10-CM

## 2021-12-28 DIAGNOSIS — Z95.1 S/P CABG X 3: ICD-10-CM

## 2021-12-28 DIAGNOSIS — I25.5 ISCHEMIC CARDIOMYOPATHY: ICD-10-CM

## 2021-12-28 DIAGNOSIS — I25.83 CORONARY ARTERY DISEASE DUE TO LIPID RICH PLAQUE: ICD-10-CM

## 2021-12-28 DIAGNOSIS — I44.2 THIRD DEGREE AV BLOCK (HCC): ICD-10-CM

## 2021-12-28 PROCEDURE — G8752 SYS BP LESS 140: HCPCS | Performed by: INTERNAL MEDICINE

## 2021-12-28 PROCEDURE — G8754 DIAS BP LESS 90: HCPCS | Performed by: INTERNAL MEDICINE

## 2021-12-28 PROCEDURE — G8417 CALC BMI ABV UP PARAM F/U: HCPCS | Performed by: INTERNAL MEDICINE

## 2021-12-28 PROCEDURE — G0463 HOSPITAL OUTPT CLINIC VISIT: HCPCS | Performed by: INTERNAL MEDICINE

## 2021-12-28 PROCEDURE — G9717 DOC PT DX DEP/BP F/U NT REQ: HCPCS | Performed by: INTERNAL MEDICINE

## 2021-12-28 PROCEDURE — 99214 OFFICE O/P EST MOD 30 MIN: CPT | Performed by: INTERNAL MEDICINE

## 2021-12-28 PROCEDURE — G8536 NO DOC ELDER MAL SCRN: HCPCS | Performed by: INTERNAL MEDICINE

## 2021-12-28 PROCEDURE — 1101F PT FALLS ASSESS-DOCD LE1/YR: CPT | Performed by: INTERNAL MEDICINE

## 2021-12-28 PROCEDURE — 93280 PM DEVICE PROGR EVAL DUAL: CPT | Performed by: INTERNAL MEDICINE

## 2021-12-28 PROCEDURE — G8427 DOCREV CUR MEDS BY ELIG CLIN: HCPCS | Performed by: INTERNAL MEDICINE

## 2021-12-28 PROCEDURE — 3017F COLORECTAL CA SCREEN DOC REV: CPT | Performed by: INTERNAL MEDICINE

## 2021-12-28 RX ORDER — DOXYCYCLINE 100 MG/1
CAPSULE ORAL
COMMUNITY
Start: 2021-12-15 | End: 2022-07-01 | Stop reason: ALTCHOICE

## 2021-12-28 NOTE — PROGRESS NOTES
Cardiac Electrophysiology OFFICE Note     Subjective:      Loraine Morales is a 66 y.o. patient who is seen for evaluation of dual-chamber Medtronic pacemaker  I had seen him at [facility] [place] when the patient had cardiac bypass surgery and complete AV block  The patient's primary cardiologist is Dr. Lisa Rhoades    He has persistent complete av block post CABG 4/28/2020 after NSTEMI    His LVEF has been up and down and he did not want to do BIV pacer upgrade yet       ECHO ADULT FOLLOW-UP OR LIMITED 09/13/2021 9/13/2021    Interpretation Summary  · LV: Estimated LVEF is 45 - 50%. Biplane method used to measure ejection fraction. Normal cavity size. Severe concentric hypertrophy. Globally reduced systolic function. Severe (grade 3) left ventricular diastolic dysfunction. · LA: Severely dilated left atrium. · RV: Mildly dilated right ventricle. Mildly reduced systolic function. Pacing wire present  · RA: Moderately dilated right atrium. · MV: Mitral valve thickening.     RV pacing  Visual LVEF 45%  Biplane LVEF 48%  3 D LVEF was attempted but not recorded       Hx of PVCs  Hypertension   DM2  CKD  DVT/PE  RBBB     He had LIMA to LAD, SVG to RCA and Ramus  He is on temporary epicardial wire pacing     Echo 4/25/2020 normal LVEF severe LVH  ECG 4/23/2020 sinus rhythm PAC, RBBB, LAFB, septal MI  Problem List  Date Reviewed: 12/28/2021          Codes Class Noted    Pacemaker ICD-10-CM: Z95.0  ICD-9-CM: V45.01  5/1/2020    Overview Signed 5/1/2020 12:06 PM by Kadie Martell MD     Medtronic dual chamber 5/1/2020             Third degree AV block ([address] ICD-10-CM: I44.2  ICD-9-CM: 426.0  5/1/2020        Postoperative anemia due to acute blood loss ICD-10-CM: D62  ICD-9-CM: 285.1  4/29/2020        S/P CABG x 3 ICD-10-CM: Z95.1  ICD-9-CM: V45.81  4/28/2020    Overview Signed 4/28/2020 12:48 PM by THELMA Prasad     ON PUMP CORONARY ARTERY BYPASS GRAFT X 3  WITH LIMA to LAD, RSVG to RCA, RSVG to ramus  Left greater saphenous vein EVH              CAD (coronary artery disease) ICD-10-CM: I25.10  ICD-9-CM: 414.00  4/27/2020        Bilateral carotid artery stenosis ICD-10-CM: I65.23  ICD-9-CM: 433.10, 433.30  4/24/2020        NSTEMI (non-ST elevated myocardial infarction) [facility]) ICD-10-CM: I21.4  ICD-9-CM: 410.70  4/23/2020        Type 2 diabetes mellitus with diabetic neuropathy ([facility] ICD-10-CM: E11.40  ICD-9-CM: 250.60, 357.2  1/9/2020        Prostate cancer ([facility] ICD-10-CM: C61  ICD-9-CM: 185  6/4/2018    Overview Signed 6/4/2018  8:25 AM by Miguel Hugo MD     Duncan stage 7 follows [place]             Type 2 diabetes with nephropathy ([facility] ICD-10-CM: E11.21  ICD-9-CM: 250.40, 583.81  2/28/2018        Reactive depression ICD-10-CM: F32.9  ICD-9-CM: 300.4  11/15/2016        Cervical spinal stenosis ICD-10-CM: M48.02  ICD-9-CM: 723.0  11/3/2016        CKD (chronic kidney disease) ICD-10-CM: N18.9  ICD-9-CM: 585.9  10/31/2016    Overview Signed 10/31/2016  9:55 AM by Miguel Hugo MD     bedicheck               Hyperlipidemia ICD-10-CM: E78.5  ICD-9-CM: 272.4  7/10/2015        Spinal stenosis, lumbar region, without neurogenic claudication ICD-10-CM: M48.061  ICD-9-CM: 724.02  10/16/2014    Overview Signed 10/16/2014 12:03 PM by Aggie Banda MD     [id number]             DVT of leg (deep venous thrombosis) ([facility] ICD-10-CM: I82.409  ICD-9-CM: 453.40  12/18/2012        HTN (hypertension) ICD-10-CM: I10  ICD-9-CM: 401.9  12/12/2012        Perforated diverticulum of large intestine ICD-10-CM: K57.20  ICD-9-CM: 562.10  12/4/2012        Alcohol abuse ICD-10-CM: F10.10  ICD-9-CM: 305.00  12/4/2012        Insomnia ICD-10-CM: G47.00  ICD-9-CM: 780.52  12/4/2012              Current Outpatient Medications   Medication Sig Dispense Refill    zinc sulfate (ZINC-220 PO) Take  by mouth.       doxycycline (VIBRAMYCIN) 100 mg capsule TAKE 1 CAPSULE BY MOUTH ONCE DAILY SEPARATE FROM MAGNESIUM BY AT LEAST TWO HOURS      SITagliptin (Januvia) 50 mg tablet Take 1 tablet by mouth once daily 90 Tablet 0    traZODone (DESYREL) 50 mg tablet TAKE 1 TABLET BY MOUTH AT BEDTIME 30 Tablet 0    sertraline (ZOLOFT) 50 mg tablet Take 1 tablet by mouth once daily 90 Tablet 0    turmeric 400 mg cap Take  by mouth.  senna-docusate (PERICOLACE) 8.6-50 mg per tablet Take 1 Tablet by mouth daily as needed for Constipation. 90 Tablet 2    metoprolol succinate (TOPROL-XL) 25 mg XL tablet Take 1 Tablet by mouth daily. 90 Tablet 1    Praluent Pen 75 mg/mL injector pen INJECT 1 ML SUBCUTANEOUSLY  EVERY TWO WEEKS 6 mL 3    vitamin e (E GEMS) 100 unit capsule Take 1 capsule three times daily 90 Cap 6    docusate sodium (Dulcolax Stool Softener, dss,) 100 mg capsule Take 1 Cap by mouth two (2) times a day. 30 Cap 0    pumpkin seed extract-soy germ 300 mg cap Take 1 Cap by mouth as needed (prostate health).  co-enzyme Q-10 (Co Q-10) 100 mg capsule Take 100 mg by mouth daily as needed. 200 mg      cholecalciferol, vitamin D3, (VITAMIN D3 PO) Take 1,000 Int'l Units by mouth daily as needed.  ascorbate calcium (VITAMIN C PO) Take 1 Tab by mouth daily as needed.  cyanocobalamin 1,000 mcg tablet Take 1,000 mcg by mouth daily as needed.        Allergies   Allergen Reactions    Arb-Angiotensin Receptor Antagonist Other (comments)     High k    Statins-Hmg-Coa Reductase Inhibitors Myalgia    Ace Inhibitors Cough     Past Medical History:   Diagnosis Date    Arthritis     Cancer ([facility]     Prostate ca    Chronic kidney disease     Stage 3    Chronic pain     Abdoman, back, fingers per daughter   Gabriela Glaser Diabetes ([address]     Heart attack ([address]     Heart failure ([facility] 2020    Dr. Cee Martinez Hyperlipidemia     Hypertension     Pacemaker 05/01/2020    MED    PE (pulmonary embolism)     Pneumonia     Psychiatric disorder     Depression    Sleep apnea     not use CPAP     Past Surgical History:   Procedure Laterality Date    COLONOSCOPY N/A 3/19/2021    COLONOSCOPY [facility] Jose [facility]y [facility] END[facility]    COLONOSCOPY N/A 5/10/2021    [facility]A 8/2/2021    FLEXIBLE SIGMOIDOSCOPY performed by Jose [facility]y MD at [facility] ENDOSCOPY    HX BACK SURGERY  2014    HX CERVICAL LAMINECTOMY      HX CORONARY ARTERY BYPASS GRAFT  04/2021    X3     HX GI  11/2012    bowel resection    HX HEART CATHETERIZATION  04/2020    HX ORTHOPAEDIC      amputated left 4th finger    [place] ABDOMEN SURGERY PROC UNLISTED      bowel resection    [place] INS NEW/RPLCMT PRM PM W/TRANSV ELTRD ATRIAL&VENT N/A 5/1/2020    INSERT PPM DUAL performed by Isabel Membreno MD at [address] CATH LAB     Family History   Problem Relation Age of Onset    Cancer Mother         unknown    Heart Disease Father     Heart Disease Brother     Anesth Problems Neg Hx      Social History     Tobacco Use    Smoking status: Never Smoker    Smokeless tobacco: Current User     Types: Chew    Tobacco comment: Chews tobacco every day   Substance Use Topics    Alcohol use: Yes     Alcohol/week: 7.0 standard drinks     Types: 7 Cans of beer per week     Comment:  3-4 beers daily         Review of Systems: all other systems negative  Constitutional: Negative for fever, chills, weight loss, + malaise/fatigue. HEENT: Negative for nosebleeds, vision changes. Respiratory: Negative for cough, hemoptysis  Cardiovascular: Negative for chest pain, palpitations, orthopnea, claudication, leg swelling, syncope, and PND. Gastrointestinal: Negative for nausea, vomiting, diarrhea, blood in stool and melena. Genitourinary: Negative for dysuria, and hematuria. Musculoskeletal: Negative for myalgias, arthralgia. Skin: Negative for rash. Heme: Does not bleed or bruise easily.    Neurological: Negative for speech change and focal weakness     Objective:     Visit Vitals  /70   Pulse 98   Resp 16   Ht 5' 7\" (1.702 m)   Wt 171 lb (77.6 kg)   SpO2 99%   BMI 26.78 kg/m²      Physical Exam:   Constitutional: well-developed and well-nourished. No respiratory distress. Head: Normocephalic and atraumatic. Eyes: Pupils are equal, round  ENT: hearing normal  Neck: supple. No JVD present. Cardiovascular: Normal rate, regular rhythm. Exam reveals no gallop and no friction rub. No murmur heard. Pulmonary/Chest: Effort normal and breath sounds normal. No wheezes. Abdominal: Soft, no tenderness. Musculoskeletal: steady gait  Vasc/lymph no edema  Neurological: alert,oriented. Skin: left sided pacer pocket healed and unremarkable  Sternal scar is healed  Psychiatric: normal mood and affect. Behavior is normal. Judgment and thought content normal.           Assessment/Plan:       ICD-10-CM ICD-9-CM    1. Cardiac pacemaker in situ  Z95.0 V45.01    2. Ischemic cardiomyopathy  I25.5 414.8    3. Third degree AV block (HCC)  I44.2 426.0    4. S/P CABG x 3  Z95.1 V45.81    5. Essential hypertension  I10 401.9    6.  Coronary artery disease due to lipid rich plaque  I25.10 414.00     I25.83 414.3       medtronic pacer check showed 15% RA and 100% RV pacing  He is RV pacing Dependent  1 second or 7 beat NSVT  At rate of 185 bpm    LVEF 45-50% in September and better than 35-40% in March and July    Follow up with Dr Audrey Copeland in May 2022  Echo next year if possible or whenever more dyspnea  If LVEF is down again then BIV pacer upgrade  He and his wife agree     Previously he had short atrial flutter but the device check today did not show the new AF or AFL arrhythmic event  His blood pressure is controlled and not much more room to add more medication for GDMT    Future Appointments   Date Time Provider Willian Beverly   1/31/2022 10:15 AM Nerissa Bradley MD [facility] BS AMB   4/4/2022  4:15 PM REMOTE1, HODA WYATT BS AMB   5/23/2022 11:15 AM Naomi Fitch MD [facility] BS AMB   7/13/2022  2:15 PM REMOTE1, HODA WYATT BS AMB 10/19/2022  3:00 PM REMOTE1, HODA FONTENOT AMB   1/31/2023 11:00 AM PACEMAKER3, HODA FONTENOT AMB   1/31/2023 11:20 AM MD YOSELIN Bourgeois BS AMB         Thank you for involving me in this patient's care and please call with further concerns or questions. Concepcion Lopez M.D.   Electrophysiology/Cardiology  [facility]  [address] [place] [address]                             [phone]

## 2022-01-01 RX ORDER — TRAZODONE HYDROCHLORIDE 50 MG/1
TABLET ORAL
Qty: 30 TABLET | Refills: 0 | Status: SHIPPED | OUTPATIENT
Start: 2022-01-01 | End: 2022-01-31 | Stop reason: SDUPTHER

## 2022-01-03 NOTE — PROGRESS NOTES
Sent letter Bi-Rhombic Flap Text: The defect edges were debeveled with a #15 scalpel blade.  Given the location of the defect and the proximity to free margins a bi-rhombic flap was deemed most appropriate.  Using a sterile surgical marker, an appropriate rhombic flap was drawn incorporating the defect. The area thus outlined was incised deep to adipose tissue with a #15 scalpel blade.  The skin margins were undermined to an appropriate distance in all directions utilizing iris scissors.

## 2022-01-05 ENCOUNTER — TELEPHONE (OUTPATIENT)
Dept: CARDIOLOGY CLINIC | Age: 72
End: 2022-01-05

## 2022-01-05 NOTE — TELEPHONE ENCOUNTER
Patient's daughter Anita Wyman called to give the following information regarding the patient's booster vaccine    Booster:  Pfizer 10/20/2021 ([id number])        NEPLI:[phone]

## 2022-01-26 NOTE — PROGRESS NOTES
HISTORY OF PRESENT ILLNESS  Jose Urbano is a 66 y.o. male. HPI     Zayda Eaton 9/29/21. Pt is here to f/u on chronic conditions. He reports having vivid dreams recently, fell out of bed once d/t this  Discussed that this can happen with again, melatonin can help with this    Recall had DVT in the past       History of hypertension  BP today is 110/71  No home BP readings to review  Continues on toprol  25mg once daily        He has DM   No home BS readings  Continues januvia 50mg daily        Wt today is 170 lbs, up 2 lbs x lov   Discussed that this wt/l is good    Reviewed labs    Will get POC A1c    He takes ASA 81 mg          Pt follows annually with Dr. Bayron Ureña (cardio) for CAD and history of CABG 4/20  Reviewed note 11/15/21:  ASHD Hx CABG x 3 in 4/2020---LIMA to LAD, SVG to RCA and Ramus  Negative NST in 5/24/2021  Continue BB   Intolerant to statins, now on Praluent  Holding ASA per urology  Dilated CM  Echo 9/2021 showed EF 45-50%   Echo 3/2021 showed EF 40-45% mild- mod MR mild AR  Echo 12/2020 showed EF 45-50% mild MR  Echo in April 2020 showed EF 60-65% , mild mitral regurgitation. On BB.  He is intolerant of lisinopril and ARB's as per allergies section. Hold adding isosorbide / hydralazine for now as EF improved, has f/u with EP next mos for repeat echo  Patient has been 100% RV paced. Both Dr Gavino Bill and Dr Viviana Santiago recommended biventricular pacemaker upgrade but holding off since his EF improved 45-50% with most  Recent echo. He has f/u with Dr Viviana Santiago next mos  CHB s/p PPM 5/2020  Implanted for complete AVB post CABG  Reviewed note 7/23/21 with MARIYA Conradper:  Medtronic dual chamber pacemaker (DOI 05/01/2020): Implanted for complete AVB post CABG.  Device check on 06/28/2021 showed proper lead & generator function.  Generator longevity estimated 10.75 yrs.  RA 21.91%.  RV pacer dependent.  Since 03/24/2021, no episodes noted.   Reviewed risks/benefits of upgrade to biventricular pacemaker. Ralf Ontiveros agrees to proceed as scheduled.  Reviewed Hibiclens.  Labs ordered   HTN  Controlled with current therapy  HLD  Statin intolerance---In chart review has been intolerant to Crestor, Lipitor, pravastatin  5/2020 LDL at 112 On Zetia  Restarted Praluent in 1/2021 and most recent LDL at goal 29 on 4/2021  Will dc zetia, repeat labs in 3 mos  CKD III  Followed by Dr Brianna Alanis  Cr 1.4 in 11/2021; Cr 1.57 in 4/2021; Cr. 1.62 in 11/2020; Cr 1.54 in 6/2020  DM  On oral agent  Hx GI bleed due to rectal ulcer in 05/2021. Endoclip placed, BICAP cautery performed.   Hx DVT/PE: developed PE/DVT after bowel resection in 2012  Continue current care and f/u in 6 months.       For hyperlipidemia  taking praluent and Zetia          He saw Dr. Karyna Keene (cardio) for cardiomyopathy  Last visit 5/27/21     He saw Dr. Castro Masters) for CAD/CABG   Last there 6/3/20  He was completing cardiac rehab, no longer doing this      [facility] had pacemaker placed with Dr. Dawson (cardio)  Reviewed note 12/28/21:  medtronic pacer check showed 15% RA and 100% RV pacing  He is RV pacing Dependent  1 second or 7 beat NSVT  At rate of 185 bpm  LVEF 45-50% in September and better than 35-40% in March and July  Follow up with Dr Catie Juárez in May 2022  Echo next year if possible or whenever more dyspnea  If LVEF is down again then BIV pacer upgrade  He and his wife agree  Had pacemaker checked 12/28/21            had echocardiogram September 2021 reviewed EF around 45 to 50%       Pt follows with Dr. Lori Ho (nephro) for ckd in the past  Now following with Dr. Brianna Alanis (nephro), last there November 2021 appt scheduled 4/22  Reviewed note 6/29/21: cr 1.8, notes high K in past, had stage 3 kidney issues, cr 1.8-1.9 recently, ordered labs, no changes to meds  Last visit was 6/21 does not remember the name of the nephrologist  Baseline cr 1.4-1.5 stable on recent labs          Pt follows with Edin (uro) for prostate cancer--  Recall pt has [facility] (brother and uncle) prostate cancer  Last there 1/22 reports PSAs are trending down     Pt also met with a radiation oncologist at [facility]  He completed radiation therapy with Dr. Devon House  Last visit 9/21:  He did not radioactive seeds because of his recent CABG       Pt saw Dr. Jeremy Ruffin (podiatry) in 4/19  Discussed following up with podiatrist, overgrown toenails, hammertoes, callus formation   Provided referral to podiatry      Recall Pt had a hearing evaluation in the past  Recall notes from ENT 5/28/19: has some hearing loss      Pt has been taking CoQ-10      He is taking gabapentin 300 mg again for pain in hands/feet occasionally does not use it very much     He has zoloft 50mg this helps with his depression happy with dose     He also has ativan to use prn (not often, not in the past month) for anxiety      Pt uses trazodone nightly, this is not working as well as it used to he would like to increase the dose also discussed sleep apnea  Discussed can go up to 1.5mg, discussed can also take melatonin with this  Discussed getting sleep study     He has been seeing Dr. Candice Zhao (sleep) for NIMO    Not compliant with cpap 9/21 - declined sleep study   Last visit 1/4/21    Discussed sleep study again today     In the past, pt expressed concern about his memory  Previously, provided referral for Dr. Rishi Story (neuropsych)  lov provided info for Dr. Cande Pelayo (neuropsych)  Had the appointment 09/24/19 --then refused testing in the end      He has been following with Dr. Yasir Sebastian (GI) for stomach bleeding  Recall in May 2021 he had an endoscopy and colonoscopy  Reviewed flexible sigmoidoscopy 8/02/21  Discussed finding out about if f/u is required d/t rectal ulcer     ACP not on file. SDM is his daughter (Yareli Mehta). Provided information in the past.      Recall sees Dr. Yulisa Nielson for h/o DVT, no longer on coumadin or xarelto, on ASA only.  Was on coumadin for h/o PE and DVT post operatively     PREVENTIVE:    Colonoscopy: 5/10/21 , Dr. Yasir Sebastian, repeat 5 years    EGD: 5/10/21, Dr. Layton Right  PSA: 7/15 3.0, ,  3.8, ,  5.5  Dr Ion Hayden follows, 3/21 <1  <1 per pt  AAA screen: CT , negative  Tdap: unknown, due will get at pharm  Pneumovax:   Liz Her: 19   Shingrix: due reminded  Flu shot: 21  Foot exam: 22  Microalbumin:   A1c:  POC 5.7  ,  5.8  6.6  6.0  6.2  6.1,  5.6  Eye exam: Dr. Carey Escort Estopinal, , scheduled 3/22  Hep C Screen: 10/15, negative  Lipids:  :D: 39  EK/17, PACs  Covid: three doses (Pfizer)    Patient Active Problem List    Diagnosis Date Noted    Pacemaker 2020    Third degree AV block ([facility] 2020    Postoperative anemia due to acute blood loss 2020    S/P CABG x 3 2020    CAD (coronary artery disease) 2020    Bilateral carotid artery stenosis 2020    NSTEMI (non-ST elevated myocardial infarction) ([facility] 2020    Type 2 diabetes mellitus with diabetic neuropathy ([facility] [facility] 2020    Prostate cancer ([facility] [facility] 2018    Type 2 diabetes with nephropathy ([facility] [facility] 2018    Reactive depression 11/15/2016    Cervical spinal stenosis 2016    CKD (chronic kidney disease) 10/31/2016    Hyperlipidemia 07/10/2015    Spinal stenosis, lumbar region, without neurogenic claudication 10/16/2014    DVT of leg (deep venous thrombosis) ([address] 2012    HTN (hypertension) 2012    Perforated diverticulum of large intestine 2012    Alcohol abuse 2012    Insomnia 2012     Current Outpatient Medications   Medication Sig Dispense Refill    metoprolol succinate (TOPROL-XL) 25 mg XL tablet Take 1 tablet by mouth once daily 90 Tablet 1    traZODone (DESYREL) 50 mg tablet TAKE 1 TABLET BY MOUTH AT BEDTIME 30 Tablet 0    zinc sulfate (ZINC-220 PO) Take  by mouth.       SITagliptin (Januvia) 50 mg tablet Take 1 tablet by mouth once daily 90 Tablet 0    sertraline (ZOLOFT) 50 mg tablet Take 1 tablet by mouth once daily 90 Tablet 0    turmeric 400 mg cap Take  by mouth.  Praluent Pen 75 mg/mL injector pen INJECT 1 ML SUBCUTANEOUSLY  EVERY TWO WEEKS 6 mL 3    vitamin e (E GEMS) 100 unit capsule Take 1 capsule three times daily 90 Cap 6    pumpkin seed extract-soy germ 300 mg cap Take 1 Cap by mouth as needed (prostate health).  co-enzyme Q-10 (Co Q-10) 100 mg capsule Take 100 mg by mouth daily as needed. 200 mg      cholecalciferol, vitamin D3, (VITAMIN D3 PO) Take 1,000 Int'l Units by mouth daily as needed.  ascorbate calcium (VITAMIN C PO) Take 1 Tab by mouth daily as needed.  cyanocobalamin 1,000 mcg tablet Take 1,000 mcg by mouth daily as needed.  doxycycline (VIBRAMYCIN) 100 mg capsule TAKE 1 CAPSULE BY MOUTH ONCE DAILY SEPARATE FROM MAGNESIUM BY AT LEAST TWO HOURS (Patient not taking: Reported on 1/31/2022)      senna-docusate (PERICOLACE) 8.6-50 mg per tablet Take 1 Tablet by mouth daily as needed for Constipation. (Patient not taking: Reported on 1/31/2022) 90 Tablet 2    docusate sodium (Dulcolax Stool Softener, dss,) 100 mg capsule Take 1 Cap by mouth two (2) times a day.  (Patient not taking: Reported on 1/31/2022) 30 Cap 0     Past Surgical History:   Procedure Laterality Date    COLONOSCOPY N/A 3/19/2021    COLONOSCOPY performed by Micky Salazar MD at [facility] ENDOSCOPY    COLONOSCOPY N/A 5/10/2021    COLONOSCOPY performed by Micky Salazar MD at [place] [address] N/A 8/2/2021    FLEXIBLE SIGMOIDOSCOPY performed by Micky Salazar MD at [facility] ENDOSCOPY    HX BACK SURGERY  2014    HX CERVICAL LAMINECTOMY      HX CORONARY ARTERY BYPASS GRAFT  04/2021    X3     HX GI  11/2012    bowel resection    HX HEART CATHETERIZATION  04/2020    HX ORTHOPAEDIC      amputated left 4th finger    CO ABDOMEN SURGERY PROC UNLISTED      bowel resection    CO INS NEW/RPLCMT PRM PM W/TRANSV ELTRD ATRIAL&VENT N/A 5/1/2020    INSERT PPM DUAL performed by Rachel Chirinos, Michelle Morales MD at [place] [address], [facility] [address] [facility]      Lab Results   Component Value Date/Time    WBC 4.8 11/10/2021 11:36 AM    HGB 12.5 (L) 11/10/2021 11:36 AM    Hemoglobin (POC) 13.3 11/02/2016 07:15 AM    HCT 38.0 11/10/2021 11:36 AM    Hematocrit (POC) 39 11/02/2016 07:15 AM    PLATELET 692 46/86/8063 11:36 AM    MCV 87 11/10/2021 11:36 AM     Lab Results   Component Value Date/Time    Cholesterol, total 104 01/28/2022 10:15 AM    Cholesterol (POC) 227 02/06/2015 03:30 PM    HDL Cholesterol 52 01/28/2022 10:15 AM    LDL, calculated 39 01/28/2022 10:15 AM    LDL Cholesterol (POC) 164 02/06/2015 03:30 PM    Triglyceride 65 01/28/2022 10:15 AM    Triglycerides (POC) 68 02/06/2015 03:30 PM    CHOL/HDL Ratio 2.0 01/28/2022 10:15 AM        Review of Systems   Respiratory: Negative for shortness of breath. Cardiovascular: Negative for chest pain. Physical Exam  Constitutional:       General: He is not in acute distress. Appearance: Normal appearance. He is not ill-appearing, toxic-appearing or diaphoretic. HENT:      Head: Normocephalic and atraumatic. Right Ear: External ear normal.      Left Ear: External ear normal.   Eyes:      General:         Right eye: No discharge. Left eye: No discharge. Conjunctiva/sclera: Conjunctivae normal.      Pupils: Pupils are equal, round, and reactive to light. Cardiovascular:      Rate and Rhythm: Normal rate and regular rhythm. Heart sounds: No murmur heard. No friction rub. No gallop. Pulmonary:      Effort: No respiratory distress. Breath sounds: Normal breath sounds. No wheezing or rales. Chest:      Chest wall: No tenderness. Musculoskeletal:         General: Normal range of motion. Cervical back: Normal range of motion and neck supple. Skin:     General: Skin is warm and dry. Comments: overgrown toenails, hammertoes, callus formation    Neurological:      Mental Status: He is alert and oriented to person, place, and time. Mental status is at baseline. Coordination: Coordination normal.      Gait: Gait normal.      Comments: Sensory exam of the foot is normal, tested with the monofilament. Good pulses, no lesions or ulcers, good peripheral pulses. Psychiatric:         Mood and Affect: Mood normal.         Behavior: Behavior normal.         ASSESSMENT and PLAN    ICD-10-CM ICD-9-CM    1. Type 2 diabetes with nephropathy ([facility])      E11.21 250.40    Controlled on Januvia continue    Discussed 4-month follow-up is fine      583.81    2. NICM (nonischemic cardiomyopathy) ([facility])       Mild decrease in EF medically managed stable up-to-date with cardiology     I42.8 425.4    3. Chronic deep vein thrombosis (DVT) of proximal vein of both lower extremities ([facility])      History of in the past just on aspirin for prevention     I82.5Y3 453.51    4. Third degree AV block ([facility]       Status post pacemaker up-to-date with cardiology     I44.2 426.0    5. Prostate cancer [facility])       Status post radiation follows with urology up-to-date     C61 185    6. Stage 3a chronic kidney disease ([facility] [facility]       Follows with nephrology routinely creatinine runs around 1.5 baseline    Has been stable recently    Had a period where his creatinine had climbed to 1.8-1.9 but has improved    We will get notes for review     N18.31 585.3    7. Type 2 diabetes mellitus with diabetic neuropathy, without long-term current use of insulin ([facility])  E11.40 250.60    See above has gabapentin      357.2    8. Primary hypertension       Controlled Toprol-XL 25 mg once daily     I10 401.9    9. Coronary artery disease involving native coronary artery of native heart without angina pectoris       Medically managed no active signs or symptoms of CAD up-to-date with cardiology     I25.10 414.01    10. Mixed hyperlipidemia       Controlled on Praluent and Zetia     E78.2 272.2    11.  Reactive depression       Stable on Zoloft continue     F32.9 300.4    12. Spinal stenosis, lumbar region, without neurogenic claudication       Follow-up with neurosurgery in the past    Now with gabapentin as needed     M48.061 724.02    13. Neuropathy    See above     G62.9 355.9    14. NIMO (obstructive sleep apnea)       Declined CPAP previously discussed need for follow-up    Having some difficulty with insomnia currently on trazodone discussed increasing to 75 mg nightly as needed    Discussed melatonin as well G47.33 327.23         Scribed by Yogesh Castillo [facility], as dictated by Dr. Ellen Dee. Current diagnosis and concerns discussed with pt at length. Pt understands risks and benefits or current treatment plan and medications, and accepts the treatment and medication with any possible risks. Pt asks appropriate questions, which were answered. Pt was instructed to call with any concerns or problems. I have reviewed the note documented by the scribe. The services provided are my own.   The documentation is accurate

## 2022-01-28 ENCOUNTER — APPOINTMENT (OUTPATIENT)
Dept: INTERNAL MEDICINE CLINIC | Age: 72
End: 2022-01-28

## 2022-01-30 LAB
ALBUMIN SERPL-MCNC: 3.6 G/DL (ref 3.5–5)
ALBUMIN/GLOB SERPL: 1 {RATIO} (ref 1.1–2.2)
ALP SERPL-CCNC: 66 U/L (ref 45–117)
ALT SERPL-CCNC: 22 U/L (ref 12–78)
ANION GAP SERPL CALC-SCNC: 4 MMOL/L (ref 5–15)
AST SERPL-CCNC: 28 U/L (ref 15–37)
BILIRUB SERPL-MCNC: 1.2 MG/DL (ref 0.2–1)
BUN SERPL-MCNC: 23 MG/DL (ref 6–20)
BUN/CREAT SERPL: 15 (ref 12–20)
CALCIUM SERPL-MCNC: 9.4 MG/DL (ref 8.5–10.1)
CHLORIDE SERPL-SCNC: 110 MMOL/L (ref 97–108)
CHOLEST SERPL-MCNC: 104 MG/DL
CO2 SERPL-SCNC: 26 MMOL/L (ref 21–32)
CREAT SERPL-MCNC: 1.52 MG/DL (ref 0.7–1.3)
GLOBULIN SER CALC-MCNC: 3.5 G/DL (ref 2–4)
GLUCOSE SERPL-MCNC: 72 MG/DL (ref 65–100)
HDLC SERPL-MCNC: 52 MG/DL
HDLC SERPL: 2 {RATIO} (ref 0–5)
LDLC SERPL CALC-MCNC: 39 MG/DL (ref 0–100)
POTASSIUM SERPL-SCNC: 4.6 MMOL/L (ref 3.5–5.1)
PROT SERPL-MCNC: 7.1 G/DL (ref 6.4–8.2)
SODIUM SERPL-SCNC: 140 MMOL/L (ref 136–145)
TRIGL SERPL-MCNC: 65 MG/DL (ref ?–150)
VLDLC SERPL CALC-MCNC: 13 MG/DL

## 2022-01-31 ENCOUNTER — OFFICE VISIT (OUTPATIENT)
Dept: INTERNAL MEDICINE CLINIC | Age: 72
End: 2022-01-31
Payer: MEDICARE

## 2022-01-31 ENCOUNTER — TELEPHONE (OUTPATIENT)
Dept: CARDIOLOGY CLINIC | Age: 72
End: 2022-01-31

## 2022-01-31 VITALS
TEMPERATURE: 97.6 F | OXYGEN SATURATION: 99 % | DIASTOLIC BLOOD PRESSURE: 71 MMHG | HEIGHT: 67 IN | HEART RATE: 88 BPM | RESPIRATION RATE: 16 BRPM | SYSTOLIC BLOOD PRESSURE: 110 MMHG | BODY MASS INDEX: 26.74 KG/M2 | WEIGHT: 170.4 LBS

## 2022-01-31 DIAGNOSIS — G62.9 NEUROPATHY: ICD-10-CM

## 2022-01-31 DIAGNOSIS — F32.9 REACTIVE DEPRESSION: ICD-10-CM

## 2022-01-31 DIAGNOSIS — E78.2 MIXED HYPERLIPIDEMIA: ICD-10-CM

## 2022-01-31 DIAGNOSIS — E11.21 TYPE 2 DIABETES WITH NEPHROPATHY (HCC): Primary | ICD-10-CM

## 2022-01-31 DIAGNOSIS — E11.40 TYPE 2 DIABETES MELLITUS WITH DIABETIC NEUROPATHY, WITHOUT LONG-TERM CURRENT USE OF INSULIN (HCC): ICD-10-CM

## 2022-01-31 DIAGNOSIS — C61 PROSTATE CANCER (HCC): ICD-10-CM

## 2022-01-31 DIAGNOSIS — I82.5Y3 CHRONIC DEEP VEIN THROMBOSIS (DVT) OF PROXIMAL VEIN OF BOTH LOWER EXTREMITIES (HCC): ICD-10-CM

## 2022-01-31 DIAGNOSIS — I44.2 THIRD DEGREE AV BLOCK (HCC): ICD-10-CM

## 2022-01-31 DIAGNOSIS — I10 PRIMARY HYPERTENSION: ICD-10-CM

## 2022-01-31 DIAGNOSIS — I25.10 CORONARY ARTERY DISEASE INVOLVING NATIVE CORONARY ARTERY OF NATIVE HEART WITHOUT ANGINA PECTORIS: ICD-10-CM

## 2022-01-31 DIAGNOSIS — N18.31 STAGE 3A CHRONIC KIDNEY DISEASE (HCC): ICD-10-CM

## 2022-01-31 DIAGNOSIS — M48.061 SPINAL STENOSIS, LUMBAR REGION, WITHOUT NEUROGENIC CLAUDICATION: ICD-10-CM

## 2022-01-31 DIAGNOSIS — G47.33 OSA (OBSTRUCTIVE SLEEP APNEA): ICD-10-CM

## 2022-01-31 DIAGNOSIS — I42.8 NICM (NONISCHEMIC CARDIOMYOPATHY) (HCC): ICD-10-CM

## 2022-01-31 LAB — HBA1C MFR BLD HPLC: 5.6 %

## 2022-01-31 PROCEDURE — G8419 CALC BMI OUT NRM PARAM NOF/U: HCPCS | Performed by: INTERNAL MEDICINE

## 2022-01-31 PROCEDURE — G8754 DIAS BP LESS 90: HCPCS | Performed by: INTERNAL MEDICINE

## 2022-01-31 PROCEDURE — G8427 DOCREV CUR MEDS BY ELIG CLIN: HCPCS | Performed by: INTERNAL MEDICINE

## 2022-01-31 PROCEDURE — 99214 OFFICE O/P EST MOD 30 MIN: CPT | Performed by: INTERNAL MEDICINE

## 2022-01-31 PROCEDURE — 83036 HEMOGLOBIN GLYCOSYLATED A1C: CPT | Performed by: INTERNAL MEDICINE

## 2022-01-31 PROCEDURE — G8752 SYS BP LESS 140: HCPCS | Performed by: INTERNAL MEDICINE

## 2022-01-31 PROCEDURE — G0463 HOSPITAL OUTPT CLINIC VISIT: HCPCS | Performed by: INTERNAL MEDICINE

## 2022-01-31 PROCEDURE — 1101F PT FALLS ASSESS-DOCD LE1/YR: CPT | Performed by: INTERNAL MEDICINE

## 2022-01-31 PROCEDURE — G9717 DOC PT DX DEP/BP F/U NT REQ: HCPCS | Performed by: INTERNAL MEDICINE

## 2022-01-31 PROCEDURE — G8536 NO DOC ELDER MAL SCRN: HCPCS | Performed by: INTERNAL MEDICINE

## 2022-01-31 PROCEDURE — 3017F COLORECTAL CA SCREEN DOC REV: CPT | Performed by: INTERNAL MEDICINE

## 2022-01-31 PROCEDURE — 2022F DILAT RTA XM EVC RTNOPTHY: CPT | Performed by: INTERNAL MEDICINE

## 2022-01-31 PROCEDURE — 3044F HG A1C LEVEL LT 7.0%: CPT | Performed by: INTERNAL MEDICINE

## 2022-01-31 RX ORDER — TRAZODONE HYDROCHLORIDE 50 MG/1
50 TABLET ORAL
Qty: 120 TABLET | Refills: 0 | Status: SHIPPED | OUTPATIENT
Start: 2022-01-31 | End: 2022-05-16

## 2022-01-31 NOTE — TELEPHONE ENCOUNTER
Called and spoke to Lisa Hu, patient's daughter; who is on permission to release information paperwork. Patient identified with two identifiers. Informed of the following message:  ----- Message from Soni Edwards NP sent at 1/30/2022  7:40 AM EST -----  Kidney fxn sl up but stable, note that he follows with Dr Virginia Georges (renal). Other lytes ok,  Liver test ok. Will see him in May or sooner if needed    Lisa Hu verbalized understanding at this time.

## 2022-01-31 NOTE — TELEPHONE ENCOUNTER
Called and spoke to patient's daughter Elizabeth Roldan at this time; who is on permission to release info paperwork. Verified patient with two identifiers. Informed of the following message:  ----- Message from Philip Bell NP sent at 1/30/2022  7:38 AM EST -----  Lipids at goal with praluent, continue. Patient's daughter Elizabeth Roldan verbalized understanding at this time.

## 2022-02-07 RX ORDER — SERTRALINE HYDROCHLORIDE 50 MG/1
TABLET, FILM COATED ORAL
Qty: 90 TABLET | Refills: 0 | Status: SHIPPED | OUTPATIENT
Start: 2022-02-07 | End: 2022-06-08

## 2022-03-18 PROBLEM — I65.23 BILATERAL CAROTID ARTERY STENOSIS: Status: ACTIVE | Noted: 2020-04-24

## 2022-03-18 PROBLEM — I25.10 CAD (CORONARY ARTERY DISEASE): Status: ACTIVE | Noted: 2020-04-27

## 2022-03-18 PROBLEM — D62 POSTOPERATIVE ANEMIA DUE TO ACUTE BLOOD LOSS: Status: ACTIVE | Noted: 2020-04-29

## 2022-03-18 PROBLEM — Z95.1 S/P CABG X 3: Status: ACTIVE | Noted: 2020-04-28

## 2022-03-18 PROBLEM — E11.21 TYPE 2 DIABETES WITH NEPHROPATHY (HCC): Status: ACTIVE | Noted: 2018-02-28

## 2022-03-18 PROBLEM — I21.4 NSTEMI (NON-ST ELEVATED MYOCARDIAL INFARCTION) (HCC): Status: ACTIVE | Noted: 2020-04-23

## 2022-03-19 PROBLEM — I44.2 THIRD DEGREE AV BLOCK (HCC): Status: ACTIVE | Noted: 2020-05-01

## 2022-03-19 PROBLEM — Z95.0 PACEMAKER: Status: ACTIVE | Noted: 2020-05-01

## 2022-03-19 PROBLEM — E11.40 TYPE 2 DIABETES MELLITUS WITH DIABETIC NEUROPATHY (HCC): Status: ACTIVE | Noted: 2020-01-09

## 2022-03-20 PROBLEM — C61 PROSTATE CANCER (HCC): Status: ACTIVE | Noted: 2018-06-04

## 2022-04-04 ENCOUNTER — OFFICE VISIT (OUTPATIENT)
Dept: CARDIOLOGY CLINIC | Age: 72
End: 2022-04-04
Payer: MEDICARE

## 2022-04-04 DIAGNOSIS — Z95.0 CARDIAC PACEMAKER IN SITU: Primary | ICD-10-CM

## 2022-04-04 PROCEDURE — 93296 REM INTERROG EVL PM/IDS: CPT | Performed by: INTERNAL MEDICINE

## 2022-04-04 NOTE — LETTER
4/4/2022 11:43 AM    Mr. Odessa Forbes  [id number] [place] [phone]            This letter confirms that we have received your scheduled remote check of your implanted     device on 4-4-22  . Our EP team will contact you via phone if there are significant abnormal    findings. Your next remote check from home is scheduled for 7-13-22  . If you have any questions, please call [address] at [phone].                Sincerely,    Stan Braun MD [facility]

## 2022-04-19 ENCOUNTER — TELEPHONE (OUTPATIENT)
Dept: INTERNAL MEDICINE CLINIC | Age: 72
End: 2022-04-19

## 2022-04-19 NOTE — TELEPHONE ENCOUNTER
----- Message from April Joe sent at 4/19/2022 11:38 AM EDT -----  Subject: Appointment Request    Reason for Call: Routine ED Follow Up Visit    QUESTIONS  Type of Appointment? Established Patient  Reason for appointment request? Available appointments did not meet   patient need  Additional Information for Provider? Patient was seen in the emergency   room on 4/17 due to a fall and hitting head. Physician also noted leg   swelling, and told to follow up with physician. Available appointment were   in June for this follow up, patient daughter Mile Richardson would like to know   when patient's physician would like to see patient for emergency room   follow up? Please call Mile Richardson back to advise, thank you   ---------------------------------------------------------------------------  --------------  CALL BACK INFO  What is the best way for the office to contact you? OK to leave message on   voicemail  Preferred Call Back Phone Number? [phone]  ---------------------------------------------------------------------------  --------------  SCRIPT ANSWERS  Relationship to Patient? Other  Representative Name? micky  Additional information verified (besides Name and Date of Birth)? Phone   Number  (Patient requests to see provider urgently. )? No  Do you have any questions for your primary care provider that need to be   answered prior to your appointment? No  Have you been diagnosed with, awaiting test results for, or told that you   are suspected of having COVID-19 (Coronavirus)? (If patient has tested   negative or was tested as a requirement for work, school, or travel and   not based on symptoms, answer no)? No  Within the past 10 days have you developed any of the following symptoms   (answer no if symptoms have been present longer than 10 days or began   more than 10 days ago)?  Fever or Chills, Cough, Shortness of breath or   difficulty breathing, Loss of taste or smell, Sore throat, Nasal   congestion, Sneezing or runny nose, Fatigue or generalized body aches   (answer no if pain is specific to a body part e.g. back pain), Diarrhea,   Headache? No  Have you had close contact with someone with COVID-19 in the last 7 days? No  (Service Expert  click yes below to proceed with [employer] As Usual   Scheduling)?  Yes

## 2022-05-04 ENCOUNTER — OFFICE VISIT (OUTPATIENT)
Dept: INTERNAL MEDICINE CLINIC | Age: 72
End: 2022-05-04
Payer: MEDICARE

## 2022-05-04 ENCOUNTER — HOSPITAL ENCOUNTER (OUTPATIENT)
Dept: GENERAL RADIOLOGY | Age: 72
Discharge: HOME | End: 2022-05-04
Payer: MEDICARE

## 2022-05-04 ENCOUNTER — HOME HEALTH ADMISSION (OUTPATIENT)
Dept: HOME HEALTH SERVICES | Facility: HOME HEALTH | Age: 72
End: 2022-05-04
Payer: MEDICARE

## 2022-05-04 VITALS
HEART RATE: 84 BPM | WEIGHT: 165.2 LBS | HEIGHT: 67 IN | TEMPERATURE: 97.2 F | DIASTOLIC BLOOD PRESSURE: 64 MMHG | BODY MASS INDEX: 25.93 KG/M2 | RESPIRATION RATE: 16 BRPM | OXYGEN SATURATION: 99 % | SYSTOLIC BLOOD PRESSURE: 110 MMHG

## 2022-05-04 DIAGNOSIS — D62 POSTOPERATIVE ANEMIA DUE TO ACUTE BLOOD LOSS: ICD-10-CM

## 2022-05-04 DIAGNOSIS — M25.552 HIP PAIN, LEFT: ICD-10-CM

## 2022-05-04 DIAGNOSIS — R29.6 FREQUENT FALLS: ICD-10-CM

## 2022-05-04 DIAGNOSIS — H92.03 EAR PAIN, BILATERAL: ICD-10-CM

## 2022-05-04 DIAGNOSIS — I10 PRIMARY HYPERTENSION: ICD-10-CM

## 2022-05-04 DIAGNOSIS — E78.2 MIXED HYPERLIPIDEMIA: ICD-10-CM

## 2022-05-04 DIAGNOSIS — R60.0 LOCALIZED EDEMA: ICD-10-CM

## 2022-05-04 DIAGNOSIS — E11.40 TYPE 2 DIABETES MELLITUS WITH DIABETIC NEUROPATHY, WITHOUT LONG-TERM CURRENT USE OF INSULIN (HCC): Primary | ICD-10-CM

## 2022-05-04 DIAGNOSIS — I25.10 CORONARY ARTERY DISEASE INVOLVING NATIVE CORONARY ARTERY OF NATIVE HEART WITHOUT ANGINA PECTORIS: ICD-10-CM

## 2022-05-04 DIAGNOSIS — M54.32 SCIATICA OF LEFT SIDE: ICD-10-CM

## 2022-05-04 DIAGNOSIS — C61 PROSTATE CANCER (HCC): ICD-10-CM

## 2022-05-04 DIAGNOSIS — N18.31 STAGE 3A CHRONIC KIDNEY DISEASE (HCC): ICD-10-CM

## 2022-05-04 DIAGNOSIS — Z86.73 H/O: STROKE: ICD-10-CM

## 2022-05-04 DIAGNOSIS — R41.3 MEMORY CHANGE: ICD-10-CM

## 2022-05-04 DIAGNOSIS — F32.9 REACTIVE DEPRESSION: ICD-10-CM

## 2022-05-04 DIAGNOSIS — E11.21 TYPE 2 DIABETES WITH NEPHROPATHY (HCC): ICD-10-CM

## 2022-05-04 DIAGNOSIS — I82.5Y3 CHRONIC DEEP VEIN THROMBOSIS (DVT) OF PROXIMAL VEIN OF BOTH LOWER EXTREMITIES (HCC): ICD-10-CM

## 2022-05-04 DIAGNOSIS — I21.4 NSTEMI (NON-ST ELEVATED MYOCARDIAL INFARCTION) (HCC): ICD-10-CM

## 2022-05-04 PROCEDURE — G8752 SYS BP LESS 140: HCPCS | Performed by: INTERNAL MEDICINE

## 2022-05-04 PROCEDURE — G0463 HOSPITAL OUTPT CLINIC VISIT: HCPCS | Performed by: INTERNAL MEDICINE

## 2022-05-04 PROCEDURE — G8754 DIAS BP LESS 90: HCPCS | Performed by: INTERNAL MEDICINE

## 2022-05-04 PROCEDURE — 2022F DILAT RTA XM EVC RTNOPTHY: CPT | Performed by: INTERNAL MEDICINE

## 2022-05-04 PROCEDURE — 73502 X-RAY EXAM HIP UNI 2-3 VIEWS: CPT

## 2022-05-04 PROCEDURE — G8427 DOCREV CUR MEDS BY ELIG CLIN: HCPCS | Performed by: INTERNAL MEDICINE

## 2022-05-04 PROCEDURE — 3044F HG A1C LEVEL LT 7.0%: CPT | Performed by: INTERNAL MEDICINE

## 2022-05-04 PROCEDURE — 72100 X-RAY EXAM L-S SPINE 2/3 VWS: CPT

## 2022-05-04 PROCEDURE — G8536 NO DOC ELDER MAL SCRN: HCPCS | Performed by: INTERNAL MEDICINE

## 2022-05-04 PROCEDURE — G8419 CALC BMI OUT NRM PARAM NOF/U: HCPCS | Performed by: INTERNAL MEDICINE

## 2022-05-04 PROCEDURE — 1101F PT FALLS ASSESS-DOCD LE1/YR: CPT | Performed by: INTERNAL MEDICINE

## 2022-05-04 PROCEDURE — G9717 DOC PT DX DEP/BP F/U NT REQ: HCPCS | Performed by: INTERNAL MEDICINE

## 2022-05-04 PROCEDURE — 99215 OFFICE O/P EST HI 40 MIN: CPT | Performed by: INTERNAL MEDICINE

## 2022-05-04 PROCEDURE — 3017F COLORECTAL CA SCREEN DOC REV: CPT | Performed by: INTERNAL MEDICINE

## 2022-05-04 RX ORDER — FUROSEMIDE 20 MG/1
TABLET ORAL AS NEEDED
COMMUNITY
End: 2022-05-04 | Stop reason: SDUPTHER

## 2022-05-04 RX ORDER — FUROSEMIDE 20 MG/1
20 TABLET ORAL DAILY
Qty: 30 TABLET | Refills: 1 | Status: SHIPPED | OUTPATIENT
Start: 2022-05-04 | End: 2022-10-06 | Stop reason: SDUPTHER

## 2022-05-04 RX ORDER — METHYLPREDNISOLONE 4 MG/1
TABLET ORAL
Qty: 1 DOSE PACK | Refills: 0 | Status: SHIPPED | OUTPATIENT
Start: 2022-05-04 | End: 2022-07-01 | Stop reason: ALTCHOICE

## 2022-05-04 NOTE — LETTER
5/4/2022    Mr. Damaris DESAI [id number]      Dear Zuri Calvo:    Please find your most recent results below. Resulted Orders   XR HIP LT W OR WO PELV 2-3 VWS    Narrative    EXAM: XR HIP LT W OR WO PELV 2-3 VWS    INDICATION: fall hip pain. COMPARISON: None. FINDINGS: AP view of the pelvis and a frogleg lateral view of the left hip  demonstrate mildly diminished bone mineralization. No acute fracture or  dislocation is shown. No substantial hip arthrosis is demonstrated. Mild  bilateral SI joint osteoarthrosis is shown. Lower lumbar laminectomy defects  with moderate spondylosis is noted. Impression    No acute fracture or dislocation demonstrated. RECOMMENDATIONS:  Your imaging shows changes consistent with arthritis. Continue current medications. Will have you see orthopedics if you are not improving.     Please call me if you have any questions: [phone]    Sincerely,      Severino Narayanan MD

## 2022-05-04 NOTE — PROGRESS NOTES
1. \"Have you been to the ER, urgent care clinic since your last visit? Hospitalized since your last visit? \" Yes When: 04/17/22    2. \"Have you seen or consulted any other health care providers outside of the [address] [place] [address] since your last visit? \" No     3. For patients aged 39-70: Has the patient had a colonoscopy / FIT/ Cologuard? Yes - no Care Gap present      If the patient is female:    4. For patients aged 41-77: Has the patient had a mammogram within the past 2 years? NA - based on age or sex      11. For patients aged 21-65: Has the patient had a pap smear?  NA - based on age or sex

## 2022-05-05 DIAGNOSIS — D64.9 MILD ANEMIA: ICD-10-CM

## 2022-05-05 DIAGNOSIS — N18.31 STAGE 3A CHRONIC KIDNEY DISEASE (HCC): Primary | ICD-10-CM

## 2022-05-05 LAB
ALBUMIN SERPL-MCNC: 3.6 G/DL (ref 3.5–5)
ALBUMIN/GLOB SERPL: 0.9 {RATIO} (ref 1.1–2.2)
ALP SERPL-CCNC: 68 U/L (ref 45–117)
ALT SERPL-CCNC: 24 U/L (ref 12–78)
ANION GAP SERPL CALC-SCNC: 9 MMOL/L (ref 5–15)
AST SERPL-CCNC: 35 U/L (ref 15–37)
BILIRUB SERPL-MCNC: 2.2 MG/DL (ref 0.2–1)
BUN SERPL-MCNC: 26 MG/DL (ref 6–20)
BUN/CREAT SERPL: 19 (ref 12–20)
CALCIUM SERPL-MCNC: 9.2 MG/DL (ref 8.5–10.1)
CHLORIDE SERPL-SCNC: 106 MMOL/L (ref 97–108)
CO2 SERPL-SCNC: 25 MMOL/L (ref 21–32)
CREAT SERPL-MCNC: 1.34 MG/DL (ref 0.7–1.3)
ERYTHROCYTE [DISTWIDTH] IN BLOOD BY AUTOMATED COUNT: 18.2 % (ref 11.5–14.5)
EST. AVERAGE GLUCOSE BLD GHB EST-MCNC: 105 MG/DL
GLOBULIN SER CALC-MCNC: 3.8 G/DL (ref 2–4)
GLUCOSE SERPL-MCNC: 81 MG/DL (ref 65–100)
HBA1C MFR BLD: 5.3 % (ref 4–5.6)
HCT VFR BLD AUTO: 35.6 % (ref 36.6–50.3)
HGB BLD-MCNC: 11.6 G/DL (ref 12.1–17)
MCH RBC QN AUTO: 28.6 PG (ref 26–34)
MCHC RBC AUTO-ENTMCNC: 32.6 G/DL (ref 30–36.5)
MCV RBC AUTO: 87.7 FL (ref 80–99)
NRBC # BLD: 0 K/UL (ref 0–0.01)
NRBC BLD-RTO: 0 PER 100 WBC
PLATELET # BLD AUTO: 140 K/UL (ref 150–400)
PMV BLD AUTO: 11.4 FL (ref 8.9–12.9)
POTASSIUM SERPL-SCNC: 4.1 MMOL/L (ref 3.5–5.1)
PROT SERPL-MCNC: 7.4 G/DL (ref 6.4–8.2)
RBC # BLD AUTO: 4.06 M/UL (ref 4.1–5.7)
SODIUM SERPL-SCNC: 140 MMOL/L (ref 136–145)
TSH SERPL DL<=0.05 MIU/L-ACNC: 2.26 UIU/ML (ref 0.36–3.74)
WBC # BLD AUTO: 5.2 K/UL (ref 4.1–11.1)

## 2022-05-05 NOTE — PROGRESS NOTES
Called, spoke to Yareli(Daughter)  Received two pt identifiers   Joseph Pyo informed per Dr. Sukhjinder Anderson pt has mild anemia (likely related to chronic ckd) but also mild low plateltets --needs to see hematology. ----referral placed. Yareli informed per Dr. Sukhjinder Anderson bilirubin test progressively elevated, likely a normal genetic variant (last ct abd showed no liver abnormality)--however since persistent should f/U w his GI dr beard--needed to f/U w him anyway regarding his h/o rectal ulcer. ---labs sent via CC to Dr. Melissa Mueller. Denilson Duarte understanding of the instructions and has no further questions at this time.

## 2022-05-05 NOTE — PROGRESS NOTES
Please call patient back about results  A few things--mild anemia (likely related to chronic ckd) but also mild low plateltets --needs to see hematology send to ivan      Also bilirubin test progressively elevated, likely a normal genetic variant (last ct abd showed no liver abnormality)--however since persistent should f/U w his GI dr beard--needed to f/U w him anyway regarding his h/o rectal ulcer

## 2022-05-06 ENCOUNTER — TELEPHONE (OUTPATIENT)
Dept: INTERNAL MEDICINE CLINIC | Age: 72
End: 2022-05-06

## 2022-05-06 NOTE — TELEPHONE ENCOUNTER
----- Message from Lupedontae Ilana sent at 5/6/2022  3:42 PM EDT -----  Subject: Results Request    QUESTIONS  Which lab or imaging result is the patient calling about? Hip & Back   X-Rays  Which provider ordered the test? Irma Hart   At what location was the test performed? Date the test was performed? 2022-05-04  Additional Information for Provider? Pt's daughter, Joshua Che has called in   to check on the results of the x-rays that he had done on Wednesday for   his hip & back. Can you please follow up with her to advise?   ---------------------------------------------------------------------------  --------------  CALL BACK INFO  What is the best way for the office to contact you? OK to leave message on   voicemail  Preferred Call Back Phone Number? [phone]  ---------------------------------------------------------------------------  --------------  SCRIPT ANSWERS  Relationship to Patient? Other  Representative Name? Daughter - Joshua Che  Is the Representative on the appropriate HIPAA document in Epic?  Yes

## 2022-05-06 NOTE — TELEPHONE ENCOUNTER
Called, Spoke to yareli(Daughter)  Received two pt identifiers  Arthur Serrano informed no fractures. Yareli informed pain and imaging show normal arthritis. Shruti Manleys understanding of the instructions and has no further questions at this time.

## 2022-05-09 ENCOUNTER — HOME CARE VISIT (OUTPATIENT)
Dept: SCHEDULING | Facility: HOME HEALTH | Age: 72
End: 2022-05-09
Payer: MEDICARE

## 2022-05-09 DIAGNOSIS — E11.21 TYPE 2 DIABETES WITH NEPHROPATHY (HCC): Primary | ICD-10-CM

## 2022-05-09 LAB
CREAT UR-MCNC: 14.9 MG/DL
MICROALBUMIN UR-MCNC: 1.82 MG/DL
MICROALBUMIN/CREAT UR-RTO: 122 MG/G (ref 0–30)

## 2022-05-09 PROCEDURE — G0151 HHCP-SERV OF PT,EA 15 MIN: HCPCS

## 2022-05-09 PROCEDURE — 3331090002 HH PPS REVENUE DEBIT

## 2022-05-09 PROCEDURE — 400013 HH SOC

## 2022-05-09 PROCEDURE — 400018 HH-NO PAY CLAIM PROCEDURE

## 2022-05-09 PROCEDURE — 3331090001 HH PPS REVENUE CREDIT

## 2022-05-09 NOTE — PROGRESS NOTES
GUSTAVO per Dr. Dick January new order placed due to not being able to collect at previous appt.   Closing

## 2022-05-10 VITALS
BODY MASS INDEX: 25.58 KG/M2 | WEIGHT: 163 LBS | DIASTOLIC BLOOD PRESSURE: 65 MMHG | HEIGHT: 67 IN | HEART RATE: 76 BPM | RESPIRATION RATE: 16 BRPM | OXYGEN SATURATION: 99 % | TEMPERATURE: 98 F | SYSTOLIC BLOOD PRESSURE: 110 MMHG

## 2022-05-10 PROCEDURE — 3331090002 HH PPS REVENUE DEBIT

## 2022-05-10 PROCEDURE — 3331090001 HH PPS REVENUE CREDIT

## 2022-05-11 PROCEDURE — 3331090001 HH PPS REVENUE CREDIT

## 2022-05-11 PROCEDURE — 3331090002 HH PPS REVENUE DEBIT

## 2022-05-12 PROCEDURE — 3331090001 HH PPS REVENUE CREDIT

## 2022-05-12 PROCEDURE — 3331090002 HH PPS REVENUE DEBIT

## 2022-05-13 ENCOUNTER — HOME CARE VISIT (OUTPATIENT)
Dept: SCHEDULING | Facility: HOME HEALTH | Age: 72
End: 2022-05-13
Payer: MEDICARE

## 2022-05-13 PROCEDURE — 3331090001 HH PPS REVENUE CREDIT

## 2022-05-13 PROCEDURE — 3331090002 HH PPS REVENUE DEBIT

## 2022-05-13 PROCEDURE — G0151 HHCP-SERV OF PT,EA 15 MIN: HCPCS

## 2022-05-14 PROCEDURE — 3331090002 HH PPS REVENUE DEBIT

## 2022-05-14 PROCEDURE — 3331090001 HH PPS REVENUE CREDIT

## 2022-05-15 PROCEDURE — 3331090001 HH PPS REVENUE CREDIT

## 2022-05-15 PROCEDURE — 3331090002 HH PPS REVENUE DEBIT

## 2022-05-16 VITALS
SYSTOLIC BLOOD PRESSURE: 122 MMHG | OXYGEN SATURATION: 98 % | HEART RATE: 87 BPM | TEMPERATURE: 97.5 F | RESPIRATION RATE: 16 BRPM | DIASTOLIC BLOOD PRESSURE: 65 MMHG

## 2022-05-16 PROCEDURE — 3331090001 HH PPS REVENUE CREDIT

## 2022-05-16 PROCEDURE — 3331090002 HH PPS REVENUE DEBIT

## 2022-05-16 RX ORDER — TRAZODONE HYDROCHLORIDE 50 MG/1
TABLET ORAL
Qty: 120 TABLET | Refills: 0 | Status: SHIPPED | OUTPATIENT
Start: 2022-05-16

## 2022-05-17 ENCOUNTER — HOME CARE VISIT (OUTPATIENT)
Dept: SCHEDULING | Facility: HOME HEALTH | Age: 72
End: 2022-05-17
Payer: MEDICARE

## 2022-05-17 PROCEDURE — G0157 HHC PT ASSISTANT EA 15: HCPCS

## 2022-05-17 PROCEDURE — 3331090001 HH PPS REVENUE CREDIT

## 2022-05-17 PROCEDURE — 3331090002 HH PPS REVENUE DEBIT

## 2022-05-18 VITALS
SYSTOLIC BLOOD PRESSURE: 120 MMHG | RESPIRATION RATE: 10 BRPM | DIASTOLIC BLOOD PRESSURE: 60 MMHG | TEMPERATURE: 98.6 F | HEART RATE: 76 BPM | OXYGEN SATURATION: 99 %

## 2022-05-18 PROCEDURE — 3331090001 HH PPS REVENUE CREDIT

## 2022-05-18 PROCEDURE — 3331090002 HH PPS REVENUE DEBIT

## 2022-05-18 NOTE — HOME HEALTH
Subjective: Pt reported increased fatigue upon arrival.  Falls since last visit NO(if yes complete the Fall Tracking Form and include bsrifallreport):  Caregiver involvement changes: none. Home health supplies by type and quantity ordered/delivered this visit include: none. Does the patient have any new or changed medications? NO   If Yes, were medications reconciled? N/A   Was the certifying physician notified of changes in medications? N/A     Clinical assessment (what this visit means for the patient overall and need for ongoing skilled care) and progress or lack of progress towards SPECIFIC goals: PTA educated pt and caretaker during gait training , SBA, with SPC, with verbal cues to remove throw rugs to increase B knee flex during swing phase to decrease fall risk in which pt was able to decrease shuffling pattern. Pt will benefit from cont gait training instruction to increase overall safety . will cont to work on B LE therex. Interdisciplinary communication with: N/A   Discharge planning as follows: When goals are met  Specific plan for next visit: Instruct caregiver/patient in gait training outdoors.

## 2022-05-19 ENCOUNTER — HOME CARE VISIT (OUTPATIENT)
Dept: SCHEDULING | Facility: HOME HEALTH | Age: 72
End: 2022-05-19
Payer: MEDICARE

## 2022-05-19 PROCEDURE — 3331090002 HH PPS REVENUE DEBIT

## 2022-05-19 PROCEDURE — G0157 HHC PT ASSISTANT EA 15: HCPCS

## 2022-05-19 PROCEDURE — 3331090001 HH PPS REVENUE CREDIT

## 2022-05-19 NOTE — Clinical Note
Pt was inebriated upon PTA arrival which was obvious by his ataxic gait pattern after amb with PTA, empty liquor bottle next to couch. Pt then sat on his couch and began to drink liquor from another bottle and stated he was drunk. PTA contacted daughter to inform her of pts state and how he is increasing his fall risk and HH PT and PTA will not be able to tx pt under these conditons going forward. CM PT to follow up with pt and daughter. PTA informed [facility] supervisor .

## 2022-05-20 PROCEDURE — 3331090002 HH PPS REVENUE DEBIT

## 2022-05-20 PROCEDURE — 3331090001 HH PPS REVENUE CREDIT

## 2022-05-21 PROCEDURE — 3331090001 HH PPS REVENUE CREDIT

## 2022-05-21 PROCEDURE — 3331090002 HH PPS REVENUE DEBIT

## 2022-05-22 PROCEDURE — 3331090002 HH PPS REVENUE DEBIT

## 2022-05-22 PROCEDURE — 3331090001 HH PPS REVENUE CREDIT

## 2022-05-23 ENCOUNTER — TELEPHONE (OUTPATIENT)
Dept: INTERNAL MEDICINE CLINIC | Age: 72
End: 2022-05-23

## 2022-05-23 ENCOUNTER — HOME CARE VISIT (OUTPATIENT)
Dept: SCHEDULING | Facility: HOME HEALTH | Age: 72
End: 2022-05-23
Payer: MEDICARE

## 2022-05-23 ENCOUNTER — OFFICE VISIT (OUTPATIENT)
Dept: CARDIOLOGY CLINIC | Age: 72
End: 2022-05-23
Payer: MEDICARE

## 2022-05-23 VITALS
BODY MASS INDEX: 25.74 KG/M2 | DIASTOLIC BLOOD PRESSURE: 62 MMHG | WEIGHT: 164 LBS | HEIGHT: 67 IN | SYSTOLIC BLOOD PRESSURE: 112 MMHG | HEART RATE: 85 BPM | OXYGEN SATURATION: 99 % | RESPIRATION RATE: 16 BRPM

## 2022-05-23 DIAGNOSIS — Z95.0 CARDIAC PACEMAKER IN SITU: ICD-10-CM

## 2022-05-23 DIAGNOSIS — I25.10 CORONARY ARTERY DISEASE DUE TO LIPID RICH PLAQUE: Primary | ICD-10-CM

## 2022-05-23 DIAGNOSIS — M48.061 SPINAL STENOSIS, LUMBAR REGION, WITHOUT NEUROGENIC CLAUDICATION: ICD-10-CM

## 2022-05-23 DIAGNOSIS — I25.83 CORONARY ARTERY DISEASE DUE TO LIPID RICH PLAQUE: Primary | ICD-10-CM

## 2022-05-23 DIAGNOSIS — I44.2 THIRD DEGREE AV BLOCK (HCC): ICD-10-CM

## 2022-05-23 DIAGNOSIS — Z95.1 S/P CABG X 3: ICD-10-CM

## 2022-05-23 DIAGNOSIS — I25.5 ISCHEMIC CARDIOMYOPATHY: ICD-10-CM

## 2022-05-23 DIAGNOSIS — E78.2 MIXED HYPERLIPIDEMIA: ICD-10-CM

## 2022-05-23 PROCEDURE — G0151 HHCP-SERV OF PT,EA 15 MIN: HCPCS

## 2022-05-23 PROCEDURE — G8752 SYS BP LESS 140: HCPCS | Performed by: INTERNAL MEDICINE

## 2022-05-23 PROCEDURE — 3331090001 HH PPS REVENUE CREDIT

## 2022-05-23 PROCEDURE — 99214 OFFICE O/P EST MOD 30 MIN: CPT | Performed by: INTERNAL MEDICINE

## 2022-05-23 PROCEDURE — G0463 HOSPITAL OUTPT CLINIC VISIT: HCPCS | Performed by: INTERNAL MEDICINE

## 2022-05-23 PROCEDURE — 93010 ELECTROCARDIOGRAM REPORT: CPT | Performed by: INTERNAL MEDICINE

## 2022-05-23 PROCEDURE — 3331090002 HH PPS REVENUE DEBIT

## 2022-05-23 PROCEDURE — G8427 DOCREV CUR MEDS BY ELIG CLIN: HCPCS | Performed by: INTERNAL MEDICINE

## 2022-05-23 PROCEDURE — G9717 DOC PT DX DEP/BP F/U NT REQ: HCPCS | Performed by: INTERNAL MEDICINE

## 2022-05-23 PROCEDURE — 1101F PT FALLS ASSESS-DOCD LE1/YR: CPT | Performed by: INTERNAL MEDICINE

## 2022-05-23 PROCEDURE — G8536 NO DOC ELDER MAL SCRN: HCPCS | Performed by: INTERNAL MEDICINE

## 2022-05-23 PROCEDURE — G8417 CALC BMI ABV UP PARAM F/U: HCPCS | Performed by: INTERNAL MEDICINE

## 2022-05-23 PROCEDURE — 93005 ELECTROCARDIOGRAM TRACING: CPT | Performed by: INTERNAL MEDICINE

## 2022-05-23 PROCEDURE — 3017F COLORECTAL CA SCREEN DOC REV: CPT | Performed by: INTERNAL MEDICINE

## 2022-05-23 PROCEDURE — G8754 DIAS BP LESS 90: HCPCS | Performed by: INTERNAL MEDICINE

## 2022-05-23 RX ORDER — ALFUZOSIN HYDROCHLORIDE 10 MG/1
10 TABLET, EXTENDED RELEASE ORAL DAILY
COMMUNITY
Start: 2022-04-27

## 2022-05-23 RX ORDER — ASPIRIN 81 MG/1
81 TABLET ORAL DAILY
COMMUNITY

## 2022-05-23 NOTE — PATIENT INSTRUCTIONS
You physician has ordered blood work to be done in January 28, 2023. You will be schedule for an Echocardiogram to be done in September this year. Follow up with us in a year.

## 2022-05-23 NOTE — TELEPHONE ENCOUNTER
Called, Spoke with micky(Daughter)  Received two pt identifiers  Hardeep Chapa informed Dr. Cheli Cameron does not prescribe meal replacements at this time. Pt verbalizes understanding of the instructions and has no further questions at this time.

## 2022-05-23 NOTE — HOME HEALTH
Subjective: Pt stated he was drunk. Falls since last visit NO(if yes complete the Fall Tracking Form and include bsrifallreport):  Caregiver involvement changes: none. Home health supplies by type and quantity ordered/delivered this visit include: none. Does the patient have any new or changed medications? NO   If Yes, were medications reconciled? N/A   Was the certifying physician notified of changes in medications? N/A     Clinical assessment (what this visit means for the patient overall and need for ongoing skilled care) and progress or lack of progress towards SPECIFIC goals: Pt was inebriated upon PTA arrival which was obvious by his ataxic gait pattern after amb with PTA. Pt then sat on his couch and began to drink alcohol and stated he was drunk. PTA contacted daughter to inform her of pts state and how he is increasing his fall risk and HH PT and PTA will not be able to tx pt under these conditons going forward. CM PT to follow up with pt and daughter. [facility] supervisor . Interdisciplinary communication with: JOSE LUIS PT, Clinical Manager for the purpose of POC collaboration  Discharge planning as follows: When goals are met  Specific plan for next visit: Instruct caregiver/patient in gait training.

## 2022-05-23 NOTE — LETTER
5/23/2022    Patient: Natalya Chandra   YOB: 1950   Date of Visit: 5/23/2022     Marquez Dooley MD  [address] [facility] [address] [facility]  [address]  [facility]  Via In [facility] & [facility] [address]    Dear Marquez Dooley MD,      Thank you for referring Mr. Ashu Carlos to [address]  for evaluation. My notes for this consultation are attached. If you have questions, please do not hesitate to call me. I look forward to following your patient along with you.       Sincerely,    Vaibhav Sr MD

## 2022-05-23 NOTE — PROGRESS NOTES
[address], [address] [facility]  [phone]     Subjective:      Hali Lopez is a 66 y.o. male is here for routine f/u. He has pmhx CAD s/p CABG, CHB s/p PM, HTN, HLD, DM  And CKD III. Last seen by me in May 2021. Last  OV 12/2020:  C/o ROLON---ordered echo. Echo: EF appears lower than before -- was 60-65%, now about 45-50%. We started low dose lisinopril which he did not tolerate.     August 2021-had colonoscopy by Dr. Jaime Rodriguez for hematochezia, which revealed radiation changes related to prostate radiation. Aspirin was held for a while. Restarted aspirin in early May without recurrent bleeding. The patient denies chest pain, orthopnea, PND, LE edema, palpitations, syncope, or presyncope.           Patient Active Problem List    Diagnosis Date Noted    Pacemaker 05/01/2020    Third degree AV block ([address] 05/01/2020    Postoperative anemia due to acute blood loss 04/29/2020    S/P CABG x 3 04/28/2020    CAD (coronary artery disease) 04/27/2020    Bilateral carotid artery stenosis 04/24/2020    NSTEMI (non-ST elevated myocardial infarction) ([address] 04/23/2020    Type 2 diabetes mellitus with diabetic neuropathy ([address] 01/09/2020    Prostate cancer ([address] 06/04/2018    Type 2 diabetes with nephropathy ([address] 02/28/2018    Reactive depression 11/15/2016    Cervical spinal stenosis 11/03/2016    CKD (chronic kidney disease) 10/31/2016    Hyperlipidemia 07/10/2015    Spinal stenosis, lumbar region, without neurogenic claudication 10/16/2014    DVT of leg (deep venous thrombosis) ([address] 12/18/2012    HTN (hypertension) 12/12/2012    Perforated diverticulum of large intestine 12/04/2012    Alcohol abuse 12/04/2012    Insomnia 12/04/2012      Kierra Barba MD  Past Medical History:   Diagnosis Date    Arthritis     Cancer [facility])     Prostate ca    Chronic kidney disease     Stage 3    Chronic pain     Abdoman, back, fingers per daughter   Wilhelminia Blazing Diabetes ([facility]     Heart attack ([facility]     Heart failure ([facility] 2020    Dr. Eric Boswell Hyperlipidemia     Hypertension     Pacemaker 05/01/2020    MED    PE (pulmonary embolism)     Pneumonia     Psychiatric disorder     Depression    Sleep apnea     not use CPAP      Past Surgical History:   Procedure Laterality Date    COLONOSCOPY N/A 3/19/2021    COLONOSCOPY performed by Chacorta Carolina MD at [facility] ENDOSCOPY    COLONOSCOPY N/A 5/10/2021    COLONOSCOPY performed by Chacorta Carolina MD at [facility] ENDOSCOPY    FLEXIBLE SIGMOIDOSCOPY N/A 8/2/2021    FLEXIBLE SIGMOIDOSCOPY performed by Chacorta Carolina MD at [facility] ENDOSCOPY    HX BACK SURGERY  2014    HX CERVICAL LAMINECTOMY      HX CORONARY ARTERY BYPASS GRAFT  04/2021    X3     HX GI  11/2012    bowel resection    HX HEART CATHETERIZATION  04/2020    HX ORTHOPAEDIC      amputated left 4th finger    KS ABDOMEN SURGERY PROC UNLISTED      bowel resection    KS INS NEW/RPLCMT PRM PM W/TRANSV ELTRD ATRIAL&VENT N/A 5/1/2020    INSERT PPM DUAL performed by Elly Rojo MD at [facility]  CATH LAB     Allergies   Allergen Reactions    Arb-Angiotensin Receptor Antagonist Other (comments)     High k    Statins-Hmg-Coa Reductase Inhibitors Myalgia    Ace Inhibitors Cough      Family History   Problem Relation Age of Onset    Cancer Mother         unknown    Heart Disease Father     Heart Disease Brother     Anesth Problems Neg Hx       Social History     Socioeconomic History    Marital status:      Spouse name: Not on file    Number of children: 1    Years of education: 15    Highest education level: High school graduate   Occupational History    Not on file   Tobacco Use    Smoking status: Never Smoker    Smokeless tobacco: Current User     Types: Chew    Tobacco comment: Chews tobacco every day   Vaping Use    Vaping Use: Never used   Substance and Sexual Activity    Alcohol use:  Yes     Alcohol/week: 7.0 standard drinks     Types: 7 Cans of beer per week     Comment:  3-4 beers daily     Drug use: Never    Sexual activity: Not Currently   Other Topics Concern     Service No    Blood Transfusions No    Caffeine Concern No    Occupational Exposure No    Hobby Hazards No    Sleep Concern Yes    Stress Concern Yes    Weight Concern No    Special Diet No    Back Care Yes    Exercise No    Bike Helmet Not Asked    Seat Belt Yes    Self-Exams Not Asked   Social History Narrative    ** Merged History Encounter **          Social Determinants of Health     Financial Resource Strain:     Difficulty of Paying Living Expenses: Not on file   Food Insecurity:     Worried About Running Out of Food in the Last Year: Not on file    Jenna of Food in the Last Year: Not on file   Transportation Needs:     Lack of Transportation (Medical): Not on file    Lack of Transportation (Non-Medical): Not on file   Physical Activity:     Days of Exercise per Week: Not on file    Minutes of Exercise per Session: Not on file   Stress:     Feeling of Stress : Not on file   Social Connections:     Frequency of Communication with Friends and Family: Not on file    Frequency of Social Gatherings with Friends and Family: Not on file    Attends Shinto Services: Not on file    Active Member of [address] [place] [address] [employer] or Organizations: Not on file    Attends Club or Organization Meetings: Not on file    Marital Status: Not on file   Intimate Partner Violence:     Fear of Current or Ex-Partner: Not on file    Emotionally Abused: Not on file    Physically Abused: Not on file    Sexually Abused: Not on file   Housing Stability:     Unable to Pay for Housing in the Last Year: Not on file    Number of [place] in the Last Year: Not on file    Unstable Housing in the Last Year: Not on file      Current Outpatient Medications   Medication Sig    alfuzosin SR (UROXATRAL) 10 mg SR tablet Take 10 mg by mouth nightly.       traZODone (DESYREL) 50 mg tablet TAKE 1 TO 1 & 1/2 (ONE & ONE-HALF) TABLETS BY MOUTH NIGHTLY AS NEEDED    gabapentin (NEURONTIN) 300 mg capsule Take 300 mg by mouth three (3) times daily. Indications: restless legs syndrome, an extreme discomfort in the calf muscles when sitting or lying down    mv, min #36-iron,carbonyl-FA (Geritol Complete) 16 mg iron- 0.38 mg tab Take 1 Tablet by mouth daily.  methylPREDNISolone (MEDROL DOSEPACK) 4 mg tablet Per pack    furosemide (LASIX) 20 mg tablet Take 1 Tablet by mouth daily.  SITagliptin (Januvia) 50 mg tablet Take 1 tablet by mouth once daily    sertraline (ZOLOFT) 50 mg tablet Take 1 tablet by mouth once daily    metoprolol succinate (TOPROL-XL) 25 mg XL tablet Take 1 tablet by mouth once daily (Patient taking differently: 12.5 mg.)    zinc sulfate (ZINC-220 PO) Take  by mouth.  doxycycline (VIBRAMYCIN) 100 mg capsule TAKE 1 CAPSULE BY MOUTH ONCE DAILY SEPARATE FROM MAGNESIUM BY AT LEAST TWO HOURS    turmeric 400 mg cap Take  by mouth.  Praluent Pen 75 mg/mL injector pen INJECT 1 ML SUBCUTANEOUSLY  EVERY TWO WEEKS    vitamin e (E GEMS) 100 unit capsule Take 1 capsule three times daily    docusate sodium (Dulcolax Stool Softener, dss,) 100 mg capsule Take 1 Cap by mouth two (2) times a day.  pumpkin seed extract-soy germ 300 mg cap Take 1 Cap by mouth as needed (prostate health).  co-enzyme Q-10 (Co Q-10) 100 mg capsule Take 100 mg by mouth daily. 200 mg    cholecalciferol, vitamin D3, (VITAMIN D3 PO) Take 1,000 mg by mouth daily.  ascorbate calcium (VITAMIN C PO) Take 1 Tab by mouth daily as needed.  cyanocobalamin 1,000 mcg tablet Take 1,000 mcg by mouth daily.  senna-docusate (PERICOLACE) 8.6-50 mg per tablet Take 1 Tablet by mouth daily as needed for Constipation. (Patient not taking: Reported on 1/31/2022)     No current facility-administered medications for this visit.          Review of Symptoms:  11 systems reviewed, negative other than as stated in the HPI    Physical ExamPhysical Exam:    Vitals:    05/23/22 1125   BP: 112/62   Pulse: 85   Resp: 16   SpO2: 99%   Weight: 164 lb (74.4 kg)   Height: 5' 7\" (1.702 m)     Body mass index is 25.69 kg/m². General PE  Gen:  NAD  Mental Status - Alert. General Appearance - Not in acute distress. HEENT:  PERRL, no carotid bruits or JVD  Chest and Lung Exam   Inspection: Accessory muscles - No use of accessory muscles in breathing. Auscultation:   Breath sounds: - Normal.   Cardiovascular   Inspection: Jugular vein - Bilateral - Inspection Normal.   Palpation/Percussion:   Apical Impulse: - Normal.   Auscultation: Rhythm - Regular. Heart Sounds - S1 WNL and S2 WNL. No S3 or S4. Murmurs & Other Heart Sounds: Auscultation of the heart reveals - No Murmurs. Peripheral Vascular   Upper Extremity: Inspection - Bilateral - No Cyanotic nailbeds or Digital clubbing. Lower Extremity:   Palpation: Edema - Bilateral - No edema. Abdomen:   Soft, non-tender, bowel sounds are active.   Neuro: A&O times 3, CN and motor grossly WNL    Labs:   Lab Results   Component Value Date/Time    Cholesterol, total 104 01/28/2022 10:15 AM    Cholesterol, total 99 04/26/2021 10:54 AM    Cholesterol, total 100 03/10/2021 10:13 AM    Cholesterol, total 156 11/18/2020 09:53 AM    Cholesterol, total 103 08/19/2020 09:31 AM    HDL Cholesterol 52 01/28/2022 10:15 AM    HDL Cholesterol 60 04/26/2021 10:54 AM    HDL Cholesterol 46 03/10/2021 10:13 AM    HDL Cholesterol 49 11/18/2020 09:53 AM    HDL Cholesterol 42 08/19/2020 09:31 AM    LDL, calculated 39 01/28/2022 10:15 AM    LDL, calculated 29.6 04/26/2021 10:54 AM    LDL, calculated 38 03/10/2021 10:13 AM    LDL, calculated 93 11/18/2020 09:53 AM    LDL, calculated 48 08/19/2020 09:31 AM    LDL, calculated 112 (H) 05/14/2020 01:45 PM    LDL, calculated 138 (H) 01/09/2020 11:36 AM    Triglyceride 65 01/28/2022 10:15 AM    Triglyceride 47 04/26/2021 10:54 AM    Triglyceride 80 03/10/2021 10:13 AM    Triglyceride 69 11/18/2020 09:53 AM    Triglyceride 63 08/19/2020 09:31 AM    CHOL/HDL Ratio 2.0 01/28/2022 10:15 AM    CHOL/HDL Ratio 1.7 04/26/2021 10:54 AM     Lab Results   Component Value Date/Time     04/26/2021 10:54 AM     Lab Results   Component Value Date/Time    Sodium 140 05/04/2022 01:21 PM    Potassium 4.1 05/04/2022 01:21 PM    Chloride 106 05/04/2022 01:21 PM    CO2 25 05/04/2022 01:21 PM    Anion gap 9 05/04/2022 01:21 PM    Glucose 81 05/04/2022 01:21 PM    BUN 26 (H) 05/04/2022 01:21 PM    Creatinine 1.34 (H) 05/04/2022 01:21 PM    BUN/Creatinine ratio 19 05/04/2022 01:21 PM    GFR est AA >60 05/04/2022 01:21 PM    GFR est non-AA 52 (L) 05/04/2022 01:21 PM    Calcium 9.2 05/04/2022 01:21 PM    Bilirubin, total 2.2 (H) 05/04/2022 01:21 PM    Alk. phosphatase 68 05/04/2022 01:21 PM    Protein, total 7.4 05/04/2022 01:21 PM    Albumin 3.6 05/04/2022 01:21 PM    Globulin 3.8 05/04/2022 01:21 PM    A-G Ratio 0.9 (L) 05/04/2022 01:21 PM    ALT (SGPT) 24 05/04/2022 01:21 PM       EKG:  AS-    Echo 9/2021:  · LV: Estimated LVEF is 45 - 50%. Biplane method used to measure ejection fraction. Normal cavity size. Severe concentric hypertrophy. Globally reduced systolic function. Severe (grade 3) left ventricular diastolic dysfunction. · LA: Severely dilated left atrium. · RV: Mildly dilated right ventricle. Mildly reduced systolic function. Pacing wire present  · RA: Moderately dilated right atrium. · MV: Mitral valve thickening. RV pacing  Visual LVEF 45%  Biplane LVEF 48%  3 D LVEF was attempted but not recorded            Assessment:        1. Coronary artery disease due to lipid rich plaque    2. Ischemic cardiomyopathy    3. S/P CABG x 3    4. Cardiac pacemaker in situ    5. Third degree AV block ([address]    6. Mixed hyperlipidemia    7.  Spinal stenosis, lumbar region, without neurogenic claudication        Orders Placed This Encounter    AMB POC EKG ROUTINE W/ 12 LEADS, INTER & REP     Order Specific Question:   Reason for Exam:     Answer:   ROUTINE    alfuzosin SR (UROXATRAL) 10 mg SR tablet     Sig: Take 10 mg by mouth nightly. Plan:        ASHD Hx CABG x 3 in 4/2020---LIMA to LAD, SVG to RCA and Ramus  Continue ASA BB   Intolerant to statins, now on Praluent  Patient notes resolution of chest pain since his bypass.       CM, new   Echo September 2021, LVEF 45 to 50%  Echo 3/2021 showed EF 40-45% mild- mod MR mild AR  Echo 12/2020 showed EF 45-50% mild MR  Echo in April 2020 showed EF 60-65% , mild mitral regurgitation. On BB. He is intolerant of lisinopril and ARB's as per allergies section. BP borderline low, so will not add hydralazine or isosorbide mononitrate at this time  Patient has been 100% RV paced  [facility] May 2021 with normal perfusion, calculated LVEF of 37%   We will repeat echocardiogram in September 2022, and if LVEF remains reduced, forward result to Dr. Darwni Mendosa to decide if device upgrade is indicated    CHB s/p PPM 5/2020  Device followed by Dr Dawson---last seen 12/2021 patient pacemaker check today show proper function.  The patient is dependent on RV pacing. Previously he had short atrial flutter but the device check today did not show the new AMS arrhythmic event  Last seen by Dr. Darwin Mendosa in December 2021- acknowledged LVEF 45-50 in September, with plan to \"Repeat echo in 2022 and if LVEF declines, consider upgrade to biventricular pacing\"     HTN  Controlled with current therapy     HLD  Statin intolerance---In chart review has been intolerant to Crestor, Lipitor, pravastatin  LDL was 39 in January 2022. Noted normal transaminases in May 2022. Restarted Praluent in 1/2021  Repeat labs in January 2023     CKD III  Cr 1.57 in 4/2021  Cr. 1.62 in 11/2020  Cr 1.54 in 6/2020     DM  On oral agent     Hx DVT/PE: developed PE/DVT after bowel resection in 2012    History of rectal bleeding due to an ulcer related to radiation treatment:  No recurrent episodes.   Back on aspirin 81 mg without any recurrence of bleeding at this point. They will let us know if recurrent episodes.     Continue current care and f/u in 1 year with me if stress test is negative, and with electrophysiology as scheduled.       Adam Brown MD

## 2022-05-24 ENCOUNTER — HOME CARE VISIT (OUTPATIENT)
Dept: HOME HEALTH SERVICES | Facility: HOME HEALTH | Age: 72
End: 2022-05-24
Payer: MEDICARE

## 2022-05-24 ENCOUNTER — TELEPHONE (OUTPATIENT)
Dept: INTERNAL MEDICINE CLINIC | Age: 72
End: 2022-05-24

## 2022-05-24 PROCEDURE — 3331090002 HH PPS REVENUE DEBIT

## 2022-05-24 PROCEDURE — 3331090001 HH PPS REVENUE CREDIT

## 2022-05-24 NOTE — CASE COMMUNICATION
Upon arrival patient's daughter opened the for the Physical therapist and told to wait for the patient in the living room. Patient came to the [address] [facility] after few minutes by ambulating using a cane. Physical therapist noticed patient was very unsteady on his feet and his speech was not clear. PT initially thought that was due to his facemask but realized a little later that patient is under the influence of alcohol. Physical therapist  explained to the patient/his daughter (daughter was present throughout the visit today) about the purpose of the supervisory visit including safety concerns from the PTA happened last visit/not drinking during the day of the PT vest. Patient started getting agitated during this time saying that Tevin Palmerter is my personal issue; therapist has no business with it. Physical therapist explained to the patient about the dangers of drinking in cluding safety concerns/fall risk and inappropriate behavior to the [facility] staff. Patients' daughter intervened this time and supported the physical therapist by saying patient must stop drinking. Patient agreed to that initially and gave a promise that he will not be drinking during the days that the therapist is coming. PT then started the visit process and attempted to check the Blood pressure. Patient declined this saying that I just c monika back from the MD office, and you don't need to check it again. If you want to know, check the records or ask my daughter. Physical therapist explained to the patient that it is part of the visit that vitals must be checked during each visit. Patient was not ready to listen and started to get more agitated, but words was not clear due his level of in toxification. Patients' daughter intervened again at this time and told to the chandni ent to cooperate. Patient was not happy with the daughter but agreed by saying Guerline Ivory do whatever you want.  Physical therapist realized at this time that it is going to be very difficult to work with the patient going forward due to the behavioral issues/safety issues including patient mentioned to the PTA that he got firearms at home/alcohol abuse. PT decided to leave the patients home at this time and explained the reasons to the daught er outside the home. Patients' daughter was ok with the decision and agreed with the PT about the plan (discharging from the agency). Patient will be discharged from the agency due to safety concerns while working the patient. Patients' daughter reported a fall happened yesterday during today's visit, but PT was unable to get any details of the fall due to the nature of the visit. PT believe the fall is due to drinking and that makes  the patient very unsteady put him at high fall risk.

## 2022-05-24 NOTE — CASE COMMUNICATION
I agree and it is not acceptable drinking while we are there. There is a staff safety issue, and we are discharging hip. I already communicated with the MD office about this.    ----- Message -----  From: Valorie Carolina: 5/23/2022   8:04 AM EDT  To: Juliette Bauman, PT      Pt was inebriated upon PTA arrival which was obvious by his ataxic gait pattern after amb with PTA, empty liquor bottle next to couch. Pt then sat on his couch  and began to drink liquor from another bottle and stated he was drunk. PTA contacted daughter to inform her of pts state and how he is increasing his fall risk and HH PT and PTA will not be able to tx pt under these conditons going forward. CM PT to follow up with pt and daughter. PTA informed [facility] supervisor .

## 2022-05-24 NOTE — TELEPHONE ENCOUNTER
----- Message from David Luecro sent at 5/24/2022 12:02 PM EDT -----  Subject: Message to Provider    QUESTIONS  Information for Provider? [address] [facility] called in wanting to let PCP   know that they are suspending the patients home physical therapy because   he is being uncooperative, intoxicated during visits and has been speaking   threating to the therapists. They have tried multiple times to help the   patient but he is unwilling. The physical therapists Ollie Nicholson did try to see   him yesterday and the PT was drunk, referencing to fire arms within the   home and would not let them take his vitals. The patient was also drunk   and falling around.   ---------------------------------------------------------------------------  --------------  CALL BACK INFO  What is the best way for the office to contact you? OK to leave message on   voicemail  Preferred Call Back Phone Number? [phone]  ---------------------------------------------------------------------------  --------------  SCRIPT ANSWERS  Relationship to Patient? Third Party  Third Party Type? Home Health Care? Representative Name?  Kelly Gonzalez

## 2022-05-24 NOTE — CASE COMMUNICATION
I agree and it is not acceptable drinking while we are there. There is a staff safety issue, and we are discharging hip. I already communicated with the MD office about this. Esvin Najera  ----- Message -----  From: Osiel Joiner  Sent: 5/23/2022   8:04 AM EDT  To: Nico Solis, PT      Pt was inebriated upon PTA arrival which was obvious by his ataxic gait pattern after amb with PTA, empty liquor bottle next to couch. Pt then sat on his  couch and began to drink liquor from another bottle and stated he was drunk. PTA contacted daughter to inform her of pts state and how he is increasing his fall risk and HH PT and PTA will not be able to tx pt under these conditons going forward. CM PT to follow up with pt and daughter. PTA informed [facility] supervisor .

## 2022-05-25 PROCEDURE — 3331090003 HH PPS REVENUE ADJ

## 2022-05-25 PROCEDURE — 3331090001 HH PPS REVENUE CREDIT

## 2022-05-25 PROCEDURE — 3331090002 HH PPS REVENUE DEBIT

## 2022-06-08 RX ORDER — SERTRALINE HYDROCHLORIDE 50 MG/1
TABLET, FILM COATED ORAL
Qty: 90 TABLET | Refills: 0 | Status: SHIPPED | OUTPATIENT
Start: 2022-06-08 | End: 2022-10-06 | Stop reason: SDUPTHER

## 2022-06-16 NOTE — TELEPHONE ENCOUNTER
----- Message from Nataly [place] sent at 10/29/2020  4:56 PM EDT -----  Regarding: Dr. Katya Arnett  General Message/Vendor Calls    Caller's first and last name: Jazzymundo Charanjit blunt/[facility]       Reason for call: Following up on previous call. Requesting a call back due to concerns when making a recent assessment.       Callback required yes/no and why: Yes      Best contact number(s):  [phone]       Details to clarify the request: 1st request was on 10/26       [facility]    Copy/paste [facility] aerobic capacity/endurance/gait, locomotion, and balance

## 2022-07-01 ENCOUNTER — OFFICE VISIT (OUTPATIENT)
Dept: NEUROLOGY | Age: 72
End: 2022-07-01
Payer: MEDICAID

## 2022-07-01 VITALS
OXYGEN SATURATION: 98 % | WEIGHT: 175 LBS | BODY MASS INDEX: 27.47 KG/M2 | HEART RATE: 65 BPM | DIASTOLIC BLOOD PRESSURE: 70 MMHG | HEIGHT: 67 IN | SYSTOLIC BLOOD PRESSURE: 116 MMHG

## 2022-07-01 DIAGNOSIS — E63.9 POOR NUTRITION: ICD-10-CM

## 2022-07-01 DIAGNOSIS — M54.16 RIGHT LUMBAR RADICULOPATHY: ICD-10-CM

## 2022-07-01 DIAGNOSIS — R26.9 GAIT DISTURBANCE: ICD-10-CM

## 2022-07-01 DIAGNOSIS — R41.3 MEMORY LOSS: Primary | ICD-10-CM

## 2022-07-01 DIAGNOSIS — B35.3 TINEA PEDIS OF BOTH FEET: ICD-10-CM

## 2022-07-01 PROCEDURE — G9717 DOC PT DX DEP/BP F/U NT REQ: HCPCS | Performed by: PSYCHIATRY & NEUROLOGY

## 2022-07-01 PROCEDURE — 3017F COLORECTAL CA SCREEN DOC REV: CPT | Performed by: PSYCHIATRY & NEUROLOGY

## 2022-07-01 PROCEDURE — G8417 CALC BMI ABV UP PARAM F/U: HCPCS | Performed by: PSYCHIATRY & NEUROLOGY

## 2022-07-01 PROCEDURE — 99205 OFFICE O/P NEW HI 60 MIN: CPT | Performed by: PSYCHIATRY & NEUROLOGY

## 2022-07-01 PROCEDURE — G8752 SYS BP LESS 140: HCPCS | Performed by: PSYCHIATRY & NEUROLOGY

## 2022-07-01 PROCEDURE — 1123F ACP DISCUSS/DSCN MKR DOCD: CPT | Performed by: PSYCHIATRY & NEUROLOGY

## 2022-07-01 PROCEDURE — G8427 DOCREV CUR MEDS BY ELIG CLIN: HCPCS | Performed by: PSYCHIATRY & NEUROLOGY

## 2022-07-01 PROCEDURE — G8754 DIAS BP LESS 90: HCPCS | Performed by: PSYCHIATRY & NEUROLOGY

## 2022-07-01 PROCEDURE — 1101F PT FALLS ASSESS-DOCD LE1/YR: CPT | Performed by: PSYCHIATRY & NEUROLOGY

## 2022-07-01 PROCEDURE — G8536 NO DOC ELDER MAL SCRN: HCPCS | Performed by: PSYCHIATRY & NEUROLOGY

## 2022-07-01 NOTE — LETTER
7/1/2022    Patient: Isis Curry   YOB: 1950   Date of Visit: 7/1/2022     Ileana Jimenez, [address]  [address] [facility] [address]  [address] [phone]  [place] [place]    Dear Ileana Jimenez MD,      Thank you for referring Mr. Brianda Hirsch to [address] [facility] for evaluation. My notes for this consultation are attached. If you have questions, please do not hesitate to call me. I look forward to following your patient along with you.       Sincerely,    Peterson Sandra MD

## 2022-07-01 NOTE — PROGRESS NOTES
Neurology Consult Note      HISTORY PROVIDED BY: patient and daughter    Chief Complaint:   Chief Complaint   Patient presents with    New Patient     fell in March, Easter    Stroke    Memory Loss      Subjective:    Analisa Mcdonald is a 66 y.o. right handed male who presents in consultation for memory loss. Pt has noticed trouble with his short term memory. Gives example of misplacing his cane. He lives alone. His daughter states he has trouble getting his thoughts together to express himself. Has trouble getting his words out. He told her that he forgets why he went into a room, has to start over and will remember. He has insomnia, takes trazodone., +snoring, +NIMO but non-compliant with CPAP. He was mumbling and cursing in his sleep, has not gotten out of bed. Once while in hospital recovering from a heart attack and started screaming out during a nightmare and all of the nurses came running. No family h/o dementia. Additionally, she reports several falls, first fall on Easter, was in garden. [address] in ED was neg for acute findings. There is a chronic left occipital CVA with encephalomalacia and a cystic focus along the lateral aspect of left lateral ventricle. Pt was unaware of prior stroke. He had Xrays of hip and spine with arthritis due to pain in left hip, now on right side and radiating down right leg. He has trouble getting up off of toilet, feels like legs are weak and going to buckle. He has h/o cervical stenosis s/p surgery with Dr. Sahil Sam. Still has bilateral numbness and weakness. No numbness in legs. Right leg feels heavier than left. He also has diabetes. Labs 5/4/2022 reviewed-hemoglobin A1c 5.3, CBC, TSH, CMP unremarkable. Lab 1/28/2022 lipid panel with LDL 29.6.     Past Medical History:   Diagnosis Date    Arthritis     Cancer [facility])     Prostate ca    Chronic kidney disease     Stage 3    Chronic pain     Abdoman, back, fingers per daughter    Depression     Diabetes ([address]     Heart attack [facility] [address]     Heart failure ([facility] [address] 2020    Dr. Andra López Hyperlipidemia     Hypertension     Pacemaker 05/01/2020    MED    PE (pulmonary embolism)     Pneumonia     Sleep apnea     not use CPAP      Past Surgical History:   Procedure Laterality Date    COLONOSCOPY N/A 3/19/2021    COLONOSCOPY performed by Sabino Dumont MD at [facility] ENDOSCOPY    COLONOSCOPY N/A 5/10/2021    COLONOSCOPY performed by Sabino Dumont MD at [place] [address] N/A 8/2/2021    FLEXIBLE SIGMOIDOSCOPY performed by Sabino Dumont MD at [facility] ENDOSCOPY    HX BACK SURGERY  2014    HX CERVICAL LAMINECTOMY      HX CORONARY ARTERY BYPASS GRAFT  04/2021    X3     HX GI  11/2012    bowel resection    HX HEART CATHETERIZATION  04/2020    HX ORTHOPAEDIC      amputated left 4th finger    [place] ABDOMEN SURGERY PROC UNLISTED      bowel resection    [place] INS NEW/RPLCMT PRM PM W/TRANSV ELTRD ATRIAL&VENT N/A 5/1/2020    INSERT PPM DUAL [facility] CATH [facility]      Social History     Socioeconomic History    Marital status:      Spouse name: Not on file    Number of children: 1    Years of education: 15    Highest education level: High school graduate   Occupational History    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  \"Drinks like a fish\" he tells me    Drug use: Never    Sexual activity: Not Currently   Other Topics Concern     Service No    Blood Transfusions No    Caffeine Concern No    Occupational Exposure No    Hobby Hazards No    Sleep Concern Yes    Stress Concern Yes    Weight Concern No    Special Diet No    Back Care Yes    Exercise No    Bike Helmet Not Asked    Seat Belt Yes    Self-Exams Not Asked   Social History Narrative    ** Merged History Encounter **         Lives in [address] [place] [address] Road Strain:     Difficulty of Paying Living Expenses: Not on file   Food Insecurity:     Worried About Running Out of Food in the Last Year: Not on file    Jenna of Food in the Last Year: Not on file   Transportation Needs:     Lack of Transportation (Medical): Not on file    Lack of Transportation (Non-Medical): Not on file   Physical Activity:     Days of Exercise per Week: Not on file    Minutes of Exercise per Session: Not on file   Stress:     Feeling of Stress : Not on file   Social Connections:     Frequency of Communication with Friends and Family: Not on file    Frequency of Social Gatherings with Friends and Family: Not on file    Attends [employer] Services: Not on file    Active Member of [address] [place] [address] or Organizations: Not on file    Attends Club or Organization Meetings: Not on file    Marital Status: Not on file   Intimate Partner Violence:     Fear of Current or Ex-Partner: Not on file    Emotionally Abused: Not on file    Physically Abused: Not on file    Sexually Abused: Not on file   Housing Stability:     Unable to Pay for Housing in the Last Year: Not on file    Number of [place] in the Last Year: Not on file    Unstable Housing in the Last Year: Not on file     Family History   Problem Relation Age of Onset    Cancer Mother         unknown    Heart Disease Father     Heart Disease Brother     Anesth Problems Neg Hx          Objective:   Review of Systems   HENT: Positive for hearing loss. Cardiovascular: Positive for leg swelling. Musculoskeletal: Positive for joint pain. Neurological: Positive for sensory change and weakness.    Psychiatric/Behavioral: Positive for depression and memory loss. The patient is nervous/anxious. All other systems reviewed and are negative. Allergies   Allergen Reactions    Arb-Angiotensin Receptor Antagonist Other (comments)     High k    Statins-Hmg-Coa Reductase Inhibitors Myalgia    Ace Inhibitors Cough        Meds:  Outpatient Medications Prior to Visit   Medication Sig Dispense Refill    SITagliptin (Januvia) 50 mg tablet Take 1 tablet by mouth once daily 90 Tablet 0    sertraline (ZOLOFT) 50 mg tablet Take 1 tablet by mouth once daily 90 Tablet 0    cholecalciferol (Vitamin D3) 25 mcg (1,000 unit) cap Take 1,000 Units by mouth daily.  alfuzosin SR (UROXATRAL) 10 mg SR tablet Take 10 mg by mouth nightly.  aspirin delayed-release 81 mg tablet Take 81 mg by mouth daily.  traZODone (DESYREL) 50 mg tablet TAKE 1 TO 1 & 1/2 (ONE & ONE-HALF) TABLETS BY MOUTH NIGHTLY AS NEEDED 120 Tablet 0    mv, min #36-iron,carbonyl-FA (Geritol Complete) 16 mg iron- 0.38 mg tab Take 1 Tablet by mouth daily.  furosemide (LASIX) 20 mg tablet Take 1 Tablet by mouth daily. 30 Tablet 1    metoprolol succinate (TOPROL-XL) 25 mg XL tablet Take 1 tablet by mouth once daily (Patient taking differently: 12.5 mg.) 90 Tablet 1    zinc sulfate (ZINC-220 PO) Take  by mouth.  turmeric 400 mg cap Take  by mouth.  Praluent Pen 75 mg/mL injector pen INJECT 1 ML SUBCUTANEOUSLY  EVERY TWO WEEKS 6 mL 3    vitamin e (E GEMS) 100 unit capsule Take 1 capsule three times daily 90 Cap 6    pumpkin seed extract-soy germ 300 mg cap Take 1 Cap by mouth as needed (prostate health).  co-enzyme Q-10 (Co Q-10) 100 mg capsule Take 100 mg by mouth daily. 200 mg      cholecalciferol, vitamin D3, (VITAMIN D3 PO) Take 1,000 mg by mouth daily.  ascorbate calcium (VITAMIN C PO) Take 1 Tablet by mouth daily.  cyanocobalamin 1,000 mcg tablet Take 1,000 mcg by mouth daily.       gabapentin (NEURONTIN) 300 mg capsule Take 300 mg by mouth three (3) times daily. Indications: restless legs syndrome, an extreme discomfort in the calf muscles when sitting or lying down (Patient not taking: Reported on 7/1/2022)      methylPREDNISolone (MEDROL DOSEPACK) 4 mg tablet Per pack (Patient not taking: Reported on 7/1/2022) 1 Dose Pack 0    doxycycline (VIBRAMYCIN) 100 mg capsule TAKE 1 CAPSULE BY MOUTH ONCE DAILY SEPARATE FROM MAGNESIUM BY AT LEAST TWO HOURS (Patient not taking: Reported on 7/1/2022)      senna-docusate (PERICOLACE) 8.6-50 mg per tablet Take 1 Tablet by mouth daily as needed for Constipation. (Patient not taking: Reported on 1/31/2022) 90 Tablet 2    docusate sodium (Dulcolax Stool Softener, dss,) 100 mg capsule Take 1 Cap by mouth two (2) times a day. (Patient not taking: Reported on 7/1/2022) 30 Cap 0     No facility-administered medications prior to visit. Imaging:  MRI Results (most recent):  Results from Hospital Encounter encounter on 04/25/16    MRI CERV SPINE WO CONT    Narrative  **Final Report**      ICD Codes / Adm. Diagnosis: 782.0  782.0 / Disturbance of skin sensation  Disturbance of skin sensation  Examination:  MR Vel Ordaz CON  - [id number] - Apr 25 2016  9:33AM  Accession No:  [id number]  Reason:  severe djd      REPORT:  EXAM:  MR C SPINE WO CON    INDICATION:  Bilateral hand tingling. Clinical differential diagnosis  includes cervical degenerative disease and bilateral carpal tunnel syndrome. COMPARISON: Cervical spine degenerative disease on 4/12/2016    TECHNIQUE: MR imaging of the cervical spine was performed using the  following sequences: sagittal T1, T2, STIR;  axial T2, T1 performed on the 3  Ara magnet. CONTRAST:  None. FINDINGS:    2 mm posterior subluxation of C3 on C4. No other subluxation. Vertebral body  heights are maintained. Marrow signal is normal.    Disc desiccation is greatest at C3-C4 and C6-C7. No evidence of discitis. The craniocervical junction is intact. Mild cord compression at C3-4 and  C6-C7. No cord signal abnormality. The paraspinal soft tissues are within normal limits. C1-C2 osteoarthritis  moderate for age. C2-C3:  Small central disc protrusion. No stenosis. C3-C4:  Posterior disc osteophyte complex and posterior subluxation. Moderate central spinal canal stenosis. Moderate bilateral foraminal  stenosis. C4-C5:  No herniation or stenosis. C5-C6:  Posterior disc osteophyte complex. Mild central spinal canal  stenosis. Mild left foraminal stenosis. C6-C7:  Posterior disc osteophyte complex. Moderate central spinal canal  stenosis. Moderate-severe bilateral foraminal stenosis. C7-T1:  No herniation or stenosis. Impression  :    1. Mild cord compression at C3-4 and C6-7. No cord signal abnormality. 2. Moderate central spinal canal stenosis at C3-4 and C6-7.  3. Moderate-severe bilateral foraminal stenosis at C6-7.  4. 2 mm posterior subluxation of C3-C4. Consider flexion and extension  lateral cervical spine views to assess for motion. 23X          Signing/Reading Doctor: Carmelo Patient ([id number])  Approved: Carmelo Patient ([id number])  Apr 25 2016 11:30AM     CT Results (most recent):  Results from Hospital Encounter encounter on 03/12/21    CTA CHEST W OR W WO CONT    Narrative  Clinical history: ddimer elevated, r/o PE  INDICATION:   ddimer elevated, r/o PE  COMPARISON: 12/12/2012      CONTRAST: 100 ml Isovue 370  TECHNIQUE: CT of the chest with  IV contrast , Isovue-370 is performed. Axial  images from the thoracic inlet to the level of the upper abdomen are obtained. Manual post-processing of the images and coronal reformatting is also performed. CT dose reduction was achieved through use of a standardized protocol tailored  for this examination and automatic exposure control for dose modulation. Multiplanar reformatted imaging was performed. Sagittal and coronal reformatting. 3-D Postprocessing of imaging was performed.   3-D MIP reconstructed images were obtained in the coronal plane. FINDINGS:  There is no pulmonary embolism. There is no aortic aneurysm or dissection. Hepatic venous congestion. Hepatic steatosis. Cardiomegaly is extensive. Enlarged pulmonary arteries are suggestive of pulmonary arterial hypertension. Coronary vascular calcifications. Cardiac pacer. There is no pleural or  pericardial effusion. There is no mediastinal, axillary or hilar  lymphadenopathy. The aorta is normal in course and caliber. The proximal  pulmonary arteries are without evidence of filling defects. No lytic or blastic  lesions are identified. The remainder of the upper abdomen visualized is  unremarkable. Impression  There is no pulmonary embolism. There is no aortic aneurysm or dissection. Cardiomegaly and coronary artery disease.        Reviewed records in [address] [employer] tab today    Lab Review   Results for orders placed or performed in visit on 05/04/22   CBC W/O DIFF   Result Value Ref Range    WBC 5.2 4.1 - 11.1 K/uL    RBC 4.06 (L) 4.10 - 5.70 M/uL    HGB 11.6 (L) 12.1 - 17.0 g/dL    HCT 35.6 (L) 36.6 - 50.3 %    MCV 87.7 80.0 - 99.0 FL    MCH 28.6 26.0 - 34.0 PG    MCHC 32.6 30.0 - 36.5 g/dL    RDW 18.2 (H) 11.5 - 14.5 %    PLATELET 510 (L) 176 - 400 K/uL    MPV 11.4 8.9 - 12.9 FL    NRBC 0.0 0  WBC    ABSOLUTE NRBC 0.00 0.00 - 0.01 K/uL   TSH 3RD GENERATION   Result Value Ref Range    TSH 2.26 0.36 - 3.74 uIU/mL   HEMOGLOBIN A1C WITH EAG   Result Value Ref Range    Hemoglobin A1c 5.3 4.0 - 5.6 %    Est. average glucose 372 mg/dL   METABOLIC PANEL, COMPREHENSIVE   Result Value Ref Range    Sodium 140 136 - 145 mmol/L    Potassium 4.1 3.5 - 5.1 mmol/L    Chloride 106 97 - 108 mmol/L    CO2 25 21 - 32 mmol/L    Anion gap 9 5 - 15 mmol/L    Glucose 81 65 - 100 mg/dL    BUN 26 (H) 6 - 20 MG/DL    Creatinine 1.34 (H) 0.70 - 1.30 MG/DL    BUN/Creatinine ratio 19 12 - 20      GFR est AA >60 >60 ml/min/1.73m2    GFR est non-AA 52 (L) >60 ml/min/1.73m2    Calcium 9.2 8.5 - 10.1 MG/DL    Bilirubin, total 2.2 (H) 0.2 - 1.0 MG/DL    ALT (SGPT) 24 12 - 78 U/L    AST (SGOT) 35 15 - 37 U/L    Alk. phosphatase 68 45 - 117 U/L    Protein, total 7.4 6.4 - 8.2 g/dL    Albumin 3.6 3.5 - 5.0 g/dL    Globulin 3.8 2.0 - 4.0 g/dL    A-G Ratio 0.9 (L) 1.1 - 2.2     MICROALBUMIN, UR, RAND W/ MICROALB/CREAT RATIO   Result Value Ref Range    Microalbumin,urine random 1.82 MG/DL    Creatinine, urine 14.90 mg/dL    Microalbumin/Creat ratio (mg/g creat) 122 (H) 0 - 30 mg/g        Exam:  Visit Vitals  /70   Pulse 65   Ht 5' 7\" (1.702 m)   Wt 175 lb (79.4 kg)   SpO2 98%   BMI 27.41 kg/m²     General:  Alert, cooperative, no distress. Head:  Normocephalic, without obvious abnormality, atraumatic. Exophthalmos bilaterally - from birth per daughter. Respiratory:  Heart:   Non labored breathing  Regular rate and rhythm, no murmurs   Neck:   2+ carotids, no bruits   Extremities: Warm, no cyanosis or edema. Fungal infection on the soles of his feet as well as toenails. 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 Pulses: 2+ radial pulses. Neurologic:  MS: Alert and oriented x 4, speech intact. Language intact, able to name, repeat, and follow all commands. Attention and fund of knowledge appropriate. Recent and remote memory impaired to details of medical history.    Cranial Nerves:  II: visual fields Full to confrontation   II: pupils Equal, round, reactive to light   II: optic disc    III,VII: ptosis none   III,IV,VI: extraocular muscles  EOMI, no nystagmus or diplopia   V: facial light touch sensation  normal   VII: facial muscle function   symmetric   VIII: hearing intact   IX: soft palate elevation  normal   XI: trapezius strength  5/5   XI: sternocleidomastoid strength 5/5   XII: tongue  Midline     Motor: normal bulk and tone, no tremor              Strength: 5/5 throughout, no PD  Sensory: intact to LT, PP  Coordination: FTN and HTS intact, SANDHYA intact  Gait: Very unsteady antalgic gait with cane, required max two person assist to get on exam table. Reflexes: Absent, toes downgoing           Assessment/Plan   Pt is a 66 y.o. right handed male with DM with HgbA1C 5.3, HTN, HLD with LDL 39, NIMO non-compliant with CPAP, alcoholism, with c/o short term memory loss with daughter noting word finding and difficulty expressing himself. Additionally, pt started falling in April, seen in ED with [facility] revealing chronic left occipital CVA and left lateral ventricle cystic structure. Pt c/o bilateral LBP with radicular pain down right leg, legs feels weak and like they are going to buckle. He has h/o cervical stenosis and surgery, but has ongoing numbness and weakness in hands. Exam -fully oriented and language intact, but memory impaired to details of history, exophthalmos, unsteady gait, absent reflexes. This is a complex case. Patient's alcohol intake could be contributing to his gait disturbance, due to intoxication, but also possible peripheral neuropathy from alcohol and/or diabetes and poor nutrition with limited p.o. intake per patient. He has absent reflexes on exam though patient reported intact sensation. Patient reported that he \"drinks like a fish\" and had already started drinking this morning prior to his visit. Additionally, he has arthritis which could be causing the feeling that his legs are going to give out and could be the cause of his bilateral low back pain with pain radiating down right leg, but he may also have lumbar radiculopathy and stenosis with prior history of cervical stenosis. Recommend MRI L-spine without contrast, patient has an MRI compatible pacemaker with card scanned to chart. Memory loss could also be due to nutritional deficiencies such as B12 deficiency, acute alcohol intoxication, alcoholic dementia, and even Warnicke's encephalopathy given poor nutrition and alcohol intake.   Differential would include stroke as an etiology with prior history of stroke and multiple stroke risk factors, possible mass, and NPH. Recommend MRI brain without contrast.  Will order additional labs looking for metabolic causes of memory loss as well as neuropathy including B12/MMA, RPR, and vitamin B1 level. Patient was incidentally found to have significant fungal infection of his feet and referral placed to podiatry. Follow-up in clinic to be determined after testing completed, they are instructed to call after test completed to discuss results if they do not hear from our office within 1 week. ICD-10-CM ICD-9-CM    1. Memory loss  R41.3 780.93 MRI BRAIN WO CONT      VITAMIN B12      METHYLMALONIC ACID      VITAMIN B1, WHOLE BLOOD      RPR   2. Gait disturbance  R26.9 781.2 MRI LUMB SPINE WO CONT      MRI BRAIN WO CONT   3. Right lumbar radiculopathy  M54.16 724.4 MRI LUMB SPINE WO CONT   4. Poor nutrition  E63.9 269.9 VITAMIN B12      METHYLMALONIC ACID      VITAMIN B1, WHOLE BLOOD   5. Tinea pedis of both feet  B35.3 110.4 REFERRAL TO PODIATRY     I spent a total of 68 minutes in both face-to-face activities and non-face-to-face activities for the visit on the date of this encounter. Signed:   Bayron Cortez MD  7/1/2022

## 2022-07-06 ENCOUNTER — TELEPHONE (OUTPATIENT)
Dept: CARDIOLOGY CLINIC | Age: 72
End: 2022-07-06

## 2022-07-06 NOTE — TELEPHONE ENCOUNTER
Faxed MRI form to [id number] [facility] at fax number [phone]. Fax confirmation received.

## 2022-07-11 LAB
METHYLMALONATE SERPL-SCNC: 168 NMOL/L (ref 0–378)
VIT B12 SERPL-MCNC: 953 PG/ML (ref 232–1245)

## 2022-07-13 ENCOUNTER — OFFICE VISIT (OUTPATIENT)
Dept: CARDIOLOGY CLINIC | Age: 72
End: 2022-07-13
Payer: MEDICARE

## 2022-07-13 DIAGNOSIS — Z95.0 CARDIAC PACEMAKER IN SITU: Primary | ICD-10-CM

## 2022-07-13 PROCEDURE — 93296 REM INTERROG EVL PM/IDS: CPT | Performed by: INTERNAL MEDICINE

## 2022-07-13 NOTE — LETTER
7/13/2022 10:00 AM    Mr. Devi Mayer  1947 [id number] [facility] [id number]            This letter confirms that we have received your scheduled remote check of your implanted     device on 7-13-22  . Our EP team will contact you via phone if there are significant abnormal    findings. Your next remote check from home is scheduled for 10-19-22  . If you have any questions, please call [address] [place] [address] at [phone].                Sincerely,    Amy Reyna MD [facility]

## 2022-07-15 ENCOUNTER — TELEPHONE (OUTPATIENT)
Dept: CARDIOLOGY CLINIC | Age: 72
End: 2022-07-15

## 2022-07-15 ENCOUNTER — TELEPHONE (OUTPATIENT)
Dept: NEUROLOGY | Age: 72
End: 2022-07-15

## 2022-07-15 NOTE — TELEPHONE ENCOUNTER
Pt Id confirmed. Advised daughter, per Dr Truman Wray, \"B12/MMA look good. For some reason, RPR and B1 do not appear to have been drawn. Awaiting MRIs. \" Daughter stated lab slip was given, so not sure what happened, MRI scheduled.

## 2022-07-15 NOTE — PROGRESS NOTES
Please call pt: B12/MMA look good. For some reason, RPR and B1 do not appear to have been drawn. Awaiting MRIs.

## 2022-07-15 NOTE — TELEPHONE ENCOUNTER
----- Message from Priscila Tadeo MD sent at 7/15/2022  1:18 PM EDT -----  Please call pt: B12/MMA look good. For some reason, RPR and B1 do not appear to have been drawn. Awaiting MRIs.

## 2022-07-15 NOTE — TELEPHONE ENCOUNTER
Results for Bilirubin blood work from July 14, 2022 received   Bilirubin t                 2.5  Direct Bilirubin        1.02  Indirect Bilirubin      1.48    Results faxed to PCP.

## 2022-07-21 ENCOUNTER — TELEPHONE (OUTPATIENT)
Dept: CARDIOLOGY CLINIC | Age: 72
End: 2022-07-21

## 2022-07-21 NOTE — TELEPHONE ENCOUNTER
Blood work results from [employer] for Bilirubin blood work from July 14, 2022 received  Bilirubin t                 2.5  Direct Bilirubin        1.02  Indirect Bilirubin      1.48     Results faxed to PCP.

## 2022-07-26 ENCOUNTER — HOSPITAL ENCOUNTER (OUTPATIENT)
Dept: MRI IMAGING | Age: 72
Discharge: HOME | End: 2022-07-26
Attending: PSYCHIATRY & NEUROLOGY
Payer: MEDICARE

## 2022-07-26 DIAGNOSIS — R26.9 GAIT DISTURBANCE: ICD-10-CM

## 2022-07-26 DIAGNOSIS — R41.3 MEMORY LOSS: ICD-10-CM

## 2022-07-26 DIAGNOSIS — M54.16 RIGHT LUMBAR RADICULOPATHY: ICD-10-CM

## 2022-07-26 PROCEDURE — 72148 MRI LUMBAR SPINE W/O DYE: CPT

## 2022-07-26 PROCEDURE — 70551 MRI BRAIN STEM W/O DYE: CPT

## 2022-07-28 ENCOUNTER — TRANSCRIBE ORDER (OUTPATIENT)
Dept: SCHEDULING | Age: 72
End: 2022-07-28

## 2022-07-28 DIAGNOSIS — R41.3 MEMORY LOSS: Primary | ICD-10-CM

## 2022-07-28 DIAGNOSIS — R26.9 ABNORMALITY OF GAIT: ICD-10-CM

## 2022-08-06 NOTE — PROGRESS NOTES
HISTORY OF PRESENT ILLNESS  Jacquenette Kocher is a 66 y.o. male. HPI     Keisha Espinoza 1/31/22. Pt is here to f/u on chronic conditions.   Presents with his daughter who helps provide hx  Ambulates with wheelchair    His notes recent episode of leg swelling during ER visit, took lasix for this and using compression hose  Discussed taking lasix daily he had not been taking his Lasix recently and has been having more bilateral swelling    His daughter notes that he has c/o issues with hands, dropping things  Recall he had surgery on his C-spine in the past he is not taking his gabapentin currently and has not followed up with his neurosurgeon  Discussed can also f/u with neurosurg if continues  Recommended taking gabapentin    His daughter reports two recent falls, most recently was this past Sunday  He c/o L hip and leg pain following this going down the left leg felt like his leg was giving out  Endorses pain traveling down L leg  Will order steroid pack, xr  Will have PT go to house  Also we will have him see neurology due to multiple neurologic complaints and concern of memory loss as well which we discussed in the past    He was in the ED for a fall 4/17/22  BP was 114/71  Reviewed note: possible cyst at base of thumb, head CT showed chronic old changes to include evidence of old infarct and cyst I have referred him to neurology of note he is not taking aspirin daily and should resume daily aspirin discussed this with patient     Recall had DVT in the past    History of hypertension  BP today is 110/64  No home BP readings to review  Continues on toprol  25mg once daily now half tab, lowered by nephro      He has DM   No BS readings to review  Continues januvia 50mg daily        Wt today is 165 lbs, down 5 lbs x lov   Discussed that this wt/l is good     Reviewed labs   Will get labs today     He no longer takes ASA 81 mg- discussed that he should be taking this d/t evidence of old stroke and coronary disease     Pt follows annually with Dr. Marybel Lopez (cardio) for CAD and history of CABG 4/20  Last visit 11/15/21  Will f/u 5/22     For hyperlipidemia  taking praluent and Zetia     He saw  [facility] (cardio) for cardiomyopathy  Last visit 5/27/21     He saw Dr. Lenin Zimmerman) for CAD/CABG   Last there 6/3/20    [facility] had pacemaker placed with Dr. Dawson (cardio)  Last visit 12/28/21  Had pacemaker checked 4/04/22     had echocardiogram September 2021 EF around 39 to 50%      Has a history of CKD  Now following with Dr. Eduar Martinez (nephro),   Last visit was 4/22 per pt's daughter stage 3A  Baseline cr 1.4-1.5 stable on recent labs     Pt follows with [facility] (uro) for prostate cancer--  Recall pt has [facility] (brother and uncle) prostate cancer  Last there 1/22 reports PSAs are trending down  Follows q 6 mo     Pt also met with a radiation oncologist at [facility]  He completed radiation therapy with Dr. Pernell Melendez  Last visit 9/21  He did not radioactive seeds because of his recent CABG     Pt saw Dr. Yovani Gallegos (podiatry) in 4/19  Discussed following up with podiatrist, overgrown toenails, hammertoes, callus formation   Provided referral to podiatry      Recall Pt had a hearing evaluation in the past  Recall notes from ENT 5/28/19: has some hearing loss   Patient is having some continued difficulty with his ears and will schedule follow-up with ENT     Pt has been taking CoQ-10      He is taking gabapentin 300 mg again for pain in hands/feet occasionally does not use it very much recently has more neuropathic complaints sciatica discussed taking the gabapentin     He has zoloft 50mg this helps with his depression happy with dose     He also has ativan to use prn (not often, not in the past month) for anxiety      Pt uses trazodone nightly  Declines sleep study  Discussed can go up to 1.5mg, discussed can also take melatonin with this     He has been seeing Dr. Aroldo Callaway (sleep) for NIMO    Not compliant with cpap 5/22   Last visit 1/4/21       In the past, pt expressed concern about his memory  Previously, provided referral for Dr. Lulu Womack (neuropsych)  lov provided info for Dr. Keturah Hrenandez (neuropsych)  Had the appointment 19 --then refused testing in the end   Discussed should f/u with neuro     He has been following with Dr. Manda Baird (GI) for stomach bleeding  Recall in May 2021 he had an endoscopy and colonoscopy  Reviewed flexible sigmoidoscopy 21  Discussed finding out about if f/u is required d/t rectal ulcer     ACP not on file. SDM is his daughter (Yareli Santana). Provided information in the past.      Recall sees Dr. Ephraim Giang for h/o DVT, no longer on coumadin or xarelto, on ASA only.  Was on coumadin for h/o PE and DVT post operatively     PREVENTIVE:    Colonoscopy: 5/10/21 , Dr. Manda Baird, repeat 5 years    EGD: 5/10/21, Dr. Manda Baird  PSA:   Dr Telma Lombardi follows, 3/21 <1  <1 per pt  AAA screen: CT , negative  Tdap: unknown, due will get at pharm  Pneumovax:   [place] Pat: 19   Shingrix: due reminded  Flu shot: 21  Foot exam: 22  Microalbumin:   A1c:   6.0  6.2  6.1,  5.6 POC  Eye exam: Dr. Rajan Reyes 3/22  Hep C Screen: 10/15, negative  Lipids:  :D: 39  EK/17, PACs  Covid: three doses [employer]), discussed eligible for 2nd booster, discussed waiting until after done with steroids    Patient Active Problem List    Diagnosis Date Noted    Pacemaker 2020    Third degree AV block ([facility] 2020    Postoperative anemia due to acute blood loss 2020    S/P CABG x 3 2020    CAD (coronary artery disease) 2020    Bilateral carotid artery stenosis 2020    NSTEMI (non-ST elevated myocardial infarction) ([facility] 2020    Type 2 diabetes mellitus with diabetic neuropathy ([facility] 2020    Prostate cancer ([facility] 2018    Type 2 diabetes with nephropathy ([facility] 2018    Reactive depression 11/15/2016    Cervical spinal stenosis 2016    CKD (chronic kidney disease) 10/31/2016    Hyperlipidemia 07/10/2015    Spinal stenosis, lumbar region, without neurogenic claudication 10/16/2014    DVT of leg (deep venous thrombosis) ([facility] 12/18/2012    HTN (hypertension) 12/12/2012    Perforated diverticulum of large intestine 12/04/2012    Alcohol abuse 12/04/2012    Insomnia 12/04/2012     Current Outpatient Medications   Medication Sig Dispense Refill    methylPREDNISolone (MEDROL DOSEPACK) 4 mg tablet Per pack 1 Dose Pack 0    furosemide (LASIX) 20 mg tablet Take 1 Tablet by mouth daily. 30 Tablet 1    SITagliptin (Januvia) 50 mg tablet Take 1 tablet by mouth once daily 90 Tablet 0    sertraline (ZOLOFT) 50 mg tablet Take 1 tablet by mouth once daily 90 Tablet 0    traZODone (DESYREL) 50 mg tablet Take 1 Tablet by mouth nightly. 1.5 tabs qhs prn 120 Tablet 0    metoprolol succinate (TOPROL-XL) 25 mg XL tablet Take 1 tablet by mouth once daily (Patient taking differently: 12.5 mg.) 90 Tablet 1    zinc sulfate (ZINC-220 PO) Take  by mouth.  doxycycline (VIBRAMYCIN) 100 mg capsule TAKE 1 CAPSULE BY MOUTH ONCE DAILY SEPARATE FROM MAGNESIUM BY AT LEAST TWO HOURS      turmeric 400 mg cap Take  by mouth.  Praluent Pen 75 mg/mL injector pen INJECT 1 ML SUBCUTANEOUSLY  EVERY TWO WEEKS 6 mL 3    vitamin e (E GEMS) 100 unit capsule Take 1 capsule three times daily 90 Cap 6    docusate sodium (Dulcolax Stool Softener, dss,) 100 mg capsule Take 1 Cap by mouth two (2) times a day. 30 Cap 0    pumpkin seed extract-soy germ 300 mg cap Take 1 Cap by mouth as needed (prostate health).  co-enzyme Q-10 (Co Q-10) 100 mg capsule Take 100 mg by mouth daily as needed. 200 mg      cholecalciferol, vitamin D3, (VITAMIN D3 PO) Take 1,000 Int'l Units by mouth daily as needed.  ascorbate calcium (VITAMIN C PO) Take 1 Tab by mouth daily as needed.  cyanocobalamin 1,000 mcg tablet Take 1,000 mcg by mouth daily as needed.       senna-docusate (PERICOLACE) 8.6-50 mg per tablet Take 1 Tablet by mouth daily as needed for Constipation.  (Patient not taking: Reported on 1/31/2022) 90 Tablet 2     Past Surgical History:   Procedure Laterality Date    COLONOSCOPY N/A 3/19/2021    COLONOSCOPY performed by Kavon Lundberg MD at \[facility]\"" ENDOSCOPY    COLONOSCOPY N/A 5/10/2021    COLONOSCOPY performed by [facility]berg MD at [facility] N/A 8/2/2021    FLEXIBLE SIGMOIDOSCOPY performed by Kavon Lundberg MD at \[facility]\"" ENDOSCOPY    HX BACK SURGERY  2014    HX CERVICAL LAMINECTOMY      HX CORONARY ARTERY BYPASS GRAFT  04/2021    X3     HX GI  11/2012    bowel resection    HX HEART CATHETERIZATION  04/2020    HX ORTHOPAEDIC      amputated left 4th finger    VT ABDOMEN SURGERY PROC UNLISTED      bowel resection    VT INS NEW/RPLCMT PRM PM W/TRANSV ELTRD ATRIAL&VENT N/A 5/1/2020    INSERT PPM DUAL performed by Ginger Amaya MD at [place] [id number], [facility] CATH LAB      Lab Results   Component Value Date/Time    WBC 4.8 11/10/2021 11:36 AM    HGB 12.5 (L) 11/10/2021 11:36 AM    Hemoglobin (POC) 13.3 11/02/2016 07:15 AM    HCT 38.0 11/10/2021 11:36 AM    Hematocrit (POC) 39 11/02/2016 07:15 AM    PLATELET 503 31/75/1706 11:36 AM    MCV 87 11/10/2021 11:36 AM     Lab Results   Component Value Date/Time    Cholesterol, total 104 01/28/2022 10:15 AM    Cholesterol (POC) 227 02/06/2015 03:30 PM    HDL Cholesterol 52 01/28/2022 10:15 AM    LDL, calculated 39 01/28/2022 10:15 AM    LDL Cholesterol (POC) 164 02/06/2015 03:30 PM    Triglyceride 65 01/28/2022 10:15 AM    Triglycerides (POC) 68 02/06/2015 03:30 PM    CHOL/HDL Ratio 2.0 01/28/2022 10:15 AM     Lab Results   Component Value Date/Time    GFR est non-AA 45 (L) 01/28/2022 10:15 AM    GFRNA, POC 50 (L) 11/02/2016 07:15 AM    GFR est AA 55 (L) 01/28/2022 10:15 AM    GFRAA, POC >60 11/02/2016 07:15 AM    Creatinine 1.52 (H) 01/28/2022 10:15 AM    Creatinine (POC) 1.4 (H) 11/02/2016 07:15 AM    BUN 23 (H) 01/28/2022 10:15 AM    BUN (POC) 28 (H) 11/02/2016 07:15 AM    Sodium 140 01/28/2022 10:15 AM    Sodium (POC) 140 11/02/2016 07:15 AM    Potassium 4.6 01/28/2022 10:15 AM    Potassium (POC) 4.1 11/02/2016 07:15 AM    Chloride 110 (H) 01/28/2022 10:15 AM    Chloride (POC) 105 11/02/2016 07:15 AM    CO2 26 01/28/2022 10:15 AM    Magnesium 2.6 (H) 05/04/2020 04:52 AM    Phosphorus 3.0 10/17/2014 04:40 AM    Albumin, urine 59.5 10/23/2015 09:10 AM        Review of Systems   Respiratory: Negative for shortness of breath. Cardiovascular: Negative for chest pain. Musculoskeletal:        L hip and leg pain       Physical Exam  Constitutional:       General: He is not in acute distress. Appearance: Normal appearance. He is not ill-appearing, toxic-appearing or diaphoretic. HENT:      Head: Normocephalic and atraumatic. Right Ear: External ear normal.      Left Ear: External ear normal.   Eyes:      General:         Right eye: No discharge. Left eye: No discharge. Conjunctiva/sclera: Conjunctivae normal.      Pupils: Pupils are equal, round, and reactive to light. Cardiovascular:      Rate and Rhythm: Normal rate and regular rhythm. Heart sounds: No murmur heard. No friction rub. No gallop. Pulmonary:      Effort: No respiratory distress. Breath sounds: Normal breath sounds. No wheezing or rales. Chest:      Chest wall: No tenderness. Abdominal:      General: Abdomen is flat. There is no distension. Palpations: Abdomen is soft. There is no mass. Tenderness: There is no abdominal tenderness. There is no guarding or rebound. Hernia: No hernia is present. Musculoskeletal:         General: Normal range of motion. Cervical back: Normal range of motion and neck supple. Right lower leg: Edema present. Left lower leg: Edema present. Comments: 1-2+ pitting bilaterally   Skin:     General: Skin is warm and dry.    Neurological:      Mental Status: He is alert and oriented to person, place, and time. Mental status is at baseline. Coordination: Coordination abnormal.      Gait: Gait abnormal.      Comments: Ambulates with wheelchair   Psychiatric:         Mood and Affect: Mood normal.         Behavior: Behavior normal.         ASSESSMENT and PLAN high complexity of medical decision making multiple somatic complaints    ICD-10-CM ICD-9-CM    1. Type 2 diabetes mellitus with diabetic neuropathy, without long-term current use of insulin ([facility])    E11.40 250.60    Has been adequately controlled on Januvia unfortunately he continues to not monitor his blood sugar at home encouraged home monitoring    Check labs today    Adjust medication further as needed      357.2    2. Type 2 diabetes with nephropathy ([facility])  E11.21 250.40    Follows with nephrology creatinine has been running around 1.4-1.6 baseline we will monitor BMP      583.81    3. Primary hypertension     Controlled on Toprol-XL     I10 401.9    4. Coronary artery disease involving native coronary artery of native heart without angina pectoris       Medically managed up-to-date with cardiology     I25.10 414.01    5. Mixed hyperlipidemia       Controlled on Praluent and Zetia     E78.2 272.2    6. Stage 3a chronic kidney disease ([facility]     See above     N18.31 585.3    7. Reactive depression       Improved on Zoloft     F32.9 300.4    8. Prostate cancer [facility])       Status post radiation follows with urology routinely C61 185    9. Chronic deep vein thrombosis (DVT) of proximal vein of both lower extremities ([facility])       History of should be on daily aspirin will resume this I82.5Y3 453.51    10. NSTEMI (non-ST elevated myocardial infarction) ([facility]       See above I21.4 410.70    11.  Frequent falls  R29.6 V15.88 REFERRAL TO NEUROLOGY       Multiple neurologic complaints refer to neurology for history of frequent falls numbness in hands and feet difficulty with memory evidence of old stroke on brain imaging    Will set up with home health for physical therapy to help with balance and sciatica patient does not drive he is homebound   [facility]   12. Memory change     Previously referred him to neuropsychiatry for evaluation but he refused to go he is open to seeing neurology for his other complaints R41.3 780.93 REFERRAL TO NEUROLOGY   15. H/O: stroke  Z80.78 V16.53 REFERRAL TO NEUROLOGY   Evidence of old stroke seen on CT completed with [facility]    Discussed should be on daily aspirin he will resume this   [facility]   14. Sciatica of left side  M54.32 724.3 XR SPINE LUMB 2 OR 3 V       We will get L-spine plain film we will treat with Medrol Dosepak we will have him resume his gabapentin    Patient also having some difficulty with his hands recall that he previously had surgery on his C-spine is to follow-up with his neurosurgeon    Will be starting home health for physical therapy       [facility]   15. Hip pain, left  M25.552 719.45 XR SPINE LUMB 2 OR 3 V      REFERRAL TO HOME HEALTH   Check plain film hip and L-spine we will start PT suspect hip pain is related to sciatica   XR HIP LT W OR WO PELV 2-3 VWS   16. Postoperative anemia due to acute blood loss       Stable on last labs from [facility] D62 285.1    17. Localized edema     Having more edema discussed taking Lasix daily and using compression hose     R60.0 782. 3         Depression screen reviewed and positive (2) sad due to multiple complaints happy with Zoloft no change    Scribed by Peri Lynch, as dictated by Dr. Radha Ryan. Current diagnosis and concerns discussed with pt at length. Pt understands risks and benefits or current treatment plan and medications, and accepts the treatment and medication with any possible risks. Pt asks appropriate questions, which were answered. Pt was instructed to call with any concerns or problems. I have reviewed the note documented by the scribe.   The services provided are my own.  The documentation is accurate No

## 2022-08-08 ENCOUNTER — ANCILLARY PROCEDURE (OUTPATIENT)
Dept: CARDIOLOGY CLINIC | Age: 72
End: 2022-08-08
Payer: MEDICARE

## 2022-08-08 VITALS — HEIGHT: 67 IN | WEIGHT: 175 LBS | BODY MASS INDEX: 27.47 KG/M2

## 2022-08-08 DIAGNOSIS — I42.9 CARDIOMYOPATHY, UNSPECIFIED TYPE (HCC): Primary | ICD-10-CM

## 2022-08-08 DIAGNOSIS — I25.5 ISCHEMIC CARDIOMYOPATHY: ICD-10-CM

## 2022-08-08 DIAGNOSIS — I25.10 CORONARY ARTERY DISEASE DUE TO LIPID RICH PLAQUE: ICD-10-CM

## 2022-08-08 DIAGNOSIS — E85.9 AMYLOIDOSIS, UNSPECIFIED TYPE (HCC): ICD-10-CM

## 2022-08-08 DIAGNOSIS — I25.83 CORONARY ARTERY DISEASE DUE TO LIPID RICH PLAQUE: ICD-10-CM

## 2022-08-08 LAB
ECHO AO ASC DIAM: 3 CM
ECHO AO ASCENDING AORTA INDEX: 1.57 CM/M2
ECHO AO ROOT DIAM: 3.6 CM
ECHO AO ROOT INDEX: 1.88 CM/M2
ECHO AV AREA PEAK VELOCITY: 3.8 CM2
ECHO AV AREA VTI: 3.4 CM2
ECHO AV AREA/BSA PEAK VELOCITY: 2 CM2/M2
ECHO AV AREA/BSA VTI: 1.8 CM2/M2
ECHO AV MEAN GRADIENT: 2 MMHG
ECHO AV MEAN VELOCITY: 0.6 M/S
ECHO AV PEAK GRADIENT: 3 MMHG
ECHO AV PEAK VELOCITY: 0.8 M/S
ECHO AV VELOCITY RATIO: 1
ECHO AV VTI: 14.6 CM
ECHO EST RA PRESSURE: 15 MMHG
ECHO LA DIAMETER INDEX: 2.15 CM/M2
ECHO LA DIAMETER: 4.1 CM
ECHO LA TO AORTIC ROOT RATIO: 1.14
ECHO LA VOL 2C: 98 ML (ref 18–58)
ECHO LA VOL 4C: 91 ML (ref 18–58)
ECHO LA VOL BP: 99 ML (ref 18–58)
ECHO LA VOL/BSA BIPLANE: 52 ML/M2 (ref 16–34)
ECHO LA VOLUME AREA LENGTH: 106 ML
ECHO LA VOLUME INDEX A2C: 51 ML/M2 (ref 16–34)
ECHO LA VOLUME INDEX A4C: 48 ML/M2 (ref 16–34)
ECHO LA VOLUME INDEX AREA LENGTH: 55 ML/M2 (ref 16–34)
ECHO LV EDV A2C: 68 ML
ECHO LV EDV A4C: 68 ML
ECHO LV EDV BP: 69 ML (ref 67–155)
ECHO LV EDV INDEX A4C: 36 ML/M2
ECHO LV EDV INDEX BP: 36 ML/M2
ECHO LV EDV NDEX A2C: 36 ML/M2
ECHO LV EJECTION FRACTION A2C: 51 %
ECHO LV EJECTION FRACTION A4C: 54 %
ECHO LV EJECTION FRACTION BIPLANE: 50 % (ref 55–100)
ECHO LV ESV A2C: 34 ML
ECHO LV ESV A4C: 31 ML
ECHO LV ESV BP: 35 ML (ref 22–58)
ECHO LV ESV INDEX A2C: 18 ML/M2
ECHO LV ESV INDEX A4C: 16 ML/M2
ECHO LV ESV INDEX BP: 18 ML/M2
ECHO LV FRACTIONAL SHORTENING: 27 % (ref 28–44)
ECHO LV INTERNAL DIMENSION DIASTOLE INDEX: 1.94 CM/M2
ECHO LV INTERNAL DIMENSION DIASTOLIC: 3.7 CM (ref 4.2–5.9)
ECHO LV INTERNAL DIMENSION SYSTOLIC INDEX: 1.41 CM/M2
ECHO LV INTERNAL DIMENSION SYSTOLIC: 2.7 CM
ECHO LV IVSD: 1.8 CM (ref 0.6–1)
ECHO LV MASS 2D: 309.5 G (ref 88–224)
ECHO LV MASS INDEX 2D: 162 G/M2 (ref 49–115)
ECHO LV POSTERIOR WALL DIASTOLIC: 2 CM (ref 0.6–1)
ECHO LV RELATIVE WALL THICKNESS RATIO: 1.08
ECHO LVOT AREA: 3.8 CM2
ECHO LVOT AV VTI INDEX: 0.91
ECHO LVOT DIAM: 2.2 CM
ECHO LVOT MEAN GRADIENT: 2 MMHG
ECHO LVOT PEAK GRADIENT: 3 MMHG
ECHO LVOT PEAK VELOCITY: 0.8 M/S
ECHO LVOT STROKE VOLUME INDEX: 26.5 ML/M2
ECHO LVOT SV: 50.5 ML
ECHO LVOT VTI: 13.3 CM
ECHO PV MAX VELOCITY: 0.6 M/S
ECHO PV PEAK GRADIENT: 1 MMHG
ECHO RA MINOR AXIS INDEX: 2.46 CM/M2
ECHO RA MINOR AXIS: 4.7 CM
ECHO RIGHT VENTRICULAR SYSTOLIC PRESSURE (RVSP): 38 MMHG
ECHO RV INTERNAL DIMENSION: 4 CM
ECHO TV REGURGITANT MAX VELOCITY: 2.41 M/S
ECHO TV REGURGITANT PEAK GRADIENT: 23 MMHG

## 2022-08-08 PROCEDURE — 93306 TTE W/DOPPLER COMPLETE: CPT | Performed by: INTERNAL MEDICINE

## 2022-08-09 ENCOUNTER — TELEPHONE (OUTPATIENT)
Dept: CARDIOLOGY CLINIC | Age: 72
End: 2022-08-09

## 2022-08-09 DIAGNOSIS — I25.10 CORONARY ARTERY DISEASE INVOLVING NATIVE CORONARY ARTERY OF NATIVE HEART WITHOUT ANGINA PECTORIS: Primary | ICD-10-CM

## 2022-08-09 DIAGNOSIS — I42.8 NICM (NONISCHEMIC CARDIOMYOPATHY) (HCC): ICD-10-CM

## 2022-08-09 DIAGNOSIS — I42.8 OTHER CARDIOMYOPATHY (HCC): ICD-10-CM

## 2022-08-09 DIAGNOSIS — I25.10 ASHD (ARTERIOSCLEROTIC HEART DISEASE): ICD-10-CM

## 2022-08-09 DIAGNOSIS — I44.2 THIRD DEGREE AV BLOCK (HCC): ICD-10-CM

## 2022-08-09 DIAGNOSIS — Z95.1 S/P CABG (CORONARY ARTERY BYPASS GRAFT): ICD-10-CM

## 2022-08-09 DIAGNOSIS — I25.5 ISCHEMIC CARDIOMYOPATHY: ICD-10-CM

## 2022-08-09 NOTE — PROGRESS NOTES
Frankie-please advise the patient that his echo showed improved heart function, there were normal, so he does not need a device upgrade at this time in my opinion. There is a somewhat abnormal appearance to the texture of the heart and I would like to be thorough and get a cardiac MRI to look for any kind of conditions that could cause deposition of abnormal tissues into the heart. This will need to be arranged in conjunction with the EP department for MRI programming at the time of the MRI. I will order the MRI, and copy Ariel Tomas and Dr. Yokasta Iniguez from . Please advise that radiology scheduling should call him to schedule and he should remind him that he has a device which is MRI compatible when he schedules. I will put that on the MRI order as well. Please advise him to take a full dose of his metoprolol the night before the MRI in the morning of the MRI. I would not give him more than that because his blood pressure starts relatively low.

## 2022-08-09 NOTE — TELEPHONE ENCOUNTER
Patients daughter would like the nurse to call her because she would like an update of her father's visit on 8/8/22    [phone]

## 2022-08-10 NOTE — TELEPHONE ENCOUNTER
Spoke with Tonio Humphreys, patient daughter on PHI, regarding results of Echocardiogram and recommendation for MRI. Patient's daughter voiced understanding and will call central scheduling to set it up, stated.

## 2022-08-11 ENCOUNTER — TELEPHONE (OUTPATIENT)
Dept: CARDIOLOGY CLINIC | Age: 72
End: 2022-08-11

## 2022-08-11 ENCOUNTER — TELEPHONE (OUTPATIENT)
Dept: NEUROLOGY | Age: 72
End: 2022-08-11

## 2022-08-11 DIAGNOSIS — F40.240 CLAUSTROPHOBIA: Primary | ICD-10-CM

## 2022-08-11 NOTE — TELEPHONE ENCOUNTER
Pt daughter called to inform Dr. Chester Peña that pt has a MRI scheduled for 10/11/22, could not get a sooner appt, please advise        Caity Puentes (daughter)  [phone]

## 2022-08-11 NOTE — TELEPHONE ENCOUNTER
Patient daughter requesting the test results from  the brain MRI. She is also requesting a medication for him to take the Lumbar MRI. The patient is diabetic and has kidney diease also. Please call.

## 2022-08-12 ENCOUNTER — TELEPHONE (OUTPATIENT)
Dept: CARDIOLOGY CLINIC | Age: 72
End: 2022-08-12

## 2022-08-12 NOTE — TELEPHONE ENCOUNTER
Alek Benjamin., MARIYA Blanc -- he can try to call once a week if he wants to and check if any cancellations.

## 2022-08-12 NOTE — TELEPHONE ENCOUNTER
Verified Patient with two identifiers  Spoke with patient regarding recommendations. Patient voiced understanding.

## 2022-08-12 NOTE — TELEPHONE ENCOUNTER
Patient daugther calling back informing new date for Cardiac MRI.  MRI will be on Sept 5 at 8:45 am.

## 2022-08-15 RX ORDER — LORAZEPAM 1 MG/1
TABLET ORAL
Qty: 2 TABLET | Refills: 0 | Status: SHIPPED | OUTPATIENT
Start: 2022-08-15 | End: 2022-08-28 | Stop reason: ALTCHOICE

## 2022-08-15 NOTE — TELEPHONE ENCOUNTER
Pt Id confirmed. Advised daughter, per Dr Truman Wray, \"MRI brain wo contrast 7/29/22 reveals an old left parietal stroke, also seen on [facility] in April, mild chronic ischemic changes, no explanation for memory loss or gait difficulties. MRI L-spine could not be completed b/c patient could not tolerate. Radiology noted that there may be an increase in lumbar stenosis since 2014. It looks like they are going to try again on 8/26/22. I have sent Rx for Ativan 1mg on arrival to MRI, may take a second dose after 30 minutes if needed for anxiety. I don't believe he is driving, but will need someone to drive him home after MRI. \" Daughter verbalized understanding, stated \"I'm his , he no longer drives. \"

## 2022-08-15 NOTE — TELEPHONE ENCOUNTER
Kavon Oglesby - Please call pt's daughter: MRI brain wo contrast 7/29/22 reveals an old left parietal stroke, also seen on [facility] in April, mild chronic ischemic changes, no explanation for memory loss or gait difficulties. MRI L-spine could not be completed b/c patient could not tolerate. Radiology noted that there may be an increase in lumbar stenosis since 2014. It looks like they are going to try again on 8/26/22. I have sent Rx for Ativan 1mg on arrival to MRI, may take a second dose after 30 minutes if needed for anxiety. I don't believe he is driving, but will need someone to drive him home after MRI.

## 2022-08-24 NOTE — PROGRESS NOTES
HISTORY OF PRESENT ILLNESS  Lala Mcgraw is a 74 y.o. male. HPI  Last here 5/4/22. Pt is here to f/u on chronic conditions. Presents with his daughter who helps provide hx  Ambulates with wheelchair     Pt had previously had home health coming out, but they will no longer be coming due to pt having consumed significant alcohol, being uncooperative and hostile. Pt's daughter notes significant drinking at times (beer, wine, liquor), including in the AM. Discussed alcohol guidelines. Discussed that monkeypox vaccine would come through [facility]. History of hypertension  BP today is 120/75  O2 97  No home BP readings to review  Continues on toprol  1/2 tablet 25mg once daily    Has not been taking lasix recently  Also wearing compression hose      He has DM   No BS readings to review  States diet has been normal  Continues januvia 50mg daily        Wt today is 156 lbs, down 10 lbs since lov  Discussed that this could be due to alcohol  Reviewed recent imaging of head, neck, spine, liver   Ordered CXR  Of note he had endoscopy and colonoscopy 1 year ago had a recent ultrasound of the liver      Reviewed labs   Will get labs today    Reviewed XR hip L 5/4/22:  Impression:     No acute fracture or dislocation demonstrated. Recall had DVT in the past  Lov referred to neurology due to multiple neurologic complaints and concern of memory loss as well   His daughter noted that he has c/o issues with hands, dropping things, he has also had a series of falls  He is taking ASA 81 mg    Recall he had surgery on his C-spine in the past by Dr. Indira Mckeon he is not taking his gabapentin currently and has not followed up with his neurosurgeon  Gave info for Dr. Indira Mckeon due to increased stenosis on MRI  Reviewed XR spine lumb 5/4/22:  Impression:     No acute fracture or subluxation. Reviewed MRI lumb spine 8/26/22   IMPRESSION  Posterior laminectomy from L3-4 through L5-S1.  Moderate multilevel degenerative  changes as described above. No evidence for acute abnormality. Probable small amount of ascites. Pt follows annually with Dr. Sissy Mcginnis (cardio) for CAD and history of CABG 4/20  Last visit 5/23/22  Reviewed note:   Plan:    ASHD Hx CABG x 3 in 4/2020---LIMA to LAD, SVG to RCA and Ramus  Continue ASA BB   Intolerant to statins, now on Praluent  Patient notes resolution of chest pain since his bypass. CM, new   Echo September 2021, LVEF 45 to 50%  Echo 3/2021 showed EF 40-45% mild- mod MR mild AR  Echo 12/2020 showed EF 45-50% mild MR  Echo in April 2020 showed EF 60-65% , mild mitral regurgitation. On BB. He is intolerant of lisinopril and ARB's as per allergies section. BP borderline low, so will not add hydralazine or isosorbide mononitrate at this time  Patient has been 100% RV paced  [facility] May 2021 with normal perfusion, calculated LVEF of 37%   We will repeat echocardiogram in September 2022, and if LVEF remains reduced, forward result to Dr. Pascale Dukes to decide if device upgrade is indicated  CHB s/p PPM 5/2020  Device followed by Dr Dawson---last seen 12/2021 patient pacemaker check today show proper function. The patient is dependent on RV pacing. Previously he had short atrial flutter but the device check today did not show the new AMS arrhythmic event  Last seen by Dr. Pascale Dukes in December 2021- acknowledged LVEF 45-50 in September, with plan to \"Repeat echo in 2022 and if LVEF declines, consider upgrade to biventricular pacing\"  HTN  Controlled with current therapy  HLD  Statin intolerance---In chart review has been intolerant to Crestor, Lipitor, pravastatin  LDL was 39 in January 2022. Noted normal transaminases in May 2022.   Restarted Praluent in 1/2021  Repeat labs in January 2023   CKD III  Cr 1.57 in 4/2021  Cr. 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 Back on aspirin 81 mg without any recurrence of bleeding at this point. They will let us know if recurrent episodes. Continue current care and f/u in 1 year with me if stress test is negative, and with electrophysiology as scheduled. He saw Dr. Deep Sanchez (cardio) for CAD/CABG   Last there 6/3/20       He had pacemaker placed with Dr. Carmelo Funk (cardio)  Last visit 12/28/21  Had pacemaker last checked 7/13/22     Reviewed echo 8/8/22:    Left Ventricle: Low normal left ventricular systolic function. EF by 2D Simpsons Biplane is 50%. Left ventricle size is normal. Severely increased wall thickness. Findings consistent with severe concentric hypertrophy. There is a speckled appearance to the left ventricular myocardium. Consider testing for cardiac amyloidosis including possible cardiac MRI. Noted that the patient's Medtronic device is MRI compatible. Normal wall motion consistent with paced rhythm    Left Atrium: Left atrium is severely dilated. Left atrial volume index is severely increased (>48 mL/m2). Right Atrium: Right atrium is moderately dilated. Mitral Valve: Mild to moderate regurgitation.     Pt has MRI cardiac morph scheduled 9/6/22    History of hyperlipidemia  taking praluent  No longer taking Zetia     Has a history of CKD  Now following with Dr. Nely Baum (nephro), follow-up as scheduled for October 14  Last visit was 4/22 per pt's daughter stage 3A  Baseline cr 1.4-1.5 stable on recent labs     Pt follows with Genia Gomez (uro) for prostate cancer--treated with radiation  Recall pt has [facility] (brother and uncle) prostate cancer  Last there 1/22 reports PSAs are trending down  Follows q 6 mo, needs to schedule f/U     Pt also met with a radiation oncologist at [facility]  He completed radiation therapy with Dr. Chavez Postal  Last visit 9/21       Pt saw Dr. Manuela Montana (podiatry) in 4/19  Prev provided referral to podiatry, discussed f/Uing up for overgrown toenails, hammertoes, callus formation     In the past, pt expressed concern about his memory  Previously, provided referral for Dr. Jorge L Crump (neuropsych)  lov provided info for Dr. Amanda Ojeda (neuropsych)  Had the appointment 09/24/19 --then refused testing in the end   He saw Dr. Vick Rae (neuropsych) 7/1/22, will f/U 11/3/22  Reviewed note:  Assessment/Plan   Pt is a 66 y.o. right handed male with DM with HgbA1C 5.3, HTN, HLD with LDL 39, NIMO non-compliant with CPAP, alcoholism, with c/o short term memory loss with daughter noting word finding and difficulty expressing himself. Additionally, pt started falling in April, seen in ED with [facility] revealing chronic left occipital CVA and left lateral ventricle cystic structure. Pt c/o bilateral LBP with radicular pain down right leg, legs feels weak and like they are going to buckle. He has h/o cervical stenosis and surgery, but has ongoing numbness and weakness in hands. Exam -fully oriented and language intact, but memory impaired to details of history, exophthalmos, unsteady gait, absent reflexes. This is a complex case. Patient's alcohol intake could be contributing to his gait disturbance, due to intoxication, but also possible peripheral neuropathy from alcohol and/or diabetes and poor nutrition with limited p.o. intake per patient. He has absent reflexes on exam though patient reported intact sensation. Patient reported that he \"drinks like a fish\" and had already started drinking this morning prior to his visit. Additionally, he has arthritis which could be causing the feeling that his legs are going to give out and could be the cause of his bilateral low back pain with pain radiating down right leg, but he may also have lumbar radiculopathy and stenosis with prior history of cervical stenosis. Recommend MRI L-spine without contrast, patient has an MRI compatible pacemaker with card scanned to chart.   Memory loss could also be due to nutritional deficiencies such as B12 deficiency, acute alcohol intoxication, alcoholic dementia, and even Warnicke's encephalopathy given poor nutrition and alcohol intake. Differential would include stroke as an etiology with prior history of stroke and multiple stroke risk factors, possible mass, and NPH. Recommend MRI brain without contrast.  Will order additional labs looking for metabolic causes of memory loss as well as neuropathy including B12/MMA, RPR, and vitamin B1 level. Patient was incidentally found to have significant fungal infection of his feet and referral placed to podiatry. Follow-up in clinic to be determined after testing completed, they are instructed to call after test completed to discuss results if they do not hear from our office within 1 week. Noted pt should be wearing CPAP but is not, alcohol could be contributing to memory problems, prev imaging showed remote old infarc, did not start any new meds    Reviewed MRI brain 7/26/22:  Impression:     1. No acute findings no mass. 2. Remote left parietal infarct. 3. Mild nonspecific white matter changes.        He has been following with Dr. Radha Ryan (GI) for stomach bleeding, lov 6/22  Recall in May 2021 he had an endoscopy and colonoscopy  Reviewed flexible sigmoidoscopy 8/02/21  Had elevated bilirubin which has been climbing and went back to see him again  Had abd US--which he reports was unremarkable  Pt states Dr. Radha Ryan thinks bilirubin increase might be coming from heart rather than liver, referred to cardiology    Prev referred to hematology due to anemia and thrombocytopenia  He has not yet gone we will repeat labs if not improved will need him to see hematology of note potentially this could be related to some sort of underlying liver disease  Discussed it likely would be a good idea to go to this, will repeat labs          Recall Pt had a hearing evaluation in the past  Recall notes from ENT 5/28/19: has some hearing loss   Pt does not have hearing aids  Patient is having some continued difficulty with his ears and will schedule follow-up with ENT     He has been seeing Dr. Pawan Dobbs (sleep) for NIMO  Last visit 21    Declines sleep study  Not compliant with cpap     Pt uses trazodone nightly  Discussed can go up to 1.5mg, discussed can also take melatonin with this    Pt has been taking CoQ-10      No longer taking gabapentin 300 mg for pain in hands/feet and sciatica     He has zoloft 50mg for depression  Pt had a positive depression screen today, we will increase dosage to 75 mg  Has trazodone 50 mg to take at night  He also has ativan to use prn (not often, not in the past month) for anxiety   Discussed the possibility of seeing a therapist    Pt lives alone, daughter checks on him  No safety equipment  Ambulates w/ a cane and a walker at home     Pt is not functionally independent   Pt's daughter does driving, laundry, bills, meds, housework       No memory concerns   Knows the month, date, year, location   Can recall 3/3 objects       ACP not on file. SDM is his daughter (Suzanne Enrique). Provided information in the past.      Recall sees Dr. Xavier Salinas for h/o DVT, no longer on coumadin or xarelto, on ASA only.  Was on coumadin for h/o PE and DVT post operatively     PREVENTIVE:    Colonoscopy: 5/10/21 , Dr. Marti Quintanilla, repeat 5 years    EGD: 5/10/21, Dr. Marti Quintanilla  PSA:   <1 per pt  AAA screen: CT , negative  Tdap: unknown, due will get at pharm, reminded again  Pneumovax:   [id number]: 19   Shingrix: due reminded again  Flu shot: 21  Foot exam: 22  Microalbumin:  122  A1c:   6.0  6.2  6.1,  5.6 POC  5.3  Eye exam: Dr. Leigh Ty 3/22, will get report  Hep C Screen: 10/15, negative  Lipids:  LDL 39  EK22 Electronic ventricular pacemaker   Covid: three doses [employer]), discussed eligible for 2nd booster      Patient Active Problem List    Diagnosis Date Noted    Pacemaker 2020    Third degree AV block ([address] 2020    Postoperative anemia due to acute blood loss 04/29/2020    S/P CABG x 3 04/28/2020    CAD (coronary artery disease) 04/27/2020    Bilateral carotid artery stenosis 04/24/2020    NSTEMI (non-ST elevated myocardial infarction) ([facility] 04/23/2020    Type 2 diabetes mellitus with diabetic neuropathy ([facility] 01/09/2020    Prostate cancer ([facility] 06/04/2018    Type 2 diabetes with nephropathy ([facility] 02/28/2018    Reactive depression 11/15/2016    Cervical spinal stenosis 11/03/2016    CKD (chronic kidney disease) 10/31/2016    Hyperlipidemia 07/10/2015    Spinal stenosis, lumbar region, without neurogenic claudication 10/16/2014    DVT of leg (deep venous thrombosis) ([facility] 12/18/2012    HTN (hypertension) 12/12/2012    Perforated diverticulum of large intestine 12/04/2012    Alcohol abuse 12/04/2012    Insomnia 12/04/2012     Current Outpatient Medications   Medication Sig Dispense Refill    LORazepam (ATIVAN) 1 mg tablet Take 1 tab by mouth on arrival to MRI, may repeat x 1 after 30 minutes for anxiety. 2 Tablet 0    SITagliptin (Januvia) 50 mg tablet Take 1 tablet by mouth once daily 90 Tablet 0    sertraline (ZOLOFT) 50 mg tablet Take 1 tablet by mouth once daily 90 Tablet 0    cholecalciferol (Vitamin D3) 25 mcg (1,000 unit) cap Take 1,000 Units by mouth daily. alfuzosin SR (UROXATRAL) 10 mg SR tablet Take 10 mg by mouth nightly. aspirin delayed-release 81 mg tablet Take 81 mg by mouth daily. traZODone (DESYREL) 50 mg tablet TAKE 1 TO 1 & 1/2 (ONE & ONE-HALF) TABLETS BY MOUTH NIGHTLY AS NEEDED 120 Tablet 0    mv, min #36-iron,carbonyl-FA (Geritol Complete) 16 mg iron- 0.38 mg tab Take 1 Tablet by mouth daily. furosemide (LASIX) 20 mg tablet Take 1 Tablet by mouth daily. 30 Tablet 1    metoprolol succinate (TOPROL-XL) 25 mg XL tablet Take 1 tablet by mouth once daily (Patient taking differently: 12.5 mg.) 90 Tablet 1    zinc sulfate (ZINC-220 PO) Take  by mouth. turmeric 400 mg cap Take  by mouth.       Praluent Pen 75 mg/mL injector pen INJECT 1 ML SUBCUTANEOUSLY  EVERY TWO WEEKS 6 mL 3    vitamin e (E GEMS) 100 unit capsule Take 1 capsule three times daily 90 Cap 6    pumpkin seed extract-soy germ 300 mg cap Take 1 Cap by mouth as needed (prostate health). co-enzyme Q-10 (Co Q-10) 100 mg capsule Take 100 mg by mouth daily. 200 mg      cholecalciferol, vitamin D3, (VITAMIN D3 PO) Take 1,000 mg by mouth daily. ascorbate calcium (VITAMIN C PO) Take 1 Tablet by mouth daily. cyanocobalamin 1,000 mcg tablet Take 1,000 mcg by mouth daily.        Past Surgical History:   Procedure Laterality Date    COLONOSCOPY N/A 3/19/2021    COLONOSCOPY performed by Jacque Palmer MD at \[facility]\"" ENDOSCOPY    COLONOSCOPY N/A 5/10/2021    COLONOSCOPY performed by Jacque Palmer MD at [facility] N/A 8/2/2021    FLEXIBLE SIGMOIDOSCOPY performed by Jacque Palmer MD at \[facility]\"" ENDOSCOPY    HX BACK SURGERY  2014    HX CERVICAL LAMINECTOMY      HX CORONARY ARTERY BYPASS GRAFT  04/2021    X3     HX GI  11/2012    bowel resection    HX HEART CATHETERIZATION  04/2020    HX ORTHOPAEDIC      amputated left 4th finger    GA ABDOMEN SURGERY PROC UNLISTED      bowel resection    GA INS NEW/RPLCMT PRM PM W/TRANSV ELTRD ATRIAL&VENT N/A 5/1/2020    INSERT PPM DUAL performed by Milton English MD at [address]/s  CATH LAB      Lab Results   Component Value Date/Time    WBC 5.2 05/04/2022 01:21 PM    HGB 11.6 (L) 05/04/2022 01:21 PM    Hemoglobin (POC) 13.3 11/02/2016 07:15 AM    HCT 35.6 (L) 05/04/2022 01:21 PM    Hematocrit (POC) 39 11/02/2016 07:15 AM    PLATELET 893 (L) 78/65/6536 01:21 PM    MCV 87.7 05/04/2022 01:21 PM     Lab Results   Component Value Date/Time    Cholesterol, total 104 01/28/2022 10:15 AM    Cholesterol (POC) 227 02/06/2015 03:30 PM    HDL Cholesterol 52 01/28/2022 10:15 AM    LDL, calculated 39 01/28/2022 10:15 AM    LDL Cholesterol (POC) 164 02/06/2015 03:30 PM    Triglyceride 65 01/28/2022 10:15 AM Triglycerides (POC) 68 02/06/2015 03:30 PM    CHOL/HDL Ratio 2.0 01/28/2022 10:15 AM     Lab Results   Component Value Date/Time    GFR est non-AA 52 (L) 05/04/2022 01:21 PM    GFRNA, POC 50 (L) 11/02/2016 07:15 AM    GFR est AA >60 05/04/2022 01:21 PM    GFRAA, POC >60 11/02/2016 07:15 AM    Creatinine 1.34 (H) 05/04/2022 01:21 PM    Creatinine (POC) 1.4 (H) 11/02/2016 07:15 AM    BUN 26 (H) 05/04/2022 01:21 PM    BUN (POC) 28 (H) 11/02/2016 07:15 AM    Sodium 140 05/04/2022 01:21 PM    Sodium (POC) 140 11/02/2016 07:15 AM    Potassium 4.1 05/04/2022 01:21 PM    Potassium (POC) 4.1 11/02/2016 07:15 AM    Chloride 106 05/04/2022 01:21 PM    Chloride (POC) 105 11/02/2016 07:15 AM    CO2 25 05/04/2022 01:21 PM    Magnesium 2.6 (H) 05/04/2020 04:52 AM    Phosphorus 3.0 10/17/2014 04:40 AM    Albumin, urine 59.5 10/23/2015 09:10 AM        Review of Systems   Respiratory:  Negative for shortness of breath. Cardiovascular:  Negative for chest pain. Physical Exam  Constitutional:       General: He is not in acute distress. Appearance: Normal appearance. He is not ill-appearing, toxic-appearing or diaphoretic. HENT:      Head: Normocephalic and atraumatic. Right Ear: External ear normal.      Left Ear: External ear normal.   Eyes:      General:         Right eye: No discharge. Left eye: No discharge. Conjunctiva/sclera: Conjunctivae normal.      Pupils: Pupils are equal, round, and reactive to light. Cardiovascular:      Rate and Rhythm: Normal rate and regular rhythm. Heart sounds: No murmur heard. No friction rub. No gallop. Pulmonary:      Effort: No respiratory distress. Breath sounds: Normal breath sounds. No wheezing or rales. Chest:      Chest wall: No tenderness. Musculoskeletal:         General: Normal range of motion. Cervical back: Normal.      Right lower leg: No edema. Left lower leg: No edema. Skin:     General: Skin is warm and dry. Neurological:      General: No focal deficit present. Mental Status: He is oriented to person, place, and time. Gait: Gait normal.   Psychiatric:         Mood and Affect: Mood normal.         Behavior: Behavior normal.       ASSESSMENT and PLAN    ICD-10-CM ICD-9-CM    1. Type 2 diabetes mellitus with diabetic neuropathy, without long-term current use of insulin ([facility])  U09.23 148.47 METABOLIC PANEL, COMPREHENSIVE     357.2 HEMOGLOBIN A1C WITH EAG   Not checking his blood sugars at home encouraged him to start monitoring again A1c's have been good weight has come down continue Januvia   TSH 3RD GENERATION      CBC W/O DIFF      FERRITIN      PSA SCREENING (SCREENING)      LIPID PANEL      2. Medicare annual wellness visit, subsequent  V92.87 Q95.0 METABOLIC PANEL, COMPREHENSIVE      HEMOGLOBIN A1C WITH EAG      TSH 3RD GENERATION      CBC W/O DIFF      FERRITIN      PSA SCREENING (SCREENING)      LIPID PANEL      3. Type 2 diabetes with nephropathy ([facility])  X34.13 992.43 METABOLIC PANEL, COMPREHENSIVE     583.81 HEMOGLOBIN A1C WITH EAG      TSH 3RD GENERATION      CBC W/O DIFF      FERRITIN   See above monitor renal function follows with nephrology has follow-up pending for October   PSA SCREENING (SCREENING)      LIPID PANEL      4.  Coronary artery disease involving native coronary artery of native heart without angina pectoris  J27.17 750.44 METABOLIC PANEL, COMPREHENSIVE      HEMOGLOBIN A1C WITH EAG      TSH 3RD GENERATION   Medically managed on aspirin has been seen cardiology recently had a recent echocardiogram and has a cardiac MRI pending   CBC W/O DIFF      FERRITIN      PSA SCREENING (SCREENING)      LIPID PANEL      5. Bilateral carotid artery stenosis  Y41.99 223.49 METABOLIC PANEL, COMPREHENSIVE     433.30 HEMOGLOBIN A1C WITH EAG      TSH 3RD GENERATION      CBC W/O DIFF   Monitor duplex as needed   FERRITIN      PSA SCREENING (SCREENING)      LIPID PANEL      6. Primary hypertension  I10 401.9 METABOLIC PANEL, COMPREHENSIVE      HEMOGLOBIN A1C WITH EAG      TSH 3RD GENERATION      CBC W/O DIFF   Controlled on Toprol   FERRITIN      PSA SCREENING (SCREENING)      LIPID PANEL      7. Prostate cancer (HCC)  Z41 236 METABOLIC PANEL, COMPREHENSIVE      HEMOGLOBIN A1C WITH EAG   Status post radiation therapy overdue to see his urologist we will check PSA   TSH 3RD GENERATION      CBC W/O DIFF      FERRITIN      PSA SCREENING (SCREENING)      LIPID PANEL      8. Alcohol abuse  P52.97 944.52 METABOLIC PANEL, COMPREHENSIVE      HEMOGLOBIN A1C WITH EAG      TSH 3RD GENERATION   I suspect this is one of his bigger problems    He downplayed his alcohol use however after trying to get home health physical therapy to the house it turns out he was inebriated whenever he came out very hostile and combative with them    He drinks large amounts of bourbon or beer or wine sporadically has times where he has none of it as well discussed alcohol cessation    Discussed his elevated bilirubin level which I will repeat again today we will need to get the ultrasound of his liver report    I do wonder if his low blood counts and platelets are related to underlying liver disease which may be perhaps more advanced than we suspect discussed all of this with patient and his daughter    Depending on results of all of the labs we may need to consider hep otology sooner rather than later of note some of the imaging noted some trace ascites   CBC W/O DIFF      FERRITIN      PSA SCREENING (SCREENING)      LIPID PANEL      9. Stage 3a chronic kidney disease ([facility]  I89.52 807.2 METABOLIC PANEL, COMPREHENSIVE      HEMOGLOBIN A1C WITH EAG      TSH 3RD GENERATION   Follows with nephrology has appointment pending for October   CBC W/O DIFF      FERRITIN      PSA SCREENING (SCREENING)      LIPID PANEL      10.  Mixed hyperlipidemia  Y62.0 711.7 METABOLIC PANEL, COMPREHENSIVE      HEMOGLOBIN A1C WITH EAG      TSH 3RD GENERATION   Controlled with Praluent   CBC W/O DIFF      FERRITIN      PSA SCREENING (SCREENING)      LIPID PANEL      11. Serum total bilirubin elevated  U00 710.0 METABOLIC PANEL, COMPREHENSIVE      HEMOGLOBIN A1C WITH EAG   Saw gastroenterology had a liver ultrasound we will need to get records of this we will repeat labs today   TSH 3RD GENERATION      CBC W/O DIFF      FERRITIN      PSA SCREENING (SCREENING)      LIPID PANEL      12. Reactive depression  R99.2 888.5 METABOLIC PANEL, COMPREHENSIVE      HEMOGLOBIN A1C WITH EAG   Increase Zoloft to 75 mg   TSH 3RD GENERATION      CBC W/O DIFF      FERRITIN      PSA SCREENING (SCREENING)      LIPID PANEL      13. Thrombocytopenia (HCC)  W13.3 368.7 METABOLIC PANEL, COMPREHENSIVE      HEMOGLOBIN A1C WITH EAG      TSH 3RD GENERATION      CBC W/O DIFF   See above repeat labs has not gone to see hematology yet we will refer back depending on repeat report   FERRITIN      PSA SCREENING (SCREENING)      LIPID PANEL      14.  Anemia, unspecified type  E77.9 434.6 METABOLIC PANEL, COMPREHENSIVE      HEMOGLOBIN A1C WITH EAG      TSH 3RD GENERATION      CBC W/O DIFF      FERRITIN   See above   PSA SCREENING (SCREENING)      LIPID PANEL      15. Spinal stenosis, lumbar region, without neurogenic claudication  O84.907 829.83 METABOLIC PANEL, COMPREHENSIVE   Has difficulty walking has severe spinal stenosis in the areas had a recent MRI of his spine has been seeing neurology has followed with neurosurgery in the past and had surgery on his neck we will follow-up with neurosurgeon to see if any additional intervention is needed   HEMOGLOBIN A1C WITH EAG      TSH 3RD GENERATION      CBC W/O DIFF      FERRITIN      PSA SCREENING (SCREENING)      LIPID PANEL      REFERRAL TO NEUROLOGY      16. Weight loss  R63.4 783.21 XR CHEST PA LAT   I suspect this is due to poor nutrition and alcohol abuse    Will check chest x-ray    Of note he had endoscopy and colonoscopy about 1 year ago    Will get basic labs    He had recent brain imaging    Will evaluate further as needed depending on results   17. Memory loss  R41.3 780.93    Currently under evaluation with neurology had MRI of the brain unremarkable     Scribed by Mare Louis of [facility], as dictated by Dr. Angelica Blanc. Current diagnosis and concerns discussed with pt at length. Pt understands risks and benefits or current treatment plan and medications, and accepts the treatment and medication with any possible risks. Pt asks appropriate questions, which were answered. Pt was instructed to call with any concerns or problems. I have reviewed the note documented by the scribe. The services provided are my own. The documentation is accurate.

## 2022-08-26 ENCOUNTER — HOSPITAL ENCOUNTER (OUTPATIENT)
Dept: MRI IMAGING | Age: 72
Discharge: HOME | End: 2022-08-26
Attending: PSYCHIATRY & NEUROLOGY
Payer: MEDICARE

## 2022-08-26 DIAGNOSIS — R41.3 MEMORY LOSS: ICD-10-CM

## 2022-08-26 PROCEDURE — 72148 MRI LUMBAR SPINE W/O DYE: CPT

## 2022-08-28 DIAGNOSIS — F41.9 ANXIETY: ICD-10-CM

## 2022-08-28 DIAGNOSIS — Z86.59 HISTORY OF CLAUSTROPHOBIA: ICD-10-CM

## 2022-08-28 DIAGNOSIS — I42.9 CARDIOMYOPATHY, UNSPECIFIED TYPE (HCC): Primary | ICD-10-CM

## 2022-08-28 RX ORDER — LORAZEPAM 1 MG/1
1 TABLET ORAL
Qty: 2 TABLET | Refills: 0 | Status: SHIPPED | OUTPATIENT
Start: 2022-08-28

## 2022-08-28 NOTE — PATIENT INSTRUCTIONS
Medicare Wellness Visit, Male    The best way to live healthy is to have a lifestyle where you eat a well-balanced diet, exercise regularly, limit alcohol use, and quit all forms of tobacco/nicotine, if applicable. Regular preventive services are another way to keep healthy. Preventive services (vaccines, screening tests, monitoring & exams) can help personalize your care plan, which helps you manage your own care. Screening tests can find health problems at the earliest stages, when they are easiest to treat. [facility] follows the current, evidence-based guidelines published by the [facility] ([facility]) when recommending preventive services for our patients. Because we follow these guidelines, sometimes recommendations change over time as research supports it. (For example, a prostate screening blood test is no longer routinely recommended for men with no symptoms). Of course, you and your doctor may decide to screen more often for some diseases, based on your risk and co-morbidities (chronic disease you are already diagnosed with). Preventive services for you include:  - Medicare offers their members a free annual wellness visit, which is time for you and your primary care provider to discuss and plan for your preventive service needs. Take advantage of this benefit every year!  -All adults over age 72 should receive the recommended pneumonia vaccines. Current USPSTF guidelines recommend a series of two vaccines for the best pneumonia protection.   -All adults should have a flu vaccine yearly and tetanus vaccine every 10 years.  -All adults age 48 and older should receive the shingles vaccines (series of two vaccines).        -All adults age 38-68 who are overweight should have a diabetes screening test once every three years.   -Other screening tests & preventive services for persons with diabetes include: an eye exam to screen for diabetic retinopathy, a kidney function test, a foot exam, and stricter control over your cholesterol.   -Cardiovascular screening for adults with routine risk involves an electrocardiogram (ECG) at intervals determined by the provider.   -Colorectal cancer screening should be done for adults age 54-65 with no increased risk factors for colorectal cancer. There are a number of acceptable methods of screening for this type of cancer. Each test has its own benefits and drawbacks. Discuss with your provider what is most appropriate for you during your annual wellness visit. The different tests include: colonoscopy (considered the best screening method), a fecal occult blood test, a fecal DNA test, and sigmoidoscopy.  -All adults born between [facility] should be screened once for Hepatitis C.  -An Abdominal Aortic Aneurysm (AAA) Screening is recommended for men age 73-68 who has ever smoked in their lifetime.      Here is a list of your current Health Maintenance items (your personalized list of preventive services) with a due date:  Health Maintenance Due   Topic Date Due    DTaP/Tdap/Td  (1 - Tdap) Never done    Shingles Vaccine (1 of 2) Never done    COVID-19 Vaccine (4 - Booster for [employer] series) 02/20/2022    Eye Exam  02/26/2022    Diabetic Foot Care  03/10/2022    Annual Well Visit  06/24/2022

## 2022-08-28 NOTE — PROGRESS NOTES
This is the Subsequent Medicare Annual Wellness Exam, performed 12 months or more after the Initial AWV or the last Subsequent AWV    I have reviewed the patient's medical history in detail and updated the computerized patient record. Assessment/Plan   Education and counseling provided:  Are appropriate based on today's review and evaluation    1. Medicare annual wellness visit, subsequent     Depression Risk Factor Screening     3 most recent PHQ Screens 8/29/2022   PHQ Not Done -   Little interest or pleasure in doing things Several days   Feeling down, depressed, irritable, or hopeless Several days   Total Score PHQ 2 2   Trouble falling or staying asleep, or sleeping too much -   Feeling tired or having little energy -   Poor appetite, weight loss, or overeating -   Feeling bad about yourself - or that you are a failure or have let yourself or your family down -   Trouble concentrating on things such as school, work, reading, or watching TV -   Moving or speaking so slowly that other people could have noticed; or the opposite being so fidgety that others notice -   Thoughts of being better off dead, or hurting yourself in some way -   PHQ 9 Score -   How difficult have these problems made it for you to do your work, take care of your home and get along with others -       Alcohol & Drug Abuse Risk Screen    Do you average more than 1 drink per night or more than 7 drinks a week: Yes    In the past three months have you have had more than 4 drinks containing alcohol on one occasion: Yes          Functional Ability and Level of Safety    Hearing:  had eval mild hearing loss no aides needed      Activities of Daily Living: The home contains: no safety equipment.   Patient needs help with:  transportation, shopping, preparing meals, laundry, housework, managing medications, and managing money      Ambulation: with difficulty, uses a cane and walker     Fall Risk:  Fall Risk Assessment, last 12 mths 8/29/2022 Able to walk? Yes   Fall in past 12 months? 1   Do you feel unsteady? -   Are you worried about falling -   Is TUG test greater than 12 seconds? -   Is the gait abnormal? -   Number of falls in past 12 months 2   Fall with injury?  0      Abuse Screen:  Patient is not abused     Lives alone  Cognitive Screening    Has your family/caregiver stated any concerns about your memory: yes - seeing neurology     Cognitive Screening: Abnormal - seeing neurology     Health Maintenance Due     Health Maintenance Due   Topic Date Due    DTaP/Tdap/Td series (1 - Tdap) Never done    Shingrix Vaccine Age 50> (1 of 2) Never done    COVID-19 Vaccine (4 - Booster for [place] series) 02/20/2022    Eye Exam Retinal or Dilated  02/26/2022    Foot Exam Q1  03/10/2022    Medicare Yearly Exam  06/24/2022       Patient Care Team   Patient Care Team:  Josue Guerrero MD as PCP - General (Internal Medicine Physician)  Josue Guerrero MD as PCP - [facility] Empaneled Provider  Gregorio Posey MD as Physician (Cardiovascular Disease Physician)  Velasquez Clayton MD as Physician (Nephrology)  Sara Skinner MD (Neurosurgery)  Wilbert Lawson MD (Ophthalmology)  Tiana Mendoza MD (Otolaryngology)  Merline Kirsten, MD (Urology)  Lizeth Amanda MD (Ophthalmology)  Charanjit Vines, DPM (Podiatry)  Ifeanyi Moreno, PHD (Psychology)  Radha Perez MD (Cardiothoracic Surgery)  Salma Romero MD (Cardiovascular Disease Physician)  Jennie Garces MD (Cardiovascular Disease Physician)  Maritza Gee MD as Physician (Nephrology)  Kashif Arceo MD (Gastroenterology)  Boyd Capellan MD (Radiation Oncology)  Naima Latif MD as Physician (Nephrology)  Goran Abarca, MARIYA as Nurse Practitioner (Nurse Practitioner)  Jennie Garces MD (Cardiovascular Disease Physician)  Wilbert Lawson MD (Ophthalmology)    History     Patient Active Problem List   Diagnosis Code    Perforated 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    Alcohol abuse F10.10    Insomnia G47.00    HTN (hypertension) I10    DVT of leg (deep venous thrombosis) ([facility]) I82.409    Spinal stenosis, lumbar region, without neurogenic claudication M48.061    Hyperlipidemia E78.5    CKD (chronic kidney disease) N18.9    Cervical spinal stenosis M48.02    Reactive depression F32.9    Type 2 diabetes with nephropathy ([facility]) E11.21    Prostate cancer ([facility] C61    Type 2 diabetes mellitus with diabetic neuropathy ([facility]) E11.40    NSTEMI (non-ST elevated myocardial infarction) ([facility] I21.4    Bilateral carotid artery stenosis I65.23    CAD (coronary artery disease) I25.10    S/P CABG x 3 Z95.1    Postoperative anemia due to acute blood loss D62    Pacemaker Z95.0    Third degree AV block ([facility]) I44.2     Past Medical History:   Diagnosis Date    Arthritis     Cancer ([facility] [address]     Prostate ca    Chronic kidney disease     Stage 3    Chronic pain     Abdoman, back, fingers per daughter    Depression     Diabetes ([facility]     Heart attack ([facility]     Heart failure ([facility] 2020    Dr. Michelle Garza    Hyperlipidemia     Hypertension     Pacemaker 05/01/2020    MED    PE (pulmonary embolism)     Pneumonia     Sleep apnea     not use CPAP      Past Surgical History:   Procedure Laterality Date    COLONOSCOPY N/A 3/19/2021    COLONOSCOPY performed by Aneesh Peters MD at \[facility]\"" ENDOSCOPY    COLONOSCOPY N/A 5/10/2021    COLONOSCOPY performed by Aneesh Peters MD at [facility] N/A 8/2/2021    FLEXIBLE SIGMOIDOSCOPY performed by Aneesh Peters MD at \[facility]\"" ENDOSCOPY    HX BACK SURGERY  2014    HX CERVICAL LAMINECTOMY      HX CORONARY ARTERY BYPASS GRAFT  04/2021    X3     HX GI  11/2012    bowel resection    HX HEART CATHETERIZATION  04/2020    HX ORTHOPAEDIC      amputated left 4th finger    WI ABDOMEN SURGERY PROC UNLISTED      bowel resection    WI INS NEW/RPLCMT PRM PM W/TRANSV ELTRD ATRIAL&VENT N/A 5/1/2020    INSERT PPM DUAL performed by Babak Jeong Danni Singh MD at [address], [facility] [address] [facility]     Current Outpatient Medications   Medication Sig Dispense Refill    LORazepam (ATIVAN) 1 mg tablet Take 1 tab by mouth on arrival to MRI, may repeat x 1 after 30 minutes for anxiety. 2 Tablet 0    SITagliptin (Januvia) 50 mg tablet Take 1 tablet by mouth once daily 90 Tablet 0    sertraline (ZOLOFT) 50 mg tablet Take 1 tablet by mouth once daily 90 Tablet 0    cholecalciferol (Vitamin D3) 25 mcg (1,000 unit) cap Take 1,000 Units by mouth daily. alfuzosin SR (UROXATRAL) 10 mg SR tablet Take 10 mg by mouth nightly. aspirin delayed-release 81 mg tablet Take 81 mg by mouth daily. traZODone (DESYREL) 50 mg tablet TAKE 1 TO 1 & 1/2 (ONE & ONE-HALF) TABLETS BY MOUTH NIGHTLY AS NEEDED 120 Tablet 0    mv, min #36-iron,carbonyl-FA (Geritol Complete) 16 mg iron- 0.38 mg tab Take 1 Tablet by mouth daily. furosemide (LASIX) 20 mg tablet Take 1 Tablet by mouth daily. 30 Tablet 1    metoprolol succinate (TOPROL-XL) 25 mg XL tablet Take 1 tablet by mouth once daily (Patient taking differently: 12.5 mg.) 90 Tablet 1    zinc sulfate (ZINC-220 PO) Take  by mouth. turmeric 400 mg cap Take  by mouth. Praluent Pen 75 mg/mL injector pen INJECT 1 ML SUBCUTANEOUSLY  EVERY TWO WEEKS 6 mL 3    vitamin e (E GEMS) 100 unit capsule Take 1 capsule three times daily 90 Cap 6    pumpkin seed extract-soy germ 300 mg cap Take 1 Cap by mouth as needed (prostate health). co-enzyme Q-10 (Co Q-10) 100 mg capsule Take 100 mg by mouth daily. 200 mg      cholecalciferol, vitamin D3, (VITAMIN D3 PO) Take 1,000 mg by mouth daily. ascorbate calcium (VITAMIN C PO) Take 1 Tablet by mouth daily. cyanocobalamin 1,000 mcg tablet Take 1,000 mcg by mouth daily.        Allergies   Allergen Reactions    Arb-Angiotensin Receptor Antagonist Other (comments)     High k    Statins-Hmg-Coa Reductase Inhibitors Myalgia    Ace Inhibitors Cough       Family History   Problem Relation Age of Onset    Cancer Mother         unknown    Heart Disease Father     Heart Disease Brother     Anesth Problems Neg Hx      Social History     Tobacco Use    Smoking status: Never    Smokeless tobacco: Current     Types: Chew    Tobacco comments:     Chews tobacco every day   Substance Use Topics    Alcohol use: Yes     Alcohol/week: 7.0 standard drinks     Types: 7 Cans of beer per week     Comment: 5-6 beers daily and wine. \"Drinks like a fish\" he tells me     ACP not on file. SDM is his daughter (Titi Garrido). Provided information in the past.        Colonoscopy: 5/10/21 , Dr. Kaylen Perkins, repeat 5 years    EGD: 5/10/21, Dr. Kaylen Perkins  PSA:   <1 per pt  AAA screen: CT , negative    Tdap: unknown, due will get at pharm  Pneumovax:   Jacob Cords: 19   Shingrix: due reminded  Flu shot: 19    Eye exam: Dr. Niurka Tavares 3/22 annual     A1c:    5.3 due now  Hep C Screen: 10/15, negative  Lipids:  LDL 39 annual     EK22 Electronic ventricular pacemaker   Covid: three doses [employer]), discussed eligible for 2nd booster, discussed waiting until after done with steroids    Medication reconciliation completed by MA and reviewed by me. Medical/surgical/social/family history reviewed and updated by me. Patient provided AVS and preventative screening table. Patient verbalized understanding of all information discussed.      Chong Solomon MD

## 2022-08-29 ENCOUNTER — OFFICE VISIT (OUTPATIENT)
Dept: INTERNAL MEDICINE CLINIC | Age: 72
End: 2022-08-29
Payer: MEDICARE

## 2022-08-29 VITALS
SYSTOLIC BLOOD PRESSURE: 120 MMHG | HEIGHT: 67 IN | WEIGHT: 156.25 LBS | BODY MASS INDEX: 24.52 KG/M2 | TEMPERATURE: 97.9 F | HEART RATE: 89 BPM | OXYGEN SATURATION: 97 % | DIASTOLIC BLOOD PRESSURE: 75 MMHG | RESPIRATION RATE: 16 BRPM

## 2022-08-29 DIAGNOSIS — N18.31 STAGE 3A CHRONIC KIDNEY DISEASE (HCC): ICD-10-CM

## 2022-08-29 DIAGNOSIS — R17 SERUM TOTAL BILIRUBIN ELEVATED: ICD-10-CM

## 2022-08-29 DIAGNOSIS — F32.9 REACTIVE DEPRESSION: ICD-10-CM

## 2022-08-29 DIAGNOSIS — C61 PROSTATE CANCER (HCC): ICD-10-CM

## 2022-08-29 DIAGNOSIS — E11.21 TYPE 2 DIABETES WITH NEPHROPATHY (HCC): ICD-10-CM

## 2022-08-29 DIAGNOSIS — I25.10 CORONARY ARTERY DISEASE INVOLVING NATIVE CORONARY ARTERY OF NATIVE HEART WITHOUT ANGINA PECTORIS: ICD-10-CM

## 2022-08-29 DIAGNOSIS — D69.6 THROMBOCYTOPENIA (HCC): ICD-10-CM

## 2022-08-29 DIAGNOSIS — D64.9 ANEMIA, UNSPECIFIED TYPE: ICD-10-CM

## 2022-08-29 DIAGNOSIS — F10.10 ALCOHOL ABUSE: ICD-10-CM

## 2022-08-29 DIAGNOSIS — I65.23 BILATERAL CAROTID ARTERY STENOSIS: ICD-10-CM

## 2022-08-29 DIAGNOSIS — Z00.00 MEDICARE ANNUAL WELLNESS VISIT, SUBSEQUENT: ICD-10-CM

## 2022-08-29 DIAGNOSIS — E78.2 MIXED HYPERLIPIDEMIA: ICD-10-CM

## 2022-08-29 DIAGNOSIS — R63.4 WEIGHT LOSS: ICD-10-CM

## 2022-08-29 DIAGNOSIS — M48.061 SPINAL STENOSIS, LUMBAR REGION, WITHOUT NEUROGENIC CLAUDICATION: ICD-10-CM

## 2022-08-29 DIAGNOSIS — I10 PRIMARY HYPERTENSION: ICD-10-CM

## 2022-08-29 DIAGNOSIS — R41.3 MEMORY LOSS: ICD-10-CM

## 2022-08-29 DIAGNOSIS — E11.40 TYPE 2 DIABETES MELLITUS WITH DIABETIC NEUROPATHY, WITHOUT LONG-TERM CURRENT USE OF INSULIN (HCC): Primary | ICD-10-CM

## 2022-08-29 PROBLEM — D62 POSTOPERATIVE ANEMIA DUE TO ACUTE BLOOD LOSS: Status: RESOLVED | Noted: 2020-04-29 | Resolved: 2022-08-29

## 2022-08-29 PROCEDURE — 99214 OFFICE O/P EST MOD 30 MIN: CPT | Performed by: INTERNAL MEDICINE

## 2022-08-29 PROCEDURE — 3044F HG A1C LEVEL LT 7.0%: CPT | Performed by: INTERNAL MEDICINE

## 2022-08-29 PROCEDURE — G8536 NO DOC ELDER MAL SCRN: HCPCS | Performed by: INTERNAL MEDICINE

## 2022-08-29 PROCEDURE — G0463 HOSPITAL OUTPT CLINIC VISIT: HCPCS | Performed by: INTERNAL MEDICINE

## 2022-08-29 PROCEDURE — G0439 PPPS, SUBSEQ VISIT: HCPCS | Performed by: INTERNAL MEDICINE

## 2022-08-29 PROCEDURE — G8427 DOCREV CUR MEDS BY ELIG CLIN: HCPCS | Performed by: INTERNAL MEDICINE

## 2022-08-29 PROCEDURE — G8752 SYS BP LESS 140: HCPCS | Performed by: INTERNAL MEDICINE

## 2022-08-29 PROCEDURE — G8754 DIAS BP LESS 90: HCPCS | Performed by: INTERNAL MEDICINE

## 2022-08-29 PROCEDURE — 2022F DILAT RTA XM EVC RTNOPTHY: CPT | Performed by: INTERNAL MEDICINE

## 2022-08-29 PROCEDURE — 1101F PT FALLS ASSESS-DOCD LE1/YR: CPT | Performed by: INTERNAL MEDICINE

## 2022-08-29 PROCEDURE — 3017F COLORECTAL CA SCREEN DOC REV: CPT | Performed by: INTERNAL MEDICINE

## 2022-08-29 PROCEDURE — G9717 DOC PT DX DEP/BP F/U NT REQ: HCPCS | Performed by: INTERNAL MEDICINE

## 2022-08-29 PROCEDURE — G8420 CALC BMI NORM PARAMETERS: HCPCS | Performed by: INTERNAL MEDICINE

## 2022-08-29 RX ORDER — SERTRALINE HYDROCHLORIDE 50 MG/1
75 TABLET, FILM COATED ORAL DAILY
Qty: 120 TABLET | Refills: 1 | Status: SHIPPED | OUTPATIENT
Start: 2022-08-29

## 2022-08-29 NOTE — LETTER
8/30/2022 3:28 PM    Mr. Malave See  [id number] [facility] [id number]    Dear Care Provider    Please fax us the most recent office notes so that we may update the patient's records for continuity of care. Our fax number: [phone].     Patient:   Frieda See  1950          Sincerely,      Flor Puentes MD

## 2022-08-29 NOTE — LETTER
8/30/2022 3:27 PM    Mr. Nigel Majano  [id number] [place] [phone]    Dear Care Provider    Please fax us the most recent office notes and any test results so that we may update the patient's records for continuity of care. Our fax number: [phone].     Patient:   Nigel Majano  1950          Sincerely,      Óscar Whyte MD

## 2022-08-30 LAB
ALBUMIN SERPL-MCNC: 3.8 G/DL (ref 3.5–5)
ALBUMIN/GLOB SERPL: 1 {RATIO} (ref 1.1–2.2)
ALP SERPL-CCNC: 65 U/L (ref 45–117)
ALT SERPL-CCNC: 24 U/L (ref 12–78)
ANION GAP SERPL CALC-SCNC: 7 MMOL/L (ref 5–15)
AST SERPL-CCNC: 31 U/L (ref 15–37)
BILIRUB SERPL-MCNC: 2.6 MG/DL (ref 0.2–1)
BUN SERPL-MCNC: 26 MG/DL (ref 6–20)
BUN/CREAT SERPL: 20 (ref 12–20)
CALCIUM SERPL-MCNC: 9.9 MG/DL (ref 8.5–10.1)
CHLORIDE SERPL-SCNC: 105 MMOL/L (ref 97–108)
CHOLEST SERPL-MCNC: 133 MG/DL
CO2 SERPL-SCNC: 28 MMOL/L (ref 21–32)
COMMENT, HOLDF: NORMAL
CREAT SERPL-MCNC: 1.33 MG/DL (ref 0.7–1.3)
ERYTHROCYTE [DISTWIDTH] IN BLOOD BY AUTOMATED COUNT: 18 % (ref 11.5–14.5)
EST. AVERAGE GLUCOSE BLD GHB EST-MCNC: 105 MG/DL
FERRITIN SERPL-MCNC: 194 NG/ML (ref 26–388)
GLOBULIN SER CALC-MCNC: 4 G/DL (ref 2–4)
GLUCOSE SERPL-MCNC: 83 MG/DL (ref 65–100)
HBA1C MFR BLD: 5.3 % (ref 4–5.6)
HCT VFR BLD AUTO: 37.2 % (ref 36.6–50.3)
HDLC SERPL-MCNC: 55 MG/DL
HDLC SERPL: 2.4 {RATIO} (ref 0–5)
HGB BLD-MCNC: 12.6 G/DL (ref 12.1–17)
LDLC SERPL CALC-MCNC: 64.4 MG/DL (ref 0–100)
MCH RBC QN AUTO: 28.8 PG (ref 26–34)
MCHC RBC AUTO-ENTMCNC: 33.9 G/DL (ref 30–36.5)
MCV RBC AUTO: 85.1 FL (ref 80–99)
NRBC # BLD: 0 K/UL (ref 0–0.01)
NRBC BLD-RTO: 0 PER 100 WBC
PLATELET # BLD AUTO: 166 K/UL (ref 150–400)
PMV BLD AUTO: 12.6 FL (ref 8.9–12.9)
POTASSIUM SERPL-SCNC: 3.6 MMOL/L (ref 3.5–5.1)
PROT SERPL-MCNC: 7.8 G/DL (ref 6.4–8.2)
PSA SERPL-MCNC: 0.4 NG/ML (ref 0.01–4)
RBC # BLD AUTO: 4.37 M/UL (ref 4.1–5.7)
SAMPLES BEING HELD,HOLD: NORMAL
SODIUM SERPL-SCNC: 140 MMOL/L (ref 136–145)
TRIGL SERPL-MCNC: 68 MG/DL (ref ?–150)
TSH SERPL DL<=0.05 MIU/L-ACNC: 2.88 UIU/ML (ref 0.36–3.74)
VLDLC SERPL CALC-MCNC: 13.6 MG/DL
WBC # BLD AUTO: 3.7 K/UL (ref 4.1–11.1)

## 2022-08-31 NOTE — PROGRESS NOTES
Please call patient back about results  Let him know bilirubin elevated a bit more. Discussed liver US results w gi dr Codi Chavarria, it did show a \"pulsatile portal flow pattern\"-which could indicate pressure from the heart--thus pt was instructed to f/U w cards.   Also persistent etoh consumption likely playing a role as well --work on cessation   Rest labs fine  Cbc improved

## 2022-09-01 ENCOUNTER — TELEPHONE (OUTPATIENT)
Dept: NEUROLOGY | Age: 72
End: 2022-09-01

## 2022-09-01 NOTE — TELEPHONE ENCOUNTER
Called patient's daughter. Patient's daughter would like the results of the MRI lumbar spine that was on 08/26/2022.

## 2022-09-01 NOTE — TELEPHONE ENCOUNTER
Patients daughter Honey Thorne called asking for the test results from her dads MRI.     Please contact

## 2022-09-02 NOTE — PROGRESS NOTES
Called, spoke to Yareli(Daughter)  Received two pt identifiers  Judie Henderson informed per Dr. Mk Almanza is slightly elevated more than before. Yareli informed per Dr. Deandre Kim results w gi dr Beitto Voss, it did show a \"pulsatile portal flow pattern\"-which could indicate pressure from the heart. Informed Judie Henderson to make sure pt follows up with cardiology  Advised drinking alcohol can play a role as well and advised yareli to work on etoh cessation  Rest labs are okay  Judie Henderson verbalizes understanding of the instructions and has no further questions at this time.

## 2022-09-02 NOTE — TELEPHONE ENCOUNTER
Called patient's daughter. Results for MRI lumbar spine were given. Patient's daughter was also informed to see the previous surgeon to discuss the results.

## 2022-09-02 NOTE — TELEPHONE ENCOUNTER
[place], that was incorrect. Please return call: He has degenerative changes that could be causing the pain down his legs. It also shows changes from prior surgery. He should see his previous surgeon to discuss.

## 2022-09-06 ENCOUNTER — TELEPHONE (OUTPATIENT)
Dept: INTERNAL MEDICINE CLINIC | Age: 72
End: 2022-09-06

## 2022-09-06 ENCOUNTER — HOSPITAL ENCOUNTER (OUTPATIENT)
Dept: MRI IMAGING | Age: 72
Discharge: HOME | End: 2022-09-06
Attending: INTERNAL MEDICINE
Payer: MEDICARE

## 2022-09-06 DIAGNOSIS — I42.9 CARDIOMYOPATHY, UNSPECIFIED TYPE (HCC): ICD-10-CM

## 2022-09-06 DIAGNOSIS — E85.9 AMYLOIDOSIS, UNSPECIFIED TYPE (HCC): ICD-10-CM

## 2022-09-06 PROCEDURE — A9576 INJ PROHANCE MULTIPACK: HCPCS | Performed by: INTERNAL MEDICINE

## 2022-09-06 PROCEDURE — 75561 CARDIAC MRI FOR MORPH W/DYE: CPT

## 2022-09-06 PROCEDURE — 74011250636 HC RX REV CODE- 250/636: Performed by: INTERNAL MEDICINE

## 2022-09-06 PROCEDURE — 75561 CARDIAC MRI FOR MORPH W/DYE: CPT | Performed by: INTERNAL MEDICINE

## 2022-09-06 RX ADMIN — GADOTERIDOL 20 ML: 279.3 INJECTION, SOLUTION INTRAVENOUS at 09:23

## 2022-09-06 NOTE — TELEPHONE ENCOUNTER
#[phone] daughter, Eriberto Branham states pt's last two A1C'S have been 5.3. Can Dr. Mechelle Knox reduce dosage of Januvia? Please call Eriberto Branham and you may leave that answer on her machine if needed. Thanks.

## 2022-09-07 NOTE — TELEPHONE ENCOUNTER
Called, spoke to Yareli(Daughter)  Received two pt identifiers  Rocio Calix informed per Dr. Ami Mayer cut Summer Pears in half to do 25mg daily. Keyshawn Rough understanding of the instructions and has no further questions at this time.

## 2022-09-09 ENCOUNTER — TELEPHONE (OUTPATIENT)
Dept: CARDIOLOGY CLINIC | Age: 72
End: 2022-09-09

## 2022-09-09 DIAGNOSIS — E85.4 CARDIAC AMYLOIDOSIS (HCC): Primary | ICD-10-CM

## 2022-09-09 DIAGNOSIS — I43 CARDIAC AMYLOIDOSIS (HCC): Primary | ICD-10-CM

## 2022-09-09 NOTE — TELEPHONE ENCOUNTER
I called and spoke with the patient's daughter, Nolvia Day- on HIPAA. Patient's two identifiers verified. I have thoroughly discussed with her the result of his Cardiac MRI.  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 Dr Sissy Mcginnis recommends for him to see Dr Trish Miles with [facility] for further assessment and management\"      She verbalized understanding. Per daughter, patient agrees to the referral.  I have provided Dr. Selina Lomeli office phone number.

## 2022-09-12 ENCOUNTER — TELEPHONE (OUTPATIENT)
Dept: CARDIOLOGY CLINIC | Age: 72
End: 2022-09-12

## 2022-09-12 NOTE — TELEPHONE ENCOUNTER
Received referral review from nursing to schedule patient for NP appointment. Called patient's emergency contact per VM left this morning. Scheduled patient for Thursday October 6th @ 9:00am w/ KElsy Reyes    Reminded patient to bring in all medication bottles, pacemaker/defibrillator card if applicable, along with insurance card/Photo ID

## 2022-10-04 ENCOUNTER — TELEPHONE (OUTPATIENT)
Dept: CARDIOLOGY CLINIC | Age: 72
End: 2022-10-04

## 2022-10-04 NOTE — TELEPHONE ENCOUNTER
Telephone Call RE:  Appointment reminder     Outcome:     [x] Patient confirmed appointment   [] Patient rescheduled appointment for    [] Unable to reach  [] Left message             [] Other:     ---------------------             [x] Screened for [address] [facility]

## 2022-10-06 ENCOUNTER — OFFICE VISIT (OUTPATIENT)
Dept: CARDIOLOGY CLINIC | Age: 72
End: 2022-10-06
Payer: MEDICARE

## 2022-10-06 ENCOUNTER — HOME HEALTH ADMISSION (OUTPATIENT)
Dept: HOME HEALTH SERVICES | Facility: HOME HEALTH | Age: 72
End: 2022-10-06
Payer: MEDICARE

## 2022-10-06 VITALS
OXYGEN SATURATION: 96 % | TEMPERATURE: 97.7 F | WEIGHT: 166.4 LBS | DIASTOLIC BLOOD PRESSURE: 78 MMHG | BODY MASS INDEX: 26.12 KG/M2 | HEART RATE: 70 BPM | SYSTOLIC BLOOD PRESSURE: 116 MMHG | RESPIRATION RATE: 16 BRPM | HEIGHT: 67 IN

## 2022-10-06 DIAGNOSIS — Z95.0 PACEMAKER: ICD-10-CM

## 2022-10-06 DIAGNOSIS — R06.09 DOE (DYSPNEA ON EXERTION): ICD-10-CM

## 2022-10-06 DIAGNOSIS — M48.02 CERVICAL SPINAL STENOSIS: ICD-10-CM

## 2022-10-06 DIAGNOSIS — I50.22 CHRONIC SYSTOLIC HEART FAILURE (HCC): Primary | ICD-10-CM

## 2022-10-06 DIAGNOSIS — R53.83 OTHER FATIGUE: ICD-10-CM

## 2022-10-06 DIAGNOSIS — E55.9 VITAMIN D DEFICIENCY: ICD-10-CM

## 2022-10-06 DIAGNOSIS — D50.8 OTHER IRON DEFICIENCY ANEMIA: ICD-10-CM

## 2022-10-06 DIAGNOSIS — M48.061 SPINAL STENOSIS, LUMBAR REGION, WITHOUT NEUROGENIC CLAUDICATION: ICD-10-CM

## 2022-10-06 DIAGNOSIS — E78.2 MIXED HYPERLIPIDEMIA: ICD-10-CM

## 2022-10-06 PROCEDURE — 99215 OFFICE O/P EST HI 40 MIN: CPT | Performed by: INTERNAL MEDICINE

## 2022-10-06 PROCEDURE — 3017F COLORECTAL CA SCREEN DOC REV: CPT | Performed by: INTERNAL MEDICINE

## 2022-10-06 PROCEDURE — G8427 DOCREV CUR MEDS BY ELIG CLIN: HCPCS | Performed by: INTERNAL MEDICINE

## 2022-10-06 PROCEDURE — G8754 DIAS BP LESS 90: HCPCS | Performed by: INTERNAL MEDICINE

## 2022-10-06 PROCEDURE — G8417 CALC BMI ABV UP PARAM F/U: HCPCS | Performed by: INTERNAL MEDICINE

## 2022-10-06 PROCEDURE — G9717 DOC PT DX DEP/BP F/U NT REQ: HCPCS | Performed by: INTERNAL MEDICINE

## 2022-10-06 PROCEDURE — 1101F PT FALLS ASSESS-DOCD LE1/YR: CPT | Performed by: INTERNAL MEDICINE

## 2022-10-06 PROCEDURE — G8752 SYS BP LESS 140: HCPCS | Performed by: INTERNAL MEDICINE

## 2022-10-06 PROCEDURE — 1123F ACP DISCUSS/DSCN MKR DOCD: CPT | Performed by: INTERNAL MEDICINE

## 2022-10-06 PROCEDURE — G8536 NO DOC ELDER MAL SCRN: HCPCS | Performed by: INTERNAL MEDICINE

## 2022-10-06 RX ORDER — FUROSEMIDE 20 MG/1
20 TABLET ORAL DAILY
Qty: 30 TABLET | Refills: 1 | Status: SHIPPED | OUTPATIENT
Start: 2022-10-06

## 2022-10-06 NOTE — PROGRESS NOTES
[facility] in [place], [facility] Note    Patient name: Prince Siemens  Patient : 1950  Patient MRN: [id number]  Date of service: 10/06/22    Primary care physician: Rhiannon Johnson MD  Primary general cardiologist:  Dr. Patrice Llanes    Primary F cardiologist: Randy Ortiz MD    CHIEF COMPLAINT:  Chronic systolic heart failure    PLAN OF CARE:  75 y/o with h/o HFmEF, LVEF 43% and recent cMRI revealed likely infiltrative cardiac amyloidosis, stage C, NYHA class IIIA symptoms, unable to tolerate GDMT due to hypotension; undergoing workup for amyloidosis - genetic testing, PYP, labs pending    PLAN:  Continue current medical therapy for heart failure  Continue current dose of toprol XL 12.5mg daily  Known intolerance to lisinopril/ARB; not recommended in amyloid  Consider adding spironolactone at next visit  Consider adding SGLT2 inhibitor at next visit  Continue current dose of lasix 20mg daily until weight loss 4-6lbs by next week when he sees nephrologist to further adjust  Not on allopurinol or uloric, check uric acid level  Continue baby ASA   Does not take statins, LFT abnormal, will recheck  Schedule sleep study    Obtain genetic testing for cardiomyopathy  Schedule PYP test  Routine screening HF labs: CBC, BMP, Mg, LFT, uric acid, pro-NT-BNP, iron profile with ferritin, TSH, vitamin D level, lipid profile, CPK, gammopathy profile, HIV, hepatitis panel, MARC, HGBA1C, B2M and UPEP/IF  Reinforced low salt diet  Reinforced fluid restriction to 6 x 8oz glasses per day  Provided educational materials \"Living with heart failure\"   Provided advanced care plan forms to be filled out    Referral to physical therapy to evaluate and treat re: balance, safety at home and conditioning  Referral to nutritionist   Follow-up with primary cardiologist, Dr. Patrice Llanes; will alternate visits  Follow-up with EP cardiologist, Dr. Fajardo Carry  Follow-up with nephrologist  Recommend flu, covid and pneumonia vaccinations  Return to [facility] Clinic in one month or sooner once workup complete with MD    IMPRESSION:  Fatigue  Shortness of breath  Chronic systolic heart failure, HFmEF  Stage C, NYHA class IIIA symptoms  Non-ischemic cardiomyopathy, LVEF 43%  Severe LVH 2.1cm  cMRI suggestive of infiltrative cardiac amyloidosis  Intolerance to lisinopril  Coronary artery disease  S/p CABG x 3 (4/2020) LIMA to LAD, SVG to RCA and Ramus  CHF s/p PM - pacemaker dependent  RBBB 144ms  Cardiac risk factors  HTN  HL  DM2  Liver dysfunction, TBili 2.6  CKD, stage 3  AT3 activity low  Proteic C low  Lupus anticoagulatn abnormal  Leukopenia  H/o prostate cancer  Elevated D Dimer  Hx DVT/PE: developed PE/DVT after bowel resection in 2012  History of rectal bleeding due to an ulcer related to radiation   DNR    CARDIAC IMAGING:  Echo (8/2022)    Left Ventricle: Low normal left ventricular systolic function. EF by 2D Simpsons Biplane is 50%. Left ventricle size is normal. Severely increased wall thickness. Findings consistent with severe concentric hypertrophy. There is a speckled appearance to the left ventricular myocardium. Consider testing for cardiac amyloidosis including possible cardiac MRI. Noted that the patient's Medtronic device is MRI compatible. Normal wall motion consistent with paced rhythm    Left Atrium: Left atrium is severely dilated. Left atrial volume index is severely increased (>48 mL/m2). Right Atrium: Right atrium is moderately dilated. Mitral Valve: Mild to moderate regurgitation. Tricuspid Valve: Moderate regurgitation. Mildly elevated RVSP. The estimated RVSP is 38 mmHg. EKG (2020) NSR, RBBB, QRS 144ms, septal infarct and inferior infarct  LHC (2020)  Left Main   The vessel was visualized by angiography. The vessel is angiographically normal.   Left Anterior Descending   Prox LAD lesion 50% stenosed. .   Mid LAD lesion 75% stenosed.  Rodriguez Ralphs Intermedius   Ramus lesion 80% stenosed. .   Left Circumflex   Mid Cx lesion 50% stenosed. .   First Obtuse Marginal Branch   1st Mrg lesion 80% stenosed. .   Right Coronary Artery   Mid RCA lesion 75% stenosed. .   Cardiac MRI (9/2022)  1. Normal left ventricular cavity size. Severe concentric left ventricular  hypertrophy. Significantly hypertrophied septum measuring 21 mm. There is no  left ventricular outflow tract obstruction. Moderate left ventricular systolic  dysfunction. Moderate global hypokinesis with slight regional variation. 3-D  LVEF 43%. 2. Normal right ventricular size with minimal right ventricular systolic  dysfunction. 3-D RVEF 47%. 3. Moderate 2+ tricuspid regurgitation. 4. On EGE and LGE study, there is mild diffuse myocardial enhancement with  slightly poor nulling of the myocardium on postcontrast images. These findings  may suggest possibility of infiltrative cardiac amyloidosis. There is no  myocardial scar. There is no features of recent or old myocardial infarction. There is no features of infiltrative sarcoidosis. There is no features of  inflammatory myocarditis. All myocardial walls are viable. 5. Normal pericardium without any significant pericardial effusion. 6. Small right-sided pleural effusion and tiny left-sided pleural effusion. Lexiscan Myoview May 2021 with normal perfusion, calculated LVEF of 37%     HEMODYNAMICS:  RHC not done  CPEST not done  6MW not done  6 Min Walk Report 7/7/2020   (PRE) HR 82      OTHER IMAGING:  CT chest (2021)  There is no pulmonary embolism. There is no aortic aneurysm or dissection. Cardiomegaly and coronary artery disease. August 2021-had colonoscopy by Dr. Elías Munguia for hematochezia, which revealed radiation changes related to prostate radiation. LIFE GOALS:  Lifestyle goals discussed with the patient.     HISTORY OF PRESENT ILLNESS:  I had the pleasure of seeing Swapna Kenny in [facility] at [address] [facility] in [facility]. Briefly, Chio John is a 74 y.o. male with medical history listed above. Patient was referred to [facility] for further workup of cardiac amyloidosis. INTERVAL HISTORY:  Today, patient presents for initial clinic visit. Patient is doing \"okay\". Patient walked to our clinic from parking garage slowly and had to stop. Patient can perform home activities slowly and has to rest.     Patient has leg edema and weight gain by 6 lbs at least. Patient denies abdominal bloating or change of appetite. Patient's weight remained stable. Patient denies orthopnea, PND or nocturia. Patient denies irregular heart rate or palpitations. No presyncope or syncope. Patient denies other cardiac symptoms such as chest pain or leg pain with walking. Patient is compliant with fluid restriction and taking medications as prescribed. Patient manages his own medications. PHYSICAL EXAM:  Visit Vitals  /78 (BP 1 Location: Left upper arm, BP Patient Position: Sitting, BP Cuff Size: Adult)   Pulse 70   Temp 97.7 °F (36.5 °C) (Oral)   Resp 16   Ht 5' 7\" (1.702 m)   Wt 166 lb 6.4 oz (75.5 kg)   SpO2 96%   BMI 26.06 kg/m²     General: Patient is well developed, well-nourished in no acute distress  HEENT: Unremarkable   Neck: Supple. No evidence of thyroid enlargements or lymphadenopathy. JVD: Cannot be appreciated   Lungs: Breath sounds are equal and clear bilaterally. No wheezes, rhonchi, or rales. Heart: Regular rate and rhythm with normal S1 and S2. No murmurs, gallops or rubs. Abdomen: Soft, no mass or tenderness. No organomegaly or hernia. Bowel sounds present. Genitourinary and rectal: deferred  Extremities: No cyanosis, clubbing, 2+ BLE edema. Neurologic: No focal sensory or motor deficits are noted. Grossly intact. Psychiatric: Awake, alert an doriented x 3. Appropriate mood and affect. Skin: Warm, dry and well perfused.      REVIEW OF SYSTEMS:  General: Denies fever.  Ear, nose and throat: Denies difficulty hearing, sinus problems, nosebleeds  Cardiovascular: see above in the interval history  Respiratory: Denies cough, wheezing, sputum production, hemoptysis.   Gastrointestinal: Denies heartburn, constipation, diarrhea, abdominal pain, nausea, blood in stool  Kidney and bladder: Denies painful urination, frequent urination  Musculoskeletal: Denies joint pain, muscle weakness  Skin and hair: Denies change in existing skin lesions    PAST MEDICAL HISTORY:  Past Medical History:   Diagnosis Date    Arthritis     Cancer ([facility]     Prostate ca    Chronic kidney disease     Stage 3    Chronic pain     Abdoman, back, fingers per daughter    Depression     Diabetes ([facility]     Heart attack ([facility]     Heart failure ([facility] 2020    Dr. Franco Finch    Hyperlipidemia     Hypertension     Pacemaker 05/01/2020    MED    PE (pulmonary embolism)     Pneumonia     Sleep apnea     not use CPAP       PAST SURGICAL HISTORY:  Past Surgical History:   Procedure Laterality Date    COLONOSCOPY N/A 3/19/2021    COLONOSCOPY performed by Rhiannon Pace MD at [facility] ENDOSCOPY    COLONOSCOPY N/A 5/10/2021    COLONOSCOPY performed by Rhiannon Pace MD at [place] [address] N/A 8/2/2021    FLEXIBLE SIGMOIDOSCOPY performed by Rhiannon Pace MD at [facility] ENDOSCOPY    HX BACK SURGERY  2014    HX CERVICAL LAMINECTOMY      HX CORONARY ARTERY BYPASS GRAFT  04/2021    X3     HX GI  11/2012    bowel resection    HX HEART CATHETERIZATION  04/2020    HX ORTHOPAEDIC      amputated left 4th finger    [place] ABDOMEN SURGERY PROC UNLISTED      bowel resection    [place] INS NEW/RPLCMT PRM PM W/TRANSV ELTRD ATRIAL&VENT N/A 5/1/2020    INSERT PPM DUAL performed by Daniel Mohan MD at [facility] CATH LAB       FAMILY HISTORY:  Family History   Problem Relation Age of Onset    Cancer Mother         unknown    Heart Disease Father     Heart Disease Brother     Anesth Problems Neg Hx        SOCIAL HISTORY:  Social History     Socioeconomic History    Marital status:     Number of children: 1    Years of education: 12    Highest education level: High school graduate   Occupational History    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[employer], 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    Tobacco Use    Smoking status: Never    Smokeless tobacco: Current     Types: Chew    Tobacco comments:     Chews tobacco every day   Vaping Use    Vaping Use: Never used   Substance and Sexual Activity    Alcohol use:  Yes     Alcohol/week: 7.0 standard drinks     Types: 7 Cans of beer per week     Comment: 2-4 beers daily, sometimes alcohol    Drug use: Never    Sexual activity: Not Currently   Other Topics Concern     Service No    Blood Transfusions No    Caffeine Concern No    Occupational Exposure No    Hobby Hazards No    Sleep Concern Yes    Stress Concern Yes    Weight Concern No    Special Diet No    Back Care Yes    Exercise No    Seat Belt Yes   Social History Narrative    ** Merged History Encounter **         Lives in [facility] Determinants of Health     Financial Resource Strain: Low Risk     Difficulty of Paying Living Expenses: Not hard at all   Food Insecurity: No Food Insecurity    Worried About Running Out of Food in the Last Year: Never true    Ran Out of Food in the Last Year: Never true       LABORATORY RESULTS:  Labs Latest Ref Rng & Units 8/29/2022   WBC 4.1 - 11.1 K/uL 3.7(L)   RBC 4.10 - 5.70 M/uL 4.37   Hemoglobin 12.1 - 17.0 g/dL 12.6   Hematocrit 36.6 - 50.3 % 37.2   MCV 80.0 - 99.0 FL 85.1   Platelets 644 - 468 K/uL 166   Albumin 3.5 - 5.0 g/dL 3.8   Calcium 8.5 - 10.1 MG/DL 9.9   Glucose 65 - 100 mg/dL 83   BUN 6 - 20 MG/DL 26(H)   Creatinine 0.70 - 1.30 MG/DL 1.33(H)   Sodium 136 - 145 mmol/L 140   Potassium 3.5 - 5.1 mmol/L 3.6   TSH 0.36 - 3.74 uIU/mL 2.88   PSA 0.01 - 4.0 ng/mL 0.4   Some recent data might be hidden       ALLERGY:  Allergies   Allergen Reactions    Arb-Angiotensin Receptor Antagonist Other (comments)     High k    Statins-Hmg-Coa Reductase Inhibitors Myalgia    Ace Inhibitors Cough        CURRENT MEDICATIONS:    Current Outpatient Medications:     sertraline (ZOLOFT) 50 mg tablet, Take 1.5 Tablets by mouth daily. , Disp: 120 Tablet, Rfl: 1    SITagliptin (Januvia) 50 mg tablet, Take 1 tablet by mouth once daily (Patient taking differently: 25 mg. Take 1 tablet by mouth once daily), Disp: 90 Tablet, Rfl: 0    alfuzosin SR (UROXATRAL) 10 mg SR tablet, Take 10 mg by mouth as needed. , Disp: , Rfl:     furosemide (LASIX) 20 mg tablet, Take 1 Tablet by mouth daily. (Patient taking differently: Take 20 mg by mouth as needed.), Disp: 30 Tablet, Rfl: 1    pumpkin seed extract-soy germ 300 mg cap, Take 1 Cap by mouth as needed (prostate health). , Disp: , Rfl:     coenzyme Q-10 (CO Q-10) 200 mg capsule, Take 200 mg by mouth daily. 200 mg, Disp: , Rfl:     ascorbate calcium (VITAMIN C PO), Take 1 Tablet by mouth daily. , Disp: , Rfl:     LORazepam (ATIVAN) 1 mg tablet, Take 1 Tablet by mouth daily as needed for Anxiety. Take 1 tab by mouth on arrival to MRI, may repeat x 1 after 30 minutes for anxiety (Patient not taking: Reported on 10/6/2022), Disp: 2 Tablet, Rfl: 0    cholecalciferol (VITAMIN D3) 25 mcg (1,000 unit) cap, Take 1,000 Units by mouth daily. , Disp: , Rfl:     aspirin delayed-release 81 mg tablet, Take 81 mg by mouth daily. (Patient not taking: Reported on 10/6/2022), Disp: , Rfl:     traZODone (DESYREL) 50 mg tablet, TAKE 1 TO 1 & 1/2 (ONE & ONE-HALF) TABLETS BY MOUTH NIGHTLY AS NEEDED, Disp: 120 Tablet, Rfl: 0    mv, min #36-iron,carbonyl-FA (Geritol Complete) 16 mg iron- 0.38 mg tab, Take 1 Tablet by mouth daily.  (Patient not taking: Reported on 10/6/2022), Disp: , Rfl:     metoprolol succinate (TOPROL-XL) 25 mg XL tablet, Take 1 tablet by mouth once daily (Patient taking differently: 12.5 mg.), Disp: 90 Tablet, Rfl: 1    zinc sulfate (ZINC-220 PO), Take  by mouth., Disp: , Rfl: turmeric 400 mg cap, Take  by mouth., Disp: , Rfl:     Praluent Pen 75 mg/mL injector pen, INJECT 1 ML SUBCUTANEOUSLY  EVERY TWO WEEKS (Patient not taking: Reported on 10/6/2022), Disp: 6 mL, Rfl: 3    vitamin e (E GEMS) 100 unit capsule, Take 1 capsule three times daily, Disp: 90 Cap, Rfl: 6    cyanocobalamin 1,000 mcg tablet, Take 1,000 mcg by mouth daily. , Disp: , Rfl:     Thank you for your referral and allowing me to participate in this patient's care.     Janelle Montana MD PhD  [address] [place] [address], [address]  Phone: [phone]  Fax: [phone]    PATIENT CARE TEAM:  Patient Care Team:  Larry Read MD as PCP - General (Internal Medicine Physician)  Larry Read MD as PCP - [facility] [place] [place]  Darreld Kocher, MD as Physician (Cardiovascular Disease Physician)  Delores Diaz MD as Physician (Nephrology)  Beti Lau MD (Neurosurgery)  Lemuel Neville MD (Ophthalmology)  Massimo Pritchett MD (Otolaryngology)  Juan Miguel Brar MD (Urology)  Karon Florence MD (Ophthalmology)  Trace Sierra DPM (Podiatry)  Tl Deng, PHD (Psychology)  Gwen Villarreal MD (Cardiothoracic Surgery)  Maria Esther Apodaca MD (Cardiovascular Disease Physician)  Prerna Long MD (Cardiovascular Disease Physician)  Alan Hsieh MD as Physician (Nephrology)  Ayse Cooper MD (Gastroenterology)  Dary Greene MD (Radiation Oncology)  Yanelis Nugent MD as Physician (Nephrology)  Brian Malhotra, MARIYA as Nurse Practitioner (Nurse Practitioner)  Prerna Long MD (Cardiovascular Disease Physician)  Lemuel Neville MD (Ophthalmology)     Total visit time: 45 minutes  (> 50% spent face-to-face counseling)

## 2022-10-06 NOTE — PATIENT INSTRUCTIONS
Medication changes:    Please take Lasix (furosemide) one tablet every day in the morning. Goal weight loss is 4-6 lb prior to seeing your nephrologist    Please take this to your pharmacy to notify them of the change in medications. Testing Ordered:    Lab work has been ordered. Please take it with your other labs to be completed. PYP testing has been scheduled for October 21 at 12:30. Please arrive by 12:15 to check in. This test is located at the [facility] at [address] [place] [address] , [address], [address]. Please be aware that the entire process takes approximately 4 hours, as you will be injected with a radioisotope and then asked to wait for 3 hours after which you will be imaged for the scan. You can leave the facility and return in 2.5 hours to be imaged at the 3 hour wayne. If this appointment does not work for you, please contact the [facility] at [phone] to reschedule. A referral to Nutrition has been placed. Please contact [phone] to schedule an appointment. A referral to sleep medicine has been placed. You will be contacted for scheduling. A referral to advanced care planning has been placed. You will be contacted for scheduling. Other Recommendations:      Ensure your drinking an adequate amount of water with a goal of 6-8 eight ounce glasses (1.5-2 liters) of fluid daily. Your urine should be clear and light yellow straw colored. If your blood pressure begins to consistently run below 90/60 and/or you begin to experience dizziness or lightheadedness, please contact the [facility] at [phone]. Follow up after your testing is completed with Dr Kylah Roth with [facility]      Please monitor your weights daily upon waking and after using the bathroom. Keep a written records of your weights and bring to your next appointment.  If you have a weight gain of 3 or more pounds overnight OR 5 or more pounds in one week please contact our office. Thank you for allowing us the privilege of being a part of your healthcare team! Please do not hesitate to contact our office at [phone] with any questions or concerns. Virtual Heart Failure [address] invites you to learn more about heart failure and to share your questions, ideas, and experiences with others. Each month, the Heart Failure Support Group features a new educational topic and a guest speaker, followed by an interactive discussion. Our Heart Failure Nurse Navigator will moderate each session. You will be able to participate by phone, tablet or computer through [employer]. This support group takes place on the 3rd Thursday of each month from 6:00-7:30PM. All individuals living with heart failure and their caregivers are welcome to join. If you are interested in participating, please contact us at [email or web address] and you will be sent the link to join the [employer].

## 2022-10-07 ENCOUNTER — PATIENT OUTREACH (OUTPATIENT)
Dept: CASE MANAGEMENT | Age: 72
End: 2022-10-07

## 2022-10-07 NOTE — ACP (ADVANCE CARE PLANNING)
Advance Care Planning   Ambulatory ACP Specialist Patient Outreach    Date:  10/7/2022    ACP Specialist:  Puja Scott LPN    Outreach call to patient in follow-up to ACP Specialist referral from:    [x] PCP  [] Provider   [] Ambulatory Care Management [] Other     For:                  [x] Advance Directive Assistance              [] Complete Portable DNR order              [] Complete POST/MOST              [] Code Status Discussion             [] Discuss Goals of Care             [] Early ACP Decision-Making              [] Other (Specify)    Date Referral Received: 10/6/22    Today's Outreach:  [x] First   [] Second  [] Third       Third outreach made by: [] Phone  [] Email / mail    [] MyChart     Intervention:  [x] Spoke with Patient's daughter   [] Left VM requesting return call      Outcome:   LPN spoke with the pt's daughter who wished to schedule an appt with ACP specialist for the pt. Appt scheduled for 10/19/22 at 11 am. ACP material e-mailed fro review. Next Step:   [x] ACP scheduled conversation  [] Outreach again in one week               [x] Email / Mail ACP Info Sheets  [] Email / Mail Advance Directive   [] Closing referral.  Routing closure to referring provider/staff and to ACP Specialist . [] Closure letter mailed to patient with invitation to contact ACP Specialist if / when ready.   Thank you for this referral.

## 2022-10-10 ENCOUNTER — HOME CARE VISIT (OUTPATIENT)
Dept: SCHEDULING | Facility: HOME HEALTH | Age: 72
End: 2022-10-10
Payer: MEDICARE

## 2022-10-10 VITALS
SYSTOLIC BLOOD PRESSURE: 120 MMHG | OXYGEN SATURATION: 98 % | TEMPERATURE: 97.8 F | HEART RATE: 63 BPM | RESPIRATION RATE: 17 BRPM | DIASTOLIC BLOOD PRESSURE: 69 MMHG

## 2022-10-10 PROCEDURE — G0151 HHCP-SERV OF PT,EA 15 MIN: HCPCS

## 2022-10-10 PROCEDURE — 400018 HH-NO PAY CLAIM PROCEDURE

## 2022-10-10 PROCEDURE — 3331090002 HH PPS REVENUE DEBIT

## 2022-10-10 PROCEDURE — 400013 HH SOC

## 2022-10-10 PROCEDURE — 3331090001 HH PPS REVENUE CREDIT

## 2022-10-11 PROCEDURE — 3331090002 HH PPS REVENUE DEBIT

## 2022-10-11 PROCEDURE — 3331090001 HH PPS REVENUE CREDIT

## 2022-10-12 ENCOUNTER — HOME CARE VISIT (OUTPATIENT)
Dept: SCHEDULING | Facility: HOME HEALTH | Age: 72
End: 2022-10-12
Payer: MEDICARE

## 2022-10-12 PROCEDURE — G0151 HHCP-SERV OF PT,EA 15 MIN: HCPCS

## 2022-10-12 PROCEDURE — 3331090002 HH PPS REVENUE DEBIT

## 2022-10-12 PROCEDURE — 3331090001 HH PPS REVENUE CREDIT

## 2022-10-13 ENCOUNTER — DOCUMENTATION ONLY (OUTPATIENT)
Dept: CASE MANAGEMENT | Age: 72
End: 2022-10-13

## 2022-10-13 VITALS
SYSTOLIC BLOOD PRESSURE: 126 MMHG | RESPIRATION RATE: 16 BRPM | DIASTOLIC BLOOD PRESSURE: 68 MMHG | HEART RATE: 69 BPM | OXYGEN SATURATION: 98 % | TEMPERATURE: 97.6 F

## 2022-10-13 PROCEDURE — 3331090002 HH PPS REVENUE DEBIT

## 2022-10-13 PROCEDURE — 3331090001 HH PPS REVENUE CREDIT

## 2022-10-13 NOTE — ACP (ADVANCE CARE PLANNING)
Advance Care Planning   Ambulatory ACP Specialist Patient Outreach    Date:  10/13/2022    ACP Specialist:  Adrian Rebollar LCSW    Outreach call to patient in follow-up to ACP Specialist referral from:    [x] PCP  [] Provider   [] Ambulatory Care Management [] Other     For:                  [x] Advance Directive Assistance              [] Complete Portable DNR order              [] Complete POST/MOST              [] Code Status Discussion             [] Discuss Goals of Care             [] Early ACP Decision-Making              [] Other (Specify)    Date Referral Received:10/6/22    Today's Outreach:  [] First   [x] Second  [] Third       Third outreach made by: [] Phone  [x] Email / mail    [] MyChart     Intervention:  [] Spoke with Patient   [] Left VM requesting return call      Outcome: Emailed ACP appt reminder to provided email address in pt's chart. Next Step:   [x] ACP scheduled conversation  [] Outreach again in one week               [] Email / Mail ACP Info Sheets  [] Email / Mail Advance Directive   [] Closing referral.  Routing closure to referring provider/staff and to ACP Specialist . [] Closure letter mailed to patient with invitation to contact ACP Specialist if / when ready.   Thank you for this referral.  Adrian Rebollar LCSW

## 2022-10-14 PROCEDURE — 3331090002 HH PPS REVENUE DEBIT

## 2022-10-14 PROCEDURE — 3331090001 HH PPS REVENUE CREDIT

## 2022-10-15 PROCEDURE — 3331090002 HH PPS REVENUE DEBIT

## 2022-10-15 PROCEDURE — 3331090001 HH PPS REVENUE CREDIT

## 2022-10-16 PROCEDURE — 3331090002 HH PPS REVENUE DEBIT

## 2022-10-16 PROCEDURE — 3331090001 HH PPS REVENUE CREDIT

## 2022-10-17 ENCOUNTER — HOME CARE VISIT (OUTPATIENT)
Dept: SCHEDULING | Facility: HOME HEALTH | Age: 72
End: 2022-10-17
Payer: MEDICARE

## 2022-10-17 PROCEDURE — G0151 HHCP-SERV OF PT,EA 15 MIN: HCPCS

## 2022-10-17 PROCEDURE — 3331090002 HH PPS REVENUE DEBIT

## 2022-10-17 PROCEDURE — 3331090001 HH PPS REVENUE CREDIT

## 2022-10-18 VITALS
RESPIRATION RATE: 16 BRPM | SYSTOLIC BLOOD PRESSURE: 135 MMHG | TEMPERATURE: 97.8 F | DIASTOLIC BLOOD PRESSURE: 75 MMHG | HEART RATE: 83 BPM | OXYGEN SATURATION: 98 %

## 2022-10-18 LAB
25(OH)D3+25(OH)D2 SERPL-MCNC: 55.4 NG/ML (ref 30–100)
ALBUMIN MFR UR ELPH: 61.1 %
ALBUMIN SERPL ELPH-MCNC: 3.6 G/DL (ref 2.9–4.4)
ALBUMIN SERPL-MCNC: 4.1 G/DL (ref 3.7–4.7)
ALBUMIN/GLOB SERPL: 1 {RATIO} (ref 0.7–1.7)
ALBUMIN/GLOB SERPL: 1.3 {RATIO} (ref 1.2–2.2)
ALP SERPL-CCNC: 67 IU/L (ref 44–121)
ALPHA1 GLOB MFR UR ELPH: 1.5 %
ALPHA1 GLOB SERPL ELPH-MCNC: 0.3 G/DL (ref 0–0.4)
ALPHA2 GLOB MFR UR ELPH: 6.5 %
ALPHA2 GLOB SERPL ELPH-MCNC: 0.5 G/DL (ref 0.4–1)
ALT SERPL-CCNC: 17 IU/L (ref 0–44)
AST SERPL-CCNC: 33 IU/L (ref 0–40)
B-GLOBULIN MFR UR ELPH: 14.7 %
B-GLOBULIN SERPL ELPH-MCNC: 1.2 G/DL (ref 0.7–1.3)
B2 MICROGLOB SERPL-MCNC: 3.5 MG/L (ref 0.6–2.4)
BILIRUB SERPL-MCNC: 2.9 MG/DL (ref 0–1.2)
BUN SERPL-MCNC: 19 MG/DL (ref 8–27)
BUN/CREAT SERPL: 12 (ref 10–24)
CALCIUM SERPL-MCNC: 9.5 MG/DL (ref 8.6–10.2)
CENTROMERE B AB SER-ACNC: <0.2 AI (ref 0–0.9)
CHLORIDE SERPL-SCNC: 103 MMOL/L (ref 96–106)
CHOLEST SERPL-MCNC: 119 MG/DL (ref 100–199)
CHROMATIN AB SERPL-ACNC: <0.2 AI (ref 0–0.9)
CK SERPL-CCNC: 89 U/L (ref 41–331)
CO2 SERPL-SCNC: 23 MMOL/L (ref 20–29)
CREAT SERPL-MCNC: 1.64 MG/DL (ref 0.76–1.27)
DSDNA AB SER-ACNC: <1 IU/ML (ref 0–9)
EGFR: 44 ML/MIN/1.73
ENA JO1 AB SER-ACNC: <0.2 AI (ref 0–0.9)
ENA RNP AB SER-ACNC: <0.2 AI (ref 0–0.9)
ENA SCL70 AB SER-ACNC: <0.2 AI (ref 0–0.9)
ENA SM AB SER-ACNC: <0.2 AI (ref 0–0.9)
ENA SS-A AB SER-ACNC: 1 AI (ref 0–0.9)
ENA SS-B AB SER-ACNC: <0.2 AI (ref 0–0.9)
ERYTHROCYTE [DISTWIDTH] IN BLOOD BY AUTOMATED COUNT: 14.8 % (ref 11.6–15.4)
ERYTHROCYTE [SEDIMENTATION RATE] IN BLOOD BY WESTERGREN METHOD: 2 MM/HR (ref 0–30)
FERRITIN SERPL-MCNC: 248 NG/ML (ref 30–400)
GAMMA GLOB MFR UR ELPH: 16.1 %
GAMMA GLOB SERPL ELPH-MCNC: 1.7 G/DL (ref 0.4–1.8)
GLOBULIN SER CALC-MCNC: 3.2 G/DL (ref 1.5–4.5)
GLOBULIN SER-MCNC: 3.7 G/DL (ref 2.2–3.9)
GLUCOSE SERPL-MCNC: 74 MG/DL (ref 70–99)
HAV IGM SERPL QL IA: NEGATIVE
HBV CORE IGM SERPL QL IA: NEGATIVE
HBV SURFACE AG SERPL QL IA: NEGATIVE
HCT VFR BLD AUTO: 38 % (ref 37.5–51)
HCV AB S/CO SERPL IA: 0.1 S/CO RATIO (ref 0–0.9)
HCV AB SERPL QL IA: NORMAL
HDLC SERPL-MCNC: 46 MG/DL
HGB BLD-MCNC: 12.3 G/DL (ref 13–17.7)
IGA SERPL-MCNC: 472 MG/DL (ref 61–437)
IGG SERPL-MCNC: 1710 MG/DL (ref 603–1613)
IGM SERPL-MCNC: 57 MG/DL (ref 15–143)
INTERPRETATION SERPL IEP-IMP: ABNORMAL
IRON SATN MFR SERPL: 23 % (ref 15–55)
IRON SERPL-MCNC: 69 UG/DL (ref 38–169)
KAPPA LC FREE SER-MCNC: 53.2 MG/L (ref 3.3–19.4)
KAPPA LC FREE/LAMBDA FREE SER: 1.4 {RATIO} (ref 0.26–1.65)
LAMBDA LC FREE SERPL-MCNC: 37.9 MG/L (ref 5.7–26.3)
LDLC SERPL CALC-MCNC: 60 MG/DL (ref 0–99)
M PROTEIN MFR UR ELPH: NORMAL %
M PROTEIN SERPL ELPH-MCNC: ABNORMAL G/DL
MAGNESIUM SERPL-MCNC: 2.1 MG/DL (ref 1.6–2.3)
MCH RBC QN AUTO: 28.3 PG (ref 26.6–33)
MCHC RBC AUTO-ENTMCNC: 32.4 G/DL (ref 31.5–35.7)
MCV RBC AUTO: 87 FL (ref 79–97)
NT-PROBNP SERPL-MCNC: 3645 PG/ML (ref 0–376)
PLATELET # BLD AUTO: 154 X10E3/UL (ref 150–450)
PLEASE NOTE:, 133800: NORMAL
PLEASE NOTE:, 149534: ABNORMAL
POTASSIUM SERPL-SCNC: 4.3 MMOL/L (ref 3.5–5.2)
PROT SERPL-MCNC: 7.3 G/DL (ref 6–8.5)
PROT UR-MCNC: 32.1 MG/DL
RBC # BLD AUTO: 4.35 X10E6/UL (ref 4.14–5.8)
SEE BELOW, 164869: ABNORMAL
SODIUM SERPL-SCNC: 141 MMOL/L (ref 134–144)
T4 FREE SERPL-MCNC: 1.46 NG/DL (ref 0.82–1.77)
TIBC SERPL-MCNC: 299 UG/DL (ref 250–450)
TRIGL SERPL-MCNC: 62 MG/DL (ref 0–149)
TROPONIN T SERPL HS-MCNC: 162 NG/L (ref 0–22)
TSH SERPL DL<=0.005 MIU/L-ACNC: 4.38 UIU/ML (ref 0.45–4.5)
UIBC SERPL-MCNC: 230 UG/DL (ref 111–343)
URATE SERPL-MCNC: 9.1 MG/DL (ref 3.8–8.4)
VLDLC SERPL CALC-MCNC: 13 MG/DL (ref 5–40)
WBC # BLD AUTO: 3.3 X10E3/UL (ref 3.4–10.8)

## 2022-10-18 PROCEDURE — 3331090001 HH PPS REVENUE CREDIT

## 2022-10-18 PROCEDURE — 3331090002 HH PPS REVENUE DEBIT

## 2022-10-19 ENCOUNTER — HOME CARE VISIT (OUTPATIENT)
Dept: HOME HEALTH SERVICES | Facility: HOME HEALTH | Age: 72
End: 2022-10-19
Payer: MEDICARE

## 2022-10-19 ENCOUNTER — DOCUMENTATION ONLY (OUTPATIENT)
Dept: CASE MANAGEMENT | Age: 72
End: 2022-10-19

## 2022-10-19 ENCOUNTER — OFFICE VISIT (OUTPATIENT)
Dept: CARDIOLOGY CLINIC | Age: 72
End: 2022-10-19
Payer: MEDICARE

## 2022-10-19 ENCOUNTER — DOCUMENTATION ONLY (OUTPATIENT)
Dept: SLEEP MEDICINE | Age: 72
End: 2022-10-19

## 2022-10-19 DIAGNOSIS — Z95.0 CARDIAC PACEMAKER IN SITU: Primary | ICD-10-CM

## 2022-10-19 PROCEDURE — 3331090002 HH PPS REVENUE DEBIT

## 2022-10-19 PROCEDURE — 3331090001 HH PPS REVENUE CREDIT

## 2022-10-19 PROCEDURE — 93294 REM INTERROG EVL PM/LDLS PM: CPT | Performed by: INTERNAL MEDICINE

## 2022-10-19 NOTE — ACP (ADVANCE CARE PLANNING)
Advance Care Planning   Ambulatory ACP Specialist Patient Outreach    Date:  10/19/2022    ACP Specialist:  Nikos Adames LCSW    Outreach call to patient in follow-up to ACP Specialist referral from:    [x] PCP  [] Provider   [] Ambulatory Care Management [] Other     For:                  [x] Advance Directive Assistance              [] Complete Portable DNR order              [] Complete POST/MOST              [] Code Status Discussion             [] Discuss Goals of Care             [] Early ACP Decision-Making              [] Other (Specify)    Date Referral Received:10/6/22    Today's Outreach:  [] First   [] Second  [x] Third       Third outreach made by: [x] Phone  [] Email / mail    [] MyChart     Intervention:  [x] Spoke with Patient /daughter  [] Left VM requesting return call      Outcome: Per daughter's request, ACP appt rescheduled for 11/9/2022 @ 9:00 am.       Next Step:   [x] ACP scheduled conversation  [] Outreach again in one week               [] Email / Mail [address]  [] Email / Mail Advance Directive   [] Closing referral.  Routing closure to referring provider/staff and to ACP Specialist . [] Closure letter mailed to patient with invitation to contact ACP Specialist if / when ready.   Thank you for this referral.     Nikos Adames LCSW

## 2022-10-19 NOTE — PROGRESS NOTES
C/ MDT PACER REMOTE   Scheduled remote transmission. Device functioning appropriately as programmed. See scanned docments.  Chargeable visit

## 2022-10-20 PROCEDURE — 3331090001 HH PPS REVENUE CREDIT

## 2022-10-20 PROCEDURE — 3331090002 HH PPS REVENUE DEBIT

## 2022-10-21 PROCEDURE — 3331090001 HH PPS REVENUE CREDIT

## 2022-10-21 PROCEDURE — 3331090002 HH PPS REVENUE DEBIT

## 2022-10-22 PROCEDURE — 3331090002 HH PPS REVENUE DEBIT

## 2022-10-22 PROCEDURE — 3331090001 HH PPS REVENUE CREDIT

## 2022-10-23 PROCEDURE — 3331090001 HH PPS REVENUE CREDIT

## 2022-10-23 PROCEDURE — 3331090002 HH PPS REVENUE DEBIT

## 2022-10-24 ENCOUNTER — ANCILLARY PROCEDURE (OUTPATIENT)
Dept: CARDIOLOGY CLINIC | Age: 72
End: 2022-10-24
Payer: MEDICARE

## 2022-10-24 ENCOUNTER — HOME CARE VISIT (OUTPATIENT)
Dept: HOME HEALTH SERVICES | Facility: HOME HEALTH | Age: 72
End: 2022-10-24
Payer: MEDICARE

## 2022-10-24 VITALS
BODY MASS INDEX: 26.06 KG/M2 | SYSTOLIC BLOOD PRESSURE: 135 MMHG | HEIGHT: 67 IN | WEIGHT: 166 LBS | DIASTOLIC BLOOD PRESSURE: 75 MMHG

## 2022-10-24 DIAGNOSIS — D50.8 OTHER IRON DEFICIENCY ANEMIA: ICD-10-CM

## 2022-10-24 DIAGNOSIS — E55.9 VITAMIN D DEFICIENCY: ICD-10-CM

## 2022-10-24 DIAGNOSIS — M48.061 SPINAL STENOSIS, LUMBAR REGION, WITHOUT NEUROGENIC CLAUDICATION: ICD-10-CM

## 2022-10-24 DIAGNOSIS — I50.22 CHRONIC SYSTOLIC HEART FAILURE (HCC): ICD-10-CM

## 2022-10-24 DIAGNOSIS — Z95.0 PACEMAKER: ICD-10-CM

## 2022-10-24 DIAGNOSIS — R06.09 DOE (DYSPNEA ON EXERTION): ICD-10-CM

## 2022-10-24 DIAGNOSIS — R53.83 OTHER FATIGUE: ICD-10-CM

## 2022-10-24 DIAGNOSIS — E78.2 MIXED HYPERLIPIDEMIA: ICD-10-CM

## 2022-10-24 PROCEDURE — 78803 RP LOCLZJ TUM SPECT 1 AREA: CPT | Performed by: INTERNAL MEDICINE

## 2022-10-24 PROCEDURE — 3331090001 HH PPS REVENUE CREDIT

## 2022-10-24 PROCEDURE — A9538 TC99M PYROPHOSPHATE: HCPCS | Performed by: INTERNAL MEDICINE

## 2022-10-24 PROCEDURE — 3331090002 HH PPS REVENUE DEBIT

## 2022-10-24 RX ADMIN — Medication 15.4 MILLICURIE: at 08:55

## 2022-10-25 LAB
NUC 3 HOUR HEART TO CONTRALATERAL RATIO: 1.7
STRESS TARGET HR: 146 BPM

## 2022-10-25 PROCEDURE — 3331090001 HH PPS REVENUE CREDIT

## 2022-10-25 PROCEDURE — 3331090002 HH PPS REVENUE DEBIT

## 2022-10-26 ENCOUNTER — HOME CARE VISIT (OUTPATIENT)
Dept: SCHEDULING | Facility: HOME HEALTH | Age: 72
End: 2022-10-26
Payer: MEDICARE

## 2022-10-26 PROCEDURE — 3331090003 HH PPS REVENUE ADJ

## 2022-10-26 PROCEDURE — 3331090001 HH PPS REVENUE CREDIT

## 2022-10-26 PROCEDURE — G0151 HHCP-SERV OF PT,EA 15 MIN: HCPCS

## 2022-10-26 PROCEDURE — 3331090002 HH PPS REVENUE DEBIT

## 2022-10-27 VITALS
HEART RATE: 78 BPM | DIASTOLIC BLOOD PRESSURE: 68 MMHG | TEMPERATURE: 97.8 F | OXYGEN SATURATION: 98 % | SYSTOLIC BLOOD PRESSURE: 132 MMHG | RESPIRATION RATE: 16 BRPM

## 2022-11-01 ENCOUNTER — DOCUMENTATION ONLY (OUTPATIENT)
Dept: CASE MANAGEMENT | Age: 72
End: 2022-11-01

## 2022-11-01 NOTE — ACP (ADVANCE CARE PLANNING)
Advance Care Planning   Ambulatory ACP Specialist Patient Outreach    Date:  11/1/2022    ACP Specialist:  Zarina Dickerson LCSW    Outreach call to patient in follow-up to ACP Specialist referral from:    [x] PCP  [] Provider   [] Ambulatory Care Management [] Other     For:                  [x] Advance Directive Assistance              [] Complete Portable DNR order              [] Complete POST/MOST              [] Code Status Discussion             [] Discuss Goals of Care             [] Early ACP Decision-Making              [] Other (Specify)    Date Referral Received:10/6/22    Today's Outreach:  [] First   [] Second  [x] Third       Third outreach made by: [] Phone  [] Email / mail    [] MyChart     Intervention:  [x] Spoke with Patient. daughter   [] Left VM requesting return call      Outcome: Spoke with daughter this pm for reminder of the ACP appt scheduled 11/9/2022. Next Step:   [x] ACP scheduled conversation  [] Outreach again in one week               [] Email / Mail ACP Info Sheets  [] Email / Mail Advance Directive   [] Closing referral.  Routing closure to referring provider/staff and to ACP Specialist . [] Closure letter mailed to patient with invitation to contact ACP Specialist if / when ready. Thank you for this referral  Zarina Dickerson LCSW  .

## 2022-11-09 ENCOUNTER — DOCUMENTATION ONLY (OUTPATIENT)
Dept: CASE MANAGEMENT | Age: 72
End: 2022-11-09

## 2022-11-09 NOTE — ACP (ADVANCE CARE PLANNING)
Advance Care Planning     Advance Care Planning Clinical Specialist  Conversation Note      Date of ACP Conversation: 11/09/22    Conversation Conducted with:  Attempted to have conversation with pt and daughter (Daughter)    ACP Clinical Specialist: Gerardo Ray, [facility] Decision Maker:    Current Designated Health Care Decision Maker:     Primary Decision Maker: Yareli Mathur - Daughter - [phone]    Secondary Decision Maker: Barby Page - Daughter - [phone]        Care Preferences    Hospitalization: \"If your health worsens and it becomes clear that your chance of recovery is unlikely, what would your preference be regarding hospitalization? \"    Choice:  []  The patient wants hospitalization  []  The patient prefers comfort-focused treatment without hospitalization. Ventilation: \"If you were in your present state of health and suddenly became very ill and were unable to breathe on your own, what would your preference be about the use of a ventilator (breathing machine) if it were available to you? \"      If patient would desire the use of a ventilator (breathing machine), answer \"yes\", if not \"no\":no    \"If your health worsens and it becomes clear that your chance of recovery is unlikely, what would your preference be about the use of a ventilator (breathing machine) if it were available to you? \"     Would the patient desire the use of a ventilator (breathing machine)? NO      Resuscitation  \"CPR works best to restart the heart when there is a sudden event, like a heart attack, in someone who is otherwise healthy. Unfortunately, CPR does not typically restart the heart for people who have serious health conditions or who are very sick. \"    \"In the event your heart stopped as a result of an underlying serious health condition, would you want attempts to be made to restart your heart (answer \"yes\" for attempt to resuscitate) or would you prefer a natural death (answer \"no\" for do not attempt to resuscitate)? \" Pt would not participate in discussion      [] Yes  [x] No   Educated Patient / Analisa Tara regarding differences between Advance Directives and portable DNR orders. Length of ACP Conversation in minutes:  Pt was not willing to have a conversation re: AMD. He stated he did not want to be on any machines to keep him alive. He did not want to exist if he became unable to make decisions for himself. BUT did  state he would want his daughters to do so if needed. He declined to complete an AMD.     Conversation Outcomes:  [x] ACP discussion completed: Partial participation   [] Existing advance directive reviewed with patient; no changes to patient's previously recorded wishes   [] New Advance Directive completed   [] Portable Do Not Resuscitate prepared for Provider review and signature  [] POLST/POST/MOLST/MOST prepared for Provider review and signature      Follow-up plan:    [] Schedule follow-up conversation to continue planning  [] Referred individual to Provider for additional questions/concerns   [] Advised patient/agent/surrogate to review completed ACP document and update if needed with changes in condition, patient preferences or care setting     [x] This note routed to one or more involved healthcare providers    11/9/2022: Pt did not want to participate in any ACP discussion. Stated he would not want to exist if he lost the ability in making his own decision including no life support to keep him alive. He declined in having conversation but stated he trust his 2 daughters to make decisions. Explained to him and his daughter about the hierarchy of medical decision making per [facility] Code. Demographic info updated in his chart.  Will close referral.

## 2022-11-16 ENCOUNTER — OFFICE VISIT (OUTPATIENT)
Dept: CARDIOLOGY CLINIC | Age: 72
End: 2022-11-16
Payer: MEDICARE

## 2022-11-16 VITALS
OXYGEN SATURATION: 99 % | BODY MASS INDEX: 25.11 KG/M2 | DIASTOLIC BLOOD PRESSURE: 82 MMHG | HEART RATE: 70 BPM | TEMPERATURE: 96.7 F | SYSTOLIC BLOOD PRESSURE: 120 MMHG | WEIGHT: 160 LBS | RESPIRATION RATE: 20 BRPM | HEIGHT: 67 IN

## 2022-11-16 DIAGNOSIS — I50.22 CHRONIC SYSTOLIC HEART FAILURE (HCC): Primary | ICD-10-CM

## 2022-11-16 PROCEDURE — 1123F ACP DISCUSS/DSCN MKR DOCD: CPT | Performed by: NURSE PRACTITIONER

## 2022-11-16 PROCEDURE — G8752 SYS BP LESS 140: HCPCS | Performed by: NURSE PRACTITIONER

## 2022-11-16 PROCEDURE — 3078F DIAST BP <80 MM HG: CPT | Performed by: NURSE PRACTITIONER

## 2022-11-16 PROCEDURE — 3074F SYST BP LT 130 MM HG: CPT | Performed by: NURSE PRACTITIONER

## 2022-11-16 PROCEDURE — G8536 NO DOC ELDER MAL SCRN: HCPCS | Performed by: NURSE PRACTITIONER

## 2022-11-16 PROCEDURE — G9717 DOC PT DX DEP/BP F/U NT REQ: HCPCS | Performed by: NURSE PRACTITIONER

## 2022-11-16 PROCEDURE — G8427 DOCREV CUR MEDS BY ELIG CLIN: HCPCS | Performed by: NURSE PRACTITIONER

## 2022-11-16 PROCEDURE — 3017F COLORECTAL CA SCREEN DOC REV: CPT | Performed by: NURSE PRACTITIONER

## 2022-11-16 PROCEDURE — G8754 DIAS BP LESS 90: HCPCS | Performed by: NURSE PRACTITIONER

## 2022-11-16 PROCEDURE — G8417 CALC BMI ABV UP PARAM F/U: HCPCS | Performed by: NURSE PRACTITIONER

## 2022-11-16 PROCEDURE — 1101F PT FALLS ASSESS-DOCD LE1/YR: CPT | Performed by: NURSE PRACTITIONER

## 2022-11-16 PROCEDURE — 99214 OFFICE O/P EST MOD 30 MIN: CPT | Performed by: NURSE PRACTITIONER

## 2022-11-16 RX ORDER — SPIRONOLACTONE 25 MG/1
12.5 TABLET ORAL DAILY
Qty: 15 TABLET | Refills: 1 | Status: SHIPPED | OUTPATIENT
Start: 2022-11-16

## 2022-11-16 NOTE — PROGRESS NOTES
[facility] in [place], [facility] Note    Patient name: Anjel Shelton  Patient : 1950  Patient MRN: [id number]  Date of service: 22    Primary care physician: Zenaida Gonsalves MD  Primary general cardiologist:  Dr. Joselyn Aguilar    Primary F cardiologist: Nelida Collins MD    CHIEF COMPLAINT:  Chronic systolic heart failure    PLAN OF CARE:  75 y/o with h/o HFmEF, LVEF 43% and recent cMRI revealed likely infiltrative cardiac amyloidosis, with strongly suggestive PYP; AHA/ACC stage C, NYHA class IIIA symptoms, GDMT limited due to hypotension; undergoing remainder of workup for amyloidosis - genetic testing pending    PLAN:  Continue current medical therapy for heart failure  Start Vyndamax once patient assistance processed; discussed with pt and   Continue current dose of toprol XL 12.5mg daily  Known intolerance to lisinopril/ARB; not recommended in amyloid  Start spironolactone 12.5mg daily  If repeat labs with acceptable renal fxn, then will start Jardiance  Increase lasix to 40mg daily x4 days, then back to lasix 20mg daily  Not on allopurinol or uloric, check uric acid level  Resume baby ASA   Does not take statins, LFT abnormal  Recommend sleep study- pt not interested at this time  Genetic testing for cardiomyopathy in progress  Routine screening HF labs reviewed with pt  Lab slips provided for follow up labs to be done next week  Reinforced low salt diet  Reinforced fluid restriction to 6 x 8oz glasses per day  Provided educational materials \"Living with heart failure\"   Provided advanced care plan forms to be filled out    Referrals sent previously for PT and nutritionist   Follow-up with primary cardiologist, Dr. Joselyn Aguilar; will alternate visits  Follow-up with EP cardiologist, Dr. Lesley Herrera with nephrologist  Recommend flu, covid and pneumonia vaccinations  Return to [facility] in one month IMPRESSION:  Fatigue  Shortness of breath  Chronic systolic heart failure, HFmEF  Stage C, NYHA class IIIA symptoms  Non-ischemic cardiomyopathy, LVEF 43%  Severe LVH 2.1cm  cMRI suggestive of infiltrative cardiac amyloidosis  Intolerance to lisinopril  Coronary artery disease  S/p CABG x 3 (4/2020) LIMA to LAD, SVG to RCA and Ramus  CHF s/p PM - pacemaker dependent  RBBB 144ms  Cardiac risk factors  HTN  HL  DM2  Liver dysfunction, TBili 2.6  CKD, stage 3  AT3 activity low  Proteic C low  Lupus anticoagulatn abnormal  Leukopenia  H/o prostate cancer  Elevated D Dimer  Hx DVT/PE: developed PE/DVT after bowel resection in 2012  History of rectal bleeding due to an ulcer related to radiation   DNR    CARDIAC IMAGING:  Echo (8/2022)    Left Ventricle: Low normal left ventricular systolic function. EF by 2D Simpsons Biplane is 50%. Left ventricle size is normal. Severely increased wall thickness. Findings consistent with severe concentric hypertrophy. There is a speckled appearance to the left ventricular myocardium. Consider testing for cardiac amyloidosis including possible cardiac MRI. Noted that the patient's Medtronic device is MRI compatible. Normal wall motion consistent with paced rhythm    Left Atrium: Left atrium is severely dilated. Left atrial volume index is severely increased (>48 mL/m2). Right Atrium: Right atrium is moderately dilated. Mitral Valve: Mild to moderate regurgitation. Tricuspid Valve: Moderate regurgitation. Mildly elevated RVSP. The estimated RVSP is 38 mmHg. EKG (2020) NSR, RBBB, QRS 144ms, septal infarct and inferior infarct  LHC (2020)  Left Main   The vessel was visualized by angiography. The vessel is angiographically normal.   Left Anterior Descending   Prox LAD lesion 50% stenosed. .   Mid LAD lesion 75% stenosed. .   Ramus Intermedius   Ramus lesion 80% stenosed. .   Left Circumflex   Mid Cx lesion 50% stenosed.  .   First Obtuse Marginal Branch   1st Mrg lesion 80% stenosed. .   Right Coronary Artery   Mid RCA lesion 75% stenosed. .   Cardiac MRI (9/2022)  1. Normal left ventricular cavity size. Severe concentric left ventricular  hypertrophy. Significantly hypertrophied septum measuring 21 mm. There is no  left ventricular outflow tract obstruction. Moderate left ventricular systolic  dysfunction. Moderate global hypokinesis with slight regional variation. 3-D  LVEF 43%. 2. Normal right ventricular size with minimal right ventricular systolic  dysfunction. 3-D RVEF 47%. 3. Moderate 2+ tricuspid regurgitation. 4. On EGE and LGE study, there is mild diffuse myocardial enhancement with  slightly poor nulling of the myocardium on postcontrast images. These findings  may suggest possibility of infiltrative cardiac amyloidosis. There is no  myocardial scar. There is no features of recent or old myocardial infarction. There is no features of infiltrative sarcoidosis. There is no features of  inflammatory myocarditis. All myocardial walls are viable. 5. Normal pericardium without any significant pericardial effusion. 6. Small right-sided pleural effusion and tiny left-sided pleural effusion. Lexiscan Myoview May 2021 with normal perfusion, calculated LVEF of 37%     HEMODYNAMICS:  RHC not done  CPEST not done  6MW not done  6 Min Walk Report 7/7/2020   (PRE) HR 82      OTHER IMAGING:  CT chest (2021)  There is no pulmonary embolism. There is no aortic aneurysm or dissection. Cardiomegaly and coronary artery disease. August 2021-had colonoscopy by Dr. Yamileth Leon for hematochezia, which revealed radiation changes related to prostate radiation. LIFE GOALS:  Lifestyle goals discussed with the patient. HISTORY OF PRESENT ILLNESS:  I had the pleasure of seeing Florin Bartholomew in [address] [place] [address] at [address] [place] [address] in [place]. Briefly, Florin Bartholomew is a 74 y.o. male with medical history listed above.     Patient was referred to [facility] for further workup of cardiac amyloidosis. INTERVAL HISTORY:  Today, patient presents for routine clinic visit. he is frustrated with having so many doctor's appointments. He denies significant heart failure symptoms. He is a fairly poor historian, so a majority of the history is provided by his daughter who helps to care for him. Reports good appetite, no orthopnea, minimal LE edema. He was not able to walk into the clinic, was brought up in a wheelchair due to ROLON and fatigue. REVIEW OF SYSTEMS:  Review of Systems   Constitutional:  Negative for chills, fever and weight loss. Respiratory:  Negative for cough and shortness of breath. Cardiovascular:  Negative for chest pain, palpitations, orthopnea and leg swelling. Gastrointestinal:  Negative for abdominal pain, heartburn, nausea and vomiting. Neurological:  Negative for dizziness and weakness. PHYSICAL EXAM:  Visit Vitals  /82 (BP 1 Location: Left upper arm, BP Patient Position: Sitting, BP Cuff Size: Adult)   Pulse 70   Temp (!) 96.7 °F (35.9 °C) (Oral)   Resp 20   Ht 5' 7\" (1.702 m)   Wt 160 lb (72.6 kg)   SpO2 99%   BMI 25.06 kg/m²     Physical Exam  Vitals and nursing note reviewed. Constitutional:       Appearance: Normal appearance. Neck:      Vascular: Hepatojugular reflux and JVD present. Cardiovascular:      Rate and Rhythm: Normal rate and regular rhythm. Pulmonary:      Effort: Pulmonary effort is normal. No respiratory distress. Breath sounds: Normal breath sounds. Abdominal:      General: Bowel sounds are normal. There is no distension. Palpations: Abdomen is soft. Musculoskeletal:      Right lower le+ Pitting Edema present. Left lower le+ Pitting Edema present. Skin:     General: Skin is warm and dry. Capillary Refill: Capillary refill takes less than 2 seconds. Neurological:      General: No focal deficit present.       Mental Status: He is alert and oriented to person, place, and time.                 PAST MEDICAL HISTORY:  Past Medical History:   Diagnosis Date    Arthritis     Cancer ([facility]     Prostate ca    Chronic kidney disease     Stage 3    Chronic pain     Abdoman, back, fingers per daughter    Depression     Diabetes ([facility]     Heart attack ([facility]     Heart failure ([facility] 2020    Dr. Amanda Osei    Hyperlipidemia     Hypertension     Pacemaker 05/01/2020    MED    PE (pulmonary embolism)     Pneumonia     Sleep apnea     not use CPAP       PAST SURGICAL HISTORY:  Past Surgical History:   Procedure Laterality Date    COLONOSCOPY N/A 3/19/2021    COLONOSCOPY performed by Marshall Cameron MD at [facility] ENDOSCOPY    COLONOSCOPY N/A 5/10/2021    COLONOSCOPY performed by Marshall Cameron MD at [place] [id number] N/A 8/2/2021    FLEXIBLE SIGMOIDOSCOPY performed by Marshall Cameron MD at [facility] ENDOSCOPY    HX BACK SURGERY  2014    HX CERVICAL LAMINECTOMY      HX CORONARY ARTERY BYPASS GRAFT  04/2021    X3     HX GI  11/2012    bowel resection    HX HEART CATHETERIZATION  04/2020    HX ORTHOPAEDIC      amputated left 4th finger    NM ABDOMEN SURGERY PROC UNLISTED      bowel resection    NM INS NEW/RPLCMT PRM PM W/TRANSV ELTRD ATRIAL&VENT N/A 5/1/2020    INSERT PPM DUAL performed by Ramon Oseguera MD at [address] CATH LAB       FAMILY HISTORY:  Family History   Problem Relation Age of Onset    Cancer Mother         unknown    Heart Disease Father     Heart Disease Brother     Anesth Problems Neg Hx        SOCIAL HISTORY:  Social History     Socioeconomic History    Marital status:     Number of children: 1    Years of education: 12    Highest education level: High school graduate   Occupational History    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[employer], 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    Tobacco Use    Smoking status: Never    Smokeless tobacco: Current     Types: Chew    Tobacco comments:     Chews tobacco every day   Vaping Use    Vaping Use: Never used Substance and Sexual Activity    Alcohol use: Yes     Alcohol/week: 16.0 standard drinks     Types: 2 Glasses of wine, 14 Cans of beer per week     Comment: 2-4 beers daily, sometimes alcohol    Drug use: Never    Sexual activity: Not Currently   Other Topics Concern     Service No    Blood Transfusions No    Caffeine Concern No    Occupational Exposure No    Hobby Hazards No    Sleep Concern Yes    Stress Concern Yes    Weight Concern No    Special Diet No    Back Care Yes    Exercise No    Seat Belt Yes   Social History Narrative    ** Merged History Encounter **         Lives in [place] [address] Determinants of Health     Financial Resource Strain: Low Risk     Difficulty of Paying Living Expenses: Not hard at all   Food Insecurity: No Food Insecurity    Worried About [employer] in the Last Year: Never true    [address] [facility] in the Last Year: Never true       LABORATORY RESULTS:  Labs Latest Ref Rng & Units 10/12/2022   WBC 3.4 - 10.8 x10E3/uL 3. 3(L)   RBC 4.14 - 5.80 x10E6/uL 4.35   Hemoglobin 13.0 - 17.7 g/dL 12. 3(L)   Hematocrit 37.5 - 51.0 % 38.0   MCV 79 - 97 fL 87   Platelets 370 - 722 G06L6/   Albumin 3.7 - 4.7 g/dL 4.1   Calcium 8.6 - 10.2 mg/dL 9.5   Glucose 70 - 99 mg/dL 74   BUN 8 - 27 mg/dL 19   Creatinine 0.76 - 1.27 mg/dL 1.64(H)   Sodium 134 - 144 mmol/L 141   Potassium 3.5 - 5.2 mmol/L 4.3   TSH 0.450 - 4.500 uIU/mL 4.380   Some recent data might be hidden       ALLERGY:  Allergies   Allergen Reactions    Arb-Angiotensin Receptor Antagonist Other (comments)     High k    Statins-Hmg-Coa Reductase Inhibitors Myalgia    Ace Inhibitors Cough        CURRENT MEDICATIONS:    Current Outpatient Medications:     spironolactone (ALDACTONE) 25 mg tablet, Take 0.5 Tablets by mouth daily. , Disp: 15 Tablet, Rfl: 1    metoprolol succinate (TOPROL-XL) 25 mg XL tablet, Take 1 tablet by mouth once daily, Disp: 90 Tablet, Rfl: 1    acetaminophen (TYLENOL) 650 mg TbER, Take 650 mg by mouth every eight (8) hours as needed for Pain. Take one tablet by mouth every 8 hours for mild to moderate pain 3/10-6/10, Disp: , Rfl:     furosemide (LASIX) 20 mg tablet, Take 1 Tablet by mouth daily. , Disp: 30 Tablet, Rfl: 1    sertraline (ZOLOFT) 50 mg tablet, Take 1.5 Tablets by mouth daily. , Disp: 120 Tablet, Rfl: 1    SITagliptin (Januvia) 50 mg tablet, Take 1 tablet by mouth once daily (Patient taking differently: 25 mg. Take 1/2 tablet by mouth once daily (patient currently got  50mg pills)), Disp: 90 Tablet, Rfl: 0    traZODone (DESYREL) 50 mg tablet, TAKE 1 TO 1 & 1/2 (ONE & ONE-HALF) TABLETS BY MOUTH NIGHTLY AS NEEDED, Disp: 120 Tablet, Rfl: 0    cyanocobalamin 1,000 mcg tablet, Take 1,000 mcg by mouth daily. , Disp: , Rfl:     cholecalciferol (VITAMIN D3) 25 mcg (1,000 unit) cap, Take 1,000 Units by mouth daily. (Patient not taking: Reported on 11/16/2022), Disp: , Rfl:     alfuzosin SR (UROXATRAL) 10 mg SR tablet, Take 10 mg by mouth daily. (Patient not taking: Reported on 11/16/2022), Disp: , Rfl:     aspirin delayed-release 81 mg tablet, Take 81 mg by mouth daily. (Patient not taking: No sig reported), Disp: , Rfl:     mv, min #36-iron,carbonyl-FA (Geritol Complete) 16 mg iron- 0.38 mg tab, Take 1 Tablet by mouth daily. (Patient not taking: No sig reported), Disp: , Rfl:     zinc sulfate (ZINC-220 PO), Take  by mouth. (Patient not taking: Reported on 11/16/2022), Disp: , Rfl:     turmeric 400 mg cap, Take  by mouth. (Patient not taking: Reported on 11/16/2022), Disp: , Rfl:     Praluent Pen 75 mg/mL injector pen, INJECT 1 ML SUBCUTANEOUSLY  EVERY TWO WEEKS (Patient not taking: No sig reported), Disp: 6 mL, Rfl: 3    vitamin e (E GEMS) 100 unit capsule, Take 1 capsule three times daily (Patient not taking: Reported on 11/16/2022), Disp: 90 Cap, Rfl: 6    pumpkin seed extract-soy germ 300 mg cap, Take 1 Capsule by mouth as needed (prostate health).  daily (Patient not taking: Reported on 11/16/2022), Disp: , Rfl:     coenzyme Q-10 (CO Q-10) 200 mg capsule, Take 200 mg by mouth daily. 200 mg-take one pill, Disp: , Rfl:     ascorbate calcium (VITAMIN C PO), Take 1 Tablet by mouth daily. (Patient not taking: Reported on 11/16/2022), Disp: , Rfl:     Thank you for your referral and allowing me to participate in this patient's care.     Cornelius Story NP  [address]  Office [phone]  Fax [phone]      PATIENT CARE TEAM:  Patient Care Team:  Fran Randall MD as PCP - General (Internal Medicine Physician)  Fran Randall MD as PCP - [facility]  Antonina Urena MD as Physician (Cardiovascular Disease Physician)  Leann Marsh MD as Physician (Nephrology)  John Henderson MD (Neurosurgery)  Marisol Rivers MD (Ophthalmology)  Micki Mendez MD (Otolaryngology)  Van Sosa MD (Urology)  Cathye Cheadle, MD (Ophthalmology)  LUCIO SkaggsM (Podiatry)  María Elena Newman, PHD (Psychology)  Pernell Kinney MD (Cardiothoracic Surgery)  Ashia Saleh MD (Cardiovascular Disease Physician)  Ke Gonzalez MD (Cardiovascular Disease Physician)  Stepan Richards MD as Physician (Nephrology)  Arturo Chen MD (Gastroenterology)  Bret Monterroso MD (Radiation Oncology)  Albin Philip MD as Physician (Nephrology)  Lizzeth Marin NP as Nurse Practitioner (Nurse Practitioner)  Ke Gonzalez MD (Cardiovascular Disease Physician)  Marisol Rivers MD (Ophthalmology)

## 2022-11-16 NOTE — PATIENT INSTRUCTIONS
Medication changes:    Start Spironolactone 12.5mg (1/2 tablet) daily    Increase lasix to 40 mg (2 tabs) daily for 4 days, Please check weights every day and report in on Monday to [phone] option 2 with weights. Please take this to your pharmacy to notify them of the change in medications. Testing Ordered:    Lab work has been ordered. Please present to a [facility] location of your choice with the orders provided to have lab work done. Our office will notify you of any abnormal results requiring changes to your current plan of care. Other Recommendations: We are completing Vyndamax paperwork, we will call you to let you know if it was approved. Ensure your drinking an adequate amount of water with a goal of 6-8 eight ounce glasses (1.5-2 liters) of fluid daily. Your urine should be clear and light yellow straw colored. If your blood pressure begins to consistently run below 90/60 and/or you begin to experience dizziness or lightheadedness, please contact the [facility] at [phone]. Follow up 1 month with NP with [facility]      Please monitor your weights daily upon waking and after using the bathroom. Keep a written records of your weights and bring to your next appointment. If you have a weight gain of 3 or more pounds overnight OR 5 or more pounds in one week please contact our office. Thank you for allowing us the privilege of being a part of your healthcare team! Please do not hesitate to contact our office at [phone] with any questions or concerns. Virtual Heart Failure [address] invites you to learn more about heart failure and to share your questions, ideas, and experiences with others. Each month, the Heart Failure Support Group features a new educational topic and a guest speaker, followed by an interactive discussion. Our Heart Failure Nurse Navigator will moderate each session.  You will be able to participate by phone, tablet or computer through [employer]. This support group takes place on the 3rd Thursday of each month from 6:00-7:30PM. All individuals living with heart failure and their caregivers are welcome to join. If you are interested in participating, please contact us at [email or web address] and you will be sent the link to join the [employer].

## 2022-11-18 RX ORDER — TRAZODONE HYDROCHLORIDE 50 MG/1
TABLET ORAL
Qty: 120 TABLET | Refills: 0 | Status: SHIPPED | OUTPATIENT
Start: 2022-11-18

## 2022-11-21 ENCOUNTER — TELEPHONE (OUTPATIENT)
Dept: CARDIOLOGY CLINIC | Age: 72
End: 2022-11-21

## 2022-11-21 NOTE — TELEPHONE ENCOUNTER
Left message for patient/daughter to call back regarding weight/BP log after lasix increase to 40mg for 4 days. Spoke with daughter Lashaun Mcdonald (verified 2 patient identifiers) regarding weights as listed below:    11/18- 159 lb  11/19- 157 lb  11/20- 155.5 lb  11/21- 151.3lb     Daughter reports patient is now back on 20mg lasix daily. Daughter states patient has no dizziness, swelling, or issues with breathing. Daughter also has question regarding purpose of patient taking vyndamax. She states she was told patients condition is too advanced for the medication to help, so she is wondering what the purpose is. Reviewed above findings with NP ARIELA Yoo. Patient to resume normal lasix dose 20mg daily, continue monitoring weights. Per NP without a right heart cath it is unlcear how advanced pt prognosis, since patient does not want cath at this time, best thing to do is start on vyndamax as it may help. Called patients daughter Lashaun Mcdonald back and reviewed above instruction from NP ARIELA Yoo. RN educated Lashaun Mcdonald to have patient continue to weigh daily and if able incorporate BP log. Educated to call in for any weight gain 3lb overnight or 5lb in 1 week. Lashaun Mcdonald verbalized understanding and had no further questions.  Brad KU

## 2022-11-22 LAB
ALBUMIN SERPL-MCNC: 3.8 G/DL (ref 3.7–4.7)
ALBUMIN/GLOB SERPL: 1.1 {RATIO} (ref 1.2–2.2)
ALP SERPL-CCNC: 76 IU/L (ref 44–121)
ALT SERPL-CCNC: 12 IU/L (ref 0–44)
AST SERPL-CCNC: 23 IU/L (ref 0–40)
BILIRUB SERPL-MCNC: 2.1 MG/DL (ref 0–1.2)
BUN SERPL-MCNC: 22 MG/DL (ref 8–27)
BUN/CREAT SERPL: 13 (ref 10–24)
CALCIUM SERPL-MCNC: 9.1 MG/DL (ref 8.6–10.2)
CHLORIDE SERPL-SCNC: 102 MMOL/L (ref 96–106)
CO2 SERPL-SCNC: 26 MMOL/L (ref 20–29)
CREAT SERPL-MCNC: 1.69 MG/DL (ref 0.76–1.27)
EGFR: 42 ML/MIN/1.73
GLOBULIN SER CALC-MCNC: 3.4 G/DL (ref 1.5–4.5)
GLUCOSE SERPL-MCNC: 66 MG/DL (ref 70–99)
MAGNESIUM SERPL-MCNC: 2.3 MG/DL (ref 1.6–2.3)
NT-PROBNP SERPL-MCNC: 3712 PG/ML (ref 0–376)
POTASSIUM SERPL-SCNC: 4.5 MMOL/L (ref 3.5–5.2)
PROT SERPL-MCNC: 7.2 G/DL (ref 6–8.5)
SODIUM SERPL-SCNC: 141 MMOL/L (ref 134–144)

## 2022-11-22 NOTE — PROGRESS NOTES
HISTORY OF PRESENT ILLNESS  Chio John is a 74 y.o. male. HPI  Last here 8/29/22. Pt is here to f/u on chronic conditions. Presents with his daughter who helps provide hx  Ambulates with wheelchair    Pt c/o dry cough x > 1 week. On exam: clear lungs, no wheezing  Recommended flonase and diabetic robatussin.       History of hypertension  BP today is 118/77  BP at another office: Alina Hurley on toprol  1/2 tablet 25mg once daily also on spironolactone 12.5 mg daily     Now taking Lasix 20 mg daily   He is restricted on free water intake due to cardiomyopathy  Also wearing compression hose      He has DM   No BS readings to review  Continues januvia half tablet 50mg daily    POC a1c was 5.0, so we will stop the "[employer] today is 155 lbs, down 5 lbs since [place] is in the nl range  Of note he had endoscopy and colonoscopy 1 year ago had a recent ultrasound of the liver  Suspect weight loss is related to his deteriorating memory and cardiac status     Reviewed labs        Recall had DVT in the past  Previously referred to neurology due to multiple neurologic complaints and concern of memory loss as well   His daughter noted that he has c/o issues with hands, dropping things, he has also had a series of falls  He had an appt w/ Dr. Meli Mendosa (neuro), to help with neuropathy and memory evaluation but he ns'd the appointment stressed the importance of following through with appointments    He is taking ASA 81 mg      Recall he had surgery on his C-spine in the past by Dr. Salima Sanchez he is not taking his gabapentin currently and has not followed up with his neurosurgeon   Gave info for Dr. Salima Sanchez due to increased stenosis on MRI  He had appt scheduled 9/22 but he ns'd this appointment as well  Discussed that his neurologic complaints are likely not a result of his back surgery   Discussed gabapentin for his symptoms but he is not interested      Pt follows annually with Dr. Leonora Wadsworth (cardio) for CAD and history of CABG 4/20  Last visit 5/23/22 and now has been diagnosed with likely amyloid cardiomyopathy  Reviewed cardiac MRI 8/8/22: IMPRESSION  1. Normal left ventricular cavity size. Severe concentric left ventricular  hypertrophy. Significantly hypertrophied septum measuring 21 mm. There is no  left ventricular outflow tract obstruction. Moderate left ventricular systolic  dysfunction. Moderate global hypokinesis with slight regional variation. 3-D  LVEF 43%. 2. Normal right ventricular size with minimal right ventricular systolic  dysfunction. 3-D RVEF 47%. 3. Moderate 2+ tricuspid regurgitation. 4. On EGE and LGE study, there is mild diffuse myocardial enhancement with  slightly poor nulling of the myocardium on postcontrast images. These findings  may suggest possibility of infiltrative cardiac amyloidosis. There is no  myocardial scar. There is no features of recent or old myocardial infarction. There is no features of infiltrative sarcoidosis. There is no features of  inflammatory myocarditis. All myocardial walls are viable. 5. Normal pericardium without any significant pericardial effusion. 6. Small right-sided pleural effusion and tiny left-sided pleural effusion   Reviewed MRI card morph 9/6/22:  Impression:       1. Normal left ventricular cavity size. Severe concentric left ventricular   hypertrophy. Significantly hypertrophied septum measuring 21 mm. There is no   left ventricular outflow tract obstruction. Moderate left ventricular systolic   dysfunction. Moderate global hypokinesis with slight regional variation. 3-D   LVEF 43%. 2. Normal right ventricular size with minimal right ventricular systolic   dysfunction. 3-D RVEF 47%. 3. Moderate 2+ tricuspid regurgitation. 4. On EGE and LGE study, there is mild diffuse myocardial enhancement with   slightly poor nulling of the myocardium on postcontrast images. These findings   may suggest possibility of infiltrative cardiac amyloidosis. There is no   myocardial scar. There is no features of recent or old myocardial infarction. There is no features of infiltrative sarcoidosis. There is no features of   inflammatory myocarditis. All myocardial walls are viable. 5. Normal pericardium without any significant pericardial effusion. 6. Small right-sided pleural effusion and tiny left-sided pleural effusion.     States they were concerned for amyloid and that he might need might need a heart catheterization he is supposed to set up a heart catheterization but has not done it he was referred advanced heart failure clinic to amyloidosis    He is now following w/ advanced heart failure center (Dr. Malou Catalan), last saw her 10/6/22 for cardiac amyloidosis  Lov w/ NP Betsyantonio Saliva 11/16/22  Reviewed note:  PLAN:  Continue current medical therapy for heart failure  Start Vyndamax once patient assistance processed; discussed with pt and   Continue current dose of toprol XL 12.5mg daily  Known intolerance to lisinopril/ARB; not recommended in amyloid  Start spironolactone 12.5mg daily  If repeat labs with acceptable renal fxn, then will start Jardiance  Increase lasix to 40mg daily x4 days, then back to lasix 20mg daily  Not on allopurinol or uloric, check uric acid level  Resume baby ASA   Does not take statins, LFT abnormal  Recommend sleep study- pt not interested at this time  Genetic testing for cardiomyopathy in progress  Routine screening HF labs reviewed with pt  Lab slips provided for follow up labs to be done next week  Reinforced low salt diet  Reinforced fluid restriction to 6 x 8oz glasses per day  Provided educational materials \"Living with heart failure\"   Provided advanced care plan forms to be filled out    Referrals sent previously for PT and nutritionist   Follow-up with primary cardiologist, Dr. Meena Rudd; will alternate visits  Follow-up with EP cardiologist, Dr. Teagan Peterson  Follow-up with nephrologist  Recommend flu, covid and pneumonia vaccinations  [facility]     He had pacemaker placed with Dr. Ally Allan (cardio)  Had pacemaker last checked 10/19/22    History of hyperlipidemia  No longer taking praluent, he self-dc'd a few months ago  Discussed restarting this   No longer taking Zetia     Has a history of stable stage 3 CKD  Now following with Dr. Reji Sandoval (nephro)  Last there 10/14/22  Reviewed note:  1. Stage 3a chronic kidney disease (CMS-HCC)  Suspect secondary to combination of hypertension, atherosclerosis, advanced age. Creatinine stable at 1.4-1.5 range. He has minimal proteinuria and is nonnephrotic range. Repeat labs prior to next visit. Most recent creatinine is at 1.5. Hypokalemia advised heart failure team recommendations, patient is a good candidate for either Kelly Manley or Neli. I spoke to the daughter in detail about these 2 medications and informed about risk and benefits. She would like to talk to advanced heart failure team about these 2 medications. 2. Essential (primary) hypertension  Well-controlled. Continue current medications. 3. Malignant neoplasm of prostate (CMS-[facility])  In remission. Followed by urology. 4. Type 2 diabetes mellitus without complication (CMS-[facility])  Well-controlled. On Januvia. 5. Other proteinuria  Some nephrotic range. Will order for LUCILLE and kappa lambda ratio. 6. Systolic heart failure, not otherwise specified (CMS-[facility])  Compensated. Possible etiology includes cardiac amyloidosis. Continue Lasix daily. Follow with advanced heart failure team.  Problem Specific Notes:  No problem-specific Assessment & Plan notes found for this encounter.   Baseline cr 1.4-1.5, stable on recent labs 1.6 on recent labs     Pt follows with Edin (uro) for prostate cancer -- treated with radiation  Recall pt has [facility] (brother and uncle) prostate cancer  Last there 1/22 reports PSAs are trending down since radiation  Follows q 6 mo, he is overdue, discussed he needs to f/U      Pt also met with leonor radiation oncologist at [facility]  He completed radiation therapy with Dr. Alexander Rush  Last visit 9/21     Pt saw Dr. Leda Garnica (podiatry) in 4/19  Prev provided referral to podiatry, discussed f/Uing up for overgrown toenails, hammertoes, callus formation      In the past, pt expressed concern about his memory  Previously, provided referral for Dr. Charo Sethi (neuropsych)  Lov provided info for Dr. Chava Cabezas (neuropsych)  Had the appointment 09/24/19 -- then refused testing in the end   He saw Dr. Micaela Bañuelos (neuropsych) 7/1/22  Has not followed up and no showed his appointment see above    He has been following with Dr. Dawood Mazariegos (GI) for stomach bleeding, lov 6/22  Recall in May 2021 he had an endoscopy and colonoscopy  Reviewed flexible sigmoidoscopy 8/02/21  Had elevated bilirubin which has been climbing and went back to see him again  Had abd US--  Pt states Dr. Dawood Mazariegos thinks bilirubin increase might be coming from heart rather than liver,--I discussed this with Dr. Dawood Mazariegos he had a pulsatile morphology on the ultrasound so that the elevated bilirubin was felt to be related to his cardiomyopathy     Prev referred to hematology due to anemia and thrombocytopenia  On most recent labs, counts have improved, so we will focus on heart failure clinic for now        Recall Pt had a hearing evaluation in the past  Recall notes from ENT 5/28/19: has some hearing loss   Pt does not have hearing aids  Patient is having some continued difficulty with his ears and will schedule follow-up with ENT     He has been seeing Dr. Anushka Gamboa (sleep) for NIMO  Last visit 1/4/21    Declines sleep study  Not compliant with cpap      Pt uses trazodone nightly  Discussed can go up to 1.5mg, discussed can also take melatonin with this     Pt has been taking CoQ-10      No longer taking gabapentin 300 mg for pain in hands/feet and sciatica     He has zoloft 75mg (increased from 50 mg since lov) for depression  Has trazodone 50 mg to take at night  He also has ativan to use prn (not often, not in the past month) for anxiety   Have discussed the possibility of seeing a therapist     Pt lives alone, daughter checks on him  Pt had previously had home health coming out, but they will no longer be coming due to pt having consumed significant alcohol, being uncooperative and hostile. ACP not on file. SDM is his daughter (Cordell Dennis). Provided information in the past.      Recall sees Dr. Nicola Olivier for h/o DVT, no longer on coumadin or xarelto, on ASA only.  Was on coumadin for h/o PE and DVT post operatively     PREVENTIVE:    Colonoscopy: 5/10/21 , Dr. Ulises Reynaga, repeat 5 years    EGD: 5/10/21, Dr. Ulises Reynaga  PSA:   <1 per pt  0.4  AAA screen: CT , negative  Tdap: unknown, due will get at pharm, reminded again  Pneumovax:   [id number]: 19   Shingrix: due reminded again  Flu shot: Fall    Foot exam: 22  Microalbumin:  122  A1c: 1 POC  5.3  5.3  POC 5.0  Eye exam: Dr. Noreen Felty 3/22, will get report  Hep C Screen: 10/22 neg  Lipids: 10/22 LDL 60  EK22 Electronic ventricular pacemaker   Covid: four doses [employer])     Patient Active Problem List    Diagnosis Date Noted    Pacemaker 2020    Third degree AV block ([address] 2020    S/P CABG x 3 2020    CAD (coronary artery disease) 2020    Bilateral carotid artery stenosis 2020    NSTEMI (non-ST elevated myocardial infarction) ([address] 2020    Type 2 diabetes mellitus with diabetic neuropathy ([address] 2020    Prostate cancer ([address] 2018    Type 2 diabetes with nephropathy ([address] 2018    Reactive depression 11/15/2016    Cervical spinal stenosis 2016    CKD (chronic kidney disease) 10/31/2016    Hyperlipidemia 07/10/2015    Spinal stenosis, lumbar region, without neurogenic claudication 10/16/2014    DVT of leg (deep venous thrombosis) [facility] 2012    HTN (hypertension) 2012    Perforated diverticulum of large intestine 2012 Alcohol abuse 12/04/2012    Insomnia 12/04/2012     Current Outpatient Medications   Medication Sig Dispense Refill    traZODone (DESYREL) 50 mg tablet TAKE 1 TO 1 & 1/2 (ONE & ONE-HALF) TABLETS BY MOUTH NIGHTLY AS NEEDED 120 Tablet 0    spironolactone (ALDACTONE) 25 mg tablet Take 0.5 Tablets by mouth daily. 15 Tablet 1    metoprolol succinate (TOPROL-XL) 25 mg XL tablet Take 1 tablet by mouth once daily 90 Tablet 1    acetaminophen (TYLENOL) 650 mg TbER Take 650 mg by mouth every eight (8) hours as needed for Pain. Take one tablet by mouth every 8 hours for mild to moderate pain 3/10-6/10      furosemide (LASIX) 20 mg tablet Take 1 Tablet by mouth daily. 30 Tablet 1    sertraline (ZOLOFT) 50 mg tablet Take 1.5 Tablets by mouth daily. 120 Tablet 1    SITagliptin (Januvia) 50 mg tablet Take 1 tablet by mouth once daily (Patient taking differently: 25 mg. Take 1/2 tablet by mouth once daily (patient currently got  50mg pills)) 90 Tablet 0    cholecalciferol (VITAMIN D3) 25 mcg (1,000 unit) cap Take 1,000 Units by mouth daily. (Patient not taking: Reported on 11/16/2022)      alfuzosin SR (UROXATRAL) 10 mg SR tablet Take 10 mg by mouth daily. (Patient not taking: Reported on 11/16/2022)      aspirin delayed-release 81 mg tablet Take 81 mg by mouth daily. (Patient not taking: No sig reported)      mv, min #36-iron,carbonyl-FA (Geritol Complete) 16 mg iron- 0.38 mg tab Take 1 Tablet by mouth daily. (Patient not taking: No sig reported)      zinc sulfate (ZINC-220 PO) Take  by mouth. (Patient not taking: Reported on 11/16/2022)      turmeric 400 mg cap Take  by mouth.  (Patient not taking: Reported on 11/16/2022)      Praluent Pen 75 mg/mL injector pen INJECT 1 ML SUBCUTANEOUSLY  EVERY TWO WEEKS (Patient not taking: No sig reported) 6 mL 3    vitamin e (E GEMS) 100 unit capsule Take 1 capsule three times daily (Patient not taking: Reported on 11/16/2022) 90 Cap 6    pumpkin seed extract-soy germ 300 mg cap Take 1 Capsule by mouth as needed (prostate health). daily (Patient not taking: Reported on 11/16/2022)      coenzyme Q-10 (CO Q-10) 200 mg capsule Take 200 mg by mouth daily. 200 mg-take one pill      ascorbate calcium (VITAMIN C PO) Take 1 Tablet by mouth daily. (Patient not taking: Reported on 11/16/2022)      cyanocobalamin 1,000 mcg tablet Take 1,000 mcg by mouth daily.        Past Surgical History:   Procedure Laterality Date    COLONOSCOPY N/A 3/19/2021    COLONOSCOPY performed by Lety Mandujano MD at [facility] ENDOSCOPY    COLONOSCOPY N/A 5/10/2021    COLONOSCOPY [facility] by [facility]uj[facility] MD at [facility] N/A 8/2/2021    FLEXIBLE SIGMOIDOSCOPY performed by Lety Mandujano MD at [facility] ENDOSCOPY    HX BACK SURGERY  2014    HX CERVICAL LAMINECTOMY      HX CORONARY ARTERY BYPASS GRAFT  04/2021    X3     HX GI  11/2012    bowel resection    HX HEART CATHETERIZATION  04/2020    HX ORTHOPAEDIC      amputated left 4th finger    LA ABDOMEN SURGERY PROC UNLISTED      bowel resection    LA INS NEW/RPLCMT PRM PM W/TRANSV ELTRD ATRIAL&VENT N/A 5/1/2020    INSERT PPM DUAL performed by Emy Jack MD at [place] [id number], [facility] CATH LAB      Lab Results   Component Value Date/Time    WBC 3.3 (L) 10/12/2022 10:36 AM    HGB 12.3 (L) 10/12/2022 10:36 AM    Hemoglobin (POC) 13.3 11/02/2016 07:15 AM    HCT 38.0 10/12/2022 10:36 AM    Hematocrit (POC) 39 11/02/2016 07:15 AM    PLATELET 200 27/[birth year]/1966 10:36 AM    MCV 87 10/12/2022 10:36 AM     Lab Results   Component Value Date/Time    Cholesterol, total 119 10/12/2022 10:36 AM    Cholesterol (POC) 227 02/06/2015 03:30 PM    HDL Cholesterol 46 10/12/2022 10:36 AM    LDL, calculated 60 10/12/2022 10:36 AM    LDL, calculated 64.4 08/29/2022 11:23 AM    LDL Cholesterol (POC) 164 02/06/2015 03:30 PM    Triglyceride 62 10/12/2022 10:36 AM    Triglycerides (POC) 68 02/06/2015 03:30 PM    CHOL/HDL Ratio 2.4 08/29/2022 11:23 AM     Lab Results   Component Value Date/Time GFR est non-AA 53 (L) 08/29/2022 11:23 AM    GFRNA, POC 50 (L) 11/02/2016 07:15 AM    GFR est AA >60 08/29/2022 11:23 AM    GFRAA, POC >60 11/02/2016 07:15 AM    Creatinine 1.69 (H) 11/21/2022 11:46 AM    Creatinine (POC) 1.4 (H) 11/02/2016 07:15 AM    BUN 22 11/21/2022 11:46 AM    BUN (POC) 28 (H) 11/02/2016 07:15 AM    Sodium 141 11/21/2022 11:46 AM    Sodium (POC) 140 11/02/2016 07:15 AM    Potassium 4.5 11/21/2022 11:46 AM    Potassium (POC) 4.1 11/02/2016 07:15 AM    Chloride 102 11/21/2022 11:46 AM    Chloride (POC) 105 11/02/2016 07:15 AM    CO2 26 11/21/2022 11:46 AM    Magnesium 2.3 11/21/2022 11:46 AM    Phosphorus 3.0 10/17/2014 04:40 AM    Albumin, urine 61.1 10/12/2022 10:36 AM        Review of Systems   Respiratory:  Negative for shortness of breath. Cardiovascular:  Negative for chest pain. Physical Exam  Constitutional:       General: He is not in acute distress. Appearance: Normal appearance. He is not ill-appearing, toxic-appearing or diaphoretic. HENT:      Head: Normocephalic and atraumatic. Right Ear: External ear normal.      Left Ear: External ear normal.   Eyes:      General:         Right eye: No discharge. Left eye: No discharge. Conjunctiva/sclera: Conjunctivae normal.      Pupils: Pupils are equal, round, and reactive to light. Cardiovascular:      Rate and Rhythm: Normal rate and regular rhythm. Heart sounds: No murmur heard. No friction rub. No gallop. Pulmonary:      Effort: No respiratory distress. Breath sounds: Normal breath sounds. No wheezing or rales. Chest:      Chest wall: No tenderness. Musculoskeletal:         General: Normal range of motion. Cervical back: Normal.      Right lower leg: Edema (mild) present. Left lower leg: Edema (mild) present. Skin:     General: Skin is warm and dry. Neurological:      General: No focal deficit present. Mental Status: He is oriented to person, place, and time.       Gait: Gait normal.   Psychiatric:         Mood and Affect: Mood normal.         Behavior: Behavior normal.       ASSESSMENT and PLAN    ICD-10-CM ICD-9-CM    1. Type 2 diabetes with nephropathy ([facility])  E11.21 250.40 AMB POC HEMOGLOBIN A1C   Will stop Januvia at this time    He likely will be started on Jardiance in the near future to advanced heart failure clinic    Renal function has been stable and okay to start this medication A1c's have been at goal  583.81       2. Stage 3a chronic kidney disease ([facility])  N18.31 585. 3    Creatinine runs 1.4-1.6 baseline up-to-date with nephrology   3. Chronic systolic (congestive) heart failure  I50.22 428.22      428.0    Patient was found to have amyloid cardiomyopathy    He is now following with the advanced heart failure clinic    He was placed on spironolactone in addition to Toprol    They would likely be starting Jardiance in the near future    And they are working on getting prior authorization for Vyndamax    Follow-up is pending   4. Primary hypertension  I10 401.9    Controlled on Toprol and spironolactone   5. Type 2 diabetes mellitus with diabetic neuropathy, without long-term current use of insulin ([facility])  E11.40 250.60      357.2    See above   6. Coronary artery disease involving native coronary artery of native heart without angina pectoris  I25.10 414.01    Medically managed on aspirin   7. Mixed hyperlipidemia  E78.2 272.2    Was taking Praluent but self DC'd for no clear reason encouraged resuming medication   8. Reactive depression  F32.9 300.4    Controlled on Zoloft   9.  Spinal stenosis, lumbar region, without neurogenic claudication  M48.061 724.02    Has persistent cervicalgia and sciatic symptoms he was supposed to see both his neurologist and neurosurgeon but no showed both these appointments    Also needs evaluation with neurology for memory concerns but continues to no-show encourage following through with appointments gabapentin if needed but he is currently not interested in gabapentin   10. Prostate cancer ([facility]  C61 185    Had radiation PSA 0.4 needs to follow-up with his urologist I discussed this again   11. URI, acute  J06.9 465.9    Likely viral or allergy mediated take Flonase over-the-counter     Depression screen reviewed and negative. Scribed by Ingris Bassett, as dictated by Dr. Que Morse. Current diagnosis and concerns discussed with pt at length. Pt understands risks and benefits or current treatment plan and medications, and accepts the treatment and medication with any possible risks. Pt asks appropriate questions, which were answered. Pt was instructed to call with any concerns or problems. I have reviewed the note documented by the scribe. The services provided are my own. The documentation is accurate.

## 2022-11-22 NOTE — PROGRESS NOTES
HISTORY OF PRESENT ILLNESS  Derick Mckenna is a 74 y.o. male. HPI  Last here 8/29/22. Pt is here to f/u on chronic conditions. Presents with his daughter who helps provide hx  Ambulates with wheelchair     History of hypertension  BP today is  No home BP readings to review  Continues on toprol  1/2 tablet 25mg once daily     Has not been taking lasix recently  Also wearing compression hose      He has DM   No BS readings to review  States diet has been normal  Continues januvia 50mg daily        Wt lov 156 lbs  Discussed that this could be due to alcohol  Reviewed recent imaging of head, neck, spine, liver   Ordered CXR  Of note he had endoscopy and colonoscopy 1 year ago had a recent ultrasound of the liver        Reviewed labs   Will get labs today    Recall had DVT in the past  Lov referred to neurology due to multiple neurologic complaints and concern of memory loss as well   His daughter noted that he has c/o issues with hands, dropping things, he has also had a series of falls  He is taking ASA 81 mg     Recall he had surgery on his C-spine in the past by Dr. Beena Sosa he is not taking his gabapentin currently and has not followed up with his neurosurgeon  Gave info for Dr. Beena Sosa due to increased stenosis on MRI     Pt follows annually with Dr. Danni Guerra (cardio) for CAD and history of CABG 4/20  Last visit 5/23/22  Reviewed note:   Plan:    ASHD Hx CABG x 3 in 4/2020---LIMA to LAD, SVG to RCA and Ramus  Continue ASA BB   Intolerant to statins, now on Praluent  Patient notes resolution of chest pain since his bypass. CM, new   Echo September 2021, LVEF 45 to 50%  Echo 3/2021 showed EF 40-45% mild- mod MR mild AR  Echo 12/2020 showed EF 45-50% mild MR  Echo in April 2020 showed EF 60-65% , mild mitral regurgitation. On BB. He is intolerant of lisinopril and ARB's as per allergies section.   BP borderline low, so will not add hydralazine or isosorbide mononitrate at this time  Patient has been 100% RV paced  Cordell Dee Myoview May 2021 with normal perfusion, calculated LVEF of 37%   We will repeat echocardiogram in September 2022, and if LVEF remains reduced, forward result to Dr. Reid Wallace to decide if device upgrade is indicated  CHB s/p PPM 5/2020  Device followed by Dr Dawson---last seen 12/2021 patient pacemaker check today show proper function. The patient is dependent on RV pacing. Previously he had short atrial flutter but the device check today did not show the new AMS arrhythmic event  Last seen by Dr. Reid Wallace in December 2021- acknowledged LVEF 45-50 in September, with plan to \"Repeat echo in 2022 and if LVEF declines, consider upgrade to biventricular pacing\"  HTN  Controlled with current therapy  HLD  Statin intolerance---In chart review has been intolerant to Crestor, Lipitor, pravastatin  LDL was 39 in January 2022. Noted normal transaminases in May 2022. Restarted Praluent in 1/2021  Repeat labs in January 2023   CKD III  Cr 1.57 in 4/2021  Cr. 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 Back on aspirin 81 mg without any recurrence of bleeding at this point. They will let us know if recurrent episodes. Continue current care and f/u in 1 year with me if stress test is negative, and with electrophysiology as scheduled. He saw Dr. Jamey Rendon (cardio) for CAD/CABG   Last there 6/3/20       He had pacemaker placed with Dr. Reid Wallace (cardio)  Last visit 12/28/21  Had pacemaker last checked 7/13/22     Reviewed echo 8/8/22:    Left Ventricle: Low normal left ventricular systolic function. EF by 2D Simpsons Biplane is 50%. Left ventricle size is normal. Severely increased wall thickness. Findings consistent with severe concentric hypertrophy. There is a speckled appearance to the left ventricular myocardium.   Consider testing for cardiac amyloidosis including possible cardiac MRI. Noted that the patient's Medtronic device is MRI compatible. Normal wall motion consistent with paced rhythm    Left Atrium: Left atrium is severely dilated. Left atrial volume index is severely increased (>48 mL/m2).   Right Atrium: Right atrium is moderately dilated.   Mitral Valve: Mild to moderate regurgitation. Pt has MRI cardiac morph scheduled 9/6/22     History of hyperlipidemia  taking praluent  No longer taking Zetia     Has a history of CKD  Now following with Dr. Yolis May (nephro), follow-up as scheduled for October 14  Last visit was 4/22 per pt's daughter stage 3A  Baseline cr 1.4-1.5 stable on recent labs     Pt follows with Jeaneline Killer (uro) for prostate cancer--treated with radiation  Recall pt has [facility] (brother and uncle) prostate cancer  Last there 1/22 reports PSAs are trending down  Follows q 6 mo, needs to schedule f/U     Pt also met with a radiation oncologist at [facility]  He completed radiation therapy with Dr. Darby Lombardi  Last visit 9/21        Pt saw Dr. Puja Marcus (podiatry) in 4/19  Prev provided referral to podiatry, discussed f/Uing up for overgrown toenails, hammertoes, callus formation      In the past, pt expressed concern about his memory  Previously, provided referral for Dr. Nicole Colon (neuropsych)  lov provided info for Dr. Cedric Alexis (neuropsych)  Had the appointment 09/24/19 --then refused testing in the end   He saw Dr. Suzan Chavez (neuropsych) 7/1/22, will f/U 11/3/22  Reviewed note:  Assessment/Plan   Pt is a 66 y.o. right handed male with DM with HgbA1C 5.3, HTN, HLD with LDL 39, NIMO non-compliant with CPAP, alcoholism, with c/o short term memory loss with daughter noting word finding and difficulty expressing himself. Additionally, pt started falling in April, seen in ED with [facility] revealing chronic left occipital CVA and left lateral ventricle cystic structure.  Pt c/o bilateral LBP with radicular pain down right leg, legs feels weak and like they are going to buckle. He has h/o cervical stenosis and surgery, but has ongoing numbness and weakness in hands. Exam -fully oriented and language intact, but memory impaired to details of history, exophthalmos, unsteady gait, absent reflexes. This is a complex case. Patient's alcohol intake could be contributing to his gait disturbance, due to intoxication, but also possible peripheral neuropathy from alcohol and/or diabetes and poor nutrition with limited p.o. intake per patient. He has absent reflexes on exam though patient reported intact sensation. Patient reported that he \"drinks like a fish\" and had already started drinking this morning prior to his visit. Additionally, he has arthritis which could be causing the feeling that his legs are going to give out and could be the cause of his bilateral low back pain with pain radiating down right leg, but he may also have lumbar radiculopathy and stenosis with prior history of cervical stenosis. Recommend MRI L-spine without contrast, patient has an MRI compatible pacemaker with card scanned to chart. Memory loss could also be due to nutritional deficiencies such as B12 deficiency, acute alcohol intoxication, alcoholic dementia, and even Warnicke's encephalopathy given poor nutrition and alcohol intake. Differential would include stroke as an etiology with prior history of stroke and multiple stroke risk factors, possible mass, and NPH. Recommend MRI brain without contrast.  Will order additional labs looking for metabolic causes of memory loss as well as neuropathy including B12/MMA, RPR, and vitamin B1 level. Patient was incidentally found to have significant fungal infection of his feet and referral placed to podiatry. Follow-up in clinic to be determined after testing completed, they are instructed to call after test completed to discuss results if they do not hear from our office within 1 week.   Noted pt should be wearing CPAP but is not, alcohol could be contributing to memory problems, prev imaging showed remote old infarc, did not start any new meds     Reviewed MRI brain 7/26/22:  Impression:     1. No acute findings no mass. 2. Remote left parietal infarct. 3. Mild nonspecific white matter changes.          He has been following with Dr. Gena Brito (GI) for stomach bleeding, lov 6/22  Recall in May 2021 he had an endoscopy and colonoscopy  Reviewed flexible sigmoidoscopy 8/02/21  Had elevated bilirubin which has been climbing and went back to see him again  Had abd US--which he reports was unremarkable  Pt states Dr. Gena Brito thinks bilirubin increase might be coming from heart rather than liver, referred to cardiology     Prev referred to hematology due to anemia and thrombocytopenia  He has not yet gone we will repeat labs if not improved will need him to see hematology of note potentially this could be related to some sort of underlying liver disease  Discussed it likely would be a good idea to go to this, will repeat labs            Recall Pt had a hearing evaluation in the past  Recall notes from ENT 5/28/19: has some hearing loss   Pt does not have hearing aids  Patient is having some continued difficulty with his ears and will schedule follow-up with ENT     He has been seeing Dr. Chance Reynolds (sleep) for NIMO  Last visit 1/4/21    Declines sleep study  Not compliant with cpap      Pt uses trazodone nightly  Discussed can go up to 1.5mg, discussed can also take melatonin with this     Pt has been taking CoQ-10      No longer taking gabapentin 300 mg for pain in hands/feet and sciatica     He has zoloft 50mg for depression  Pt had a positive depression screen today, we will increase dosage to 75 mg  Has trazodone 50 mg to take at night  He also has ativan to use prn (not often, not in the past month) for anxiety   Discussed the possibility of seeing a therapist     Pt lives alone, daughter checks on him  Pt had previously had home health coming out, but they will no longer be coming due to pt having consumed significant alcohol, being uncooperative and hostile. ACP not on file. SDM is his daughter (Cordell Dennis). Provided information in the past.      Recall sees Dr. Nicola Olivier for h/o DVT, no longer on coumadin or xarelto, on ASA only.  Was on coumadin for h/o PE and DVT post operatively     PREVENTIVE:    Colonoscopy: 5/10/21 , Dr. Ulises Reynaga, repeat 5 years    EGD: 5/10/21, Dr. Ulises Reynaga  PSA:   <1 per pt  AAA screen: CT , negative  Tdap: unknown, due will get at pharm, reminded again  Pneumovax:   Carla Loretta19   Shingrix: due reminded again  Flu shot: 21  Foot exam: 22  Microalbumin:  122  A1c:   6.0  6.2  6.1,  5.6 POC  5.3  Eye exam: Dr. Noreen Felty 3/22, will get report  Hep C Screen: 10/15, negative  Lipids:  LDL 39  EK22 Electronic ventricular pacemaker   Covid: three doses [employer]), discussed eligible for 2nd booster         Patient Active Problem List    Diagnosis Date Noted    Pacemaker 2020    Third degree AV block ([address] 2020    S/P CABG x 3 2020    CAD (coronary artery disease) 2020    Bilateral carotid artery stenosis 2020    NSTEMI (non-ST elevated myocardial infarction) ([address] 2020    Type 2 diabetes mellitus with diabetic neuropathy ([address] 2020    Prostate cancer ([address] 2018    Type 2 diabetes with nephropathy ([address] 2018    Reactive depression 11/15/2016    Cervical spinal stenosis 2016    CKD (chronic kidney disease) 10/31/2016    Hyperlipidemia 07/10/2015    Spinal stenosis, lumbar region, without neurogenic claudication 10/16/2014    DVT of leg (deep venous thrombosis) ([address] 2012    HTN (hypertension) 2012    Perforated diverticulum of large intestine 2012    Alcohol abuse 2012    Insomnia 2012     Current Outpatient Medications   Medication Sig Dispense Refill    traZODone (DESYREL) 50 mg tablet TAKE 1 TO 1 & 1/2 (ONE & ONE-HALF) TABLETS BY MOUTH NIGHTLY AS NEEDED 120 Tablet 0    spironolactone (ALDACTONE) 25 mg tablet Take 0.5 Tablets by mouth daily. 15 Tablet 1    metoprolol succinate (TOPROL-XL) 25 mg XL tablet Take 1 tablet by mouth once daily 90 Tablet 1    acetaminophen (TYLENOL) 650 mg TbER Take 650 mg by mouth every eight (8) hours as needed for Pain. Take one tablet by mouth every 8 hours for mild to moderate pain 3/10-6/10      furosemide (LASIX) 20 mg tablet Take 1 Tablet by mouth daily. 30 Tablet 1    sertraline (ZOLOFT) 50 mg tablet Take 1.5 Tablets by mouth daily. 120 Tablet 1    SITagliptin (Januvia) 50 mg tablet Take 1 tablet by mouth once daily (Patient taking differently: 25 mg. Take 1/2 tablet by mouth once daily (patient currently got  50mg pills)) 90 Tablet 0    cholecalciferol (VITAMIN D3) 25 mcg (1,000 unit) cap Take 1,000 Units by mouth daily. (Patient not taking: Reported on 11/16/2022)      alfuzosin SR (UROXATRAL) 10 mg SR tablet Take 10 mg by mouth daily. (Patient not taking: Reported on 11/16/2022)      aspirin delayed-release 81 mg tablet Take 81 mg by mouth daily. (Patient not taking: No sig reported)      mv, min #36-iron,carbonyl-FA (Geritol Complete) 16 mg iron- 0.38 mg tab Take 1 Tablet by mouth daily. (Patient not taking: No sig reported)      zinc sulfate (ZINC-220 PO) Take  by mouth. (Patient not taking: Reported on 11/16/2022)      turmeric 400 mg cap Take  by mouth. (Patient not taking: Reported on 11/16/2022)      Praluent Pen 75 mg/mL injector pen INJECT 1 ML SUBCUTANEOUSLY  EVERY TWO WEEKS (Patient not taking: No sig reported) 6 mL 3    vitamin e (E GEMS) 100 unit capsule Take 1 capsule three times daily (Patient not taking: Reported on 11/16/2022) 90 Cap 6    pumpkin seed extract-soy germ 300 mg cap Take 1 Capsule by mouth as needed (prostate health).  daily (Patient not taking: Reported on 11/16/2022)      coenzyme Q-10 (CO Q-10) 200 mg capsule Take 200 mg by mouth daily. 200 mg-take one pill      ascorbate calcium (VITAMIN C PO) Take 1 Tablet by mouth daily. (Patient not taking: Reported on 11/16/2022)      cyanocobalamin 1,000 mcg tablet Take 1,000 mcg by mouth daily.        Past Surgical History:   Procedure Laterality Date    COLONOSCOPY N/A 3/19/2021    COLONOSCOPY performed by Trisha Santiago MD at [facility] ENDOSCOPY    COLONOSCOPY N/A 5/10/2021    COLONOSCOPY performed by Trisha Santiago MD at [place] [id number] N/A 8/2/2021    FLEXIBLE SIGMOIDOSCOPY performed by Trisha Santiago MD at [facility] ENDOSCOPY    HX BACK SURGERY  2014    HX CERVICAL LAMINECTOMY      HX CORONARY ARTERY BYPASS GRAFT  04/2021    X3     HX GI  11/2012    bowel resection    HX HEART CATHETERIZATION  04/2020    HX ORTHOPAEDIC      amputated left 4th finger    MI ABDOMEN SURGERY PROC UNLISTED      bowel resection    MI INS NEW/RPLCMT PRM PM W/TRANSV ELTRD ATRIAL&VENT N/A 5/1/2020    INSERT PPM DUAL performed by Romero Santoyo MD at [place] [id number], [facility]  CATH LAB      Lab Results   Component Value Date/Time    WBC 3.3 (L) 10/12/2022 10:36 AM    HGB 12.3 (L) 10/12/2022 10:36 AM    Hemoglobin (POC) 13.3 11/02/2016 07:15 AM    HCT 38.0 10/12/2022 10:36 AM    Hematocrit (POC) 39 11/02/2016 07:15 AM    PLATELET 968 57/36/6398 10:36 AM    MCV 87 10/12/2022 10:36 AM     Lab Results   Component Value Date/Time    Cholesterol, total 119 10/12/2022 10:36 AM    Cholesterol (POC) 227 02/06/2015 03:30 PM    HDL Cholesterol 46 10/12/2022 10:36 AM    LDL, calculated 60 10/12/2022 10:36 AM    LDL, calculated 64.4 08/29/2022 11:23 AM    LDL Cholesterol (POC) 164 02/06/2015 03:30 PM    Triglyceride 62 10/12/2022 10:36 AM    Triglycerides (POC) 68 02/06/2015 03:30 PM    CHOL/HDL Ratio 2.4 08/29/2022 11:23 AM     Lab Results   Component Value Date/Time    GFR est non-AA 53 (L) 08/29/2022 11:23 AM    GFRNA, POC 50 (L) 11/02/2016 07:15 AM    GFR est AA >60 08/29/2022 11:23 AM GFRAA, POC >60 11/02/2016 07:15 AM    Creatinine 1.69 (H) 11/21/2022 11:46 AM    Creatinine (POC) 1.4 (H) 11/02/2016 07:15 AM    BUN 22 11/21/2022 11:46 AM    BUN (POC) 28 (H) 11/02/2016 07:15 AM    Sodium 141 11/21/2022 11:46 AM    Sodium (POC) 140 11/02/2016 07:15 AM    Potassium 4.5 11/21/2022 11:46 AM    Potassium (POC) 4.1 11/02/2016 07:15 AM    Chloride 102 11/21/2022 11:46 AM    Chloride (POC) 105 11/02/2016 07:15 AM    CO2 26 11/21/2022 11:46 AM    Magnesium 2.3 11/21/2022 11:46 AM    Phosphorus 3.0 10/17/2014 04:40 AM    Albumin, urine 61.1 10/12/2022 10:36 AM        Review of Systems   Respiratory:  Negative for shortness of breath. Cardiovascular:  Negative for chest pain. Physical Exam  Constitutional:       General: He is not in acute distress. Appearance: Normal appearance. He is not ill-appearing, toxic-appearing or diaphoretic. HENT:      Head: Normocephalic and atraumatic. Right Ear: External ear normal.      Left Ear: External ear normal.   Eyes:      General:         Right eye: No discharge. Left eye: No discharge. Conjunctiva/sclera: Conjunctivae normal.      Pupils: Pupils are equal, round, and reactive to light. Cardiovascular:      Rate and Rhythm: Normal rate and regular rhythm. Heart sounds: No murmur heard. No friction rub. No gallop. Pulmonary:      Effort: No respiratory distress. Breath sounds: Normal breath sounds. No wheezing or rales. Chest:      Chest wall: No tenderness. Musculoskeletal:         General: Normal range of motion. Cervical back: Normal.   Skin:     General: Skin is warm and dry. Neurological:      General: No focal deficit present. Mental Status: He is oriented to person, place, and time. Gait: Gait normal.   Psychiatric:         Mood and Affect: Mood normal.         Behavior: Behavior normal.       ASSESSMENT and PLAN  {No Diagnosis Found}  Depression screen reviewed and negative.   Scribed by Marybel Aburto of [employer], as dictated by Dr. Diane Espinoza. Current diagnosis and concerns discussed with pt at length. Pt understands risks and benefits or current treatment plan and medications, and accepts the treatment and medication with any possible risks. Pt asks appropriate questions, which were answered. Pt was instructed to call with any concerns or problems. I have reviewed the note documented by the scribe. The services provided are my own. The documentation is accurate.

## 2022-11-28 ENCOUNTER — OFFICE VISIT (OUTPATIENT)
Dept: INTERNAL MEDICINE CLINIC | Age: 72
End: 2022-11-28
Payer: MEDICARE

## 2022-11-28 VITALS
TEMPERATURE: 98.8 F | HEART RATE: 82 BPM | OXYGEN SATURATION: 98 % | HEIGHT: 67 IN | DIASTOLIC BLOOD PRESSURE: 77 MMHG | BODY MASS INDEX: 24.39 KG/M2 | RESPIRATION RATE: 16 BRPM | SYSTOLIC BLOOD PRESSURE: 118 MMHG | WEIGHT: 155.4 LBS

## 2022-11-28 DIAGNOSIS — E78.2 MIXED HYPERLIPIDEMIA: ICD-10-CM

## 2022-11-28 DIAGNOSIS — E11.21 TYPE 2 DIABETES WITH NEPHROPATHY (HCC): Primary | ICD-10-CM

## 2022-11-28 DIAGNOSIS — I10 PRIMARY HYPERTENSION: ICD-10-CM

## 2022-11-28 DIAGNOSIS — M48.061 SPINAL STENOSIS, LUMBAR REGION, WITHOUT NEUROGENIC CLAUDICATION: ICD-10-CM

## 2022-11-28 DIAGNOSIS — I50.22 CHRONIC SYSTOLIC (CONGESTIVE) HEART FAILURE (HCC): ICD-10-CM

## 2022-11-28 DIAGNOSIS — E11.40 TYPE 2 DIABETES MELLITUS WITH DIABETIC NEUROPATHY, WITHOUT LONG-TERM CURRENT USE OF INSULIN (HCC): ICD-10-CM

## 2022-11-28 DIAGNOSIS — C61 PROSTATE CANCER (HCC): ICD-10-CM

## 2022-11-28 DIAGNOSIS — F32.9 REACTIVE DEPRESSION: ICD-10-CM

## 2022-11-28 DIAGNOSIS — J06.9 URI, ACUTE: ICD-10-CM

## 2022-11-28 DIAGNOSIS — I25.10 CORONARY ARTERY DISEASE INVOLVING NATIVE CORONARY ARTERY OF NATIVE HEART WITHOUT ANGINA PECTORIS: ICD-10-CM

## 2022-11-28 DIAGNOSIS — N18.31 STAGE 3A CHRONIC KIDNEY DISEASE (HCC): ICD-10-CM

## 2022-11-28 PROBLEM — N18.30 CHRONIC RENAL DISEASE, STAGE III (HCC): Status: ACTIVE | Noted: 2022-11-28

## 2022-11-28 LAB — HBA1C MFR BLD HPLC: 5 %

## 2022-11-28 PROCEDURE — 3017F COLORECTAL CA SCREEN DOC REV: CPT | Performed by: INTERNAL MEDICINE

## 2022-11-28 PROCEDURE — G8536 NO DOC ELDER MAL SCRN: HCPCS | Performed by: INTERNAL MEDICINE

## 2022-11-28 PROCEDURE — G8752 SYS BP LESS 140: HCPCS | Performed by: INTERNAL MEDICINE

## 2022-11-28 PROCEDURE — 1101F PT FALLS ASSESS-DOCD LE1/YR: CPT | Performed by: INTERNAL MEDICINE

## 2022-11-28 PROCEDURE — 2022F DILAT RTA XM EVC RTNOPTHY: CPT | Performed by: INTERNAL MEDICINE

## 2022-11-28 PROCEDURE — G8427 DOCREV CUR MEDS BY ELIG CLIN: HCPCS | Performed by: INTERNAL MEDICINE

## 2022-11-28 PROCEDURE — 99214 OFFICE O/P EST MOD 30 MIN: CPT | Performed by: INTERNAL MEDICINE

## 2022-11-28 PROCEDURE — G0463 HOSPITAL OUTPT CLINIC VISIT: HCPCS | Performed by: INTERNAL MEDICINE

## 2022-11-28 PROCEDURE — G8754 DIAS BP LESS 90: HCPCS | Performed by: INTERNAL MEDICINE

## 2022-11-28 PROCEDURE — 83036 HEMOGLOBIN GLYCOSYLATED A1C: CPT | Performed by: INTERNAL MEDICINE

## 2022-11-28 PROCEDURE — 3044F HG A1C LEVEL LT 7.0%: CPT | Performed by: INTERNAL MEDICINE

## 2022-11-28 PROCEDURE — G8420 CALC BMI NORM PARAMETERS: HCPCS | Performed by: INTERNAL MEDICINE

## 2022-11-28 PROCEDURE — G9717 DOC PT DX DEP/BP F/U NT REQ: HCPCS | Performed by: INTERNAL MEDICINE

## 2022-11-28 NOTE — PATIENT INSTRUCTIONS
See your urologist aleksandr    Restart praluent for cholesterol     See dr Maddox Counter about your back

## 2022-11-28 NOTE — PROGRESS NOTES
1. \"Have you been to the ER, urgent care clinic since your last visit? Hospitalized since your last visit? \" No    2. \"Have you seen or consulted any other health care providers outside of the [address] [place] [address] since your last visit? \" No     3. For patients aged 39-70: Has the patient had a colonoscopy / FIT/ Cologuard?  No

## 2022-11-30 ENCOUNTER — TELEPHONE (OUTPATIENT)
Dept: CARDIOLOGY CLINIC | Age: 72
End: 2022-11-30

## 2022-11-30 RX ORDER — ALIROCUMAB 75 MG/ML
INJECTION, SOLUTION SUBCUTANEOUS
Qty: 6 ML | Refills: 0 | Status: SHIPPED | OUTPATIENT
Start: 2022-11-30

## 2022-11-30 NOTE — TELEPHONE ENCOUNTER
Telephone Call RE:  Appointment reminder      Outcome:                 [] Patient confirmed appointment              [] Patient rescheduled appointment for               [x] Unable to reach  [x] Left message              [] Other:     LM with new appointment time 12/20 at 3:00 approved by Nirali Webb on 11/29.  Confirmed Appointment with Daughter, John Moreau

## 2022-12-06 ENCOUNTER — TELEPHONE (OUTPATIENT)
Dept: CARDIOLOGY CLINIC | Age: 72
End: 2022-12-06

## 2022-12-06 NOTE — TELEPHONE ENCOUNTER
Daughter Emily Lu called in regarding confirming appt change from 12/22 to 12/20 at 3pm.    She also wanted to follow up on status of vyndamax application. RN explained we had to resend application once new prior auth came through with updated part d insurance. She also stated the patient has changed his mind and is now willing to have a RHC done, she reports Dr Ml Avila is patients primary cardiology. RN will confer with provider regarding RHC and scheduling. She also asked about genetic testing if results were completed. RN explained that results are at office provider will review with patient at appt 12/20. All questions answered, she states understanding. Spoke with Ender MERAZ regarding patient now agreeable to [address] She recommends having this scheduled through primary cards Dr Ml Avila. RN sent message to schedulers. Awaiting response on date/time of RHC. Called patients daughter to inform her of plan and that scheduling will reach out to her regarding [address] She states understanding and has no further questions.

## 2022-12-09 ENCOUNTER — TELEPHONE (OUTPATIENT)
Dept: CARDIOLOGY CLINIC | Age: 72
End: 2022-12-09

## 2022-12-09 NOTE — TELEPHONE ENCOUNTER
----- Message from [address] sent at 12/9/2022  8:42 AM EST -----  Please let me know if this pt needs to hold any medications  ----- Message -----  From: Michael Santiago RN  Sent: 12/7/2022   4:49 PM EST  To: Ana Rosa Garfield, [address] [facility] afternoon,    I have a patient of Dr Stephy Moore and Danial Ding NP who will need a RHC. He was seen last in office on 11/16/22, patient had been reluctant to do [address] but had called in and is agreeable at this time. He is also a patient of Dr Patrice Llanes. Diagnosis code:150.22. Please let me know once scheduled.     Thanks,  Amaya Hdez

## 2022-12-13 ENCOUNTER — TELEPHONE (OUTPATIENT)
Dept: CARDIOLOGY CLINIC | Age: 72
End: 2022-12-13

## 2022-12-13 NOTE — TELEPHONE ENCOUNTER
Returning patients daughters call. Calling regarding Racquel Hayes, States she received approval from insurance company regarding medication. RN explained this is the prior auth required for patient assistance which had been faxed with patient application. Still awaiting on approval of patient assistance. RN told daughter we will call her once we get the approval notice. She states understanding.

## 2022-12-14 ENCOUNTER — TELEPHONE (OUTPATIENT)
Dept: CARDIOLOGY CLINIC | Age: 72
End: 2022-12-14

## 2022-12-14 RX ORDER — TAFAMIDIS 61 MG/1
61 CAPSULE, LIQUID FILLED ORAL DAILY
Qty: 90 CAPSULE | Refills: 3 | Status: SHIPPED | OUTPATIENT
Start: 2022-12-14

## 2022-12-14 NOTE — TELEPHONE ENCOUNTER
Requested Prescriptions     Signed Prescriptions Disp Refills    tafamidis (Vyndamax) 61 mg cap 90 Capsule 3     Sig: Take 61 mg by mouth daily. Authorizing Provider: Ra Yoo     Ordering User: Wander Fountain     Informed by Josefina Bryant with [employer] that after insurance benefit assessment. Patient found to have 0 dollar copay for medication. Medication needs to be sent to specialty pharmacy. RN spoke with [facility] pharmacy see to check if they have a specialty pharmacy they work with. RX sent to [address] will check to see if there specialty pharmacy can fill this and let me know. Called back,spoke to Cherylene Edin at [address], they are sending the script to [facility] in [place]. RN called patients daughter and LM to alert of above. Spoke with patients daughter  to alert of above and let her know she will be hearing from the pharmacy, she verbalized understanding.

## 2022-12-16 ENCOUNTER — TELEPHONE (OUTPATIENT)
Dept: CARDIOLOGY CLINIC | Age: 72
End: 2022-12-16

## 2022-12-16 NOTE — TELEPHONE ENCOUNTER
Scheduled pt for RHC with Dr Bayron Ureña on 1/6/2023 at [facility] with an arrival time of 10am. Pt to have labs drawn between 12/19 and 1/4. Reviewed instructions including no Spironolactone or Lasix 1/6(AM).

## 2022-12-16 NOTE — TELEPHONE ENCOUNTER
Spoke with daughter regarding Vyndamax rx being transferred to [facility] specialty pharmacy. Fax received from [facility], RX signed by provider and sent back. Informed daughter regarding above. Patient also has been scheduled for RHC on 1/6 and will need follow up 1 week after with MD. Patient scheduled for appt on 12/20. RN to ask if patient appt should just be rescheduled until after RHC. Daughter in agreement however she would like to know genetic test results and is asking if there would be a way to do that prior to January follow up. RN to check with provider. Per Marlene Briscoe patient appt to be moved after RHC. Spoke with Daughter regarding moving patients appt to after [address] she is in agreement, RN transferred her upfront to scheduling.

## 2022-12-20 ENCOUNTER — TELEPHONE (OUTPATIENT)
Dept: CARDIOLOGY CLINIC | Age: 72
End: 2022-12-20

## 2022-12-20 DIAGNOSIS — I50.22 CHRONIC SYSTOLIC (CONGESTIVE) HEART FAILURE (HCC): ICD-10-CM

## 2022-12-20 DIAGNOSIS — I65.23 BILATERAL CAROTID ARTERY STENOSIS: Primary | ICD-10-CM

## 2022-12-20 DIAGNOSIS — I44.2 THIRD DEGREE AV BLOCK (HCC): ICD-10-CM

## 2022-12-20 NOTE — TELEPHONE ENCOUNTER
Lab slips for [facility] mailed to [facility] per her request. [address] [place] [address].

## 2023-01-03 RX ORDER — SODIUM CHLORIDE 0.9 % (FLUSH) 0.9 %
5-40 SYRINGE (ML) INJECTION EVERY 8 HOURS
OUTPATIENT
Start: 2023-01-03

## 2023-01-03 RX ORDER — SODIUM CHLORIDE 0.9 % (FLUSH) 0.9 %
5-40 SYRINGE (ML) INJECTION AS NEEDED
OUTPATIENT
Start: 2023-01-03

## 2023-01-10 ENCOUNTER — TELEPHONE (OUTPATIENT)
Dept: CARDIOLOGY CLINIC | Age: 73
End: 2023-01-10

## 2023-01-10 NOTE — TELEPHONE ENCOUNTER
Pt daughter stated the  at [facility] is rq to have the results (Pre right heart cath labwork,  kidney fxn up, followed by renal, Lipid at goal) faxed over to his office.    Fax# [phone]  Phone # [phone] (daughter) # [phone]

## 2023-01-18 ENCOUNTER — HOSPITAL ENCOUNTER (OUTPATIENT)
Age: 73
Setting detail: OUTPATIENT SURGERY
Discharge: HOME | End: 2023-01-18
Attending: INTERNAL MEDICINE | Admitting: INTERNAL MEDICINE
Payer: MEDICARE

## 2023-01-18 VITALS
WEIGHT: 165 LBS | TEMPERATURE: 97.9 F | BODY MASS INDEX: 25.9 KG/M2 | HEIGHT: 67 IN | DIASTOLIC BLOOD PRESSURE: 68 MMHG | OXYGEN SATURATION: 97 % | SYSTOLIC BLOOD PRESSURE: 109 MMHG | RESPIRATION RATE: 13 BRPM | HEART RATE: 85 BPM

## 2023-01-18 DIAGNOSIS — I25.83 CORONARY ARTERY DISEASE DUE TO LIPID RICH PLAQUE: ICD-10-CM

## 2023-01-18 DIAGNOSIS — E85.9 AMYLOIDOSIS, UNSPECIFIED TYPE (HCC): ICD-10-CM

## 2023-01-18 DIAGNOSIS — I25.10 CORONARY ARTERY DISEASE DUE TO LIPID RICH PLAQUE: ICD-10-CM

## 2023-01-18 DIAGNOSIS — I50.33 DIASTOLIC CHF, ACUTE ON CHRONIC (HCC): ICD-10-CM

## 2023-01-18 DIAGNOSIS — I42.5 CARDIOMYOPATHY, RESTRICTIVE (HCC): ICD-10-CM

## 2023-01-18 DIAGNOSIS — I10 HYPERTENSION, UNSPECIFIED TYPE: Primary | ICD-10-CM

## 2023-01-18 LAB
GLUCOSE BLD STRIP.AUTO-MCNC: 79 MG/DL (ref 65–117)
SERVICE CMNT-IMP: NORMAL

## 2023-01-18 PROCEDURE — 99212 OFFICE O/P EST SF 10 MIN: CPT | Performed by: INTERNAL MEDICINE

## 2023-01-18 PROCEDURE — 93451 RIGHT HEART CATH: CPT | Performed by: INTERNAL MEDICINE

## 2023-01-18 PROCEDURE — 99152 MOD SED SAME PHYS/QHP 5/>YRS: CPT | Performed by: INTERNAL MEDICINE

## 2023-01-18 PROCEDURE — C1751 CATH, INF, PER/CENT/MIDLINE: HCPCS | Performed by: INTERNAL MEDICINE

## 2023-01-18 PROCEDURE — 74011000250 HC RX REV CODE- 250: Performed by: INTERNAL MEDICINE

## 2023-01-18 PROCEDURE — 82962 GLUCOSE BLOOD TEST: CPT

## 2023-01-18 PROCEDURE — C1894 INTRO/SHEATH, NON-LASER: HCPCS | Performed by: INTERNAL MEDICINE

## 2023-01-18 PROCEDURE — 77030013797 HC KT TRNSDUC PRSSR EDWD -A: Performed by: INTERNAL MEDICINE

## 2023-01-18 PROCEDURE — C1892 INTRO/SHEATH,FIXED,PEEL-AWAY: HCPCS | Performed by: INTERNAL MEDICINE

## 2023-01-18 PROCEDURE — 76937 US GUIDE VASCULAR ACCESS: CPT | Performed by: INTERNAL MEDICINE

## 2023-01-18 PROCEDURE — 2709999900 HC NON-CHARGEABLE SUPPLY: Performed by: INTERNAL MEDICINE

## 2023-01-18 RX ORDER — LIDOCAINE HYDROCHLORIDE 10 MG/ML
INJECTION INFILTRATION; PERINEURAL AS NEEDED
Status: DISCONTINUED | OUTPATIENT
Start: 2023-01-18 | End: 2023-01-18 | Stop reason: HOSPADM

## 2023-01-18 RX ORDER — FUROSEMIDE 40 MG/1
40 TABLET ORAL DAILY
Qty: 30 TABLET | Refills: 0 | Status: SHIPPED | OUTPATIENT
Start: 2023-01-18 | End: 2023-02-06

## 2023-01-18 NOTE — DISCHARGE INSTRUCTIONS
Cardiac Catheterization  Discharge Instructions    Do not drive, operate any machinery, or sign any legal documents for 24 hours after your procedure. You must have someone to drive you home. You may take a shower 24 hours after your cardiac catheterization. Be sure to get the dressing wet and then remove it; gently wash the area with warm soapy water. Pat dry and leave open to air. To help prevent infections, be sure to keep the cath site clean and dry. No lotions, creams, powders, ointments, etc. in the cath site for approximately 1 week. Do not soak site with such activities as: take a tub bath, get in a hot tub or swimming pool for approximately 5 days or until the cath site is completely healed. No strenuous activity or heavy lifting over 10 lbs. for 3 days. Drink plenty of fluids for 24-48 hours after your cath to flush the contrast dye from your kidneys. No alcoholic beverages for 24 hours. You may resume your previous diet (low fat, low cholesterol) after your cath. After your cath, some bruising or discomfort is common during the healing process. An ice pack and Tylenol, 1-2 tablets every 6 hours as needed, is recommended if you experience any discomfort. If you experience any signs or symptoms of infection such as fever, chills, or poorly healing incision, persistent tenderness or swelling around the cath site, redness and/or warmth to the touch, numbness, significant tingling or pain at the cath site or affected extremity, rash, drainage from the cath site, or if the affected arm or leg feels tight or swollen, call your physician right away. 30 minutes of activity a day is recommended. If it is too hot or too cold outside, perform activities in doors. If bleeding at the cath site occurs, take a clean gauze pad and apply direct pressure to the cath site just above the puncture site.   Call 911 immediately, and continue to apply direct pressure until an ambulance gets to your location to take you to the ER. DO not drive yourself, do not have anyone else drive you. You may return to work  2  days after your cardiac cath if no cath site bleeding.

## 2023-01-18 NOTE — H&P
[facility]                    Cardiology Care Note     []Initial Encounter     []Follow-up    Patient Name: Chelsey Lloyd - CI/3/2802 - [facility]:[id number]  Primary Cardiologist: {[facility]:[id number]}  Consulting Cardiologist: {[facility]:[id number]}     Reason for encounter: ***    HPI:       Chelsey Lloyd is a 74 y.o. male with PMH significant for     Subjective:      Chelsey Lloyd reports {Symptoms; cardiac:75347}. Assessment and Plan              ____________________________________________________________    Cardiac testing  22    ECHO ADULT COMPLETE 2022    Interpretation Summary    Left Ventricle: Low normal left ventricular systolic function. EF by 2D Simpsons Biplane is 50%. Left ventricle size is normal. Severely increased wall thickness. Findings consistent with severe concentric hypertrophy. There is a speckled appearance to the left ventricular myocardium. Consider testing for cardiac amyloidosis including possible cardiac MRI. Noted that the patient's Medtr[employer] device is MRI compatible. Normal wall motion consistent with paced rhythm    Left Atrium: Left atrium is severely dilated. Left atrial volume index is severely increased (>48 mL/m2). Right Atrium: Right atrium is moderately dilated. Mitral Valve: Mild to moderate regurgitation. Tricuspid Valve: Moderate regurgitation. Mildly elevated RVSP. The estimated RVSP is 38 mmHg. 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 Visual grade 3 with HCL ratio of 1.7.     Evaluation of AL amyloidosis by serum free light chain and urine and serum immunofixation is recommended in all patients undergoing Tc-99 PYP imaging (PYP imaging does not rule out AL amyloidosis). Results should be interpreted in the context of prior evaluation and referral to hematologist or amyloidosis expert is recommended if either a) Echo or CMR strongly suggestive but 99mTc- PYP is not suggestive or equivocal. b) serum free light chain are abnormal or equivocal.    Signed by: Brown Mancilla MD on 10/25/2022 12:08 PM    20    CARDIAC PROCEDURE 2020    Conclusion  · Significant 3 vessel CAD. · Normal LVEF. · CT surgery consult. Signed by: Kate Vázquez MD on 2020  3:29 PM        Most recent HS troponins:  No results for input(s): TROPHS in the last 72 hours.     No lab exists for component:  CKMB    ECG: {Findings; ec::\"normal EKG, normal sinus rhythm\",\"unchanged from previous tracings\"}    Review of Systems:    []All other systems reviewed and all negative except as written in HPI    [] Patient unable to provide secondary to condition    Past Medical History:   Diagnosis Date    Arthritis     Cancer ([address]     Prostate ca    Chronic kidney disease     Stage 3    Chronic pain     Abdoman, back, fingers per daughter    Chronic systolic (congestive) heart failure 2022    Depression     Diabetes ([address]     Heart attack ([address]     Heart failure ([address]     Dr. Crystal Bishop    Hyperlipidemia     Hypertension     Pacemaker 2020    MED    PE (pulmonary embolism)     Pneumonia     Sleep apnea     not use CPAP     Past Surgical History:   Procedure Laterality Date    COLONOSCOPY N/A 3/19/2021    COLONOSCOPY performed by Alva Min MD at [facility] ENDOSCOPY    COLONOSCOPY N/A 5/10/2021    COLONOSCOPY performed by Alva Min MD at [facility] N/A 2021    FLEXIBLE SIGMOIDOSCOPY performed by Alva Min MD at [facility] ENDOSCOPY    HX BACK SURGERY      HX CERVICAL LAMINECTOMY      HX CORONARY ARTERY BYPASS GRAFT  04/2021    X3     HX GI 11/2012    bowel resection    HX HEART CATHETERIZATION  04/2020    HX ORTHOPAEDIC      amputated left 4th finger    ND ABDOMEN SURGERY PROC UNLISTED      bowel resection    ND INS NEW/RPLCMT PRM PM W/TRANSV ELTRD ATRIAL&VENT N/A 5/1/2020    INSERT PPM DUAL performed by Felicita Cuellar MD at [address], [facility] [address] [facility]     Social Hx:  reports that he has never smoked. His smokeless tobacco use includes chew. He reports current alcohol use of about 16.0 standard drinks per week. He reports that he does not use drugs. Family Hx: family history includes Cancer in his mother; Heart Disease in his brother and father. Allergies   Allergen Reactions    Arb-Angiotensin Receptor Antagonist Other (comments)     High k    Statins-Hmg-Coa Reductase Inhibitors Myalgia    Ace Inhibitors Cough          OBJECTIVE:  Wt Readings from Last 3 Encounters:   01/18/23 165 lb (74.8 kg)   11/28/22 155 lb 6.4 oz (70.5 kg)   11/16/22 160 lb (72.6 kg)     No intake or output data in the 24 hours ending 01/18/23 1212    Physical Exam:    Vitals:   Vitals:    01/18/23 1025   BP: 114/78   Pulse: 82   Resp: 18   Temp: 97.9 °F (36.6 °C)   SpO2: 98%   Weight: 165 lb (74.8 kg)   Height: 5' 7\" (1.702 m)     Telemetry: {Telemetry:[id number]::\"normal sinus rhythm\"}    Gen: Well-developed, well-nourished, in no acute distress  Neck: Supple, No JVD, No Carotid Bruit  Resp: No accessory muscle use, Clear breath sounds, No rales or rhonchi  Card: Regular Rate,Rythm, Normal S1, S2, No murmurs, rubs or gallop. No thrills.    Abd:   Soft, non-tender, non-distended, BS+   MSK: No cyanosis  Skin: No rashes    Neuro: Moving all four extremities, follows commands appropriately  Psych: Good insight, oriented to person, place, alert, Nml Affect  LE: No edema    Data Review:     Radiology:   XR Results (most recent):  Results from Hospital Encounter encounter on 05/04/22    XR HIP LT W OR WO PELV 2-3 VWS    Narrative  EXAM: XR HIP LT W OR WO PELV 2-3 VWS    INDICATION: fall hip pain.    COMPARISON: None. FINDINGS: AP view of the pelvis and a frogleg lateral view of the left hip  demonstrate mildly diminished bone mineralization. No acute fracture or  dislocation is shown. No substantial hip arthrosis is demonstrated. Mild  bilateral SI joint osteoarthrosis is shown. Lower lumbar laminectomy defects  with moderate spondylosis is noted. Impression  No acute fracture or dislocation demonstrated. No results for input(s): NA, K, CL, CO2, BUN, CREA, GLU, PHOS, CA in the last 72 hours. No results for input(s): WBC, HGB, HCT, PLT, HGBEXT, HCTEXT, PLTEXT in the last 72 hours. No results for input(s): PTP, INR, AP, INREXT in the last 72 hours. No lab exists for component: PTTP, GPT, SGOT  No results for input(s): CHOL, LDLC in the last 72 hours. No lab exists for component: TGL, HDLC,  HBA1C      Current meds:  No current facility-administered medications for this encounter.     Jadyn Odonnell MD    [facility] Cardiology  Call center: (U) [phone]  (U) [phone]      Suyapa Patino MD facility-administered medications for this encounter.     Noreen Don MD    [facility] Cardiology  Call center: (E) [phone]  (B) [phone]      Alexander Nur MD

## 2023-01-18 NOTE — PROGRESS NOTES
1320  Pt arrived to recovery room. MD wants to hold pt 1 hour to make sure sites are not having any complications. No bleeding and no hematoma at cath sites. Pt tolerating PO diet well    1500  Discharge orders populated in chart. RN called pt ride home. RN Reviewed discharge instructions with daughter over phone. 12  RN assisted pt with getting dressed    12  RN removed pt IV    1515  Pt walked to the restroom and voided successfully. 0  Pt discharged to daughter at main entrance of hospital via wheel chair by RN  Pt sent home with lunch box, discharge instructions, hospital socks, and clothing and valuables from home.

## 2023-01-18 NOTE — PROGRESS NOTES
1320  TRANSFER - IN REPORT:    Verbal report received from Delia on William Babin  being received from [address] [place] [address] for routine progression of care. Report consisted of patients Situation, Background, Assessment and Recommendations(SBAR). Information from the following report(s) SBAR, Procedure Summary, Intake/Output, MAR, Recent Results, and Cardiac Rhythm AV Paced  was reviewed with the receiving clinician. Opportunity for questions and clarification was provided. Assessment completed upon patients arrival to [address] [place] [address] and care assumed. Cardiac Cath Lab Recovery Arrival Note:    William Babin arrived to [facility] recovery area. Patient procedure= RHC. Patient on cardiac monitor, non-invasive blood pressure, SPO2 monitor. On O2 @ 2 lpm via NC.  IV  saline locked. Patient status doing well without problems. Patient is A&Ox 4. Patient reports no complaints. PROCEDURE SITE CHECK:    Procedure site:without any bleeding and no hematoma, no pain/discomfort reported at procedure site. No change in patient status. Continue to monitor patient and status.

## 2023-01-18 NOTE — PROGRESS NOTES
1025    Cardiac Cath Lab Recovery Arrival Note:      Osvaldo Royal arrived to Cardiac Cath Lab, Recovery Area. Staff introduced to patient. Patient identifiers verified with NAME and DATE OF BIRTH. Procedure verified with patient. Consent forms reviewed and signed by patient or authorized representative and verified. Allergies verified. Patient and family oriented to department. Patient and family informed of procedure and plan of care. Questions answered with review. Patient prepped for procedure, per orders from physician, prior to arrival.    Patient on cardiac monitor, non-invasive blood pressure, SPO2 monitor. On room air. Patient is A&Ox 4. Patient reports no complaints. Patient in stretcher, in low position, with side rails up, call bell within reach, patient instructed to call if assistance as needed. Patient prep in: [facility] Recovery Area, [facility]. Patient family has pager # Moriah Haro [phone] (may provide pt info to this individual)  Family in: Home.    Prep by: Spencer Levin

## 2023-01-18 NOTE — Clinical Note
Cardiac Cath Lab Procedure Area Arrival Note:    Paul Cárdenas arrived to Cardiac Cath Lab, Procedure Area. Patient identifiers verified with NAME and DATE OF BIRTH. Procedure verified with patient. Consent forms verified. Allergies verified. Patient informed of procedure and plan of care. Questions answered with review. Patient voiced understanding of procedure and plan of care. Patient on cardiac monitor, non-invasive blood pressure, SPO2 monitor. On RA . IV of 0.9 NS on pump at 30 ml/hr. Patient status doing well without problems. Patient is A&Ox 4. Patient reports NO PAIN. Patient medicated during procedure with orders obtained and verified by Dr. Prema Moncada. Refer to patients Cardiac Cath Lab PROCEDURE REPORT for vital signs, assessment, status, and response during procedure, printed at end of case. Printed report on chart or scanned into chart.

## 2023-01-18 NOTE — Clinical Note
Right brachial vein. Accessed unsuccessfully. Micropuncture needle used. Using ultrasound guidance. Number of attempts =  2.

## 2023-01-20 ENCOUNTER — TELEPHONE (OUTPATIENT)
Dept: CARDIOLOGY CLINIC | Age: 73
End: 2023-01-20

## 2023-01-24 ENCOUNTER — OFFICE VISIT (OUTPATIENT)
Dept: CARDIOLOGY CLINIC | Age: 73
End: 2023-01-24
Payer: MEDICARE

## 2023-01-24 ENCOUNTER — TELEPHONE (OUTPATIENT)
Dept: CARDIOLOGY CLINIC | Age: 73
End: 2023-01-24

## 2023-01-24 VITALS
WEIGHT: 156 LBS | BODY MASS INDEX: 24.48 KG/M2 | DIASTOLIC BLOOD PRESSURE: 78 MMHG | HEART RATE: 67 BPM | OXYGEN SATURATION: 100 % | TEMPERATURE: 97.7 F | RESPIRATION RATE: 16 BRPM | HEIGHT: 67 IN | SYSTOLIC BLOOD PRESSURE: 116 MMHG

## 2023-01-24 DIAGNOSIS — I50.22 SYSTOLIC CHF, CHRONIC (HCC): Primary | ICD-10-CM

## 2023-01-24 DIAGNOSIS — I44.2 THIRD DEGREE AV BLOCK (HCC): ICD-10-CM

## 2023-01-24 DIAGNOSIS — I82.5Y3 CHRONIC DEEP VEIN THROMBOSIS (DVT) OF PROXIMAL VEIN OF BOTH LOWER EXTREMITIES (HCC): ICD-10-CM

## 2023-01-24 PROCEDURE — 3074F SYST BP LT 130 MM HG: CPT | Performed by: INTERNAL MEDICINE

## 2023-01-24 PROCEDURE — 1101F PT FALLS ASSESS-DOCD LE1/YR: CPT | Performed by: INTERNAL MEDICINE

## 2023-01-24 PROCEDURE — G9717 DOC PT DX DEP/BP F/U NT REQ: HCPCS | Performed by: INTERNAL MEDICINE

## 2023-01-24 PROCEDURE — 3078F DIAST BP <80 MM HG: CPT | Performed by: INTERNAL MEDICINE

## 2023-01-24 PROCEDURE — 99215 OFFICE O/P EST HI 40 MIN: CPT | Performed by: INTERNAL MEDICINE

## 2023-01-24 PROCEDURE — 1123F ACP DISCUSS/DSCN MKR DOCD: CPT | Performed by: INTERNAL MEDICINE

## 2023-01-24 PROCEDURE — G8536 NO DOC ELDER MAL SCRN: HCPCS | Performed by: INTERNAL MEDICINE

## 2023-01-24 PROCEDURE — G8427 DOCREV CUR MEDS BY ELIG CLIN: HCPCS | Performed by: INTERNAL MEDICINE

## 2023-01-24 PROCEDURE — G8420 CALC BMI NORM PARAMETERS: HCPCS | Performed by: INTERNAL MEDICINE

## 2023-01-24 PROCEDURE — 3017F COLORECTAL CA SCREEN DOC REV: CPT | Performed by: INTERNAL MEDICINE

## 2023-01-24 NOTE — PROGRESS NOTES
[facility] in [place], 105 [facility] Note    Patient name: Nahid Catalan  Patient : 1950  Patient MRN: [id number]  Date of service: 23    Primary care physician: Ashutosh Costello MD  Primary general cardiologist:  Dr. Libertad Flanagan    Primary F cardiologist: Asia Sofia MD     CHIEF COMPLAINT:  Chronic systolic heart failure     PLAN OF CARE:  73 y/o with h/o diastolic > systolic heart failure, HFmEF, LVEF 43%, stage D, NYHA class IV symptoms, c/b renal and hepatic failure, GDMT limited due to hypotension  Cardiac MRI revealed infiltrative cardiac amyloidosis, PYP was strongly suggestive of ATTR amyloidosis and genetic testing positive for inherited ATTR  RHC with severely elevated filling pressures and normal resting index  Patient agreed for admission for inotrope-assisted diuresis; possible initiation of inotropes, palliative care meeting to address code status, limits of care   Patient declined admission today, would like to come in Monday, 23  Family will require cascade genetic testing; they will reach out to [facility] first    PLAN:  Continue current medical therapy for heart failure  Start vyndamax once patient assistance processed; discussed with pt and   Continue current dose of toprol XL 12.5mg daily  Known intolerance to lisinopril/ARB; not recommended in amyloid  Continue spironolactone 12.5mg daily  Cannot tolerate SGLT2i due to CrCl  Continue current dose of diuretic  Not on allopurinol or uloric, check uric acid level  Resume baby ASA   Does not take statins, LFT abnormal  Recommend sleep study - pt not interested at this time  Routine screening HF labs reviewed with pt  Lab slips provided for follow up labs to be done next week  Reinforced low salt diet  Reinforced fluid restriction to 6 x 8oz glasses per day  Provided educational materials \"Living with heart failure\"   Provided advanced care plan forms to be filled out    Referrals sent previously for PT and nutritionist   Follow-up with primary cardiologist, Dr. Arturo Cooper; will alternate visits  Follow-up with EP cardiologist, Dr. Maria Nyhan with nephrologist  Recommend flu, covid and pneumonia vaccinations  Return to [facility] on Monday for elective admission     IMPRESSION:  Fatigue  Shortness of breath  Chronic systolic heart failure, HFmEF  Stage C, NYHA class IIIA symptoms  Non-ischemic cardiomyopathy, LVEF 43%  Severe LVH 2.1cm  cMRI suggestive of infiltrative cardiac amyloidosis  Intolerance to lisinopril  Inherited ATTR cardiac amyloidosis  Coronary artery disease  S/p CABG x 3 (4/2020) LIMA to LAD, SVG to RCA and Ramus  CHF s/p PM - pacemaker dependent  RBBB 144ms  Cardiac risk factors  HTN  HL  DM2  Liver dysfunction, TBili 2.8  CKD, stage 3  AT3 activity low  Proteic C low  Lupus anticoagulatn abnormal  Leukopenia  H/o prostate cancer  Elevated D Dimer  Hx DVT/PE: developed PE/DVT after bowel resection in 2012  History of rectal bleeding due to an ulcer related to radiation   Depressive symptoms  DNR     CARDIAC IMAGING:  Echo (8/2022)    Left Ventricle: Low normal left ventricular systolic function. EF by 2D Simpsons Biplane is 50%. Left ventricle size is normal. Severely increased wall thickness. Findings consistent with severe concentric hypertrophy. There is a speckled appearance to the left ventricular myocardium. Consider testing for cardiac amyloidosis including possible cardiac MRI. Noted that the patient's Medtronic device is MRI compatible. Normal wall motion consistent with paced rhythm    Left Atrium: Left atrium is severely dilated. Left atrial volume index is severely increased (>48 mL/m2). Right Atrium: Right atrium is moderately dilated. Mitral Valve: Mild to moderate regurgitation. Tricuspid Valve: Moderate regurgitation. Mildly elevated RVSP. The estimated RVSP is 38 mmHg.   EKG (2020) NSR, RBBB, QRS 144ms, septal infarct and inferior infarct  EKG (5/2022) NSR, QRSd 167, paced  LHC (2020)  Left Main   The vessel was visualized by angiography. The vessel is angiographically normal.   Left Anterior Descending   Prox LAD lesion 50% stenosed. .   Mid LAD lesion 75% stenosed. .   Ramus Intermedius   Ramus lesion 80% stenosed. .   Left Circumflex   Mid Cx lesion 50% stenosed. .   First Obtuse Marginal Branch   1st Mrg lesion 80% stenosed. .   Right Coronary Artery   Mid RCA lesion 75% stenosed. .   Cardiac MRI (9/2022)  1. Normal left ventricular cavity size. Severe concentric left ventricular  hypertrophy. Significantly hypertrophied septum measuring 21 mm. There is no  left ventricular outflow tract obstruction. Moderate left ventricular systolic  dysfunction. Moderate global hypokinesis with slight regional variation. 3-D  LVEF 43%. 2. Normal right ventricular size with minimal right ventricular systolic  dysfunction. 3-D RVEF 47%. 3. Moderate 2+ tricuspid regurgitation. 4. On EGE and LGE study, there is mild diffuse myocardial enhancement with  slightly poor nulling of the myocardium on postcontrast images. These findings  may suggest possibility of infiltrative cardiac amyloidosis. There is no  myocardial scar. There is no features of recent or old myocardial infarction. There is no features of infiltrative sarcoidosis. There is no features of  inflammatory myocarditis. All myocardial walls are viable. 5. Normal pericardium without any significant pericardial effusion. 6. Small right-sided pleural effusion and tiny left-sided pleural effusion. Lexiscan Myoview May 2021 with normal perfusion, calculated LVEF of 37%      HEMODYNAMICS:  RHC (1/2023) 21, 44/43/31, 25, Susi CI 2.97  CPEST not done  6MW not done  6 Min Walk Report 7/7/2020   (PRE) HR 82      OTHER IMAGING:  CT chest (2021)  There is no pulmonary embolism. There is no aortic aneurysm or dissection. Cardiomegaly and coronary artery disease.   August 2021-had colonoscopy by Dr. Justina Meckel for hematochezia, which revealed radiation changes related to prostate radiation. LIFE GOALS:  Lifestyle goals discussed with the patient. HISTORY OF PRESENT ILLNESS:  I had the pleasure of seeing Milan Meigs in [address] [place] [address] at [facility] in [place]. Briefly, Milan Meigs is a 74 y.o. male with medical history listed above. Patient was referred to [facility] for further workup of cardiac amyloidosis. INTERVAL HISTORY:  Today, patient presents for routine clinic visit. he is frustrated with having so many doctor's appointments. He denies significant heart failure symptoms. He is a fairly poor historian, so a majority of the history is provided by his daughter who helps to care for him. Reports good appetite, no orthopnea, minimal LE edema. He was not able to walk into the clinic, was brought up in a wheelchair due to ROLON and fatigue. PHYSICAL EXAM:  Visit Vitals  /78 (BP 1 Location: Right upper arm, BP Patient Position: Sitting, BP Cuff Size: Adult)   Pulse 67   Temp 97.7 °F (36.5 °C) (Oral)   Resp 16   Ht 5' 7\" (1.702 m)   Wt 156 lb (70.8 kg)   SpO2 100%   BMI 24.43 kg/m²     General: Patient is cachectic in no acute distress  HEENT: Unremarkable   Neck: Supple. No evidence of thyroid enlargements or lymphadenopathy. JVD: 18cm  Lungs: Breath sounds are equal and clear bilaterally. No wheezes, rhonchi, or rales. Heart: Regular rate and rhythm with normal S1 and S2. No murmurs, gallops or rubs. Abdomen: Soft, no mass or tenderness. No organomegaly or hernia. Bowel sounds present. Genitourinary and rectal: deferred  Extremities: No cyanosis, clubbing, or edema. Neurologic: No focal sensory or motor deficits are noted. Grossly intact. Psychiatric: Awake, alert an doriented x 3. Appropriate mood and affect. Skin: Warm, dry and well perfused. REVIEW OF SYSTEMS:  General: Denies fever.   Ear, nose and throat: Denies difficulty hearing, sinus problems, nosebleeds  Cardiovascular: see above in the interval history  Respiratory: Denies cough, wheezing, sputum production, hemoptysis.   Gastrointestinal: Denies heartburn, constipation, diarrhea, abdominal pain, nausea, blood in stool  Kidney and bladder: Denies painful urination, frequent urination  Musculoskeletal: Denies joint pain, muscle weakness  Skin and hair: Denies change in existing skin lesions    PAST MEDICAL HISTORY:  Past Medical History:   Diagnosis Date    Arthritis     Cancer ([facility]     Prostate ca    Chronic kidney disease     Stage 3    Chronic pain     Abdoman, back, fingers per daughter    Chronic systolic (congestive) heart failure 11/28/2022    Depression     Diabetes ([facility]     Heart attack ([facility]     Heart failure ([facility] 2020    Dr. Mitra Zapien    Hyperlipidemia     Hypertension     Pacemaker 05/01/2020    MED    PE (pulmonary embolism)     Pneumonia     Sleep apnea     not use CPAP       PAST SURGICAL HISTORY:  Past Surgical History:   Procedure Laterality Date    COLONOSCOPY N/A 3/19/2021    COLONOSCOPY performed by Rommel Moctezuma MD at [facility] ENDOSCOPY    COLONOSCOPY N/A 5/10/2021    COLONOSCOPY performed by Rommel Moctezuma MD at [facility] N/A 8/2/2021    FLEXIBLE SIGMOIDOSCOPY performed by Rommel Moctezuma MD at [facility] ENDOSCOPY    HX BACK SURGERY  2014    HX CERVICAL LAMINECTOMY      HX CORONARY ARTERY BYPASS GRAFT  04/2021    X3     HX GI  11/2012    bowel resection    HX HEART CATHETERIZATION  04/2020    HX ORTHOPAEDIC      amputated left 4th finger    NM INS NEW/RPLCMT PRM PM W/TRANSV ELTRD ATRIAL&VENT N/A 5/1/2020    INSERT PPM DUAL performed by Kadie Martell MD at [facility] CATH LAB    NM UNLISTED PROCEDURE ABDOMEN PERITONEUM & OMENTUM      bowel resection       FAMILY HISTORY:  Family History   Problem Relation Age of Onset    Cancer Mother         unknown    Heart Disease Father     Heart Disease Brother     Lyssa Andre Problems Neg Hx        SOCIAL HISTORY:  Social History     Socioeconomic History    Marital status:     Number of children: 1    Years of education: 12    Highest education level: High school graduate   Occupational History    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[employer], 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    Tobacco Use    Smoking status: Every Day     Types: Cigars    Smokeless tobacco: Current     Types: Chew    Tobacco comments:     Chews tobacco every day; cigars and black and milds    Vaping Use    Vaping Use: Never used   Substance and Sexual Activity    Alcohol use: Yes     Alcohol/week: 16.0 standard drinks     Types: 2 Glasses of wine, 14 Cans of beer per week     Comment: 2-4 beers daily, sometimes alcohol    Drug use: Never    Sexual activity: Not Currently   Other Topics Concern     Service No    Blood Transfusions No    Caffeine Concern No    Occupational Exposure No    Hobby Hazards No    Sleep Concern Yes    Stress Concern Yes    Weight Concern No    Special Diet No    Back Care Yes    Exercise No    Seat Belt Yes   Social History Narrative    ** Merged History Encounter **         Lives in [place] [address] Determinants of Health     Financial Resource Strain: Low Risk     Difficulty of Paying Living Expenses: Not hard at all   Food Insecurity: No Food Insecurity    Worried About [address] [place] [address] in the Last Year: Never true    Ran Out of Food in the Last Year: Never true       LABORATORY RESULTS:  Labs Latest Ref Rng & Units 1/4/2023   WBC 3.4 - 10.8 x10E3/uL 3.9   RBC 4.14 - 5.80 x10E6/uL 4.09(L)   Hemoglobin 13.0 - 17.7 g/dL 12. 1(L)   Hematocrit 37.5 - 51.0 % 37.9   MCV 79 - 97 fL 93   Platelets 569 - 709 F57M6/   Albumin 3.7 - 4.7 g/dL 4.2   Calcium 8.6 - 10.2 mg/dL 9.3   Glucose 70 - 99 mg/dL 66(L)   BUN 8 - 27 mg/dL 28(H)   Creatinine 0.76 - 1.27 mg/dL 1.68(H)   Sodium 134 - 144 mmol/L 139   Potassium 3.5 - 5.2 mmol/L 5.1   Some recent data might be hidden ALLERGY:  Allergies   Allergen Reactions    Arb-Angiotensin Receptor Antagonist Other (comments)     High k    Statins-Hmg-Coa Reductase Inhibitors Myalgia    Ace Inhibitors Cough        CURRENT MEDICATIONS:    Current Outpatient Medications:     furosemide (LASIX) 40 mg tablet, Take 1 Tablet by mouth daily. Increased 1/18/23, May take an additional tablet once daily as directed by [facility], Disp: 30 Tablet, Rfl: 0    tafamidis (Vyndamax) 61 mg cap, Take 61 mg by mouth daily. , Disp: 90 Capsule, Rfl: 3    traZODone (DESYREL) 50 mg tablet, TAKE 1 TO 1 & 1/2 (ONE & ONE-HALF) TABLETS BY MOUTH NIGHTLY AS NEEDED (Patient taking differently: Take 50 mg by mouth nightly.), Disp: 120 Tablet, Rfl: 0    spironolactone (ALDACTONE) 25 mg tablet, Take 0.5 Tablets by mouth daily. , Disp: 15 Tablet, Rfl: 1    metoprolol succinate (TOPROL-XL) 25 mg XL tablet, Take 1 tablet by mouth once daily (Patient taking differently: Take 12.5 mg by mouth daily.), Disp: 90 Tablet, Rfl: 1    acetaminophen (TYLENOL) 650 mg TbER, Take 650 mg by mouth every eight (8) hours as needed for Pain. Take one tablet by mouth every 8 hours for mild to moderate pain 3/10-6/10, Disp: , Rfl:     sertraline (ZOLOFT) 50 mg tablet, Take 1.5 Tablets by mouth daily. (Patient taking differently: Take 50 mg by mouth daily.), Disp: 120 Tablet, Rfl: 1    cholecalciferol (VITAMIN D3) 25 mcg (1,000 unit) cap, Take 1,000 Units by mouth daily. , Disp: , Rfl:     alfuzosin SR (UROXATRAL) 10 mg SR tablet, Take 10 mg by mouth daily. , Disp: , Rfl:     aspirin delayed-release 81 mg tablet, Take 81 mg by mouth daily. , Disp: , Rfl:     mv, min #36-iron,carbonyl-FA (Geritol Complete) 16 mg iron- 0.38 mg tab, Take 1 Tablet by mouth daily. , Disp: , Rfl:     zinc sulfate (ZINC-220 PO), Take  by mouth., Disp: , Rfl:     turmeric 400 mg cap, Take  by mouth., Disp: , Rfl:     vitamin e (E GEMS) 100 unit capsule, Take 1 capsule three times daily, Disp: 90 Cap, Rfl: 6    pumpkin seed extract-soy germ 300 mg cap, Take 1 Capsule by mouth as needed (prostate health). daily, Disp: , Rfl:     coenzyme Q-10 (CO Q-10) 200 mg capsule, Take 200 mg by mouth daily. 200 mg-take one pill, Disp: , Rfl:     ascorbate calcium (VITAMIN C PO), Take 1 Tablet by mouth daily. , Disp: , Rfl:     cyanocobalamin 1,000 mcg tablet, Take 1,000 mcg by mouth daily. , Disp: , Rfl:     Praluent Pen 75 mg/mL injector pen, INJECT 1 ML SUBCUTANEOUSLY  EVERY TWO WEEKS (Patient not taking: Reported on 1/24/2023), Disp: 6 mL, Rfl: 0    Thank you for your referral and allowing me to participate in this patient's care.     Marlena Severs, MD PhD  [address], [address]  Phone: [phone]  Fax: [phone]    PATIENT CARE TEAM:  Patient Care Team:  Dylan Zaidi MD as PCP - General (Internal Medicine Physician)  Dylan Zaidi MD as PCP - [facility]  Rebecca Chakraborty MD as Physician (Cardiovascular Disease Physician)  She Shi MD as Physician (Nephrology)  Lynnette Velasco MD (Neurosurgery)  Ventura Castañeda MD (Ophthalmology)  Gregoria Amaral MD (Otolaryngology)  Jenkins Nyhan, MD (Urology)  Shakira Sharma MD (Ophthalmology)  Fredo Edwards DPM (Podiatry)  Carrillo Sanches, PHD (Psychology)  Kinjal Berry MD (Cardiothoracic Surgery)  Rock Arias MD (Cardiovascular Disease Physician)  Jim Estrada MD (Cardiovascular Disease Physician)  Finley Felty, MD as Physician (Nephrology)  Quintin Banks MD (Gastroenterology)  Chidi Baker MD (Radiation Oncology)  Lukas Gaines MD as Physician (Nephrology)  Rex Swanson, NP as Nurse Practitioner (Nurse Practitioner)  Jim Estrada MD (Cardiovascular Disease Physician)  Ventura Castañeda MD (Ophthalmology)     Total visit time: 40 minutes  (> 50% spent face-to-face counseling)

## 2023-01-24 NOTE — PATIENT INSTRUCTIONS
Medication changes:    No medication changes     Please take this to your pharmacy to notify them of the change in medications. Testing Ordered: Other Recommendations:     Please be prepared to be admitted Monday morning. You will be called once a bed becomes available. [facility] genetic testing results have been reviewed today. Your testing results qualify for complimentary genetic counseling through [facility]. Please visit [email or web address] to schedule your telephone genetic counseling appointment. You may also call [phone]. Your RQ number from your test is [id number]. You will be asked for this number when you schedule the appointment. Your [facility] Genetic Testing results qualify for complimentary family testing for up to two blood relatives within 80 days of testing. If you would like to proceed with family testing, please contact the [address] [place] [address] at [phone] Option 2 to provide the following information:    -Name of Family Member  -Date of Birth  -Home Address  -Phone Number  -Email (Optional)   -Any cardiac history (Optional)    Our office will order the test and a testing kit will be sent directly to the home of your family member. Instructions for specimen collection and shipping material to return the specimen to [employer] will be provided with the kit. This test is complimentary and is at no cost to you or your family members. Ensure your drinking an adequate amount of water with a goal of 6-8 eight ounce glasses (1.5-2 liters) of fluid daily. Your urine should be clear and light yellow straw colored. If your blood pressure begins to consistently run below 90/60 and/or you begin to experience dizziness or lightheadedness, please contact the [facility] at [phone].        Follow up after admission with [facility]      Please monitor your weights daily upon waking and after using the bathroom. Keep a written records of your weights and bring to your next appointment. If you have a weight gain of 3 or more pounds overnight OR 5 or more pounds in one week please contact our office. Thank you for allowing us the privilege of being a part of your healthcare team! Please do not hesitate to contact our office at [phone] with any questions or concerns. Virtual Heart Failure [address] invites you to learn more about heart failure and to share your questions, ideas, and experiences with others. Each month, the Heart Failure Support Group features a new educational topic and a guest speaker, followed by an interactive discussion. Our Heart Failure Nurse Navigator will moderate each session. You will be able to participate by phone, tablet or computer through [employer]. This support group takes place on the 3rd Thursday of each month from 6:00-7:30PM. All individuals living with heart failure and their caregivers are welcome to join. If you are interested in participating, please contact us at [email or web address] and you will be sent the link to join the [employer].

## 2023-01-24 NOTE — TELEPHONE ENCOUNTER
Called [facility] and spoke with [facility].  Direct admission to [facility]  4th floor tele cvsu bed set for Monday 1/30/23 around 9 am. Mendel Arena RN

## 2023-01-26 ENCOUNTER — TELEPHONE (OUTPATIENT)
Dept: CARDIOLOGY CLINIC | Age: 73
End: 2023-01-26

## 2023-01-30 ENCOUNTER — HOSPITAL ENCOUNTER (INPATIENT)
Age: 73
LOS: 7 days | Discharge: HOSPICE | DRG: 291 | End: 2023-02-06
Attending: INTERNAL MEDICINE | Admitting: FAMILY MEDICINE
Payer: MEDICARE

## 2023-01-30 ENCOUNTER — APPOINTMENT (OUTPATIENT)
Dept: GENERAL RADIOLOGY | Age: 73
DRG: 291 | End: 2023-01-30
Attending: NURSE PRACTITIONER
Payer: MEDICARE

## 2023-01-30 ENCOUNTER — APPOINTMENT (OUTPATIENT)
Dept: NON INVASIVE DIAGNOSTICS | Age: 73
DRG: 291 | End: 2023-01-30
Attending: FAMILY MEDICINE
Payer: MEDICARE

## 2023-01-30 ENCOUNTER — TELEPHONE (OUTPATIENT)
Dept: CARDIOLOGY CLINIC | Age: 73
End: 2023-01-30

## 2023-01-30 DIAGNOSIS — Z71.89 DNR (DO NOT RESUSCITATE) DISCUSSION: ICD-10-CM

## 2023-01-30 DIAGNOSIS — Z71.89 GOALS OF CARE, COUNSELING/DISCUSSION: ICD-10-CM

## 2023-01-30 DIAGNOSIS — Z51.5 PALLIATIVE CARE ENCOUNTER: Primary | ICD-10-CM

## 2023-01-30 PROBLEM — I50.23 ACUTE ON CHRONIC HFREF (HEART FAILURE WITH REDUCED EJECTION FRACTION) (HCC): Status: ACTIVE | Noted: 2023-01-30

## 2023-01-30 PROBLEM — I50.9 CHF (CONGESTIVE HEART FAILURE) (HCC): Status: ACTIVE | Noted: 2023-01-30

## 2023-01-30 LAB
ALBUMIN SERPL-MCNC: 3.4 G/DL (ref 3.5–5)
ALBUMIN/GLOB SERPL: 0.7 (ref 1.1–2.2)
ALP SERPL-CCNC: 70 U/L (ref 45–117)
ALT SERPL-CCNC: 24 U/L (ref 12–78)
ANION GAP SERPL CALC-SCNC: 0 MMOL/L (ref 5–15)
AST SERPL-CCNC: ABNORMAL U/L (ref 15–37)
BILIRUB SERPL-MCNC: 2.3 MG/DL (ref 0.2–1)
BNP SERPL-MCNC: 2764 PG/ML
BUN SERPL-MCNC: 31 MG/DL (ref 6–20)
BUN/CREAT SERPL: 17 (ref 12–20)
CALCIUM SERPL-MCNC: 9.2 MG/DL (ref 8.5–10.1)
CHLORIDE SERPL-SCNC: 108 MMOL/L (ref 97–108)
CO2 SERPL-SCNC: 26 MMOL/L (ref 21–32)
CREAT SERPL-MCNC: 1.82 MG/DL (ref 0.7–1.3)
ECHO AO ROOT DIAM: 3.8 CM
ECHO AO ROOT INDEX: 2.09 CM/M2
ECHO LA DIAMETER INDEX: 2.47 CM/M2
ECHO LA DIAMETER: 4.5 CM
ECHO LA TO AORTIC ROOT RATIO: 1.18
ECHO LA VOL 2C: 104 ML (ref 18–58)
ECHO LA VOL 4C: 84 ML (ref 18–58)
ECHO LA VOLUME AREA LENGTH: 106 ML
ECHO LA VOLUME INDEX A2C: 57 ML/M2 (ref 16–34)
ECHO LA VOLUME INDEX A4C: 46 ML/M2 (ref 16–34)
ECHO LA VOLUME INDEX AREA LENGTH: 58 ML/M2 (ref 16–34)
ECHO LV E' LATERAL VELOCITY: 6 CM/S
ECHO LV E' SEPTAL VELOCITY: 4 CM/S
ECHO LV EJECTION FRACTION A2C: 30 %
ECHO LV EJECTION FRACTION A4C: 25 %
ECHO LV FRACTIONAL SHORTENING: 10 % (ref 28–44)
ECHO LV INTERNAL DIMENSION DIASTOLE INDEX: 2.31 CM/M2
ECHO LV INTERNAL DIMENSION DIASTOLIC: 4.2 CM (ref 4.2–5.9)
ECHO LV INTERNAL DIMENSION SYSTOLIC INDEX: 2.09 CM/M2
ECHO LV INTERNAL DIMENSION SYSTOLIC: 3.8 CM
ECHO LV IVSD: 1.5 CM (ref 0.6–1)
ECHO LV MASS 2D: 224.3 G (ref 88–224)
ECHO LV MASS INDEX 2D: 123.3 G/M2 (ref 49–115)
ECHO LV POSTERIOR WALL DIASTOLIC: 1.3 CM (ref 0.6–1)
ECHO LV RELATIVE WALL THICKNESS RATIO: 0.62
ECHO LVOT AREA: 3.1 CM2
ECHO LVOT DIAM: 2 CM
ECHO MV A VELOCITY: 0.37 M/S
ECHO MV AREA PHT: 7 CM2
ECHO MV E DECELERATION TIME (DT): 109.1 MS
ECHO MV E VELOCITY: 0.79 M/S
ECHO MV E/A RATIO: 2.14
ECHO MV E/E' LATERAL: 13.17
ECHO MV E/E' RATIO (AVERAGED): 16.46
ECHO MV E/E' SEPTAL: 19.75
ECHO MV PRESSURE HALF TIME (PHT): 31.6 MS
ECHO RV FREE WALL PEAK S': 3 CM/S
ECHO RV INTERNAL DIMENSION: 4.1 CM
ECHO RV TAPSE: 0.6 CM (ref 1.7–?)
ECHO TV REGURGITANT MAX VELOCITY: 2.05 M/S
ECHO TV REGURGITANT PEAK GRADIENT: 17 MMHG
ERYTHROCYTE [DISTWIDTH] IN BLOOD BY AUTOMATED COUNT: 19.2 % (ref 11.5–14.5)
GLOBULIN SER CALC-MCNC: 4.8 G/DL (ref 2–4)
GLUCOSE BLD STRIP.AUTO-MCNC: 113 MG/DL (ref 65–117)
GLUCOSE BLD STRIP.AUTO-MCNC: 124 MG/DL (ref 65–117)
GLUCOSE SERPL-MCNC: 97 MG/DL (ref 65–100)
HCT VFR BLD AUTO: 37.7 % (ref 36.6–50.3)
HGB BLD-MCNC: 12.2 G/DL (ref 12.1–17)
LACTATE SERPL-SCNC: 1.6 MMOL/L (ref 0.4–2)
MAGNESIUM SERPL-MCNC: NORMAL MG/DL (ref 1.6–2.4)
MCH RBC QN AUTO: 29.2 PG (ref 26–34)
MCHC RBC AUTO-ENTMCNC: 32.4 G/DL (ref 30–36.5)
MCV RBC AUTO: 90.2 FL (ref 80–99)
NRBC # BLD: 0 K/UL (ref 0–0.01)
NRBC BLD-RTO: 0 PER 100 WBC
PLATELET # BLD AUTO: 162 K/UL (ref 150–400)
PMV BLD AUTO: 12 FL (ref 8.9–12.9)
POTASSIUM SERPL-SCNC: ABNORMAL MMOL/L (ref 3.5–5.1)
PROT SERPL-MCNC: 8.2 G/DL (ref 6.4–8.2)
RBC # BLD AUTO: 4.18 M/UL (ref 4.1–5.7)
SERVICE CMNT-IMP: ABNORMAL
SERVICE CMNT-IMP: NORMAL
SODIUM SERPL-SCNC: 134 MMOL/L (ref 136–145)
WBC # BLD AUTO: 4.6 K/UL (ref 4.1–11.1)

## 2023-01-30 PROCEDURE — 74011250636 HC RX REV CODE- 250/636: Performed by: NURSE PRACTITIONER

## 2023-01-30 PROCEDURE — 74011250636 HC RX REV CODE- 250/636: Performed by: FAMILY MEDICINE

## 2023-01-30 PROCEDURE — HZ2ZZZZ DETOXIFICATION SERVICES FOR SUBSTANCE ABUSE TREATMENT: ICD-10-PCS | Performed by: FAMILY MEDICINE

## 2023-01-30 PROCEDURE — 36415 COLL VENOUS BLD VENIPUNCTURE: CPT

## 2023-01-30 PROCEDURE — 74011000250 HC RX REV CODE- 250: Performed by: NURSE PRACTITIONER

## 2023-01-30 PROCEDURE — 74011000250 HC RX REV CODE- 250: Performed by: FAMILY MEDICINE

## 2023-01-30 PROCEDURE — 71045 X-RAY EXAM CHEST 1 VIEW: CPT

## 2023-01-30 PROCEDURE — 80053 COMPREHEN METABOLIC PANEL: CPT

## 2023-01-30 PROCEDURE — 65270000046 HC RM TELEMETRY

## 2023-01-30 PROCEDURE — 82962 GLUCOSE BLOOD TEST: CPT

## 2023-01-30 PROCEDURE — 83605 ASSAY OF LACTIC ACID: CPT

## 2023-01-30 PROCEDURE — 85027 COMPLETE CBC AUTOMATED: CPT

## 2023-01-30 PROCEDURE — 93306 TTE W/DOPPLER COMPLETE: CPT | Performed by: INTERNAL MEDICINE

## 2023-01-30 PROCEDURE — 83735 ASSAY OF MAGNESIUM: CPT

## 2023-01-30 PROCEDURE — 93306 TTE W/DOPPLER COMPLETE: CPT

## 2023-01-30 PROCEDURE — 83880 ASSAY OF NATRIURETIC PEPTIDE: CPT

## 2023-01-30 RX ORDER — ONDANSETRON 4 MG/1
4 TABLET, ORALLY DISINTEGRATING ORAL
Status: DISCONTINUED | OUTPATIENT
Start: 2023-01-30 | End: 2023-02-06 | Stop reason: HOSPADM

## 2023-01-30 RX ORDER — ACETAMINOPHEN 650 MG/1
650 SUPPOSITORY RECTAL
Status: DISCONTINUED | OUTPATIENT
Start: 2023-01-30 | End: 2023-02-06 | Stop reason: HOSPADM

## 2023-01-30 RX ORDER — ONDANSETRON 2 MG/ML
4 INJECTION INTRAMUSCULAR; INTRAVENOUS
Status: DISCONTINUED | OUTPATIENT
Start: 2023-01-30 | End: 2023-02-06 | Stop reason: HOSPADM

## 2023-01-30 RX ORDER — MILRINONE LACTATE 0.2 MG/ML
0.12 INJECTION, SOLUTION INTRAVENOUS CONTINUOUS
Status: DISCONTINUED | OUTPATIENT
Start: 2023-01-30 | End: 2023-02-05

## 2023-01-30 RX ORDER — ACETAMINOPHEN 325 MG/1
650 TABLET ORAL
Status: DISCONTINUED | OUTPATIENT
Start: 2023-01-30 | End: 2023-02-06 | Stop reason: HOSPADM

## 2023-01-30 RX ORDER — SODIUM CHLORIDE 0.9 % (FLUSH) 0.9 %
5-40 SYRINGE (ML) INJECTION AS NEEDED
Status: DISCONTINUED | OUTPATIENT
Start: 2023-01-30 | End: 2023-02-06 | Stop reason: HOSPADM

## 2023-01-30 RX ORDER — SODIUM CHLORIDE 0.9 % (FLUSH) 0.9 %
5-40 SYRINGE (ML) INJECTION EVERY 8 HOURS
Status: DISCONTINUED | OUTPATIENT
Start: 2023-01-30 | End: 2023-02-06 | Stop reason: HOSPADM

## 2023-01-30 RX ORDER — SODIUM CHLORIDE 0.9 % (FLUSH) 0.9 %
5-40 SYRINGE (ML) INJECTION EVERY 8 HOURS
Status: DISCONTINUED | OUTPATIENT
Start: 2023-01-30 | End: 2023-02-03

## 2023-01-30 RX ORDER — IBUPROFEN 200 MG
4 TABLET ORAL AS NEEDED
Status: DISCONTINUED | OUTPATIENT
Start: 2023-01-30 | End: 2023-02-06 | Stop reason: HOSPADM

## 2023-01-30 RX ORDER — LANOLIN ALCOHOL/MO/W.PET/CERES
1000 CREAM (GRAM) TOPICAL DAILY
Status: DISCONTINUED | OUTPATIENT
Start: 2023-01-31 | End: 2023-02-06 | Stop reason: HOSPADM

## 2023-01-30 RX ORDER — SPIRONOLACTONE 25 MG/1
12.5 TABLET ORAL DAILY
Status: DISCONTINUED | OUTPATIENT
Start: 2023-01-31 | End: 2023-02-06 | Stop reason: HOSPADM

## 2023-01-30 RX ORDER — HEPARIN SODIUM 5000 [USP'U]/ML
5000 INJECTION, SOLUTION INTRAVENOUS; SUBCUTANEOUS EVERY 8 HOURS
Status: DISCONTINUED | OUTPATIENT
Start: 2023-01-30 | End: 2023-02-06 | Stop reason: HOSPADM

## 2023-01-30 RX ORDER — FAMOTIDINE 20 MG/1
20 TABLET, FILM COATED ORAL DAILY
Status: DISCONTINUED | OUTPATIENT
Start: 2023-01-31 | End: 2023-02-06 | Stop reason: HOSPADM

## 2023-01-30 RX ORDER — POLYETHYLENE GLYCOL 3350 17 G/17G
17 POWDER, FOR SOLUTION ORAL DAILY PRN
Status: DISCONTINUED | OUTPATIENT
Start: 2023-01-30 | End: 2023-02-06 | Stop reason: HOSPADM

## 2023-01-30 RX ORDER — BUMETANIDE 0.25 MG/ML
1 INJECTION INTRAMUSCULAR; INTRAVENOUS 2 TIMES DAILY
Status: DISCONTINUED | OUTPATIENT
Start: 2023-01-30 | End: 2023-02-01

## 2023-01-30 RX ORDER — SERTRALINE HYDROCHLORIDE 50 MG/1
50 TABLET, FILM COATED ORAL DAILY
Status: DISCONTINUED | OUTPATIENT
Start: 2023-01-31 | End: 2023-02-06 | Stop reason: HOSPADM

## 2023-01-30 RX ORDER — INSULIN LISPRO 100 [IU]/ML
INJECTION, SOLUTION INTRAVENOUS; SUBCUTANEOUS
Status: DISCONTINUED | OUTPATIENT
Start: 2023-01-30 | End: 2023-02-06 | Stop reason: HOSPADM

## 2023-01-30 RX ORDER — MELATONIN
1000 DAILY
Status: DISCONTINUED | OUTPATIENT
Start: 2023-01-31 | End: 2023-02-06 | Stop reason: HOSPADM

## 2023-01-30 RX ORDER — METOPROLOL SUCCINATE 25 MG/1
12.5 TABLET, EXTENDED RELEASE ORAL DAILY
Status: DISCONTINUED | OUTPATIENT
Start: 2023-01-31 | End: 2023-02-06 | Stop reason: HOSPADM

## 2023-01-30 RX ORDER — TAMSULOSIN HYDROCHLORIDE 0.4 MG/1
0.4 CAPSULE ORAL DAILY
Status: DISCONTINUED | OUTPATIENT
Start: 2023-01-31 | End: 2023-02-06 | Stop reason: HOSPADM

## 2023-01-30 RX ORDER — ASPIRIN 81 MG/1
81 TABLET ORAL DAILY
Status: DISCONTINUED | OUTPATIENT
Start: 2023-01-31 | End: 2023-02-06 | Stop reason: HOSPADM

## 2023-01-30 RX ADMIN — SODIUM CHLORIDE, PRESERVATIVE FREE 10 ML: 5 INJECTION INTRAVENOUS at 18:12

## 2023-01-30 RX ADMIN — BUMETANIDE 1 MG: 0.25 INJECTION, SOLUTION INTRAMUSCULAR; INTRAVENOUS at 18:04

## 2023-01-30 RX ADMIN — MILRINONE LACTATE IN DEXTROSE 0.12 MCG/KG/MIN: 200 INJECTION, SOLUTION INTRAVENOUS at 14:04

## 2023-01-30 RX ADMIN — HEPARIN SODIUM 5000 UNITS: 5000 INJECTION INTRAVENOUS; SUBCUTANEOUS at 18:04

## 2023-01-30 NOTE — PROGRESS NOTES
Physical Therapy - defer  Order received, attempted chart review however there were no notes at the time. Will defer PT evaluation at this time and follow.

## 2023-01-30 NOTE — TELEPHONE ENCOUNTER
1/30/22     Approx 9:50 am    Called [facility] center and spoke with sorin. She states that bed has not yet become available. She could not provide timeframe at this time but states they will contact office when in patient bed does become available. Approx 9:53 am    Called patient to provide update on bed status with no answer, will try again later.  Jennifer Hernandez RN

## 2023-01-30 NOTE — PROGRESS NOTES
Patient direct admitted for inotropic assisted diuresis, will start low dose milrinone and pulsed IV bumex.  Please see office note from 1/27 for more information and full note to follow

## 2023-01-30 NOTE — H&P
[address] [facility] Adult  Hospitalist Group  History and Physical    Date of Service:  1/30/2023  Primary Care Provider: Herbert Rojas MD  Source of information: The patient and Chart review    Chief Complaint: No chief complaint on file. History of Presenting Illness:   Renan Moran is a 74 y.o. male who presents with shortness of breath. Patient with atorvastatin, presented to the advanced heart failure clinic yesterday, patient presented with dyspnea on exertion and fatigue, was admitted to [facility] for possible diuresis and inotrope initiation    The patient denies any headache, blurry vision, sore throat, trouble swallowing, trouble with speech, , fever, chills, N/V/D, abd pain, urinary symptoms, constipation, recent travels, sick contacts, focal or generalized neurological symptoms, falls, injuries, rashes, contact with COVID-19 diagnosed patients, hematemesis, melena, hemoptysis, hematuria, rashes, denies starting any new medications and denies any other concerns or problems besides as mentioned above. REVIEW OF SYSTEMS:  A comprehensive review of systems was negative except for that written in the History of Present Illness.      Past Medical History:   Diagnosis Date    Arthritis     Cancer [facility])     Prostate ca    Chronic kidney disease     Stage 3    Chronic pain     Abdoman, back, fingers per daughter    Chronic systolic (congestive) heart failure 11/28/2022    Depression     Diabetes ([facility] [address]     Heart attack ([facility] [address]     Heart failure ([facility] [address] 2020    Dr. Joanie Diana    Hyperlipidemia     Hypertension     Pacemaker 05/01/2020    MED    PE (pulmonary embolism)     Pneumonia     Sleep apnea     not use CPAP      Past Surgical History:   Procedure Laterality Date    COLONOSCOPY N/A 3/19/2021    COLONOSCOPY performed by Douglas Mitchell MD at [facility] ENDOSCOPY    COLONOSCOPY N/A 5/10/2021    COLONOSCOPY performed by Douglas Mitchell MD at [address] [place] [address] SIGMOIDOSCOPY N/A 8/2/2021    FLEXIBLE SIGMOIDOSCOPY performed by Douglas Mitchell MD at [facility] ENDOSCOPY    HX BACK SURGERY  2014    HX CERVICAL LAMINECTOMY      HX CORONARY ARTERY BYPASS GRAFT  04/2021    X3     HX GI  11/2012    bowel resection    HX HEART CATHETERIZATION  04/2020    HX ORTHOPAEDIC      amputated left 4th finger    ME INS NEW/RPLCMT PRM PM W/TRANSV ELTRD ATRIAL&VENT N/A 5/1/2020    INSERT PPM DUAL performed by Nathanael Zamora MD at [facility] CATH LAB    ME UNLISTED PROCEDURE ABDOMEN PERITONEUM & OMENTUM      bowel resection     Prior to Admission medications    Medication Sig Start Date End Date Taking? Authorizing Provider   furosemide (LASIX) 40 mg tablet Take 1 Tablet by mouth daily. Increased 1/18/23, May take an additional tablet once daily as directed by [facility] 1/18/23  Yes Madisyn Azul MD   tafamidis (Vyndamax) 61 mg cap Take 61 mg by mouth daily. 12/14/22  Yes Clem Gomez NP   traZODone (DESYREL) 50 mg tablet TAKE 1 TO 1 & 1/2 (ONE & ONE-HALF) TABLETS BY MOUTH NIGHTLY AS NEEDED  Patient taking differently: Take 50 mg by mouth nightly. 11/18/22  Yes Herbert Rojas MD   spironolactone (ALDACTONE) 25 mg tablet Take 0.5 Tablets by mouth daily. 11/16/22  Yes Angel Azul NP   metoprolol succinate (TOPROL-XL) 25 mg XL tablet Take 1 tablet by mouth once daily  Patient taking differently: Take 12.5 mg by mouth daily. 11/11/22  Yes Anderson BREWSTER NP   sertraline (ZOLOFT) 50 mg tablet Take 1.5 Tablets by mouth daily. Patient taking differently: Take 50 mg by mouth daily. 8/29/22  Yes Herbert Rojas MD   alfuzosin SR (UROXATRAL) 10 mg SR tablet Take 10 mg by mouth daily. 4/27/22  Yes Provider, Historical   coenzyme Q-10 (CO Q-10) 200 mg capsule Take 200 mg by mouth daily.  200 mg-take one pill   Yes Other, MD Camilla   Praluent Pen 75 mg/mL injector pen INJECT 1 ML SUBCUTANEOUSLY  EVERY TWO WEEKS  Patient not taking: Reported on 1/30/2023 11/30/22   Luis Manuel Lakeshia Evans MD   acetaminophen (TYLENOL) 650 mg TbER Take 650 mg by mouth every eight (8) hours as needed for Pain. Take one tablet by mouth every 8 hours for mild to moderate pain 3/10-6/10 10/10/22   Cecelai Brower MD   cholecalciferol (VITAMIN D3) 25 mcg (1,000 unit) cap Take 1,000 Units by mouth daily. Provider, Historical   aspirin delayed-release 81 mg tablet Take 81 mg by mouth daily. Provider, Historical   mv, min #36-iron,carbonyl-FA (Geritol Complete) 16 mg iron- 0.38 mg tab Take 1 Tablet by mouth daily. Patient not taking: Reported on 1/30/2023    Provider, Historical   zinc sulfate (ZINC-220 PO) Take  by mouth. Provider, Historical   turmeric 400 mg cap Take  by mouth. Provider, Historical   vitamin e (E GEMS) 100 unit capsule Take 1 capsule three times daily 3/24/21   Nedra Madison MD   pumpkin seed extract-soy germ 300 mg cap Take 1 Capsule by mouth as needed ([employer]). daily    Provider, Historical   ascorbate calcium (VITAMIN C PO) Take 1 Tablet by mouth daily. 5/11/20   Provider, Historical   cyanocobalamin 1,000 mcg tablet Take 1,000 mcg by mouth daily. Provider, Historical     Allergies   Allergen Reactions    Arb-Angiotensin Receptor Antagonist Other (comments)     High k    Statins-Hmg-Coa Reductase Inhibitors Myalgia    Ace Inhibitors Cough      Family History   Problem Relation Age of Onset    Cancer Mother         unknown    Heart Disease Father     Heart Disease Brother     Anesth Problems Neg Hx       Social History:  reports that he has been smoking cigars. His smokeless tobacco use includes chew. He reports current alcohol use of about 16.0 standard drinks per week. He reports that he does not use drugs.    Social Determinants of Health     Tobacco Use: High Risk    Smoking Tobacco Use: Every Day    Smokeless Tobacco Use: Current    Passive Exposure: Not on file   Alcohol Use: Not on file   Financial Resource Strain: Low Risk     Difficulty of Paying Living Expenses: Not hard at all   Food Insecurity: No Food Insecurity    Worried About Running Out of Food in the Last Year: Never true    Ran Out of Food in the Last Year: Never true   Transportation Needs: Not on file   Physical Activity: Not on file   Stress: Not on file   Social Connections: Not on file   Intimate Partner Violence: Not on file   Depression: At risk    PHQ-2 Score: 3   Housing Stability: Not on file        Medications were reconciled to the best of my ability given all available resources at the time of admission. Route is PO if not otherwise noted. Family and social history were personally reviewed, all pertinent and relevant details are outlined as above.     Objective:   Visit Vitals  /78 (BP 1 Location: Right upper arm, BP Patient Position: At rest)   Pulse 74   Temp 98.9 °F (37.2 °C)   Resp 17   Wt 70.8 kg (156 lb 1.4 oz)   SpO2 99%   BMI 24.45 kg/m²           PHYSICAL EXAM:   General: Awake, no acute distress  HEENT: PEERL, EOMI, moist mucus membranes  Neck: Supple, no JVD, no meningeal signs  Chest: Decreased basal breath sounds  CVS: RRR, S1 S2 heard, no murmurs/rubs/gallops  Abd: Soft, non-tender, non-distended, +bowel sounds   Ext: No clubbing, no cyanosis, no edema  Neuro/Psych: Limited exam, no focal neurological deficits  Cap refill: Brisk, less than 3 seconds  Pulses: 2+, symmetric in all extremities  Skin: Warm, dry, without rashes or lesions    Data Review:   I have independently reviewed and interpreted patient's lab and all other diagnostic data    Abnormal Labs Reviewed   NT-PRO BNP - Abnormal; Notable for the following components:       Result Value    NT pro-BNP 2,764 (*)     All other components within normal limits   METABOLIC PANEL, COMPREHENSIVE - Abnormal; Notable for the following components:    Sodium 134 (*)     Anion gap 0 (*)     BUN 31 (*)     Creatinine 1.82 (*)     eGFR 38 (*)     Bilirubin, total 2.3 (*)     Albumin 3.4 (*)     Globulin 4.8 (*)     A-G Ratio 0.7 (*)     All other components within normal limits   CBC W/O DIFF - Abnormal; Notable for the following components:    RDW 19.2 (*)     All other components within normal limits       All Micro Results       None            IMAGING:   XR CHEST PORT   Final Result   Mild cardiomegaly, otherwise no acute cardiopulmonary findings. ECG/ECHO:    Results for orders placed or performed during the hospital encounter of 04/23/20   EKG, 12 LEAD, INITIAL   Result Value Ref Range    Ventricular Rate 90 BPM    Atrial Rate 90 BPM    P-R Interval 188 ms    QRS Duration 144 ms    Q-T Interval 412 ms    QTC Calculation (Bezet) 504 ms    Calculated P Axis 41 degrees    Calculated R Axis -79 degrees    Calculated T Axis 79 degrees    Diagnosis       Normal sinus rhythm  Left axis deviation  Right bundle branch block  Septal infarct , age undetermined    Confirmed by Darshana Humphreys PJOHAN ([id number]) on 4/24/2020 4:42:38 PM            Notes reviewed from all clinical/nonclinical/nursing services involved in patient's clinical care. Care coordination discussions were held with appropriate clinical/nonclinical/ nursing providers based on care coordination needs. Assessment:   Given the patient's current clinical presentation, there is a high level of concern for decompensation if discharged from the emergency department. Complex decision making was performed, which includes reviewing the patient's available past medical records, laboratory results, and imaging studies.     Acute on chronic HFrEF  Coronary artery disease status post CABG  Hypertension  LUIS FERNANDO  Hyperlipidemia  Diabetes mellitus type 2  Hyperbilirubinemia  Leukopenia  History of prostate cancer  Plan:     Patient will be admitted on a telemetry bed, will be started on milrinone continuous infusion, gentle IV diuresis with Bumex, continue Toprol, continue spironolactone, baby aspirin, strict I's and O's, daily weights, closely monitor volume status, closely monitor renal function further intervention per hospital course  Optimize blood pressure control, monitor, started on inotropes  LUIS FERNANDO likely secondary to poor perfusion, should improve with inotropes, hold nephrotoxic medication, renally dose all other medication, trend creatinine, nephrology consult if persist, monitor  Sliding-scale insulin, Accu-Cheks, diet control, close monitoring    DIET: ADULT DIET Regular; Low Sodium (2 gm)   ISOLATION PRECAUTIONS: There are currently no Active Isolations  CODE STATUS: Full Code   DVT PROPHYLAXIS: Heparin  FUNCTIONAL STATUS PRIOR TO HOSPITALIZATION: Ambulatory and capable of self-care but relies on assistive devices (rolling walker/cane). Ambulatory status/function: Ambulates with assistance:  Walker   EARLY MOBILITY ASSESSMENT: Recommend a consult to the Mobility Team  ANTICIPATED DISCHARGE: Greater than 48 hours. ANTICIPATED DISPOSITION: Home with Home Healthcare    CRITICAL CARE WAS PERFORMED FOR THIS ENCOUNTER: YES. I had a face to face encounter with the patient, reviewed and interpreted patient data including clinical events, labs, images, vital signs, I/O's, and examined patient. I have discussed the case and the plan and management of the patient's care with the consulting services, the bedside nurses and necessary ancillary providers. This patient has a high probability of imminent, clinically significant deterioration, which requires the highest level of preparedness to intervene urgently. I participated in the decision-making and personally managed or directed the management of the following life and organ supporting interventions that required my frequent assessment to treat or prevent imminent deterioration. I personally spent 50 minutes of critical care time. This is time spent at this critically ill patient's bedside actively involved in patient care as well as the coordination of care and discussions with the patient's family.   This does not include any procedural time which has been billed separately. Signed By: Ata Singh MD     January 30, 2023         Please note that this dictation may have been completed with Dragon, the computer voice recognition software. Quite often unanticipated grammatical, syntax, homophones, and other interpretive errors are inadvertently transcribed by the computer software. Please disregard these errors. Please excuse any errors that have escaped final proofreading.

## 2023-01-31 ENCOUNTER — APPOINTMENT (OUTPATIENT)
Dept: VASCULAR SURGERY | Age: 73
DRG: 291 | End: 2023-01-31
Attending: INTERNAL MEDICINE
Payer: MEDICARE

## 2023-01-31 ENCOUNTER — HOME HEALTH ADMISSION (OUTPATIENT)
Dept: HOME HEALTH SERVICES | Facility: HOME HEALTH | Age: 73
End: 2023-01-31

## 2023-01-31 LAB
ALBUMIN SERPL-MCNC: 3.2 G/DL (ref 3.5–5)
ALBUMIN/GLOB SERPL: 0.8 (ref 1.1–2.2)
ALP SERPL-CCNC: 62 U/L (ref 45–117)
ALT SERPL-CCNC: 19 U/L (ref 12–78)
ANION GAP SERPL CALC-SCNC: 5 MMOL/L (ref 5–15)
AST SERPL-CCNC: 31 U/L (ref 15–37)
ATRIAL RATE: 84 BPM
BILIRUB SERPL-MCNC: 2.5 MG/DL (ref 0.2–1)
BNP SERPL-MCNC: 2973 PG/ML
BUN SERPL-MCNC: 31 MG/DL (ref 6–20)
BUN/CREAT SERPL: 18 (ref 12–20)
CALCIUM SERPL-MCNC: 8.9 MG/DL (ref 8.5–10.1)
CALCULATED P AXIS, ECG09: -10 DEGREES
CALCULATED R AXIS, ECG10: -77 DEGREES
CALCULATED T AXIS, ECG11: 88 DEGREES
CHLORIDE SERPL-SCNC: 108 MMOL/L (ref 97–108)
CO2 SERPL-SCNC: 24 MMOL/L (ref 21–32)
COMMENT, HOLDF: NORMAL
CREAT SERPL-MCNC: 1.74 MG/DL (ref 0.7–1.3)
DIAGNOSIS, 93000: NORMAL
EST. AVERAGE GLUCOSE BLD GHB EST-MCNC: 100 MG/DL
GLOBULIN SER CALC-MCNC: 3.9 G/DL (ref 2–4)
GLUCOSE BLD STRIP.AUTO-MCNC: 101 MG/DL (ref 65–117)
GLUCOSE BLD STRIP.AUTO-MCNC: 112 MG/DL (ref 65–117)
GLUCOSE BLD STRIP.AUTO-MCNC: 86 MG/DL (ref 65–117)
GLUCOSE BLD STRIP.AUTO-MCNC: 98 MG/DL (ref 65–117)
GLUCOSE SERPL-MCNC: 87 MG/DL (ref 65–100)
HBA1C MFR BLD: 5.1 % (ref 4–5.6)
MAGNESIUM SERPL-MCNC: 2.4 MG/DL (ref 1.6–2.4)
P-R INTERVAL, ECG05: 128 MS
POTASSIUM SERPL-SCNC: 4.3 MMOL/L (ref 3.5–5.1)
PROT SERPL-MCNC: 7.1 G/DL (ref 6.4–8.2)
Q-T INTERVAL, ECG07: 482 MS
QRS DURATION, ECG06: 180 MS
QTC CALCULATION (BEZET), ECG08: 569 MS
SAMPLES BEING HELD,HOLD: NORMAL
SERVICE CMNT-IMP: NORMAL
SODIUM SERPL-SCNC: 137 MMOL/L (ref 136–145)
TROPONIN-HIGH SENSITIVITY: 159 NG/L (ref 0–76)
TROPONIN-HIGH SENSITIVITY: 178 NG/L (ref 0–76)
VENTRICULAR RATE, ECG03: 84 BPM

## 2023-01-31 PROCEDURE — 97116 GAIT TRAINING THERAPY: CPT

## 2023-01-31 PROCEDURE — 74011000250 HC RX REV CODE- 250: Performed by: FAMILY MEDICINE

## 2023-01-31 PROCEDURE — 65660000001 HC RM ICU INTERMED STEPDOWN

## 2023-01-31 PROCEDURE — 83036 HEMOGLOBIN GLYCOSYLATED A1C: CPT

## 2023-01-31 PROCEDURE — 99223 1ST HOSP IP/OBS HIGH 75: CPT | Performed by: NURSE PRACTITIONER

## 2023-01-31 PROCEDURE — 93970 EXTREMITY STUDY: CPT

## 2023-01-31 PROCEDURE — 83735 ASSAY OF MAGNESIUM: CPT

## 2023-01-31 PROCEDURE — 97165 OT EVAL LOW COMPLEX 30 MIN: CPT

## 2023-01-31 PROCEDURE — 97161 PT EVAL LOW COMPLEX 20 MIN: CPT

## 2023-01-31 PROCEDURE — 80053 COMPREHEN METABOLIC PANEL: CPT

## 2023-01-31 PROCEDURE — 83880 ASSAY OF NATRIURETIC PEPTIDE: CPT

## 2023-01-31 PROCEDURE — 82962 GLUCOSE BLOOD TEST: CPT

## 2023-01-31 PROCEDURE — 74011250636 HC RX REV CODE- 250/636: Performed by: NURSE PRACTITIONER

## 2023-01-31 PROCEDURE — 74011250637 HC RX REV CODE- 250/637: Performed by: NURSE PRACTITIONER

## 2023-01-31 PROCEDURE — 97535 SELF CARE MNGMENT TRAINING: CPT

## 2023-01-31 PROCEDURE — 93005 ELECTROCARDIOGRAM TRACING: CPT

## 2023-01-31 PROCEDURE — 84484 ASSAY OF TROPONIN QUANT: CPT

## 2023-01-31 PROCEDURE — 74011250636 HC RX REV CODE- 250/636: Performed by: FAMILY MEDICINE

## 2023-01-31 PROCEDURE — 74011000250 HC RX REV CODE- 250: Performed by: NURSE PRACTITIONER

## 2023-01-31 PROCEDURE — 36415 COLL VENOUS BLD VENIPUNCTURE: CPT

## 2023-01-31 RX ORDER — LORAZEPAM 2 MG/1
2 TABLET ORAL
Status: DISCONTINUED | OUTPATIENT
Start: 2023-01-31 | End: 2023-02-06 | Stop reason: HOSPADM

## 2023-01-31 RX ORDER — LORAZEPAM 2 MG/1
4 TABLET ORAL
Status: DISCONTINUED | OUTPATIENT
Start: 2023-01-31 | End: 2023-02-06 | Stop reason: HOSPADM

## 2023-01-31 RX ORDER — LORAZEPAM 2 MG/1
2 TABLET ORAL
Status: ACTIVE | OUTPATIENT
Start: 2023-01-31 | End: 2023-01-31

## 2023-01-31 RX ADMIN — SODIUM CHLORIDE, PRESERVATIVE FREE 10 ML: 5 INJECTION INTRAVENOUS at 00:00

## 2023-01-31 RX ADMIN — ASPIRIN 81 MG: 81 TABLET, COATED ORAL at 10:32

## 2023-01-31 RX ADMIN — BUMETANIDE 1 MG: 0.25 INJECTION, SOLUTION INTRAMUSCULAR; INTRAVENOUS at 19:06

## 2023-01-31 RX ADMIN — HEPARIN SODIUM 5000 UNITS: 5000 INJECTION INTRAVENOUS; SUBCUTANEOUS at 02:18

## 2023-01-31 RX ADMIN — HEPARIN SODIUM 5000 UNITS: 5000 INJECTION INTRAVENOUS; SUBCUTANEOUS at 16:00

## 2023-01-31 RX ADMIN — Medication 1000 UNITS: at 10:34

## 2023-01-31 RX ADMIN — SODIUM CHLORIDE, PRESERVATIVE FREE 10 ML: 5 INJECTION INTRAVENOUS at 19:05

## 2023-01-31 RX ADMIN — CYANOCOBALAMIN TAB 500 MCG 1000 MCG: 500 TAB at 10:32

## 2023-01-31 RX ADMIN — MILRINONE LACTATE IN DEXTROSE 0.12 MCG/KG/MIN: 200 INJECTION, SOLUTION INTRAVENOUS at 21:14

## 2023-01-31 RX ADMIN — BUMETANIDE 1 MG: 0.25 INJECTION, SOLUTION INTRAMUSCULAR; INTRAVENOUS at 10:31

## 2023-01-31 RX ADMIN — SODIUM CHLORIDE, PRESERVATIVE FREE 10 ML: 5 INJECTION INTRAVENOUS at 21:15

## 2023-01-31 RX ADMIN — METOPROLOL SUCCINATE 12.5 MG: 25 TABLET, EXTENDED RELEASE ORAL at 10:33

## 2023-01-31 RX ADMIN — FAMOTIDINE 20 MG: 20 TABLET, FILM COATED ORAL at 10:34

## 2023-01-31 RX ADMIN — SODIUM CHLORIDE, PRESERVATIVE FREE 10 ML: 5 INJECTION INTRAVENOUS at 06:00

## 2023-01-31 RX ADMIN — SPIRONOLACTONE 12.5 MG: 25 TABLET ORAL at 10:33

## 2023-01-31 RX ADMIN — SERTRALINE HYDROCHLORIDE 50 MG: 50 TABLET ORAL at 10:33

## 2023-01-31 RX ADMIN — TAMSULOSIN HYDROCHLORIDE 0.4 MG: 0.4 CAPSULE ORAL at 10:33

## 2023-01-31 NOTE — CARDIO/PULMONARY
Cardiac Rehab: Consult received and chart reviewed.  Echo on 1/30/2023 reports EF 55-60% therefore patient does not meet the following criteria and is not a candidate for cardiac rehab.  EF ?35% at time of enrollment  Stable class 2-4 NYHA heart failure  Optimal medical therapy for > 6 weeks  No major hospitalizations within the last 6 weeks  No major hospitalizations within the next 6 weeks  Betsey Storey RN

## 2023-01-31 NOTE — PROGRESS NOTES
Charting and patient care of Rosalia Yee by Ina Mccarty, RN from [id number] to [id number] was supervised and reviewed by this RN. Problem: Falls - Risk of  Goal: *Absence of Falls  Description: Document Rodriguez Pancoast Fall Risk and appropriate interventions in the flowsheet.   Outcome: Progressing Towards Goal  Note: Fall Risk Interventions:                                Problem: Patient Education: Go to Patient Education Activity  Goal: Patient/Family Education  Outcome: Progressing Towards Goal     Problem: Patient Education: Go to Patient Education Activity  Goal: Patient/Family Education  Outcome: Progressing Towards Goal

## 2023-01-31 NOTE — CONSULTS
[facility] in [place], [place]  Inpatient Progress Note      Patient name: Thomas Scott  Patient : 1950  Patient MRN: [id number]  Consulting MD: Stewart Pena MD  Date of service: 23      REASON FOR REFERRAL:  Management of chronic diastolic heart failure      PLAN OF CARE   74 y.o. male with h/o diastolic > systolic heart failure, HFpEF, LVEF 55-60%(echo 23), stage D, NYHA class IV symptoms, c/b renal and hepatic failure, GDMT limited due to hypotension  Cardiac MRI revealed infiltrative cardiac amyloidosis, PYP was strongly suggestive of ATTR amyloidosis and genetic testing positive for inherited ATTR  RHC with severely elevated filling pressures and normal resting index  Patient agreed for admission for inotrope-assisted diuresis; possible initiation of inotropes, palliative care meeting to address code status, limits of care   Patient declined admission from clinic , but agreed to come Monday, 23  Note: Family will require cascade genetic testing; they will reach out to [facility]  Pt reporting heavy, regular alcohol use and concern for withdrawal- will likely need CIWA protocol          RECOMMENDATIONS:  Continue low-dose inotrope milrinone 0.125mcg/kg/min to augment diuresis  Plan to start vyndamax 61mg daily as outpatient, once patient assistance processed  Continue current dose of toprol XL 12.5mg daily  Known intolerance to lisinopril/ARB; not recommended in amyloid  Continue spironolactone 12.5mg daily  Cannot tolerate SGLT2i due to CrCl  Diuretic: continue bumex 1mg IV BID ; goal net -1 to -2L next 24 hrs  Continue ASA   Pt has declined sleep study at this time  Monitor HF labs: CMP, NT-Pro BNP, Mg daily while diuresing  Heart failure education  Will need education on alcohol cessation    Remainder of care per hospitalist        IMPRESSION:  Fluid overload  Chronic diastolic heart failure, HFpEF  Stage C, NYHA class IIIA symptoms  Non-ischemic cardiomyopathy, LVEF 55-60%  Severe LVH 2.1cm  cMRI suggestive of infiltrative cardiac amyloidosis  Intolerance to lisinopril  Inherited ATTR cardiac amyloidosis  Coronary artery disease  S/p CABG x 3 (4/2020) LIMA to LAD, SVG to RCA and Ramus  CHF s/p PM - pacemaker dependent  RBBB 144ms  Cardiac risk factors  HTN  HL  DM2  Liver dysfunction, TBili 2.8  CKD, stage 3  AT3 activity low  Proteic C low  Lupus anticoagulatn abnormal  Leukopenia  H/o prostate cancer  Elevated D Dimer  Hx DVT/PE: developed PE/DVT after bowel resection in 2012  History of rectal bleeding due to an ulcer related to radiation   Depressive symptoms  Alcohol Abuse      CARDIAC IMAGING AND DIAGNOSTICS REVIEWED:  Echo (1/30/23)    Echo (8/2022)    Left Ventricle: Low normal left ventricular systolic function. EF by 2D Simpsons Biplane is 50%. Left ventricle size is normal. Severely increased wall thickness. Findings consistent with severe concentric hypertrophy. There is a speckled appearance to the left ventricular myocardium. Consider testing for cardiac amyloidosis including possible cardiac MRI. Noted that the patient's Medtronic device is MRI compatible. Normal wall motion consistent with paced rhythm    Left Atrium: Left atrium is severely dilated. Left atrial volume index is severely increased (>48 mL/m2). Right Atrium: Right atrium is moderately dilated. Mitral Valve: Mild to moderate regurgitation. Tricuspid Valve: Moderate regurgitation. Mildly elevated RVSP. The estimated RVSP is 38 mmHg. Cardiac MRI (9/2022)  1. Normal left ventricular cavity size. Severe concentric left ventricular  hypertrophy. Significantly hypertrophied septum measuring 21 mm. There is no  left ventricular outflow tract obstruction. Moderate left ventricular systolic  dysfunction. Moderate global hypokinesis with slight regional variation. 3-D  LVEF 43%.   2. Normal right ventricular size with minimal right ventricular systolic  dysfunction. 3-D RVEF 47%. 3. Moderate 2+ tricuspid regurgitation. 4. On EGE and LGE study, there is mild diffuse myocardial enhancement with  slightly poor nulling of the myocardium on postcontrast images. These findings  may suggest possibility of infiltrative cardiac amyloidosis. There is no  myocardial scar. There is no features of recent or old myocardial infarction. There is no features of infiltrative sarcoidosis. There is no features of  inflammatory myocarditis. All myocardial walls are viable. 5. Normal pericardium without any significant pericardial effusion. 6. Small right-sided pleural effusion and tiny left-sided pleural effusion. Lexiscan Myoview May 2021 with normal perfusion, calculated LVEF of 37%     RHC (1/18/23)  Moderately to severely elevated right and left-sided filling pressures. Moderate pulmonary hypertension of predominantly postcapillary origin. Right heart waveforms are likely consistent with restrictive physiology. Preserved cardiac output and index. RA 21, PCWP 25; CI 2.94      LIFE GOALS:  Lifestyle goals discussed with the patient. BRIEF HISTORY OF PRESENT ILLNESS:  Janece Tyler is a 74 y.o. male with extensive history as noted above. Pt was referred to [facility] for further evaluation and treatment of cardiac amyloidosis. He was found to have significantly elevated filling pressures on RHC and was admitted for inotrope assisted diuresis. Now on admission, found to be heavy alcohol drinker and concerns for withdrawals. INTERVAL HISTORY:      REVIEW OF SYSTEMS:  Review of Systems   Constitutional:  Positive for malaise/fatigue. Negative for chills and fever. Respiratory:  Positive for shortness of breath. Cardiovascular:  Positive for leg swelling. Negative for chest pain and palpitations. Gastrointestinal:  Negative for heartburn, nausea and vomiting. Neurological:  Negative for dizziness and weakness.    Psychiatric/Behavioral:  Positive for substance abuse. The patient is nervous/anxious. PHYSICAL EXAM:  Visit Vitals  /72 (BP 1 Location: Left upper arm, BP Patient Position: Standing)   Pulse 85   Temp 97.6 °F (36.4 °C)   Resp 14   Ht 5' 7\" (1.702 m)   Wt 150 lb 2.1 oz (68.1 kg)   SpO2 100%   BMI 23.51 kg/m²         Physical Exam  Vitals and nursing note reviewed. Constitutional:       Appearance: Normal appearance. Neck:      Vascular: Hepatojugular reflux and JVD present. Cardiovascular:      Rate and Rhythm: Normal rate and regular rhythm. Pulmonary:      Effort: Pulmonary effort is normal. No respiratory distress. Breath sounds: Normal breath sounds. Abdominal:      General: Bowel sounds are normal. There is no distension. Palpations: Abdomen is soft. Musculoskeletal:      Right lower le+ Pitting Edema present. Left lower le+ Pitting Edema present. Skin:     General: Skin is warm and dry. Capillary Refill: Capillary refill takes less than 2 seconds. Neurological:      General: No focal deficit present. Mental Status: He is alert and oriented to person, place, and time.             PAST MEDICAL HISTORY:  Past Medical History:   Diagnosis Date    Arthritis     Cancer [facility])     Prostate ca    Chronic kidney disease     Stage 3    Chronic pain     Abdoman, back, fingers per daughter    Chronic systolic (congestive) heart failure 2022    Depression     Diabetes ([address]     Heart attack ([address]     Heart failure ([address]     Dr. Cj Olivas    Hyperlipidemia     Hypertension     Pacemaker 2020    MED    PE (pulmonary embolism)     Pneumonia     Sleep apnea     not use CPAP       PAST SURGICAL HISTORY:  Past Surgical History:   Procedure Laterality Date    COLONOSCOPY N/A 3/19/2021    COLONOSCOPY performed by Genaro Oneil MD at [facility] ENDOSCOPY    COLONOSCOPY N/A 5/10/2021    COLONOSCOPY performed by Genaro Oneil MD at [facility] N/A 2021 FLEXIBLE SIGMOIDOSCOPY performed by [facility]uro S[facility] ENDOSCOPY    HX BACK SURGERY  2014    HX CERVICAL LAMINECTOMY      HX CORONARY ARTERY BYPASS GRAFT  04/2021    X3     HX GI  11/2012    bowel resection    HX HEART CATHETERIZATION  04/2020    HX ORTHOPAEDIC      amputated left 4th finger    TX INS NEW/RPLCMT PRM PM W/TRANSV ELTRD ATRIAL&VENT N/A 5/1/2020    INSERT PPM DUAL performed by Concetta Black MD at [address] CATH LAB    TX UNLISTED PROCEDURE ABDOMEN PERITONEUM & OMENTUM      bowel resection       FAMILY HISTORY:  Family History   Problem Relation Age of Onset    Cancer Mother         unknown    Heart Disease Father     Heart Disease Brother     Anesth Problems Neg Hx        SOCIAL HISTORY:  Social History     Socioeconomic History    Marital status:     Number of children: 1    Years of education: 12    Highest education level: High school graduate   Occupational History    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[employer], 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    Tobacco Use    Smoking status: Every Day     Types: Cigars    Smokeless tobacco: Current     Types: Chew    Tobacco comments:     Chews tobacco every day; cigars and black and milds    Vaping Use    Vaping Use: Never used   Substance and Sexual Activity    Alcohol use:  Yes     Alcohol/week: 16.0 standard drinks     Types: 2 Glasses of wine, 14 Cans of beer per week     Comment: 2-4 beers daily, sometimes alcohol    Drug use: Never    Sexual activity: Not Currently   Other Topics Concern     Service No    Blood Transfusions No    Caffeine Concern No    Occupational Exposure No    Hobby Hazards No    Sleep Concern Yes    Stress Concern Yes    Weight Concern No    Special Diet No    Back Care Yes    Exercise No    Seat Belt Yes   Social History Narrative    ** Merged History Encounter **         [facility] in [facility] Determinants of Health     Financial Resource Strain: Low Risk     Difficulty of Paying Living Expenses: Not hard at all   Food Insecurity: No Food Insecurity    Worried About [address] [place] [address] in the Last Year: Never true    Ran Out of Food in the Last Year: Never true       LABORATORY RESULTS:     Labs Latest Ref Rng & Units 1/31/2023 1/30/2023 1/4/2023   WBC 4.1 - 11.1 K/uL - 4.6 3.9   RBC 4.10 - 5.70 M/uL - 4.18 4.09(L)   Hemoglobin 12.1 - 17.0 g/dL - 12.2 12. 1(L)   Hematocrit 36.6 - 50.3 % - 37.7 37.9   MCV 80.0 - 99.0 FL - 90.2 93   Platelets 170 - 817 K/uL - 162 157   Albumin 3.5 - 5.0 g/dL 3.2(L) 3.4(L) 4.2   Calcium 8.5 - 10.1 MG/DL 8.9 9.2 9.3   Glucose 65 - 100 mg/dL 87 97 66(L)   BUN 6 - 20 MG/DL 31(H) 31(H) 28(H)   Creatinine 0.70 - 1.30 MG/DL 1.74(H) 1.82(H) 1.68(H)   Sodium 136 - 145 mmol/L 137 134(L) 139   Potassium 3.5 - 5.1 mmol/L 4.3 HEMOLYZED,RECOLLECT REQUESTED 5.1   Some recent data might be hidden     Lab Results   Component Value Date/Time    TSH 4.380 10/12/2022 10:36 AM    TSH 2.88 08/29/2022 11:23 AM    TSH 2.26 05/04/2022 01:21 PM    TSH 3.680 06/16/2021 10:28 AM    TSH 3.450 03/10/2021 10:13 AM    TSH 2.070 08/19/2020 09:31 AM    TSH 1.98 04/25/2020 01:42 AM    TSH 2.400 01/09/2020 11:36 AM    TSH 2.220 09/18/2019 09:15 AM    TSH 2.360 06/25/2019 09:23 AM    TSH 2.900 09/17/2018 09:20 AM    TSH 2.100 05/09/2017 01:58 PM    TSH 2.320 04/12/2016 01:34 PM    TSH 2.070 06/03/2015 02:04 PM    TSH 2.71 11/28/2012 02:15 PM       ALLERGY:  Allergies   Allergen Reactions    Arb-Angiotensin Receptor Antagonist Other (comments)     High k    Statins-Hmg-Coa Reductase Inhibitors Myalgia    Ace Inhibitors Cough        CURRENT MEDICATIONS:    Current Facility-Administered Medications:     tamsulosin (FLOMAX) capsule 0.4 mg, 0.4 mg, Oral, DAILY, Patricia, Sanjana B, NP    aspirin delayed-release tablet 81 mg, 81 mg, Oral, DAILY, Patricia, Sanjana B, NP    cholecalciferol (VITAMIN D3) (1000 Units /25 mcg) tablet 1,000 Units, 1,000 Units, Oral, DAILY, Patricia, Sanjana B, NP    cyanocobalamin (VITAMIN B12) tablet 1,000 mcg, 1,000 mcg, Oral, DAILY, Patricia, Sanjana B, NP    metoprolol succinate (TOPROL-XL) XL tablet 12.5 mg, 12.5 mg, Oral, DAILY, Patricia, Sanjana B, NP    sertraline (ZOLOFT) tablet 50 mg, 50 mg, Oral, DAILY, Patricia, Sanjana B, NP    spironolactone (ALDACTONE) tablet 12.5 mg, 12.5 mg, Oral, DAILY, Patricia, Sanjana B, NP    sodium chloride (NS) flush 5-40 mL, 5-40 mL, IntraVENous, Q8H, Patricia, Sanjana B, NP, 10 mL at 01/31/23 0600    sodium chloride (NS) flush 5-40 mL, 5-40 mL, IntraVENous, PRN, Patricia, Sanjana B, NP    acetaminophen (TYLENOL) tablet 650 mg, 650 mg, Oral, Q6H PRN **OR** acetaminophen (TYLENOL) suppository 650 mg, 650 mg, Rectal, Q6H PRN, Patricia, Sanjana B, NP    polyethylene glycol (MIRALAX) packet 17 g, 17 g, Oral, DAILY PRN, Patricia, Sanjana B, NP    ondansetron (ZOFRAN ODT) tablet 4 mg, 4 mg, Oral, Q8H PRN **OR** ondansetron (ZOFRAN) injection 4 mg, 4 mg, IntraVENous, Q6H PRN, Patricia, Sanjana B, NP    famotidine (PEPCID) tablet 20 mg, 20 mg, Oral, DAILY, Patricia, Sanjana B, NP    milrinone (PRIMACOR) 20 MG/100 ML D5W infusion, 0.125 mcg/kg/min, IntraVENous, CONTINUOUS, Patricia, Sanjana B, NP, Last Rate: 2.7 mL/hr at 01/30/23 2106, 0.125 mcg/kg/min at 01/30/23 2106    bumetanide (BUMEX) injection 1 mg, 1 mg, IntraVENous, BID, Patricia, Sanjana B, NP, 1 mg at 01/30/23 1804    sodium chloride (NS) flush 5-40 mL, 5-40 mL, IntraVENous, Q8H, Nilson Marin MD, 10 mL at 01/31/23 0000    sodium chloride (NS) flush 5-40 mL, 5-40 mL, IntraVENous, PRN, Wilfred Abarca MD    glucose chewable tablet 16 g, 4 Tablet, Oral, PRN, Nilson Millard MD    glucagon (GLUCAGEN) injection 1 mg, 1 mg, IntraMUSCular, PRN, Nilson Marin MD    dextrose 10 % infusion 0-250 mL, 0-250 mL, IntraVENous, PRN, Wilfred Abarca MD    heparin (porcine) injection 5,000 Units, 5,000 Units, SubCUTAneous, Q8H, Eliz Marin MD, 5,000 Units at 01/31/23 0218    insulin lispro (HUMALOG) injection, , SubCUTAneous, AC&HS, Des Gordon MD    PATIENT CARE TEAM:  Patient Care Team:  Mckay Lutz MD as PCP - General (Internal Medicine Physician)  Mckay Lutz MD as PCP - [facility]  Merlinda Sabin, MD as Physician (Cardiovascular Disease Physician)  Kashif Stahl MD as Physician (Nephrology)  Ursula Marino MD (Neurosurgery)  Velvet Alex MD (Ophthalmology)  Betina Marino MD (Otolaryngology)  Andrew Schultz MD (Urology)  Geoffrey Ramos MD (Ophthalmology)  Brian Garcia DPM (Podiatry)  Milan Gonzalez, PHD (Psychology)  Mazin Jaime MD (Cardiothoracic Surgery)  Ramesh De La Vega MD (Cardiovascular Disease Physician)  Evelin Zapien MD (Cardiovascular Disease Physician)  Oscar Dailey MD as Physician (Nephrology)  Jenn Flores MD (Gastroenterology)  Jim Mendez MD (Radiation Oncology)  Le Yin MD as Physician (Nephrology)  Iliana Servin, MARIYA as Nurse Practitioner (Nurse Practitioner)  Evelin Zapien MD (Cardiovascular Disease Physician)  Velvet Alex MD (Ophthalmology)     Thank you for allowing us to participate in this patient's care. Arlin Francisco, MSN, [address], [address]  Phone: [phone]  Fax: [phone]

## 2023-01-31 NOTE — NURSE NAVIGATOR
HEART FAILURE NURSE NAVIGATOR NOTE  [address] [facility]    Patient chart was reviewed by Heart Failure (HF) Nurse Navigators for compliance of prescribed treatment with guidelines directed medical therapy (GDMT) and HF database completed. Please, review beneath recommendations for symptomatic patients with HF with Preserved Ejection Fraction ? 50% (HFpEF) for your consideration when taking care of this patient. Current HF Medical Therapy:      Name Juan GAY 1950   LVEF 55/60    NYHA Functional Class    ARNi/ACEi/ARB    Aldosterone Antagonist Aldactone   SGLT2 inhibitor    Consulting Cardiologist [facility]     Recommendations for HF Management:    For patients with HFpEF ? 50%, consider adding the following GDMT, if appropriate:  SGLT2 inhibitor [Class 2a]  ARNi or ARB [Class 2b]  Aldosterone antagonist [Class 2b]  Adjust antihypertensive therapy [Class 1]  Adjust diuretic dose at discharge if hospitalized for volume overload [Class 1]  For patient with hyperkalemia while on RAASi > 5.5, consider adding potassium binders (patiromer, sodium zirconium cycosilicate) [Class 2a]    Patients with suspected cardiac amyloidosis (older > 61years old with LVH > 1.2cm and/or any other signs of amyloidosis) should be offered screening labs and imaging [Class 1]: (a) serum gammopathy profile and UPEP with immunofixation, and (b) PYP test. If PYP test is positive patient should have genetic testing done for inherited ATTR amyloidosis. If any findings are positive or you need genetic testing ordered, please, consider in-patient consultation or referral to [address] [place] [address]. Note that the following medications may be potentially harmful in heart failure [Class 3].   Calcium channel blockers (doxazosin, diltiazem, verapamil, nifedipine)  Antiarrhythmics (flecanide, disopyrimide, sotalol, dronedarone)  Diabetes medications (thiasolidinediones, saxagliptin, alogliptin)  NSAIDs and MARTINEZ 2 inhibitors    Consider vaccinations for respiratory illnesses (flu, pneumonia, covid) [Class 2b]      Patient Heart Failure Education:     Teach back in heart failure education provided, including information about medical therapy, lifestyle modifications, diet and fluid restrictions, physical activity. Educational resources provided: Living with Heart Failure booklet; Signs/Symptoms magnet; Weight Calendar; Dispatch Health flyer; Preparing for Successful Discharge check list.  Information provided about HF support group. Heart failure avoiding triggers on discharge instructions. Plan for Transitional Care:    Post discharge follow up phone call to be made within 48-72 hours of discharge. Patient will follow-up with PCP. Patient will follow-up with Primary Cardiologist.  Patient screened for obstructive sleep apnea and referred to Sleep Medicine. Referral/follow-up with Cardiac Rehabilitation. Referral/follow-up with Advanced Heart Failure Specialist.  Referral/follow-up with Palliative Care Specialist.        Heart Failure Nurse Navigator  Phone: [phone] / [phone]    *Recommendations listed above are based on the guidelines: 2022 AHA/ACC/HFSA Guideline for the Management of Heart Failure: A Report of the [address] on Clinical Practice Guidelines. Circulation 2022; [id number]. and 2017 AHA/ACC/HRS guideline for management of patients with ventricular arrhythmias and the prevention of sudden cardiac death: A Report of the [employer] of Cardiology/American Heart Association Task Force on Clinical Practice Guidelines and the Heart Rhythm Society.  Heart Rhythm, Vol 15, No 10, October 2018 *Class of Recommendation: Class 1 (strong), Class 2a (moderate), Class 2b (weak), Class 3 (not recommended, potentially harmful)

## 2023-01-31 NOTE — DISCHARGE INSTRUCTIONS
Download the Heart Failure Bland Gera: Search in your [employer] (Android) or [employer] Gera Store ([employer]): Search for- HF Bland Gera.    [email or web address]    HF Bland is a brand-new phone gera that helps you track daily symptoms, vitals, mood, energy level and more. You can even add your heart failure care team members to view your data and monitor your condition at home.     HF Bland lets you:  Track symptoms, medications and more  Share health information with your health care team  Connect with others living with heart failure

## 2023-01-31 NOTE — PROGRESS NOTES
Problem: Mobility Impaired (Adult and Pediatric)  Goal: *Acute Goals and Plan of Care (Insert Text)  Description: FUNCTIONAL STATUS PRIOR TO ADMISSION: Patient was modified independent using a single point cane, rolling walker or no device for functional mobility, denies falls. HOME SUPPORT PRIOR TO ADMISSION: The patient lived alone with his daughters available to provide assistance if needed (transportation, cooking, cleaning). Physical Therapy Goals  Initiated 1/31/2023  1. Patient will move from supine to sit and sit to supine  in bed with modified independence within 7 day(s). 2.  Patient will transfer from bed to chair and chair to bed with modified independence using the least restrictive device within 7 day(s). 3.  Patient will perform sit to stand with modified independence within 7 day(s). 4.  Patient will ambulate with modified independence for 300 feet with the least restrictive device within 7 day(s). 5.  Patient will ascend/descend  3 stairs with handrail(s) with modified independence within 7 day(s). Outcome: Not Met     PHYSICAL THERAPY EVALUATION  Patient: Leonora Huerta (10 y.o. male)  Date: 1/31/2023  Primary Diagnosis: Acute on chronic HFrEF (heart failure with reduced ejection fraction) ([facility]) [I50.23]  CHF [facility]congestive heart failure) [facility] [I50.9]       Precautions:   Fall    ASSESSMENT  Based on the objective data described below, the patient presents with mildly impaired functional mobility s/p direct admission with acute on chronic heart failure. His mobility is limited by impaired dynamic stand balance (relies on AD at baseline), generalized weakness, ROLON, and decreased activity tolerance. Received pt sitting on the side of the bed with two daughters in the room. Noted his gait was unsteady when using the cane during OT session earlier today. Pt agreed to gait training with the RW.   He demonstrates improvement in gait quality and balance when relying on the RW as compared with the cane. RW left in the room for pt to use with staff assist.  Therapy will follow. Anticipate home with HHPT vs none pending progress when discharged. Current Level of Function Impacting Discharge (mobility/balance): CGA ambulating with the cane and transfers. Improved to SBA with the RW. Functional Outcome Measure: The patient scored 50/100 on the Barthel outcome measure which is indicative of 50% impairment in functional tasks and mobility. Other factors to consider for discharge: lives alone, supportive daughters available to assist     Patient will benefit from skilled therapy intervention to address the above noted impairments. PLAN :  Recommendations and Planned Interventions: bed mobility training, transfer training, gait training, therapeutic exercises, neuromuscular re-education, edema management/control, patient and family training/education, and therapeutic activities      Frequency/Duration: Patient will be followed by physical therapy:  3 times a week to address goals. Recommendation for discharge: (in order for the patient to meet his/her long term goals)  To be determined: Likely home with HHPT vs none    This discharge recommendation:  Has not yet been discussed the attending provider and/or case management    IF patient discharges home will need the following DME: patient owns DME required for discharge         SUBJECTIVE:   Patient stated I need a beer.     OBJECTIVE DATA SUMMARY:   HISTORY:    Past Medical History:   Diagnosis Date    Arthritis     Cancer ([facility]     Prostate ca    Chronic kidney disease     Stage 3    Chronic pain     Abdoman, back, fingers per daughter    Chronic systolic (congestive) heart failure 11/28/2022    Depression     Diabetes ([address]     Heart attack ([facility]     Heart failure ([facility] 2020    Dr. Ángel Randolph    Hyperlipidemia     Hypertension     Pacemaker 05/01/2020    MED    PE (pulmonary embolism)     Pneumonia     Sleep apnea     not use CPAP Past Surgical History:   Procedure Laterality Date    COLONOSCOPY N/A 3/19/2021    COLONOSCOPY performed by Guy West MD at [facility] ENDOSCOPY    COLONOSCOPY N/A 5/10/2021    [facility] N/A 8/2/2021    FLEXIBLE SIGMOIDOSCOPY performed by Guy West MD at [facility] ENDOSCOPY    HX BACK SURGERY  2014    HX CERVICAL LAMINECTOMY      HX CORONARY ARTERY BYPASS GRAFT  04/2021    X3     HX GI  11/2012    bowel resection    HX HEART CATHETERIZATION  04/2020    HX ORTHOPAEDIC      amputated left 4th finger    [place] INS NEW/RPLCMT PRM PM W/TRANSV ELTRD ATRIAL&VENT N/A 5/1/2020    INSERT PPM DUAL [place] [address] [address] CATH LAB    [place] UNLISTED PROCEDURE ABDOMEN PERITONEUM & OMENTUM      bowel resection       Home Situation  Home Environment: Private residence  One/Two Story Residence: One story  Living Alone: Yes  Support Systems: Child(anca)  Patient Expects to be Discharged to[de-identified] Home with home health  Current DME Used/Available at Home: Cane, straight, Walker, rolling, Grab bars (safety frame around commode; handheld shower head)  Tub or Shower Type: Tub/Shower combination    EXAMINATION/PRESENTATION/DECISION MAKING:   Critical Behavior:  Neurologic State: Alert  Orientation Level: Oriented to person, Oriented to place, Oriented to situation  Cognition: Appropriate decision making, Appropriate for age attention/concentration, Appropriate safety awareness, Follows commands  Safety/Judgement: Awareness of environment  Hearing:   Auditory  Auditory Impairment: Hard of hearing, bilateral    Edema: LEs (Rt leg > Lt)  Range Of Motion:  AROM: Generally decreased, functional           PROM: Within functional limits           Strength:    Strength: Generally decreased, functional                    Tone & Sensation:   Tone: Normal                              Coordination:  Coordination: Generally decreased, functional  Vision: Acuity: Within Defined Limits  Functional Mobility:  Bed Mobility: (as observed by OT earlier today)  Rolling: Supervision  Supine to Sit: Supervision  Scooting: Supervision  Transfers:  Sit to Stand: Contact guard assistance (hoists up on cane)  Stand to Sit: Contact guard assistance (hands remained on AD)  Instructed in appropriate transfer technique to decrease fall risk. Cues provided to correct hand placement w/ pt correcting. Balance:   Sitting: Intact; Without support  Standing: Impaired; Without support  Standing - Static: Good;Constant support  Standing - Dynamic : Good;Constant support  Ambulation/Gait Training:  Distance (ft): 200 Feet (ft) (100 ft x2)  Assistive Device: Gait belt;Cane, straight;Walker, rolling  Ambulation - Level of Assistance: Contact guard assistance;Assist x1 (w/ SPC; improved to SBA w/ RW)     Gait Description (WDL): Exceptions to WDL  Gait Abnormalities: Decreased step clearance        Base of Support: Widened     Speed/Lois: Slow  Step Length: Right shortened;Left shortened      Educated pt in his fall risk with hosp lines and to call for staff assist before attempting to get up. Educated in benefit of RW as compared with the cane (improved balance, strides). Recommended using the RW w/ staff assist for daily walks in the [place]. Pt voiced understanding. Functional Measure:  Barthel Index:    Bathin  Bladder: 5  Bowels: 10  Groomin  Dressin  Feeding: 10  Mobility: 0  Stairs: 0  Toilet Use: 5  Transfer (Bed to Chair and Back): 10  Total: 50/100       The Barthel ADL Index: Guidelines  1. The index should be used as a record of what a patient does, not as a record of what a patient could do. 2. The main aim is to establish degree of independence from any help, physical or verbal, however minor and for whatever reason. 3. The need for supervision renders the patient not independent.   4. A patient's performance should be established using the best available evidence. Asking the patient, friends/relatives and nurses are the usual sources, but direct observation and common sense are also important. However direct testing is not needed. 5. Usually the patient's performance over the preceding 24-48 hours is important, but occasionally longer periods will be relevant. 6. Middle categories imply that the patient supplies over 50 per cent of the effort. 7. Use of aids to be independent is allowed. Score Interpretation (from [facility])    Independent   60-79 Minimally independent   40-59 Partially dependent   20-39 Very dependent   <20 Totally dependent     -Gerald Yao., Barthel, D.W. (1965). Functional evaluation: the Barthel Index. [address] [facility] ([address] (1997). The Barthel activities of daily living index: self-reporting versus actual performance in the old (> or = 75 years). Journal of [address] [place] [address] 45(7), 14 [facility], SAMANTHA, Lucy Kevin., Sunday Anna. (1999). Measuring the change in disability after inpatient rehabilitation; comparison of the responsiveness of the Barthel Index and Functional Davis Measure. Journal of Neurology, Neurosurgery, and Psychiatry, 66(4), 223-243. Randell Cuellar, N.J.A, SYEDA Delarosa, & Joshua Tirado, M.A. (2004) Assessment of post-stroke quality of life in cost-effectiveness studies: The usefulness of the Barthel Index and the EuroQoL-5D.  Quality of Life Research, 15, 289-88            Physical Therapy Evaluation Charge Determination   History Examination Presentation Decision-Making   LOW Complexity : Zero comorbidities / personal factors that will impact the outcome / POC LOW Complexity : 1-2 Standardized tests and measures addressing body structure, function, activity limitation and / or participation in recreation  LOW Complexity : Stable, uncomplicated  LOW Complexity : FOTO score of       Based on the above components, the patient evaluation is determined to be of the following complexity level: LOW     Pain Rating:  None indicated    Activity Tolerance:   Fair and requires rest breaks    After treatment patient left in no apparent distress:   Sitting edge of bed, Call bell within reach, and Caregiver / family present    COMMUNICATION/EDUCATION:   The patients plan of care was discussed with: Occupational therapist.     Fall prevention education was provided and the patient/caregiver indicated understanding., Patient/family have participated as able in goal setting and plan of care. , and Patient/family agree to work toward stated goals and plan of care.     Thank you for this referral.  Colleen Mcpherson, PT   Time Calculation: 26 mins

## 2023-01-31 NOTE — PROGRESS NOTES
Problem: Self Care Deficits Care Plan (Adult)  Goal: *Acute Goals and Plan of Care (Insert Text)  Description: FUNCTIONAL STATUS PRIOR TO ADMISSION: Patient was modified independent using a walker and single point cane PRN, at times ambulating w/o AD, for functional mobility. Patient denies hx falls. Is retired marine. Patient enjoys gardening and painting in his free time. HOME SUPPORT: The patient lived alone with daughter to provide assistance. Reports daughter assists with transportation (patient does not drive), cooking/cleaning, and other IADLs as needed. Occupational Therapy Goals  Initiated 1/31/2023  1. Patient will perform upper body dressing with independence within 7 day(s). 2.  Patient will perform lower body dressing with independence within 7 day(s). 3.  Patient will perform simple home management with supervision/set-up within 7 day(s). 4.  Patient will perform toilet transfers with independence within 7 day(s). 5.  Patient will perform all aspects of toileting with independence within 7 day(s). 6.  Patient will participate in upper extremity therapeutic exercise/activities with independence for 5 minutes within 7 day(s). 7.  Patient will utilize energy conservation techniques during functional activities with verbal cues within 7 day(s). Outcome: Progressing Towards Goal     OCCUPATIONAL THERAPY EVALUATION  Patient: Owen Patel (47 y.o. male)  Date: 1/31/2023  Primary Diagnosis: Acute on chronic HFrEF (heart failure with reduced ejection fraction) ([facility]) [I50.23]  [facility]est[facility] [I50.9]       Precautions:   Fall    ASSESSMENT  Based on the objective data described below, the patient presents with decreased balance, generalized weakness, limited endurance, and slowed pacing during ADL tasks following admission for dyspnea on exertion and fatigue from advanced heart failure clinic. Patient cleared for activity by RN, on RA and agreeable to OT evaluation. Patient able to complete bedroom/bathroom mobility using SPC with CGA. Observed slow pacing and hesitancy when performing mobility, patient observed to be grasping onto grab bar/bedroom furniture to steady. Patient tolerated standing at sink for brief grooming tasks however returned to sitting in chair as patient fatiguing quickly. Patients vitals stable with activity, set up with breakfast tray at conclusion of session. Anticipate patient will require no more than HHOT at DC, reports good family assist from daughter. 01/31/23 0853 01/31/23 0855 01/31/23 0858   Vital Signs   Pulse (Heart Rate) 85  --   --    BP (!) 100/56 119/71 127/72   MAP (Calculated) 71 87 90   BP 1 Location Left upper arm Left upper arm Left upper arm   BP 1 Method Automatic Automatic Automatic   BP Patient Position At rest Sitting Standing   O2 Sat (%) 100 %  --   --    O2 Device None (Room air)  --   --        Current Level of Function Impacting Discharge (ADLs/self-care):   Feeding: Independent    Oral Facial Hygiene/Grooming: Independent    Bathing: Stand-by assistance         Upper Body Dressing: Stand-by assistance    Lower Body Dressing: Minimum assistance    Toileting: Stand by assistance             Functional Outcome Measure: The patient scored Total: 50/100 on the Barthel Index outcome measure which is indicative of being partially dependent in basic self-care. Other factors to consider for discharge: below baseline, lives alone, from advanced heart failure clinic      Patient will benefit from skilled therapy intervention to address the above noted impairments.        PLAN :  Recommendations and Planned Interventions: self care training, functional mobility training, therapeutic exercise, balance training, therapeutic activities, endurance activities, patient education, home safety training, and family training/education    Frequency/Duration: Patient will be followed by occupational therapy 5 times a week to address goals. Recommendation for discharge: (in order for the patient to meet his/her long term goals)  Occupational therapy at least 2 days/week in the home AND ensure assist and/or supervision for safety with IADLs    This discharge recommendation:  Has been made in collaboration with the attending provider and/or case management    IF patient discharges home will need the following DME: patient owns DME required for discharge       SUBJECTIVE:   Patient stated I wish I had some moonshine.     OBJECTIVE DATA SUMMARY:   HISTORY:   Past Medical History:   Diagnosis Date    Arthritis     Cancer ([facility]     Prostate ca    Chronic kidney disease     Stage 3    Chronic pain     Abdoman, back, fingers per daughter    Chronic systolic (congestive) heart failure 11/28/2022    Depression     Diabetes ([facility]     Heart attack ([facility]     Heart failure ([facility] 2020    Dr. Eratso Ralph    Hyperlipidemia     Hypertension     Pacemaker 05/01/2020    MED    PE (pulmonary embolism)     Pneumonia     Sleep apnea     not use CPAP     Past Surgical History:   Procedure Laterality Date    COLONOSCOPY N/A 3/19/2021    COLONOSCOPY performed by Sammy Sales MD at [facility] ENDOSCOPY    COLONOSCOPY N/A 5/10/2021    COLONOSCOPY performed by Sammy Sales MD at [facility] N/A 8/2/2021    FLEXIBLE SIGMOIDOSCOPY performed by Sammy Sales MD at [facility] ENDOSCOPY    HX BACK SURGERY  2014    HX CERVICAL LAMINECTOMY      HX CORONARY ARTERY BYPASS GRAFT  04/2021    X3     HX GI  11/2012    bowel resection    HX HEART CATHETERIZATION  04/2020    HX ORTHOPAEDIC      amputated left 4th finger    [place] INS NEW/RPLCMT PRM PM W/TRANSV ELTRD ATRIAL&VENT N/A 5/1/2020    INSERT PPM DUAL performed by Izabel[place] eFlix [place] [address], [facility] CATH LAB    [place] UNLISTED PROCEDURE ABDOMEN PERITONEUM & OMENTUM      bowel resection       Expanded or extensive additional review of patient history:     Home Situation  Home Environment: Private residence  One/Two Story Residence: One story  Living Alone: Yes  Support Systems: Child(anca) (daughter assists with driving, grocery shoppping, IADLs)  Patient Expects to be Discharged to[de-identified] Home with home health  Current DME Used/Available at Home: Cane, straight, Walker, rolling, Grab bars (safety frame around commode; handheld shower head)  Tub or Shower Type: Tub/Shower combination    Hand dominance: Right    EXAMINATION OF PERFORMANCE DEFICITS:  Cognitive/Behavioral Status:  Neurologic State: Alert  Orientation Level: Disoriented to time;Oriented to person;Oriented to place;Oriented to situation (unable to recall day/month, orientated to 2023)  Cognition: Appropriate decision making  Perception: Appears intact  Perseveration: No perseveration noted  Safety/Judgement: Awareness of environment    Hearing: Auditory  Auditory Impairment: Hard of hearing, bilateral    Vision/Perceptual:                           Acuity: Within Defined Limits         Range of Motion:    AROM: Generally decreased, functional  PROM: Within functional limits                      Strength:    Strength: Generally decreased, functional                Coordination:  Coordination: Generally decreased, functional  Fine Motor Skills-Upper: Left Intact; Right Intact    Gross Motor Skills-Upper: Left Intact; Right Intact    Tone & Sensation:    Tone: Normal                         Balance:  Sitting: Intact  Standing: Impaired  Standing - Static: Good  Standing - Dynamic : Fair;Constant support    Functional Mobility and Transfers for ADLs:  Bed Mobility:  Rolling: Supervision  Supine to Sit: Supervision  Scooting: Supervision    Transfers:  Sit to Stand: Contact guard assistance  Stand to Sit: Contact guard assistance  Bed to Chair: Contact guard assistance  Bathroom Mobility: Contact guard assistance  Assistive Device : Cane, straight    ADL Assessment:  Feeding: Independent    Oral Facial Hygiene/Grooming: Independent    Bathing: Stand-by assistance         Upper Body Dressing: Stand-by assistance    Lower Body Dressing: Minimum assistance    Toileting: Stand by assistance                ADL Intervention and task modifications:       Grooming  Grooming Assistance: Supervision  Position Performed: Standing (at sink)  Washing Face: Supervision  Washing Hands: Supervision                             Toileting  Toileting Assistance: Stand-by assistance    Cognitive Retraining  Safety/Judgement: Awareness of environment  Functional Measure:    Barthel Index:  Bathin  Bladder: 5  Bowels: 10  Groomin  Dressin  Feeding: 10  Mobility: 0  Stairs: 0  Toilet Use: 5  Transfer (Bed to Chair and Back): 10  Total: 50/100      The Barthel ADL Index: Guidelines  1. The index should be used as a record of what a patient does, not as a record of what a patient could do. 2. The main aim is to establish degree of independence from any help, physical or verbal, however minor and for whatever reason. 3. The need for supervision renders the patient not independent. 4. A patient's performance should be established using the best available evidence. Asking the patient, friends/relatives and nurses are the usual sources, but direct observation and common sense are also important. However direct testing is not needed. 5. Usually the patient's performance over the preceding 24-48 hours is important, but occasionally longer periods will be relevant. 6. Middle categories imply that the patient supplies over 50 per cent of the effort. 7. Use of aids to be independent is allowed. Score Interpretation (from [address] [facility] 83)    Independent   60-79 Minimally independent   40-59 Partially dependent   20-39 Very dependent   <20 Totally dependent     -Gerald Yao., Barthel, D.W. (1965). Functional evaluation: the Barthel Index. [address] ([address] (1997).  The Barthel activities of daily living index: self-reporting versus actual performance in the old (> or = 75 years). Journal of [facility] 45(1), 14 [facility], CHRISTOPHERF, Monique Pinzon., Joana Burdick. (1999). Measuring the change in disability after inpatient rehabilitation; comparison of the responsiveness of the Barthel Index and Functional [place] Measure. Journal of Neurology, Neurosurgery, and Psychiatry, 66(4), 337-315. DARNELL Bill, SYEDA Dlearosa, & Carl Valdez M.A. (2004) Assessment of post-stroke quality of life in cost-effectiveness studies: The usefulness of the Barthel Index and the EuroQoL-5D. Quality of Life Research, 15, 860-60     Occupational Therapy Evaluation Charge Determination   History Examination Decision-Making   LOW Complexity : Brief history review  LOW Complexity : 1-3 performance deficits relating to physical, cognitive , or psychosocial skils that result in activity limitations and / or participation restrictions  LOW Complexity : No comorbidities that affect functional and no verbal or physical assistance needed to complete eval tasks       Based on the above components, the patient evaluation is determined to be of the following complexity level: LOW   Pain Rating:  Pt did not endorse pain during session. Activity Tolerance:   Fair and requires frequent rest breaks    After treatment patient left in no apparent distress:    Sitting in chair, Call bell within reach, and Bed / chair alarm activated    COMMUNICATION/EDUCATION:   The patients plan of care was discussed with: Physical therapist, Occupational therapist, and Registered nurse. Patient/family have participated as able in goal setting and plan of care. This patients plan of care is appropriate for delegation to [facility].     Thank you for this referral.  Denita Heck OT  Time Calculation: 32 mins

## 2023-01-31 NOTE — PROGRESS NOTES
*SMAART E DC*    Reason for Admission:   Shortness of breath, history of advanced heart failure. CABG 4/28/2020. RUR Score:    15% Moderate              PCP: First and Last name:   Susan Bernstein MD     Name of Practice: [facility]   Are you a current patient: Yes/No: Yes   Approximate date of last visit: 11/28/22   Can you participate in a virtual visit if needed: NA    Do you (patient/family) have any concerns for transition/discharge? None              Plan for utilizing home health:   TBD-history with [id number] following CABG    Current Advanced Directive/Advance Care Plan:  Full Code      Healthcare Decision Maker:   Click here to complete [address] including selection of the Healthcare Decision Maker Relationship (ie \"Primary\")            Primary Decision Maker: Yareli Mathur - Daughter - [phone]    Primary Decision Maker: Mary Rowe - Daughter - [phone]    Transition of Care Plan:   CM met with patient to inform of CM role and to assess needs. Patient lives at home with alone in a one level home with three steps to enter. Patient uses a cane and walker. He reports independence but has the support for four daughters who all live nearby. Patient's daughter Cecy Guevara is primary caregiver. Preferred pharmacy is [facility] to monitor and assist with transitional care planning needs. Atif Cochran MS       12:45 Home Health order pending with [facility]. Atif Cochran MS    12:53 [id number] has accepted patient. AVS updated.    Atif Cochran MS

## 2023-01-31 NOTE — PROGRESS NOTES
Spiritual Care Partner Volunteer visited patient at [facility] in [facility] 4 CV SERVICES UNIT on 2023   Documented by:   Chaplain Gallegos MDiv, MS, [facility]

## 2023-01-31 NOTE — PROGRESS NOTES
[address] [facility]                                                                                          Hospitalist Progress Note  Brown Mcadams MD  Answering service: [phone] from in house phone        Date of Service:  2023  NAME:  Leroy Steward  :  1950  MRN:  [id number]      Admission Summary:   Leroy Steward is a 74 y.o. male who presents with shortness of breath. Patient with atorvastatin, presented to the advanced heart failure clinic yesterday, patient presented with dyspnea on exertion and fatigue, was admitted to [facility] for possible diuresis and inotrope initiation        Interval history / Subjective:   Patient is a poor historian. I asked him if he is short of breath, instead of answering, he asks me, \"Am I?\". He is not in any resp distress, on RA, sitting in a chair. Both legs are edematous, R>L, patient denies any history of DVT, but thinks he had a PE many years ago. Negative fluid balance of over 1 liters overnight. Assessment & Plan:        Acute on chronic HFrEF:  - keep on Telemetry;  - appreciate Cardio/AHF team consult and recommendations;   - IV diuresis: on Bumex drip, and Milrinone drip;   - daily weights;  - strict in's and out's;   - continue cardiac medications;   - B LE edema, R>L, check Doppler;     Coronary artery disease, status post CABG:  - continue Aspirin, BB;     Hypertension:  - continue home antihypertensives;     CKD Stage IIIB:  - monitor renal indices as patient is being diuresed; Hyperlipidemia:   - continue statin; Hyperbilirubinemia:  - trend; History of prostate cancer; Alcoholism:  - patient is a heavy drinker;   - initiate CIWA protocol; Code status: Full   Prophylaxis: Heparin SC;   Care Plan discussed with: patient, RN, AHF team;   Anticipated Disposition: home in 2-3 days;           Hospital Problems  Date Reviewed: 2022            Codes Class Noted POA Acute on chronic HFrEF (heart failure with reduced ejection fraction) ([facility]) ICD-10-CM: I50.23  ICD-9-CM: 428.23  1/30/2023 Unknown        CHF (congestive heart failure) [facility] ICD-10-CM: I50.9  ICD-9-CM: 428.0  1/30/2023 Unknown             Review of Systems:   A comprehensive review of systems was negative except for that written in the HPI. Vital Signs:    Last 24hrs VS reviewed since prior progress note. Most recent are:  Visit Vitals  /61 (BP 1 Location: Left upper arm, BP Patient Position: Sitting)   Pulse (!) 101   Temp 97.5 °F (36.4 °C)   Resp 13   Ht 5' 7\" (1.702 m)   Wt 68.1 kg (150 lb 2.1 oz)   SpO2 99%   BMI 23.51 kg/m²         Intake/Output Summary (Last 24 hours) at 1/31/2023 1329  Last data filed at 1/31/2023 0855  Gross per 24 hour   Intake 488.43 ml   Output 1675 ml   Net -1186.57 ml        Physical Examination:     I had a face to face encounter with this patient and independently examined them on 1/31/2023 as outlined below:          Constitutional:  No acute distress, cooperative, pleasant    ENT:  Oral mucosa moist, oropharynx benign. Resp:  CTA bilaterally. No wheezing/rhonchi/rales. No accessory muscle use. CV:  Regular rhythm, normal rate, no murmurs, gallops, rubs    GI:  Soft, non distended, non tender. normoactive bowel sounds, no hepatosplenomegaly     Musculoskeletal:  No edema, warm, 2+ pulses throughout    Neurologic:  Moves all extremities.   AAOx3, CN II-XII reviewed            Data Review:    Review and/or order of clinical lab test  Review and/or order of tests in the radiology section of CPT  Review and/or order of tests in the medicine section of CPT      Labs:     Recent Labs     01/30/23  1342   WBC 4.6   HGB 12.2   HCT 37.7        Recent Labs     01/31/23  0217 01/30/23  1342    134*   K 4.3 HEMOLYZED,RECOLLECT REQUESTED    108   CO2 24 26   BUN 31* 31*   CREA 1.74* 1.82*   GLU 87 97   CA 8.9 9.2   MG 2.4 HEMOLYZED,RECOLLECT REQUESTED Recent Labs     01/31/23  0217 01/30/23  1342   ALT 19 24   AP 62 70   TBILI 2.5* 2.3*   TP 7.1 8.2   ALB 3.2* 3.4*   GLOB 3.9 4.8*     No results for input(s): INR, PTP, APTT, INREXT in the last 72 hours. No results for input(s): FE, TIBC, PSAT, FERR in the last 72 hours. Lab Results   Component Value Date/Time    Folate 12.0 01/21/2013 07:55 AM      No results for input(s): PH, PCO2, PO2 in the last 72 hours. No results for input(s): CPK, CKNDX, TROIQ in the last 72 hours.     No lab exists for component: CPKMB  Lab Results   Component Value Date/Time    Cholesterol, total 74 (L) 01/04/2023 10:55 AM    HDL Cholesterol 41 01/04/2023 10:55 AM    LDL, calculated 20 01/04/2023 10:55 AM    LDL, calculated 64.4 08/29/2022 11:23 AM    Triglyceride 49 01/04/2023 10:55 AM    CHOL/HDL Ratio 2.4 08/29/2022 11:23 AM     Lab Results   Component Value Date/Time    Glucose (POC) 98 01/31/2023 11:14 AM    Glucose (POC) 86 01/31/2023 07:39 AM    Glucose (POC) 124 (H) 01/30/2023 09:16 PM    Glucose (POC) 113 01/30/2023 04:12 PM    Glucose (POC) 79 01/18/2023 11:05 AM     Lab Results   Component Value Date/Time    Color YELLOW/STRAW 02/27/2021 09:14 PM    Appearance CLEAR 02/27/2021 09:14 PM    Specific gravity 1.022 02/27/2021 09:14 PM    Specific gravity 1.010 12/12/2012 03:15 PM    pH (UA) 5.0 02/27/2021 09:14 PM    Protein 100 (A) 02/27/2021 09:14 PM    Glucose Negative 02/27/2021 09:14 PM    Ketone TRACE (A) 02/27/2021 09:14 PM    Bilirubin Negative 02/27/2021 09:14 PM    Urobilinogen 0.2 02/27/2021 09:14 PM    Nitrites Negative 02/27/2021 09:14 PM    Leukocyte Esterase Negative 02/27/2021 09:14 PM    Epithelial cells FEW 02/27/2021 09:14 PM    Bacteria Negative 02/27/2021 09:14 PM    WBC 0-4 02/27/2021 09:14 PM    RBC 0-5 02/27/2021 09:14 PM         Medications Reviewed:     Current Facility-Administered Medications   Medication Dose Route Frequency    tamsulosin (FLOMAX) capsule 0.4 mg  0.4 mg Oral DAILY    aspirin delayed-release tablet 81 mg  81 mg Oral DAILY    cholecalciferol (VITAMIN D3) (1000 Units /25 mcg) tablet 1,000 Units  1,000 Units Oral DAILY    cyanocobalamin (VITAMIN B12) tablet 1,000 mcg  1,000 mcg Oral DAILY    metoprolol succinate (TOPROL-XL) XL tablet 12.5 mg  12.5 mg Oral DAILY    sertraline (ZOLOFT) tablet 50 mg  50 mg Oral DAILY    spironolactone (ALDACTONE) tablet 12.5 mg  12.5 mg Oral DAILY    sodium chloride (NS) flush 5-40 mL  5-40 mL IntraVENous Q8H    sodium chloride (NS) flush 5-40 mL  5-40 mL IntraVENous PRN    acetaminophen (TYLENOL) tablet 650 mg  650 mg Oral Q6H PRN    Or    acetaminophen (TYLENOL) suppository 650 mg  650 mg Rectal Q6H PRN    polyethylene glycol (MIRALAX) packet 17 g  17 g Oral DAILY PRN    ondansetron (ZOFRAN ODT) tablet 4 mg  4 mg Oral Q8H PRN    Or    ondansetron (ZOFRAN) injection 4 mg  4 mg IntraVENous Q6H PRN    famotidine (PEPCID) tablet 20 mg  20 mg Oral DAILY    milrinone (PRIMACOR) 20 MG/100 ML D5W infusion  0.125 mcg/kg/min IntraVENous CONTINUOUS    bumetanide (BUMEX) injection 1 mg  1 mg IntraVENous BID    sodium chloride (NS) flush 5-40 mL  5-40 mL IntraVENous Q8H    sodium chloride (NS) flush 5-40 mL  5-40 mL IntraVENous PRN    glucose chewable tablet 16 g  4 Tablet Oral PRN    glucagon (GLUCAGEN) injection 1 mg  1 mg IntraMUSCular PRN    dextrose 10 % infusion 0-250 mL  0-250 mL IntraVENous PRN    heparin (porcine) injection 5,000 Units  5,000 Units SubCUTAneous Q8H    insulin lispro (HUMALOG) injection   SubCUTAneous AC&HS     ______________________________________________________________________  EXPECTED LENGTH OF STAY: 3d 21h  ACTUAL LENGTH OF STAY:          1                 Humble Brown MD

## 2023-01-31 NOTE — PROGRESS NOTES
Patient/family seen: YES       Informed patient/family of BPCI-A Bundle Program. Also advised of potential outreach by Care Transitions Team.    Bundle Payment Care Improvement Beneficiary Letter Delivered to Beneficiary or Representative:YES.  Date BCPI -A was given:01/31/23

## 2023-02-01 LAB
ALBUMIN SERPL-MCNC: 3.1 G/DL (ref 3.5–5)
ALBUMIN/GLOB SERPL: 0.8 (ref 1.1–2.2)
ALP SERPL-CCNC: 59 U/L (ref 45–117)
ALT SERPL-CCNC: 21 U/L (ref 12–78)
AMMONIA PLAS-SCNC: 124 UMOL/L
ANION GAP SERPL CALC-SCNC: 6 MMOL/L (ref 5–15)
AST SERPL-CCNC: 50 U/L (ref 15–37)
BILIRUB SERPL-MCNC: 2.6 MG/DL (ref 0.2–1)
BNP SERPL-MCNC: 2853 PG/ML
BUN SERPL-MCNC: 29 MG/DL (ref 6–20)
BUN/CREAT SERPL: 16 (ref 12–20)
CALCIUM SERPL-MCNC: 8.8 MG/DL (ref 8.5–10.1)
CHLORIDE SERPL-SCNC: 103 MMOL/L (ref 97–108)
CO2 SERPL-SCNC: 26 MMOL/L (ref 21–32)
CREAT SERPL-MCNC: 1.83 MG/DL (ref 0.7–1.3)
ERYTHROCYTE [DISTWIDTH] IN BLOOD BY AUTOMATED COUNT: 18.4 % (ref 11.5–14.5)
GLOBULIN SER CALC-MCNC: 3.9 G/DL (ref 2–4)
GLUCOSE BLD STRIP.AUTO-MCNC: 123 MG/DL (ref 65–117)
GLUCOSE BLD STRIP.AUTO-MCNC: 148 MG/DL (ref 65–117)
GLUCOSE BLD STRIP.AUTO-MCNC: 68 MG/DL (ref 65–117)
GLUCOSE BLD STRIP.AUTO-MCNC: 99 MG/DL (ref 65–117)
GLUCOSE SERPL-MCNC: 87 MG/DL (ref 65–100)
HCT VFR BLD AUTO: 33.2 % (ref 36.6–50.3)
HGB BLD-MCNC: 11.1 G/DL (ref 12.1–17)
MAGNESIUM SERPL-MCNC: 2.3 MG/DL (ref 1.6–2.4)
MCH RBC QN AUTO: 29.2 PG (ref 26–34)
MCHC RBC AUTO-ENTMCNC: 33.4 G/DL (ref 30–36.5)
MCV RBC AUTO: 87.4 FL (ref 80–99)
NRBC # BLD: 0 K/UL (ref 0–0.01)
NRBC BLD-RTO: 0 PER 100 WBC
PHOSPHATE SERPL-MCNC: 4.2 MG/DL (ref 2.6–4.7)
PLATELET # BLD AUTO: 168 K/UL (ref 150–400)
PMV BLD AUTO: 12.7 FL (ref 8.9–12.9)
POTASSIUM SERPL-SCNC: 5 MMOL/L (ref 3.5–5.1)
PROT SERPL-MCNC: 7 G/DL (ref 6.4–8.2)
RBC # BLD AUTO: 3.8 M/UL (ref 4.1–5.7)
SERVICE CMNT-IMP: ABNORMAL
SERVICE CMNT-IMP: ABNORMAL
SERVICE CMNT-IMP: NORMAL
SERVICE CMNT-IMP: NORMAL
SODIUM SERPL-SCNC: 135 MMOL/L (ref 136–145)
WBC # BLD AUTO: 3 K/UL (ref 4.1–11.1)

## 2023-02-01 PROCEDURE — 84100 ASSAY OF PHOSPHORUS: CPT

## 2023-02-01 PROCEDURE — 36415 COLL VENOUS BLD VENIPUNCTURE: CPT

## 2023-02-01 PROCEDURE — 83735 ASSAY OF MAGNESIUM: CPT

## 2023-02-01 PROCEDURE — 99233 SBSQ HOSP IP/OBS HIGH 50: CPT | Performed by: NURSE PRACTITIONER

## 2023-02-01 PROCEDURE — 97535 SELF CARE MNGMENT TRAINING: CPT

## 2023-02-01 PROCEDURE — 82140 ASSAY OF AMMONIA: CPT

## 2023-02-01 PROCEDURE — 97530 THERAPEUTIC ACTIVITIES: CPT

## 2023-02-01 PROCEDURE — 97116 GAIT TRAINING THERAPY: CPT

## 2023-02-01 PROCEDURE — 65660000001 HC RM ICU INTERMED STEPDOWN

## 2023-02-01 PROCEDURE — 80053 COMPREHEN METABOLIC PANEL: CPT

## 2023-02-01 PROCEDURE — 74011250636 HC RX REV CODE- 250/636: Performed by: FAMILY MEDICINE

## 2023-02-01 PROCEDURE — 85027 COMPLETE CBC AUTOMATED: CPT

## 2023-02-01 PROCEDURE — 82962 GLUCOSE BLOOD TEST: CPT

## 2023-02-01 PROCEDURE — 74011250637 HC RX REV CODE- 250/637: Performed by: NURSE PRACTITIONER

## 2023-02-01 PROCEDURE — 83880 ASSAY OF NATRIURETIC PEPTIDE: CPT

## 2023-02-01 PROCEDURE — 74011000250 HC RX REV CODE- 250: Performed by: NURSE PRACTITIONER

## 2023-02-01 PROCEDURE — 74011000250 HC RX REV CODE- 250: Performed by: FAMILY MEDICINE

## 2023-02-01 RX ORDER — BUMETANIDE 0.25 MG/ML
2 INJECTION INTRAMUSCULAR; INTRAVENOUS 2 TIMES DAILY
Status: DISCONTINUED | OUTPATIENT
Start: 2023-02-01 | End: 2023-02-02

## 2023-02-01 RX ORDER — ALBUMIN HUMAN 50 G/1000ML
25 SOLUTION INTRAVENOUS
Status: ACTIVE | OUTPATIENT
Start: 2023-02-01 | End: 2023-02-02

## 2023-02-01 RX ADMIN — SODIUM CHLORIDE, PRESERVATIVE FREE 10 ML: 5 INJECTION INTRAVENOUS at 07:11

## 2023-02-01 RX ADMIN — METOPROLOL SUCCINATE 12.5 MG: 25 TABLET, EXTENDED RELEASE ORAL at 11:38

## 2023-02-01 RX ADMIN — TAMSULOSIN HYDROCHLORIDE 0.4 MG: 0.4 CAPSULE ORAL at 09:40

## 2023-02-01 RX ADMIN — SODIUM CHLORIDE, PRESERVATIVE FREE 10 ML: 5 INJECTION INTRAVENOUS at 18:04

## 2023-02-01 RX ADMIN — Medication 1000 UNITS: at 09:39

## 2023-02-01 RX ADMIN — FAMOTIDINE 20 MG: 20 TABLET, FILM COATED ORAL at 09:40

## 2023-02-01 RX ADMIN — HEPARIN SODIUM 5000 UNITS: 5000 INJECTION INTRAVENOUS; SUBCUTANEOUS at 02:12

## 2023-02-01 RX ADMIN — HEPARIN SODIUM 5000 UNITS: 5000 INJECTION INTRAVENOUS; SUBCUTANEOUS at 09:40

## 2023-02-01 RX ADMIN — BUMETANIDE 1 MG: 0.25 INJECTION, SOLUTION INTRAMUSCULAR; INTRAVENOUS at 09:41

## 2023-02-01 RX ADMIN — SPIRONOLACTONE 12.5 MG: 25 TABLET ORAL at 11:40

## 2023-02-01 RX ADMIN — SERTRALINE HYDROCHLORIDE 50 MG: 50 TABLET ORAL at 09:40

## 2023-02-01 RX ADMIN — SODIUM CHLORIDE, PRESERVATIVE FREE 10 ML: 5 INJECTION INTRAVENOUS at 14:00

## 2023-02-01 RX ADMIN — SODIUM CHLORIDE, PRESERVATIVE FREE 10 ML: 5 INJECTION INTRAVENOUS at 23:37

## 2023-02-01 RX ADMIN — CYANOCOBALAMIN TAB 500 MCG 1000 MCG: 500 TAB at 09:39

## 2023-02-01 RX ADMIN — HEPARIN SODIUM 5000 UNITS: 5000 INJECTION INTRAVENOUS; SUBCUTANEOUS at 23:37

## 2023-02-01 RX ADMIN — ASPIRIN 81 MG: 81 TABLET, COATED ORAL at 09:40

## 2023-02-01 NOTE — PROGRESS NOTES
Problem: Self Care Deficits Care Plan (Adult)  Goal: *Acute Goals and Plan of Care (Insert Text)  Description: FUNCTIONAL STATUS PRIOR TO ADMISSION: Patient was modified independent using a walker and single point cane PRN, at times ambulating w/o AD, for functional mobility. Patient denies hx falls. Is retired marine. HOME SUPPORT: The patient lived alone with daughter to provide assistance. Reports daughter assists with transportation (patient does not drive), cooking/cleaning, and other IADLs as needed. Occupational Therapy Goals  Initiated 1/31/2023  1. Patient will perform upper body dressing with independence within 7 day(s). 2.  Patient will perform lower body dressing with independence within 7 day(s). 3.  Patient will perform simple home management with supervision/set-up within 7 day(s). 4.  Patient will perform toilet transfers with independence within 7 day(s). 5.  Patient will perform all aspects of toileting with independence within 7 day(s). 6.  Patient will participate in upper extremity therapeutic exercise/activities with independence for 5 minutes within 7 day(s). 7.  Patient will utilize energy conservation techniques during functional activities with verbal cues within 7 day(s). Outcome: Progressing Towards Goal   OCCUPATIONAL THERAPY TREATMENT  Patient: Francis Edgar (7 y.o. male)  Date: 2/1/2023  Diagnosis: Acute on chronic HFrEF (heart failure with reduced ejection fraction) ([facility] [I50.23]  CHF (congestive heart failure) ([facility] [I50.9] <principal problem not specified>      Precautions: Fall  Chart, occupational therapy assessment, plan of care, and goals were reviewed. ASSESSMENT  Patient continues with skilled OT services and is progressing towards goals. Patient requiring increased encouragement to participate in OT session on this date. Agreeable to standing at sink to complete grooming tasks.  Patient with improved balance with use of RW vs SPC, no overt LOB observed during bathroom mobility however verbal cues provided for RW safety/hand placement. Patient tolerated standing at sink for ~5 minutes to engage in face washing/grooming tasks. Patient declined offer to sit up in recliner chair, returned to sitting EOB and RN at bedside at conclusion of session. Recommend [facility]. Current Level of Function Impacting Discharge (ADLs): up to min A for LB ADLs, SPV - CGA for UB ADLs, toileting, bathing    Other factors to consider for discharge: lives alone, CIWA, supportive family         PLAN :  Patient continues to benefit from skilled intervention to address the above impairments. Continue treatment per established plan of care to address goals.     Recommend with staff: up to chair for meals, RW for mobility     Recommend next OT session: LB dressing    Recommendation for discharge: (in order for the patient to meet his/her long term goals)  Occupational therapy at least 2 days/week in the home     This discharge recommendation:  Has been made in collaboration with the attending provider and/or case management    IF patient discharges home will need the following DME: walker: rolling       SUBJECTIVE:   Patient stated I dont want to do much today, I usually dont get out of bed until 1PM.    OBJECTIVE DATA SUMMARY:   Cognitive/Behavioral Status:  Neurologic State: Alert  Orientation Level: Oriented X4  Cognition: Follows commands             Functional Mobility and Transfers for ADLs:  Bed Mobility:  Rolling: Independent  Supine to Sit: Independent  Scooting: Independent    Transfers:  Sit to Stand: Supervision  Functional Transfers  Bathroom Mobility: Contact guard assistance       Balance:  Sitting: Intact  Standing: Impaired  Standing - Static: Good  Standing - Dynamic : Good    ADL Intervention:       Grooming  Grooming Assistance: Supervision  Position Performed: Standing (at sink)  Washing Face: Supervision  Washing Hands: Supervision           Pain:  Pt did not endorse pain. Activity Tolerance:   Fair and slowed pacing    After treatment patient left in no apparent distress:   Seated EOB, RN and nursing student at bedside administering meds    COMMUNICATION/COLLABORATION:   The patients plan of care was discussed with: Physical therapist, Occupational therapist, and Registered nurse.      Laina Blevins OT  Time Calculation: 18 mins

## 2023-02-01 NOTE — NURSE NAVIGATOR
Heart Failure Nurse Navigator rounds completed. HF NN provided introduction to self and nurse navigator role. Living With Heart Failure booklet given to patient, along with weight calendar, magnet, and HF Support Group flyer. Introduced patient to Preparing for Successful Discharge - Heart Failure check list and explained that knowledge of these topics will help facilitate successful self management upon discharge. Educated using teach back method. Discussed diagnosis definition and assessed patient understanding. Reviewed importance of daily weight monitoring and low sodium diet (1500-2000mg daily). Reinforced daily weights and what weight gain to report to MD.  Patient had bag with sandwich and bag of potato chips at bedside. Unclear where this came from. Spoke to patient about importance of salt restrictions. Advised patient that follow up appointment will be scheduled and placed on Discharge Instructions prior to discharge. Patient given [facility] flyer and is agreeable to services as needed. Patient provided with contact information for HF NN and encouraged to call with questions. Will continue to follow until discharge.

## 2023-02-01 NOTE — PROGRESS NOTES
Physician Progress Note      Elizabeth Bolton  [facility] #:                  [id number]  :                       1950  ADMIT DATE:       2023 10:54 AM  DISCH DATE:  RESPONDING  PROVIDER #:        Sandra Mckeon MD          QUERY TEXT:    Patient admitted with Acute on chronic HFrEF. Noted documentation of Acute Kidney Injury in H&P on  with creatinine 1.82, 1.74. Pt's Cr is not greater than or equal to 1.5x his baseline. In order to support the diagnosis of LUIS FERNANDO, please include additional clinical indicators in your documentation. ? Or please document if the diagnosis of LUIS FERNANDO has been ruled out after further study. The medical record reflects the following:  Risk Factors: CHF, HTN, CKD 3, DM    Clinical Indicators:  Creatinine - 1.82, 1.74 (Cr on  1.68) and (Cr on  1.69)  BUN - 31  GFR - 38, 41    H&P,  \"LUIS FERNANDO; LUIS FERNANDO likely secondary to poor perfusion, should improve with inotropes, hold nephrotoxic medication\"    Treatment: Labs, Monitoring    Defined by Kidney Disease Improving Global Outcomes (KDIGO) clinical practice guideline for acute kidney injury:  -Increase in SCr by greater than or equal to 0.3 mg/dl within 48 hours; or  -Increase or decrease in SCr to greater than or equal to 1.5 times baseline, which is known or presumed to have occurred within the prior 7 days; or  -Urine volume < 0.5ml/kg/h for 6 hours. Thank you,  Jennifer Vazquez  Options provided:  -- Acute kidney injury evidenced by, Please document evidence as well as a numerical baseline creatinine, if known. -- CKD 3 without LUIS FERNANDO  -- Other - I will add my own diagnosis  -- Disagree - Not applicable / Not valid  -- Disagree - Clinically unable to determine / Unknown  -- Refer to Clinical Documentation Reviewer    PROVIDER RESPONSE TEXT:    This patient has CKD 3 without LUIS FERNANDO.     Query created by: Heriberto Palacios on 2023 7:33 AM      Electronically signed by:  Sandra Mckeon MD 2023 4:37 PM

## 2023-02-01 NOTE — PROGRESS NOTES
Transitions of Care Plan  RUR: 14% - low  Clinical Update: aggressive diureses; milrinone; bumex  Consults: AHFC; Therapy  Baseline: ambulates w cane; resides alone; hx ETOH  Barrier(s) to Discharge: medical  Disposition:  Home Health: [address]  Estimated Discharge Date: 2+ days    Clinical update per chart review and/or patient discussed during Interdisciplinary Rounds:    Patient continues with IV diuretics for volume overload. Participating with therapy and owns required DME needed for discharge - cane and RW. Disposition:  Home Health:  [address] [facility], [address] [place] [address],[address]  Available via [facility] or  [facility]

## 2023-02-01 NOTE — PROGRESS NOTES
Problem: Mobility Impaired (Adult and Pediatric)  Goal: *Acute Goals and Plan of Care (Insert Text)  Description: FUNCTIONAL STATUS PRIOR TO ADMISSION: Patient was modified independent using a single point cane, rolling walker or no device for functional mobility, denies falls. HOME SUPPORT PRIOR TO ADMISSION: The patient lived alone with his daughters available to provide assistance if needed (transportation, cooking, cleaning). Physical Therapy Goals  Initiated 1/31/2023  1. Patient will move from supine to sit and sit to supine  in bed with modified independence within 7 day(s). 2.  Patient will transfer from bed to chair and chair to bed with modified independence using the least restrictive device within 7 day(s). 3.  Patient will perform sit to stand with modified independence within 7 day(s). 4.  Patient will ambulate with modified independence for 300 feet with the least restrictive device within 7 day(s). 5.  Patient will ascend/descend  3 stairs with handrail(s) with modified independence within 7 day(s). Outcome: Not Met     PHYSICAL THERAPY TREATMENT  Patient: Jeanie Scott (32 y.o. male)  Date: 2/1/2023  Diagnosis: Acute on chronic HFrEF (heart failure with reduced ejection fraction) ([facility]) [I50.23]  CH[facility]congestive heart failure) [facility] [I50.9] <principal problem not specified>      Precautions: Fall  Chart, physical therapy assessment, plan of care and goals were reviewed. ASSESSMENT  Patient continues with skilled PT services and is progressing towards goals. Pt required encouragement from daughters to participate in therapy intervention. Once up and moving, pt was motivated to increase his gait distance. He is generally supervision to SBA with transfers and ambulating with the RW. Gait with the walker is steady though pt ambulating posterior and outside the walker frame at times putting him at risk for falls.   Encouraged pt to walk in the [place] 3x/ day with RN assist to manage lines. Pt reluctantly agreed. Plan: steps next session (3 to enter the home); gait train w/ SPC if appropriate    Current Level of Function Impacting Discharge (mobility/balance): Supervision sit<->stand (cues to improve hand placement); SBA ambulating 325 ft w/ RW, steady gait (cues to improve body position w/ walker frame)    Other factors to consider for discharge: supportive daughters         PLAN :  Patient continues to benefit from skilled intervention to address the above impairments. Continue treatment per established plan of care. to address goals. Recommendation for discharge: (in order for the patient to meet his/her long term goals)  To be determined: HHPT vs none pending progress. This discharge recommendation:  A follow-up discussion with the attending provider and/or case management is planned    IF patient discharges home will need the following DME: patient owns DME required for discharge       SUBJECTIVE:   Patient stated I need a beer.     OBJECTIVE DATA SUMMARY:   Critical Behavior:  Neurologic State: Alert  Orientation Level: Oriented X4  Cognition: Follows commands  Safety/Judgement: Awareness of environment  Functional Mobility Training:  Bed Mobility:  Received pt sitting EOB; remained sitting in chair. Transfers:  Sit to Stand: Supervision (d/t line)  Stand to Sit: Supervision  Cues to improve hand placement (push up from chair/ bed vs pulling walker) and for controlled descent returning to sit. Balance:  Sitting: Intact  Standing: Intact; With support (rolling walker)  Standing - Static: Good  Standing - Dynamic : Good  Ambulation/Gait Training:  Distance (ft): 325 Feet (ft)  Assistive Device: Gait belt;Walker, rolling  Ambulation - Level of Assistance: Stand-by assistance;Assist x1;Additional time; Adaptive equipment                 Base of Support: Widened     Speed/Lois: Slow                    Therapeutic Activity:   Education re:  Activity progression w/ staff assist d/t fall risk and hosp lines. Recommended 3 walks/ day in the [place] w/ RW w/ staff assist.      Pain Rating:  None indicated    Activity Tolerance:   Good    After treatment patient left in no apparent distress:   Sitting in chair, Call bell within reach, Bed / chair alarm activated, Caregiver / family present, and student nurses in the room    COMMUNICATION/COLLABORATION:   The patients plan of care was discussed with: Registered nurse.      Chantell Friedman, PT   Time Calculation: 39 mins

## 2023-02-01 NOTE — PROGRESS NOTES
[facility] Adult  Hospitalist Group                                                                                          Hospitalist Progress Note  Faraz Harris MD  Answering service: [phone] from in house phone        Date of Service:  2023  NAME:  Maddy Jacinto  :  1950  MRN:  [id number]      Admission Summary:   Maddy Jacinto is a 74 y.o. male who was referred from the advanced heart failure clinic due to worsening shortness of breath/ dyspnea on exertion and fatigue. He was admitted to [address] for possible diuresis and inotrope initiation. Interval history / Subjective:   Patient is working with PT/OT this morning, denies any new complaints. He is not short of breath, denies any chest pain or heart palpitations. He is on RA. Remains on IV Bumex and Milrinone drip. Assessment & Plan:        Acute on chronic HFrEF:  - keep on Telemetry;  - appreciate Cardio/AHF team consult and recommendations;   - IV diuresis: on Bumex drip, and Milrinone drip;   - daily weights;  - strict in's and out's;   - continue cardiac medications;   - B LE edema, R>L, check Doppler;     Coronary artery disease, status post CABG:  - continue Aspirin, BB;     Hypertension:  - continue home antihypertensives;     CKD Stage IIIB:  - monitor renal indices as patient is being diuresed; Hyperlipidemia:   - continue statin; Hyperbilirubinemia:  - trend; History of prostate cancer; Alcoholism:  - patient is a heavy drinker;   - initiate CIWA protocol;         Code status: Full   Prophylaxis: Heparin SC;   Care Plan discussed with: patient, RN, AHF team;   Anticipated Disposition: Home on Friday 2/3 if ok with Cardio/ HF team;          Hospital Problems  Date Reviewed: 2022            Codes Class Noted POA    Acute on chronic HFrEF (heart failure with reduced ejection fraction) ([facility] [address] ICD-10-CM: T86.91  ICD-9-CM: 428.23  2023 Unknown        CHF (congestive heart failure) ([facility] [address] ICD-10-CM: I50.9  ICD-9-CM: 428.0  1/30/2023 Unknown           Review of Systems:   A comprehensive review of systems was negative except for that written in the HPI. Vital Signs:    Last 24hrs VS reviewed since prior progress note. Most recent are:  Visit Vitals  /71   Pulse 73   Temp 97.4 °F (36.3 °C)   Resp 16   Ht 5' 7\" (1.702 m)   Wt 67 kg (147 lb 11.3 oz)   SpO2 100%   BMI 23.13 kg/m²         Intake/Output Summary (Last 24 hours) at 2/1/2023 1345  Last data filed at 2/1/2023 9344  Gross per 24 hour   Intake 206.21 ml   Output 1450 ml   Net -1243.79 ml          Physical Examination:     I had a face to face encounter with this patient and independently examined them on 2/1/2023 as outlined below:          Constitutional:  No acute distress, cooperative, pleasant    ENT:  Oral mucosa moist, oropharynx benign. Resp:  CTA bilaterally. No wheezing/rhonchi/rales. No accessory muscle use. CV:  Regular rhythm, normal rate, no murmurs, gallops, rubs    GI:  Soft, non distended, non tender. normoactive bowel sounds, no hepatosplenomegaly     Musculoskeletal:  No edema, warm, 2+ pulses throughout    Neurologic:  Moves all extremities.   AAOx3, CN II-XII reviewed            Data Review:    Review and/or order of clinical lab test  Review and/or order of tests in the radiology section of CPT  Review and/or order of tests in the medicine section of CPT      Labs:     Recent Labs     02/01/23  0201 01/30/23  1342   WBC 3.0* 4.6   HGB 11.1* 12.2   HCT 33.2* 37.7    162       Recent Labs     02/01/23  0201 01/31/23  0217 01/30/23  1342   * 137 134*   K 5.0 4.3 HEMOLYZED,RECOLLECT REQUESTED    108 108   CO2 26 24 26   BUN 29* 31* 31*   CREA 1.83* 1.74* 1.82*   GLU 87 87 97   CA 8.8 8.9 9.2   MG 2.3 2.4 HEMOLYZED,RECOLLECT REQUESTED   PHOS 4.2  --   --        Recent Labs     02/01/23  0201 01/31/23  0217 01/30/23  1342   ALT 21 19 24   AP 59 62 70   TBILI 2.6* 2.5* 2.3* TP 7.0 7.1 8.2   ALB 3.1* 3.2* 3.4*   GLOB 3.9 3.9 4.8*       No results for input(s): INR, PTP, APTT, INREXT, INREXT in the last 72 hours. No results for input(s): FE, TIBC, PSAT, FERR in the last 72 hours. Lab Results   Component Value Date/Time    Folate 12.0 01/21/2013 07:55 AM        No results for input(s): PH, PCO2, PO2 in the last 72 hours. No results for input(s): CPK, CKNDX, TROIQ in the last 72 hours.     No lab exists for component: CPKMB  Lab Results   Component Value Date/Time    Cholesterol, total 74 (L) 01/04/2023 10:55 AM    HDL Cholesterol 41 01/04/2023 10:55 AM    LDL, calculated 20 01/04/2023 10:55 AM    LDL, calculated 64.4 08/29/2022 11:23 AM    Triglyceride 49 01/04/2023 10:55 AM    CHOL/HDL Ratio 2.4 08/29/2022 11:23 AM     Lab Results   Component Value Date/Time    Glucose (POC) 99 02/01/2023 11:10 AM    Glucose (POC) 68 02/01/2023 07:10 AM    Glucose (POC) 112 01/31/2023 09:00 PM    Glucose (POC) 101 01/31/2023 04:01 PM    Glucose (POC) 98 01/31/2023 11:14 AM     Lab Results   Component Value Date/Time    Color YELLOW/STRAW 02/27/2021 09:14 PM    Appearance CLEAR 02/27/2021 09:14 PM    Specific gravity 1.022 02/27/2021 09:14 PM    Specific gravity 1.010 12/12/2012 03:15 PM    pH (UA) 5.0 02/27/2021 09:14 PM    Protein 100 (A) 02/27/2021 09:14 PM    Glucose Negative 02/27/2021 09:14 PM    Ketone TRACE (A) 02/27/2021 09:14 PM    Bilirubin Negative 02/27/2021 09:14 PM    Urobilinogen 0.2 02/27/2021 09:14 PM    Nitrites Negative 02/27/2021 09:14 PM    Leukocyte Esterase Negative 02/27/2021 09:14 PM    Epithelial cells FEW 02/27/2021 09:14 PM    Bacteria Negative 02/27/2021 09:14 PM    WBC 0-4 02/27/2021 09:14 PM    RBC 0-5 02/27/2021 09:14 PM         Medications Reviewed:     Current Facility-Administered Medications   Medication Dose Route Frequency    LORazepam (ATIVAN) tablet 2 mg  2 mg Oral Q1H PRN    LORazepam (ATIVAN) tablet 4 mg  4 mg Oral Q1H PRN    tamsulosin (FLOMAX) capsule 0.4 mg  0.4 mg Oral DAILY    aspirin delayed-release tablet 81 mg  81 mg Oral DAILY    cholecalciferol (VITAMIN D3) (1000 Units /25 mcg) tablet 1,000 Units  1,000 Units Oral DAILY    cyanocobalamin (VITAMIN B12) tablet 1,000 mcg  1,000 mcg Oral DAILY    metoprolol succinate (TOPROL-XL) XL tablet 12.5 mg  12.5 mg Oral DAILY    sertraline (ZOLOFT) tablet 50 mg  50 mg Oral DAILY    spironolactone (ALDACTONE) tablet 12.5 mg  12.5 mg Oral DAILY    sodium chloride (NS) flush 5-40 mL  5-40 mL IntraVENous Q8H    sodium chloride (NS) flush 5-40 mL  5-40 mL IntraVENous PRN    acetaminophen (TYLENOL) tablet 650 mg  650 mg Oral Q6H PRN    Or    acetaminophen (TYLENOL) suppository 650 mg  650 mg Rectal Q6H PRN    polyethylene glycol (MIRALAX) packet 17 g  17 g Oral DAILY PRN    ondansetron (ZOFRAN ODT) tablet 4 mg  4 mg Oral Q8H PRN    Or    ondansetron (ZOFRAN) injection 4 mg  4 mg IntraVENous Q6H PRN    famotidine (PEPCID) tablet 20 mg  20 mg Oral DAILY    milrinone (PRIMACOR) 20 MG/100 ML D5W infusion  0.125 mcg/kg/min IntraVENous CONTINUOUS    bumetanide (BUMEX) injection 1 mg  1 mg IntraVENous BID    sodium chloride (NS) flush 5-40 mL  5-40 mL IntraVENous Q8H    sodium chloride (NS) flush 5-40 mL  5-40 mL IntraVENous PRN    glucose chewable tablet 16 g  4 Tablet Oral PRN    glucagon (GLUCAGEN) injection 1 mg  1 mg IntraMUSCular PRN    dextrose 10 % infusion 0-250 mL  0-250 mL IntraVENous PRN    heparin (porcine) injection 5,000 Units  5,000 Units SubCUTAneous Q8H    insulin lispro (HUMALOG) injection   SubCUTAneous AC&HS     ______________________________________________________________________  EXPECTED LENGTH OF STAY: 3d 21h  ACTUAL LENGTH OF STAY:          2                 Beronica Bell MD

## 2023-02-01 NOTE — PROGRESS NOTES
[facility] in [place], [place]  Inpatient Progress Note      Patient name: Paul Cárdenas  Patient : 1950  Patient MRN: [id number]  Consulting MD: Lonny Saxena MD  Date of service: 23      REASON FOR REFERRAL:  Management of chronic diastolic heart failure      PLAN OF CARE   74 y.o. male with h/o diastolic > systolic heart failure, HFpEF, LVEF 55-60%(echo 23), stage D, NYHA class IV symptoms, c/b renal and hepatic failure, GDMT limited due to hypotension  Cardiac MRI revealed infiltrative cardiac amyloidosis, PYP was strongly suggestive of ATTR amyloidosis and genetic testing positive for inherited ATTR  RHC with severely elevated filling pressures and normal resting index  Patient agreed for admission for inotrope-assisted diuresis; possible initiation of inotropes, palliative care meeting to address code status, limits of care   Patient declined admission from clinic , but agreed to come Monday, 23  Note: Family will require cascade genetic testing; they will reach out to [facility]  Pt reporting heavy, regular alcohol use and concern for withdrawal- will likely need CIWA protocol          RECOMMENDATIONS:  Continue low-dose inotrope milrinone 0.125mcg/kg/min to augment diuresis  Plan to start vyndamax 61mg daily as outpatient, once patient assistance processed  Continue current dose of toprol XL 12.5mg daily  Known intolerance to lisinopril/ARB; not recommended in amyloid  Continue spironolactone 12.5mg daily  Cannot tolerate SGLT2i due to CrCl  Diuretic: continue bumex 1mg IV BID ; goal net -1 to -2L next 24 hrs  Continue ASA   Pt has declined sleep study at this time  Monitor HF labs: CMP, NT-Pro BNP, Mg daily while diuresing  Heart failure education  Will need education on alcohol cessation  Would benefit from Palliative Care consultation for further discussion of goals of care.       Remainder of care per hospitalist        IMPRESSION:  Fluid overload  Chronic diastolic heart failure, HFpEF  Stage C, NYHA class IIIA symptoms  Non-ischemic cardiomyopathy, LVEF 55-60%  Severe LVH 2.1cm  cMRI suggestive of infiltrative cardiac amyloidosis  Intolerance to lisinopril  Inherited ATTR cardiac amyloidosis  Coronary artery disease  S/p CABG x 3 (4/2020) LIMA to LAD, SVG to RCA and Ramus  CHF s/p PM - pacemaker dependent  RBBB 144ms  Cardiac risk factors  HTN  HL  DM2  Liver dysfunction, TBili 2.8  CKD, stage 3  AT3 activity low  Proteic C low  Lupus anticoagulatn abnormal  Leukopenia  H/o prostate cancer  Elevated D Dimer  Hx DVT/PE: developed PE/DVT after bowel resection in 2012  History of rectal bleeding 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 Findings consistent with moderate to severe concentric hypertrophy; speckled pattern consider amyloid. Normal wall motion. Mitral Valve: Mild regurgitation. Left Atrium: Left atrium is moderately dilated. Right Atrium: Right atrium is moderately dilated. Echo (8/2022)    Left Ventricle: Low normal left ventricular systolic function. EF by 2D Simpsons Biplane is 50%. Left ventricle size is normal. Severely increased wall thickness. Findings consistent with severe concentric hypertrophy. There is a speckled appearance to the left ventricular myocardium. Consider testing for cardiac amyloidosis including possible cardiac MRI. Noted that the patient's Medtronic device is MRI compatible. Normal wall motion consistent with paced rhythm    Left Atrium: Left atrium is severely dilated. Left atrial volume index is severely increased (>48 mL/m2). Right Atrium: Right atrium is moderately dilated. Mitral Valve: Mild to moderate regurgitation. Tricuspid Valve:  Moderate regurgitation. Mildly elevated RVSP. The estimated RVSP is 38 mmHg. Cardiac MRI (9/2022)  1. Normal left ventricular cavity size. Severe concentric left ventricular  hypertrophy. Significantly hypertrophied septum measuring 21 mm. There is no  left ventricular outflow tract obstruction. Moderate left ventricular systolic  dysfunction. Moderate global hypokinesis with slight regional variation. 3-D  LVEF 43%. 2. Normal right ventricular size with minimal right ventricular systolic  dysfunction. 3-D RVEF 47%. 3. Moderate 2+ tricuspid regurgitation. 4. On EGE and LGE study, there is mild diffuse myocardial enhancement with  slightly poor nulling of the myocardium on postcontrast images. These findings  may suggest possibility of infiltrative cardiac amyloidosis. There is no  myocardial scar. There is no features of recent or old myocardial infarction. There is no features of infiltrative sarcoidosis. There is no features of  inflammatory myocarditis. All myocardial walls are viable. 5. Normal pericardium without any significant pericardial effusion. 6. Small right-sided pleural effusion and tiny left-sided pleural effusion. Lexiscan Myoview May 2021 with normal perfusion, calculated LVEF of 37%     RHC (1/18/23)  Moderately to severely elevated right and left-sided filling pressures. Moderate pulmonary hypertension of predominantly postcapillary origin. Right heart waveforms are likely consistent with restrictive physiology. Preserved cardiac output and index. RA 21, PCWP 25; CI 2.94      LIFE GOALS:  Lifestyle goals discussed with the patient. BRIEF HISTORY OF PRESENT ILLNESS:  Chelsey Lloyd is a 74 y.o. male with extensive history as noted above. Pt was referred to [facility] for further evaluation and treatment of cardiac amyloidosis. He was found to have significantly elevated filling pressures on RHC and was admitted for inotrope assisted diuresis.  Now on admission, found to be heavy alcohol drinker and concerns for withdrawals. INTERVAL HISTORY:  NAEO  Diuresing well, Net -1L and weight down 1kg  Cr and Tbili remain elevated  VSS  Pt reports doing ok except \"the hospital food is terrible\"  Ambulating around the unit without dypnea      REVIEW OF SYSTEMS:  Review of Systems   Constitutional:  Positive for malaise/fatigue. Negative for chills and fever. Respiratory:  Positive for shortness of breath. Cardiovascular:  Positive for leg swelling. Negative for chest pain and palpitations. Gastrointestinal:  Negative for heartburn, nausea and vomiting. Neurological:  Negative for dizziness and weakness. Psychiatric/Behavioral:  Positive for substance abuse. The patient is nervous/anxious. PHYSICAL EXAM:  Visit Vitals  BP (!) 102/53 (BP 1 Location: Left upper arm, BP Patient Position: At rest)   Pulse 70   Temp 97.4 °F (36.3 °C)   Resp 16   Ht 5' 7\" (1.702 m)   Wt 147 lb 11.3 oz (67 kg)   SpO2 100%   BMI 23.13 kg/m²         Physical Exam  Vitals and nursing note reviewed. Constitutional:       Appearance: Normal appearance. Neck:      Vascular: Hepatojugular reflux and JVD present. Cardiovascular:      Rate and Rhythm: Normal rate and regular rhythm. Pulmonary:      Effort: Pulmonary effort is normal. No respiratory distress. Breath sounds: Normal breath sounds. Abdominal:      General: Bowel sounds are normal. There is no distension. Palpations: Abdomen is soft. Musculoskeletal:      Right lower le+ Pitting Edema present. Left lower le+ Pitting Edema present. Skin:     General: Skin is warm and dry. Capillary Refill: Capillary refill takes less than 2 seconds. Neurological:      General: No focal deficit present. Mental Status: He is alert and oriented to person, place, and time.             PAST MEDICAL HISTORY:  Past Medical History:   Diagnosis Date    Arthritis     Cancer [facility])     Prostate ca    Chronic kidney disease     Stage 3    Chronic pain     Abdoman, back, fingers per daughter    Chronic systolic (congestive) heart failure 11/28/2022    Depression     Diabetes ([facility] [address]     Heart attack ([facility] [address]     Heart failure ([facility] [address] 2020    Dr. Merida Mask    Hyperlipidemia     Hypertension     Pacemaker 05/01/2020    MED    PE (pulmonary embolism)     Pneumonia     Sleep apnea     not use CPAP       PAST SURGICAL HISTORY:  Past Surgical History:   Procedure Laterality Date    COLONOSCOPY N/A 3/19/2021    COLONOSCOPY performed by Arash Cazares MD at [facility] ENDOSCOPY    COLONOSCOPY N/A 5/10/2021    COLONOSCOPY performed [facility]ares MD at [facility] N/A 8/2/2021    FLEXIBLE SIGMOIDOSCOPY performed by Arash Cazares MD at [facility] ENDOSCOPY    HX BACK SURGERY  2014    HX CERVICAL LAMINECTOMY      HX CORONARY ARTERY BYPASS GRAFT  04/2021    X3     HX GI  11/2012    bowel resection    HX HEART CATHETERIZATION  04/2020    HX ORTHOPAEDIC      amputated left 4th finger    AL INS NEW/RPLCMT PRM PM W/TRANSV ELTRD ATRIAL&VENT N/A 5/1/2020    INSERT PPM DUAL performed by Ilnee Ramirez MD at [facility] CATH LAB    AL UNLISTED PROCEDURE ABDOMEN PERITONEUM & OMENTUM      bowel resection       FAMILY HISTORY:  Family History   Problem Relation Age of Onset    Cancer Mother         unknown    Heart Disease Father     Heart Disease Brother     Anesth Problems Neg Hx        SOCIAL HISTORY:  Social History     Socioeconomic History    Marital status:     Number of children: 1    Years of education: 12    Highest education level: High school graduate   Occupational History    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[employer], 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    Tobacco Use    Smoking status: Every Day     Types: Cigars    Smokeless tobacco: Current     Types: Chew    Tobacco comments:     Chews tobacco every day; cigars and black and milds    Vaping Use    Vaping Use: Never used   Substance and Sexual Activity    Alcohol use:  Yes Alcohol/week: 16.0 standard drinks     Types: 2 Glasses of wine, 14 Cans of beer per week     Comment: 2-4 beers daily, sometimes alcohol    Drug use: Never    Sexual activity: Not Currently   Other Topics Concern     Service No    Blood Transfusions No    Caffeine Concern No    Occupational Exposure No    Hobby Hazards No    Sleep Concern Yes    Stress Concern Yes    Weight Concern No    Special Diet No    Back Care Yes    Exercise No    Seat Belt Yes   Social History Narrative    ** Merged History Encounter **         Lives in [place] [address] Determinants of Health     Financial Resource Strain: Low Risk     Difficulty of Paying Living Expenses: Not hard at all   Food Insecurity: No Food Insecurity    Worried About [employer] in the Last Year: Never true    [employer] in the Last Year: Never true       LABORATORY RESULTS:     Labs Latest Ref Rng & Units 2/1/2023 1/31/2023 1/30/2023 1/4/2023   WBC 4.1 - 11.1 K/uL 3. 0(L) - 4.6 3.9   RBC 4.10 - 5.70 M/uL 3.80(L) - 4.18 4.09(L)   Hemoglobin 12.1 - 17.0 g/dL 11. 1(L) - 12.2 12. 1(L)   Hematocrit 36.6 - 50.3 % 33. 2(L) - 37.7 37.9   MCV 80.0 - 99.0 FL 87.4 - 90.2 93   Platelets 303 - 065 K/uL 168 - 162 157   Albumin 3.5 - 5.0 g/dL 3. 1(L) 3. 2(L) 3.4(L) 4.2   Calcium 8.5 - 10.1 MG/DL 8.8 8.9 9.2 9.3   Glucose 65 - 100 mg/dL 87 87 97 66(L)   BUN 6 - 20 MG/DL 29(H) 31(H) 31(H) 28(H)   Creatinine 0.70 - 1.30 MG/DL 1.83(H) 1.74(H) 1.82(H) 1.68(H)   Sodium 136 - 145 mmol/L 135(L) 137 134(L) 139   Potassium 3.5 - 5.1 mmol/L 5.0 4.3 HEMOLYZED,RECOLLECT REQUESTED 5.1   Some recent data might be hidden     Lab Results   Component Value Date/Time    TSH 4.380 10/12/2022 10:36 AM    TSH 2.88 08/29/2022 11:23 AM    TSH 2.26 05/04/2022 01:21 PM    TSH 3.680 06/16/2021 10:28 AM    TSH 3.450 03/10/2021 10:13 AM    TSH 2.070 08/19/2020 09:31 AM    TSH 1.98 04/25/2020 01:42 AM    TSH 2.400 01/09/2020 11:36 AM    TSH 2.220 09/18/2019 09:15 AM    TSH 2.360 06/25/2019 09:23 AM    TSH 2.900 09/17/2018 09:20 AM    TSH 2.100 05/09/2017 01:58 PM    TSH 2.320 04/12/2016 01:34 PM    TSH 2.070 06/03/2015 02:04 PM    TSH 2.71 11/28/2012 02:15 PM       ALLERGY:  Allergies   Allergen Reactions    Arb-Angiotensin Receptor Antagonist Other (comments)     High k    Statins-Hmg-Coa Reductase Inhibitors Myalgia    Ace Inhibitors Cough        CURRENT MEDICATIONS:    Current Facility-Administered Medications:     LORazepam (ATIVAN) tablet 2 mg, 2 mg, Oral, Q1H PRN, Alton Hernandez MD    LORazepam (ATIVAN) tablet 4 mg, 4 mg, Oral, Q1H PRN, Madina Hernandez MD    tamsulosin (FLOMAX) capsule 0.4 mg, 0.4 mg, Oral, DAILY, Patricia, Sanjana B, NP, 0.4 mg at 02/01/23 0940    aspirin delayed-release tablet 81 mg, 81 mg, Oral, DAILY, Patricia, Sanjana B, NP, 81 mg at 02/01/23 0940    cholecalciferol (VITAMIN D3) (1000 Units /25 mcg) tablet 1,000 Units, 1,000 Units, Oral, DAILY, Patricia, Sanjana B, NP, 1,000 Units at 02/01/23 1130    cyanocobalamin (VITAMIN B12) tablet 1,000 mcg, 1,000 mcg, Oral, DAILY, Patricia, Sanjana B, NP, 1,000 mcg at 02/01/23 0939    metoprolol succinate (TOPROL-XL) XL tablet 12.5 mg, 12.5 mg, Oral, DAILY, Patricia, Sanjana B, NP, 12.5 mg at 01/31/23 1033    sertraline (ZOLOFT) tablet 50 mg, 50 mg, Oral, DAILY, Patricia, Sanjana B, NP, 50 mg at 02/01/23 0940    spironolactone (ALDACTONE) tablet 12.5 mg, 12.5 mg, Oral, DAILY, Patricia, Sanjana B, NP, 12.5 mg at 01/31/23 1033    sodium chloride (NS) flush 5-40 mL, 5-40 mL, IntraVENous, Q8H, Patricia, Sanjana B, NP, 10 mL at 02/01/23 0711    sodium chloride (NS) flush 5-40 mL, 5-40 mL, IntraVENous, PRN, Patricia, Sanjana B, NP    acetaminophen (TYLENOL) tablet 650 mg, 650 mg, Oral, Q6H PRN **OR** acetaminophen (TYLENOL) suppository 650 mg, 650 mg, Rectal, Q6H PRN, Patricia, Sanjana B, NP    polyethylene glycol (MIRALAX) packet 17 g, 17 g, Oral, DAILY PRN, Patricia, Sanjana B, NP    ondansetron (ZOFRAN ODT) tablet 4 mg, 4 mg, Oral, Q8H PRN **OR** ondansetron (ZOFRAN) injection 4 mg, 4 mg, IntraVENous, Q6H PRN, Patricia, Sanjana B, NP    famotidine (PEPCID) tablet 20 mg, 20 mg, Oral, DAILY, Patricia, Sanjana B, NP, 20 mg at 02/01/23 0940    milrinone (PRIMACOR) 20 MG/100 ML D5W infusion, 0.125 mcg/kg/min, IntraVENous, CONTINUOUS, Patricia, Sanjana B, NP, Last Rate: 2.7 mL/hr at 01/31/23 2114, 0.125 mcg/kg/min at 01/31/23 2114    bumetanide (BUMEX) injection 1 mg, 1 mg, IntraVENous, BID, Patricia, Sanjana B, NP, 1 mg at 02/01/23 0941    sodium chloride (NS) flush 5-40 mL, 5-40 mL, IntraVENous, Q8H, Nilson Marin MD, 10 mL at 02/01/23 0711    sodium chloride (NS) flush 5-40 mL, 5-40 mL, IntraVENous, PRN, Jose Hernández MD    glucose chewable tablet 16 g, 4 Tablet, Oral, PRN, Nilson Mendez MD    glucagon (GLUCAGEN) injection 1 mg, 1 mg, IntraMUSCular, PRN, Nilson Mendez MD    dextrose 10 % infusion 0-250 mL, 0-250 mL, IntraVENous, PRN, Jose Hernández MD    heparin (porcine) injection 5,000 Units, 5,000 Units, SubCUTAneous, Q8H, Jose Hernández MD, 5,000 Units at 02/01/23 0940    insulin lispro (HUMALOG) injection, , SubCUTAneous, AC&HS, Jose Hernández MD    PATIENT CARE TEAM:  Patient Care Team:  Jami Riojas MD as PCP - General (Internal Medicine Physician)  Jami Riojas MD as PCP - [address] [place] [address]  [facility]  Loida Velázquez MD as Physician (Cardiovascular Disease Physician)  Brad Burnette MD as Physician (Nephrology)  Rainer Bonilla MD (Neurosurgery)  Glendy Christianson MD (Ophthalmology)  Crystal Scherer MD (Otolaryngology)  Concepcion Steele MD (Urology)  Robert Briones MD (Ophthalmology)  Hayde Mckeon, DPM (Podiatry)  Shelley Campos, PHD (Psychology)  Wil Diaz MD (Cardiothoracic Surgery)  Dima Apodaca MD (Cardiovascular Disease Physician)  Carter Pittman MD (Cardiovascular Disease Physician)  Suzette Wood MD as Physician (Nephrology)  Taina Rodriguez MD (Gastroenterology)  [address] [place] [address] Amarjit Garcia MD (Radiation Oncology)  James Montiel MD as Physician (Nephrology)  Bre Herrera, MARIYA as Nurse Practitioner (Nurse Practitioner)  Gina Rtoh MD (Cardiovascular Disease Physician)  Eloise Thorpe MD (Ophthalmology)     Thank you for allowing us to participate in this patient's care. Nick Padilla, MSN, AGAP-[address], [address]  Phone: [phone]  Fax: [phone]

## 2023-02-02 LAB
ALBUMIN SERPL-MCNC: 3.1 G/DL (ref 3.5–5)
ALBUMIN/GLOB SERPL: 0.8 (ref 1.1–2.2)
ALP SERPL-CCNC: 61 U/L (ref 45–117)
ALT SERPL-CCNC: 19 U/L (ref 12–78)
ANION GAP SERPL CALC-SCNC: 8 MMOL/L (ref 5–15)
AST SERPL-CCNC: 35 U/L (ref 15–37)
BILIRUB SERPL-MCNC: 1.9 MG/DL (ref 0.2–1)
BNP SERPL-MCNC: 2594 PG/ML
BUN SERPL-MCNC: 32 MG/DL (ref 6–20)
BUN/CREAT SERPL: 15 (ref 12–20)
CALCIUM SERPL-MCNC: 9 MG/DL (ref 8.5–10.1)
CHLORIDE SERPL-SCNC: 104 MMOL/L (ref 97–108)
CO2 SERPL-SCNC: 25 MMOL/L (ref 21–32)
CREAT SERPL-MCNC: 2.11 MG/DL (ref 0.7–1.3)
GLOBULIN SER CALC-MCNC: 4.1 G/DL (ref 2–4)
GLUCOSE BLD STRIP.AUTO-MCNC: 149 MG/DL (ref 65–117)
GLUCOSE BLD STRIP.AUTO-MCNC: 84 MG/DL (ref 65–117)
GLUCOSE BLD STRIP.AUTO-MCNC: 86 MG/DL (ref 65–117)
GLUCOSE BLD STRIP.AUTO-MCNC: 99 MG/DL (ref 65–117)
GLUCOSE SERPL-MCNC: 83 MG/DL (ref 65–100)
MAGNESIUM SERPL-MCNC: 2.4 MG/DL (ref 1.6–2.4)
POTASSIUM SERPL-SCNC: 4.2 MMOL/L (ref 3.5–5.1)
PROT SERPL-MCNC: 7.2 G/DL (ref 6.4–8.2)
SERVICE CMNT-IMP: ABNORMAL
SERVICE CMNT-IMP: NORMAL
SODIUM SERPL-SCNC: 137 MMOL/L (ref 136–145)

## 2023-02-02 PROCEDURE — 99233 SBSQ HOSP IP/OBS HIGH 50: CPT | Performed by: NURSE PRACTITIONER

## 2023-02-02 PROCEDURE — 99223 1ST HOSP IP/OBS HIGH 75: CPT | Performed by: INTERNAL MEDICINE

## 2023-02-02 PROCEDURE — 74011000250 HC RX REV CODE- 250: Performed by: FAMILY MEDICINE

## 2023-02-02 PROCEDURE — 74011250637 HC RX REV CODE- 250/637: Performed by: FAMILY MEDICINE

## 2023-02-02 PROCEDURE — 80053 COMPREHEN METABOLIC PANEL: CPT

## 2023-02-02 PROCEDURE — 36415 COLL VENOUS BLD VENIPUNCTURE: CPT

## 2023-02-02 PROCEDURE — 65660000001 HC RM ICU INTERMED STEPDOWN

## 2023-02-02 PROCEDURE — 74011250636 HC RX REV CODE- 250/636: Performed by: FAMILY MEDICINE

## 2023-02-02 PROCEDURE — 97116 GAIT TRAINING THERAPY: CPT

## 2023-02-02 PROCEDURE — 74011250636 HC RX REV CODE- 250/636: Performed by: NURSE PRACTITIONER

## 2023-02-02 PROCEDURE — 82962 GLUCOSE BLOOD TEST: CPT

## 2023-02-02 PROCEDURE — 74011000250 HC RX REV CODE- 250: Performed by: NURSE PRACTITIONER

## 2023-02-02 PROCEDURE — 74011250637 HC RX REV CODE- 250/637: Performed by: NURSE PRACTITIONER

## 2023-02-02 PROCEDURE — 83880 ASSAY OF NATRIURETIC PEPTIDE: CPT

## 2023-02-02 PROCEDURE — 83735 ASSAY OF MAGNESIUM: CPT

## 2023-02-02 RX ORDER — IBUPROFEN 200 MG
1 TABLET ORAL DAILY
Status: DISCONTINUED | OUTPATIENT
Start: 2023-02-02 | End: 2023-02-06 | Stop reason: HOSPADM

## 2023-02-02 RX ORDER — FOLIC ACID 1 MG/1
1 TABLET ORAL DAILY
Status: DISCONTINUED | OUTPATIENT
Start: 2023-02-02 | End: 2023-02-06 | Stop reason: HOSPADM

## 2023-02-02 RX ORDER — LANOLIN ALCOHOL/MO/W.PET/CERES
100 CREAM (GRAM) TOPICAL DAILY
Status: DISCONTINUED | OUTPATIENT
Start: 2023-02-02 | End: 2023-02-06 | Stop reason: HOSPADM

## 2023-02-02 RX ORDER — BUMETANIDE 0.25 MG/ML
2 INJECTION INTRAMUSCULAR; INTRAVENOUS 3 TIMES DAILY
Status: DISCONTINUED | OUTPATIENT
Start: 2023-02-02 | End: 2023-02-05

## 2023-02-02 RX ORDER — MIDODRINE HYDROCHLORIDE 5 MG/1
5 TABLET ORAL
Status: DISCONTINUED | OUTPATIENT
Start: 2023-02-02 | End: 2023-02-06 | Stop reason: HOSPADM

## 2023-02-02 RX ADMIN — MILRINONE LACTATE IN DEXTROSE 0.12 MCG/KG/MIN: 200 INJECTION, SOLUTION INTRAVENOUS at 07:16

## 2023-02-02 RX ADMIN — SERTRALINE HYDROCHLORIDE 50 MG: 50 TABLET ORAL at 09:13

## 2023-02-02 RX ADMIN — CYANOCOBALAMIN TAB 500 MCG 1000 MCG: 500 TAB at 09:13

## 2023-02-02 RX ADMIN — FAMOTIDINE 20 MG: 20 TABLET, FILM COATED ORAL at 09:12

## 2023-02-02 RX ADMIN — SODIUM CHLORIDE, PRESERVATIVE FREE 10 ML: 5 INJECTION INTRAVENOUS at 15:10

## 2023-02-02 RX ADMIN — MIDODRINE HYDROCHLORIDE 5 MG: 5 TABLET ORAL at 11:39

## 2023-02-02 RX ADMIN — BUMETANIDE 2 MG: 0.25 INJECTION, SOLUTION INTRAMUSCULAR; INTRAVENOUS at 17:27

## 2023-02-02 RX ADMIN — HEPARIN SODIUM 5000 UNITS: 5000 INJECTION INTRAVENOUS; SUBCUTANEOUS at 15:09

## 2023-02-02 RX ADMIN — Medication 1 TABLET: at 11:39

## 2023-02-02 RX ADMIN — TAMSULOSIN HYDROCHLORIDE 0.4 MG: 0.4 CAPSULE ORAL at 09:14

## 2023-02-02 RX ADMIN — Medication 1000 UNITS: at 09:14

## 2023-02-02 RX ADMIN — BUMETANIDE 2 MG: 0.25 INJECTION, SOLUTION INTRAMUSCULAR; INTRAVENOUS at 15:09

## 2023-02-02 RX ADMIN — ASPIRIN 81 MG: 81 TABLET, COATED ORAL at 09:14

## 2023-02-02 RX ADMIN — HEPARIN SODIUM 5000 UNITS: 5000 INJECTION INTRAVENOUS; SUBCUTANEOUS at 23:38

## 2023-02-02 RX ADMIN — MIDODRINE HYDROCHLORIDE 5 MG: 5 TABLET ORAL at 17:27

## 2023-02-02 RX ADMIN — HEPARIN SODIUM 5000 UNITS: 5000 INJECTION INTRAVENOUS; SUBCUTANEOUS at 09:21

## 2023-02-02 RX ADMIN — BUMETANIDE 2 MG: 0.25 INJECTION, SOLUTION INTRAMUSCULAR; INTRAVENOUS at 09:21

## 2023-02-02 RX ADMIN — Medication 100 MG: at 11:39

## 2023-02-02 RX ADMIN — SODIUM CHLORIDE, PRESERVATIVE FREE 10 ML: 5 INJECTION INTRAVENOUS at 07:16

## 2023-02-02 RX ADMIN — FOLIC ACID 1 MG: 1 TABLET ORAL at 11:40

## 2023-02-02 RX ADMIN — SODIUM CHLORIDE, PRESERVATIVE FREE 10 ML: 5 INJECTION INTRAVENOUS at 22:26

## 2023-02-02 NOTE — PROGRESS NOTES
[facility] in [place], [place]  Inpatient Progress Note      Patient name: Owen Patel  Patient : 1950  Patient MRN: [id number]  Consulting MD: Azam Fernandez MD  Date of service: 23      REASON FOR REFERRAL:  Management of chronic diastolic heart failure      PLAN OF CARE   74 y.o. male with h/o diastolic > systolic heart failure, HFpEF, LVEF 55-60%(echo 23), stage D, NYHA class IV symptoms, c/b renal and hepatic failure, GDMT limited due to hypotension  Cardiac MRI revealed infiltrative cardiac amyloidosis, PYP was strongly suggestive of ATTR amyloidosis and genetic testing positive for inherited ATTR  RHC with severely elevated filling pressures and normal resting index  Patient agreed for admission for inotrope-assisted diuresis; possible initiation of inotropes, palliative care meeting to address code status, limits of care   Patient declined admission from clinic , but agreed to come Monday, 23  Note: Family will require cascade genetic testing; they will reach out to [facility]  Pt with heavy alcohol use at home PTA; concern for pt's ability to manage fluid status at home; also need to clarify pts goals of care          RECOMMENDATIONS:  Continue low-dose inotrope milrinone 0.125mcg/kg/min to augment diuresis; not planning on discharge home on this  Plan to start vyndamax 61mg daily as outpatient, once patient assistance processed  Continue current dose of toprol XL 12.5mg daily  Known intolerance to lisinopril/ARB; not recommended in amyloid  Continue spironolactone 12.5mg daily  Cannot tolerate SGLT2i due to CrCl  Diuretic: increase bumex to 2mg IV TID ; goal net -1 to -2L next 24 hrs  Continue ASA   Pt has declined sleep study at this time  Monitor HF labs: CMP, NT-Pro BNP, Mg daily while diuresing  Heart failure education  Will need education on alcohol cessation  Appreciate Palliative Care consultation for further discussion of goals of care; noted DNR  Plan for diuresis and optimization of GDMT    Remainder of care per hospitalist        IMPRESSION:  Fluid overload  Chronic diastolic heart failure, HFpEF  Stage C, NYHA class IIIA symptoms  Non-ischemic cardiomyopathy, LVEF 55-60%  Severe LVH 2.1cm  cMRI suggestive of infiltrative cardiac amyloidosis  Intolerance to lisinopril  Inherited ATTR cardiac amyloidosis  Coronary artery disease  S/p CABG x 3 (4/2020) LIMA to LAD, SVG to RCA and Ramus  CHF s/p PM - pacemaker dependent  RBBB 144ms  Cardiac risk factors  HTN  HL  DM2  Liver dysfunction, TBili 2.8  CKD, stage 3  AT3 activity low  Proteic C low  Lupus anticoagulatn abnormal  Leukopenia  H/o prostate cancer  Elevated D Dimer  Hx DVT/PE: developed PE/DVT after bowel resection in 2012  History of rectal bleeding 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 Findings consistent with moderate to severe concentric hypertrophy; speckled pattern consider amyloid. Normal wall motion. Mitral Valve: Mild regurgitation. Left Atrium: Left atrium is moderately dilated. Right Atrium: Right atrium is moderately dilated. Echo (8/2022)    Left Ventricle: Low normal left ventricular systolic function. EF by 2D Simpsons Biplane is 50%. Left ventricle size is normal. Severely increased wall thickness. Findings consistent with severe concentric hypertrophy. There is a speckled appearance to the left ventricular myocardium. Consider testing for cardiac amyloidosis including possible cardiac MRI. Noted that the patient's Medtronic device is MRI compatible. Normal wall motion consistent with paced rhythm    Left Atrium: Left atrium is severely dilated. Left atrial volume index is severely increased (>48 mL/m2).     Right Atrium: Right atrium is moderately dilated. Mitral Valve: Mild to moderate regurgitation. Tricuspid Valve: Moderate regurgitation. Mildly elevated RVSP. The estimated RVSP is 38 mmHg. Cardiac MRI (9/2022)  1. Normal left ventricular cavity size. Severe concentric left ventricular  hypertrophy. Significantly hypertrophied septum measuring 21 mm. There is no  left ventricular outflow tract obstruction. Moderate left ventricular systolic  dysfunction. Moderate global hypokinesis with slight regional variation. 3-D  LVEF 43%. 2. Normal right ventricular size with minimal right ventricular systolic  dysfunction. 3-D RVEF 47%. 3. Moderate 2+ tricuspid regurgitation. 4. On EGE and LGE study, there is mild diffuse myocardial enhancement with  slightly poor nulling of the myocardium on postcontrast images. These findings  may suggest possibility of infiltrative cardiac amyloidosis. There is no  myocardial scar. There is no features of recent or old myocardial infarction. There is no features of infiltrative sarcoidosis. There is no features of  inflammatory myocarditis. All myocardial walls are viable. 5. Normal pericardium without any significant pericardial effusion. 6. Small right-sided pleural effusion and tiny left-sided pleural effusion. Lexiscan Myoview May 2021 with normal perfusion, calculated LVEF of 37%     RHC (1/18/23)  Moderately to severely elevated right and left-sided filling pressures. Moderate pulmonary hypertension of predominantly postcapillary origin. Right heart waveforms are likely consistent with restrictive physiology. Preserved cardiac output and index. RA 21, PCWP 25; CI 2.94      LIFE GOALS:  Lifestyle goals discussed with the patient. BRIEF HISTORY OF PRESENT ILLNESS:  Catalina Woodruff is a 74 y.o. male with extensive history as noted above. Pt was referred to [facility] for further evaluation and treatment of cardiac amyloidosis.   He was found to have significantly elevated filling pressures on RHC and was admitted for inotrope assisted diuresis. Now on admission, found to be heavy alcohol drinker and concerns for withdrawals. INTERVAL HISTORY:  NAEO  Inadequate diuresis; Net -589mL   Cr elevated; tbili improved, Pro BNP improved  VSS      REVIEW OF SYSTEMS:  Review of Systems   Constitutional:  Positive for malaise/fatigue. Negative for chills and fever. Respiratory:  Positive for shortness of breath. Cardiovascular:  Positive for leg swelling. Negative for chest pain and palpitations. Gastrointestinal:  Negative for heartburn, nausea and vomiting. Neurological:  Negative for dizziness and weakness. Psychiatric/Behavioral:  Positive for substance abuse. The patient is nervous/anxious. PHYSICAL EXAM:  Visit Vitals  /65 (BP 1 Location: Left upper arm, BP Patient Position: At rest)   Pulse 78   Temp 97.6 °F (36.4 °C)   Resp 16   Ht 5' 7\" (1.702 m)   Wt 147 lb 11.3 oz (67 kg)   SpO2 99%   BMI 23.13 kg/m²         Physical Exam  Vitals and nursing note reviewed. Constitutional:       Appearance: Normal appearance. Neck:      Vascular: Hepatojugular reflux and JVD present. Cardiovascular:      Rate and Rhythm: Normal rate and regular rhythm. Pulmonary:      Effort: Pulmonary effort is normal. No respiratory distress. Breath sounds: Normal breath sounds. Abdominal:      General: Bowel sounds are normal. There is no distension. Palpations: Abdomen is soft. Musculoskeletal:      Right lower le+ Pitting Edema present. Left lower le+ Pitting Edema present. Skin:     General: Skin is warm and dry. Capillary Refill: Capillary refill takes less than 2 seconds. Neurological:      General: No focal deficit present. Mental Status: He is alert and oriented to person, place, and time.             PAST MEDICAL HISTORY:  Past Medical History:   Diagnosis Date    Arthritis     Cancer [facility])     Prostate ca    Chronic kidney disease     Stage 3    Chronic pain Abdoman, back, fingers per daughter    Chronic systolic (congestive) heart failure 11/28/2022    Depression     Diabetes ([facility] [address]     Heart attack ([facility] [address]     Heart failure ([facility] [address] 2020    Dr. Telma Quiñones    Hyperlipidemia     Hypertension     Pacemaker 05/01/2020    MED    PE (pulmonary embolism)     Pneumonia     Sleep apnea     not use CPAP       PAST SURGICAL HISTORY:  Past Surgical History:   Procedure Laterality Date    COLONOSCOPY N/A 3/19/2021    COLONOSCOPY performed by Sarita Pena MD at [facility] ENDOSCOPY    COLONOSCOPY N/A 5/10/2021    COLONOSCOPY performed by Sar[facility] Pena MD at [facility] N/A 8/2/2021    FLEXIBLE SIGMOIDOSCOPY performed by Sarita Pena MD at [facility] ENDOSCOPY    HX BACK SURGERY  2014    HX CERVICAL LAMINECTOMY      HX CORONARY ARTERY BYPASS GRAFT  04/2021    X3     HX GI  11/2012    bowel resection    HX HEART CATHETERIZATION  04/2020    HX ORTHOPAEDIC      amputated left 4th finger    [place] INS NEW/RPLCMT PRM PM W/TRANSV ELTRD ATRIAL&VENT N/A 5/1/2020    INSERT PPM DUAL performed by Janice Morris MD at [facility] CATH LAB    [place] UNLISTED PROCEDURE ABDOMEN PERITONEUM & OMENTUM      bowel resection       FAMILY HISTORY:  Family History   Problem Relation Age of Onset    Cancer Mother         unknown    Heart Disease Father     Heart Disease Brother     Anesth Problems Neg Hx        SOCIAL HISTORY:  Social History     Socioeconomic History    Marital status:     Number of children: 1    Years of education: 12    Highest education level: High school graduate   Occupational History    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[employer], 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    Tobacco Use    Smoking status: Every Day     Types: Cigars    Smokeless tobacco: Current     Types: Chew    Tobacco comments:     Chews tobacco every day; cigars and black and milds    Vaping Use    Vaping Use: Never used   Substance and Sexual Activity    Alcohol use:  Yes     Alcohol/week: 16.0 standard drinks     Types: 2 Glasses of wine, 14 Cans of beer per week     Comment: 2-4 beers daily, sometimes alcohol    Drug use: Never    Sexual activity: Not Currently   Other Topics Concern     Service No    Blood Transfusions No    Caffeine Concern No    Occupational Exposure No    Hobby Hazards No    Sleep Concern Yes    Stress Concern Yes    Weight Concern No    Special Diet No    Back Care Yes    Exercise No    Seat Belt Yes   Social History Narrative    ** Merged History Encounter **         Lives in [place] [address] Determinants of Health     Financial Resource Strain: Low Risk     Difficulty of Paying Living Expenses: Not hard at all   Food Insecurity: No Food Insecurity    Worried About [address] in the Last Year: Never true    [address]  [employer] in the Last Year: Never true       LABORATORY RESULTS:     Labs Latest Ref Rng & Units 2/1/2023 1/31/2023 1/30/2023 1/4/2023   WBC 4.1 - 11.1 K/uL 3. 0(L) - 4.6 3.9   RBC 4.10 - 5.70 M/uL 3.80(L) - 4.18 4.09(L)   Hemoglobin 12.1 - 17.0 g/dL 11. 1(L) - 12.2 12. 1(L)   Hematocrit 36.6 - 50.3 % 33. 2(L) - 37.7 37.9   MCV 80.0 - 99.0 FL 87.4 - 90.2 93   Platelets 466 - 834 K/uL 168 - 162 157   Albumin 3.5 - 5.0 g/dL 3. 1(L) 3. 2(L) 3.4(L) 4.2   Calcium 8.5 - 10.1 MG/DL 8.8 8.9 9.2 9.3   Glucose 65 - 100 mg/dL 87 87 97 66(L)   BUN 6 - 20 MG/DL 29(H) 31(H) 31(H) 28(H)   Creatinine 0.70 - 1.30 MG/DL 1.83(H) 1.74(H) 1.82(H) 1.68(H)   Sodium 136 - 145 mmol/L 135(L) 137 134(L) 139   Potassium 3.5 - 5.1 mmol/L 5.0 4.3 HEMOLYZED,RECOLLECT REQUESTED 5.1   Some recent data might be hidden     Lab Results   Component Value Date/Time    TSH 4.380 10/12/2022 10:36 AM    TSH 2.88 08/29/2022 11:23 AM    TSH 2.26 05/04/2022 01:21 PM    TSH 3.680 06/16/2021 10:28 AM    TSH 3.450 03/10/2021 10:13 AM    TSH 2.070 08/19/2020 09:31 AM    TSH 1.98 04/25/2020 01:42 AM    TSH 2.400 01/09/2020 11:36 AM    TSH 2.220 09/18/2019 09:15 AM    TSH 2.360 06/25/2019 09:23 AM    TSH 2.900 09/17/2018 09:20 AM    TSH 2.100 05/09/2017 01:58 PM    TSH 2.320 04/12/2016 01:34 PM    TSH 2.070 06/03/2015 02:04 PM    TSH 2.71 11/28/2012 02:15 PM       ALLERGY:  Allergies   Allergen Reactions    Arb-Angiotensin Receptor Antagonist Other (comments)     High k    Statins-Hmg-Coa Reductase Inhibitors Myalgia    Ace Inhibitors Cough        CURRENT MEDICATIONS:    Current Facility-Administered Medications:     thiamine HCL (B-1) tablet 100 mg, 100 mg, Oral, DAILY, Sebastian Aldrich MD    folic acid (FOLVITE) tablet 1 mg, 1 mg, Oral, DAILY, Sebastian Aldrich MD    multivitamin, tx-iron-ca-min (THERA-M w/ IRON) tablet 1 Tablet, 1 Tablet, Oral, DAILY, Sebastian Aldrich MD    midodrine (PROAMATINE) tablet 5 mg, 5 mg, Oral, TID WITH MEALS, Sebastian Aldrich MD    bumetanide (BUMEX) injection 2 mg, 2 mg, IntraVENous, BID, Bubba Andino B, NP, 2 mg at 02/02/23 2585    albumin human 5% (BUMINATE) solution 25 g, 25 g, IntraVENous, ONCE PRN, Alton aMi MD    LORazepam (ATIVAN) tablet 2 mg, 2 mg, Oral, Q1H PRN, Alton Casillas MD    LORazepam (ATIVAN) tablet 4 mg, 4 mg, Oral, Q1H PRN, Alton Casillas MD    tamsulosin (FLOMAX) capsule 0.4 mg, 0.4 mg, Oral, DAILY, Patricia, Sanjana B, NP, 0.4 mg at 02/02/23 9356    aspirin delayed-release tablet 81 mg, 81 mg, Oral, DAILY, Patricia, Sanjana B, NP, 81 mg at 02/02/23 9660    cholecalciferol (VITAMIN D3) (1000 Units /25 mcg) tablet 1,000 Units, 1,000 Units, Oral, DAILY, Patricia, Sanjana B, NP, 1,000 Units at 02/02/23 8683    cyanocobalamin (VITAMIN B12) tablet 1,000 mcg, 1,000 mcg, Oral, DAILY, Patricia, Sanjana B, NP, 1,000 mcg at 02/02/23 0913    metoprolol succinate (TOPROL-XL) XL tablet 12.5 mg, 12.5 mg, Oral, DAILY, Patricia, Sanjana B, NP, 12.5 mg at 02/01/23 1138    sertraline (ZOLOFT) tablet 50 mg, 50 mg, Oral, DAILY, Patricia, Sanjana B, NP, 50 mg at 02/02/23 5729    spironolactone (ALDACTONE) tablet 12.5 mg, 12.5 mg, Oral, DAILY, Patricia, Sanjana B, NP, 12.5 mg at 02/01/23 1140 sodium chloride (NS) flush 5-40 mL, 5-40 mL, IntraVENous, Q8H, Patricia, Sanjana B, NP, 10 mL at 02/01/23 1400    sodium chloride (NS) flush 5-40 mL, 5-40 mL, IntraVENous, PRN, Patricia, Sanjana B, NP    acetaminophen (TYLENOL) tablet 650 mg, 650 mg, Oral, Q6H PRN **OR** acetaminophen (TYLENOL) suppository 650 mg, 650 mg, Rectal, Q6H PRN, Patricia, Sanjana B, NP    polyethylene glycol (MIRALAX) packet 17 g, 17 g, Oral, DAILY PRN, Patricia, Sanjana B, NP    ondansetron (ZOFRAN ODT) tablet 4 mg, 4 mg, Oral, Q8H PRN **OR** ondansetron (ZOFRAN) injection 4 mg, 4 mg, IntraVENous, Q6H PRN, Patricia, Sanjana B, NP    famotidine (PEPCID) tablet 20 mg, 20 mg, Oral, DAILY, Patricia, Sanjana B, NP, 20 mg at 02/02/23 0912    milrinone (PRIMACOR) 20 MG/100 ML D5W infusion, 0.125 mcg/kg/min, IntraVENous, CONTINUOUS, Patricia, Sanjana B, NP, Last Rate: 2.7 mL/hr at 02/02/23 0934, 0.125 mcg/kg/min at 02/02/23 0934    sodium chloride (NS) flush 5-40 mL, 5-40 mL, IntraVENous, Q8H, Nilson Marin MD, 10 mL at 02/02/23 0716    sodium chloride (NS) flush 5-40 mL, 5-40 mL, IntraVENous, PRN, Daniel Dan MD    glucose chewable tablet 16 g, 4 Tablet, Oral, PRN, Nilson Bartholomew MD    glucagon (GLUCAGEN) injection 1 mg, 1 mg, IntraMUSCular, PRN, Nilson Bartholomew MD    dextrose 10 % infusion 0-250 mL, 0-250 mL, IntraVENous, PRN, Daniel Dan MD    heparin (porcine) injection 5,000 Units, 5,000 Units, SubCUTAneous, Q8H, Daniel Dan MD, 5,000 Units at 02/02/23 0921    insulin lispro (HUMALOG) injection, , SubCUTAneous, AC&HS, Daniel Dan MD    PATIENT CARE TEAM:  Patient Care Team:  Marian Valdez MD as PCP - General (Internal Medicine Physician)  Marian Valdez MD as PCP - [facility]  Wilfred Nuñez MD as Physician (Cardiovascular Disease Physician)  Tiffany Gallego MD as Physician (Nephrology)  Suresh Rojas MD (Neurosurgery)  Wagner Ponce MD (Ophthalmology)  Isis Styles MD (Otolaryngology)  So Hough MD (Urology)  Thomas Powell MD (Ophthalmology)  Damien Sandifer, DPM (Podiatry)  Tamika Mckeon, PHD (Psychology)  Danna Campbell MD (Cardiothoracic Surgery)  Paola Marshall MD (Cardiovascular Disease Physician)  Peyton Hector MD (Cardiovascular Disease Physician)  Dee Raymundo MD as Physician (Nephrology)  Kadeem Doyle MD (Gastroenterology)  Soha Quintanilla MD (Radiation Oncology)  Emilio Cooper MD as Physician (Nephrology)  Roe Page NP as Nurse Practitioner (Nurse Practitioner)  Peyton Hector MD (Cardiovascular Disease Physician)  Radha Licea MD (Ophthalmology)     Thank you for allowing us to participate in this patient's care. Birgit Browning, MSN, AGACNP-[address], [address]  Phone: [phone]  Fax: [phone]

## 2023-02-02 NOTE — CONSULTS
Palliative Medicine Consult  Kael: [phone] ([phone])    Patient Name: Papi Givens  YOB: 1950    Date of Initial Consult: 2/1/23  Date of Today's Visit: 2/2/2023  Reason for Consult: end stage disease   Requesting Provider: Farida Maldonado  Primary Care Physician: Ivan Coyne MD     SUMMARY:   Papi Givens is a 74 y.o. 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 He was admitted on 1/30/2023  with a diagnosis of acute on chronic heart failure stage D, class NYHA class IV symptoms, with renal and hepatic failure, goal-directed medical therapy is limited due to hypotension. He was seen by Advance heart failure clinic on 1/24/2023 presented with shortness of breath declined admission but agreed to be admitted on 1/30/23, for inotrope assisted diuresis. PMH : Diabetes mellitus, tension, history of prostate cancer. Current medical issues leading to Palliative Medicine involvement include: care decisions in as setting of end stage heart failure, patient intolerant to GDMT. Social: Patient resides alone in his home ,  , has 4 daughters who are local, his dtr Consuelo is his care giver lives 30 mins away from him, At base line he is independent for Garrison Fender helps with house chores and transportation to doctors appointment, he has long standing hx of heavy drinking , mainly beer varies from a gallon a day to few bottles. Marilynn Gibson PALLIATIVE DIAGNOSES:   Debility due to chronic illness   Advance heart failure ( amylodoisis )  Pallaitive care encounter   Goals of care   DNR discussion   Renal and hepatic dysfunction due to # 2       PLAN:   Visited patient, with the his daughter Consuelo over the speaker phone, later on in the afternoon I met with patient and his daughter Chris Pineda in person.   Introduce my role  Patient has fair understanding of seriousness of his medical issues, and slowing down of his organs, he understands he will die soon. His daughter shared, based on her discussion with Dr. Jaleel Arreola, Mr. Joslyn Tran has a limited prognosis\" 6 months to 1 year\"  Code status discussion : Patient understand what CPR entails is very clear does not want resuscitation, his daughter support his decision for 'DNR\", later in the afternoon in the presence of her other daughter Ave Canavan he signed durable DNR, copy given to Ave Canavan, another copy and original placed in the chart. Goals of care: Daughter understand that the current level of treatment is is like a \"Band-Aid\", we talked about future trips to hospital for fluid overload and complication of heart failure versus's support of hospice at home to focus on quality of life for what ever time is left. Patient is eager to return to home, he understands hospice , not ready for hospice now, I have encouraged him to talk through with his family after discharge . We discuss , patient is weak, fall risk and not safe to live alone, patient has medicaid, he does not want to live alone , declines to live in long term care facility, daughters are considering to rotate amongst his 4 daughters to cover 24/7 care for him. I have coordinated with advance heart failure team Sean Weeks, plan is to wean off inotropes . Initial consult note routed to primary continuity provider and/or primary health care team members  Communicated plan of care with: Palliative Zoran KHANNA [facility] Team     GOALS OF CARE / TREATMENT PREFERENCES:     GOALS OF CARE:  Patient/Health Care Proxy Stated Goals:  (not ready for hospice now, may consider in future .)    TREATMENT PREFERENCES:   Code Status: DNR    Patient and family's personal goals include: wants to get better to return home . Important upcoming milestones or family events:      The patient identifies the following as important for living well: wants to return home       Advance Care Planning:  [x] [facility] Interdisciplinary Team has updated the ACP Navigator with Health Care Decision Maker and Patient Capacity      Primary Decision Maker: Hebert Narayanan - Daughter - [phone]    Primary Decision Maker: Karolina Hernandez - Daughter - [phone]  Advance Care Planning 4/27/2020   Patient's Healthcare Decision Maker is: -   Primary Decision Maker Name -   Primary Decision Maker Phone Number -   Primary Decision Maker Relationship to Patient -   Confirm Advance Directive None   Patient Would Like to Complete Advance Directive -       Medical Interventions:  (DNAR and DNI)       Other:    As far as possible, the palliative care team has discussed with patient / health care proxy about goals of care / treatment preferences for patient. HISTORY:     History obtained from: chart , patient and bed side RN     CHIEF COMPLAINT: Feeling better     HPI/SUBJECTIVE:    The patient is:   [] Verbal and participatory    Overall he is feeling better, swelling of feet is coming down , denies any dizziness during activity .     Clinical Pain Assessment (nonverbal scale for severity on nonverbal patients):   Clinical Pain Assessment  Severity: 0          Duration: for how long has pt been experiencing pain (e.g., 2 days, 1 month, years)  Frequency: how often pain is an issue (e.g., several times per day, once every few days, constant)     FUNCTIONAL ASSESSMENT:     Palliative Performance Scale (PPS):  PPS: 50       PSYCHOSOCIAL/SPIRITUAL SCREENING:     Palliative IDT has assessed this patient for cultural preferences / practices and a referral made as appropriate to needs (Cultural Services, Patient Advocacy, Ethics, etc.)    Any spiritual / Bahai concerns:  [] Yes /  [x] No   If \"Yes\" to discuss with pastoral care during IDT     Does caregiver feel burdened by caring for their loved one:   [] Yes /  [x] No /  [] No Caregiver Present/Available [] No Caregiver [] Pt Lives at Facility  If \"Yes\" to discuss with social work during IDT    Anticipatory grief assessment:   [x] Normal  / [] Maladaptive     If \"Maladaptive\" to discuss with social work during IDT    ESAS Anxiety: Anxiety: 2    ESAS Depression: Depression: 0        REVIEW OF SYSTEMS:     Positive and pertinent negative findings in ROS are noted above in HPI. The following systems were [x] reviewed / [] unable to be reviewed as noted in HPI  Other findings are noted below. Systems: constitutional, ears/nose/mouth/throat, respiratory, gastrointestinal, genitourinary, musculoskeletal, integumentary, neurologic, psychiatric, endocrine. Positive findings noted below. Modified ESAS Completed by: provider   Fatigue: 9 Drowsiness: 0   Depression: 0 Pain: 0   Anxiety: 2 Nausea: 0   Anorexia: 6 Dyspnea: 2     Constipation: No              PHYSICAL EXAM:     From RN flowsheet:  Wt Readings from Last 3 Encounters:   02/01/23 147 lb 11.3 oz (67 kg)   01/24/23 156 lb (70.8 kg)   01/18/23 165 lb (74.8 kg)     Blood pressure 99/61, pulse 67, temperature 97.4 °F (36.3 °C), resp. rate 13, height 5' 7\" (1.702 m), weight 147 lb 11.3 oz (67 kg), SpO2 100 %.     Pain Scale 1: Numeric (0 - 10)  Pain Intensity 1: 0                 Last bowel movement, if known:     Constitutional: age appropriate , not in any distress, in recliner , engages in conversation   Eyes: pupils equal, anicteric  ENMT: no nasal discharge, moist mucous membranes  Cardiovascular: regular rhythm, edema ++  Respiratory: breathing not labored, symmetric, not congested   Gastrointestinal: soft non-tender, +bowel sounds  Musculoskeletal: no deformity, no tenderness to palpation  Skin: warm, dry  Neurologic: following commands, moving all extremities  Psychiatric: mild anxious  no hallucinations  Other:       HISTORY:     Active Problems:    Acute on chronic HFrEF (heart failure with reduced ejection fraction) ([facility]) (1/30/2023)      CHF (congestive heart failure) ([facility] (1/30/2023)    Past Medical History: Diagnosis Date    Arthritis     Cancer [facility])     Prostate ca    Chronic kidney disease     Stage 3    Chronic pain     Abdoman, back, fingers per daughter    Chronic systolic (congestive) heart failure 11/28/2022    Depression     Diabetes ([facility] [address]     Heart attack ([facility] [address]     Heart failure ([facility] [address] 2020    Dr. Erasto Ralph    Hyperlipidemia     Hypertension     Pacemaker 05/01/2020    MED    PE (pulmonary embolism)     Pneumonia     Sleep apnea     not use CPAP      Past Surgical History:   Procedure Laterality Date    COLONOSCOPY N/A 3/19/2021    COLONOSCOPY performed by Sammy Sales MD at [facility] ENDOSCOPY    COLONOSCOPY N/A 5/10/2021    COLONOSCOPY performed by Sammy Sales MD at [place] [id number] N/A 8/2/2021    FLEXIBLE SIGMOIDOSCOPY performed by Sammy Sales MD at [facility] ENDOSCOPY    HX BACK SURGERY  2014    HX CERVICAL LAMINECTOMY      HX CORONARY ARTERY BYPASS GRAFT  04/2021    X3     HX GI  11/2012    bowel resection    HX HEART CATHETERIZATION  04/2020    HX ORTHOPAEDIC      amputated left 4th finger    [place] INS NEW/RPLCMT PRM PM W/TRANSV ELTRD ATRIAL&VENT N/A 5/1/2020    INSERT PPM DUAL performed by Izabela Felix MD at [address] CATH LAB    [place] UNLISTED PROCEDURE ABDOMEN PERITONEUM & OMENTUM      bowel resection      Family History   Problem Relation Age of Onset    Cancer Mother         unknown    Heart Disease Father     Heart Disease Brother     Anesth Problems Neg Hx       History reviewed, no pertinent family history. Social History     Tobacco Use    Smoking status: Every Day     Types: Cigars    Smokeless tobacco: Current     Types: Chew    Tobacco comments:     Chews tobacco every day; cigars and black and milds    Substance Use Topics    Alcohol use:  Yes     Alcohol/week: 16.0 standard drinks     Types: 2 Glasses of wine, 14 Cans of beer per week     Comment: 2-4 beers daily, sometimes alcohol     Allergies   Allergen Reactions    Arb-Angiotensin Receptor Antagonist Other (comments)     High k    Statins-Hmg-Coa Reductase Inhibitors Myalgia    Ace Inhibitors Cough      Current Facility-Administered Medications   Medication Dose Route Frequency    thiamine HCL (B-1) tablet 100 mg  100 mg Oral DAILY    folic acid (FOLVITE) tablet 1 mg  1 mg Oral DAILY    multivitamin, tx-iron-ca-min (THERA-M w/ IRON) tablet 1 Tablet  1 Tablet Oral DAILY    midodrine (PROAMATINE) tablet 5 mg  5 mg Oral TID WITH MEALS    bumetanide (BUMEX) injection 2 mg  2 mg IntraVENous BID    albumin human 5% (BUMINATE) solution 25 g  25 g IntraVENous ONCE PRN    LORazepam (ATIVAN) tablet 2 mg  2 mg Oral Q1H PRN    LORazepam (ATIVAN) tablet 4 mg  4 mg Oral Q1H PRN    tamsulosin (FLOMAX) capsule 0.4 mg  0.4 mg Oral DAILY    aspirin delayed-release tablet 81 mg  81 mg Oral DAILY    cholecalciferol (VITAMIN D3) (1000 Units /25 mcg) tablet 1,000 Units  1,000 Units Oral DAILY    cyanocobalamin (VITAMIN B12) tablet 1,000 mcg  1,000 mcg Oral DAILY    metoprolol succinate (TOPROL-XL) XL tablet 12.5 mg  12.5 mg Oral DAILY    sertraline (ZOLOFT) tablet 50 mg  50 mg Oral DAILY    spironolactone (ALDACTONE) tablet 12.5 mg  12.5 mg Oral DAILY    sodium chloride (NS) flush 5-40 mL  5-40 mL IntraVENous Q8H    sodium chloride (NS) flush 5-40 mL  5-40 mL IntraVENous PRN    acetaminophen (TYLENOL) tablet 650 mg  650 mg Oral Q6H PRN    Or    acetaminophen (TYLENOL) suppository 650 mg  650 mg Rectal Q6H PRN    polyethylene glycol (MIRALAX) packet 17 g  17 g Oral DAILY PRN    ondansetron (ZOFRAN ODT) tablet 4 mg  4 mg Oral Q8H PRN    Or    ondansetron (ZOFRAN) injection 4 mg  4 mg IntraVENous Q6H PRN    famotidine (PEPCID) tablet 20 mg  20 mg Oral DAILY    milrinone (PRIMACOR) 20 MG/100 ML D5W infusion  0.125 mcg/kg/min IntraVENous CONTINUOUS    sodium chloride (NS) flush 5-40 mL  5-40 mL IntraVENous Q8H    sodium chloride (NS) flush 5-40 mL  5-40 mL IntraVENous PRN    glucose chewable tablet 16 g  4 Tablet Oral PRN    glucagon (GLUCAGEN) injection 1 mg  1 mg IntraMUSCular PRN    dextrose 10 % infusion 0-250 mL  0-250 mL IntraVENous PRN    heparin (porcine) injection 5,000 Units  5,000 Units SubCUTAneous Q8H    insulin lispro (HUMALOG) injection   SubCUTAneous AC&HS          LAB AND IMAGING FINDINGS:     Lab Results   Component Value Date/Time    WBC 3.0 (L) 02/01/2023 02:01 AM    HGB 11.1 (L) 02/01/2023 02:01 AM    PLATELET 684 52/12/9881 02:01 AM     Lab Results   Component Value Date/Time    Sodium 137 02/02/2023 04:54 AM    Potassium 4.2 02/02/2023 04:54 AM    Chloride 104 02/02/2023 04:54 AM    CO2 25 02/02/2023 04:54 AM    BUN 32 (H) 02/02/2023 04:54 AM    Creatinine 2.11 (H) 02/02/2023 04:54 AM    Calcium 9.0 02/02/2023 04:54 AM    Magnesium 2.4 02/02/2023 04:54 AM    Phosphorus 4.2 02/01/2023 02:01 AM      Lab Results   Component Value Date/Time    Alk. phosphatase 61 02/02/2023 04:54 AM    Protein, total 7.2 02/02/2023 04:54 AM    Albumin 3.1 (L) 02/02/2023 04:54 AM    Globulin 4.1 (H) 02/02/2023 04:54 AM     Lab Results   Component Value Date/Time    INR 1.3 (H) 01/04/2023 10:55 AM    Prothrombin time 13.7 (H) 01/04/2023 10:55 AM    aPTT 29.1 05/03/2020 05:09 AM      Lab Results   Component Value Date/Time    Iron 69 10/12/2022 10:36 AM    TIBC 299 10/12/2022 10:36 AM    Iron % saturation 23 10/12/2022 10:36 AM    Ferritin 248 10/12/2022 10:36 AM      Lab Results   Component Value Date/Time    pH 7.46 (H) 11/21/2012 03:09 PM    PCO2 31 (L) 11/21/2012 03:09 PM    PO2 64 (L) 11/21/2012 03:09 PM     No components found for: [place]   Lab Results   Component Value Date/Time     04/26/2021 10:54 AM                Total time: 70 mins  Counseling / coordination time, spent as noted above: 50 mins  > 50% counseling / coordination?: yes     Prolonged service was provided for  []30 min   []75 min in face to face time in the presence of the patient, spent as noted above.   Time Start:   Time End:   Note: this can only be billed with 30026 (initial) or 41638 (follow up). If multiple start / stop times, list each separately.

## 2023-02-02 NOTE — PROGRESS NOTES
Occupational Therapy  2/2/2023    Attempted to see patient for OT session. Patient declining all offered ADLs, stating \"I just want to eat ice cream and be comfortable\". Daughter at bedside, reports patient has good family support at home. Considering returning home on hospice vs home with [address] [place] [address] [place]. Reports they completed palliative consult today. Will defer OT session and continue to follow.      Era Ying, JENNIFERD, OTR/L

## 2023-02-02 NOTE — PROGRESS NOTES
Problem: Mobility Impaired (Adult and Pediatric)  Goal: *Acute Goals and Plan of Care (Insert Text)  Description: FUNCTIONAL STATUS PRIOR TO ADMISSION: Patient was modified independent using a single point cane, rolling walker or no device for functional mobility, denies falls. HOME SUPPORT PRIOR TO ADMISSION: The patient lived alone with his daughters available to provide assistance if needed (transportation, cooking, cleaning). Physical Therapy Goals  Initiated 1/31/2023  1. Patient will move from supine to sit and sit to supine  in bed with modified independence within 7 day(s). 2.  Patient will transfer from bed to chair and chair to bed with modified independence using the least restrictive device within 7 day(s). 3.  Patient will perform sit to stand with modified independence within 7 day(s). 4.  Patient will ambulate with modified independence for 300 feet with the least restrictive device within 7 day(s). 5.  Patient will ascend/descend  3 stairs with handrail(s) with modified independence within 7 day(s). Outcome: Progressing Towards Goal       PHYSICAL THERAPY TREATMENT  Patient: Daved Hammans (3 y.o. male)  Date: 2/2/2023  Diagnosis: Acute on chronic HFrEF (heart failure with reduced ejection fraction) ([facility]) [I50.23]  CH[facility]congestive [facility] failure) [facility] [I50.9] <principal problem not specified>      Precautions: Fall  Chart, physical therapy assessment, plan of care and goals were reviewed. ASSESSMENT  Patient continues with skilled PT services and is progressing towards goals. Pt able to ambulate with SPC with no LOB. Pt is not expressing any concerns with mobility. Pt desires to discharge home. Current Level of Function Impacting Discharge (mobility/balance): CGA    Other factors to consider for discharge:          PLAN :  Patient continues to benefit from skilled intervention to address the above impairments. Continue treatment per established plan of care.   to address goals. Recommendation for discharge: (in order for the patient to meet his/her long term goals)  None vs HHPT    This discharge recommendation:  Has not yet been discussed the attending provider and/or case management    IF patient discharges home will need the following DME: patient owns DME required for discharge       SUBJECTIVE:   Patient stated I just want to go home.     OBJECTIVE DATA SUMMARY:   Critical Behavior:  Neurologic State: Alert  Orientation Level: Oriented X4  Cognition: Follows commands  Safety/Judgement: Awareness of environment  Functional Mobility Training:  Bed Mobility:                    Transfers:  Sit to Stand: Supervision  Stand to Sit: Supervision                             Balance:  Sitting: Intact  Standing: Intact; With support  Ambulation/Gait Training:  Distance (ft): 200 Feet (ft)  Assistive Device: Gait belt;Cane, straight  Ambulation - Level of Assistance: Stand-by assistance;Contact guard assistance        Gait Abnormalities: Decreased step clearance        Base of Support: Widened     Speed/Lois: Slow                   Stairs: Therapeutic Exercises:     Pain Rating:  Did not complain     Activity Tolerance:   Fair    After treatment patient left in no apparent distress:   Call bell within reach, Bed / chair alarm activated, and sitting EOB    COMMUNICATION/COLLABORATION:   The patients plan of care was discussed with: Registered nurse.      Jae Renee PTA   Time Calculation: 20 mins

## 2023-02-02 NOTE — PROGRESS NOTES
[address] [facility] Adult  Hospitalist Group                                                                                          Hospitalist Progress Note  Elly Reilly MD  Answering service: [phone] from in house phone        Date of Service:  2023  NAME:  Reji Andersen  :  1950  MRN:  [id number]      Admission Summary:     Patient admitted with acute on chronic CHF. Interval history / Subjective:     Patient's breathing is improving. Assessment & Plan:     Acute on chronic CHF  -Patient with acute on chronic combined systolic and diastolic CHF, NYHA class IV on admission  -Last echo with EF 55 to 60%  -On inotrope assisted diuresis, with milrinone drip and Bumex  -No intolerance to ACE or ARB or Arni, on Toprol and Aldactone, not tolerant to SGLT2 due to CKD  -Advanced heart failure team following  -Discussed with patient and patient's daughter present at bedside, patient voiced that he would like to go home, patient's daughter interested in home hospice once discharged to home    Coronary artery disease  -Patient has history of CABG  -Continue aspirin and beta-blocker    Hypertension  -Continue Toprol, monitor blood pressure    History of prostate cancer  -Follow as outpatient    Depression/anxiety  -Continue Zoloft    Alcohol abuse  -On [facility] protocol  -Start thiamine, folic acid and multivitamins     Code status: Full  Prophylaxis: Heparin  Care Plan discussed with: Patient and patient's daughter present at bedside. Anticipated Disposition: 24 to 48 hours    CRITICAL CARE ATTESTATION:  I had a face to face encounter with the patient, reviewed and interpreted patient data including clinical events, labs, images, vital signs, I/O's, and examined patient. I have discussed the case and the plan and management of the patient's care with the consulting services, the bedside nurses and necessary ancillary providers.        NOTE OF PERSONAL INVOLVEMENT IN CARE   This patient has a high probability of imminent, clinically significant deterioration, which requires the highest level of preparedness to intervene urgently. I participated in the decision-making and personally managed or directed the management of the following life and organ supporting interventions that required my frequent assessment to treat or prevent imminent deterioration. I personally spent 45 minutes of critical care time. This is time spent at this critically ill patient's bedside actively involved in patient care as well as the coordination of care and discussions with the patient's family. This does not include any procedural time which has been billed separately. Hospital Problems  Date Reviewed: 11/28/2022            Codes Class Noted POA    Acute on chronic HFrEF (heart failure with reduced ejection fraction) ([facility]) ICD-10-CM: I50.23  ICD-9-CM: 428.23  1/30/2023 Unknown        CHF (congestive heart failure) ([facility]) ICD-10-CM: I50.9  ICD-9-CM: 428.0  1/30/2023 Unknown               Review of Systems:   A comprehensive review of systems was negative except for that written in the HPI. Vital Signs:    Last 24hrs VS reviewed since prior progress note. Most recent are:  Visit Vitals  /65 (BP 1 Location: Left upper arm, BP Patient Position: At rest)   Pulse 79   Temp 97.6 °F (36.4 °C)   Resp 16   Ht 5' 7\" (1.702 m)   Wt 67 kg (147 lb 11.3 oz)   SpO2 99%   BMI 23.13 kg/m²         Intake/Output Summary (Last 24 hours) at 2/2/2023 4523  Last data filed at 2/2/2023 0400  Gross per 24 hour   Intake 290.68 ml   Output 800 ml   Net -509.32 ml        Physical Examination:     I had a face to face encounter with this patient and independently examined them on 2/2/2023 as outlined below:          Constitutional:  No acute distress, cooperative, pleasant    ENT:  Oral mucosa moist, oropharynx benign. Resp:  CTA bilaterally. No wheezing/rhonchi/rales. No accessory muscle use.     CV:  Regular rhythm, normal rate, no murmurs, gallops, rubs    GI:  Soft, non distended, non tender. normoactive bowel sounds, no hepatosplenomegaly     Musculoskeletal:  No edema, warm, 2+ pulses throughout    Neurologic:  Moves all extremities. AAOx3, CN II-XII reviewed            Data Review:    Review and/or order of clinical lab test    I have independently reviewed and interpreted patient's lab and all other diagnostic data    Notes reviewed from all clinical/nonclinical/nursing services involved in patient's clinical care. Care coordination discussions were held with appropriate clinical/nonclinical/ nursing providers based on care coordination needs. Labs:     Recent Labs     02/01/23  0201 01/30/23  1342   WBC 3.0* 4.6   HGB 11.1* 12.2   HCT 33.2* 37.7    162     Recent Labs     02/02/23  0454 02/01/23  0201 01/31/23  0217 01/30/23  1342   NA  --  135* 137 134*   K  --  5.0 4.3 HEMOLYZED,RECOLLECT REQUESTED   CL  --  103 108 108   CO2  --  26 24 26   BUN  --  29* 31* 31*   CREA  --  1.83* 1.74* 1.82*   GLU  --  87 87 97   CA  --  8.8 8.9 9.2   MG 2.4 2.3 2.4 HEMOLYZED,RECOLLECT REQUESTED   PHOS  --  4.2  --   --      Recent Labs     02/01/23  0201 01/31/23 0217 01/30/23  1342   ALT 21 19 24   AP 59 62 70   TBILI 2.6* 2.5* 2.3*   TP 7.0 7.1 8.2   ALB 3.1* 3.2* 3.4*   GLOB 3.9 3.9 4.8*     No results for input(s): INR, PTP, APTT, INREXT in the last 72 hours. No results for input(s): FE, TIBC, PSAT, FERR in the last 72 hours. Lab Results   Component Value Date/Time    Folate 12.0 01/21/2013 07:55 AM      No results for input(s): PH, PCO2, PO2 in the last 72 hours. No results for input(s): CPK, CKNDX, TROIQ in the last 72 hours.     No lab exists for component: CPKMB  Lab Results   Component Value Date/Time    Cholesterol, total 74 (L) 01/04/2023 10:55 AM    HDL Cholesterol 41 01/04/2023 10:55 AM    LDL, calculated 20 01/04/2023 10:55 AM    LDL, calculated 64.4 08/29/2022 11:23 AM    Triglyceride 49 01/04/2023 10:55 AM CHOL/HDL Ratio 2.4 08/29/2022 11:23 AM     Lab Results   Component Value Date/Time    Glucose (POC) 86 02/02/2023 06:47 AM    Glucose (POC) 148 (H) 02/01/2023 09:05 PM    Glucose (POC) 123 (H) 02/01/2023 04:12 PM    Glucose (POC) 99 02/01/2023 11:10 AM    Glucose (POC) 68 02/01/2023 07:10 AM     Lab Results   Component Value Date/Time    Color YELLOW/STRAW 02/27/2021 09:14 PM    Appearance CLEAR 02/27/2021 09:14 PM    Specific gravity 1.022 02/27/2021 09:14 PM    Specific gravity 1.010 12/12/2012 03:15 PM    pH (UA) 5.0 02/27/2021 09:14 PM    Protein 100 (A) 02/27/2021 09:14 PM    Glucose Negative 02/27/2021 09:14 PM    Ketone TRACE (A) 02/27/2021 09:14 PM    Bilirubin Negative 02/27/2021 09:14 PM    Urobilinogen 0.2 02/27/2021 09:14 PM    Nitrites Negative 02/27/2021 09:14 PM    Leukocyte Esterase Negative 02/27/2021 09:14 PM    Epithelial cells FEW 02/27/2021 09:14 PM    Bacteria Negative 02/27/2021 09:14 PM    WBC 0-4 02/27/2021 09:14 PM    RBC 0-5 02/27/2021 09:14 PM         Medications Reviewed:     Current Facility-Administered Medications   Medication Dose Route Frequency    bumetanide (BUMEX) injection 2 mg  2 mg IntraVENous BID    albumin human 5% (BUMINATE) solution 25 g  25 g IntraVENous ONCE PRN    LORazepam (ATIVAN) tablet 2 mg  2 mg Oral Q1H PRN    LORazepam (ATIVAN) tablet 4 mg  4 mg Oral Q1H PRN    tamsulosin (FLOMAX) capsule 0.4 mg  0.4 mg Oral DAILY    aspirin delayed-release tablet 81 mg  81 mg Oral DAILY    cholecalciferol (VITAMIN D3) (1000 Units /25 mcg) tablet 1,000 Units  1,000 Units Oral DAILY    cyanocobalamin (VITAMIN B12) tablet 1,000 mcg  1,000 mcg Oral DAILY    metoprolol succinate (TOPROL-XL) XL tablet 12.5 mg  12.5 mg Oral DAILY    sertraline (ZOLOFT) tablet 50 mg  50 mg Oral DAILY    spironolactone (ALDACTONE) tablet 12.5 mg  12.5 mg Oral DAILY    sodium chloride (NS) flush 5-40 mL  5-40 mL IntraVENous Q8H    sodium chloride (NS) flush 5-40 mL  5-40 mL IntraVENous PRN acetaminophen (TYLENOL) tablet 650 mg  650 mg Oral Q6H PRN    Or    acetaminophen (TYLENOL) suppository 650 mg  650 mg Rectal Q6H PRN    polyethylene glycol (MIRALAX) packet 17 g  17 g Oral DAILY PRN    ondansetron (ZOFRAN ODT) tablet 4 mg  4 mg Oral Q8H PRN    Or    ondansetron (ZOFRAN) injection 4 mg  4 mg IntraVENous Q6H PRN    famotidine (PEPCID) tablet 20 mg  20 mg Oral DAILY    milrinone (PRIMACOR) 20 MG/100 ML D5W infusion  0.125 mcg/kg/min IntraVENous CONTINUOUS    sodium chloride (NS) flush 5-40 mL  5-40 mL IntraVENous Q8H    sodium chloride (NS) flush 5-40 mL  5-40 mL IntraVENous PRN    glucose chewable tablet 16 g  4 Tablet Oral PRN    glucagon (GLUCAGEN) injection 1 mg  1 mg IntraMUSCular PRN    dextrose 10 % infusion 0-250 mL  0-250 mL IntraVENous PRN    heparin (porcine) injection 5,000 Units  5,000 Units SubCUTAneous Q8H    insulin lispro (HUMALOG) injection   SubCUTAneous AC&HS     ______________________________________________________________________  EXPECTED LENGTH OF STAY: 3d 21h  ACTUAL LENGTH OF STAY:          3                 Minnie Phoenix MD

## 2023-02-02 NOTE — PROGRESS NOTES
0730: Bedside and Verbal shift change report given Carolann Pina(oncoming nurse) by Cash Wellington (offgoing nurse). Report included the following information SBAR, Kardex, Intake/Output, MAR, Recent Results, Med Rec Status, and Cardiac Rhythm V-paced .

## 2023-02-03 LAB
ALBUMIN SERPL-MCNC: 3.3 G/DL (ref 3.5–5)
ALBUMIN/GLOB SERPL: 0.7 (ref 1.1–2.2)
ALP SERPL-CCNC: 67 U/L (ref 45–117)
ALT SERPL-CCNC: 21 U/L (ref 12–78)
ANION GAP SERPL CALC-SCNC: 8 MMOL/L (ref 5–15)
AST SERPL-CCNC: 30 U/L (ref 15–37)
BILIRUB SERPL-MCNC: 1.8 MG/DL (ref 0.2–1)
BNP SERPL-MCNC: 2859 PG/ML
BUN SERPL-MCNC: 37 MG/DL (ref 6–20)
BUN/CREAT SERPL: 18 (ref 12–20)
CALCIUM SERPL-MCNC: 9.5 MG/DL (ref 8.5–10.1)
CHLORIDE SERPL-SCNC: 101 MMOL/L (ref 97–108)
CO2 SERPL-SCNC: 27 MMOL/L (ref 21–32)
CREAT SERPL-MCNC: 2.01 MG/DL (ref 0.7–1.3)
GLOBULIN SER CALC-MCNC: 4.7 G/DL (ref 2–4)
GLUCOSE BLD STRIP.AUTO-MCNC: 111 MG/DL (ref 65–117)
GLUCOSE BLD STRIP.AUTO-MCNC: 215 MG/DL (ref 65–117)
GLUCOSE BLD STRIP.AUTO-MCNC: 86 MG/DL (ref 65–117)
GLUCOSE BLD STRIP.AUTO-MCNC: 96 MG/DL (ref 65–117)
GLUCOSE SERPL-MCNC: 100 MG/DL (ref 65–100)
MAGNESIUM SERPL-MCNC: 2.5 MG/DL (ref 1.6–2.4)
POTASSIUM SERPL-SCNC: 4 MMOL/L (ref 3.5–5.1)
PROT SERPL-MCNC: 8 G/DL (ref 6.4–8.2)
SERVICE CMNT-IMP: ABNORMAL
SERVICE CMNT-IMP: NORMAL
SODIUM SERPL-SCNC: 136 MMOL/L (ref 136–145)

## 2023-02-03 PROCEDURE — 74011250637 HC RX REV CODE- 250/637: Performed by: NURSE PRACTITIONER

## 2023-02-03 PROCEDURE — 74011000250 HC RX REV CODE- 250: Performed by: NURSE PRACTITIONER

## 2023-02-03 PROCEDURE — 80053 COMPREHEN METABOLIC PANEL: CPT

## 2023-02-03 PROCEDURE — 74011636637 HC RX REV CODE- 636/637: Performed by: FAMILY MEDICINE

## 2023-02-03 PROCEDURE — 74011000250 HC RX REV CODE- 250: Performed by: FAMILY MEDICINE

## 2023-02-03 PROCEDURE — 99233 SBSQ HOSP IP/OBS HIGH 50: CPT | Performed by: NURSE PRACTITIONER

## 2023-02-03 PROCEDURE — 74011250636 HC RX REV CODE- 250/636: Performed by: FAMILY MEDICINE

## 2023-02-03 PROCEDURE — 36415 COLL VENOUS BLD VENIPUNCTURE: CPT

## 2023-02-03 PROCEDURE — 82962 GLUCOSE BLOOD TEST: CPT

## 2023-02-03 PROCEDURE — 74011250637 HC RX REV CODE- 250/637: Performed by: FAMILY MEDICINE

## 2023-02-03 PROCEDURE — 83735 ASSAY OF MAGNESIUM: CPT

## 2023-02-03 PROCEDURE — 65660000001 HC RM ICU INTERMED STEPDOWN

## 2023-02-03 PROCEDURE — 83880 ASSAY OF NATRIURETIC PEPTIDE: CPT

## 2023-02-03 RX ADMIN — SPIRONOLACTONE 12.5 MG: 25 TABLET ORAL at 09:37

## 2023-02-03 RX ADMIN — Medication 1 TABLET: at 09:37

## 2023-02-03 RX ADMIN — Medication 100 MG: at 09:37

## 2023-02-03 RX ADMIN — FOLIC ACID 1 MG: 1 TABLET ORAL at 09:37

## 2023-02-03 RX ADMIN — BUMETANIDE 2 MG: 0.25 INJECTION, SOLUTION INTRAMUSCULAR; INTRAVENOUS at 11:38

## 2023-02-03 RX ADMIN — MIDODRINE HYDROCHLORIDE 5 MG: 5 TABLET ORAL at 11:38

## 2023-02-03 RX ADMIN — ASPIRIN 81 MG: 81 TABLET, COATED ORAL at 09:37

## 2023-02-03 RX ADMIN — SODIUM CHLORIDE, PRESERVATIVE FREE 10 ML: 5 INJECTION INTRAVENOUS at 06:42

## 2023-02-03 RX ADMIN — FAMOTIDINE 20 MG: 20 TABLET, FILM COATED ORAL at 09:37

## 2023-02-03 RX ADMIN — SERTRALINE HYDROCHLORIDE 50 MG: 50 TABLET ORAL at 09:37

## 2023-02-03 RX ADMIN — MIDODRINE HYDROCHLORIDE 5 MG: 5 TABLET ORAL at 18:32

## 2023-02-03 RX ADMIN — HEPARIN SODIUM 5000 UNITS: 5000 INJECTION INTRAVENOUS; SUBCUTANEOUS at 09:38

## 2023-02-03 RX ADMIN — ACETAMINOPHEN 650 MG: 325 TABLET ORAL at 13:55

## 2023-02-03 RX ADMIN — BUMETANIDE 2 MG: 0.25 INJECTION, SOLUTION INTRAMUSCULAR; INTRAVENOUS at 18:32

## 2023-02-03 RX ADMIN — METOPROLOL SUCCINATE 12.5 MG: 25 TABLET, EXTENDED RELEASE ORAL at 09:37

## 2023-02-03 RX ADMIN — MIDODRINE HYDROCHLORIDE 5 MG: 5 TABLET ORAL at 09:50

## 2023-02-03 RX ADMIN — BUMETANIDE 2 MG: 0.25 INJECTION, SOLUTION INTRAMUSCULAR; INTRAVENOUS at 06:47

## 2023-02-03 RX ADMIN — TAMSULOSIN HYDROCHLORIDE 0.4 MG: 0.4 CAPSULE ORAL at 09:37

## 2023-02-03 RX ADMIN — CYANOCOBALAMIN TAB 500 MCG 1000 MCG: 500 TAB at 09:37

## 2023-02-03 RX ADMIN — Medication 1000 UNITS: at 09:37

## 2023-02-03 RX ADMIN — Medication 3 UNITS: at 18:32

## 2023-02-03 NOTE — PROGRESS NOTES
[facility] in [place], [place]  Inpatient Progress Note      Patient name: Inez Wagner  Patient : 1950  Patient MRN: [id number]  Consulting MD: Jackson Hernandez MD  Date of service: 23      REASON FOR REFERRAL:  Management of chronic diastolic heart failure      PLAN OF CARE   74 y.o. male with h/o diastolic > systolic heart failure, HFpEF, LVEF 55-60%(echo 23), stage D, NYHA class IV symptoms, c/b renal and hepatic failure, GDMT limited due to hypotension  Cardiac MRI revealed infiltrative cardiac amyloidosis, PYP was strongly suggestive of ATTR amyloidosis and genetic testing positive for inherited ATTR  RHC with severely elevated filling pressures and normal resting index  Patient agreed for admission for inotrope-assisted diuresis; possible initiation of inotropes, palliative care meeting to address code status, limits of care   Patient declined admission from clinic , but agreed to come Monday, 23  Note: Family will require cascade genetic testing; they will reach out to [facility] first  Pt with heavy alcohol use at home PTA; concern for pt's ability to manage fluid status at home; also need to clarify pts goals of care          RECOMMENDATIONS:  Continue low-dose inotrope milrinone 0.125mcg/kg/min to augment diuresis; not planning on discharge home on this  Plan to start vyndamax 61mg daily as outpatient; unless pt decides to go home on hospice, in which case would be reasonable to stop  Continue current dose of toprol XL 12.5mg daily  Known intolerance to lisinopril/ARB; not recommended in amyloid  Continue spironolactone 12.5mg daily  Cannot tolerate SGLT2i due to CrCl  Diuretic: continue bumex 2mg IV TID ; goal net -1 to -2L next 24 hrs  Continue ASA   Pt has declined sleep study at this time  Monitor HF labs: CMP, NT-Pro BNP, Mg daily while diuresing  Heart failure education  Will need education on alcohol cessation  Appreciate Palliative Care consultation for further discussion of goals of care; noted DNR  Plan for diuresis and optimization of GDMT    Remainder of care per hospitalist        IMPRESSION:  Fluid overload  Chronic diastolic heart failure, HFpEF  Stage C, NYHA class IIIA symptoms  Non-ischemic cardiomyopathy, LVEF 55-60%  Severe LVH 2.1cm  cMRI suggestive of infiltrative cardiac amyloidosis  Intolerance to lisinopril  Inherited ATTR cardiac amyloidosis  Coronary artery disease  S/p CABG x 3 (4/2020) LIMA to LAD, SVG to RCA and Ramus  CHF s/p PM - pacemaker dependent  RBBB 144ms  Cardiac risk factors  HTN  HL  DM2  Liver dysfunction, TBili 2.8  CKD, stage 3  AT3 activity low  Proteic C low  Lupus anticoagulatn abnormal  Leukopenia  H/o prostate cancer  Elevated D Dimer  Hx DVT/PE: developed PE/DVT after bowel resection in 2012  History of rectal bleeding 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 Findings consistent with moderate to severe concentric hypertrophy; speckled pattern consider amyloid. Normal wall motion. Mitral Valve: Mild regurgitation. Left Atrium: Left atrium is moderately dilated. Right Atrium: Right atrium is moderately dilated. Echo (8/2022)    Left Ventricle: Low normal left ventricular systolic function. EF by 2D Simpsons Biplane is 50%. Left ventricle size is normal. Severely increased wall thickness. Findings consistent with severe concentric hypertrophy. There is a speckled appearance to the left ventricular myocardium. Consider testing for cardiac amyloidosis including possible cardiac MRI. Noted that the patient's Medtronic device is MRI compatible. Normal wall motion consistent with paced rhythm    Left Atrium: Left atrium is severely dilated.  Left atrial volume index is severely increased (>48 mL/m2). Right Atrium: Right atrium is moderately dilated. Mitral Valve: Mild to moderate regurgitation. Tricuspid Valve: Moderate regurgitation. Mildly elevated RVSP. The estimated RVSP is 38 mmHg. Cardiac MRI (9/2022)  1. Normal left ventricular cavity size. Severe concentric left ventricular  hypertrophy. Significantly hypertrophied septum measuring 21 mm. There is no  left ventricular outflow tract obstruction. Moderate left ventricular systolic  dysfunction. Moderate global hypokinesis with slight regional variation. 3-D  LVEF 43%. 2. Normal right ventricular size with minimal right ventricular systolic  dysfunction. 3-D RVEF 47%. 3. Moderate 2+ tricuspid regurgitation. 4. On EGE and LGE study, there is mild diffuse myocardial enhancement with  slightly poor nulling of the myocardium on postcontrast images. These findings  may suggest possibility of infiltrative cardiac amyloidosis. There is no  myocardial scar. There is no features of recent or old myocardial infarction. There is no features of infiltrative sarcoidosis. There is no features of  inflammatory myocarditis. All myocardial walls are viable. 5. Normal pericardium without any significant pericardial effusion. 6. Small right-sided pleural effusion and tiny left-sided pleural effusion. Lexiscan Myoview May 2021 with normal perfusion, calculated LVEF of 37%     RHC (1/18/23)  Moderately to severely elevated right and left-sided filling pressures. Moderate pulmonary hypertension of predominantly postcapillary origin. Right heart waveforms are likely consistent with restrictive physiology. Preserved cardiac output and index. RA 21, PCWP 25; CI 2.94      LIFE GOALS:  Lifestyle goals discussed with the patient. BRIEF HISTORY OF PRESENT ILLNESS:  Kaila Swartz is a 74 y.o. male with extensive history as noted above. Pt was referred to [facility] for further evaluation and treatment of cardiac amyloidosis.   He was found to have significantly elevated filling pressures on RHC and was admitted for inotrope assisted diuresis. Now on admission, found to be heavy alcohol drinker and concerns for withdrawals. INTERVAL HISTORY:  NAEO  Net -2.4L  Cr and tbili improved, Pro BNP improved  VSS  Pt says, \" I just want to go home and eat icecream\"      REVIEW OF SYSTEMS:  Review of Systems   Constitutional:  Positive for malaise/fatigue. Negative for chills and fever. Respiratory:  Positive for shortness of breath. Cardiovascular:  Positive for leg swelling. Negative for chest pain and palpitations. Gastrointestinal:  Negative for heartburn, nausea and vomiting. Neurological:  Negative for dizziness and weakness. Psychiatric/Behavioral:  Positive for substance abuse. The patient is nervous/anxious. PHYSICAL EXAM:  Visit Vitals  BP (!) 99/59 (BP 1 Location: Left upper arm, BP Patient Position: At rest)   Pulse 84   Temp 97.5 °F (36.4 °C)   Resp 13   Ht 5' 7\" (1.702 m)   Wt 141 lb 8.6 oz (64.2 kg)   SpO2 97%   BMI 22.17 kg/m²         Physical Exam  Vitals and nursing note reviewed. Constitutional:       Appearance: Normal appearance. Neck:      Vascular: Hepatojugular reflux and JVD present. Cardiovascular:      Rate and Rhythm: Normal rate and regular rhythm. Pulmonary:      Effort: Pulmonary effort is normal. No respiratory distress. Breath sounds: Normal breath sounds. Abdominal:      General: Bowel sounds are normal. There is no distension. Palpations: Abdomen is soft. Musculoskeletal:      Right lower le+ Pitting Edema present. Left lower le+ Pitting Edema present. Skin:     General: Skin is warm and dry. Capillary Refill: Capillary refill takes less than 2 seconds. Neurological:      General: No focal deficit present. Mental Status: He is alert and oriented to person, place, and time.             PAST MEDICAL HISTORY:  Past Medical History:   Diagnosis Date    Arthritis     Cancer ([facility] [address] Prostate ca    Chronic kidney disease     Stage 3    Chronic pain     Abdoman, back, fingers per daughter    Chronic systolic (congestive) heart failure 11/28/2022    Depression     Diabetes ([address]     Heart attack ([facility] [address]     Heart failure ([facility] [address] 2020    Dr. Keith Brought    Hyperlipidemia     Hypertension     Pacemaker 05/01/2020    MED    PE (pulmonary embolism)     Pneumonia     Sleep apnea     not use CPAP       PAST SURGICAL HISTORY:  Past Surgical History:   Procedure Laterality Date    COLONOSCOPY N/A 3/19/2021    COLONOSCOPY performed by Michael Odonnell MD at [facility] ENDOSCOPY    COLONOSCOPY N/A 5/10/2021    COLONOSCOPY performed by Michael Odonnell MD at [facility] N/A 8/2/2021    FLEXIBLE SIGMOIDOSCOPY performed by Michael Odonnell MD at [facility] ENDOSCOPY    HX BACK SURGERY  2014    HX CERVICAL LAMINECTOMY      HX CORONARY ARTERY BYPASS GRAFT  04/2021    X3     HX GI  11/2012    bowel resection    HX HEART CATHETERIZATION  04/2020    HX ORTHOPAEDIC      amputated left 4th finger    KS INS NEW/RPLCMT PRM PM W/TRANSV ELTRD ATRIAL&VENT N/A 5/1/2020    INSERT PPM DUAL performed by Xiomy Kim MD at [facility] CATH LAB    KS UNLISTED PROCEDURE ABDOMEN PERITONEUM & OMENTUM      bowel resection       FAMILY HISTORY:  Family History   Problem Relation Age of Onset    Cancer Mother         unknown    Heart Disease Father     Heart Disease Brother     Anesth Problems Neg Hx        SOCIAL HISTORY:  Social History     Socioeconomic History    Marital status:     Number of children: 1    Years of education: 12    Highest education level: High school graduate   Occupational History    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[employer], 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    Tobacco Use    Smoking status: Every Day     Types: Cigars    Smokeless tobacco: Current     Types: Chew    Tobacco comments:     Chews tobacco every day; cigars and black and milds    Vaping Use    Vaping Use: Never used Substance and Sexual Activity    Alcohol use: Yes     Alcohol/week: 16.0 standard drinks     Types: 2 Glasses of wine, 14 Cans of beer per week     Comment: 2-4 beers daily, sometimes alcohol    Drug use: Never    Sexual activity: Not Currently   Other Topics Concern     Service No    Blood Transfusions No    Caffeine Concern No    Occupational Exposure No    Hobby Hazards No    Sleep Concern Yes    Stress Concern Yes    Weight Concern No    Special Diet No    Back Care Yes    Exercise No    Seat Belt Yes   Social History Narrative    ** Merged History Encounter **         Lives in [place] [address] Determinants of Health     Financial Resource Strain: Low Risk     Difficulty of Paying Living Expenses: Not hard at all   Food Insecurity: No Food Insecurity    Worried About [address] in the Last Year: Never true    [address] [facility] in the Last Year: Never true       LABORATORY RESULTS:     Labs Latest Ref Rng & Units 2/3/2023 2/2/2023 2/1/2023 1/31/2023 1/30/2023 1/4/2023   WBC 4.1 - 11.1 K/uL - - 3. 0(L) - 4.6 3.9   RBC 4.10 - 5.70 M/uL - - 3.80(L) - 4.18 4.09(L)   Hemoglobin 12.1 - 17.0 g/dL - - 11. 1(L) - 12.2 12. 1(L)   Hematocrit 36.6 - 50.3 % - - 33. 2(L) - 37.7 37.9   MCV 80.0 - 99.0 FL - - 87.4 - 90.2 93   Platelets 603 - 588 K/uL - - 168 - 162 157   Albumin 3.5 - 5.0 g/dL 3. 3(L) 3. 1(L) 3. 1(L) 3. 2(L) 3.4(L) 4.2   Calcium 8.5 - 10.1 MG/DL 9.5 9.0 8.8 8.9 9.2 9.3   Glucose 65 - 100 mg/dL 100 83 87 87 97 66(L)   BUN 6 - 20 MG/DL 37(H) 32(H) 29(H) 31(H) 31(H) 28(H)   Creatinine 0.70 - 1.30 MG/DL 2.01(H) 2.11(H) 1.83(H) 1.74(H) 1.82(H) 1.68(H)   Sodium 136 - 145 mmol/L 136 137 135(L) 137 134(L) 139   Potassium 3.5 - 5.1 mmol/L 4.0 4.2 5.0 4.3 HEMOLYZED,RECOLLECT REQUESTED 5.1   Some recent data might be hidden     Lab Results   Component Value Date/Time    TSH 4.380 10/12/2022 10:36 AM    TSH 2.88 08/29/2022 11:23 AM    TSH 2.26 05/04/2022 01:21 PM    TSH 3.680 06/16/2021 10:28 AM    TSH 3.450 03/10/2021 10:13 AM    TSH 2.070 08/19/2020 09:31 AM    TSH 1.98 04/25/2020 01:42 AM    TSH 2.400 01/09/2020 11:36 AM    TSH 2.220 09/18/2019 09:15 AM    TSH 2.360 06/25/2019 09:23 AM    TSH 2.900 09/17/2018 09:20 AM    TSH 2.100 05/09/2017 01:58 PM    TSH 2.320 04/12/2016 01:34 PM    TSH 2.070 06/03/2015 02:04 PM    TSH 2.71 11/28/2012 02:15 PM       ALLERGY:  Allergies   Allergen Reactions    Arb-Angiotensin Receptor Antagonist Other (comments)     High k    Statins-Hmg-Coa Reductase Inhibitors Myalgia    Ace Inhibitors Cough        CURRENT MEDICATIONS:    Current Facility-Administered Medications:     thiamine HCL (B-1) tablet 100 mg, 100 mg, Oral, DAILY, Sebastian Aldrich MD, 100 mg at 29/63/90 3233    folic acid (FOLVITE) tablet 1 mg, 1 mg, Oral, DAILY, Sebastian Aldrich MD, 1 mg at 02/03/23 1283    multivitamin, tx-iron-ca-min (THERA-M w/ IRON) tablet 1 Tablet, 1 Tablet, Oral, DAILY, Sebastian Aldrich MD, 1 Tablet at 02/03/23 0937    midodrine (PROAMATINE) tablet 5 mg, 5 mg, Oral, TID WITH MEALS, Sebastian Aldrich MD, 5 mg at 02/03/23 0950    bumetanide (BUMEX) injection 2 mg, 2 mg, IntraVENous, TID, Nicole BREWSTER NP, 2 mg at 02/03/23 0544    nicotine (NICODERM CQ) 21 mg/24 hr patch 1 Patch, 1 Patch, TransDERmal, DAILY, Mariana Pizarro NP, 1 Patch at 02/02/23 2226    LORazepam (ATIVAN) tablet 2 mg, 2 mg, Oral, Q1H PRN, Court Olszewski, Ilona E, MD    LORazepam (ATIVAN) tablet 4 mg, 4 mg, Oral, Q1H PRN, Court Olszewski, Ilona E, MD    tamsulosin (FLOMAX) capsule 0.4 mg, 0.4 mg, Oral, DAILY, Patricia, Sanjana B, NP, 0.4 mg at 02/03/23 6109    aspirin delayed-release tablet 81 mg, 81 mg, Oral, DAILY, Patricia, Sanjana B, NP, 81 mg at 02/03/23 8636    cholecalciferol (VITAMIN D3) (1000 Units /25 mcg) tablet 1,000 Units, 1,000 Units, Oral, DAILY, Patricia, Sanjana B, NP, 1,000 Units at 02/03/23 4921    cyanocobalamin (VITAMIN B12) tablet 1,000 mcg, 1,000 mcg, Oral, DAILY, Patricia, Sanjana B, NP, 1,000 mcg at 02/03/23 0937    metoprolol succinate (TOPROL-XL) XL tablet 12.5 mg, 12.5 mg, Oral, DAILY, Patricia, Sanjana B, NP, 12.5 mg at 02/03/23 5173    sertraline (ZOLOFT) tablet 50 mg, 50 mg, Oral, DAILY, Patricia, Sanjana B, NP, 50 mg at 02/03/23 0184    spironolactone (ALDACTONE) tablet 12.5 mg, 12.5 mg, Oral, DAILY, Patricia, Sanjana B, NP, 12.5 mg at 02/03/23 0937    sodium chloride (NS) flush 5-40 mL, 5-40 mL, IntraVENous, Q8H, Patricia, Sanjana B, NP, 10 mL at 02/02/23 1510    sodium chloride (NS) flush 5-40 mL, 5-40 mL, IntraVENous, PRN, Patricia, Sanjana B, NP    acetaminophen (TYLENOL) tablet 650 mg, 650 mg, Oral, Q6H PRN **OR** acetaminophen (TYLENOL) suppository 650 mg, 650 mg, Rectal, Q6H PRN, Patricia, Sanjana B, NP    polyethylene glycol (MIRALAX) packet 17 g, 17 g, Oral, DAILY PRN, Patricia, Sanjana B, NP    ondansetron (ZOFRAN ODT) tablet 4 mg, 4 mg, Oral, Q8H PRN **OR** ondansetron (ZOFRAN) injection 4 mg, 4 mg, IntraVENous, Q6H PRN, Patricia, Sanajna B, NP    famotidine (PEPCID) tablet 20 mg, 20 mg, Oral, DAILY, Patricia, Sanjana B, NP, 20 mg at 02/03/23 0937    milrinone (PRIMACOR) 20 MG/100 ML D5W infusion, 0.125 mcg/kg/min, IntraVENous, CONTINUOUS, Patricia, Sanjana B, NP, Last Rate: 2.7 mL/hr at 02/02/23 0934, 0.125 mcg/kg/min at 02/02/23 0934    sodium chloride (NS) flush 5-40 mL, 5-40 mL, IntraVENous, Q8H, Nilson Marin MD, 10 mL at 02/03/23 1270    sodium chloride (NS) flush 5-40 mL, 5-40 mL, IntraVENous, PRN, Stan Dinero MD    glucose chewable tablet 16 g, 4 Tablet, Oral, PRN, Nilson Altamirano MD    glucagon (GLUCAGEN) injection 1 mg, 1 mg, IntraMUSCular, PRN, Nilson Marin MD    dextrose 10 % infusion 0-250 mL, 0-250 mL, IntraVENous, PRN, Stan Dinero MD    heparin (porcine) injection 5,000 Units, 5,000 Units, SubCUTAneous, Q8H, Stan Dinero MD, 5,000 Units at 02/03/23 0938    insulin lispro (HUMALOG) injection, , SubCUTAneous, AC&HS, Stan Dinero MD    PATIENT CARE TEAM:  Patient Care Team:  Ivan Coyne MD as PCP - General (Internal Medicine Physician)  Jorge Browning MD as PCP - [facility] Empaneled Provider  Tiana Vergara MD as Physician (Cardiovascular Disease Physician)  Ar Hatfield MD as Physician (Nephrology)  Ryan Aragon MD (Neurosurgery)  Yasmin White MD (Ophthalmology)  Wandy Villeda MD (Otolaryngology)  Florian Yoder MD (Urology)  Love Morales MD (Ophthalmology)  Manjinder Mccallum DPM (Podiatry)  Maria L Nj, PHD (Psychology)  Katelyn Storey MD (Cardiothoracic Surgery)  Elena Shirley MD (Cardiovascular Disease Physician)  America Wall MD (Cardiovascular Disease Physician)  Denia Martinez MD as Physician (Nephrology)  Dean Valdez MD (Gastroenterology)  Laina Lynn MD (Radiation Oncology)  Tami Knowles MD as Physician (Nephrology)  Сергей Nguyen, NP as Nurse Practitioner (Nurse Practitioner)  America Wall MD (Cardiovascular Disease Physician)  Yasmin White MD (Ophthalmology)     Thank you for allowing us to participate in this patient's care. Curtis Gray, MSN, AGACNP-BC  [address] [place] [address], [address]  Phone: [phone]  Fax: [phone]

## 2023-02-03 NOTE — PROGRESS NOTES
[address] [facility] Adult  Hospitalist Group                                                                                          Hospitalist Progress Note  Jason Shields MD  Answering service: [phone] from in house phone        Date of Service:  2/3/2023  NAME:  Renan Moran  :  1950  MRN:  [id number]      Admission Summary:     Patient admitted with acute on chronic CHF. Interval history / Subjective:     Patient's breathing is improving. Assessment & Plan:     Acute on chronic CHF  -Patient with acute on chronic combined systolic and diastolic CHF, NYHA class IV on admission  -Last echo with EF 55 to 60%  -On inotrope assisted diuresis, with milrinone drip and Bumex  -No intolerance to ACE or ARB or Arni, on Toprol and Aldactone, not tolerant to SGLT2 due to CKD  -Advanced heart failure team following  -Discussed with AHF team, continue with current regimen, patient diuresing well, likely plan for discharge early next week  -Can arrange for home hospice upon discharge    Coronary artery disease  -Patient has history of CABG  -Continue aspirin and beta-blocker    Hypertension  -Continue Toprol, monitor blood pressure    History of prostate cancer  -Follow as outpatient    Depression/anxiety  -Continue Zoloft    Alcohol abuse  -On [facility] protocol  -Start thiamine, folic acid and multivitamins     Code status: Full  Prophylaxis: Heparin  Care Plan discussed with: Patient and patient's daughter present at bedside. Anticipated Disposition: 24 to 48 hours    CRITICAL CARE ATTESTATION:  I had a face to face encounter with the patient, reviewed and interpreted patient data including clinical events, labs, images, vital signs, I/O's, and examined patient. I have discussed the case and the plan and management of the patient's care with the consulting services, the bedside nurses and necessary ancillary providers.        NOTE OF PERSONAL INVOLVEMENT IN CARE   This patient has a high probability of imminent, clinically significant deterioration, which requires the highest level of preparedness to intervene urgently. I participated in the decision-making and personally managed or directed the management of the following life and organ supporting interventions that required my frequent assessment to treat or prevent imminent deterioration. I personally spent 45 minutes of critical care time. This is time spent at this critically ill patient's bedside actively involved in patient care as well as the coordination of care and discussions with the patient's family. This does not include any procedural time which has been billed separately. Hospital Problems  Date Reviewed: 11/28/2022            Codes Class Noted POA    Acute on chronic HFrEF (heart failure with reduced ejection fraction) ([facility]) ICD-10-CM: I50.23  ICD-9-CM: 428.23  1/30/2023 Unknown        CHF (congestive heart failure) ([facility]) ICD-10-CM: I50.9  ICD-9-CM: 428.0  1/30/2023 Unknown               Review of Systems:   A comprehensive review of systems was negative except for that written in the HPI. Vital Signs:    Last 24hrs VS reviewed since prior progress note. Most recent are:  Visit Vitals  BP (!) 99/59 (BP 1 Location: Left upper arm, BP Patient Position: At rest)   Pulse 84   Temp 97.5 °F (36.4 °C)   Resp 13   Ht 5' 7\" (1.702 m)   Wt 64.2 kg (141 lb 8.6 oz)   SpO2 97%   BMI 22.17 kg/m²         Intake/Output Summary (Last 24 hours) at 2/3/2023 1151  Last data filed at 2/3/2023 4032  Gross per 24 hour   Intake 327.53 ml   Output 3075 ml   Net -2747.47 ml        Physical Examination:     I had a face to face encounter with this patient and independently examined them on 2/3/2023 as outlined below:          Constitutional:  No acute distress, cooperative, pleasant    ENT:  Oral mucosa moist, oropharynx benign. Resp:  CTA bilaterally. No wheezing/rhonchi/rales. No accessory muscle use.     CV:  Regular rhythm, normal rate, no murmurs, gallops, rubs    GI:  Soft, non distended, non tender. normoactive bowel sounds, no hepatosplenomegaly     Musculoskeletal:  No edema, warm, 2+ pulses throughout    Neurologic:  Moves all extremities. AAOx3, CN II-XII reviewed            Data Review:    Review and/or order of clinical lab test    I have independently reviewed and interpreted patient's lab and all other diagnostic data    Notes reviewed from all clinical/nonclinical/nursing services involved in patient's clinical care. Care coordination discussions were held with appropriate clinical/nonclinical/ nursing providers based on care coordination needs. Labs:     Recent Labs     02/01/23 0201   WBC 3.0*   HGB 11.1*   HCT 33.2*        Recent Labs     02/03/23 0323 02/02/23 0454 02/01/23 0201    137 135*   K 4.0 4.2 5.0    104 103   CO2 27 25 26   BUN 37* 32* 29*   CREA 2.01* 2.11* 1.83*    83 87   CA 9.5 9.0 8.8   MG 2.5* 2.4 2.3   PHOS  --   --  4.2     Recent Labs     02/03/23 0323 02/02/23 0454 02/01/23  0201   ALT 21 19 21   AP 67 61 59   TBILI 1.8* 1.9* 2.6*   TP 8.0 7.2 7.0   ALB 3.3* 3.1* 3.1*   GLOB 4.7* 4.1* 3.9     No results for input(s): INR, PTP, APTT, INREXT, INREXT in the last 72 hours. No results for input(s): FE, TIBC, PSAT, FERR in the last 72 hours. Lab Results   Component Value Date/Time    Folate 12.0 01/21/2013 07:55 AM      No results for input(s): PH, PCO2, PO2 in the last 72 hours. No results for input(s): CPK, CKNDX, TROIQ in the last 72 hours.     No lab exists for component: CPKMB  Lab Results   Component Value Date/Time    Cholesterol, total 74 (L) 01/04/2023 10:55 AM    HDL Cholesterol 41 01/04/2023 10:55 AM    LDL, calculated 20 01/04/2023 10:55 AM    LDL, calculated 64.4 08/29/2022 11:23 AM    Triglyceride 49 01/04/2023 10:55 AM    CHOL/HDL Ratio 2.4 08/29/2022 11:23 AM     Lab Results   Component Value Date/Time    Glucose (POC) 86 02/03/2023 06:43 AM    Glucose (POC) 149 (H) 02/02/2023 09:00 PM    Glucose (POC) 84 02/02/2023 03:45 PM    Glucose (POC) 99 02/02/2023 11:23 AM    Glucose (POC) 86 02/02/2023 06:47 AM     Lab Results   Component Value Date/Time    Color YELLOW/STRAW 02/27/2021 09:14 PM    Appearance CLEAR 02/27/2021 09:14 PM    Specific gravity 1.022 02/27/2021 09:14 PM    Specific gravity 1.010 12/12/2012 03:15 PM    pH (UA) 5.0 02/27/2021 09:14 PM    Protein 100 (A) 02/27/2021 09:14 PM    Glucose Negative 02/27/2021 09:14 PM    Ketone TRACE (A) 02/27/2021 09:14 PM    Bilirubin Negative 02/27/2021 09:14 PM    Urobilinogen 0.2 02/27/2021 09:14 PM    Nitrites Negative 02/27/2021 09:14 PM    Leukocyte Esterase Negative 02/27/2021 09:14 PM    Epithelial cells FEW 02/27/2021 09:14 PM    Bacteria Negative 02/27/2021 09:14 PM    WBC 0-4 02/27/2021 09:14 PM    RBC 0-5 02/27/2021 09:14 PM         Medications Reviewed:     Current Facility-Administered Medications   Medication Dose Route Frequency    thiamine HCL (B-1) tablet 100 mg  100 mg Oral DAILY    folic acid (FOLVITE) tablet 1 mg  1 mg Oral DAILY    multivitamin, tx-iron-ca-min (THERA-M w/ IRON) tablet 1 Tablet  1 Tablet Oral DAILY    midodrine (PROAMATINE) tablet 5 mg  5 mg Oral TID WITH MEALS    bumetanide (BUMEX) injection 2 mg  2 mg IntraVENous TID    nicotine (NICODERM CQ) 21 mg/24 hr patch 1 Patch  1 Patch TransDERmal DAILY    LORazepam (ATIVAN) tablet 2 mg  2 mg Oral Q1H PRN    LORazepam (ATIVAN) tablet 4 mg  4 mg Oral Q1H PRN    tamsulosin (FLOMAX) capsule 0.4 mg  0.4 mg Oral DAILY    aspirin delayed-release tablet 81 mg  81 mg Oral DAILY    cholecalciferol (VITAMIN D3) (1000 Units /25 mcg) tablet 1,000 Units  1,000 Units Oral DAILY    cyanocobalamin (VITAMIN B12) tablet 1,000 mcg  1,000 mcg Oral DAILY    metoprolol succinate (TOPROL-XL) XL tablet 12.5 mg  12.5 mg Oral DAILY    sertraline (ZOLOFT) tablet 50 mg  50 mg Oral DAILY    spironolactone (ALDACTONE) tablet 12.5 mg  12.5 mg Oral DAILY    sodium chloride (NS) flush 5-40 mL  5-40 mL IntraVENous Q8H    sodium chloride (NS) flush 5-40 mL  5-40 mL IntraVENous PRN    acetaminophen (TYLENOL) tablet 650 mg  650 mg Oral Q6H PRN    Or    acetaminophen (TYLENOL) suppository 650 mg  650 mg Rectal Q6H PRN    polyethylene glycol (MIRALAX) packet 17 g  17 g Oral DAILY PRN    ondansetron (ZOFRAN ODT) tablet 4 mg  4 mg Oral Q8H PRN    Or    ondansetron (ZOFRAN) injection 4 mg  4 mg IntraVENous Q6H PRN    famotidine (PEPCID) tablet 20 mg  20 mg Oral DAILY    milrinone (PRIMACOR) 20 MG/100 ML D5W infusion  0.125 mcg/kg/min IntraVENous CONTINUOUS    sodium chloride (NS) flush 5-40 mL  5-40 mL IntraVENous Q8H    sodium chloride (NS) flush 5-40 mL  5-40 mL IntraVENous PRN    glucose chewable tablet 16 g  4 Tablet Oral PRN    glucagon (GLUCAGEN) injection 1 mg  1 mg IntraMUSCular PRN    dextrose 10 % infusion 0-250 mL  0-250 mL IntraVENous PRN    heparin (porcine) injection 5,000 Units  5,000 Units SubCUTAneous Q8H    insulin lispro (HUMALOG) injection   SubCUTAneous AC&HS     ______________________________________________________________________  EXPECTED LENGTH OF STAY: 3d 21h  ACTUAL LENGTH OF STAY:          4                 Vickie Lo MD

## 2023-02-03 NOTE — PROGRESS NOTES
Transitions of Care Plan  RUR: 15% - moderate  Clinical Update: aggressive diureses; milrinone; bumex  Consults: [facility]; Therapy  Baseline: ambulates w cane; resides alone; hx ETOH  Barrier(s) to Discharge: medical  Disposition:  Home Health: [address]  Estimated Discharge Date: 2+ days    CM participated in [place]. Plans for milrinone wean over the weekend and possible family meeting early next week for goals of care. Patient remains not a milrinone candidate at home.     Mario Alberto Quigley, MPH  Care Manager [facility]  Available via [facility] or

## 2023-02-03 NOTE — PROGRESS NOTES
Spiritual Care Assessment/Progress Note  [facility]      NAME: Leonora Huerta      MRN: [id number]  AGE: 74 y.o.  SEX: male  Mu-ism Affiliation: Baptist   Language: English     2/3/2023     Total Time (in minutes): 22     Spiritual Assessment begun in [facility] CV SERVICES UNIT through conversation with:         [x]Patient        [] Family    [] Friend(s)        Reason for Consult: Palliative Care, Initial/Spiritual Assessment     Spiritual beliefs: (Please include comment if needed)     [x] Identifies with a nilam tradition: Baptist       [] Supported by a nilam community:            [] Claims no spiritual orientation:           [] Seeking spiritual identity:                [] Adheres to an individual form of spirituality:           [] Not able to assess:                           Identified resources for coping:      [] Prayer                               [] Music                  [] Guided Imagery     [x] Family/friends                 [] Pet visits     [] Devotional reading                         [] Unknown     [] Other:                                               Interventions offered during this visit: (See comments for more details)    Patient Interventions: Affirmation of emotions/emotional suffering, Affirmation of nilam, Iconic (affirming the presence of God/Higher Power), Catharsis/review of pertinent events in supportive environment, Normalization of emotional/spiritual concerns           Plan of Care:     [] Support spiritual and/or cultural needs    [] Support AMD and/or advance care planning process      [] Support grieving process   [] Coordinate Rites and/or Rituals    [] Coordination with community clergy   [] No spiritual needs identified at this time   [] Detailed Plan of Care below (See Comments)  [] Make referral to Music Therapy  [] Make referral to Pet Therapy     [] Make referral to Addiction services  [] Make referral to [facility]  [] Make referral to Spiritual Care Partner  [] No future visits requested        [x] Follow up visits as needed     Visited patient for palliative initial spiritual assessment. Her chart was consulted. He spoke of his main desire, which is to go home. He lives alone and has family who checks on him from time to time. No immediate spiritual care issues.    Chaplain Manuel, MDiv, MS, [facility]

## 2023-02-04 LAB
GLUCOSE BLD STRIP.AUTO-MCNC: 110 MG/DL (ref 65–117)
GLUCOSE BLD STRIP.AUTO-MCNC: 162 MG/DL (ref 65–117)
GLUCOSE BLD STRIP.AUTO-MCNC: 96 MG/DL (ref 65–117)
SERVICE CMNT-IMP: ABNORMAL
SERVICE CMNT-IMP: NORMAL
SERVICE CMNT-IMP: NORMAL

## 2023-02-04 PROCEDURE — 65660000001 HC RM ICU INTERMED STEPDOWN

## 2023-02-04 PROCEDURE — 74011250637 HC RX REV CODE- 250/637: Performed by: NURSE PRACTITIONER

## 2023-02-04 PROCEDURE — 74011250636 HC RX REV CODE- 250/636: Performed by: FAMILY MEDICINE

## 2023-02-04 PROCEDURE — 74011000250 HC RX REV CODE- 250: Performed by: NURSE PRACTITIONER

## 2023-02-04 PROCEDURE — 74011250637 HC RX REV CODE- 250/637: Performed by: FAMILY MEDICINE

## 2023-02-04 PROCEDURE — 82962 GLUCOSE BLOOD TEST: CPT

## 2023-02-04 PROCEDURE — 99232 SBSQ HOSP IP/OBS MODERATE 35: CPT | Performed by: NURSE PRACTITIONER

## 2023-02-04 PROCEDURE — 74011250636 HC RX REV CODE- 250/636: Performed by: NURSE PRACTITIONER

## 2023-02-04 RX ADMIN — FAMOTIDINE 20 MG: 20 TABLET, FILM COATED ORAL at 08:59

## 2023-02-04 RX ADMIN — MILRINONE LACTATE IN DEXTROSE 0.12 MCG/KG/MIN: 200 INJECTION, SOLUTION INTRAVENOUS at 00:32

## 2023-02-04 RX ADMIN — CYANOCOBALAMIN TAB 500 MCG 1000 MCG: 500 TAB at 08:59

## 2023-02-04 RX ADMIN — ASPIRIN 81 MG: 81 TABLET, COATED ORAL at 08:59

## 2023-02-04 RX ADMIN — SODIUM CHLORIDE, PRESERVATIVE FREE 10 ML: 5 INJECTION INTRAVENOUS at 21:30

## 2023-02-04 RX ADMIN — MIDODRINE HYDROCHLORIDE 5 MG: 5 TABLET ORAL at 09:00

## 2023-02-04 RX ADMIN — Medication 1 TABLET: at 08:59

## 2023-02-04 RX ADMIN — FOLIC ACID 1 MG: 1 TABLET ORAL at 09:00

## 2023-02-04 RX ADMIN — BUMETANIDE 2 MG: 0.25 INJECTION, SOLUTION INTRAMUSCULAR; INTRAVENOUS at 11:09

## 2023-02-04 RX ADMIN — TAMSULOSIN HYDROCHLORIDE 0.4 MG: 0.4 CAPSULE ORAL at 09:00

## 2023-02-04 RX ADMIN — SODIUM CHLORIDE, PRESERVATIVE FREE 10 ML: 5 INJECTION INTRAVENOUS at 06:17

## 2023-02-04 RX ADMIN — MIDODRINE HYDROCHLORIDE 5 MG: 5 TABLET ORAL at 11:10

## 2023-02-04 RX ADMIN — HEPARIN SODIUM 5000 UNITS: 5000 INJECTION INTRAVENOUS; SUBCUTANEOUS at 21:26

## 2023-02-04 RX ADMIN — Medication 100 MG: at 08:59

## 2023-02-04 RX ADMIN — METOPROLOL SUCCINATE 12.5 MG: 25 TABLET, EXTENDED RELEASE ORAL at 08:59

## 2023-02-04 RX ADMIN — BUMETANIDE 2 MG: 0.25 INJECTION, SOLUTION INTRAMUSCULAR; INTRAVENOUS at 17:10

## 2023-02-04 RX ADMIN — BUMETANIDE 2 MG: 0.25 INJECTION, SOLUTION INTRAMUSCULAR; INTRAVENOUS at 06:17

## 2023-02-04 RX ADMIN — HEPARIN SODIUM 5000 UNITS: 5000 INJECTION INTRAVENOUS; SUBCUTANEOUS at 04:05

## 2023-02-04 RX ADMIN — HEPARIN SODIUM 5000 UNITS: 5000 INJECTION INTRAVENOUS; SUBCUTANEOUS at 11:10

## 2023-02-04 RX ADMIN — Medication 1000 UNITS: at 08:59

## 2023-02-04 RX ADMIN — SERTRALINE HYDROCHLORIDE 50 MG: 50 TABLET ORAL at 09:00

## 2023-02-04 RX ADMIN — MIDODRINE HYDROCHLORIDE 5 MG: 5 TABLET ORAL at 17:09

## 2023-02-04 RX ADMIN — SPIRONOLACTONE 12.5 MG: 25 TABLET ORAL at 09:01

## 2023-02-04 NOTE — PROGRESS NOTES
[address] [facility] Adult  Hospitalist Group                                                                                          Hospitalist Progress Note  Adrienne Hall MD  Answering service: [phone] from in house phone        Date of Service:  2023  NAME:  Alexei Foy  :  1950  MRN:  [id number]      Admission Summary:     Patient admitted with acute on chronic CHF. Interval history / Subjective:     Patient's breathing is improving. Assessment & Plan:     Acute on chronic CHF  -Patient with acute on chronic combined systolic and diastolic CHF, NYHA class IV on admission  -Last echo with EF 55 to 60%  -On inotrope assisted diuresis, with milrinone drip and Bumex  -No intolerance to ACE or ARB or Arni, on Toprol and Aldactone, not tolerant to SGLT2 due to CKD  -Advanced heart failure team following  -Discussed with AHF team, continue with current regimen, patient diuresing well, likely plan for discharge early next week  -Plan to arrange for home hospice upon discharge    Coronary artery disease  -Patient has history of CABG  -Continue aspirin and beta-blocker    Hypertension  -Continue Toprol, monitor blood pressure    History of prostate cancer  -Follow as outpatient    Depression/anxiety  -Continue Zoloft    Alcohol abuse  -On [facility] protocol  -Start thiamine, folic acid and multivitamins     Code status: Full  Prophylaxis: Heparin  Care Plan discussed with: Patient and patient's daughter present at bedside. Anticipated Disposition: 24 to 48 hours    CRITICAL CARE ATTESTATION:  I had a face to face encounter with the patient, reviewed and interpreted patient data including clinical events, labs, images, vital signs, I/O's, and examined patient. I have discussed the case and the plan and management of the patient's care with the consulting services, the bedside nurses and necessary ancillary providers.        NOTE OF PERSONAL INVOLVEMENT IN CARE   This patient has a high probability of imminent, clinically significant deterioration, which requires the highest level of preparedness to intervene urgently. I participated in the decision-making and personally managed or directed the management of the following life and organ supporting interventions that required my frequent assessment to treat or prevent imminent deterioration. I personally spent 45 minutes of critical care time. This is time spent at this critically ill patient's bedside actively involved in patient care as well as the coordination of care and discussions with the patient's family. This does not include any procedural time which has been billed separately. Hospital Problems  Date Reviewed: 11/28/2022            Codes Class Noted POA    Acute on chronic HFrEF (heart failure with reduced ejection fraction) ([facility]) ICD-10-CM: I50.23  ICD-9-CM: 428.23  1/30/2023 Unknown        CHF (congestive heart failure) ([facility]) ICD-10-CM: I50.9  ICD-9-CM: 428.0  1/30/2023 Unknown               Review of Systems:   A comprehensive review of systems was negative except for that written in the HPI. Vital Signs:    Last 24hrs VS reviewed since prior progress note. Most recent are:  Visit Vitals  /71   Pulse 71   Temp 97.7 °F (36.5 °C)   Resp 18   Ht 5' 7\" (1.702 m)   Wt 64.2 kg (141 lb 8.6 oz)   SpO2 98%   BMI 22.17 kg/m²         Intake/Output Summary (Last 24 hours) at 2/4/2023 1401  Last data filed at 2/4/2023 1113  Gross per 24 hour   Intake 43.2 ml   Output 2550 ml   Net -2506.8 ml        Physical Examination:     I had a face to face encounter with this patient and independently examined them on 2/4/2023 as outlined below:          Constitutional:  No acute distress, cooperative, pleasant    ENT:  Oral mucosa moist, oropharynx benign. Resp:  CTA bilaterally. No wheezing/rhonchi/rales. No accessory muscle use.     CV:  Regular rhythm, normal rate, no murmurs, gallops, rubs    GI:  Soft, non distended, non tender. normoactive bowel sounds, no hepatosplenomegaly     Musculoskeletal:  No edema, warm, 2+ pulses throughout    Neurologic:  Moves all extremities. AAOx3, CN II-XII reviewed            Data Review:    Review and/or order of clinical lab test    I have independently reviewed and interpreted patient's lab and all other diagnostic data    Notes reviewed from all clinical/nonclinical/nursing services involved in patient's clinical care. Care coordination discussions were held with appropriate clinical/nonclinical/ nursing providers based on care coordination needs. Labs:     No results for input(s): WBC, HGB, HCT, PLT, HGBEXT, HCTEXT, PLTEXT, HGBEXT, HCTEXT, PLTEXT in the last 72 hours. Recent Labs     02/03/23 0323 02/02/23  0454    137   K 4.0 4.2    104   CO2 27 25   BUN 37* 32*   CREA 2.01* 2.11*    83   CA 9.5 9.0   MG 2.5* 2.4     Recent Labs     02/03/23 0323 02/02/23  0454   ALT 21 19   AP 67 61   TBILI 1.8* 1.9*   TP 8.0 7.2   ALB 3.3* 3.1*   GLOB 4.7* 4.1*     No results for input(s): INR, PTP, APTT, INREXT, INREXT in the last 72 hours. No results for input(s): FE, TIBC, PSAT, FERR in the last 72 hours. Lab Results   Component Value Date/Time    Folate 12.0 01/21/2013 07:55 AM      No results for input(s): PH, PCO2, PO2 in the last 72 hours. No results for input(s): CPK, CKNDX, TROIQ in the last 72 hours.     No lab exists for component: CPKMB  Lab Results   Component Value Date/Time    Cholesterol, total 74 (L) 01/04/2023 10:55 AM    HDL Cholesterol 41 01/04/2023 10:55 AM    LDL, calculated 20 01/04/2023 10:55 AM    LDL, calculated 64.4 08/29/2022 11:23 AM    Triglyceride 49 01/04/2023 10:55 AM    CHOL/HDL Ratio 2.4 08/29/2022 11:23 AM     Lab Results   Component Value Date/Time    Glucose (POC) 162 (H) 02/04/2023 11:19 AM    Glucose (POC) 96 02/03/2023 09:28 PM    Glucose (POC) 215 (H) 02/03/2023 05:41 PM    Glucose (POC) 111 02/03/2023 12:16 PM    Glucose (POC) 86 02/03/2023 06:43 AM     Lab Results   Component Value Date/Time    Color YELLOW/STRAW 02/27/2021 09:14 PM    Appearance CLEAR 02/27/2021 09:14 PM    Specific gravity 1.022 02/27/2021 09:14 PM    Specific gravity 1.010 12/12/2012 03:15 PM    pH (UA) 5.0 02/27/2021 09:14 PM    Protein 100 (A) 02/27/2021 09:14 PM    Glucose Negative 02/27/2021 09:14 PM    Ketone TRACE (A) 02/27/2021 09:14 PM    Bilirubin Negative 02/27/2021 09:14 PM    Urobilinogen 0.2 02/27/2021 09:14 PM    Nitrites Negative 02/27/2021 09:14 PM    Leukocyte Esterase Negative 02/27/2021 09:14 PM    Epithelial cells FEW 02/27/2021 09:14 PM    Bacteria Negative 02/27/2021 09:14 PM    WBC 0-4 02/27/2021 09:14 PM    RBC 0-5 02/27/2021 09:14 PM         Medications Reviewed:     Current Facility-Administered Medications   Medication Dose Route Frequency    thiamine HCL (B-1) tablet 100 mg  100 mg Oral DAILY    folic acid (FOLVITE) tablet 1 mg  1 mg Oral DAILY    multivitamin, tx-iron-ca-min (THERA-M w/ IRON) tablet 1 Tablet  1 Tablet Oral DAILY    midodrine (PROAMATINE) tablet 5 mg  5 mg Oral TID WITH MEALS    bumetanide (BUMEX) injection 2 mg  2 mg IntraVENous TID    nicotine (NICODERM CQ) 21 mg/24 hr patch 1 Patch  1 Patch TransDERmal DAILY    LORazepam (ATIVAN) tablet 2 mg  2 mg Oral Q1H PRN    LORazepam (ATIVAN) tablet 4 mg  4 mg Oral Q1H PRN    tamsulosin (FLOMAX) capsule 0.4 mg  0.4 mg Oral DAILY    aspirin delayed-release tablet 81 mg  81 mg Oral DAILY    cholecalciferol (VITAMIN D3) (1000 Units /25 mcg) tablet 1,000 Units  1,000 Units Oral DAILY    cyanocobalamin (VITAMIN B12) tablet 1,000 mcg  1,000 mcg Oral DAILY    metoprolol succinate (TOPROL-XL) XL tablet 12.5 mg  12.5 mg Oral DAILY    sertraline (ZOLOFT) tablet 50 mg  50 mg Oral DAILY    spironolactone (ALDACTONE) tablet 12.5 mg  12.5 mg Oral DAILY    sodium chloride (NS) flush 5-40 mL  5-40 mL IntraVENous Q8H    sodium chloride (NS) flush 5-40 mL  5-40 mL IntraVENous PRN    acetaminophen (TYLENOL) tablet 650 mg  650 mg Oral Q6H PRN    Or    acetaminophen (TYLENOL) suppository 650 mg  650 mg Rectal Q6H PRN    polyethylene glycol (MIRALAX) packet 17 g  17 g Oral DAILY PRN    ondansetron (ZOFRAN ODT) tablet 4 mg  4 mg Oral Q8H PRN    Or    ondansetron (ZOFRAN) injection 4 mg  4 mg IntraVENous Q6H PRN    famotidine (PEPCID) tablet 20 mg  20 mg Oral DAILY    milrinone (PRIMACOR) 20 MG/100 ML D5W infusion  0.125 mcg/kg/min IntraVENous CONTINUOUS    sodium chloride (NS) flush 5-40 mL  5-40 mL IntraVENous PRN    glucose chewable tablet 16 g  4 Tablet Oral PRN    glucagon (GLUCAGEN) injection 1 mg  1 mg IntraMUSCular PRN    dextrose 10 % infusion 0-250 mL  0-250 mL IntraVENous PRN    heparin (porcine) injection 5,000 Units  5,000 Units SubCUTAneous Q8H    insulin lispro (HUMALOG) injection   SubCUTAneous AC&HS     ______________________________________________________________________  EXPECTED LENGTH OF STAY: 3d 21h  ACTUAL LENGTH OF STAY:          5                 Jason Shields MD

## 2023-02-04 NOTE — PROGRESS NOTES
[address]  Inpatient Progress Note      Patient name: China Sanchez  Patient : 1950  Patient MRN: [id number]  Consulting MD: Ariana Lynn MD  Date of service: 23      REASON FOR REFERRAL:  Management of chronic diastolic heart failure      PLAN OF CARE   74 y.o. male with h/o diastolic > systolic heart failure, HFpEF, LVEF 55-60%(echo 23), stage D, NYHA class IV symptoms, c/b renal and hepatic failure, GDMT limited due to hypotension  Cardiac MRI revealed infiltrative cardiac amyloidosis, PYP was strongly suggestive of ATTR amyloidosis and genetic testing positive for inherited ATTR  RHC with severely elevated filling pressures and normal resting index  Patient agreed for admission for inotrope-assisted diuresis; possible initiation of inotropes, palliative care meeting to address code status, limits of care   Patient declined admission from clinic , but agreed to come Monday, 23  Note: Family will require cascade genetic testing; they will reach out to [facility] first  Pt with heavy alcohol use at home PTA; concern for pt's ability to manage fluid status at home; also need to clarify pts goals of care          RECOMMENDATIONS:  Continue low-dose inotrope milrinone 0.125mcg/kg/min to augment diuresis; not planning on discharge home on this  Plan to start vyndamax 61mg daily as outpatient; unless pt decides to go home on hospice, in which case would be reasonable to stop  Continue current dose of toprol XL 12.5mg daily  Known intolerance to lisinopril/ARB; not recommended in amyloid  Continue spironolactone 12.5mg daily  Cannot tolerate SGLT2i due to CrCl  Diuretic: continue bumex 2mg IV TID ; goal net -1 to -2L next 24 hrs  Will need to transition to PO bumex at discharge Bumex 3mg BID PO  Continue ASA   Pt has declined sleep study at this time  Monitor HF labs: CMP, NT-Pro BNP, Mg daily while diuresing  Heart failure education  Will need education on alcohol cessation  Appreciate Palliative Care consultation for further discussion of goals of care; noted DNR  Plan for diuresis and optimization of GDMT    Remainder of care per hospitalist, hopefully home tomorrow      IMPRESSION:  Fluid overload  Chronic diastolic heart failure, HFpEF  Stage C, NYHA class IIIA symptoms  Non-ischemic cardiomyopathy, LVEF 55-60%  Severe LVH 2.1cm  cMRI suggestive of infiltrative cardiac amyloidosis  Intolerance to lisinopril  Inherited ATTR cardiac amyloidosis  Coronary artery disease  S/p CABG x 3 (4/2020) LIMA to LAD, SVG to RCA and Ramus  CHF s/p PM - pacemaker dependent  RBBB 144ms  Cardiac risk factors  HTN  HL  DM2  Liver dysfunction, TBili 2.8  CKD, stage 3  AT3 activity low  Proteic C low  Lupus anticoagulatn abnormal  Leukopenia  H/o prostate cancer  Elevated D Dimer  Hx DVT/PE: developed PE/DVT after bowel resection in 2012  History of rectal bleeding 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 Findings consistent with moderate to severe concentric hypertrophy; speckled pattern consider amyloid. Normal wall motion. Mitral Valve: Mild regurgitation. Left Atrium: Left atrium is moderately dilated. Right Atrium: Right atrium is moderately dilated. Echo (8/2022)    Left Ventricle: Low normal left ventricular systolic function. EF by 2D Simpsons Biplane is 50%. Left ventricle size is normal. Severely increased wall thickness. Findings consistent with severe concentric hypertrophy. There is a speckled appearance to the left ventricular myocardium. Consider testing for cardiac amyloidosis including possible cardiac MRI. Noted that the patient's Fast[employer] device is MRI compatible.  Normal wall motion consistent with paced rhythm    Left Atrium: Left atrium is severely dilated. Left atrial volume index is severely increased (>48 mL/m2). Right Atrium: Right atrium is moderately dilated. Mitral Valve: Mild to moderate regurgitation. Tricuspid Valve: Moderate regurgitation. Mildly elevated RVSP. The estimated RVSP is 38 mmHg. Cardiac MRI (9/2022)  1. Normal left ventricular cavity size. Severe concentric left ventricular  hypertrophy. Significantly hypertrophied septum measuring 21 mm. There is no  left ventricular outflow tract obstruction. Moderate left ventricular systolic  dysfunction. Moderate global hypokinesis with slight regional variation. 3-D  LVEF 43%. 2. Normal right ventricular size with minimal right ventricular systolic  dysfunction. 3-D RVEF 47%. 3. Moderate 2+ tricuspid regurgitation. 4. On EGE and LGE study, there is mild diffuse myocardial enhancement with  slightly poor nulling of the myocardium on postcontrast images. These findings  may suggest possibility of infiltrative cardiac amyloidosis. There is no  myocardial scar. There is no features of recent or old myocardial infarction. There is no features of infiltrative sarcoidosis. There is no features of  inflammatory myocarditis. All myocardial walls are viable. 5. Normal pericardium without any significant pericardial effusion. 6. Small right-sided pleural effusion and tiny left-sided pleural effusion. Lexiscan Myoview May 2021 with normal perfusion, calculated LVEF of 37%     RHC (1/18/23)  Moderately to severely elevated right and left-sided filling pressures. Moderate pulmonary hypertension of predominantly postcapillary origin. Right heart waveforms are likely consistent with restrictive physiology. Preserved cardiac output and index. RA 21, PCWP 25; CI 2.94      LIFE GOALS:  Lifestyle goals discussed with the patient. BRIEF HISTORY OF PRESENT ILLNESS:  Osvaldo Royal is a 74 y.o. male with extensive history as noted above.   Pt was referred to [facility] for further evaluation and treatment of cardiac amyloidosis. He was found to have significantly elevated filling pressures on RHC and was admitted for inotrope assisted diuresis. Now on admission, found to be heavy alcohol drinker and concerns for withdrawals. INTERVAL HISTORY:  NAEO  I/O not complete  No labs today so far   VSS      REVIEW OF SYSTEMS:  Review of Systems   Constitutional:  Positive for malaise/fatigue. Negative for chills and fever. Respiratory:  Negative for shortness of breath. Cardiovascular:  Negative for chest pain, palpitations and leg swelling. Gastrointestinal:  Negative for heartburn, nausea and vomiting. Neurological:  Negative for dizziness and weakness. Psychiatric/Behavioral:  Positive for substance abuse. The patient is nervous/anxious. PHYSICAL EXAM:  Visit Vitals  /68   Pulse 68   Temp 97.6 °F (36.4 °C)   Resp 16   Ht 5' 7\" (1.702 m)   Wt 141 lb 8.6 oz (64.2 kg)   SpO2 97%   BMI 22.17 kg/m²         Physical Exam  Vitals and nursing note reviewed. Constitutional:       Appearance: Normal appearance. Neck:      Vascular: No hepatojugular reflux or JVD. Cardiovascular:      Rate and Rhythm: Normal rate and regular rhythm. Pulmonary:      Effort: Pulmonary effort is normal. No respiratory distress. Breath sounds: Normal breath sounds. Abdominal:      General: Bowel sounds are normal. There is no distension. Palpations: Abdomen is soft. Musculoskeletal:      Right lower le+ Pitting Edema present. Left lower le+ Pitting Edema present. Comments: Improved leg edema    Skin:     General: Skin is warm and dry. Capillary Refill: Capillary refill takes less than 2 seconds. Neurological:      General: No focal deficit present. Mental Status: He is alert and oriented to person, place, and time.    Psychiatric:         Mood and Affect: Mood normal.            PAST MEDICAL HISTORY:  Past Medical History:   Diagnosis Date Arthritis     Cancer ([facility] [address]     Prostate ca    Chronic kidney disease     Stage 3    Chronic pain     Abdoman, back, fingers per daughter    Chronic systolic (congestive) heart failure 11/28/2022    Depression     Diabetes ([facility] [address]     Heart attack ([facility] [address]     Heart failure ([facility] [address] 2020    Dr. Liudmila Lopez    Hyperlipidemia     Hypertension     Pacemaker 05/01/2020    MED    PE (pulmonary embolism)     Pneumonia     Sleep apnea     not use CPAP       PAST SURGICAL HISTORY:  Past Surgical History:   Procedure Laterality Date    COLONOSCOPY N/A 3/19/2021    COLONOSCOPY performed by Ursula Boeck, MD at [facility] ENDOSCOPY    COLONOSCOPY N/A 5/10/2021    COLONOSCOPY performed by Ursula Boeck, MD at [facility] N/A 8/2/2021    FLEXIBLE SIGMOIDOSCOPY performed by Ursula Boeck, MD at [facility] ENDOSCOPY    HX BACK SURGERY  2014    HX CERVICAL LAMINECTOMY      HX CORONARY ARTERY BYPASS GRAFT  04/2021    X3     HX GI  11/2012    bowel resection    HX HEART CATHETERIZATION  04/2020    HX ORTHOPAEDIC      amputated left 4th finger    [place] NEW/RPLCMT PRM PM W/TRANSV ELTRD ATRIAL&VENT N/A 5/1/2020    INSERT PPM DUAL performed by Velia Manning MD at [facility] CATH LAB    [place] UNLISTED PROCEDURE ABDOMEN PERITONEUM & OMENTUM      bowel resection       FAMILY HISTORY:  Family History   Problem Relation Age of Onset    Cancer Mother         unknown    Heart Disease Father     Heart Disease Brother     Anesth Problems Neg Hx        SOCIAL HISTORY:  Social History     Socioeconomic History    Marital status:     Number of children: 1    Years of education: 12    Highest education level: High school graduate   Occupational History    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[employer], 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    Tobacco Use    Smoking status: Every Day     Types: Cigars    Smokeless tobacco: Current     Types: Chew    Tobacco comments:     Chews tobacco every day; cigars and black and milds    Vaping Use    Vaping Use: Never used   Substance and Sexual Activity    Alcohol use: Yes     Alcohol/week: 16.0 standard drinks     Types: 2 Glasses of wine, 14 Cans of beer per week     Comment: 2-4 beers daily, sometimes alcohol    Drug use: Never    Sexual activity: Not Currently   Other Topics Concern     Service No    Blood Transfusions No    Caffeine Concern No    Occupational Exposure No    Hobby Hazards No    Sleep Concern Yes    Stress Concern Yes    Weight Concern No    Special Diet No    Back Care Yes    Exercise No    Seat Belt Yes   Social History Narrative    ** Merged History Encounter **         Lives in [place] [id number] Determinants of Health     Financial Resource Strain: Low Risk     Difficulty of Paying Living Expenses: Not hard at all   Food Insecurity: No Food Insecurity    Worried About [address] in the Last Year: Never true    920 MDSave in the Last Year: Never true       LABORATORY RESULTS:     Labs Latest Ref Rng & Units 2/3/2023 2/2/2023 2/1/2023 1/31/2023 1/30/2023 1/4/2023   WBC 4.1 - 11.1 K/uL - - 3. 0(L) - 4.6 3.9   RBC 4.10 - 5.70 M/uL - - 3.80(L) - 4.18 4.09(L)   Hemoglobin 12.1 - 17.0 g/dL - - 11. 1(L) - 12.2 12. 1(L)   Hematocrit 36.6 - 50.3 % - - 33. 2(L) - 37.7 37.9   MCV 80.0 - 99.0 FL - - 87.4 - 90.2 93   Platelets 285 - 316 K/uL - - 168 - 162 157   Albumin 3.5 - 5.0 g/dL 3. 3(L) 3. 1(L) 3. 1(L) 3. 2(L) 3.4(L) 4.2   Calcium 8.5 - 10.1 MG/DL 9.5 9.0 8.8 8.9 9.2 9.3   Glucose 65 - 100 mg/dL 100 83 87 87 97 66(L)   BUN 6 - 20 MG/DL 37(H) 32(H) 29(H) 31(H) 31(H) 28(H)   Creatinine 0.70 - 1.30 MG/DL 2.01(H) 2.11(H) 1.83(H) 1.74(H) 1.82(H) 1.68(H)   Sodium 136 - 145 mmol/L 136 137 135(L) 137 134(L) 139   Potassium 3.5 - 5.1 mmol/L 4.0 4.2 5.0 4.3 HEMOLYZED,RECOLLECT REQUESTED 5.1   Some recent data might be hidden     Lab Results   Component Value Date/Time    TSH 4.380 10/12/2022 10:36 AM    TSH 2.88 08/29/2022 11:23 AM    TSH 2.26 05/04/2022 01:21 PM    TSH 3.680 06/16/2021 10:28 AM    TSH 3.450 03/10/2021 10:13 AM    TSH 2.070 08/19/2020 09:31 AM    TSH 1.98 04/25/2020 01:42 AM    TSH 2.400 01/09/2020 11:36 AM    TSH 2.220 09/18/2019 09:15 AM    TSH 2.360 06/25/2019 09:23 AM    TSH 2.900 09/17/2018 09:20 AM    TSH 2.100 05/09/2017 01:58 PM    TSH 2.320 04/12/2016 01:34 PM    TSH 2.070 06/03/2015 02:04 PM    TSH 2.71 11/28/2012 02:15 PM       ALLERGY:  Allergies   Allergen Reactions    Arb-Angiotensin Receptor Antagonist Other (comments)     High k    Statins-Hmg-Coa Reductase Inhibitors Myalgia    Ace Inhibitors Cough        CURRENT MEDICATIONS:    Current Facility-Administered Medications:     thiamine HCL (B-1) tablet 100 mg, 100 mg, Oral, DAILY, Sebastian Aldrich MD, 100 mg at 40/16/54 0891    folic acid (FOLVITE) tablet 1 mg, 1 mg, Oral, DAILY, Sebastian Aldrich MD, 1 mg at 02/03/23 4395    multivitamin, tx-iron-ca-min (THERA-M w/ IRON) tablet 1 Tablet, 1 Tablet, Oral, DAILY, Sebastian Aldrich MD, 1 Tablet at 02/03/23 0937    midodrine (PROAMATINE) tablet 5 mg, 5 mg, Oral, TID WITH MEALS, Sebastian Aldrich MD, 5 mg at 02/03/23 1832    bumetanide (BUMEX) injection 2 mg, 2 mg, IntraVENous, TID, Farooq BREWSTER NP, 2 mg at 02/04/23 3427    nicotine (NICODERM CQ) 21 mg/24 hr patch 1 Patch, 1 Patch, TransDERmal, DAILY, Mariana Pizarro NP, 1 Patch at 02/02/23 2226    LORazepam (ATIVAN) tablet 2 mg, 2 mg, Oral, Q1H PRN, Alton Her MD    LORazepam (ATIVAN) tablet 4 mg, 4 mg, Oral, Q1H PRN, Alton Her MD    tamsulosin (FLOMAX) capsule 0.4 mg, 0.4 mg, Oral, DAILY, Patricia, Sanjana B, NP, 0.4 mg at 02/03/23 6378    aspirin delayed-release tablet 81 mg, 81 mg, Oral, DAILY, Patricia, Sanjana B, NP, 81 mg at 02/03/23 9925    cholecalciferol (VITAMIN D3) (1000 Units /25 mcg) tablet 1,000 Units, 1,000 Units, Oral, DAILY, Patricia, Sanjana B, NP, 1,000 Units at 02/03/23 7211    cyanocobalamin (VITAMIN B12) tablet 1,000 mcg, 1,000 mcg, Oral, DAILY, Patricia, Sanjana B, NP, 1,000 mcg at 02/03/23 0937    metoprolol succinate (TOPROL-XL) XL tablet 12.5 mg, 12.5 mg, Oral, DAILY, Patricia, Sanjana B, NP, 12.5 mg at 02/03/23 3745    sertraline (ZOLOFT) tablet 50 mg, 50 mg, Oral, DAILY, Patricia, Sanjana B, NP, 50 mg at 02/03/23 7612    spironolactone (ALDACTONE) tablet 12.5 mg, 12.5 mg, Oral, DAILY, Patricia, Sanjana B, NP, 12.5 mg at 02/03/23 0937    sodium chloride (NS) flush 5-40 mL, 5-40 mL, IntraVENous, Q8H, Patricia, Sanjana B, NP, 10 mL at 02/04/23 0617    sodium chloride (NS) flush 5-40 mL, 5-40 mL, IntraVENous, PRN, Patricia, Sanjana B, NP    acetaminophen (TYLENOL) tablet 650 mg, 650 mg, Oral, Q6H PRN, 650 mg at 02/03/23 1355 **OR** acetaminophen (TYLENOL) suppository 650 mg, 650 mg, Rectal, Q6H PRN, Patricia, Sanjana B, NP    polyethylene glycol (MIRALAX) packet 17 g, 17 g, Oral, DAILY PRN, Patricia, Sanjana B, NP    ondansetron (ZOFRAN ODT) tablet 4 mg, 4 mg, Oral, Q8H PRN **OR** ondansetron (ZOFRAN) injection 4 mg, 4 mg, IntraVENous, Q6H PRN, Patricia, Sanjana B, NP    famotidine (PEPCID) tablet 20 mg, 20 mg, Oral, DAILY, Patricia, Sanjana B, NP, 20 mg at 02/03/23 0937    milrinone (PRIMACOR) 20 MG/100 ML D5W infusion, 0.125 mcg/kg/min, IntraVENous, CONTINUOUS, Patricia, Sanjana B, NP, Last Rate: 2.7 mL/hr at 02/04/23 0032, 0.125 mcg/kg/min at 02/04/23 0032    sodium chloride (NS) flush 5-40 mL, 5-40 mL, IntraVENous, PRN, Jonita Cheek, MD    glucose chewable tablet 16 g, 4 Tablet, Oral, PRN, Nilson Torres MD    glucagon (GLUCAGEN) injection 1 mg, 1 mg, IntraMUSCular, PRN, Nilson Marin MD    dextrose 10 % infusion 0-250 mL, 0-250 mL, IntraVENous, PRN, Cipriano Guerra MD    heparin (porcine) injection 5,000 Units, 5,000 Units, SubCUTAneous, Q8H, Cipriano Guerra MD, 5,000 Units at 02/04/23 0405    insulin lispro (HUMALOG) injection, , SubCUTAneous, AC&HS, Cipriano Guerra MD, 3 Units at 02/03/23 1832    PATIENT CARE TEAM:  Patient Care Team:  Sherita Rico MD as PCP - General (Internal Medicine Physician)  Barney Rose MD as PCP - [facility]  Francis Chin MD as Physician (Cardiovascular Disease Physician)  Paddy Iniguez MD as Physician (Nephrology)  Ez Steinberg MD (Neurosurgery)  Caroline Kirkpatrick MD (Ophthalmology)  Claire Yadav MD (Otolaryngology)  Maurilio Cruz MD (Urology)  Dago Zurita MD (Ophthalmology)  Grant Boateng DPM (Podiatry)  Lory Vogel, PHD (Psychology)  Velia Swain MD (Cardiothoracic Surgery)  Angélica Bartholomew MD (Cardiovascular Disease Physician)  Izabela Felix MD (Cardiovascular Disease Physician)  Abhilash Fry MD as Physician (Nephrology)  Sammy Sales MD (Gastroenterology)  Alyssa Wylie MD (Radiation Oncology)  Josafat Bell MD as Physician (Nephrology)  Lisseth Fan, NP as Nurse Practitioner (Nurse Practitioner)  Izabela Felix MD (Cardiovascular Disease Physician)  Caroline Kirkpatrick MD (Ophthalmology)     Thank you for allowing us to participate in this patient's care. Myriam Marin, MSN, AGACNP-[address], [address]  Phone: [phone]  Fax: [phone]

## 2023-02-05 LAB
ALBUMIN SERPL-MCNC: 3 G/DL (ref 3.5–5)
ALBUMIN/GLOB SERPL: 0.7 (ref 1.1–2.2)
ALP SERPL-CCNC: 65 U/L (ref 45–117)
ALT SERPL-CCNC: 21 U/L (ref 12–78)
ANION GAP SERPL CALC-SCNC: 5 MMOL/L (ref 5–15)
AST SERPL-CCNC: 34 U/L (ref 15–37)
BILIRUB SERPL-MCNC: 1.2 MG/DL (ref 0.2–1)
BNP SERPL-MCNC: 3277 PG/ML
BUN SERPL-MCNC: 38 MG/DL (ref 6–20)
BUN/CREAT SERPL: 20 (ref 12–20)
CALCIUM SERPL-MCNC: 9.2 MG/DL (ref 8.5–10.1)
CHLORIDE SERPL-SCNC: 99 MMOL/L (ref 97–108)
CO2 SERPL-SCNC: 32 MMOL/L (ref 21–32)
CREAT SERPL-MCNC: 1.86 MG/DL (ref 0.7–1.3)
GLOBULIN SER CALC-MCNC: 4.2 G/DL (ref 2–4)
GLUCOSE BLD STRIP.AUTO-MCNC: 101 MG/DL (ref 65–117)
GLUCOSE BLD STRIP.AUTO-MCNC: 107 MG/DL (ref 65–117)
GLUCOSE BLD STRIP.AUTO-MCNC: 87 MG/DL (ref 65–117)
GLUCOSE BLD STRIP.AUTO-MCNC: 95 MG/DL (ref 65–117)
GLUCOSE SERPL-MCNC: 106 MG/DL (ref 65–100)
MAGNESIUM SERPL-MCNC: 2.3 MG/DL (ref 1.6–2.4)
POTASSIUM SERPL-SCNC: 3.7 MMOL/L (ref 3.5–5.1)
PROT SERPL-MCNC: 7.2 G/DL (ref 6.4–8.2)
SERVICE CMNT-IMP: NORMAL
SODIUM SERPL-SCNC: 136 MMOL/L (ref 136–145)

## 2023-02-05 PROCEDURE — 74011000250 HC RX REV CODE- 250: Performed by: NURSE PRACTITIONER

## 2023-02-05 PROCEDURE — 82962 GLUCOSE BLOOD TEST: CPT

## 2023-02-05 PROCEDURE — 65660000001 HC RM ICU INTERMED STEPDOWN

## 2023-02-05 PROCEDURE — 74011250637 HC RX REV CODE- 250/637: Performed by: NURSE PRACTITIONER

## 2023-02-05 PROCEDURE — 74011250637 HC RX REV CODE- 250/637: Performed by: FAMILY MEDICINE

## 2023-02-05 PROCEDURE — 36415 COLL VENOUS BLD VENIPUNCTURE: CPT

## 2023-02-05 PROCEDURE — 83880 ASSAY OF NATRIURETIC PEPTIDE: CPT

## 2023-02-05 PROCEDURE — 74011250636 HC RX REV CODE- 250/636: Performed by: FAMILY MEDICINE

## 2023-02-05 PROCEDURE — 83735 ASSAY OF MAGNESIUM: CPT

## 2023-02-05 PROCEDURE — 99232 SBSQ HOSP IP/OBS MODERATE 35: CPT | Performed by: NURSE PRACTITIONER

## 2023-02-05 PROCEDURE — 80053 COMPREHEN METABOLIC PANEL: CPT

## 2023-02-05 RX ORDER — BUMETANIDE 1 MG/1
3 TABLET ORAL 2 TIMES DAILY
Status: DISCONTINUED | OUTPATIENT
Start: 2023-02-05 | End: 2023-02-06 | Stop reason: HOSPADM

## 2023-02-05 RX ADMIN — Medication 100 MG: at 09:17

## 2023-02-05 RX ADMIN — MIDODRINE HYDROCHLORIDE 5 MG: 5 TABLET ORAL at 09:17

## 2023-02-05 RX ADMIN — HEPARIN SODIUM 5000 UNITS: 5000 INJECTION INTRAVENOUS; SUBCUTANEOUS at 06:01

## 2023-02-05 RX ADMIN — Medication 1 TABLET: at 09:18

## 2023-02-05 RX ADMIN — MIDODRINE HYDROCHLORIDE 5 MG: 5 TABLET ORAL at 17:01

## 2023-02-05 RX ADMIN — ASPIRIN 81 MG: 81 TABLET, COATED ORAL at 09:17

## 2023-02-05 RX ADMIN — HEPARIN SODIUM 5000 UNITS: 5000 INJECTION INTRAVENOUS; SUBCUTANEOUS at 22:30

## 2023-02-05 RX ADMIN — SODIUM CHLORIDE, PRESERVATIVE FREE 10 ML: 5 INJECTION INTRAVENOUS at 23:40

## 2023-02-05 RX ADMIN — SODIUM CHLORIDE, PRESERVATIVE FREE 10 ML: 5 INJECTION INTRAVENOUS at 17:01

## 2023-02-05 RX ADMIN — TAMSULOSIN HYDROCHLORIDE 0.4 MG: 0.4 CAPSULE ORAL at 09:17

## 2023-02-05 RX ADMIN — FOLIC ACID 1 MG: 1 TABLET ORAL at 09:17

## 2023-02-05 RX ADMIN — CYANOCOBALAMIN TAB 500 MCG 1000 MCG: 500 TAB at 09:17

## 2023-02-05 RX ADMIN — SERTRALINE HYDROCHLORIDE 50 MG: 50 TABLET ORAL at 09:18

## 2023-02-05 RX ADMIN — SPIRONOLACTONE 12.5 MG: 25 TABLET ORAL at 09:18

## 2023-02-05 RX ADMIN — HEPARIN SODIUM 5000 UNITS: 5000 INJECTION INTRAVENOUS; SUBCUTANEOUS at 16:59

## 2023-02-05 RX ADMIN — FAMOTIDINE 20 MG: 20 TABLET, FILM COATED ORAL at 09:17

## 2023-02-05 RX ADMIN — BUMETANIDE 3 MG: 1 TABLET ORAL at 18:31

## 2023-02-05 RX ADMIN — Medication 1000 UNITS: at 09:17

## 2023-02-05 RX ADMIN — BUMETANIDE 2 MG: 0.25 INJECTION, SOLUTION INTRAMUSCULAR; INTRAVENOUS at 06:03

## 2023-02-05 RX ADMIN — METOPROLOL SUCCINATE 12.5 MG: 25 TABLET, EXTENDED RELEASE ORAL at 09:17

## 2023-02-05 RX ADMIN — SODIUM CHLORIDE, PRESERVATIVE FREE 10 ML: 5 INJECTION INTRAVENOUS at 06:06

## 2023-02-05 RX ADMIN — MIDODRINE HYDROCHLORIDE 5 MG: 5 TABLET ORAL at 12:01

## 2023-02-05 NOTE — PROGRESS NOTES
[facility] in [place], [place]  Inpatient Progress Note      Patient name: Jeanie Scott  Patient : 1950  Patient MRN: [id number]  Consulting MD: Amarilis Burris MD  Date of service: 23      REASON FOR REFERRAL:  Management of chronic diastolic heart failure      PLAN OF CARE   74 y.o. male with h/o diastolic > systolic heart failure, HFpEF, LVEF 55-60%(echo 23), stage D, NYHA class IV symptoms, c/b renal and hepatic failure, GDMT limited due to hypotension  Cardiac MRI revealed infiltrative cardiac amyloidosis, PYP was strongly suggestive of ATTR amyloidosis and genetic testing positive for inherited ATTR  RHC with severely elevated filling pressures and normal resting index  Patient agreed for admission for inotrope-assisted diuresis; possible initiation of inotropes, palliative care meeting to address code status, limits of care   Patient declined admission from clinic , but agreed to come Monday, 23  Note: Family will require cascade genetic testing; they will reach out to [facility] first  Pt with heavy alcohol use at home PTA; concern for pt's ability to manage fluid status at home; also need to clarify pts goals of care          RECOMMENDATIONS:  Stop Milrinone today   Plan to start vyndamax 61mg daily as outpatient; unless pt decides to go home on hospice, in which case would be reasonable to stop  Continue current dose of toprol XL 12.5mg daily  Known intolerance to lisinopril/ARB; not recommended in amyloid  Continue spironolactone 12.5mg daily  Cannot tolerate SGLT2i due to CrCl  Transition to Bumex 3mg BID PO  Continue ASA   Pt has declined sleep study at this time  Monitor HF labs: CMP, NT-Pro BNP, Mg daily while diuresing  Heart failure education  Will need education on alcohol cessation  Appreciate Palliative Care consultation for further discussion of goals of care; noted DNR    Remainder of care per hospitalist, home tomorrow IMPRESSION:  Fluid overload- improving   Chronic diastolic heart failure, HFpEF  Stage C, NYHA class IIIA symptoms  Non-ischemic cardiomyopathy, LVEF 55-60%  Severe LVH 2.1cm  cMRI suggestive of infiltrative cardiac amyloidosis  Intolerance to lisinopril  Inherited ATTR cardiac amyloidosis  Coronary artery disease  S/p CABG x 3 (4/2020) LIMA to LAD, SVG to RCA and Ramus  CHF s/p PM - pacemaker dependent  RBBB 144ms  Cardiac risk factors  HTN  HL  DM2  Liver dysfunction, TBili 2.8  CKD, stage 3  AT3 activity low  Proteic C low  Lupus anticoagulatn abnormal  Leukopenia  H/o prostate cancer  Elevated D Dimer  Hx DVT/PE: developed PE/DVT after bowel resection in 2012  History of rectal bleeding due to an ulcer related to radiation   Depressive symptoms  Alcohol Abuse    INTERVAL HISTORY:  NAEO  I/O not complete  VSS  Creatinine down to 1.86  PBNP 3700    K 3.7  T Bili down to 1.2      CARDIAC IMAGING AND DIAGNOSTICS REVIEWED:  Echo (1/30/23)    Left Ventricle: Normal left ventricular systolic function with a visually estimated EF of 55 - 60%. Findings consistent with moderate to severe concentric hypertrophy; speckled pattern consider amyloid. Normal wall motion. Mitral Valve: Mild regurgitation. Left Atrium: Left atrium is moderately dilated. Right Atrium: Right atrium is moderately dilated. Echo (8/2022)    Left Ventricle: Low normal left ventricular systolic function. EF by 2D Simpsons Biplane is 50%. Left ventricle size is normal. Severely increased wall thickness. Findings consistent with severe concentric hypertrophy. There is a speckled appearance to the left ventricular myocardium. Consider testing for cardiac amyloidosis including possible cardiac MRI. Noted that the patient's Medtronic device is MRI compatible. Normal wall motion consistent with paced rhythm    Left Atrium: Left atrium is severely dilated. Left atrial volume index is severely increased (>48 mL/m2).     Right Atrium: Right atrium is moderately dilated. Mitral Valve: Mild to moderate regurgitation. Tricuspid Valve: Moderate regurgitation. Mildly elevated RVSP. The estimated RVSP is 38 mmHg. Cardiac MRI (9/2022)  1. Normal left ventricular cavity size. Severe concentric left ventricular  hypertrophy. Significantly hypertrophied septum measuring 21 mm. There is no  left ventricular outflow tract obstruction. Moderate left ventricular systolic  dysfunction. Moderate global hypokinesis with slight regional variation. 3-D  LVEF 43%. 2. Normal right ventricular size with minimal right ventricular systolic  dysfunction. 3-D RVEF 47%. 3. Moderate 2+ tricuspid regurgitation. 4. On EGE and LGE study, there is mild diffuse myocardial enhancement with  slightly poor nulling of the myocardium on postcontrast images. These findings  may suggest possibility of infiltrative cardiac amyloidosis. There is no  myocardial scar. There is no features of recent or old myocardial infarction. There is no features of infiltrative sarcoidosis. There is no features of  inflammatory myocarditis. All myocardial walls are viable. 5. Normal pericardium without any significant pericardial effusion. 6. Small right-sided pleural effusion and tiny left-sided pleural effusion. Lexiscan Myoview May 2021 with normal perfusion, calculated LVEF of 37%     RHC (1/18/23)  Moderately to severely elevated right and left-sided filling pressures. Moderate pulmonary hypertension of predominantly postcapillary origin. Right heart waveforms are likely consistent with restrictive physiology. Preserved cardiac output and index. RA 21, PCWP 25; CI 2.94      LIFE GOALS:  Lifestyle goals discussed with the patient. BRIEF HISTORY OF PRESENT ILLNESS:  Emilia Thao is a 74 y.o. male with extensive history as noted above. Pt was referred to [facility] for further evaluation and treatment of cardiac amyloidosis.   He was found to have significantly elevated filling pressures on RHC and was admitted for inotrope assisted diuresis. Now on admission, found to be heavy alcohol drinker and concerns for withdrawals. REVIEW OF SYSTEMS:  Review of Systems   Constitutional:  Positive for malaise/fatigue. Negative for chills and fever. Respiratory:  Negative for shortness of breath. Cardiovascular:  Negative for chest pain, palpitations and leg swelling. Gastrointestinal:  Negative for heartburn, nausea and vomiting. Neurological:  Negative for dizziness and weakness. Psychiatric/Behavioral:  Positive for substance abuse. The patient is nervous/anxious. PHYSICAL EXAM:  Visit Vitals  BP (!) 95/55   Pulse 73   Temp 98.2 °F (36.8 °C)   Resp 12   Ht 5' 7\" (1.702 m)   Wt 141 lb 8.6 oz (64.2 kg)   SpO2 98%   BMI 22.17 kg/m²         Physical Exam  Vitals and nursing note reviewed. Constitutional:       Appearance: Normal appearance. Neck:      Vascular: No hepatojugular reflux or JVD. Cardiovascular:      Rate and Rhythm: Normal rate and regular rhythm. Pulmonary:      Effort: Pulmonary effort is normal. No respiratory distress. Breath sounds: Normal breath sounds. Abdominal:      General: Bowel sounds are normal. There is no distension. Palpations: Abdomen is soft. Musculoskeletal:      Right lower leg: No edema. Left lower leg: No edema. Comments: Improved leg edema    Skin:     General: Skin is warm and dry. Capillary Refill: Capillary refill takes less than 2 seconds. Neurological:      General: No focal deficit present. Mental Status: He is alert and oriented to person, place, and time.    Psychiatric:         Mood and Affect: Mood normal.            PAST MEDICAL HISTORY:  Past Medical History:   Diagnosis Date    Arthritis     Cancer ([facility] [address]     Prostate ca    Chronic kidney disease     Stage 3    Chronic pain     Abdoman, back, fingers per daughter    Chronic systolic (congestive) heart failure 11/28/2022    Depression Diabetes ([facility] [address]     Heart attack ([facility] [address]     Heart failure ([facility] [address] 2020    Dr. Juan David Greer    Hyperlipidemia     Hypertension     Pacemaker 05/01/2020    MED    PE (pulmonary embolism)     Pneumonia     Sleep apnea     not use CPAP       PAST SURGICAL HISTORY:  Past Surgical History:   Procedure Laterality Date    COLONOSCOPY N/A 3/19/2021    COLONOSCOPY performed by Betito Huber MD at [facility] ENDOSCOPY    COLONOSCOPY N/A 5/10/2021    COLONOSCOPY performed by Betito Huber MD at [facility] N/A 8/2/2021    FLEXIBLE SIGMOIDOSCOPY performed by Betito Huber MD at [facility] ENDOSCOPY    HX BACK SURGERY  2014    HX CERVICAL LAMINECTOMY      HX CORONARY ARTERY BYPASS GRAFT  04/2021    X3     HX GI  11/2012    bowel resection    HX HEART CATHETERIZATION  04/2020    HX ORTHOPAEDIC      amputated left 4th finger    NC INS NEW/RPLCMT PRM PM W/TRANSV ELTRD ATRIAL&VENT N/A 5/1/2020    INSERT PPM DUAL performed by Jacquie Landon MD at [facility] CATH LAB    NC UNLISTED PROCEDURE ABDOMEN PERITONEUM & OMENTUM      bowel resection       FAMILY HISTORY:  Family History   Problem Relation Age of Onset    Cancer Mother         unknown    Heart Disease Father     Heart Disease Brother     Anesth Problems Neg Hx        SOCIAL HISTORY:  Social History     Socioeconomic History    Marital status:     Number of children: 1    Years of education: 12    Highest education level: High school graduate   Occupational History    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[employer], 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    Tobacco Use    Smoking status: Every Day     Types: Cigars    Smokeless tobacco: Current     Types: Chew    Tobacco comments:     Chews tobacco every day; cigars and black and milds    Vaping Use    Vaping Use: Never used   Substance and Sexual Activity    Alcohol use:  Yes     Alcohol/week: 16.0 standard drinks     Types: 2 Glasses of wine, 14 Cans of beer per week     Comment: 2-4 beers daily, sometimes alcohol    Drug use: Never    Sexual activity: Not Currently   Other Topics Concern     Service No    Blood Transfusions No    Caffeine Concern No    Occupational Exposure No    Hobby Hazards No    Sleep Concern Yes    Stress Concern Yes    Weight Concern No    Special Diet No    Back Care Yes    Exercise No    Seat Belt Yes   Social History Narrative    ** Merged History Encounter **         Lives in [place] [id number] Determinants of Health     Financial Resource Strain: Low Risk     Difficulty of Paying Living Expenses: Not hard at all   Food Insecurity: No Food Insecurity    Worried About [address] [employer] in the Last Year: Never true    [facility] in the Last Year: Never true       LABORATORY RESULTS:     Labs Latest Ref Rng & Units 2/5/2023 2/3/2023 2/2/2023 2/1/2023 1/31/2023 1/30/2023 1/4/2023   WBC 4.1 - 11.1 K/uL - - - 3. 0(L) - 4.6 3.9   RBC 4.10 - 5.70 M/uL - - - 3.80(L) - 4.18 4.09(L)   Hemoglobin 12.1 - 17.0 g/dL - - - 11. 1(L) - 12.2 12. 1(L)   Hematocrit 36.6 - 50.3 % - - - 33. 2(L) - 37.7 37.9   MCV 80.0 - 99.0 FL - - - 87.4 - 90.2 93   Platelets 915 - 390 K/uL - - - 168 - 162 157   Albumin 3.5 - 5.0 g/dL 3. 0(L) 3. 3(L) 3. 1(L) 3. 1(L) 3. 2(L) 3.4(L) 4.2   Calcium 8.5 - 10.1 MG/DL 9.2 9.5 9.0 8.8 8.9 9.2 9.3   Glucose 65 - 100 mg/dL 106(H) 100 83 87 87 97 66(L)   BUN 6 - 20 MG/DL 38(H) 37(H) 32(H) 29(H) 31(H) 31(H) 28(H)   Creatinine 0.70 - 1.30 MG/DL 1.86(H) 2.01(H) 2.11(H) 1.83(H) 1.74(H) 1.82(H) 1.68(H)   Sodium 136 - 145 mmol/L 136 136 137 135(L) 137 134(L) 139   Potassium 3.5 - 5.1 mmol/L 3.7 4.0 4.2 5.0 4.3 HEMOLYZED,RECOLLECT REQUESTED 5.1   Some recent data might be hidden     Lab Results   Component Value Date/Time    TSH 4.380 10/12/2022 10:36 AM    TSH 2.88 08/29/2022 11:23 AM    TSH 2.26 05/04/2022 01:21 PM    TSH 3.680 06/16/2021 10:28 AM    TSH 3.450 03/10/2021 10:13 AM    TSH 2.070 08/19/2020 09:31 AM    TSH 1.98 04/25/2020 01:42 AM    TSH 2.400 01/09/2020 11:36 AM TSH 2.220 09/18/2019 09:15 AM    TSH 2.360 06/25/2019 09:23 AM    TSH 2.900 09/17/2018 09:20 AM    TSH 2.100 05/09/2017 01:58 PM    TSH 2.320 04/12/2016 01:34 PM    TSH 2.070 06/03/2015 02:04 PM    TSH 2.71 11/28/2012 02:15 PM       ALLERGY:  Allergies   Allergen Reactions    Arb-Angiotensin Receptor Antagonist Other (comments)     High k    Statins-Hmg-Coa Reductase Inhibitors Myalgia    Ace Inhibitors Cough        CURRENT MEDICATIONS:    Current Facility-Administered Medications:     bumetanide (BUMEX) tablet 3 mg, 3 mg, Oral, BID, Patricia, Sanjana B, NP    thiamine HCL (B-1) tablet 100 mg, 100 mg, Oral, DAILY, Sebastian Aldrich MD, 100 mg at 85/72/32 0651    folic acid (FOLVITE) tablet 1 mg, 1 mg, Oral, DAILY, Sebastian Aldrich MD, 1 mg at 02/04/23 0900    multivitamin, tx-iron-ca-min (THERA-M w/ IRON) tablet 1 Tablet, 1 Tablet, Oral, DAILY, Sebastian Aldrich MD, 1 Tablet at 02/04/23 0859    midodrine (PROAMATINE) tablet 5 mg, 5 mg, Oral, TID WITH MEALS, Sebastian Aldrich MD, 5 mg at 02/04/23 1709    nicotine (NICODERM CQ) 21 mg/24 hr patch 1 Patch, 1 Patch, TransDERmal, DAILY, Mariana Pizarro NP, 1 Patch at 02/04/23 0900    LORazepam (ATIVAN) tablet 2 mg, 2 mg, Oral, Q1H PRN, Alton Her MD    LORazepam (ATIVAN) tablet 4 mg, 4 mg, Oral, Q1H PRN, Alton Her MD    tamsulosin (FLOMAX) capsule 0.4 mg, 0.4 mg, Oral, DAILY, Patricia, Sanjana B, NP, 0.4 mg at 02/04/23 0900    aspirin delayed-release tablet 81 mg, 81 mg, Oral, DAILY, Patricia, Sanjana B, NP, 81 mg at 02/04/23 0859    cholecalciferol (VITAMIN D3) (1000 Units /25 mcg) tablet 1,000 Units, 1,000 Units, Oral, DAILY, Patricia, Sanjana B, NP, 1,000 Units at 02/04/23 0859    cyanocobalamin (VITAMIN B12) tablet 1,000 mcg, 1,000 mcg, Oral, DAILY, Patricia, Sanjana B, NP, 1,000 mcg at 02/04/23 0859    metoprolol succinate (TOPROL-XL) XL tablet 12.5 mg, 12.5 mg, Oral, DAILY, Patricia, Sanjana B, NP, 12.5 mg at 02/04/23 0859    sertraline (ZOLOFT) tablet 50 mg, 50 mg, Oral, DAILY, Patricia, Sanjana B, NP, 50 mg at 02/04/23 0900    spironolactone (ALDACTONE) tablet 12.5 mg, 12.5 mg, Oral, DAILY, Patricia, Sanjana B, NP, 12.5 mg at 02/04/23 0901    sodium chloride (NS) flush 5-40 mL, 5-40 mL, IntraVENous, Q8H, Patricia, Sanjana B, NP, 10 mL at 02/05/23 0606    sodium chloride (NS) flush 5-40 mL, 5-40 mL, IntraVENous, PRN, Patricia, Sanjana B, NP    acetaminophen (TYLENOL) tablet 650 mg, 650 mg, Oral, Q6H PRN, 650 mg at 02/03/23 1355 **OR** acetaminophen (TYLENOL) suppository 650 mg, 650 mg, Rectal, Q6H PRN, Patricia, Sanjana B, NP    polyethylene glycol (MIRALAX) packet 17 g, 17 g, Oral, DAILY PRN, Patricia, Sanjana B, NP    ondansetron (ZOFRAN ODT) tablet 4 mg, 4 mg, Oral, Q8H PRN **OR** ondansetron (ZOFRAN) injection 4 mg, 4 mg, IntraVENous, Q6H PRN, Patricia, Sanjana B, NP    famotidine (PEPCID) tablet 20 mg, 20 mg, Oral, DAILY, Patricia, Sanjana B, NP, 20 mg at 02/04/23 0859    sodium chloride (NS) flush 5-40 mL, 5-40 mL, IntraVENous, PRN, Daniel Dan MD    glucose chewable tablet 16 g, 4 Tablet, Oral, PRN, Nilson Bartholomew MD    glucagon (GLUCAGEN) injection 1 mg, 1 mg, IntraMUSCular, PRN, Nilson Bartholomew MD    dextrose 10 % infusion 0-250 mL, 0-250 mL, IntraVENous, PRN, Daniel Dan MD    heparin (porcine) injection 5,000 Units, 5,000 Units, SubCUTAneous, Q8H, Daniel Dan MD, 5,000 Units at 02/05/23 0601    insulin lispro (HUMALOG) injection, , SubCUTAneous, AC&HS, Daniel Dan MD, 3 Units at 02/03/23 3238    PATIENT CARE TEAM:  Patient Care Team:  Marian Valdez MD as PCP - General (Internal Medicine Physician)  Marian Valdez MD as PCP - [address] [place] [address]  Wilfred Nuñez MD as Physician (Cardiovascular Disease Physician)  Tiffany Gallego MD as Physician (Nephrology)  Suresh Rojas MD (Neurosurgery)  Wagner Ponce MD (Ophthalmology)  Isis Styles MD (Otolaryngology)  Chriss Strauss MD (Urology)  Remedios Rosales MD (Ophthalmology)  Alfonzo Stern DPM (Podiatry)  Que Molina, PHD (Psychology)  Josefina Parrish MD (Cardiothoracic Surgery)  Alexis Garces MD (Cardiovascular Disease Physician)  Chad Costa MD (Cardiovascular Disease Physician)  Deanna Shelton MD as Physician (Nephrology)  Jl Conde MD (Gastroenterology)  Aaliyah Wilkins MD (Radiation Oncology)  Fran Giordano MD as Physician (Nephrology)  Zahra Stoddard, MARIYA as Nurse Practitioner (Nurse Practitioner)  Chad Costa MD (Cardiovascular Disease Physician)  Stephanie Rodriguez MD (Ophthalmology)     Thank you for allowing us to participate in this patient's care.     Ludwig Hodgkins, NP  [address] [place] [address], [address]  Phone: [phone]

## 2023-02-05 NOTE — PROGRESS NOTES
[address] [facility] Adult  Hospitalist Group                                                                                          Hospitalist Progress Note  Adrienne Hall MD  Answering service: [phone] from in house phone        Date of Service:  2023  NAME:  Alexei Foy  :  1950  MRN:  [id number]      Admission Summary:     Patient admitted with acute on chronic CHF. Interval history / Subjective:     Patient's breathing is improving. Assessment & Plan:     Acute on chronic CHF  -Patient with acute on chronic combined systolic and diastolic CHF, NYHA class IV on admission  -Last echo with EF 55 to 60%  -On inotrope assisted diuresis, with milrinone drip and Bumex, milrinone drip stopped today  -No intolerance to ACE or ARB or Arni, on Toprol and Aldactone, not tolerant to SGLT2 due to CKD  -Advanced heart failure team following  -Plan for discharge in a.m.  -Plan to arrange for home hospice upon discharge    Coronary artery disease  -Patient has history of CABG  -Continue aspirin and beta-blocker    Hypertension  -Continue Toprol, monitor blood pressure    History of prostate cancer  -Follow as outpatient    Depression/anxiety  -Continue Zoloft    Alcohol abuse  -On CIWA protocol  -Start thiamine, folic acid and multivitamins     Code status: Full  Prophylaxis: Heparin  Care Plan discussed with: Patient and patient's daughter present at bedside. Anticipated Disposition: 24 to 48 hours    CRITICAL CARE ATTESTATION:  I had a face to face encounter with the patient, reviewed and interpreted patient data including clinical events, labs, images, vital signs, I/O's, and examined patient. I have discussed the case and the plan and management of the patient's care with the consulting services, the bedside nurses and necessary ancillary providers.        NOTE OF PERSONAL INVOLVEMENT IN CARE   This patient has a high probability of imminent, clinically significant deterioration, which requires the highest level of preparedness to intervene urgently. I participated in the decision-making and personally managed or directed the management of the following life and organ supporting interventions that required my frequent assessment to treat or prevent imminent deterioration. I personally spent 45 minutes of critical care time. This is time spent at this critically ill patient's bedside actively involved in patient care as well as the coordination of care and discussions with the patient's family. This does not include any procedural time which has been billed separately. Hospital Problems  Date Reviewed: 11/28/2022            Codes Class Noted POA    Acute on chronic HFrEF (heart failure with reduced ejection fraction) ([facility]) ICD-10-CM: I50.23  ICD-9-CM: 428.23  1/30/2023 Unknown        CHF (congestive heart failure) ([facility]) ICD-10-CM: I50.9  ICD-9-CM: 428.0  1/30/2023 Unknown               Review of Systems:   A comprehensive review of systems was negative except for that written in the HPI. Vital Signs:    Last 24hrs VS reviewed since prior progress note. Most recent are:  Visit Vitals  BP (!) 95/55   Pulse 72   Temp 98.2 °F (36.8 °C)   Resp 12   Ht 5' 7\" (1.702 m)   Wt 64.2 kg (141 lb 8.6 oz)   SpO2 98%   BMI 22.17 kg/m²         Intake/Output Summary (Last 24 hours) at 2/5/2023 1251  Last data filed at 2/5/2023 8567  Gross per 24 hour   Intake --   Output 2050 ml   Net -2050 ml        Physical Examination:     I had a face to face encounter with this patient and independently examined them on 2/5/2023 as outlined below:          Constitutional:  No acute distress, cooperative, pleasant    ENT:  Oral mucosa moist, oropharynx benign. Resp:  CTA bilaterally. No wheezing/rhonchi/rales. No accessory muscle use. CV:  Regular rhythm, normal rate, no murmurs, gallops, rubs    GI:  Soft, non distended, non tender.  normoactive bowel sounds, no hepatosplenomegaly Musculoskeletal:  No edema, warm, 2+ pulses throughout    Neurologic:  Moves all extremities. AAOx3, CN II-XII reviewed            Data Review:    Review and/or order of clinical lab test    I have independently reviewed and interpreted patient's lab and all other diagnostic data    Notes reviewed from all clinical/nonclinical/nursing services involved in patient's clinical care. Care coordination discussions were held with appropriate clinical/nonclinical/ nursing providers based on care coordination needs. Labs:     No results for input(s): WBC, HGB, HCT, PLT, HGBEXT, HCTEXT, PLTEXT, HGBEXT, HCTEXT, PLTEXT in the last 72 hours. Recent Labs     02/05/23 0452 02/03/23  0323    136   K 3.7 4.0   CL 99 101   CO2 32 27   BUN 38* 37*   CREA 1.86* 2.01*   * 100   CA 9.2 9.5   MG 2.3 2.5*     Recent Labs     02/05/23 0452 02/03/23  0323   ALT 21 21   AP 65 67   TBILI 1.2* 1.8*   TP 7.2 8.0   ALB 3.0* 3.3*   GLOB 4.2* 4.7*     No results for input(s): INR, PTP, APTT, INREXT, INREXT in the last 72 hours. No results for input(s): FE, TIBC, PSAT, FERR in the last 72 hours. Lab Results   Component Value Date/Time    Folate 12.0 01/21/2013 07:55 AM      No results for input(s): PH, PCO2, PO2 in the last 72 hours. No results for input(s): CPK, CKNDX, TROIQ in the last 72 hours.     No lab exists for component: CPKMB  Lab Results   Component Value Date/Time    Cholesterol, total 74 (L) 01/04/2023 10:55 AM    HDL Cholesterol 41 01/04/2023 10:55 AM    LDL, calculated 20 01/04/2023 10:55 AM    LDL, calculated 64.4 08/29/2022 11:23 AM    Triglyceride 49 01/04/2023 10:55 AM    CHOL/HDL Ratio 2.4 08/29/2022 11:23 AM     Lab Results   Component Value Date/Time    Glucose (POC) 87 02/05/2023 11:26 AM    Glucose (POC) 101 02/05/2023 06:26 AM    Glucose (POC) 110 02/04/2023 09:34 PM    Glucose (POC) 96 02/04/2023 04:26 PM    Glucose (POC) 162 (H) 02/04/2023 11:19 AM     Lab Results   Component Value Date/Time Color YELLOW/STRAW 02/27/2021 09:14 PM    Appearance CLEAR 02/27/2021 09:14 PM    Specific gravity 1.022 02/27/2021 09:14 PM    Specific gravity 1.010 12/12/2012 03:15 PM    pH (UA) 5.0 02/27/2021 09:14 PM    Protein 100 (A) 02/27/2021 09:14 PM    Glucose Negative 02/27/2021 09:14 PM    Ketone TRACE (A) 02/27/2021 09:14 PM    Bilirubin Negative 02/27/2021 09:14 PM    Urobilinogen 0.2 02/27/2021 09:14 PM    Nitrites Negative 02/27/2021 09:14 PM    Leukocyte Esterase Negative 02/27/2021 09:14 PM    Epithelial cells FEW 02/27/2021 09:14 PM    Bacteria Negative 02/27/2021 09:14 PM    WBC 0-4 02/27/2021 09:14 PM    RBC 0-5 02/27/2021 09:14 PM         Medications Reviewed:     Current Facility-Administered Medications   Medication Dose Route Frequency    bumetanide (BUMEX) tablet 3 mg  3 mg Oral BID    thiamine HCL (B-1) tablet 100 mg  100 mg Oral DAILY    folic acid (FOLVITE) tablet 1 mg  1 mg Oral DAILY    multivitamin, tx-iron-ca-min (THERA-M w/ IRON) tablet 1 Tablet  1 Tablet Oral DAILY    midodrine (PROAMATINE) tablet 5 mg  5 mg Oral TID WITH MEALS    nicotine (NICODERM CQ) 21 mg/24 hr patch 1 Patch  1 Patch TransDERmal DAILY    LORazepam (ATIVAN) tablet 2 mg  2 mg Oral Q1H PRN    LORazepam (ATIVAN) tablet 4 mg  4 mg Oral Q1H PRN    tamsulosin (FLOMAX) capsule 0.4 mg  0.4 mg Oral DAILY    aspirin delayed-release tablet 81 mg  81 mg Oral DAILY    cholecalciferol (VITAMIN D3) (1000 Units /25 mcg) tablet 1,000 Units  1,000 Units Oral DAILY    cyanocobalamin (VITAMIN B12) tablet 1,000 mcg  1,000 mcg Oral DAILY    metoprolol succinate (TOPROL-XL) XL tablet 12.5 mg  12.5 mg Oral DAILY    sertraline (ZOLOFT) tablet 50 mg  50 mg Oral DAILY    spironolactone (ALDACTONE) tablet 12.5 mg  12.5 mg Oral DAILY    sodium chloride (NS) flush 5-40 mL  5-40 mL IntraVENous Q8H    sodium chloride (NS) flush 5-40 mL  5-40 mL IntraVENous PRN    acetaminophen (TYLENOL) tablet 650 mg  650 mg Oral Q6H PRN    Or    acetaminophen (TYLENOL) suppository 650 mg  650 mg Rectal Q6H PRN    polyethylene glycol (MIRALAX) packet 17 g  17 g Oral DAILY PRN    ondansetron (ZOFRAN ODT) tablet 4 mg  4 mg Oral Q8H PRN    Or    ondansetron (ZOFRAN) injection 4 mg  4 mg IntraVENous Q6H PRN    famotidine (PEPCID) tablet 20 mg  20 mg Oral DAILY    sodium chloride (NS) flush 5-40 mL  5-40 mL IntraVENous PRN    glucose chewable tablet 16 g  4 Tablet Oral PRN    glucagon (GLUCAGEN) injection 1 mg  1 mg IntraMUSCular PRN    dextrose 10 % infusion 0-250 mL  0-250 mL IntraVENous PRN    heparin (porcine) injection 5,000 Units  5,000 Units SubCUTAneous Q8H    insulin lispro (HUMALOG) injection   SubCUTAneous AC&HS     ______________________________________________________________________  EXPECTED LENGTH OF STAY: 3d 21h  ACTUAL LENGTH OF STAY:          6                 Ryley Smith MD

## 2023-02-06 VITALS
RESPIRATION RATE: 14 BRPM | DIASTOLIC BLOOD PRESSURE: 48 MMHG | HEIGHT: 67 IN | WEIGHT: 131.39 LBS | BODY MASS INDEX: 20.62 KG/M2 | HEART RATE: 77 BPM | TEMPERATURE: 98.4 F | SYSTOLIC BLOOD PRESSURE: 94 MMHG | OXYGEN SATURATION: 99 %

## 2023-02-06 LAB
ALBUMIN SERPL-MCNC: 2.9 G/DL (ref 3.5–5)
ALBUMIN/GLOB SERPL: 0.7 (ref 1.1–2.2)
ALP SERPL-CCNC: 66 U/L (ref 45–117)
ALT SERPL-CCNC: 21 U/L (ref 12–78)
ANION GAP SERPL CALC-SCNC: 8 MMOL/L (ref 5–15)
AST SERPL-CCNC: 42 U/L (ref 15–37)
BILIRUB SERPL-MCNC: 1.1 MG/DL (ref 0.2–1)
BNP SERPL-MCNC: 3052 PG/ML
BUN SERPL-MCNC: 44 MG/DL (ref 6–20)
BUN/CREAT SERPL: 23 (ref 12–20)
CALCIUM SERPL-MCNC: 9 MG/DL (ref 8.5–10.1)
CHLORIDE SERPL-SCNC: 98 MMOL/L (ref 97–108)
CO2 SERPL-SCNC: 29 MMOL/L (ref 21–32)
CREAT SERPL-MCNC: 1.9 MG/DL (ref 0.7–1.3)
GLOBULIN SER CALC-MCNC: 3.9 G/DL (ref 2–4)
GLUCOSE BLD STRIP.AUTO-MCNC: 118 MG/DL (ref 65–117)
GLUCOSE BLD STRIP.AUTO-MCNC: 149 MG/DL (ref 65–117)
GLUCOSE BLD STRIP.AUTO-MCNC: 89 MG/DL (ref 65–117)
GLUCOSE SERPL-MCNC: 127 MG/DL (ref 65–100)
MAGNESIUM SERPL-MCNC: 2.2 MG/DL (ref 1.6–2.4)
POTASSIUM SERPL-SCNC: 4 MMOL/L (ref 3.5–5.1)
PROT SERPL-MCNC: 6.8 G/DL (ref 6.4–8.2)
SERVICE CMNT-IMP: ABNORMAL
SERVICE CMNT-IMP: ABNORMAL
SERVICE CMNT-IMP: NORMAL
SODIUM SERPL-SCNC: 135 MMOL/L (ref 136–145)

## 2023-02-06 PROCEDURE — 74011250636 HC RX REV CODE- 250/636: Performed by: FAMILY MEDICINE

## 2023-02-06 PROCEDURE — 74011000250 HC RX REV CODE- 250: Performed by: NURSE PRACTITIONER

## 2023-02-06 PROCEDURE — 74011250637 HC RX REV CODE- 250/637: Performed by: NURSE PRACTITIONER

## 2023-02-06 PROCEDURE — 36415 COLL VENOUS BLD VENIPUNCTURE: CPT

## 2023-02-06 PROCEDURE — 82962 GLUCOSE BLOOD TEST: CPT

## 2023-02-06 PROCEDURE — 97116 GAIT TRAINING THERAPY: CPT

## 2023-02-06 PROCEDURE — 83735 ASSAY OF MAGNESIUM: CPT

## 2023-02-06 PROCEDURE — 80053 COMPREHEN METABOLIC PANEL: CPT

## 2023-02-06 PROCEDURE — 83880 ASSAY OF NATRIURETIC PEPTIDE: CPT

## 2023-02-06 PROCEDURE — 74011250637 HC RX REV CODE- 250/637: Performed by: FAMILY MEDICINE

## 2023-02-06 PROCEDURE — 99233 SBSQ HOSP IP/OBS HIGH 50: CPT | Performed by: NURSE PRACTITIONER

## 2023-02-06 RX ORDER — MIDODRINE HYDROCHLORIDE 5 MG/1
5 TABLET ORAL
Qty: 90 TABLET | Refills: 0 | Status: SHIPPED | OUTPATIENT
Start: 2023-02-06 | End: 2023-03-08

## 2023-02-06 RX ORDER — SERTRALINE HYDROCHLORIDE 50 MG/1
50 TABLET, FILM COATED ORAL DAILY
Qty: 1 TABLET | Refills: 0 | Status: SHIPPED
Start: 2023-02-06

## 2023-02-06 RX ORDER — IBUPROFEN 200 MG
1 TABLET ORAL DAILY
Qty: 30 PATCH | Refills: 0 | Status: SHIPPED | OUTPATIENT
Start: 2023-02-07 | End: 2023-03-09

## 2023-02-06 RX ORDER — BUMETANIDE 1 MG/1
3 TABLET ORAL 2 TIMES DAILY
Qty: 180 TABLET | Refills: 0 | Status: SHIPPED | OUTPATIENT
Start: 2023-02-06

## 2023-02-06 RX ADMIN — BUMETANIDE 3 MG: 1 TABLET ORAL at 09:15

## 2023-02-06 RX ADMIN — MIDODRINE HYDROCHLORIDE 5 MG: 5 TABLET ORAL at 09:15

## 2023-02-06 RX ADMIN — HEPARIN SODIUM 5000 UNITS: 5000 INJECTION INTRAVENOUS; SUBCUTANEOUS at 06:15

## 2023-02-06 RX ADMIN — FAMOTIDINE 20 MG: 20 TABLET, FILM COATED ORAL at 09:14

## 2023-02-06 RX ADMIN — Medication 1 TABLET: at 09:14

## 2023-02-06 RX ADMIN — FOLIC ACID 1 MG: 1 TABLET ORAL at 09:15

## 2023-02-06 RX ADMIN — SODIUM CHLORIDE, PRESERVATIVE FREE 10 ML: 5 INJECTION INTRAVENOUS at 07:13

## 2023-02-06 RX ADMIN — TAMSULOSIN HYDROCHLORIDE 0.4 MG: 0.4 CAPSULE ORAL at 09:14

## 2023-02-06 RX ADMIN — SERTRALINE HYDROCHLORIDE 50 MG: 50 TABLET ORAL at 09:15

## 2023-02-06 RX ADMIN — SPIRONOLACTONE 12.5 MG: 25 TABLET ORAL at 09:15

## 2023-02-06 RX ADMIN — MIDODRINE HYDROCHLORIDE 5 MG: 5 TABLET ORAL at 11:59

## 2023-02-06 RX ADMIN — Medication 1000 UNITS: at 09:15

## 2023-02-06 RX ADMIN — CYANOCOBALAMIN TAB 500 MCG 1000 MCG: 500 TAB at 09:14

## 2023-02-06 RX ADMIN — HEPARIN SODIUM 5000 UNITS: 5000 INJECTION INTRAVENOUS; SUBCUTANEOUS at 11:59

## 2023-02-06 RX ADMIN — METOPROLOL SUCCINATE 12.5 MG: 25 TABLET, EXTENDED RELEASE ORAL at 09:15

## 2023-02-06 RX ADMIN — Medication 100 MG: at 09:15

## 2023-02-06 RX ADMIN — ASPIRIN 81 MG: 81 TABLET, COATED ORAL at 09:14

## 2023-02-06 RX ADMIN — POLYETHYLENE GLYCOL 3350 17 G: 17 POWDER, FOR SOLUTION ORAL at 14:42

## 2023-02-06 NOTE — NURSE NAVIGATOR
[facility] secured post discharge follow up appt for 2/7/23 @ 1015 am with Dr Lisseth Mckeon. AVS updated.

## 2023-02-06 NOTE — PROGRESS NOTES
0730 Bedside shift change report given to Jayce (oncoming nurse) by Bam Moran (offgoing nurse). Report included the following information SBAR, Kardex, ED Summary, Procedure Summary, Intake/Output, MAR, and Recent Results. 2000 Bedside shift change report given to Carlos Godoy (oncoming nurse) by Jayce (offgoing nurse). Report included the following information SBAR, Kardex, ED Summary, Procedure Summary, Intake/Output, MAR, and Recent Results.

## 2023-02-06 NOTE — PROGRESS NOTES
Problem: Mobility Impaired (Adult and Pediatric)  Goal: *Acute Goals and Plan of Care (Insert Text)  Description: FUNCTIONAL STATUS PRIOR TO ADMISSION: Patient was modified independent using a single point cane, rolling walker or no device for functional mobility, denies falls. HOME SUPPORT PRIOR TO ADMISSION: The patient lived alone with his daughters available to provide assistance if needed (transportation, cooking, cleaning). Physical Therapy Goals  Initiated 1/31/2023  1. Patient will move from supine to sit and sit to supine  in bed with modified independence within 7 day(s). 2.  Patient will transfer from bed to chair and chair to bed with modified independence using the least restrictive device within 7 day(s). 3.  Patient will perform sit to stand with modified independence within 7 day(s). 4.  Patient will ambulate with modified independence for 300 feet with the least restrictive device within 7 day(s). 5.  Patient will ascend/descend  3 stairs with handrail(s) with modified independence within 7 day(s). Outcome: Progressing Towards Goal       PHYSICAL THERAPY TREATMENT  Patient: Aaliyah Hector (4 y.o. male)  Date: 2/6/2023  Diagnosis: Acute on chronic HFrEF (heart failure with reduced ejection fraction) ([facility]) [I50.23]  [facility]est[facility] [I50.9] <principal problem not specified>      Precautions: Fall  Chart, physical therapy assessment, plan of care and goals were reviewed. ASSESSMENT  Patient continues with skilled PT services and is progressing towards goals. Pt was able to complete 6MWT. Pt utilized rolling walker to assist with balance. Pt reports owning one at home. On arrival pt putting toilet paper in mouth. Pt's daughter reports he is doing that because he chews tobacco and needs something in his mouth. .       6 MWT results: (  )  Distance Walked in Feet (ft): 374 ft.    Pre Heart Rate: 77         Post Heart Rate: 81      Assistive device used: Assistive Device: Gait belt;Walker, rolling       Normative data:   Men 39-80 years old = 1889 feet; Women 3680 years wxq=6847 feet              Current Level of Function Impacting Discharge (mobility/balance): CGA    Other factors to consider for discharge: decrease independence and activity tolerance          PLAN :  Patient continues to benefit from skilled intervention to address the above impairments. Continue treatment per established plan of care. to address goals. Recommendation for discharge: (in order for the patient to meet his/her long term goals)  Physical therapy at least 2 days/week in the home     This discharge recommendation:  Has not yet been discussed the attending provider and/or case management    IF patient discharges home will need the following DME: patient owns DME required for discharge       SUBJECTIVE:   Patient stated I just laid down.     OBJECTIVE DATA SUMMARY:   Critical Behavior:  Neurologic State: Alert  Orientation Level: Oriented X4  Cognition: Memory loss  Safety/Judgement: Awareness of environment  Functional Mobility Training:  Bed Mobility:     Supine to Sit: Modified independent              Transfers:  Sit to Stand: Modified independent  Stand to Sit: Modified independent                             Balance:  Sitting: Intact  Standing: Impaired; With support  Standing - Static: Good  Standing - Dynamic : Good  Ambulation/Gait Training:  Distance (ft): 374 Feet (ft)  Assistive Device: Gait belt;Walker, rolling  Ambulation - Level of Assistance: Contact guard assistance;Stand-by assistance        Gait Abnormalities: Decreased step clearance        Base of Support: Widened     Speed/Lois: Slow                   Stairs:               Therapeutic Exercises:     Pain Rating:  Leg cramps     Activity Tolerance:   Limited     After treatment patient left in no apparent distress:   Call bell within reach, Bed / chair alarm activated, and sitting EOB, daughter present    COMMUNICATION/COLLABORATION:   The patients plan of care was discussed with: Registered nurse.      Elfego Jaime, PTA   Time Calculation: 8 mins

## 2023-02-06 NOTE — PROGRESS NOTES
Transitions of Care Plan  RUR: 13% - low  Clinical Update: off milirnone; hopeful for d/c today  Consults: [facility]  Baseline: independent without DME; resides alone  Barriers to Discharge:   Disposition:  Home Hospice: [facility]  Estimated Discharge Date: 2/6/23    Chart reviewed for clinical update. Patient's milrinone discontinued yesterday in anticipation of discharge home today. CM confirmed patient is set up with [facility] for [facility] services. CM spoke with patient re discharge plan. Family to transport patient home today. Patient remains agreeable to [facility] for home health services. Medicare pt has received, reviewed, and signed 2nd IM letter informing them of their right to appeal the discharge. Signed copied has been placed on pt bedside chart. 1145 - CM updated by attending MD - hospice order placed. CM spoke with daughter at bedside - [facility] [place] [facility]. Daughter to transport patient home. Transition of Care Plan:     The Plan for Transition of Care is related to the following treatment goals: Home Hospice - 1) At [facility]    The Patient and/or patient representative PATIENT was provided with a choice of provider and agrees  with the discharge plan. Yes [x] No []    A Freedom of choice list was provided with basic dialogue that supports the patient's individualized plan of care/goals and shares the quality data associated with the providers. Yes [x] No []    1200 - Referral/hospice order sent to At [address] with At [facility]) and confirmed patient discharge this afternoon. Daughter will transport.     Oumar Khalil, MPH  Care Manager [facility]  Available via [employer] or  [employer]

## 2023-02-06 NOTE — PROGRESS NOTES
[address] [place] [address]  Inpatient Progress Note      Patient name: Janae Adame  Patient : 1950  Patient MRN: [id number]  Consulting MD: Lakesha Duran MD  Date of service: 23      REASON FOR REFERRAL:  Management of chronic diastolic heart failure    PLAN OF CARE - ATTENDING ATTESTATION     74 y.o. male with h/o diastolic > systolic heart failure, HFpEF, LVEF 55-60%(echo 23), stage D, NYHA class IV symptoms, c/b renal and hepatic failure, GDMT limited due to hypotension  Cardiac MRI revealed infiltrative cardiac amyloidosis, PYP was strongly suggestive of ATTR amyloidosis and genetic testing positive for inherited ATTR  RHC with severely elevated filling pressures and normal resting index  Patient agreed for admission for inotrope-assisted diuresis; possible initiation of inotropes, palliative care meeting to address code status, limits of care   Patient declined admission from clinic , but agreed to come Monday, 23  Note: Family will require cascade genetic testing; [facility]  Pt with heavy alcohol use at home PTA; concern for pt's ability to manage fluid status at home;   Palliative met with patient and now a DNR. Not yet ready for hospice. Weaned off inotropes  Family meeting today 23 with Dr. Hillary Allan, patient, and two daughters (one on phone). Decision to pursue hospice. Patient was seen and examined by me. I reviewed the note and data. I have discussed and agree with the plan as noted in the ANP note beneath with the following corrections: none. Time of visit: 15 minutes     Hipolito Membreno MD PhD  [address]:  Stopped Milrinone yesterday  Plan to start vyndamax 61mg daily as outpatient; unless pt decides to go home on hospice, in which case would be reasonable to stop  Continue current dose of toprol XL 12.5mg daily.   Obtain orthostatics with intermittent low BPs. Adjust midodrine, if needed  Known intolerance to lisinopril/ARB; not recommended in amyloid  Continue spironolactone 12.5mg daily  Cannot tolerate SGLT2i due to CrCl  Continue Bumex 3mg BID PO  Continue ASA   Pt has declined sleep study at this time  Monitor HF labs: CMP, NT-Pro BNP, Mg daily while diuresing  Heart failure education  Will need education on alcohol cessation  Appreciate Palliative Care consultation for further discussion of goals of care; noted DNR  Patient does not appear to have clear understanding of hospice, but his daughters have more awareness, and believe it would be best for him. Question patient's ability to take care of himself at home. Await PT recs. Would like OT to obtain [address] [place] Plans to go home with home health originally, but went back to patient's room to have meeting with daughters present, and now want to pursue hospice   Patient made an offhand comment about being suicidal, but then backpedaled when questioned further. Denies intention to harm himself. Will ask RN to complete depression/suicide screening to see how he measures.      Remainder of care per hospitalist.        IMPRESSION:  Fluid overload- improved  Chronic diastolic heart failure, HFpEF  Stage C, NYHA class IIIA symptoms  Non-ischemic cardiomyopathy, LVEF 55-60%  Severe LVH 2.1cm  cMRI suggestive of infiltrative cardiac amyloidosis  Intolerance to lisinopril  Inherited ATTR cardiac amyloidosis  Coronary artery disease  S/p CABG x 3 (4/2020) LIMA to LAD, SVG to RCA and Ramus  CHF s/p PM - pacemaker dependent  RBBB 144ms  Cardiac risk factors  HTN  HL  DM2  Liver dysfunction, TBili 2.8  CKD, stage 3  AT3 activity low  Proteic C low  Lupus anticoagulatn abnormal  Leukopenia  H/o prostate cancer  Elevated D Dimer  Hx DVT/PE: developed PE/DVT after bowel resection in 2012  History of rectal bleeding due to an ulcer related to radiation   Depressive symptoms  Alcohol Abuse    INTERVAL HISTORY:  Feeling okay off inotropes   BP intermittently low  First weight in several days. Down 5kg; I/O neg 750ml  Creat steady at 1.90; t bili steady at 1.1; proBNP slight decrease 3052   Mr. [place] is feeling okay today. Still fatigued, but better than before coming in. No SOB at rest.  No chest pain, palpitations, leg swelling, orthopnea. Flat affect today. Made \"joke\" comment about being suicidal, but that he \"love myself too much\" to hurt himself. CARDIAC IMAGING AND DIAGNOSTICS REVIEWED:  Echo (1/30/23)    Left Ventricle: Normal left ventricular systolic function with a visually estimated EF of 55 - 60%. Findings consistent with moderate to severe concentric hypertrophy; speckled pattern consider amyloid. Normal wall motion. Mitral Valve: Mild regurgitation. Left Atrium: Left atrium is moderately dilated. Right Atrium: Right atrium is moderately dilated. Echo (8/2022)    Left Ventricle: Low normal left ventricular systolic function. EF by 2D Simpsons Biplane is 50%. Left ventricle size is normal. Severely increased wall thickness. Findings consistent with severe concentric hypertrophy. There is a speckled appearance to the left ventricular myocardium. Consider testing for cardiac amyloidosis including possible cardiac MRI. Noted that the patient's Medtronic device is MRI compatible. Normal wall motion consistent with paced rhythm    Left Atrium: Left atrium is severely dilated. Left atrial volume index is severely increased (>48 mL/m2). Right Atrium: Right atrium is moderately dilated. Mitral Valve: Mild to moderate regurgitation. Tricuspid Valve: Moderate regurgitation. Mildly elevated RVSP. The estimated RVSP is 38 mmHg. Cardiac MRI (9/2022)  1. Normal left ventricular cavity size. Severe concentric left ventricular  hypertrophy. Significantly hypertrophied septum measuring 21 mm. There is no  left ventricular outflow tract obstruction.  Moderate left ventricular systolic  dysfunction. Moderate global hypokinesis with slight regional variation. 3-D  LVEF 43%. 2. Normal right ventricular size with minimal right ventricular systolic  dysfunction. 3-D RVEF 47%. 3. Moderate 2+ tricuspid regurgitation. 4. On EGE and LGE study, there is mild diffuse myocardial enhancement with  slightly poor nulling of the myocardium on postcontrast images. These findings  may suggest possibility of infiltrative cardiac amyloidosis. There is no  myocardial scar. There is no features of recent or old myocardial infarction. There is no features of infiltrative sarcoidosis. There is no features of  inflammatory myocarditis. All myocardial walls are viable. 5. Normal pericardium without any significant pericardial effusion. 6. Small right-sided pleural effusion and tiny left-sided pleural effusion. Lexiscan Myoview May 2021 with normal perfusion, calculated LVEF of 37%     RHC (1/18/23)  Moderately to severely elevated right and left-sided filling pressures. Moderate pulmonary hypertension of predominantly postcapillary origin. Right heart waveforms are likely consistent with restrictive physiology. Preserved cardiac output and index. RA 21, PCWP 25; CI 2.94      LIFE GOALS:  Lifestyle goals discussed with the patient. He wishes to be home. BRIEF HISTORY OF PRESENT ILLNESS:  From prior notes,   \"Fred Fuller is a 74 y.o. male with extensive history as noted above. Pt was referred to [facility] for further evaluation and treatment of cardiac amyloidosis. He was found to have significantly elevated filling pressures on RHC and was admitted for inotrope assisted diuresis. Now on admission, found to be heavy alcohol drinker and concerns for withdrawals.   \"        REVIEW OF SYSTEMS:  Review of Symptoms:  Constitutional: fatigue   Eyes: negative  Ears, nose, mouth, throat, and face: negative  Respiratory: ROLON  Cardiovascular: negative  Gastrointestinal: negative  Genitourinary:negative  Musculoskeletal:negative  Neurological: negative  Behvioral/Psych: anxiety, depression, ? Thoughts of suicide  Endocrine: negative         PHYSICAL EXAM:  Visit Vitals  BP (!) 89/60   Pulse 70   Temp 98.2 °F (36.8 °C)   Resp 15   Ht 5' 7\" (1.702 m)   Wt 131 lb 6.3 oz (59.6 kg)   SpO2 96%   BMI 20.58 kg/m²         Physical Assessment:   General Appearance: alert, cooperative, thin, elderly AAM sitting on side of bed in NAD; appears stated age  Eyes: sclera anicteric  Mouth/Throat: moist mucous membranes; oral pharynx clear  Neck: supple; no JVD   Pulmonary:  clear to auscultation bilaterally; good effort;   Cardiovascular: regular rate and rhythm; no murmur, click, rub, or gallop  Abdomen: soft, non-tender, non-distended; bowel sounds normal  Musculoskeletal: no swelling or deformity; moves all extremities  Extremities: no edema; weak palpable distal pulses   Skin: warm and dry. BLE slightly cool   Neuro: grossly normal:  question if fully oriented.    Psych: flat affect             PAST MEDICAL HISTORY:  Past Medical History:   Diagnosis Date    Arthritis     Cancer ([address]     Prostate ca    Chronic kidney disease     Stage 3    Chronic pain     Abdoman, back, fingers per daughter    Chronic systolic (congestive) heart failure 11/28/2022    Depression     Diabetes ([address]     Heart attack ([address]     Heart failure ([facility] 2020    Dr. Jasen Maldonado    Hyperlipidemia     Hypertension     Pacemaker 05/01/2020    MED    PE (pulmonary embolism)     Pneumonia     Sleep apnea     not use CPAP       PAST SURGICAL HISTORY:  Past Surgical History:   Procedure Laterality Date    COLONOSCOPY N/A 3/19/2021    COLONOSCOPY performed by Estella Young MD at [facility] ENDOSCOPY    COLONOSCOPY N/A 5/10/2021    COLONOSCOPY performed by Estella Young MD at [facility] N/A 8/2/2021    FLEXIBLE SIGMOIDOSCOPY performed by Estella Young MD at [facility] ENDOSCOPY    HX BACK SURGERY  2014 HX CERVICAL LAMINECTOMY      HX CORONARY ARTERY BYPASS GRAFT  04/2021    X3     HX GI  11/2012    bowel resection    HX HEART CATHETERIZATION  04/2020    HX ORTHOPAEDIC      amputated left 4th finger    AR INS NEW/RPLCMT PRM PM W/TRANSV ELTRD ATRIAL&VENT N/A 5/1/2020    INSERT PPM DUAL performed by Keegan Dykes MD at [address] CATH LAB    AR UNLISTED PROCEDURE ABDOMEN PERITONEUM & OMENTUM      bowel resection       FAMILY HISTORY:  Family History   Problem Relation Age of Onset    Cancer Mother         unknown    Heart Disease Father     Heart Disease Brother     Anesth Problems Neg Hx        SOCIAL HISTORY:  Social History     Socioeconomic History    Marital status:     Number of children: 1    Years of education: 12    Highest education level: High school graduate   Occupational History    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[employer], 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    Tobacco Use    Smoking status: Every Day     Types: Cigars    Smokeless tobacco: Current     Types: Chew    Tobacco comments:     Chews tobacco every day; cigars and black and milds    Vaping Use    Vaping Use: Never used   Substance and Sexual Activity    Alcohol use:  Yes     Alcohol/week: 16.0 standard drinks     Types: 2 Glasses of wine, 14 Cans of beer per week     Comment: 2-4 beers daily, sometimes alcohol    Drug use: Never    Sexual activity: Not Currently   Other Topics Concern     Service No    Blood Transfusions No    Caffeine Concern No    Occupational Exposure No    Hobby Hazards No    Sleep Concern Yes    Stress Concern Yes    Weight Concern No    Special Diet No    Back Care Yes    Exercise No    Seat Belt Yes   Social History Narrative    ** Merged History Encounter **         Lives in [place] [address] Determinants of Health     Financial Resource Strain: Low Risk     Difficulty of Paying Living Expenses: Not hard at all   Food Insecurity: No Food Insecurity    Worried About [address] in the Last Year: Never true    [address] [place] [address] in the Last Year: Never true       LABORATORY RESULTS:     Labs Latest Ref Rng & Units 2/6/2023 2/5/2023 2/3/2023 2/2/2023 2/1/2023 1/31/2023 1/30/2023   WBC 4.1 - 11.1 K/uL - - - - 3. 0(L) - 4.6   RBC 4.10 - 5.70 M/uL - - - - 3.80(L) - 4.18   Hemoglobin 12.1 - 17.0 g/dL - - - - 11. 1(L) - 12.2   Hematocrit 36.6 - 50.3 % - - - - 33. 2(L) - 37.7   MCV 80.0 - 99.0 FL - - - - 87.4 - 90.2   Platelets 987 - 037 K/uL - - - - 168 - 162   Albumin 3.5 - 5.0 g/dL 2. 9(L) 3.0(L) 3. 3(L) 3. 1(L) 3. 1(L) 3. 2(L) 3.4(L)   Calcium 8.5 - 10.1 MG/DL 9.0 9.2 9.5 9.0 8.8 8.9 9.2   Glucose 65 - 100 mg/dL 127(H) 106(H) 100 83 87 87 97   BUN 6 - 20 MG/DL 44(H) 38(H) 37(H) 32(H) 29(H) 31(H) 31(H)   Creatinine 0.70 - 1.30 MG/DL 1.90(H) 1.86(H) 2.01(H) 2.11(H) 1.83(H) 1.74(H) 1.82(H)   Sodium 136 - 145 mmol/L 135(L) 136 136 137 135(L) 137 134(L)   Potassium 3.5 - 5.1 mmol/L 4.0 3.7 4.0 4.2 5.0 4.3 HEMOLYZED,RECOLLECT REQUESTED   Some recent data might be hidden     Lab Results   Component Value Date/Time    TSH 4.380 10/12/2022 10:36 AM    TSH 2.88 08/29/2022 11:23 AM    TSH 2.26 05/04/2022 01:21 PM    TSH 3.680 06/16/2021 10:28 AM    TSH 3.450 03/10/2021 10:13 AM    TSH 2.070 08/19/2020 09:31 AM    TSH 1.98 04/25/2020 01:42 AM    TSH 2.400 01/09/2020 11:36 AM    TSH 2.220 09/18/2019 09:15 AM    TSH 2.360 06/25/2019 09:23 AM    TSH 2.900 09/17/2018 09:20 AM    TSH 2.100 05/09/2017 01:58 PM    TSH 2.320 04/12/2016 01:34 PM    TSH 2.070 06/03/2015 02:04 PM    TSH 2.71 11/28/2012 02:15 PM       ALLERGY:  Allergies   Allergen Reactions    Arb-Angiotensin Receptor Antagonist Other (comments)     High k    Statins-Hmg-Coa Reductase Inhibitors Myalgia    Ace Inhibitors Cough        CURRENT MEDICATIONS:    Current Facility-Administered Medications:     bumetanide (BUMEX) tablet 3 mg, 3 mg, Oral, BID, Sanjana Gomez NP, 3 mg at 02/06/23 0915    thiamine HCL (B-1) tablet 100 mg, 100 mg, Oral, DAILY, Amarilis Burris MD, 100 mg at 59/45/65 2219    folic acid (FOLVITE) tablet 1 mg, 1 mg, Oral, DAILY, Sebastian Aldrich MD, 1 mg at 02/06/23 0915    multivitamin, tx-iron-ca-min (THERA-M w/ IRON) tablet 1 Tablet, 1 Tablet, Oral, DAILY, Sebastian Aldrich MD, 1 Tablet at 02/06/23 0914    midodrine (PROAMATINE) tablet 5 mg, 5 mg, Oral, TID WITH MEALS, Sebastian Aldrich MD, 5 mg at 02/06/23 0915    nicotine (NICODERM CQ) 21 mg/24 hr patch 1 Patch, 1 Patch, TransDERmal, DAILY, Mariana Pizarro NP, 1 Patch at 02/06/23 0916    LORazepam (ATIVAN) tablet 2 mg, 2 mg, Oral, Q1H PRN, Alton Goetz MD    LORazepam (ATIVAN) tablet 4 mg, 4 mg, Oral, Q1H PRN, Elian Moraes MD    tamsulosin (FLOMAX) capsule 0.4 mg, 0.4 mg, Oral, DAILY, Patricia, Sanjana B, NP, 0.4 mg at 02/06/23 2499    aspirin delayed-release tablet 81 mg, 81 mg, Oral, DAILY, Patricia, Sanjana B, NP, 81 mg at 02/06/23 2566    cholecalciferol (VITAMIN D3) (1000 Units /25 mcg) tablet 1,000 Units, 1,000 Units, Oral, DAILY, Patricia, Sanjana B, NP, 1,000 Units at 02/06/23 0915    cyanocobalamin (VITAMIN B12) tablet 1,000 mcg, 1,000 mcg, Oral, DAILY, Patricia, Sanjana B, NP, 1,000 mcg at 02/06/23 0914    metoprolol succinate (TOPROL-XL) XL tablet 12.5 mg, 12.5 mg, Oral, DAILY, Patricia, Sanjana B, NP, 12.5 mg at 02/06/23 0915    sertraline (ZOLOFT) tablet 50 mg, 50 mg, Oral, DAILY, Patricia, Sanjana B, NP, 50 mg at 02/06/23 0915    spironolactone (ALDACTONE) tablet 12.5 mg, 12.5 mg, Oral, DAILY, Patricia, Sanjana B, NP, 12.5 mg at 02/06/23 0915    sodium chloride (NS) flush 5-40 mL, 5-40 mL, IntraVENous, Q8H, Patricia, Sanjana B, NP, 10 mL at 02/06/23 0713    sodium chloride (NS) flush 5-40 mL, 5-40 mL, IntraVENous, PRN, Patricia, Sanjana B, NP    acetaminophen (TYLENOL) tablet 650 mg, 650 mg, Oral, Q6H PRN, 650 mg at 02/03/23 1355 **OR** acetaminophen (TYLENOL) suppository 650 mg, 650 mg, Rectal, Q6H PRN, Ptaricia, Sanjana B, NP    polyethylene glycol (MIRALAX) packet 17 g, 17 g, Oral, DAILY PRN, Patricia, Indy Rosario NP    ondansetron (ZOFRAN ODT) tablet 4 mg, 4 mg, Oral, Q8H PRN **OR** ondansetron (ZOFRAN) injection 4 mg, 4 mg, IntraVENous, Q6H PRN, Patricia, Sanjana B, NP    famotidine (PEPCID) tablet 20 mg, 20 mg, Oral, DAILY, Patricia, Sanjana B, NP, 20 mg at 02/06/23 0914    sodium chloride (NS) flush 5-40 mL, 5-40 mL, IntraVENous, PRN, Nilson Menjivar MD    glucose chewable tablet 16 g, 4 Tablet, Oral, PRN, Nilson Menjivar MD    glucagon (GLUCAGEN) injection 1 mg, 1 mg, IntraMUSCular, PRN, Nilson Menjivar MD    dextrose 10 % infusion 0-250 mL, 0-250 mL, IntraVENous, PRN, Odilia Saavedra MD    heparin (porcine) injection 5,000 Units, 5,000 Units, SubCUTAneous, Q8H, Odilia Saavedra MD, 5,000 Units at 02/06/23 0615    insulin lispro (HUMALOG) injection, , SubCUTAneous, AC&HS, Odilia Saavedra MD, 3 Units at 02/03/23 1832    PATIENT CARE TEAM:  Patient Care Team:  Spencer Shields MD as PCP - General (Internal Medicine Physician)  Spencer Shields MD as PCP - [facility]  Harper Horn MD as Physician (Cardiovascular Disease Physician)  Esther Amador MD as Physician (Nephrology)  John Serna MD (Neurosurgery)  Chey Luna MD (Ophthalmology)  Martina Sheehan MD (Otolaryngology)  Tracy Strauss MD (Urology)  Tabitha Cruz MD (Ophthalmology)  LUCIO SeymourM (Podiatry)  Amadeo Greene, PHD (Psychology)  Yazan Vyas MD (Cardiothoracic Surgery)  Lamberto Reddy MD (Cardiovascular Disease Physician)  Светлана Almonte MD (Cardiovascular Disease Physician)  Erna Serrano MD as Physician (Nephrology)  Sonia Bull MD (Gastroenterology)  Ramses Horn MD (Radiation Oncology)  Roberto Mcneill MD as Physician (Nephrology)  Destin Fisher NP as Nurse Practitioner (Nurse Practitioner)  Светлана Almonte MD (Cardiovascular Disease Physician)  Chey Luna MD (Ophthalmology)     Thank you for allowing us to participate in this patient's care. Maddi Lee NP  [address] [place] [address], [address]  Phone: [phone]    On this date 2/6/2023, I have spent a total time of 40 minutes personally reviewing new vitals, test results, notes, telemetry/EKG, face to face encounter/physical exam of patient, writing orders, performing medical decision making, and documenting.
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
## 2023-02-06 NOTE — PROGRESS NOTES
I have reviewed discharge instructions with the patient and daughter. The patient and daughter verbalized understanding. Problem: Falls - Risk of  Goal: *Absence of Falls  Description: Document Dima Reveal Fall Risk and appropriate interventions in the flowsheet.   Outcome: Resolved/Met  Note: Fall Risk Interventions:                                Problem: Patient Education: Go to Patient Education Activity  Goal: Patient/Family Education  Outcome: Resolved/Met     Problem: Patient Education: Go to Patient Education Activity  Goal: Patient/Family Education  Outcome: Resolved/Met     Problem: Discharge Planning  Goal: *Discharge to safe environment  Outcome: Resolved/Met     Problem: Patient Education: Go to Patient Education Activity  Goal: Patient/Family Education  Outcome: Resolved/Met

## 2023-02-06 NOTE — PROGRESS NOTES
Problem: Falls - Risk of  Goal: *Absence of Falls  Description: Document Neil Culver Fall Risk and appropriate interventions in the flowsheet.   Outcome: Progressing Towards Goal  Note: Fall Risk Interventions:                                Problem: Patient Education: Go to Patient Education Activity  Goal: Patient/Family Education  Outcome: Progressing Towards Goal     Problem: Discharge Planning  Goal: *Discharge to safe environment  Outcome: Progressing Towards Goal     Problem: Patient Education: Go to Patient Education Activity  Goal: Patient/Family Education  Outcome: Progressing Towards Goal

## 2023-02-07 ENCOUNTER — PATIENT OUTREACH (OUTPATIENT)
Dept: CASE MANAGEMENT | Age: 73
End: 2023-02-07

## 2023-02-07 DIAGNOSIS — Z51.5 HOSPICE CARE PATIENT: ICD-10-CM

## 2023-02-08 ENCOUNTER — TELEPHONE (OUTPATIENT)
Dept: CARDIOLOGY CLINIC | Age: 73
End: 2023-02-08

## 2023-02-08 NOTE — TELEPHONE ENCOUNTER
Daughter Tami Hummel called requesting family testing for [employer], RN submitted to [employer], kit to be sent to her home [phone]. She will call back regarding another family members info for free testing.

## 2023-02-08 NOTE — PROGRESS NOTES
HISTORY OF PRESENT ILLNESS  Papi Givens is a 74 y.o. male. HPI  Last here 11/28/22. Pt is here to f/u on chronic conditions. Presents with his daughter who helps provide hx  Ambulates with wheelchair  This is an established visit completed with telemedicine was completed with video assist. The patient acknowledges and agrees to this method of visitation. He was in the hospital 1/30/23 - 2/6/23 for acute on chronic HFREF and CHF. He presented initially for progressive SOB. Mr. Tameka Johnston is a 74y.o. year old male, he is seen today for Transition of Care services following a hospital discharge for acute on chronic HFrEF on 1/30/23 - 2/6/23. Our office Nurse Navigator performed an outreach to Mr. Yovanny Toribio on 2/6/23 (within 2 business days of discharge) to complete medication reconciliation and a telephonic assessment of his condition. Started bumex 3 mg BID, midodrine 5 mg TID, and nicoderm 21 mg  Decreased zoloft to 50 mg  Stopped lasix 40 mg, vyndamax 61 mg, trazodone 50 mg  Had 28 lbs of fluid drawn off by IV bumex while on the hospital   Reviewed hospital course:  Hospital Course  Patient was admitted for management of acute on chronic combined systolic and diastolic CHF NYHA class IV on admission and NYHA class III on discharge. Last echo with EF of 55 to 60%. Patient was followed by advanced heart failure team this admission, started on inotrope assisted diuresis with milrinone drip as well as Bumex. Initially was diuresed with IV Bumex and later was changed to p.o. Patient not tolerant to ACE/ARB/ARNI, patient to continue with Toprol, Aldactone and continue diuresis with p.o. Bumex at home. Patient cannot tolerate SGLT2 inhibitor due to creatinine clearance. On further discussion with the patient and the family, they are interested in home hospice. Hospice consulted and case management to set up for hospice information session and then home hospice to be arranged if patient and family interested. Patient to follow-up with advanced heart failure team as outpatient. Reviewed consult w/ Dr. Romana Patch (palliative) 2/2/23:   PLAN:   Visited patient, with the his daughter Laney Thurston over the speaker phone, later on in the afternoon I met with patient and his daughter Jose Bryson in person. Introduce my role  Patient has fair understanding of seriousness of his medical issues, and slowing down of his organs, he understands he will die soon. His daughter shared, based on her discussion with Dr. Alexandre Cadena, Mr. Lydia Dee has a limited prognosis\" 6 months to 1 year\"  Code status discussion : Patient understand what CPR entails is very clear does not want resuscitation, his daughter support his decision for 'DNR\", later in the afternoon in the presence of her other daughter Jose Bryson he signed durable DNR, copy given to Jose Bryson, another copy and original placed in the chart. Goals of care: Daughter understand that the current level of treatment is is like a \"Band-Aid\", we talked about future trips to hospital for fluid overload and complication of heart failure versus's support of hospice at home to focus on quality of life for what ever time is left. Patient is eager to return to home, he understands hospice , not ready for hospice now, I have encouraged him to talk through with his family after discharge  We discuss , patient is weak, fall risk and not safe to live alone, patient has medicaid, he does not want to live alone , declines to live in long term care facility, daughters are considering to rotate amongst his 4 daughters to cover 24/7 care for him. I have coordinated with advance heart failure team Zoila Salinas, plan is to wean off inotropes .   Initial consult note routed to primary continuity provider and/or primary health care team members  Communicated plan of care with: Palliative IDT, [facility] Team  Reviewed consult w/ NP Linda Orta (cardio) 1/30/23:  PLAN OF CARE   74 y.o. 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  RHC with severely elevated filling pressures and normal resting index  Patient agreed for admission for inotrope-assisted diuresis; possible initiation of inotropes, palliative care meeting to address code status, limits of care   Patient declined admission from clinic 1/24, but agreed to come Monday, 1/30/23  Note: Family will require cascade genetic testing; they will reach out to [facility] first  Pt reporting heavy, regular alcohol use and concern for withdrawal- will likely need CIWA protocol   Reviewed duplex 2/1/23:  Narrative:     · No evidence of right or left lower extremity vein thrombosis. · Bilateral lower extremity pulsatile venous flow is consistent with   increased central venous pressure. Reviewed pCXR 1/30/23:  Impression:     Mild cardiomegaly, otherwise no acute cardiopulmonary findings. Pt has been going through the intake process for home hospice, they have also met with a  they are now open to hospice the hospice person will be coming either today or on Monday for intake  Notes home health gave them lorazepam, tylenol morphine bisacodyl to use PRN, they will be monitoring this  Discussed the hospice doctor will be taking over all of his medications.      Since being home he still had a few beers and has been chewing some tobacco and occasionally smokes    They are aware that he has end-stage heart failure stage IV daughters are being checked for amyloidosis as well    History of hypertension  No home BP readings for review  Denies recent falls, lightheadedness at times discussed need for monitoring blood pressure since he is on midodrine this may need to be adjusted the home nurse will be coming and checking his blood pressure either today or on Monday  Continues on toprol 1/2 tablet 25mg daily and spironolactone 12.5 mg daily  Started midodrine 5 mg TID in the hospital for hypotension     Now taking bumex 1 mg, tablets 3 of these twice daily started in the hospital he has lost a substantial amount of water weight since leaving the hospital  No longer taking Lasix 20 mg daily (stopped in the hospital)  He is restricted on free water intake due to cardiomyopathy  Also wearing compression hose      He has DM   No BS readings to review no longer checking  No longer on januvia half tablet 50mg daily due to controlled a1c  He received an insulin shot in the hospital       Wt at discharge was 128 lbs, down 27 lbs since lov   Wt is in the nl range    Recall previously we had discussed weight loss  Of note he had endoscopy and colonoscopy 1 year ago had a recent ultrasound of the liver  Suspect weight loss is related to his deteriorating memory and cardiac status     Reviewed labs   Ordered labs     Recall he had a DVT in the past  Previously referred to neurology due to multiple neurologic complaints (falls, difficulty w/ movement) and concern of memory loss as well   He had an appt w/ Dr. Kosta Lopez (neuro), to help with neuropathy and memory evaluation but he ns'd the appointment stressed the importance of following through with appointments     He is taking ASA 81 mg      Recall he had surgery on his C-spine in the past by Dr. Destiney Live he is not taking his gabapentin currently and has not followed up with his neurosurgeon   He had appt scheduled 9/22 w/ Dr. Destiney Live but he ns'd this appointment as well  We have discussed that his neurologic complaints are likely not a result of his back surgery     Pt follows annually with Dr. Anderson Hastings (cardio) for CAD and history of CABG 4/20   Last visit 5/23/22 and now has been diagnosed with likely amyloid cardiomyopathy  She had a R heart catheterization 1/18/23      He is now following w/ advanced heart failure center (Dr. Jaleel Arreola) for cardiac amyloidosis  Lov 1/24/23  Reviewed note:  PLAN:  Continue current medical therapy for heart failure  Start vyndamax once patient assistance processed; discussed with pt and   Continue current dose of toprol XL 12.5mg daily  Known intolerance to lisinopril/ARB; not recommended in amyloid  Continue spironolactone 12.5mg daily  Cannot tolerate SGLT2i due to CrCl  Continue current dose of diuretic  Not on allopurinol or uloric, check uric acid level  Resume baby ASA   Does not take statins, LFT abnormal  Recommend sleep study - pt not interested at this time  Routine screening HF labs reviewed with pt  Lab slips provided for follow up labs to be done next week  Reinforced low salt diet  Reinforced fluid restriction to 6 x 8oz glasses per day  Provided educational materials \"Living with heart failure\"   Provided advanced care plan forms to be filled out    Referrals sent previously for PT and nutritionist   Follow-up with primary cardiologist, Dr. Cecilia Phillip; will alternate visits  Follow-up with EP cardiologist, Dr. Oh Lujan  Follow-up with nephrologist  Recommend flu, covid and pneumonia vaccinations  Return to [facility] on Monday for elective admission     He had pacemaker placed with Dr. Oh Lujan (cardio)  Had pacemaker last checked 10/19/22     History of hyperlipidemia  No longer taking praluent, this was discontinued at discharge from hospital  No longer taking Zetia     Has a history of stable stage 3 CKD   following with Dr. Windsor Canavan (nephro)  Last there 10/14/22  Baseline cr 1.4-1.5, with some fluctuation recently stable for the most part on recent labs 1.9 on recent labs     Pt follows with Edin (uro) for prostate cancer -- treated with radiation   Recall pt has [facility] (brother and uncle) prostate cancer  Last there 1/22 reports PSAs are trending down since radiation  Follows q 6 mo, he is overdue but transition to hospice when no longer follow-up     Pt also met with a radiation oncologist at [facility]  He completed radiation therapy with Dr. Adonis Du  Last visit 9/21     Pt saw Dr. Colton Mcnair (podiatry) in 4/19  Prev provided referral to podiatry, discussed f/Uing up for overgrown toenails, hammertoes, callus formation      In the past, pt expressed concern about his memory  Previously, provided referral for Dr. Kashmir Garnica (neuropsych)  Also, provided info for Dr. Kevin Almaguer (neuropsych), he had the appointment 09/24/19 -- then refused testing in the end   He saw Dr. Radha Tse (neuropsych) 7/1/22  Has not followed up and no showed his appointment see above not interested in evaluation     He has been following with Dr. Stuart Wong (GI) for stomach bleeding, lov 6/22  Recall in May 2021 he had an endoscopy and colonoscopy  Had a flexible sigmoidoscopy 8/02/21  Had elevated bilirubin which has been climbing and went back to see him again  Had abd US  Per pt Dr. Stuart Wong thinks bilirubin increase might be coming from heart rather than liver -- I discussed this with Dr. Stuart Wogn, he had a pulsatile morphology on the ultrasound so that the elevated bilirubin was felt to be related to his cardiomyopathy     Prev referred to hematology due to anemia and thrombocytopenia  On most recent labs, counts have improved, so we will focus on heart failure clinic for now      Recall Pt had a hearing evaluation in the past  Recall notes from ENT 5/28/19: has some hearing loss   Pt does not have hearing aids  Patient is having some continued difficulty with his ears and will schedule follow-up with ENT     He has been seeing Dr. Silvestre Vides (sleep) for NIMO   Last visit 1/4/21    Declines sleep study  Not compliant with cpap       Pt has been taking CoQ-10      Not taking gabapentin 300 mg for pain in hands/feet and sciatica     He has zoloft 50 mg (decreased from 75 mg in the hospital) for depression  Reviewed + depression (4).   Overall happy with this medication not interested in changes today  No longer taking trazodone 50 mg, stopped in the hospital   He also has ativan to use prn (not often, not in the past month) for anxiety   Have discussed the possibility of seeing a therapist     He was started on a nicoderm patch in the hospital as he was trying to use chewing tobacco in the hospital      Pt lives alone, daughter checks on him  Pt had previously had home health coming out, but they will no longer be coming due to pt having consumed significant alcohol, being uncooperative and hostile. ACP not on file. SDM is his daughter (Sandi Sotelo). Provided information in the past.      Recall sees Dr. Bella Monte for h/o DVT, no longer on coumadin or xarelto, on ASA only.  Was on coumadin for h/o PE and DVT post operatively     PREVENTIVE:    Colonoscopy: 5/10/21 , Dr. Crystal Rodriguez, repeat 5 years    EGD: 5/10/21, Dr. Crystal Rodriguez  PSA:   <1 per pt  0.4  AAA screen: CT , negative  Tdap: unknown, due will get at pharm, reminded again  Pneumovax:   Ismael Mon: 19   Shingrix: due reminded again  Flu shot: 2022   Foot exam: 22  Microalbumin:  122  A1c:  5.3  5.3  POC 5.0  5.1  Eye exam: Dr. Mehdi Siddiqui 3/22, will get report  Hep C Screen: 10/22 neg  Lipids:  LDL 20  EK22 Electronic ventricular pacemaker   Covid: four doses (Pfizer)    Patient Active Problem List    Diagnosis Date Noted    Hospice care patient 2023    Acute on chronic HFrEF (heart failure with reduced ejection fraction) ([facility] 2023    CHF (congestive heart failure) ([address] 2023    Chronic renal disease, stage III 2022    Chronic systolic (congestive) heart failure 2022    Pacemaker 2020    Third degree AV block ([address] 2020    S/P CABG x 3 2020    CAD (coronary artery disease) 2020    Bilateral carotid artery stenosis 2020    NSTEMI (non-ST elevated myocardial infarction) ([facility] 2020    Type 2 diabetes mellitus with diabetic neuropathy ([facility] 01/09/2020    Prostate cancer ([facility] 06/04/2018    Type 2 diabetes with nephropathy ([facility] 02/28/2018    Reactive depression 11/15/2016    Cervical spinal stenosis 11/03/2016    CKD (chronic kidney disease) 10/31/2016    Hyperlipidemia 07/10/2015    Spinal stenosis, lumbar region, without neurogenic claudication 10/16/2014    DVT of leg (deep venous thrombosis) ([facility] 12/18/2012    HTN (hypertension) 12/12/2012    Perforated diverticulum of large intestine 12/04/2012    Alcohol abuse 12/04/2012    Insomnia 12/04/2012     Current Outpatient Medications   Medication Sig Dispense Refill    sertraline (ZOLOFT) 50 mg tablet Take 1 Tablet by mouth daily. 1 Tablet 0    bumetanide (BUMEX) 1 mg tablet Take 3 Tablets by mouth two (2) times a day. 180 Tablet 0    midodrine (PROAMATINE) 5 mg tablet Take 1 Tablet by mouth three (3) times daily (with meals) for 30 days. 90 Tablet 0    nicotine (NICODERM CQ) 21 mg/24 hr 1 Patch by TransDERmal route daily for 30 days. 30 Patch 0    spironolactone (ALDACTONE) 25 mg tablet Take 0.5 Tablets by mouth daily. 15 Tablet 1    metoprolol succinate (TOPROL-XL) 25 mg XL tablet Take 1 tablet by mouth once daily (Patient taking differently: Take 12.5 mg by mouth daily.) 90 Tablet 1    acetaminophen (TYLENOL) 650 mg TbER Take 650 mg by mouth every eight (8) hours as needed for Pain. Take one tablet by mouth every 8 hours for mild to moderate pain 3/10-6/10      cholecalciferol (VITAMIN D3) 25 mcg (1,000 unit) cap Take 1,000 Units by mouth daily. alfuzosin SR (UROXATRAL) 10 mg SR tablet Take 10 mg by mouth daily. aspirin delayed-release 81 mg tablet Take 81 mg by mouth daily. zinc sulfate (ZINC-220 PO) Take  by mouth. turmeric 400 mg cap Take  by mouth.      vitamin e (E GEMS) 100 unit capsule Take 1 capsule three times daily 90 Cap 6    pumpkin seed extract-soy germ 300 mg cap Take 1 Capsule by mouth as needed (prostate health).  daily      coenzyme Q-10 (CO Q-10) 200 mg capsule Take 200 mg by mouth daily. 200 mg-take one pill      ascorbate calcium (VITAMIN C PO) Take 1 Tablet by mouth daily. cyanocobalamin 1,000 mcg tablet Take 1,000 mcg by mouth daily.        Past Surgical History:   Procedure Laterality Date    COLONOSCOPY N/A 3/19/2021    COLONOSCOPY performed by Michael Odonnell MD at [facility] ENDOSCOPY    COLONOSCOPY N/A 5/10/2021    COLONOSCOPY performed by Michael Odonnell MD at [facility] N/A 8/2/2021    FLEXIBLE SIGMOIDOSCOPY performed by Michael Odonnell MD at [facility] ENDOSCOPY    HX BACK SURGERY  2014    HX CERVICAL LAMINECTOMY      HX CORONARY ARTERY BYPASS GRAFT  04/2021    X3     HX GI  11/2012    bowel resection    HX HEART CATHETERIZATION  04/2020    HX ORTHOPAEDIC      amputated left 4th finger    OR INS NEW/RPLCMT PRM PM W/TRANSV ELTRD ATRIAL&VENT N/A 5/1/2020    INSERT PPM DUAL performed by Xiomy Kim MD at [place] [address], [address] CATH LAB    OR UNLISTED PROCEDURE ABDOMEN PERITONEUM & OMENTUM      bowel resection      Lab Results   Component Value Date/Time    WBC 3.0 (L) 02/01/2023 02:01 AM    HGB 11.1 (L) 02/01/2023 02:01 AM    Hemoglobin (POC) 13.3 11/02/2016 07:15 AM    HCT 33.2 (L) 02/01/2023 02:01 AM    Hematocrit (POC) 39 11/02/2016 07:15 AM    PLATELET 978 14/60/9408 02:01 AM    MCV 87.4 02/01/2023 02:01 AM     Lab Results   Component Value Date/Time    Cholesterol, total 74 (L) 01/04/2023 10:55 AM    Cholesterol (POC) 227 02/06/2015 03:30 PM    HDL Cholesterol 41 01/04/2023 10:55 AM    LDL, calculated 20 01/04/2023 10:55 AM    LDL, calculated 64.4 08/29/2022 11:23 AM    LDL Cholesterol (POC) 164 02/06/2015 03:30 PM    Triglyceride 49 01/04/2023 10:55 AM    Triglycerides (POC) 68 02/06/2015 03:30 PM    CHOL/HDL Ratio 2.4 08/29/2022 11:23 AM     Lab Results   Component Value Date/Time    GFR est non-AA 53 (L) 08/29/2022 11:23 AM    GFRNA, POC 50 (L) 11/02/2016 07:15 AM    GFR est AA >60 08/29/2022 11:23 AM    GFRAA, POC >60 11/02/2016 07:15 AM    Creatinine 1.90 (H) 02/06/2023 01:57 AM    Creatinine (POC) 1.4 (H) 11/02/2016 07:15 AM    BUN 44 (H) 02/06/2023 01:57 AM    BUN (POC) 28 (H) 11/02/2016 07:15 AM    Sodium 135 (L) 02/06/2023 01:57 AM    Sodium (POC) 140 11/02/2016 07:15 AM    Potassium 4.0 02/06/2023 01:57 AM    Potassium (POC) 4.1 11/02/2016 07:15 AM    Chloride 98 02/06/2023 01:57 AM    Chloride (POC) 105 11/02/2016 07:15 AM    CO2 29 02/06/2023 01:57 AM    Magnesium 2.2 02/06/2023 01:57 AM    Phosphorus 4.2 02/01/2023 02:01 AM    Albumin, urine 61.1 10/12/2022 10:36 AM        Review of Systems   Respiratory:  Negative for shortness of breath. Cardiovascular:  Negative for chest pain. Physical Exam  Constitutional:       General: He is not in acute distress. Appearance: Normal appearance. He is not ill-appearing, toxic-appearing or diaphoretic. HENT:      Head: Normocephalic and atraumatic. Eyes:      General:         Right eye: No discharge. Left eye: No discharge. Musculoskeletal:      Cervical back: Normal.   Neurological:      General: No focal deficit present. Mental Status: He is oriented to person, place, and time. Psychiatric:         Mood and Affect: Mood normal.         Behavior: Behavior normal.       ASSESSMENT and PLAN    ICD-10-CM ICD-9-CM    1. Type 2 diabetes with nephropathy (HCC)  E11.21 250.40      583.81    Has lost a substantial mount of weight over the last year or so no longer requiring medication and no longer needs to monitor A1c's have been at goal   2.  Amyloidosis, unspecified type ([facility]  E85.9 277.30    Patient with end-stage heart failure found to have cardiac amyloid had been on Vyndamax transiently but this has been stopped as it is not helpful at this time    He was admitted with end-stage heart failure    Was placed on milrinone    Ultimately they had a long discussion with family patient wanted to go home they discussed hospice which she was not sure if he wanted to proceed with he now appears to want to proceed with hospice hospice intake nurses are coming today or Monday to take over    They are providing him with comfort measures    We had a long discussion about this today this is the past he wants to proceed he will no longer be following with cardiology and we will move onto home hospice   3. Thrombocytopenia ([facility])  D69.6 287.5    Stable multifactorial heart failure playing a role   4. Prostate cancer ([facility]  C61 185    Status post radiation we will no longer follow with urology due to movement to hospice   5. Stage 3a chronic kidney disease ([facility])  N18.31 585. 3    Renal failure at least in part related to heart failure creatinines running 1 to 0.4-1.5 baseline most recently more elevated in the 1.9 range   6. Primary hypertension  I10 401.9    No struggling with hypotension due to advanced heart failure on midodrine to help with this 5 mg 3 times daily   7. Type 2 diabetes mellitus with diabetic neuropathy, without long-term current use of insulin ([facility])  E11.40 250.60    Now diet controlled  357.2       8. Chronic systolic (congestive) heart failure  I50.22 428.22    Patient with cardiomyopathy related to amyloid cardiomyopathy    He is now on Bumex 3 mg twice daily    He is also on midodrine due to hypotension 5 mg 3 times daily    Discussed monitoring blood pressure to adjust medication as needed    He remains on spironolactone and Toprol XL as well to help with cardiomyopathy  428.0       9. Mixed hyperlipidemia  E78.2 272.2    Praluent now stopped as patient will transition to hospice   10. Coronary artery disease involving native coronary artery of native heart without angina pectoris  I25.10 414.01    See above   11. Acute on chronic HFrEF (heart failure with reduced ejection fraction) ([facility])  I50.23 428.23    See above   12. Acute on chronic systolic congestive heart failure ([facility])  I50.23 428.23      428.0    See above   13.  Chronic deep vein thrombosis (DVT) of proximal vein of both lower extremities (HCC)  I82.5Y3 453.51    Aspirin for anticoagulation   14. Reactive depression  F32.9 300.4    Reviewed positive depression screen overall he is doing okay with Zoloft has been given Ativan to help with sleep as needed and agitation as needed he is transitioning to hospice daughter is with him and helping as well answered all questions     Scribed by Doug Reilly of [address] [place] [address] [address], as dictated by Dr. Catina Granda. Current diagnosis and concerns discussed with pt at length. Pt understands risks and benefits or current treatment plan and medications, and accepts the treatment and medication with any possible risks. Pt asks appropriate questions, which were answered. Pt was instructed to call with any concerns or problems. I have reviewed the note documented by the scribe. The services provided are my own. The documentation is accurate. Remy Can, who was evaluated through a synchronous (real-time) audio-video encounter, and/or his healthcare decision maker, is aware that it is a billable service, which includes applicable co-pays, with coverage as determined by his insurance carrier. He provided verbal consent to proceed and patient identification was verified. This visit was conducted pursuant to the emergency declaration under the [facility], [address] [place] [address] authority and the [employer] General Act. A caregiver was present when appropriate. Ability to conduct physical exam was limited.  The patient was located at: Home: [address] [place] [address]  The provider was located at: Home: Grey Choi on 2/8/2023 at 7:40 AM

## 2023-02-10 ENCOUNTER — VIRTUAL VISIT (OUTPATIENT)
Dept: INTERNAL MEDICINE CLINIC | Age: 73
End: 2023-02-10
Payer: MEDICARE

## 2023-02-10 DIAGNOSIS — E11.40 TYPE 2 DIABETES MELLITUS WITH DIABETIC NEUROPATHY, WITHOUT LONG-TERM CURRENT USE OF INSULIN (HCC): ICD-10-CM

## 2023-02-10 DIAGNOSIS — E78.2 MIXED HYPERLIPIDEMIA: ICD-10-CM

## 2023-02-10 DIAGNOSIS — I50.23 ACUTE ON CHRONIC HFREF (HEART FAILURE WITH REDUCED EJECTION FRACTION) (HCC): ICD-10-CM

## 2023-02-10 DIAGNOSIS — D69.6 THROMBOCYTOPENIA (HCC): ICD-10-CM

## 2023-02-10 DIAGNOSIS — I82.5Y3 CHRONIC DEEP VEIN THROMBOSIS (DVT) OF PROXIMAL VEIN OF BOTH LOWER EXTREMITIES (HCC): ICD-10-CM

## 2023-02-10 DIAGNOSIS — C61 PROSTATE CANCER (HCC): ICD-10-CM

## 2023-02-10 DIAGNOSIS — F32.9 REACTIVE DEPRESSION: ICD-10-CM

## 2023-02-10 DIAGNOSIS — I10 PRIMARY HYPERTENSION: ICD-10-CM

## 2023-02-10 DIAGNOSIS — I25.10 CORONARY ARTERY DISEASE INVOLVING NATIVE CORONARY ARTERY OF NATIVE HEART WITHOUT ANGINA PECTORIS: ICD-10-CM

## 2023-02-10 DIAGNOSIS — E11.21 TYPE 2 DIABETES WITH NEPHROPATHY (HCC): Primary | ICD-10-CM

## 2023-02-10 DIAGNOSIS — E85.9 AMYLOIDOSIS, UNSPECIFIED TYPE (HCC): ICD-10-CM

## 2023-02-10 DIAGNOSIS — I50.23 ACUTE ON CHRONIC SYSTOLIC CONGESTIVE HEART FAILURE (HCC): ICD-10-CM

## 2023-02-10 DIAGNOSIS — I50.22 CHRONIC SYSTOLIC (CONGESTIVE) HEART FAILURE (HCC): ICD-10-CM

## 2023-02-10 DIAGNOSIS — N18.31 STAGE 3A CHRONIC KIDNEY DISEASE (HCC): ICD-10-CM

## 2023-02-10 NOTE — PROGRESS NOTES
1. \"Have you been to the ER, urgent care clinic since your last visit? Hospitalized since your last visit? \" Yes [facility]  1/30/2023     2. \"Have you seen or consulted any other health care providers outside of the [address] [place] [address] since your last visit? \" No     3. For patients aged 39-70: Has the patient had a colonoscopy / FIT/ Cologuard? Yes - [facility] present.  Most recent result on file

## 2023-02-14 NOTE — PROGRESS NOTES
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 Pt noted to also have Advance heart failure (amylodoisis ) and Non-ischemic cardiomyopathy. If possible, please document in progress notes and discharge summary the etiology of CHF, if able to be determined. The medical record reflects the following:  Risk Factors: HTN, CKD, Cardiomyopathy, amyloidosis    Clinical Indicators:  Hospitalist PN,  \"acute on chronic combined systolic and diastolic CHF; Last echo with EF 55 to 60%\". Cardiology PN,  \"Non-ischemic cardiomyopathy, LVEF 55-60%; cMRI suggestive of infiltrative cardiac amyloidosis Intolerance to lisinopril; Inherited ATTR cardiac amyloidosis\". Treatment: milrinone drip and IV Bumex    Thank you,  Asael Vazquez  Options provided:  -- CHF due to Hypertensive Heart Disease  -- CHF not due to Hypertension but due to NICMP  -- CHF not due to Hypertension but due to Advance heart failure (amylodoisis )  -- CHF due to HTN, NICMP and Advance heart failure (amylodoisis )  -- Other - I will add my own diagnosis  -- Disagree - Not applicable / Not valid  -- Disagree - Clinically unable to determine / Unknown  -- Refer to Clinical Documentation Reviewer    PROVIDER RESPONSE TEXT:    This patient has CHF not due to hypertensive heart disease, CHF is due to Advance heart failure (amylodoisis ).     Query created by: Christian Smalls on 2023 10:32 AM      Electronically signed by:  Alayna Tsai NP 2023 10:55 AM

## 2023-02-28 ENCOUNTER — PATIENT OUTREACH (OUTPATIENT)
Dept: CASE MANAGEMENT | Age: 73
End: 2023-02-28

## 2023-02-28 NOTE — PROGRESS NOTES
02/28/23   Patient is still under care of at [facility].     Eber Dior MSN, RN, CCM / Care Transition Nurse / [phone]

## 2023-03-09 ENCOUNTER — PATIENT OUTREACH (OUTPATIENT)
Dept: CASE MANAGEMENT | Age: 73
End: 2023-03-09

## 2023-03-09 NOTE — PROGRESS NOTES
03/09/23   Patient remains under care of At [facility].     Lencho Nguyễn MSN, RN, CCM / Care Transition Nurse / [phone]

## 2023-03-21 ENCOUNTER — PATIENT OUTREACH (OUTPATIENT)
Dept: CASE MANAGEMENT | Age: 73
End: 2023-03-21

## 2023-03-21 NOTE — PROGRESS NOTES
03/21/23   Patient remains under Care of At [facility].     Zoe Ding MSN, RN, CCM / Care Transition Nurse / [phone]

## 2023-04-05 ENCOUNTER — PATIENT OUTREACH (OUTPATIENT)
Dept: CASE MANAGEMENT | Age: 73
End: 2023-04-05

## 2023-04-19 ENCOUNTER — PATIENT OUTREACH (OUTPATIENT)
Dept: CASE MANAGEMENT | Age: 73
End: 2023-04-19

## 2023-04-21 DIAGNOSIS — R41.3 MEMORY LOSS: Primary | ICD-10-CM

## 2023-04-21 DIAGNOSIS — R26.9 ABNORMALITY OF GAIT: ICD-10-CM

## 2023-05-03 ENCOUNTER — OFFICE VISIT (OUTPATIENT)
Dept: CARDIOLOGY CLINIC | Age: 73
End: 2023-05-03

## 2023-05-03 DIAGNOSIS — Z95.0 CARDIAC PACEMAKER IN SITU: Primary | ICD-10-CM

## 2023-05-22 ENCOUNTER — TELEPHONE (OUTPATIENT)
Age: 73
End: 2023-05-22

## 2023-06-23 NOTE — TELEPHONE ENCOUNTER
MD Malik Hooks, DILCIA        Caller: Unspecified (Yesterday,  7:22 AM)                     psa elevation is from prostate ca --confirmed by bx     Nose bleeds--nasal saline, pressure to nose if continue to bleed may need to see ent Pt arrives ambulatory c/o chronic diarrhea and L foot pain. Pt reports pain to bottom of left foot after cutting open a blister.

## 2024-02-27 ENCOUNTER — TELEPHONE (OUTPATIENT)
Age: 74
End: 2024-02-27

## 2024-02-27 NOTE — TELEPHONE ENCOUNTER
Pt passed away Sat morning (2/24/24).  Daughter called to let us know and to inquire about genetic testing for her cousin who wants to be tested.  Please contact Britni Wright at [phone].

## 2024-02-28 NOTE — TELEPHONE ENCOUNTER
DR. Shaw had discussed genetic results-positive for TTR on 1/24/23 appt, she gave them resources.  They had 90 days to submit.  They can reach out to [employer]  at [email or web address] and give [phone] for more information, but will most likely have to pay out of pocket.

## 2024-02-29 NOTE — TELEPHONE ENCOUNTER
DR. Shaw had discussed genetic results-positive for TTR on 1/24/23 appt, she gave them resources.  They had 90 days to submit.  They can reach out to [employer]  at [email or web address] and give [phone] for more information, but will most likely have to pay out of pocket.         Called patients daughter robles on phi forms. Advised her on above information. She states understanding and will reach out to [employer]. Also discussed with her with strong family history of heart disease it is important that first degree family relatives should speak with their pcp providers about screening echos and ekgs going forward. Patient notes that she is already established with a primary cardiologist but will encourage her other blood relatives to also receive screenings going forward.

## 2024-07-29 NOTE — PROGRESS NOTES
Medicare Part B Preventive Services  Limitations  Recommendation/Date completed if known  Scheduled/ Next Due    Bone Mass Measurement  (age 72 & older, biennial)  Requires diagnosis related to osteoporosis or estrogen deficiency. Biennial benefit unless patient has history of long-term glucocorticoid tx or baseline is needed because initial test was by other method  n/a     Recommended every 2 years  n/a   Cardiovascular Screening Blood Tests (every 5 years)  Total cholesterol, HDL, Triglycerides  Order as a panel if possible  Completed January 2017     Recommended annually  Due January 2018   Colorectal Cancer Screening  -Fecal occult blood test (annual)  -Flexible sigmoidoscopy (5y)  -Screening colonoscopy (10y)  -Barium Enema    Completed:     9/15/15     Recommended every 10 years  DUE:     September 2020 per Dr. Darrian Sun    Counseling to Prevent Tobacco Use (up to 8 sessions per year)  - Counseling greater than 3 and up to 10 minutes  - Counseling greater than 10 minutes  Patients must be asymptomatic of tobacco-related conditions to receive as preventive service    Keep up the good work! Diabetes Screening Tests (at least every 3 years, Medicare covers annually or at 6-month intervals for prediabetic patients)     Fasting blood sugar (FBS) or glucose tolerance test (GTT)    Patient must be diagnosed with one of the following:  -Hypertension, Dyslipidemia, obesity, previous impaired FBS or GTT  Or any two of the following: overweight, FH of diabetes, age ? 72, history of gestational diabetes, birth of baby weighing more than 9 pounds  Completed:     January 2017 A1c 6.6%     Recommended annually  DUE:     Every 3-6 months    Diabetes Self-Management Training (DSMT) (no USPSTF recommendation)  Requires referral by treating physician for patient with diabetes or renal disease. 10 hours of initial DSMT session of no less than 30 minutes each in a continuous 12-month period.  2 hours of follow-up DSMT in subsequent years.  n/a n/a   Glaucoma Screening (no USPSTF recommendation)  Diabetes mellitus, family history, , age 48 or over,  American, age 72 or over  Completed April 2016 Dr. Sharifa Morillo          Recommended annually  Due April 2017   Human Immunodeficiency Virus (HIV) Screening (annually for increased risk patients)  HIV-1 and HIV-2 by EIA, RUKHSANA, rapid antibody test, or oral mucosa transudate  Patient must be at increased risk for HIV infection per USPSTF guidelines or pregnant. Tests covered annually for patients at increased risk. Pregnant patients may receive up to 3 test during pregnancy. n/a N/a   Medical Nutrition Therapy (MNT) (for diabetes or renal disease not recommended schedule)  Requires referral by treating physician for patient with diabetes or renal disease. Can be provided in same year as diabetes self-management training (DSMT), and CMS recommends medical nutrition therapy take place after DSMT. Up to 3 hours for initial year and 2 hours in subsequent years. n/a n/a   Prostate Cancer Screening (annually up to age 76)  - Digital rectal exam (NORA)  - Prostate specific antigen (PSA)  Annually (age 48 or over), NORA not paid separately when covered E/M service is provided on same date  Completed July 2016     Recommended annually  Due July 2017   Seasonal Influenza Vaccination (annually)    Completed:     Declined  Recommended annually     DUE every Fall    Pneumococcal Vaccination (once after 65)    Completed:   November 2014   You are eligible for Prevnar 13 vaccine. You can obtain this at your local pharmacy   Shingles vaccine      Hepatitis B Vaccinations (if medium/high risk)  Medium/high risk factors: End-stage renal disease,  Hemophiliacs who received Factor VIII or IX concentrates, Clients of institutions for the mentally retarded, Persons who live in the same house as a HepB virus carrier, Homosexual men, Illicit injectable drug abusers.   n/a n/a   Ultrasound Screening for Abdominal Aortic Aneurysm (AAA) (once)  Patient must be referred through IPPE and not have had a screening for abdominal aortic aneurysm before under Medicare. Limited to patients who meet one of the following criteria:  - Men who are 73-68 years old and have smoked more than 100 cigarettes in their lifetime.  -Anyone with a FH of AAA  -Anyone recommended for screening by USPSTF   CT from 11/29/14 states:     \"The aorta tapers without aneurysm. \" None

## (undated) DEVICE — STERILE POLYISOPRENE POWDER-FREE SURGICAL GLOVES WITH EMOLLIENT COATING: Brand: PROTEXIS

## (undated) DEVICE — LUER-LOK 360°: Brand: CONNECTA, LUER-LOK

## (undated) DEVICE — SOLUTION IV 1000ML 0.9% SOD CHL

## (undated) DEVICE — SOLIDIFIER FLD 2OZ 1500CC N DISINF IN BTL DISP SAFESORB

## (undated) DEVICE — Device

## (undated) DEVICE — SPLINT WR POS F/ARTERIAL ACC -- BX/10

## (undated) DEVICE — CANNULA PERF 15FR L12.5IN RG STPCOCK WIREWOUND BODY

## (undated) DEVICE — 6 FOOT DISPOSABLE EXTENSION CABLE WITH SAFE CONNECT / SCREW-DOWN

## (undated) DEVICE — ELECTRODE,RADIOTRANSLUCENT,FOAM,5PK: Brand: MEDLINE

## (undated) DEVICE — BASIN EMSIS 16OZ GRAPHITE PLAS KID SHP MOLD GRAD FOR ORAL

## (undated) DEVICE — NEEDLE HYPO 18GA L1.5IN PNK S STL HUB POLYPR SHLD REG BVL

## (undated) DEVICE — STRAP,POSITIONING,KNEE/BODY,FOAM,4X60": Brand: MEDLINE

## (undated) DEVICE — GUIDEWIRE VASC L260CM 0.035IN J TIP L3MM PTFE FIX COR NAMIC

## (undated) DEVICE — PACEMAKER PACK: Brand: MEDLINE INDUSTRIES, INC.

## (undated) DEVICE — CATHETER ANGIO 5FR L100CM GRY S STL NYL JR4 3 SEG BRAID L

## (undated) DEVICE — CATH IV AUTOGRD BC PNK 20GA 25 -- INSYTE

## (undated) DEVICE — KIT ANGIOGRAPHY CUST MRMC

## (undated) DEVICE — DRSG BORDR MPLX HEEL 8.7X9.1IN --

## (undated) DEVICE — BANDAGE COMPR ELASTIC 5 YDX6 IN

## (undated) DEVICE — 3M™ TEGADERM™ TRANSPARENT FILM DRESSING FRAME STYLE, 1626W, 4 IN X 4-3/4 IN (10 CM X 12 CM), 50/CT 4CT/CASE: Brand: 3M™ TEGADERM™

## (undated) DEVICE — Z DISCONTINUED PER MEDLINE LINE GAS SAMPLING O2/CO2 LNG AD 13 FT NSL W/ TBNG FILTERLINE

## (undated) DEVICE — CATHETER ETER ANGIO L110CM OD5FR ID046IN L75CM 038IN 145DEG CARD

## (undated) DEVICE — SYR 3ML LL TIP 1/10ML GRAD --

## (undated) DEVICE — DERMABOND SKIN ADH 0.7ML -- DERMABOND ADVANCED 12/BX

## (undated) DEVICE — CONTAINER SPEC 20 ML LID NEUT BUFF FORMALIN 10 % POLYPR STS

## (undated) DEVICE — SET PERF L203CM 12IN RED AND BLU AORT ROOT MULT SLIP CONN

## (undated) DEVICE — SNARE ENDOSCP M L240CM W27MM SHTH DIA2.4MM CHN 2.8MM OVL

## (undated) DEVICE — TIDISHIELD TRANSPORT, CONTAINMENT COVER FOR BACK TABLE 4'6" (1.37M) TO 8' (2.43M) IN LENGTH: Brand: TIDISHIELD

## (undated) DEVICE — TBG INSUFFLATION LUER LOCK: Brand: MEDLINE INDUSTRIES, INC.

## (undated) DEVICE — BLADE OPHTH W1.5MM 15DEG ORNG HNDL SHRP SHRP DEL FOR CATRCT

## (undated) DEVICE — KIT BLWR MISTER 5P 15L W/ TBNG SET IRRIG MIST TO IMPROVE

## (undated) DEVICE — BLOCK BITE ENDOSCP AD 21 MM W/ DIL BLU LF DISP

## (undated) DEVICE — SUTURE MCRYL SZ 3-0 L27IN ABSRB UD L24MM PS-1 3/8 CIR PRIM Y936H

## (undated) DEVICE — PACK PROCEDURE SURG HRT CATH

## (undated) DEVICE — SET ADMIN 16ML TBNG L100IN 2 Y INJ SITE IV PIGGY BK DISP

## (undated) DEVICE — 1200 GUARD II KIT W/5MM TUBE W/O VAC TUBE: Brand: GUARDIAN

## (undated) DEVICE — BULB SYRINGE, IRRIGATION WITH PROTECTIVE CAP, 60 CC, INDIVIDUALLY WRAPPED: Brand: DOVER

## (undated) DEVICE — CANNULA AORT ROOT INTRO STD TIP 5FR OVERALL LEN 55IN DLP

## (undated) DEVICE — RADIFOCUS OPTITORQUE ANGIOGRAPHIC CATHETER: Brand: OPTITORQUE

## (undated) DEVICE — SYR 10ML LUER LOK 1/5ML GRAD --

## (undated) DEVICE — 1000ML,PRESSURE INFUSER W/STOPCOCK: Brand: MEDLINE

## (undated) DEVICE — STERNUM BLADE, OFFSET (31.7 X 0.64 X 6.3MM)

## (undated) DEVICE — SET ADMIN IV PMP 20GTT 117IN -- 2 NDLS INJ SITE

## (undated) DEVICE — CLIP LIG ADH PD W SLOT HORZ --

## (undated) DEVICE — Device: Brand: PADPRO

## (undated) DEVICE — CATH GUID COR EB30 5FR 100CM -- LAUNCHER

## (undated) DEVICE — SUTURE VCRL SZ 0 L18IN ABSRB VLT L40MM CT 1/2 CIR J752D

## (undated) DEVICE — LEAD PCMKR MYOCARDL BPLR TEMP. --

## (undated) DEVICE — HI-TORQUE VERSACORE FLOPPY GUIDE WIRE SYSTEM 145 CM: Brand: HI-TORQUE VERSACORE

## (undated) DEVICE — NON-REM POLYHESIVE PATIENT RETURN ELECTRODE: Brand: VALLEYLAB

## (undated) DEVICE — SYR POWER 150ML 8IN FILL TUBE --

## (undated) DEVICE — SUTURE DEK POLY GRN BR SZ3 0 T 2 2N 6716

## (undated) DEVICE — KIT MED IMAG CNTRST AGNT W/ IOPAMIDOL REUSE

## (undated) DEVICE — PENCIL ES L3M BTTN SWCH S STL HEX LOK BLDE ELECTRD HOLSTER

## (undated) DEVICE — BLADE OPHTH 180DEG CUT SURF BLU STR SHRP DBL BVL GRINDLESS

## (undated) DEVICE — CATHETER IV 14GA L2IN POLYUR STR ORNG HUB SFTY RADPQ DISP

## (undated) DEVICE — SPECIAL PROCEDURE DRAPE 32" X 34": Brand: SPECIAL PROCEDURE DRAPE

## (undated) DEVICE — AIRLIFE™  ADULT CUSHION NASAL CANNULA WITH 7 FOOT (2.1 M) CRUSH-RESISTANT OXYGEN TUBING, AND U/CONNECT-IT ADAPTER: Brand: AIRLIFE™

## (undated) DEVICE — NEONATAL-ADULT SPO2 SENSOR: Brand: NELLCOR

## (undated) DEVICE — SOLUTION IRRIG 1000ML H2O STRL BLT

## (undated) DEVICE — (D)PREP SKN CHLRAPRP APPL 26ML -- CONVERT TO ITEM 371833

## (undated) DEVICE — FORCEPS BX L160CM DIA8MM GRSP DISECT CUP TIP NONLOCKING ROT

## (undated) DEVICE — SUTURE PROL SZ 7-0 L24IN NONABSORBABLE BLU L8MM BV175-6 3/8 8735H

## (undated) DEVICE — SPONGE GZ W4XL4IN COT 12 PLY TYP VII WVN C FLD DSGN

## (undated) DEVICE — SUTURE SZ 7 L18IN NONABSORBABLE SIL CCS L48MM 1/2 CIR STRNM M655G

## (undated) DEVICE — SUTURE VCRL SZ 2-0 L36IN ABSRB UD L40MM CT 1/2 CIR J957H

## (undated) DEVICE — SLING ORTHOPEDIC PCH UNIV 19.5X9 IN 2-39 IN ARM W/ FOAM STRP

## (undated) DEVICE — SENSOR TEMP SKIN DISP

## (undated) DEVICE — BIPOLAR ELECTROHEMOSTASIS CATHETER: Brand: GOLD PROBE

## (undated) DEVICE — GLIDESHEATH SLENDER ACCESS KIT: Brand: GLIDESHEATH SLENDER

## (undated) DEVICE — YANKAUER,TAPERED BULBOUS TIP,W/O VENT: Brand: MEDLINE

## (undated) DEVICE — KIT,1200CC CANISTER,3/16"X6' TUBING: Brand: MEDLINE INDUSTRIES, INC.

## (undated) DEVICE — KENDALL RADIOLUCENT FOAM MONITORING ELECTRODE RECTANGULAR SHAPE: Brand: KENDALL

## (undated) DEVICE — SUT PROL 4-0 30IN SH1 DA BLU --

## (undated) DEVICE — PRESSURE MONITORING SET: Brand: TRUWAVE

## (undated) DEVICE — BANDAGE COMPR M W6INXL10YD WHT BGE VELC E MTRX HK AND LOOP

## (undated) DEVICE — TUBING, SUCTION, 1/4" X 12', STRAIGHT: Brand: MEDLINE

## (undated) DEVICE — (D)STRIP SKN CLSR 0.5X4IN WHT --

## (undated) DEVICE — 72" ARTERIAL PRESSURE TUBING: Brand: ICU MEDICAL

## (undated) DEVICE — MICROPUNCTURE INTRODUCER SET SILHOUETTE TRANSITIONLESS WITH STAINLESS STEEL WIRE GUIDE: Brand: MICROPUNCTURE

## (undated) DEVICE — TEMP PACING WIRE: Brand: MYO/WIRE

## (undated) DEVICE — INTENDED TO STANDARDIZE OR CAMERAS TO ALLOW FOR THE USE OF THE OR CAMERA COVER: Brand: ASPEN® O.R. CAMERA COVER

## (undated) DEVICE — TOWEL 4 PLY TISS 19X30 SUE WHT

## (undated) DEVICE — PAD,ABDOMINAL,5"X9",ST,LF,25/BX: Brand: MEDLINE INDUSTRIES, INC.

## (undated) DEVICE — PROVE COVER: Brand: UNBRANDED

## (undated) DEVICE — CANNULA INJ L2.5IN BLNT TIP 3MM CLR BODY W/ 1 W VLV DLP

## (undated) DEVICE — AIRLIFE™ U/CONNECT-IT OXYGEN TUBING 7 FEET (2.1 M) CRUSH-RESISTANT OXYGEN TUBING, VINYL TIPPED: Brand: AIRLIFE™

## (undated) DEVICE — BAG SPEC BIOHZRD 10 X 10 IN --

## (undated) DEVICE — INTRO SHTH 7FR 13X20CM -- TEARAWAY

## (undated) DEVICE — INFECTION CONTROL KIT SYS

## (undated) DEVICE — AGENT HEMSTAT 3GM PURIFIED PLNT STARCH PWD ABSRB ARISTA AH

## (undated) DEVICE — INTENDED FOR TISSUE SEPARATION, AND OTHER PROCEDURES THAT REQUIRE A SHARP SURGICAL BLADE TO PUNCTURE OR CUT.: Brand: BARD-PARKER ® CARBON RIB-BACK BLADES

## (undated) DEVICE — KIT MFLD ISOLATN NACL CNTRST PRT TBNG SPIK W/ PRSS TRNSDUC

## (undated) DEVICE — PLEDGET SUT SFT OVL 3 8X5 16IN

## (undated) DEVICE — INTRO SHTH 9FR 13X20CM -- USE ITEM# 341577

## (undated) DEVICE — CABLE PACE ALGTR CLP SAF 12FT --

## (undated) DEVICE — TOWEL,OR,DSP,ST,BLUE,STD,4/PK,20PK/CS: Brand: MEDLINE

## (undated) DEVICE — BAG RED 3PLY 2MIL 30X40 IN

## (undated) DEVICE — ANGIOGRAPHY KIT CUST [K0910930B] [MERIT MEDICAL SYSTEMS INC]

## (undated) DEVICE — SOLUTION IV 1000ML PH 7.4 INJ NRMSOL R

## (undated) DEVICE — REM POLYHESIVE ADULT PATIENT RETURN ELECTRODE: Brand: VALLEYLAB

## (undated) DEVICE — SUTURE VCRL SZ 2-0 L27IN ABSRB UD L26MM SH 1/2 CIR J417H

## (undated) DEVICE — ENDOSCOPY PUMP TUBING/ CAP SET: Brand: ERBE

## (undated) DEVICE — SUTURE ETHBND EXCEL SZ 2 L30IN NONABSORBABLE GRN L40MM V-37 MX69G

## (undated) DEVICE — 40418 TRENDELENBURG ONE-STEP ARM PROTECTORS LARGE (1 PAIR): Brand: 40418 TRENDELENBURG ONE-STEP ARM PROTECTORS LARGE (1 PAIR)

## (undated) DEVICE — COVER LT HNDL PLAS RIG 1 PER PK

## (undated) DEVICE — TR BAND RADIAL ARTERY COMPRESSION DEVICE: Brand: TR BAND

## (undated) DEVICE — STRAINER URIN CALC RNL MSH -- CONVERT TO ITEM 357634

## (undated) DEVICE — CATHETER DIAG 6FR L110CM INTRO 6FR BLLN DIA9MM 1CC PULM ART

## (undated) DEVICE — SUTURE TICRON DBL ARMED 2 0 CV 305 42IN BLU N ABSRB BRAID 8886303551

## (undated) DEVICE — SUT PROL 6-0 30IN C1 DA BLU --

## (undated) DEVICE — AVID DUAL STAGE VENOUS DRAINAGE CANNULA: Brand: AVID DUAL STAGE VENOUS DRAINAGE CANNULA

## (undated) DEVICE — SOLUTION IV 500ML 0.9% SOD CHL FLX CONT

## (undated) DEVICE — SYR 50ML LR LCK 1ML GRAD NSAF --

## (undated) DEVICE — DRAPE PRB US TRNSDCR 6X96IN --

## (undated) DEVICE — DRESSING SIL W4XL5IN ANTIBACT GELLING FBR CYTOFORM

## (undated) DEVICE — 3M™ IOBAN™ 2 ANTIMICROBIAL INCISE DRAPE 6640EZ: Brand: IOBAN™ 2

## (undated) DEVICE — VENT CATHETER: Brand: EDWARDS LIFESCIENCES VENT CATHETER

## (undated) DEVICE — GOWN,SIRUS,NONRNF,SETINSLV,XL,20/CS: Brand: MEDLINE

## (undated) DEVICE — TRAP,MUCUS SPECIMEN, 80CC: Brand: MEDLINE

## (undated) DEVICE — SYS VSL HARV HEMOPRO2 VASOVIEW -- HARV SYS MINIMUM ORDER 5/EA

## (undated) DEVICE — WRAP SURG W1.31XL1.34M CARD FOR PT 165-172CM THERMOWRP

## (undated) DEVICE — DRAPE FLD WRM W44XL66IN C6L FOR INTRATEMP SYS THERMABASIN

## (undated) DEVICE — CANNULA PERF L12IN DIA20FR GWIRE DIA0.038IN ART ELONG 1 PC

## (undated) DEVICE — TRANSFER PK BLD PROD 300ML --

## (undated) DEVICE — TELFA NON-ADHERENT PADS PREPAK: Brand: TELFA

## (undated) DEVICE — AORTIC PUNCHES ARE USED TO CREATE A UNIFORM OPENING IN BLOOD VESSELS DURING CARDIOVASCULAR SURGERY. THE VESSEL GRAFT IS INSERTED INTO THE CREATED OPENING AND SUTURED TO THE VESSEL WALL. AORTIC LANCETS ARE USED TO MAKE A SMALL UNIFORM CUT IN A BLOOD VESSEL TO FACILITATE INSERTION OF AN AORTIC PUNCH.  PUNCHES COME IN VARIOUS LENGTHS, DIAMETERS AND TIP CONFIGURATIONS.: Brand: CLEANCUT ROTATING AORTIC PUNCH